# Patient Record
Sex: FEMALE | Race: WHITE | NOT HISPANIC OR LATINO | Employment: OTHER | URBAN - METROPOLITAN AREA
[De-identification: names, ages, dates, MRNs, and addresses within clinical notes are randomized per-mention and may not be internally consistent; named-entity substitution may affect disease eponyms.]

---

## 2017-01-23 ENCOUNTER — HOSPITAL ENCOUNTER (OUTPATIENT)
Dept: RADIOLOGY | Facility: HOSPITAL | Age: 67
Discharge: HOME | End: 2017-01-23
Attending: ORTHOPAEDIC SURGERY
Payer: COMMERCIAL

## 2017-01-23 ENCOUNTER — OFFICE VISIT (OUTPATIENT)
Dept: ORTHOPEDICS | Facility: CLINIC | Age: 67
End: 2017-01-23
Payer: COMMERCIAL

## 2017-01-23 VITALS — WEIGHT: 165 LBS | HEIGHT: 68 IN | BODY MASS INDEX: 25.01 KG/M2

## 2017-01-23 DIAGNOSIS — M25.561 RIGHT KNEE PAIN, UNSPECIFIED CHRONICITY: Primary | ICD-10-CM

## 2017-01-23 DIAGNOSIS — M25.561 RIGHT KNEE PAIN, UNSPECIFIED CHRONICITY: ICD-10-CM

## 2017-01-23 DIAGNOSIS — M17.11 PRIMARY OSTEOARTHRITIS OF RIGHT KNEE: Primary | ICD-10-CM

## 2017-01-23 PROCEDURE — 99213 OFFICE O/P EST LOW 20 MIN: CPT | Mod: S$GLB,,, | Performed by: ORTHOPAEDIC SURGERY

## 2017-01-23 PROCEDURE — 73564 X-RAY EXAM KNEE 4 OR MORE: CPT | Mod: 26,RT,, | Performed by: RADIOLOGY

## 2017-01-23 PROCEDURE — 73562 X-RAY EXAM OF KNEE 3: CPT | Mod: 26,59,LT, | Performed by: RADIOLOGY

## 2017-01-23 PROCEDURE — 99999 PR PBB SHADOW E&M-EST. PATIENT-LVL II: CPT | Mod: PBBFAC,,, | Performed by: ORTHOPAEDIC SURGERY

## 2017-01-23 PROCEDURE — 73564 X-RAY EXAM KNEE 4 OR MORE: CPT | Mod: TC,PN,RT

## 2017-01-23 NOTE — PROGRESS NOTES
Past Medical History   Diagnosis Date    Depression     GERD (gastroesophageal reflux disease)     Hyperlipidemia     Hypertension     Hypothyroid        Past Surgical History   Procedure Laterality Date    Appendectomy      Tubal ligation      Tonsillectomy         Current Outpatient Prescriptions   Medication Sig    aspirin (ECOTRIN) 81 MG EC tablet Take 81 mg by mouth once daily.     atorvastatin (LIPITOR) 10 MG tablet One tablet daily    benzonatate (TESSALON) 100 MG capsule     BRAN/GUM/FIB/DAYRL/PSYL/KELP/PEC (FIBER 6 ORAL) Take by mouth.    buPROPion (WELLBUTRIN SR) 150 MG TBSR 12 hr tablet Take 150 mg by mouth 2 (two) times daily.    cetirizine (ZYRTEC) 10 MG tablet Take 10 mg by mouth once daily.    erythromycin (ROMYCIN) ophthalmic ointment Place into both eyes every 6 (six) hours.    Lactobacillus rhamnosus GG (CULTURELLE) 10 billion cell capsule Take 1 capsule by mouth once daily.    levothyroxine (SYNTHROID) 112 MCG tablet Take 1 tablet (112 mcg total) by mouth once daily.    lisinopril (PRINIVIL,ZESTRIL) 20 MG tablet Take 20 mg by mouth once daily.    metoprolol succinate (TOPROL-XL) 25 MG 24 hr tablet Take 25 mg by mouth once daily.     No current facility-administered medications for this visit.      Facility-Administered Medications Ordered in Other Visits   Medication    EUFLEXXA injection Syrg 20 mg       Review of patient's allergies indicates:   Allergen Reactions    Lidocaine      Hand edema,  redness    Penicillins Rash       Family History   Problem Relation Age of Onset    Cancer Father     Cancer Sister        Social History     Social History    Marital status:      Spouse name: N/A    Number of children: N/A    Years of education: N/A     Occupational History    Not on file.     Social History Main Topics    Smoking status: Never Smoker    Smokeless tobacco: Never Used    Alcohol use Yes      Comment: rarely    Drug use: No    Sexual activity: Yes      Other Topics Concern    Not on file     Social History Narrative       Chief Complaint:   Chief Complaint   Patient presents with    Left Knee - Pain       History: This is a 66-year-old female who is an employee here at [employer].  Patient has had a history of left knee arthritis since 2014.  Patient had a cortisone injection done 2 years ago which worked great and then another one about a month ago.  Patient was seeing another physician who is now out of network.  She was considering Synvisc at the time.  She would like to pursue this.  Symptoms are stable and moderate to severe.    Present: She comes in today because her right knee is giving him more trouble.  Patient had a Synvisc series of the left knee and has done great.  She is almost a year out and still having good results.  She would like to try the same thing on the right knee.  Pain started hurting about a week ago.  Pain walking and around the kneecap.  Pain today as a 0 out of 10 but up to a 6 out of 10 with activity.    Review of Systems:    Musculoskeletal:  See HPI        Physical Examination:    Vital Signs:    There were no vitals filed for this visit.    Body mass index is 25.09 kg/(m^2).    This a well-developed, well nourished patient in no acute distress.  They are alert and oriented and cooperative to examination.  Pt. walks without an antalgic gait.      Examination of the right knee shows no rashes or erythema. There are no masses ecchymosis or effusion. Patient has full range of motion from 0-130°. Patient is nontender to palpation over lateral joint line and moderately tender to palpation over the medial joint line.  Knee is stable to varus and valgus stress. 5 out of 5 motor strength. Palpable distal pulses. Intact light touch sensation.  Severe Patellofemoral crepitus      X-rays: 4 views of the right knee are ordered and reviewed which show arthritic changes with severe changes of the patellofemoral joint and more moderate changes  of the medial compartment of both knees     Assessment:: Bilateral knee osteoarthritis    Plan:  I reviewed her x-rays today.  Both knees are about equal.  I'm glad her left knee is doing so well.  I recommended doing a Euflexxa series of her right knee as well.  We will get authorization for this. follow-up in 2 weeks to start the injections..

## 2017-01-25 PROBLEM — M17.11 PRIMARY OSTEOARTHRITIS OF RIGHT KNEE: Status: ACTIVE | Noted: 2017-01-25

## 2017-02-10 ENCOUNTER — TELEPHONE (OUTPATIENT)
Dept: FAMILY MEDICINE | Facility: CLINIC | Age: 67
End: 2017-02-10

## 2017-02-10 ENCOUNTER — OFFICE VISIT (OUTPATIENT)
Dept: OPTOMETRY | Facility: CLINIC | Age: 67
End: 2017-02-10
Payer: COMMERCIAL

## 2017-02-10 DIAGNOSIS — J11.1 INFLUENZA: Primary | ICD-10-CM

## 2017-02-10 DIAGNOSIS — H25.13 NUCLEAR SCLEROSIS, BILATERAL: Primary | ICD-10-CM

## 2017-02-10 DIAGNOSIS — H52.7 REFRACTIVE ERROR: ICD-10-CM

## 2017-02-10 DIAGNOSIS — Z20.828 EXPOSURE TO THE FLU: ICD-10-CM

## 2017-02-10 PROCEDURE — 92015 DETERMINE REFRACTIVE STATE: CPT | Mod: S$GLB,,, | Performed by: OPTOMETRIST

## 2017-02-10 PROCEDURE — 92004 COMPRE OPH EXAM NEW PT 1/>: CPT | Mod: S$GLB,,, | Performed by: OPTOMETRIST

## 2017-02-10 PROCEDURE — 99999 PR PBB SHADOW E&M-EST. PATIENT-LVL II: CPT | Mod: PBBFAC,,, | Performed by: OPTOMETRIST

## 2017-02-10 RX ORDER — OSELTAMIVIR PHOSPHATE 75 MG/1
CAPSULE ORAL
Qty: 10 CAPSULE | Refills: 0 | Status: SHIPPED | OUTPATIENT
Start: 2017-02-10 | End: 2018-04-03 | Stop reason: ALTCHOICE

## 2017-02-10 NOTE — TELEPHONE ENCOUNTER
Patient states that her son in law has the flu. Also states that they were advised to start tamiflu. Please advise.

## 2017-02-10 NOTE — PROGRESS NOTES
HPI     CC: Pt here today for yearly eye exam. DLE 1 year    Pt states vision has been stable with current glasses.     (-) pain / (-) discomfort  (-) headaches  (-) diplopia   (-) flashes / (-) floaters         Last edited by Amos Leblanc on 2/10/2017  2:15 PM.     ROS     Positive for: Eyes    Negative for: Constitutional, Gastrointestinal, Neurological, Skin,   Genitourinary, Musculoskeletal, HENT, Endocrine, Cardiovascular,   Respiratory, Psychiatric, Allergic/Imm, Heme/Lymph    Last edited by Chon Dorantes, OD on 2/10/2017  2:40 PM. (History)        Assessment /Plan     For exam results, see Encounter Report.    Nuclear sclerosis, bilateral    Refractive error      Mild NS ou. Discussed possible ocular affects of cataracts. Acceptable BCVA OU. Discussed treatment options. Surgery not recommended at this time. Monitor yearly.     Dispensed updated spectacle Rx. Discussed various spectacle lens options. Discussed adaptation period to new specs. Demonstrated new spec Rx in phoropter with patient satisfaction.       RTC in 1 year for comprehensive eye exam, or sooner prn.

## 2017-02-10 NOTE — TELEPHONE ENCOUNTER
----- Message from Leslie Hernandez sent at 2/10/2017  8:08 AM CST -----  Contact: Patient  Patient's son in law has the flu and his Doctor recommended that everyone in the house be treated with Tamiflu. Please send a prescription to [facility] on [address].

## 2017-02-10 NOTE — TELEPHONE ENCOUNTER
----- Message from Ramona Sanabria sent at 2/9/2017  3:34 PM CST -----  Contact: Allison Trejo  Daughter called regarding the patient needing to be prescribed tamaflu. The patient's son was diagnosed with the flu and lives with him. Please contact Allison [phone]    [facility] [address] [place] [phone] [facility]  [address] [facility] [address]  Phone: [phone] Fax: [phone]

## 2017-08-24 DIAGNOSIS — Z12.11 COLON CANCER SCREENING: ICD-10-CM

## 2017-08-28 ENCOUNTER — OFFICE VISIT (OUTPATIENT)
Dept: ORTHOPEDICS | Facility: CLINIC | Age: 67
End: 2017-08-28
Payer: COMMERCIAL

## 2017-08-28 ENCOUNTER — HOSPITAL ENCOUNTER (OUTPATIENT)
Dept: RADIOLOGY | Facility: HOSPITAL | Age: 67
Discharge: HOME | End: 2017-08-28
Attending: ORTHOPAEDIC SURGERY
Payer: COMMERCIAL

## 2017-08-28 VITALS
BODY MASS INDEX: 25.01 KG/M2 | DIASTOLIC BLOOD PRESSURE: 85 MMHG | HEART RATE: 75 BPM | HEIGHT: 68 IN | SYSTOLIC BLOOD PRESSURE: 142 MMHG | WEIGHT: 165 LBS

## 2017-08-28 DIAGNOSIS — M75.101 ROTATOR CUFF SYNDROME, RIGHT: Primary | ICD-10-CM

## 2017-08-28 DIAGNOSIS — M25.511 RIGHT SHOULDER PAIN, UNSPECIFIED CHRONICITY: ICD-10-CM

## 2017-08-28 DIAGNOSIS — M25.511 RIGHT SHOULDER PAIN, UNSPECIFIED CHRONICITY: Primary | ICD-10-CM

## 2017-08-28 PROCEDURE — 73030 X-RAY EXAM OF SHOULDER: CPT | Mod: 26,RT,, | Performed by: RADIOLOGY

## 2017-08-28 PROCEDURE — 99999 PR PBB SHADOW E&M-EST. PATIENT-LVL III: CPT | Mod: PBBFAC,,, | Performed by: ORTHOPAEDIC SURGERY

## 2017-08-28 PROCEDURE — 99214 OFFICE O/P EST MOD 30 MIN: CPT | Mod: 25,S$GLB,, | Performed by: ORTHOPAEDIC SURGERY

## 2017-08-28 PROCEDURE — 3077F SYST BP >= 140 MM HG: CPT | Mod: S$GLB,,, | Performed by: ORTHOPAEDIC SURGERY

## 2017-08-28 PROCEDURE — 3008F BODY MASS INDEX DOCD: CPT | Mod: S$GLB,,, | Performed by: ORTHOPAEDIC SURGERY

## 2017-08-28 PROCEDURE — 73030 X-RAY EXAM OF SHOULDER: CPT | Mod: TC,PN,RT

## 2017-08-28 PROCEDURE — 3079F DIAST BP 80-89 MM HG: CPT | Mod: S$GLB,,, | Performed by: ORTHOPAEDIC SURGERY

## 2017-08-28 PROCEDURE — 20610 DRAIN/INJ JOINT/BURSA W/O US: CPT | Mod: RT,S$GLB,, | Performed by: ORTHOPAEDIC SURGERY

## 2017-08-28 RX ORDER — TRIAMCINOLONE ACETONIDE 40 MG/ML
40 INJECTION, SUSPENSION INTRA-ARTICULAR; INTRAMUSCULAR
Status: DISCONTINUED | OUTPATIENT
Start: 2017-08-28 | End: 2017-08-28 | Stop reason: HOSPADM

## 2017-08-28 RX ADMIN — TRIAMCINOLONE ACETONIDE 40 MG: 40 INJECTION, SUSPENSION INTRA-ARTICULAR; INTRAMUSCULAR at 03:08

## 2017-08-28 NOTE — PROGRESS NOTES
Past Medical History:   Diagnosis Date    Depression     GERD (gastroesophageal reflux disease)     Hyperlipidemia     Hypertension     Hypothyroid        Past Surgical History:   Procedure Laterality Date    APPENDECTOMY      TONSILLECTOMY      TUBAL LIGATION         Current Outpatient Prescriptions   Medication Sig    aspirin (ECOTRIN) 81 MG EC tablet Take 81 mg by mouth once daily.     atorvastatin (LIPITOR) 10 MG tablet One tablet daily    benzonatate (TESSALON) 100 MG capsule     BRAN/GUM/FIB/DARYL/PSYL/KELP/PEC (FIBER 6 ORAL) Take by mouth.    buPROPion (WELLBUTRIN SR) 150 MG TBSR 12 hr tablet Take 150 mg by mouth 2 (two) times daily.    cetirizine (ZYRTEC) 10 MG tablet Take 10 mg by mouth once daily.    erythromycin (ROMYCIN) ophthalmic ointment Place into both eyes every 6 (six) hours.    Lactobacillus rhamnosus GG (CULTURELLE) 10 billion cell capsule Take 1 capsule by mouth once daily.    lisinopril (PRINIVIL,ZESTRIL) 20 MG tablet Take 20 mg by mouth once daily.    metoprolol succinate (TOPROL-XL) 25 MG 24 hr tablet Take 25 mg by mouth once daily.    oseltamivir (TAMIFLU) 75 MG capsule Take 1 po qday until out. If symptoms of flu-fever, headache, body aches then increase to bid until out     No current facility-administered medications for this visit.      Facility-Administered Medications Ordered in Other Visits   Medication    EUFLEXXA injection Syrg 20 mg       Review of patient's allergies indicates:   Allergen Reactions    Lidocaine      Hand edema,  redness    Penicillins Rash       Family History   Problem Relation Age of Onset    Cancer Father     Cancer Sister     Glaucoma Neg Hx     Macular degeneration Neg Hx     Retinal detachment Neg Hx        Social History     Social History    Marital status:      Spouse name: N/A    Number of children: N/A    Years of education: N/A     Occupational History    Not on file.     Social History Main Topics    Smoking  status: Never Smoker    Smokeless tobacco: Never Used    Alcohol use Yes      Comment: rarely    Drug use: No    Sexual activity: Yes     Other Topics Concern    Not on file     Social History Narrative    No narrative on file       Chief Complaint:   Chief Complaint   Patient presents with    Shoulder Pain     RIGHT SHOULDER PAIN       History of present illness: Is a 66-year-old pleasant right-hand-dominant female seen for right shoulder pain began on August 25, 2017 when she awoke.  Patient doesn't recall a trauma or injury.  Patient has tried Tylenol and heat.  She's tried icing.  She has limited range of motion and significant pain.  No history of shoulder trouble.  Pain as a 6 out of 10.      Review of Systems:    Constitution: Negative for chills, fever, and sweats.  Negative for unexplained weight loss.    HENT:  Negative for headaches and blurry vision.    Cardiovascular:Negative for chest pain or irregular heart beat. Negative for hypertension.    Respiratory:  Negative for cough and shortness of breath.    Gastrointestinal: Negative for abdominal pain, heartburn, melena, nausea, and vomitting.    Genitourinary:  Negative bladder incontinence and dysuria.    Musculoskeletal:  See HPI    Neurological: Negative for numbness.    Psychiatric/Behavioral: Negative for depression.  The patient is not nervous/anxious.      Endocrine: Negative for polyuria    Hematologic/Lymphatic: Negative for bleeding problem.  Does not bruise/bleed easily.    Skin: Negative for poor would healing and rash      Physical Examination:    Vital Signs:    Vitals:    08/28/17 1356   BP: (!) 142/85   Pulse: 75       Body mass index is 25.09 kg/m².    This a well-developed, well nourished patient in no acute distress.  They are alert and oriented and cooperative to examination.  Pt. walks without an antalgic gait.      Examination of the right shoulder shows no rashes or erythema. There are no masses, ecchymosis, or atrophy. The  patient has mild limitation in active range of motion in forward flexion, external rotation, and internal rotation to the mid T-spine. The patient has markedly positive impingement signs. - Sumter's test. - Speeds test. Nontender to palpation over a.c. joint. Normal stability anteriorly, posteriorly, and negative sulcus sign. Passive range of motion: Forward flexion of 180°, external rotation at 90° of 90°, internal rotation of 50°, and external rotation at 0° of 50°. 2+ radial pulse. Intact axillary, radial, median and ulnar sensation. 5 out of 5 resisted forward flexion, external rotation, and negative lift off test.    Examination of the left shoulder shows no rashes or erythema. There are no masses, ecchymosis, or atrophy. The patient has full range of motion in forward flexion, external rotation, and internal rotation to the mid T-spine. The patient has - impingement signs. - Sumter's test. - Speeds test. Nontender to palpation over a.c. joint. Normal stability anteriorly, posteriorly, and negative sulcus sign. Passive range of motion: Forward flexion of 180°, external rotation at 90° of 90°, internal rotation of 50°, and external rotation at 0° of 50°. 2+ radial pulse. Intact axillary, radial, median and ulnar sensation. 5 out of 5 resisted forward flexion, external rotation, and negative lift off test.    X-rays: X-rays of the right shoulder ordered and reviewed which show some mild degenerative changes throughout the shoulder     Assessment:: Right rotator cuff syndrome    Plan:  I reviewed the x-rays and the findings with her and her daughter today.  I recommended cortisone injection to help with her acute flareup.  Hopefully this will resolve all of her pain.  We talked about possibly getting her into some physical therapy her needing an MRI if the pain should persist.    This note was created using Dragon voice recognition software that occasionally misinterpreted phrases or words.    Consult note is  delivered via Epic messaging service.

## 2017-08-28 NOTE — PROCEDURES
Large Joint Aspiration/Injection  Date/Time: 8/28/2017 3:32 PM  Performed by: REGULO PORRAS  Authorized by: REGULO PORRAS     Consent Done?:  Yes (Verbal)  Indications:  Pain  Procedure site marked: Yes    Timeout: Prior to procedure the correct patient, procedure, and site was verified      Location:  Shoulder  Site:  R subacromial bursa  Prep: Patient was prepped and draped in usual sterile fashion    Ultrasonic Guidance for needle placement: No  Needle size:  20 G  Approach:  Posterior  Medications:  40 mg triamcinolone acetonide 40 mg/mL  Patient tolerance:  Patient tolerated the procedure well with no immediate complications

## 2017-09-27 ENCOUNTER — LAB VISIT (OUTPATIENT)
Dept: LAB | Facility: HOSPITAL | Age: 67
End: 2017-09-27
Attending: SPECIALIST
Payer: COMMERCIAL

## 2017-09-27 DIAGNOSIS — I20.0 INTERMEDIATE CORONARY SYNDROME: Primary | ICD-10-CM

## 2017-09-27 DIAGNOSIS — Z13.29 SCREENING FOR THYROID DISORDER: ICD-10-CM

## 2017-09-27 DIAGNOSIS — E78.9 UNSPECIFIED DISORDER OF LIPOID METABOLISM: ICD-10-CM

## 2017-09-27 DIAGNOSIS — E05.90 THYROTOXICOSIS WITHOUT MENTION OF GOITER OR OTHER CAUSE, WITHOUT MENTION OF THYROTOXIC CRISIS OR STORM: ICD-10-CM

## 2017-09-27 DIAGNOSIS — R07.89 OTHER CHEST PAIN: ICD-10-CM

## 2017-09-27 LAB
ALBUMIN SERPL BCP-MCNC: 3.9 G/DL
ALP SERPL-CCNC: 58 U/L
ALT SERPL W/O P-5'-P-CCNC: 22 U/L
ANION GAP SERPL CALC-SCNC: 8 MMOL/L
AST SERPL-CCNC: 21 U/L
BASOPHILS # BLD AUTO: 0 K/UL
BASOPHILS NFR BLD: 0.2 %
BILIRUB SERPL-MCNC: 0.8 MG/DL
BUN SERPL-MCNC: 12 MG/DL
CALCIUM SERPL-MCNC: 9.8 MG/DL
CHLORIDE SERPL-SCNC: 105 MMOL/L
CHOLEST SERPL-MCNC: 242 MG/DL
CHOLEST/HDLC SERPL: 3 {RATIO}
CO2 SERPL-SCNC: 28 MMOL/L
CREAT SERPL-MCNC: 0.8 MG/DL
DIFFERENTIAL METHOD: ABNORMAL
EOSINOPHIL # BLD AUTO: 0.1 K/UL
EOSINOPHIL NFR BLD: 1.5 %
ERYTHROCYTE [DISTWIDTH] IN BLOOD BY AUTOMATED COUNT: 14.1 %
EST. GFR  (AFRICAN AMERICAN): >60 ML/MIN/1.73 M^2
EST. GFR  (NON AFRICAN AMERICAN): >60 ML/MIN/1.73 M^2
GLUCOSE SERPL-MCNC: 91 MG/DL
HCT VFR BLD AUTO: 39 %
HDLC SERPL-MCNC: 82 MG/DL
HDLC SERPL: 33.9 %
HGB BLD-MCNC: 13.2 G/DL
LDLC SERPL CALC-MCNC: 148 MG/DL
LYMPHOCYTES # BLD AUTO: 2.3 K/UL
LYMPHOCYTES NFR BLD: 42.9 %
MCH RBC QN AUTO: 29.9 PG
MCHC RBC AUTO-ENTMCNC: 34 G/DL
MCV RBC AUTO: 88 FL
MONOCYTES # BLD AUTO: 0.4 K/UL
MONOCYTES NFR BLD: 8.3 %
NEUTROPHILS # BLD AUTO: 2.5 K/UL
NEUTROPHILS NFR BLD: 47.1 %
NONHDLC SERPL-MCNC: 160 MG/DL
PLATELET # BLD AUTO: 214 K/UL
PMV BLD AUTO: 8.6 FL
POTASSIUM SERPL-SCNC: 4.3 MMOL/L
PROT SERPL-MCNC: 7 G/DL
RBC # BLD AUTO: 4.44 M/UL
SODIUM SERPL-SCNC: 141 MMOL/L
TRIGL SERPL-MCNC: 60 MG/DL
TSH SERPL DL<=0.005 MIU/L-ACNC: 2.28 UIU/ML
WBC # BLD AUTO: 5.4 K/UL

## 2017-09-27 PROCEDURE — 80061 LIPID PANEL: CPT

## 2017-09-27 PROCEDURE — 80053 COMPREHEN METABOLIC PANEL: CPT

## 2017-09-27 PROCEDURE — 84443 ASSAY THYROID STIM HORMONE: CPT

## 2017-09-27 PROCEDURE — 36415 COLL VENOUS BLD VENIPUNCTURE: CPT

## 2017-09-27 PROCEDURE — 85025 COMPLETE CBC W/AUTO DIFF WBC: CPT

## 2017-09-28 DIAGNOSIS — Z12.31 SCREENING MAMMOGRAM, ENCOUNTER FOR: ICD-10-CM

## 2017-09-28 DIAGNOSIS — Z12.31 OTHER SCREENING MAMMOGRAM: ICD-10-CM

## 2017-11-30 ENCOUNTER — OFFICE VISIT (OUTPATIENT)
Dept: ORTHOPEDICS | Facility: CLINIC | Age: 67
End: 2017-11-30
Payer: COMMERCIAL

## 2017-11-30 VITALS
BODY MASS INDEX: 25.01 KG/M2 | SYSTOLIC BLOOD PRESSURE: 142 MMHG | HEIGHT: 68 IN | WEIGHT: 165 LBS | HEART RATE: 71 BPM | DIASTOLIC BLOOD PRESSURE: 67 MMHG

## 2017-11-30 DIAGNOSIS — M17.11 PRIMARY OSTEOARTHRITIS OF RIGHT KNEE: Primary | ICD-10-CM

## 2017-11-30 PROCEDURE — 99213 OFFICE O/P EST LOW 20 MIN: CPT | Mod: 25,S$GLB,, | Performed by: ORTHOPAEDIC SURGERY

## 2017-11-30 PROCEDURE — 20610 DRAIN/INJ JOINT/BURSA W/O US: CPT | Mod: RT,S$GLB,, | Performed by: ORTHOPAEDIC SURGERY

## 2017-11-30 PROCEDURE — 99999 PR PBB SHADOW E&M-EST. PATIENT-LVL III: CPT | Mod: PBBFAC,,, | Performed by: ORTHOPAEDIC SURGERY

## 2017-11-30 RX ORDER — TRIAMCINOLONE ACETONIDE 40 MG/ML
40 INJECTION, SUSPENSION INTRA-ARTICULAR; INTRAMUSCULAR
Status: DISCONTINUED | OUTPATIENT
Start: 2017-11-30 | End: 2017-11-30 | Stop reason: HOSPADM

## 2017-11-30 RX ADMIN — TRIAMCINOLONE ACETONIDE 40 MG: 40 INJECTION, SUSPENSION INTRA-ARTICULAR; INTRAMUSCULAR at 01:11

## 2017-11-30 NOTE — PROGRESS NOTES
Past Medical History:   Diagnosis Date    Depression     GERD (gastroesophageal reflux disease)     Hyperlipidemia     Hypertension     Hypothyroid        Past Surgical History:   Procedure Laterality Date    APPENDECTOMY      TONSILLECTOMY      TUBAL LIGATION         Current Outpatient Prescriptions   Medication Sig    aspirin (ECOTRIN) 81 MG EC tablet Take 81 mg by mouth once daily.     atorvastatin (LIPITOR) 10 MG tablet One tablet daily    benzonatate (TESSALON) 100 MG capsule     BRAN/GUM/FIB/DARYL/PSYL/KELP/PEC (FIBER 6 ORAL) Take by mouth.    buPROPion (WELLBUTRIN SR) 150 MG TBSR 12 hr tablet Take 150 mg by mouth 2 (two) times daily.    cetirizine (ZYRTEC) 10 MG tablet Take 10 mg by mouth once daily.    erythromycin (ROMYCIN) ophthalmic ointment Place into both eyes every 6 (six) hours.    Lactobacillus rhamnosus GG (CULTURELLE) 10 billion cell capsule Take 1 capsule by mouth once daily.    lisinopril (PRINIVIL,ZESTRIL) 20 MG tablet Take 20 mg by mouth once daily.    metoprolol succinate (TOPROL-XL) 25 MG 24 hr tablet Take 25 mg by mouth once daily.    oseltamivir (TAMIFLU) 75 MG capsule Take 1 po qday until out. If symptoms of flu-fever, headache, body aches then increase to bid until out     No current facility-administered medications for this visit.      Facility-Administered Medications Ordered in Other Visits   Medication    EUFLEXXA injection Syrg 20 mg       Review of patient's allergies indicates:   Allergen Reactions    Lidocaine      Hand edema,  redness    Penicillins Rash       Family History   Problem Relation Age of Onset    Cancer Father     Cancer Sister     Glaucoma Neg Hx     Macular degeneration Neg Hx     Retinal detachment Neg Hx        Social History     Social History    Marital status:      Spouse name: N/A    Number of children: N/A    Years of education: N/A     Occupational History    Not on file.     Social History Main Topics    Smoking  status: Never Smoker    Smokeless tobacco: Never Used    Alcohol use Yes      Comment: rarely    Drug use: No    Sexual activity: Yes     Other Topics Concern    Not on file     Social History Narrative    No narrative on file       Chief Complaint:   Chief Complaint   Patient presents with    Knee Pain     right knee pain       History: This is a 67-year-old female who is an employee here at [employer].  Patient has had a history of left knee arthritis since 2014.  Patient had a cortisone injection done 2 years ago which worked great and then another one about a month ago.  Patient was seeing another physician who is now out of network.  She was considering Synvisc at the time.  She would like to pursue this.  Symptoms are stable and moderate to severe.    Present: She comes in today because her right knee is giving her more trouble.  Patient had a Synvisc series of the left knee and has done great.  Euflexxa worked on the right knee.  Pain is back up to a 5 out of 10.      Review of systems:   Musculoskeletal: See history of present illness    Physical examination:    Vital Signs:    Vitals:    11/30/17 1024   BP: (!) 142/67   Pulse: 71       Body mass index is 25.09 kg/m².    This a well-developed, well nourished patient in no acute distress.  They are alert and oriented and cooperative to examination.  Pt. walks without an antalgic gait.      Examination of the right knee shows no rashes or erythema. There are no masses ecchymosis or effusion. Patient has full range of motion from 0-130°. Patient is nontender to palpation over lateral joint line and moderately tender to palpation over the medial joint line.  Knee is stable to varus and valgus stress. 5 out of 5 motor strength. Palpable distal pulses. Intact light touch sensation.  Severe Patellofemoral crepitus      X-rays: 4 views of the right knee are  reviewed which show arthritic changes with severe changes of the patellofemoral joint and more moderate  changes of the medial compartment of both knees     Assessment:: Bilateral knee osteoarthritis    Plan:  I reviewed her x-rays today.  Both knees are about equal.  I'm glad her left knee is doing so well.  We talked about treatment today.  She would like to try and repeat a Euflexxa series.  We will do injection of steroid today and follow-up in 2 weeks with Euflexxa.

## 2017-11-30 NOTE — PROCEDURES
Large Joint Aspiration/Injection  Date/Time: 11/30/2017 1:06 PM  Performed by: REGULO PORRAS  Authorized by: REGULO PORRAS     Consent Done?:  Yes (Verbal)  Indications:  Pain  Procedure site marked: Yes    Timeout: Prior to procedure the correct patient, procedure, and site was verified      Location:  Knee  Site:  R knee  Prep: Patient was prepped and draped in usual sterile fashion    Needle size:  20 G  Approach:  Anterolateral  Medications:  40 mg triamcinolone acetonide 40 mg/mL  Patient tolerance:  Patient tolerated the procedure well with no immediate complications

## 2017-12-14 ENCOUNTER — OFFICE VISIT (OUTPATIENT)
Dept: ORTHOPEDICS | Facility: CLINIC | Age: 67
End: 2017-12-14
Payer: COMMERCIAL

## 2017-12-14 DIAGNOSIS — M17.11 PRIMARY OSTEOARTHRITIS OF RIGHT KNEE: Primary | ICD-10-CM

## 2017-12-14 PROCEDURE — 20610 DRAIN/INJ JOINT/BURSA W/O US: CPT | Mod: RT,S$GLB,, | Performed by: ORTHOPAEDIC SURGERY

## 2017-12-14 PROCEDURE — 99499 UNLISTED E&M SERVICE: CPT | Mod: S$GLB,,, | Performed by: ORTHOPAEDIC SURGERY

## 2017-12-14 PROCEDURE — 99999 PR PBB SHADOW E&M-EST. PATIENT-LVL II: CPT | Mod: PBBFAC,,, | Performed by: ORTHOPAEDIC SURGERY

## 2017-12-14 RX ORDER — HYALURONATE SODIUM 20 MG/2 ML
20 SYRINGE (ML) INTRAARTICULAR
Status: DISCONTINUED | OUTPATIENT
Start: 2017-12-14 | End: 2017-12-14 | Stop reason: HOSPADM

## 2017-12-14 RX ADMIN — Medication 20 MG: at 08:12

## 2017-12-14 NOTE — PROCEDURES
Large Joint Aspiration/Injection  Date/Time: 12/14/2017 8:18 AM  Performed by: REGULO PORRAS  Authorized by: REGULO PORRAS     Consent Done?:  Yes (Verbal)  Indications:  Pain  Procedure site marked: Yes    Timeout: Prior to procedure the correct patient, procedure, and site was verified      Location:  Knee  Site:  R knee  Prep: Patient was prepped and draped in usual sterile fashion    Needle size:  20 G  Approach:  Anterolateral  Medications:  20 mg EUFLEXXA 10 mg/mL(mw 2.4 -3.6 million)  Patient tolerance:  Patient tolerated the procedure well with no immediate complications

## 2017-12-14 NOTE — PROGRESS NOTES
Past Medical History:   Diagnosis Date    Depression     GERD (gastroesophageal reflux disease)     Hyperlipidemia     Hypertension     Hypothyroid        Past Surgical History:   Procedure Laterality Date    APPENDECTOMY      TONSILLECTOMY      TUBAL LIGATION         Current Outpatient Prescriptions   Medication Sig    aspirin (ECOTRIN) 81 MG EC tablet Take 81 mg by mouth once daily.     atorvastatin (LIPITOR) 10 MG tablet One tablet daily    benzonatate (TESSALON) 100 MG capsule     BRAN/GUM/FIB/DARYL/PSYL/KELP/PEC (FIBER 6 ORAL) Take by mouth.    buPROPion (WELLBUTRIN SR) 150 MG TBSR 12 hr tablet Take 150 mg by mouth 2 (two) times daily.    cetirizine (ZYRTEC) 10 MG tablet Take 10 mg by mouth once daily.    erythromycin (ROMYCIN) ophthalmic ointment Place into both eyes every 6 (six) hours.    Lactobacillus rhamnosus GG (CULTURELLE) 10 billion cell capsule Take 1 capsule by mouth once daily.    lisinopril (PRINIVIL,ZESTRIL) 20 MG tablet Take 20 mg by mouth once daily.    metoprolol succinate (TOPROL-XL) 25 MG 24 hr tablet Take 25 mg by mouth once daily.    oseltamivir (TAMIFLU) 75 MG capsule Take 1 po qday until out. If symptoms of flu-fever, headache, body aches then increase to bid until out     No current facility-administered medications for this visit.      Facility-Administered Medications Ordered in Other Visits   Medication    EUFLEXXA injection Syrg 20 mg       Review of patient's allergies indicates:   Allergen Reactions    Lidocaine      Hand edema,  redness    Penicillins Rash       Family History   Problem Relation Age of Onset    Cancer Father     Cancer Sister     Glaucoma Neg Hx     Macular degeneration Neg Hx     Retinal detachment Neg Hx        Social History     Social History    Marital status:      Spouse name: N/A    Number of children: N/A    Years of education: N/A     Occupational History    Not on file.     Social History Main Topics    Smoking  status: Never Smoker    Smokeless tobacco: Never Used    Alcohol use Yes      Comment: rarely    Drug use: No    Sexual activity: Yes     Other Topics Concern    Not on file     Social History Narrative    No narrative on file       Chief Complaint:   Chief Complaint   Patient presents with    Knee Pain     R knee euflexxa 1/3       History: This is a 67-year-old female who is an employee here at [employer].  Patient has had a history of left knee arthritis since 2014.  Patient had a cortisone injection done 2 years ago which worked great and then another one about a month ago.  Patient was seeing another physician who is now out of network.  She was considering Synvisc at the time.  She would like to pursue this.  Symptoms are stable and moderate to severe.    Present: She comes in today because her right knee is giving her more trouble.  Patient had a Synvisc series of the left knee and has done great.  Euflexxa worked on the right knee.  Pain is back up to a 5 out of 10.      Review of systems:   Musculoskeletal: See history of present illness    Physical examination:    Vital Signs:    There were no vitals filed for this visit.    There is no height or weight on file to calculate BMI.    This a well-developed, well nourished patient in no acute distress.  They are alert and oriented and cooperative to examination.  Pt. walks without an antalgic gait.      Examination of the right knee shows no rashes or erythema. There are no masses ecchymosis or effusion. Patient has full range of motion from 0-130°. Patient is nontender to palpation over lateral joint line and moderately tender to palpation over the medial joint line.  Knee is stable to varus and valgus stress. 5 out of 5 motor strength. Palpable distal pulses. Intact light touch sensation.  Severe Patellofemoral crepitus      X-rays: 4 views of the right knee are  reviewed which show arthritic changes with severe changes of the patellofemoral joint and  more moderate changes of the medial compartment of both knees     Assessment:: Bilateral knee osteoarthritis    Plan:  I injected her right knee with Euflexxa 1 of 3.  Follow-up next week.

## 2017-12-21 ENCOUNTER — OFFICE VISIT (OUTPATIENT)
Dept: ORTHOPEDICS | Facility: CLINIC | Age: 67
End: 2017-12-21
Payer: COMMERCIAL

## 2017-12-21 VITALS — HEIGHT: 68 IN | WEIGHT: 165 LBS | BODY MASS INDEX: 25.01 KG/M2

## 2017-12-21 DIAGNOSIS — M17.11 PRIMARY OSTEOARTHRITIS OF RIGHT KNEE: Primary | ICD-10-CM

## 2017-12-21 PROCEDURE — 99499 UNLISTED E&M SERVICE: CPT | Mod: S$GLB,,, | Performed by: ORTHOPAEDIC SURGERY

## 2017-12-21 PROCEDURE — 20610 DRAIN/INJ JOINT/BURSA W/O US: CPT | Mod: RT,S$GLB,, | Performed by: ORTHOPAEDIC SURGERY

## 2017-12-21 PROCEDURE — 99999 PR PBB SHADOW E&M-EST. PATIENT-LVL II: CPT | Mod: PBBFAC,,, | Performed by: ORTHOPAEDIC SURGERY

## 2017-12-21 RX ORDER — HYALURONATE SODIUM 20 MG/2 ML
20 SYRINGE (ML) INTRAARTICULAR
Status: DISCONTINUED | OUTPATIENT
Start: 2017-12-21 | End: 2017-12-21 | Stop reason: HOSPADM

## 2017-12-21 RX ADMIN — Medication 20 MG: at 08:12

## 2017-12-21 NOTE — PROGRESS NOTES
Past Medical History:   Diagnosis Date    Depression     GERD (gastroesophageal reflux disease)     Hyperlipidemia     Hypertension     Hypothyroid        Past Surgical History:   Procedure Laterality Date    APPENDECTOMY      TONSILLECTOMY      TUBAL LIGATION         Current Outpatient Prescriptions   Medication Sig    aspirin (ECOTRIN) 81 MG EC tablet Take 81 mg by mouth once daily.     atorvastatin (LIPITOR) 10 MG tablet One tablet daily    benzonatate (TESSALON) 100 MG capsule     BRAN/GUM/FIB/DARYL/PSYL/KELP/PEC (FIBER 6 ORAL) Take by mouth.    buPROPion (WELLBUTRIN SR) 150 MG TBSR 12 hr tablet Take 150 mg by mouth 2 (two) times daily.    cetirizine (ZYRTEC) 10 MG tablet Take 10 mg by mouth once daily.    erythromycin (ROMYCIN) ophthalmic ointment Place into both eyes every 6 (six) hours.    Lactobacillus rhamnosus GG (CULTURELLE) 10 billion cell capsule Take 1 capsule by mouth once daily.    lisinopril (PRINIVIL,ZESTRIL) 20 MG tablet Take 20 mg by mouth once daily.    metoprolol succinate (TOPROL-XL) 25 MG 24 hr tablet Take 25 mg by mouth once daily.    oseltamivir (TAMIFLU) 75 MG capsule Take 1 po qday until out. If symptoms of flu-fever, headache, body aches then increase to bid until out     No current facility-administered medications for this visit.      Facility-Administered Medications Ordered in Other Visits   Medication    EUFLEXXA injection Syrg 20 mg       Review of patient's allergies indicates:   Allergen Reactions    Lidocaine      Hand edema,  redness    Penicillins Rash       Family History   Problem Relation Age of Onset    Cancer Father     Cancer Sister     Glaucoma Neg Hx     Macular degeneration Neg Hx     Retinal detachment Neg Hx        Social History     Social History    Marital status:      Spouse name: N/A    Number of children: N/A    Years of education: N/A     Occupational History    Not on file.     Social History Main Topics    Smoking  status: Never Smoker    Smokeless tobacco: Never Used    Alcohol use Yes      Comment: rarely    Drug use: No    Sexual activity: Yes     Other Topics Concern    Not on file     Social History Narrative    No narrative on file       Chief Complaint:   Chief Complaint   Patient presents with    Knee Pain     right knee-Euflexxa 2/3        History: This is a 67-year-old female who is an employee here at [employer].  Patient has had a history of left knee arthritis since 2014.  Patient had a cortisone injection done 2 years ago which worked great and then another one about a month ago.  Patient was seeing another physician who is now out of network.  She was considering Synvisc at the time.  She would like to pursue this.  Symptoms are stable and moderate to severe.    Present: She comes in today because her right knee is giving her more trouble.  Patient had a Synvisc series of the left knee and has done great.  Euflexxa worked on the right knee.  Pain is back up to a 5 out of 10.      Review of systems:   Musculoskeletal: See history of present illness    Physical examination:    Vital Signs:    There were no vitals filed for this visit.    Body mass index is 25.09 kg/m².    This a well-developed, well nourished patient in no acute distress.  They are alert and oriented and cooperative to examination.  Pt. walks without an antalgic gait.      Examination of the right knee shows no rashes or erythema. There are no masses ecchymosis or effusion. Patient has full range of motion from 0-130°. Patient is nontender to palpation over lateral joint line and moderately tender to palpation over the medial joint line.  Knee is stable to varus and valgus stress. 5 out of 5 motor strength. Palpable distal pulses. Intact light touch sensation.  Severe Patellofemoral crepitus      X-rays: 4 views of the right knee are  reviewed which show arthritic changes with severe changes of the patellofemoral joint and more moderate  changes of the medial compartment of both knees     Assessment:: Bilateral knee osteoarthritis    Plan:  I injected her right knee with Euflexxa 2 of 3.  Follow-up next week.

## 2017-12-21 NOTE — PROCEDURES
Large Joint Aspiration/Injection  Date/Time: 12/21/2017 8:21 AM  Performed by: REGULO PORRAS  Authorized by: REGULO PORRAS     Consent Done?:  Yes (Verbal)  Indications:  Pain  Procedure site marked: Yes    Timeout: Prior to procedure the correct patient, procedure, and site was verified      Location:  Knee  Site:  R knee  Prep: Patient was prepped and draped in usual sterile fashion    Needle size:  20 G  Approach:  Anterolateral  Medications:  20 mg EUFLEXXA 10 mg/mL(mw 2.4 -3.6 million)  Patient tolerance:  Patient tolerated the procedure well with no immediate complications

## 2017-12-28 ENCOUNTER — OFFICE VISIT (OUTPATIENT)
Dept: ORTHOPEDICS | Facility: CLINIC | Age: 67
End: 2017-12-28
Payer: COMMERCIAL

## 2017-12-28 DIAGNOSIS — M17.11 PRIMARY OSTEOARTHRITIS OF RIGHT KNEE: Primary | ICD-10-CM

## 2017-12-28 PROCEDURE — 20610 DRAIN/INJ JOINT/BURSA W/O US: CPT | Mod: RT,S$GLB,, | Performed by: ORTHOPAEDIC SURGERY

## 2017-12-28 PROCEDURE — 99499 UNLISTED E&M SERVICE: CPT | Mod: S$GLB,,, | Performed by: ORTHOPAEDIC SURGERY

## 2017-12-28 PROCEDURE — 99999 PR PBB SHADOW E&M-EST. PATIENT-LVL II: CPT | Mod: PBBFAC,,, | Performed by: ORTHOPAEDIC SURGERY

## 2017-12-28 RX ORDER — HYALURONATE SODIUM 20 MG/2 ML
20 SYRINGE (ML) INTRAARTICULAR
Status: DISCONTINUED | OUTPATIENT
Start: 2017-12-28 | End: 2017-12-28 | Stop reason: HOSPADM

## 2017-12-28 RX ADMIN — Medication 20 MG: at 10:12

## 2017-12-28 NOTE — PROGRESS NOTES
Past Medical History:   Diagnosis Date    Depression     GERD (gastroesophageal reflux disease)     Hyperlipidemia     Hypertension     Hypothyroid        Past Surgical History:   Procedure Laterality Date    APPENDECTOMY      TONSILLECTOMY      TUBAL LIGATION         Current Outpatient Prescriptions   Medication Sig    aspirin (ECOTRIN) 81 MG EC tablet Take 81 mg by mouth once daily.     atorvastatin (LIPITOR) 10 MG tablet One tablet daily    benzonatate (TESSALON) 100 MG capsule     BRAN/GUM/FIB/DARYL/PSYL/KELP/PEC (FIBER 6 ORAL) Take by mouth.    buPROPion (WELLBUTRIN SR) 150 MG TBSR 12 hr tablet Take 150 mg by mouth 2 (two) times daily.    cetirizine (ZYRTEC) 10 MG tablet Take 10 mg by mouth once daily.    erythromycin (ROMYCIN) ophthalmic ointment Place into both eyes every 6 (six) hours.    Lactobacillus rhamnosus GG (CULTURELLE) 10 billion cell capsule Take 1 capsule by mouth once daily.    lisinopril (PRINIVIL,ZESTRIL) 20 MG tablet Take 20 mg by mouth once daily.    metoprolol succinate (TOPROL-XL) 25 MG 24 hr tablet Take 25 mg by mouth once daily.    oseltamivir (TAMIFLU) 75 MG capsule Take 1 po qday until out. If symptoms of flu-fever, headache, body aches then increase to bid until out     No current facility-administered medications for this visit.      Facility-Administered Medications Ordered in Other Visits   Medication    EUFLEXXA injection Syrg 20 mg       Review of patient's allergies indicates:   Allergen Reactions    Lidocaine      Hand edema,  redness    Penicillins Rash       Family History   Problem Relation Age of Onset    Cancer Father     Cancer Sister     Glaucoma Neg Hx     Macular degeneration Neg Hx     Retinal detachment Neg Hx        Social History     Social History    Marital status:      Spouse name: N/A    Number of children: N/A    Years of education: N/A     Occupational History    Not on file.     Social History Main Topics    Smoking  status: Never Smoker    Smokeless tobacco: Never Used    Alcohol use Yes      Comment: rarely    Drug use: No    Sexual activity: Yes     Other Topics Concern    Not on file     Social History Narrative    No narrative on file       Chief Complaint:   Chief Complaint   Patient presents with    Knee Pain     R knee euflexxa 3/3       History: This is a 67-year-old female who is an employee here at [employer].  Patient has had a history of left knee arthritis since 2014.  Patient had a cortisone injection done 2 years ago which worked great and then another one about a month ago.  Patient was seeing another physician who is now out of network.  She was considering Synvisc at the time.  She would like to pursue this.  Symptoms are stable and moderate to severe.    Present: She comes in today because her right knee is giving her more trouble.  Patient had a Synvisc series of the left knee and has done great.  Euflexxa worked on the right knee.  Pain is back up to a 5 out of 10.      Review of systems:   Musculoskeletal: See history of present illness    Physical examination:    Vital Signs:    There were no vitals filed for this visit.    There is no height or weight on file to calculate BMI.    This a well-developed, well nourished patient in no acute distress.  They are alert and oriented and cooperative to examination.  Pt. walks without an antalgic gait.      Examination of the right knee shows no rashes or erythema. There are no masses ecchymosis or effusion. Patient has full range of motion from 0-130°. Patient is nontender to palpation over lateral joint line and moderately tender to palpation over the medial joint line.  Knee is stable to varus and valgus stress. 5 out of 5 motor strength. Palpable distal pulses. Intact light touch sensation.  Severe Patellofemoral crepitus      X-rays: 4 views of the right knee are  reviewed which show arthritic changes with severe changes of the patellofemoral joint and  more moderate changes of the medial compartment of both knees     Assessment:: Bilateral knee osteoarthritis    Plan:  I injected her right knee with Euflexxa 3 of 3.  Follow-up as needed.

## 2017-12-28 NOTE — PROCEDURES
Large Joint Aspiration/Injection  Date/Time: 12/28/2017 10:52 AM  Performed by: REGULO PORRAS  Authorized by: REGULO PORRAS     Consent Done?:  Yes (Verbal)  Indications:  Pain  Procedure site marked: Yes    Timeout: Prior to procedure the correct patient, procedure, and site was verified      Location:  Knee  Site:  R knee  Prep: Patient was prepped and draped in usual sterile fashion    Needle size:  20 G  Approach:  Anterolateral  Medications:  20 mg EUFLEXXA 10 mg/mL(mw 2.4 -3.6 million)  Patient tolerance:  Patient tolerated the procedure well with no immediate complications

## 2018-02-22 DIAGNOSIS — Z11.59 NEED FOR HEPATITIS C SCREENING TEST: ICD-10-CM

## 2018-04-02 ENCOUNTER — DOCUMENTATION ONLY (OUTPATIENT)
Dept: FAMILY MEDICINE | Facility: CLINIC | Age: 68
End: 2018-04-02

## 2018-04-02 NOTE — PROGRESS NOTES
Health Maintenance Due   Topic Date Due    Hepatitis C Screening  1950    TETANUS VACCINE  10/10/1969    DEXA SCAN  10/10/1991    Colonoscopy  10/10/2001    Zoster Vaccine  10/10/2011    Mammogram  02/28/2014    Pneumococcal (65+) (1 of 2 - PCV13) 10/10/2016    Influenza Vaccine  08/01/2017

## 2018-04-03 ENCOUNTER — OFFICE VISIT (OUTPATIENT)
Dept: FAMILY MEDICINE | Facility: CLINIC | Age: 68
End: 2018-04-03
Payer: COMMERCIAL

## 2018-04-03 ENCOUNTER — APPOINTMENT (OUTPATIENT)
Dept: LAB | Facility: HOSPITAL | Age: 68
End: 2018-04-03
Attending: INTERNAL MEDICINE
Payer: COMMERCIAL

## 2018-04-03 VITALS
WEIGHT: 177.94 LBS | HEART RATE: 69 BPM | HEIGHT: 68 IN | SYSTOLIC BLOOD PRESSURE: 130 MMHG | DIASTOLIC BLOOD PRESSURE: 84 MMHG | TEMPERATURE: 98 F | BODY MASS INDEX: 26.97 KG/M2 | RESPIRATION RATE: 16 BRPM | OXYGEN SATURATION: 100 %

## 2018-04-03 DIAGNOSIS — Z51.81 MEDICATION MONITORING ENCOUNTER: ICD-10-CM

## 2018-04-03 DIAGNOSIS — E03.9 HYPOTHYROIDISM, UNSPECIFIED TYPE: ICD-10-CM

## 2018-04-03 DIAGNOSIS — Z11.59 ENCOUNTER FOR HEPATITIS C SCREENING TEST FOR LOW RISK PATIENT: ICD-10-CM

## 2018-04-03 DIAGNOSIS — Z23 NEED FOR VACCINATION WITH 13-POLYVALENT PNEUMOCOCCAL CONJUGATE VACCINE: ICD-10-CM

## 2018-04-03 DIAGNOSIS — Z78.0 POSTMENOPAUSAL: ICD-10-CM

## 2018-04-03 DIAGNOSIS — G50.0 TRIGEMINAL NEURALGIA: Primary | ICD-10-CM

## 2018-04-03 DIAGNOSIS — E78.5 HYPERLIPIDEMIA, UNSPECIFIED HYPERLIPIDEMIA TYPE: ICD-10-CM

## 2018-04-03 DIAGNOSIS — R60.0 LEG EDEMA: ICD-10-CM

## 2018-04-03 PROCEDURE — 90471 IMMUNIZATION ADMIN: CPT | Mod: S$GLB,,, | Performed by: INTERNAL MEDICINE

## 2018-04-03 PROCEDURE — 80156 ASSAY CARBAMAZEPINE TOTAL: CPT

## 2018-04-03 PROCEDURE — 90670 PCV13 VACCINE IM: CPT | Mod: S$GLB,,, | Performed by: INTERNAL MEDICINE

## 2018-04-03 PROCEDURE — 99214 OFFICE O/P EST MOD 30 MIN: CPT | Mod: 25,S$GLB,, | Performed by: INTERNAL MEDICINE

## 2018-04-03 PROCEDURE — 84443 ASSAY THYROID STIM HORMONE: CPT

## 2018-04-03 PROCEDURE — 80053 COMPREHEN METABOLIC PANEL: CPT

## 2018-04-03 PROCEDURE — 83880 ASSAY OF NATRIURETIC PEPTIDE: CPT

## 2018-04-03 PROCEDURE — 86803 HEPATITIS C AB TEST: CPT

## 2018-04-03 PROCEDURE — 85025 COMPLETE CBC W/AUTO DIFF WBC: CPT

## 2018-04-03 PROCEDURE — 3079F DIAST BP 80-89 MM HG: CPT | Mod: CPTII,S$GLB,, | Performed by: INTERNAL MEDICINE

## 2018-04-03 PROCEDURE — 3075F SYST BP GE 130 - 139MM HG: CPT | Mod: CPTII,S$GLB,, | Performed by: INTERNAL MEDICINE

## 2018-04-03 PROCEDURE — 80061 LIPID PANEL: CPT

## 2018-04-03 RX ORDER — CHOLECALCIFEROL (VITAMIN D3) 25 MCG
1000 TABLET ORAL DAILY
COMMUNITY

## 2018-04-03 RX ORDER — LEVOTHYROXINE SODIUM 112 UG/1
1 TABLET ORAL DAILY
COMMUNITY
Start: 2018-01-26 | End: 2018-10-26 | Stop reason: SDUPTHER

## 2018-04-03 RX ORDER — TRIAMTERENE/HYDROCHLOROTHIAZID 37.5-25 MG
1 TABLET ORAL DAILY
Qty: 30 TABLET | Refills: 11 | Status: SHIPPED | OUTPATIENT
Start: 2018-04-03 | End: 2020-02-05

## 2018-04-03 RX ORDER — BUPROPION HYDROCHLORIDE 150 MG/1
1 TABLET ORAL 2 TIMES DAILY
COMMUNITY
Start: 2018-02-07 | End: 2019-04-09

## 2018-04-03 RX ORDER — CARBAMAZEPINE 200 MG/1
200 TABLET ORAL 3 TIMES DAILY
Qty: 90 TABLET | Refills: 11 | Status: SHIPPED | OUTPATIENT
Start: 2018-04-03 | End: 2019-04-09

## 2018-04-03 NOTE — PROGRESS NOTES
Subjective:       Patient ID: Skyler Espinoza is a 67 y.o. female.    Chief Complaint: Headache (sharp pain back of head) and Leg Swelling    HPI     CHIEF COMPLAINT: Headache  HPI: Mild relief with aspirin  presently has headache: no..    Severity is #  10  (10 point scale).  has a headache      2     days per month.  ONSET:     4 days   ago.    QUALITY/COURSE:    Worse  DURATION: 1-2 seconds happening about 10 times a day    The following symptoms/statements  are positive if BOLD, negative otherwise.     CHARACTERISTICS: sudden. atypical.   Awakening_the_patient_from_sleep. Worse_on_awakening.. .  Pulsating .     Squeezing.  Stabbing.  LOCATION:  . Right.  Left:. . Forehead. Face. Jaw. Eyes. Frontal. Temporal. Top/Vertex. Posterior. Radiation:   AGGRAVATING FACTORS: head trauma . FH of headache.   anxiety .  pressure points of the face . placing the head lower than the trunk .  PAST TREATMENT OR EVALUATION: medications:   CT scan .  MRI.  ASSOCIATED SYMPTOMS:  N/V .  Changes_in_memory .  Change_in_mentation .  Motor_changes . Sensation_changes.  . Stiff_neck . fever. phonophobia .  . prodrome.  Medical History: Any_neurological_disease . migraines . Emotional_problems . Sinus_disease .            CHIEF COMPLAINT: leg edema.  HPI:     ONSET/TIMING: Onset    40 y     ago. Sudden: no..Trauma: no.. Similar problems in past: no    DURATION: .Intermittent    QUALITY/COURSE:  Worse x 1d. .  Pain: no.    LOCATION: Right: yes   Left: yes    INTENSITY/SEVERITY:  Severity    5    (on a 1-10 scale).    CONTEXT/WHEN:  Similar problems: yes  . Late in day: yes.  Weights:   Wt Readings from Last 1 Encounters:   04/03/18 0812 80.7 kg (177 lb 14.6 oz)         MODIFIERS/TREATMENTS: Taking medications ? yes..   Compliant with taking prescribed medications ? yes.   Medication not effective now ? yes  .Prior ultrasound: no  Immobilization: no.     SYMPTOMS/RELATED:  Possible medication side effects include:     The following are  "positive if BOLD, negative otherwise.      REVIEW OF SYSTEMS :    congestive heart_failure . Liver_disease . Alcohol_abuse . blumeia . Licorice_use . pregnancy. . drugs: steroids . clonidine . aldomet . MAO_inh . Ca_challel_blockers . alpha-blocker . hydralazine . minoxidil . NSAIDS.     Review of Systems   Constitutional: Negative for fever.   HENT: Negative for ear pain.    Eyes: Negative for photophobia and visual disturbance.   Gastrointestinal: Negative for nausea and vomiting.   Musculoskeletal: Negative for myalgias.   Neurological: Positive for headaches.   Psychiatric/Behavioral: Negative for dysphoric mood. The patient is not nervous/anxious.        Objective:      Vitals:    04/03/18 0812   BP: 130/84   Pulse: 69   Resp: 16   Temp: 97.8 °F (36.6 °C)   TempSrc: Oral   SpO2: 100%   Weight: 80.7 kg (177 lb 14.6 oz)   Height: 5' 8" (1.727 m)   PainSc: 10-Worst pain ever   PainLoc: Head     Physical Exam   Constitutional: She appears well-developed and well-nourished.   Cardiovascular: Normal rate, regular rhythm and normal heart sounds.    Pulmonary/Chest: Effort normal and breath sounds normal.   Abdominal: Soft. There is no tenderness.   Musculoskeletal: She exhibits edema (1+ edema bilaterally).   Neurological: She is alert.   Psychiatric: She has a normal mood and affect. Her behavior is normal. Thought content normal.   Nursing note and vitals reviewed.      The 10-year ASCVD risk score (Patriciabre GRANGER Jr., et al., 2013) is: 8.1%    Values used to calculate the score:      Age: 67 years      Sex: Female      Is Non- : No      Diabetic: No      Tobacco smoker: No      Systolic Blood Pressure: 130 mmHg      Is BP treated: Yes      HDL Cholesterol: 82 mg/dL      Total Cholesterol: 242 mg/dL    Assessment:       1. Trigeminal neuralgia    2. Leg edema    3. Need for vaccination with 13-polyvalent pneumococcal conjugate vaccine    4. Encounter for hepatitis C screening test for low risk " patient    5. Postmenopausal    6. Hyperlipidemia, unspecified hyperlipidemia type    7. Hypothyroidism, unspecified type    8. Medication monitoring encounter          Plan:     Trigeminal neuralgia  -     CBC auto differential; Future; Expected date: 04/03/2018  -     Comprehensive metabolic panel; Future; Expected date: 04/03/2018  -     carBAMazepine (TEGRETOL) 200 mg tablet; Take 1 tablet (200 mg total) by mouth 3 (three) times daily.  Dispense: 90 tablet; Refill: 11    Leg edema  -     Comprehensive metabolic panel; Future; Expected date: 04/03/2018  -     Brain natriuretic peptide; Future; Expected date: 04/03/2018  -     triamterene-hydrochlorothiazide 37.5-25 mg (MAXZIDE-25) 37.5-25 mg per tablet; Take 1 tablet by mouth once daily.  Dispense: 30 tablet; Refill: 11    Need for vaccination with 13-polyvalent pneumococcal conjugate vaccine  -     Pneumococcal Conjugate Vaccine (13 Valent) (IM)    Encounter for hepatitis C screening test for low risk patient  -     Hepatitis C antibody; Future; Expected date: 04/03/2018    Postmenopausal  -     DXA Bone Density Spine And Hip; Future; Expected date: 04/03/2018    Hyperlipidemia, unspecified hyperlipidemia type  -     Lipid panel; Future; Expected date: 04/03/2018    Hypothyroidism, unspecified type  -     TSH; Future; Expected date: 04/03/2018    Medication monitoring encounter  -     Carbamazepine level, total; Future; Expected date: 04/03/2018  -     CBC auto differential; Future; Expected date: 04/03/2018  -     Comprehensive metabolic panel; Future; Expected date: 04/03/2018      Follow-up in about 3 months (around 7/3/2018).

## 2018-04-03 NOTE — PATIENT INSTRUCTIONS

## 2018-04-03 NOTE — PROGRESS NOTES
Patient ID by name and  Prevnar 13 adult dose 0.50 ml IM given in left deltoid Tolerated well Aseptic tech used, no bleeding at the site noted observed for 15 min no adverse reaction noted.

## 2018-04-04 DIAGNOSIS — Z51.81 MEDICATION MONITORING ENCOUNTER: Primary | ICD-10-CM

## 2018-04-04 LAB
ALBUMIN SERPL BCP-MCNC: 3.7 G/DL
ALBUMIN SERPL BCP-MCNC: 3.7 G/DL
ALP SERPL-CCNC: 54 U/L
ALP SERPL-CCNC: 54 U/L
ALT SERPL W/O P-5'-P-CCNC: 20 U/L
ALT SERPL W/O P-5'-P-CCNC: 20 U/L
ANION GAP SERPL CALC-SCNC: 7 MMOL/L
ANION GAP SERPL CALC-SCNC: 7 MMOL/L
AST SERPL-CCNC: 21 U/L
AST SERPL-CCNC: 21 U/L
BASOPHILS # BLD AUTO: 0 K/UL
BASOPHILS # BLD AUTO: 0 K/UL
BASOPHILS NFR BLD: 0.3 %
BASOPHILS NFR BLD: 0.3 %
BILIRUB SERPL-MCNC: 0.4 MG/DL
BILIRUB SERPL-MCNC: 0.4 MG/DL
BNP SERPL-MCNC: 160 PG/ML
BUN SERPL-MCNC: 14 MG/DL
BUN SERPL-MCNC: 14 MG/DL
CALCIUM SERPL-MCNC: 9.7 MG/DL
CALCIUM SERPL-MCNC: 9.7 MG/DL
CARBAMAZEPINE SERPL-MCNC: <1.9 UG/ML
CHLORIDE SERPL-SCNC: 108 MMOL/L
CHLORIDE SERPL-SCNC: 108 MMOL/L
CHOLEST SERPL-MCNC: 235 MG/DL
CHOLEST/HDLC SERPL: 3 {RATIO}
CO2 SERPL-SCNC: 26 MMOL/L
CO2 SERPL-SCNC: 26 MMOL/L
CREAT SERPL-MCNC: 0.8 MG/DL
CREAT SERPL-MCNC: 0.8 MG/DL
DIFFERENTIAL METHOD: NORMAL
DIFFERENTIAL METHOD: NORMAL
EOSINOPHIL # BLD AUTO: 0.1 K/UL
EOSINOPHIL # BLD AUTO: 0.1 K/UL
EOSINOPHIL NFR BLD: 1.4 %
EOSINOPHIL NFR BLD: 1.4 %
ERYTHROCYTE [DISTWIDTH] IN BLOOD BY AUTOMATED COUNT: 14.4 %
ERYTHROCYTE [DISTWIDTH] IN BLOOD BY AUTOMATED COUNT: 14.4 %
EST. GFR  (AFRICAN AMERICAN): >60 ML/MIN/1.73 M^2
EST. GFR  (AFRICAN AMERICAN): >60 ML/MIN/1.73 M^2
EST. GFR  (NON AFRICAN AMERICAN): >60 ML/MIN/1.73 M^2
EST. GFR  (NON AFRICAN AMERICAN): >60 ML/MIN/1.73 M^2
GLUCOSE SERPL-MCNC: 76 MG/DL
GLUCOSE SERPL-MCNC: 76 MG/DL
HCT VFR BLD AUTO: 37.1 %
HCT VFR BLD AUTO: 37.1 %
HDLC SERPL-MCNC: 79 MG/DL
HDLC SERPL: 33.6 %
HGB BLD-MCNC: 12.2 G/DL
HGB BLD-MCNC: 12.2 G/DL
LDLC SERPL CALC-MCNC: 142.8 MG/DL
LYMPHOCYTES # BLD AUTO: 1.8 K/UL
LYMPHOCYTES # BLD AUTO: 1.8 K/UL
LYMPHOCYTES NFR BLD: 33.5 %
LYMPHOCYTES NFR BLD: 33.5 %
MCH RBC QN AUTO: 29.5 PG
MCH RBC QN AUTO: 29.5 PG
MCHC RBC AUTO-ENTMCNC: 32.9 G/DL
MCHC RBC AUTO-ENTMCNC: 32.9 G/DL
MCV RBC AUTO: 90 FL
MCV RBC AUTO: 90 FL
MONOCYTES # BLD AUTO: 0.3 K/UL
MONOCYTES # BLD AUTO: 0.3 K/UL
MONOCYTES NFR BLD: 5.6 %
MONOCYTES NFR BLD: 5.6 %
NEUTROPHILS # BLD AUTO: 3.1 K/UL
NEUTROPHILS # BLD AUTO: 3.1 K/UL
NEUTROPHILS NFR BLD: 59.2 %
NEUTROPHILS NFR BLD: 59.2 %
NONHDLC SERPL-MCNC: 156 MG/DL
PLATELET # BLD AUTO: 212 K/UL
PLATELET # BLD AUTO: 212 K/UL
PMV BLD AUTO: 9.7 FL
PMV BLD AUTO: 9.7 FL
POTASSIUM SERPL-SCNC: 4.9 MMOL/L
POTASSIUM SERPL-SCNC: 4.9 MMOL/L
PROT SERPL-MCNC: 6.5 G/DL
PROT SERPL-MCNC: 6.5 G/DL
RBC # BLD AUTO: 4.14 M/UL
RBC # BLD AUTO: 4.14 M/UL
SODIUM SERPL-SCNC: 141 MMOL/L
SODIUM SERPL-SCNC: 141 MMOL/L
TRIGL SERPL-MCNC: 66 MG/DL
TSH SERPL DL<=0.005 MIU/L-ACNC: 1.08 UIU/ML
WBC # BLD AUTO: 5.3 K/UL
WBC # BLD AUTO: 5.3 K/UL

## 2018-04-05 LAB — HCV AB SERPL QL IA: NEGATIVE

## 2018-04-06 ENCOUNTER — TELEPHONE (OUTPATIENT)
Dept: FAMILY MEDICINE | Facility: CLINIC | Age: 68
End: 2018-04-06

## 2018-04-06 NOTE — TELEPHONE ENCOUNTER
Concerned that TSH is lower than it has been in the past.  Also states they discussed sleep study with provider.  Did not see order

## 2018-04-06 NOTE — TELEPHONE ENCOUNTER
----- Message from Cale Trejo sent at 4/5/2018 12:16 PM CDT -----  Contact: Daughter  Calling to go over lab results as well as some general questions. Says patient started the water pill that was prescribed yesterday and woke up with a really bad headache. Says she haven't started the headache pills yet, would like to know if she needs to follow up with some blood work after starting. Also said patient was to have a sleep study ordered and she doesn't see the orders for that yet. Please advise. Thanks.

## 2018-04-09 NOTE — TELEPHONE ENCOUNTER
What I like her to do is have a friend or family member watch while she is sleeping  to see if she is stopping breathing sometimes.  This would take an hour as I don't take insurance would pay for it with what I have in the chart right now.    Fluctuations within the normal range of TSH are expected and do not need to be treated

## 2018-04-13 ENCOUNTER — HOSPITAL ENCOUNTER (OUTPATIENT)
Dept: RADIOLOGY | Facility: HOSPITAL | Age: 68
Discharge: HOME | End: 2018-04-13
Attending: FAMILY MEDICINE
Payer: COMMERCIAL

## 2018-04-13 ENCOUNTER — HOSPITAL ENCOUNTER (OUTPATIENT)
Dept: RADIOLOGY | Facility: HOSPITAL | Age: 68
Discharge: HOME | End: 2018-04-13
Attending: INTERNAL MEDICINE
Payer: COMMERCIAL

## 2018-04-13 DIAGNOSIS — Z78.0 POSTMENOPAUSAL: ICD-10-CM

## 2018-04-13 DIAGNOSIS — Z12.31 SCREENING MAMMOGRAM, ENCOUNTER FOR: ICD-10-CM

## 2018-04-13 PROCEDURE — 77067 SCR MAMMO BI INCL CAD: CPT | Mod: 26,,, | Performed by: RADIOLOGY

## 2018-04-13 PROCEDURE — 77063 BREAST TOMOSYNTHESIS BI: CPT | Mod: 26,,, | Performed by: RADIOLOGY

## 2018-04-13 PROCEDURE — 77067 SCR MAMMO BI INCL CAD: CPT | Mod: TC

## 2018-04-13 PROCEDURE — 77080 DXA BONE DENSITY AXIAL: CPT | Mod: 26,,, | Performed by: RADIOLOGY

## 2018-04-13 PROCEDURE — 77080 DXA BONE DENSITY AXIAL: CPT | Mod: TC

## 2018-04-25 ENCOUNTER — HOSPITAL ENCOUNTER (OUTPATIENT)
Dept: CARDIOLOGY | Facility: HOSPITAL | Age: 68
Discharge: HOME | End: 2018-04-25
Attending: SPECIALIST
Payer: COMMERCIAL

## 2018-04-25 VITALS
HEIGHT: 68 IN | WEIGHT: 177 LBS | SYSTOLIC BLOOD PRESSURE: 130 MMHG | HEART RATE: 77 BPM | DIASTOLIC BLOOD PRESSURE: 84 MMHG | BODY MASS INDEX: 26.83 KG/M2

## 2018-04-25 DIAGNOSIS — I10 HYPERTENSION, UNSPECIFIED TYPE: ICD-10-CM

## 2018-04-25 PROCEDURE — C8929 TTE W OR WO FOL WCON,DOPPLER: HCPCS

## 2018-04-30 LAB
AORTIC ROOT ANNULUS: 3.12 CM
AORTIC VALVE CUSP SEPERATION: 2.62 CM
AV MEAN GRADIENT: 3.55 MMHG
AV VALVE AREA: 4 CM2
BSA FOR ECHO PROCEDURE: 1.96 M2
CV ECHO LV RWT: 0.56 CM
DOP CALC AO PEAK VEL: 1.2 M/S
DOP CALC AO VTI: 0.23 CM
DOP CALC LVOT AREA: 4.6 CM2
DOP CALC LVOT DIAMETER: 2.42 CM
DOP CALC LVOT STROKE VOLUME: 0.92 CM3
DOP CALCLVOT PEAK VEL VTI: 0.2 CM
E WAVE DECELERATION TIME: 190.45 MSEC
E/A RATIO: 1.43
E/E' RATIO: 5.03
ECHO EF ESTIMATED: 55 %
ECHO LV POSTERIOR WALL: 1.01 CM (ref 0.6–1.1)
FRACTIONAL SHORTENING: 28 % (ref 28–44)
INTERVENTRICULAR SEPTUM: 0.98 CM (ref 0.6–1.1)
IVRT: 0.12 MSEC
LEFT ATRIUM SIZE: 3.63 CM
LEFT INTERNAL DIMENSION IN SYSTOLE: 2.57 CM (ref 2.1–4)
LEFT VENTRICULAR INTERNAL DIMENSION IN DIASTOLE: 3.56 CM (ref 3.5–6)
LV LATERAL E/E' RATIO: 3.65
LV SEPTAL E/E' RATIO: 8.11
MV PEAK A VEL: 0.51 M/S
MV PEAK E VEL: 0.73 M/S
MV STENOSIS PRESSURE HALF TIME: 55.23 MS
MV VALVE AREA P 1/2 METHOD: 3.98 CM2
PISA TR MAX VEL: 2.12 M/S
PV PEAK GRADIENT: 2.52 MMHG
PV PEAK VELOCITY: 0.79 CM/S
RA PRESSURE: 8 MMHG
RIGHT VENTRICULAR END-DIASTOLIC DIMENSION: 2.43 CM
TDI LATERAL: 0.2
TDI SEPTAL: 0.09
TDI: 0.15
TR MAX PG: 18.07 MMHG
TV REST PULMONARY ARTERY PRESSURE: 26.07 MMHG

## 2018-05-16 ENCOUNTER — TELEPHONE (OUTPATIENT)
Dept: ORTHOPEDICS | Facility: CLINIC | Age: 68
End: 2018-05-16

## 2018-05-16 NOTE — TELEPHONE ENCOUNTER
----- Message from Allison Golden sent at 5/16/2018  9:31 AM CDT -----  Contact: Patient  Type:  Sooner Apoointment Request    Caller is requesting a sooner appointment.  Caller declined first available appointment listed below.  Caller will not accept being placed on the waitlist and is requesting a message be sent to doctor.    Name of Caller:  Patient  When is the first available appointment?  5/24  Symptoms:  Pain is in a a lot of pain with her left knee.  Best Call Back Number:    Additional Information:  Patient has an appointment on 5/24 but she is looking for a sooner appointment because she is in a lot of pain.

## 2018-05-17 ENCOUNTER — HOSPITAL ENCOUNTER (OUTPATIENT)
Dept: RADIOLOGY | Facility: HOSPITAL | Age: 68
Discharge: HOME | End: 2018-05-17
Attending: ORTHOPAEDIC SURGERY
Payer: COMMERCIAL

## 2018-05-17 ENCOUNTER — OFFICE VISIT (OUTPATIENT)
Dept: ORTHOPEDICS | Facility: CLINIC | Age: 68
End: 2018-05-17
Payer: COMMERCIAL

## 2018-05-17 VITALS
HEART RATE: 75 BPM | WEIGHT: 177 LBS | BODY MASS INDEX: 26.83 KG/M2 | DIASTOLIC BLOOD PRESSURE: 76 MMHG | HEIGHT: 68 IN | SYSTOLIC BLOOD PRESSURE: 124 MMHG

## 2018-05-17 DIAGNOSIS — M25.562 LEFT KNEE PAIN, UNSPECIFIED CHRONICITY: ICD-10-CM

## 2018-05-17 DIAGNOSIS — M17.12 PRIMARY OSTEOARTHRITIS OF LEFT KNEE: Primary | ICD-10-CM

## 2018-05-17 DIAGNOSIS — M25.562 LEFT KNEE PAIN, UNSPECIFIED CHRONICITY: Primary | ICD-10-CM

## 2018-05-17 PROCEDURE — 20610 DRAIN/INJ JOINT/BURSA W/O US: CPT | Mod: LT,S$GLB,, | Performed by: ORTHOPAEDIC SURGERY

## 2018-05-17 PROCEDURE — 73564 X-RAY EXAM KNEE 4 OR MORE: CPT | Mod: 26,LT,, | Performed by: RADIOLOGY

## 2018-05-17 PROCEDURE — 3074F SYST BP LT 130 MM HG: CPT | Mod: CPTII,S$GLB,, | Performed by: ORTHOPAEDIC SURGERY

## 2018-05-17 PROCEDURE — 99213 OFFICE O/P EST LOW 20 MIN: CPT | Mod: 25,S$GLB,, | Performed by: ORTHOPAEDIC SURGERY

## 2018-05-17 PROCEDURE — 73562 X-RAY EXAM OF KNEE 3: CPT | Mod: TC,PN,RT

## 2018-05-17 PROCEDURE — 73562 X-RAY EXAM OF KNEE 3: CPT | Mod: 26,XS,RT, | Performed by: RADIOLOGY

## 2018-05-17 PROCEDURE — 99999 PR PBB SHADOW E&M-EST. PATIENT-LVL III: CPT | Mod: PBBFAC,,, | Performed by: ORTHOPAEDIC SURGERY

## 2018-05-17 PROCEDURE — 3078F DIAST BP <80 MM HG: CPT | Mod: CPTII,S$GLB,, | Performed by: ORTHOPAEDIC SURGERY

## 2018-05-17 RX ADMIN — TRIAMCINOLONE ACETONIDE 40 MG: 40 INJECTION, SUSPENSION INTRA-ARTICULAR; INTRAMUSCULAR at 07:05

## 2018-05-21 RX ORDER — TRIAMCINOLONE ACETONIDE 40 MG/ML
40 INJECTION, SUSPENSION INTRA-ARTICULAR; INTRAMUSCULAR
Status: DISCONTINUED | OUTPATIENT
Start: 2018-05-17 | End: 2018-05-21 | Stop reason: HOSPADM

## 2018-05-21 NOTE — PROGRESS NOTES
Past Medical History:   Diagnosis Date    Depression     GERD (gastroesophageal reflux disease)     Hyperlipidemia     Hypertension     Hypothyroid        Past Surgical History:   Procedure Laterality Date    APPENDECTOMY      TONSILLECTOMY      TUBAL LIGATION         Current Outpatient Prescriptions   Medication Sig    aspirin (ECOTRIN) 81 MG EC tablet Take 81 mg by mouth once daily.     atorvastatin (LIPITOR) 10 MG tablet One tablet daily    buPROPion (WELLBUTRIN XL) 150 MG TB24 tablet Take 1 tablet by mouth 2 (two) times daily.    carBAMazepine (TEGRETOL) 200 mg tablet Take 1 tablet (200 mg total) by mouth 3 (three) times daily.    cetirizine (ZYRTEC) 10 MG tablet Take 10 mg by mouth once daily.    Lactobacillus rhamnosus GG (CULTURELLE) 10 billion cell capsule Take 1 capsule by mouth once daily.    levothyroxine (SYNTHROID) 112 MCG tablet Take 1 tablet by mouth once daily.    metoprolol succinate (TOPROL-XL) 25 MG 24 hr tablet Take 25 mg by mouth once daily.    triamterene-hydrochlorothiazide 37.5-25 mg (MAXZIDE-25) 37.5-25 mg per tablet Take 1 tablet by mouth once daily.    vitamin D 1000 units Tab Take 1,000 Units by mouth once daily.     No current facility-administered medications for this visit.      Facility-Administered Medications Ordered in Other Visits   Medication    EUFLEXXA injection Syrg 20 mg       Review of patient's allergies indicates:   Allergen Reactions    Lidocaine      Hand edema,  redness    Penicillins Rash       Family History   Problem Relation Age of Onset    Cancer Father     Cancer Sister     Glaucoma Neg Hx     Macular degeneration Neg Hx     Retinal detachment Neg Hx        Social History     Social History    Marital status:      Spouse name: N/A    Number of children: N/A    Years of education: N/A     Occupational History    Not on file.     Social History Main Topics    Smoking status: Never Smoker    Smokeless tobacco: Never Used     Alcohol use Yes      Comment: rarely    Drug use: No    Sexual activity: Yes     Other Topics Concern    Not on file     Social History Narrative    No narrative on file       Chief Complaint:   Chief Complaint   Patient presents with    Left Knee - Pain       History: This is a 67-year-old female who is an employee here at [employer].  Patient has had a history of left knee arthritis since 2014.  Patient had a cortisone injection done 2 years ago which worked great and then another one about a month ago.  Patient was seeing another physician who is now out of network.  She was considering Synvisc at the time.  She would like to pursue this.  Symptoms are stable and moderate to severe.    Present: She comes in today because her left knee is giving her more trouble.  Her last injection was back in March of 2016.  She did well on the other knee with Euflexxa.  Pain is a 3/10.    Review of systems:   Musculoskeletal: See history of present illness    Physical examination:    Vital Signs:    Vitals:    05/17/18 1454   BP: 124/76   Pulse: 75       Body mass index is 26.91 kg/m².    This a well-developed, well nourished patient in no acute distress.  They are alert and oriented and cooperative to examination.  Pt. walks without an antalgic gait.      Examination of the left knee shows no rashes or erythema. There are no masses ecchymosis or effusion. Patient has full range of motion from 0-130°. Patient is nontender to palpation over lateral joint line and moderately tender to palpation over the medial joint line.  Knee is stable to varus and valgus stress. 5 out of 5 motor strength. Palpable distal pulses. Intact light touch sensation.  Severe Patellofemoral crepitus      X-rays: 4 views of the left knee are ordered and reviewed which show arthritic changes with severe changes of the patellofemoral joint and more moderate changes of the medial compartment of both knees     Assessment:: Bilateral knee  osteoarthritis    Plan:  I injected her left knee with cortisone today.  Follow up as needed.  Reviewed the x-rays with her today.

## 2018-05-21 NOTE — PROCEDURES
Large Joint Aspiration/Injection  Date/Time: 5/17/2018 7:51 AM  Performed by: REGULO PORRAS  Authorized by: REGULO PORRAS     Consent Done?:  Yes (Verbal)  Indications:  Pain  Procedure site marked: Yes    Timeout: Prior to procedure the correct patient, procedure, and site was verified      Location:  Knee  Site:  L knee  Prep: Patient was prepped and draped in usual sterile fashion    Needle size:  20 G  Approach:  Anterolateral  Medications:  40 mg triamcinolone acetonide 40 mg/mL  Patient tolerance:  Patient tolerated the procedure well with no immediate complications

## 2018-06-06 ENCOUNTER — OFFICE VISIT (OUTPATIENT)
Dept: OPTOMETRY | Facility: CLINIC | Age: 68
End: 2018-06-06
Payer: COMMERCIAL

## 2018-06-06 DIAGNOSIS — H25.13 NUCLEAR SCLEROSIS, BILATERAL: Primary | ICD-10-CM

## 2018-06-06 DIAGNOSIS — D31.31 NEVUS OF CHOROID OF RIGHT EYE: ICD-10-CM

## 2018-06-06 DIAGNOSIS — H52.7 REFRACTIVE ERROR: ICD-10-CM

## 2018-06-06 PROCEDURE — 92015 DETERMINE REFRACTIVE STATE: CPT | Mod: S$GLB,,, | Performed by: OPTOMETRIST

## 2018-06-06 PROCEDURE — 99999 PR PBB SHADOW E&M-EST. PATIENT-LVL II: CPT | Mod: PBBFAC,,, | Performed by: OPTOMETRIST

## 2018-06-06 PROCEDURE — 92014 COMPRE OPH EXAM EST PT 1/>: CPT | Mod: S$GLB,,, | Performed by: OPTOMETRIST

## 2018-06-06 NOTE — PROGRESS NOTES
HPI     Presenting Complaint: Pt here today for yearly eye exam. Pt states vision   has been stable with current glasses.     Ophthalmic medication / drops:None    (-) headaches  (-) diplopia   (-) flashes / (-) floaters      Last edited by Amos Leblanc on 6/6/2018  3:03 PM. (History)            Assessment /Plan     For exam results, see Encounter Report.    Nuclear sclerosis, bilateral    Refractive error    Nevus of choroid of right eye      Mild cataracts of both eyes, non-visually significant. Discussed possible ocular affects of cataracts. Acceptable BCVA OU. Discussed treatment options. Surgery not recommended at this time. Monitor yearly.     Dispensed updated spectacle Rx. Discussed various spectacle lens options. Discussed adaptation period to new specs.  Demonstrated new spec Rx vs current specs in phoropter with patient satisfaction.    Choroidal nevus of right eye, inferior temporal arcades posterior pole. ~1 DD, distinct borders, no overlying pigment. Monitor yearly.       RTC in 1 year for comprehensive eye exam, or sooner prn.

## 2018-08-16 ENCOUNTER — OFFICE VISIT (OUTPATIENT)
Dept: OPHTHALMOLOGY | Facility: CLINIC | Age: 68
End: 2018-08-16
Payer: COMMERCIAL

## 2018-08-16 DIAGNOSIS — H57.12 EYE PAIN, LEFT: Primary | ICD-10-CM

## 2018-08-16 PROCEDURE — 92012 INTRM OPH EXAM EST PATIENT: CPT | Mod: S$GLB,,, | Performed by: OPHTHALMOLOGY

## 2018-08-16 PROCEDURE — 99999 PR PBB SHADOW E&M-EST. PATIENT-LVL III: CPT | Mod: PBBFAC,,, | Performed by: OPHTHALMOLOGY

## 2018-08-16 NOTE — PROGRESS NOTES
HPI     Presenting Complaint: Pt here today for eye pain in left eye since   Saturday ( 6 days) Pt states feels like someone has punched in left eye.   Pt has noticed over the last 6 days gray spot on corner near nose on white   part of the eye. Pt denies crust or redness of left eye.     Ophthalmic medication / drops:Art tears as needed for comfort    (-) headaches  (-) diplopia   (-) flashes / (-) floaters    Woke up from sleep from eye pain couple weeks ago, used a compress and PO   NSAID. No prior episodes. Now just feels uncomfortable, took some   ibuprofen this am. Saturday had also happened again. Denies tenderness to   touch or photophobia.     Last edited by Era Sharma MD on 8/16/2018 11:15 AM.   (History)            Assessment /Plan     For exam results, see Encounter Report.    Eye pain, left        IOP WNL, no cell/flare, clinical exam WNL. RTC for gonio if worsens.

## 2018-10-25 ENCOUNTER — DOCUMENTATION ONLY (OUTPATIENT)
Dept: FAMILY MEDICINE | Facility: CLINIC | Age: 68
End: 2018-10-25

## 2018-10-25 NOTE — PROGRESS NOTES
Health Maintenance Due   Topic Date Due    TETANUS VACCINE  10/10/1969    Zoster Vaccine  10/10/2011

## 2018-10-26 ENCOUNTER — OFFICE VISIT (OUTPATIENT)
Dept: FAMILY MEDICINE | Facility: CLINIC | Age: 68
End: 2018-10-26
Payer: COMMERCIAL

## 2018-10-26 VITALS
SYSTOLIC BLOOD PRESSURE: 124 MMHG | DIASTOLIC BLOOD PRESSURE: 70 MMHG | TEMPERATURE: 98 F | HEART RATE: 75 BPM | WEIGHT: 179.25 LBS | HEIGHT: 68 IN | BODY MASS INDEX: 27.17 KG/M2 | OXYGEN SATURATION: 99 %

## 2018-10-26 DIAGNOSIS — E03.9 ACQUIRED HYPOTHYROIDISM: ICD-10-CM

## 2018-10-26 DIAGNOSIS — Z88.9 MULTIPLE DRUG ALLERGIES: ICD-10-CM

## 2018-10-26 DIAGNOSIS — Z23 NEED FOR SHINGLES VACCINE: ICD-10-CM

## 2018-10-26 DIAGNOSIS — G47.30 SLEEP APNEA, UNSPECIFIED TYPE: Primary | ICD-10-CM

## 2018-10-26 DIAGNOSIS — E78.2 MIXED HYPERLIPIDEMIA: ICD-10-CM

## 2018-10-26 PROCEDURE — 99213 OFFICE O/P EST LOW 20 MIN: CPT | Mod: S$GLB,,, | Performed by: NURSE PRACTITIONER

## 2018-10-26 RX ORDER — LOVASTATIN 20 MG/1
20 TABLET ORAL NIGHTLY
Qty: 30 TABLET | Refills: 5 | Status: SHIPPED | OUTPATIENT
Start: 2018-10-26 | End: 2019-04-09 | Stop reason: SDUPTHER

## 2018-10-26 RX ORDER — LEVOTHYROXINE SODIUM 112 UG/1
112 TABLET ORAL DAILY
Qty: 90 TABLET | Refills: 3 | Status: SHIPPED | OUTPATIENT
Start: 2018-10-26 | End: 2019-04-09 | Stop reason: DRUGHIGH

## 2018-10-26 NOTE — PROGRESS NOTES
Subjective:       Patient ID: Skyler Espinoza is a 68 y.o. female.    Chief Complaint: Sleep Apnea and Memory Loss    Thyroid Problem   Presents for follow-up visit. Symptoms include anxiety and constipation. Patient reports no diarrhea, menstrual problem or palpitations. The symptoms have been stable.     Has known sleep apnea, has not been on cpap for past 2 years or so. Has been having memory problems for past 1 1/2 years. Saw neurologist about 2 years ago for essential tremor.     Has allergies to lidocaine and PCN listed, but not sure she is really allergic to them, would like to see an alergist to see if she really is allergic to them.     Review of Systems   Constitutional: Positive for activity change. Negative for unexpected weight change.   HENT: Positive for rhinorrhea. Negative for hearing loss and trouble swallowing.    Eyes: Positive for visual disturbance. Negative for discharge.   Respiratory: Negative for chest tightness and wheezing.    Cardiovascular: Negative for chest pain and palpitations.   Gastrointestinal: Positive for constipation. Negative for blood in stool, diarrhea and vomiting.   Endocrine: Negative for polydipsia and polyuria.   Genitourinary: Negative for difficulty urinating, dysuria, hematuria and menstrual problem.   Musculoskeletal: Positive for arthralgias. Negative for joint swelling and neck pain.   Neurological: Positive for headaches. Negative for weakness.   Psychiatric/Behavioral: Positive for confusion and dysphoric mood. The patient is nervous/anxious.        Objective:     The 10-year ASCVD risk score (Patricia ELKIN Jr., et al., 2013) is: 8.2%    Values used to calculate the score:      Age: 68 years      Sex: Female      Is Non- : No      Diabetic: No      Tobacco smoker: No      Systolic Blood Pressure: 124 mmHg      Is BP treated: Yes      HDL Cholesterol: 79 mg/dL      Total Cholesterol: 235 mg/dL    Physical Exam   Constitutional: She is oriented  to person, place, and time. She appears well-developed and well-nourished. No distress.   HENT:   Head: Normocephalic and atraumatic.   Eyes: Conjunctivae are normal. Right eye exhibits no discharge. Left eye exhibits no discharge. No scleral icterus.   Cardiovascular: Normal rate, regular rhythm and normal heart sounds. Exam reveals no gallop and no friction rub.   No murmur heard.  Pulmonary/Chest: Effort normal and breath sounds normal. No respiratory distress. She has no wheezes. She has no rales.   Neurological: She is alert and oriented to person, place, and time.   Skin: Skin is warm and dry. She is not diaphoretic.   Psychiatric: She has a normal mood and affect. Her behavior is normal.   Nursing note and vitals reviewed.      Assessment:       1. Sleep apnea, unspecified type    2. Acquired hypothyroidism    3. Mixed hyperlipidemia    4. Need for shingles vaccine    5. Multiple drug allergies        Plan:       Sleep apnea, unspecified type  -     Polysomnography 4 or more parameters; Future    Acquired hypothyroidism  -     levothyroxine (SYNTHROID) 112 MCG tablet; Take 1 tablet (112 mcg total) by mouth once daily.  Dispense: 90 tablet; Refill: 3    Mixed hyperlipidemia  -     lovastatin (MEVACOR) 20 MG tablet; Take 1 tablet (20 mg total) by mouth every evening.  Dispense: 30 tablet; Refill: 5    Need for shingles vaccine  -     adjuvant AS01B, PF,vial 1 of 2 Susp; Inject 0.5 mLs into the muscle once. for 1 dose  Dispense: 0.5 mL; Refill: 0  -     varicella-zoster gE vac,2 of 2 50 mcg SusR; Inject 0.5 mLs into the muscle once. for 1 dose  Dispense: 1 each; Refill: 0    Multiple drug allergies  -     Ambulatory Referral to Allergy         1 month with sleep study first

## 2019-01-16 ENCOUNTER — TELEPHONE (OUTPATIENT)
Dept: OPHTHALMOLOGY | Facility: CLINIC | Age: 69
End: 2019-01-16

## 2019-01-16 NOTE — TELEPHONE ENCOUNTER
Spoke to patient, informed her that she could come in for a glasses check with optical no appointment needed.

## 2019-01-16 NOTE — TELEPHONE ENCOUNTER
----- Message from Rosita Price sent at 1/16/2019 12:27 PM CST -----  Contact: Patient  Type:  Sooner Apoointment Request    Caller is requesting a sooner appointment.  Caller declined first available appointment listed below.  Caller will not accept being placed on the waitlist and is requesting a message be sent to doctor.    Name of Caller:  Skyler  When is the first available appointment?  Na    Best Call Back Number:  [phone]    Additional Information: Patient is stating that she is having double vision and would like to be seen soon. She does not know if it's her eyes or glasses needing adjustments. Please call back and advise.

## 2019-02-11 DIAGNOSIS — M25.511 RIGHT SHOULDER PAIN, UNSPECIFIED CHRONICITY: Primary | ICD-10-CM

## 2019-02-18 ENCOUNTER — HOSPITAL ENCOUNTER (OUTPATIENT)
Dept: RADIOLOGY | Facility: HOSPITAL | Age: 69
Discharge: HOME | End: 2019-02-18
Attending: ORTHOPAEDIC SURGERY
Payer: MEDICARE

## 2019-02-18 ENCOUNTER — OFFICE VISIT (OUTPATIENT)
Dept: ORTHOPEDICS | Facility: CLINIC | Age: 69
End: 2019-02-18
Payer: MEDICARE

## 2019-02-18 VITALS
HEIGHT: 68 IN | HEART RATE: 77 BPM | WEIGHT: 179 LBS | SYSTOLIC BLOOD PRESSURE: 117 MMHG | BODY MASS INDEX: 27.13 KG/M2 | DIASTOLIC BLOOD PRESSURE: 70 MMHG

## 2019-02-18 DIAGNOSIS — M75.101 ROTATOR CUFF SYNDROME OF RIGHT SHOULDER: ICD-10-CM

## 2019-02-18 DIAGNOSIS — M17.11 PRIMARY OSTEOARTHRITIS OF RIGHT KNEE: Primary | ICD-10-CM

## 2019-02-18 DIAGNOSIS — M25.511 RIGHT SHOULDER PAIN, UNSPECIFIED CHRONICITY: ICD-10-CM

## 2019-02-18 DIAGNOSIS — M17.12 PRIMARY OSTEOARTHRITIS OF LEFT KNEE: ICD-10-CM

## 2019-02-18 PROCEDURE — 73030 X-RAY EXAM OF SHOULDER: CPT | Mod: TC,PN,RT

## 2019-02-18 PROCEDURE — 20610 DRAIN/INJ JOINT/BURSA W/O US: CPT | Mod: 50,PBBFAC,PN | Performed by: ORTHOPAEDIC SURGERY

## 2019-02-18 PROCEDURE — 20610 LARGE JOINT ASPIRATION/INJECTION: R KNEE, L KNEE: ICD-10-PCS | Mod: 50,S$PBB,, | Performed by: ORTHOPAEDIC SURGERY

## 2019-02-18 PROCEDURE — 99999 PR PBB SHADOW E&M-EST. PATIENT-LVL III: ICD-10-PCS | Mod: PBBFAC,,, | Performed by: ORTHOPAEDIC SURGERY

## 2019-02-18 PROCEDURE — 73030 XR SHOULDER TRAUMA 3 VIEW RIGHT: ICD-10-PCS | Mod: 26,RT,, | Performed by: RADIOLOGY

## 2019-02-18 PROCEDURE — 73030 X-RAY EXAM OF SHOULDER: CPT | Mod: 26,RT,, | Performed by: RADIOLOGY

## 2019-02-18 PROCEDURE — 99214 PR OFFICE/OUTPT VISIT, EST, LEVL IV, 30-39 MIN: ICD-10-PCS | Mod: 25,S$PBB,, | Performed by: ORTHOPAEDIC SURGERY

## 2019-02-18 PROCEDURE — 99999 PR PBB SHADOW E&M-EST. PATIENT-LVL III: CPT | Mod: PBBFAC,,, | Performed by: ORTHOPAEDIC SURGERY

## 2019-02-18 PROCEDURE — 99214 OFFICE O/P EST MOD 30 MIN: CPT | Mod: 25,S$PBB,, | Performed by: ORTHOPAEDIC SURGERY

## 2019-02-18 PROCEDURE — 99213 OFFICE O/P EST LOW 20 MIN: CPT | Mod: PBBFAC,25,PN | Performed by: ORTHOPAEDIC SURGERY

## 2019-02-18 RX ORDER — TRIAMCINOLONE ACETONIDE 40 MG/ML
40 INJECTION, SUSPENSION INTRA-ARTICULAR; INTRAMUSCULAR
Status: DISCONTINUED | OUTPATIENT
Start: 2019-02-18 | End: 2019-02-18 | Stop reason: HOSPADM

## 2019-02-18 RX ADMIN — TRIAMCINOLONE ACETONIDE 40 MG: 40 INJECTION, SUSPENSION INTRA-ARTICULAR; INTRAMUSCULAR at 08:02

## 2019-02-18 NOTE — PROCEDURES
Large Joint Aspiration/Injection: R knee, L knee  Date/Time: 2/18/2019 8:30 AM  Performed by: David Chan MD  Authorized by: David Chan MD     Consent Done?:  Yes (Verbal)  Indications:  Pain  Procedure site marked: Yes    Timeout: Prior to procedure the correct patient, procedure, and site was verified      Location:  Knee  Site:  R knee and L knee  Prep: Patient was prepped and draped in usual sterile fashion    Needle size:  20 G  Approach:  Anterolateral  Medications:  40 mg triamcinolone acetonide 40 mg/mL; 40 mg triamcinolone acetonide 40 mg/mL  Patient tolerance:  Patient tolerated the procedure well with no immediate complications

## 2019-02-18 NOTE — PROGRESS NOTES
Past Medical History:   Diagnosis Date    Depression     GERD (gastroesophageal reflux disease)     Hyperlipidemia     Hypertension     Hypothyroid        Past Surgical History:   Procedure Laterality Date    APPENDECTOMY      TONSILLECTOMY      TUBAL LIGATION         Current Outpatient Medications   Medication Sig    aspirin (ECOTRIN) 81 MG EC tablet Take 81 mg by mouth once daily.     buPROPion (WELLBUTRIN XL) 150 MG TB24 tablet Take 1 tablet by mouth 2 (two) times daily.    carBAMazepine (TEGRETOL) 200 mg tablet Take 1 tablet (200 mg total) by mouth 3 (three) times daily.    cetirizine (ZYRTEC) 10 MG tablet Take 10 mg by mouth once daily.    levothyroxine (SYNTHROID) 112 MCG tablet Take 1 tablet (112 mcg total) by mouth once daily.    lovastatin (MEVACOR) 20 MG tablet Take 1 tablet (20 mg total) by mouth every evening.    metoprolol succinate (TOPROL-XL) 25 MG 24 hr tablet Take 25 mg by mouth once daily.    triamterene-hydrochlorothiazide 37.5-25 mg (MAXZIDE-25) 37.5-25 mg per tablet Take 1 tablet by mouth once daily.    vitamin D 1000 units Tab Take 1,000 Units by mouth once daily.     No current facility-administered medications for this visit.      Facility-Administered Medications Ordered in Other Visits   Medication    EUFLEXXA injection Syrg 20 mg       Review of patient's allergies indicates:   Allergen Reactions    Lidocaine      Hand edema,  redness    Penicillins Rash       Family History   Problem Relation Age of Onset    Cancer Father     Cancer Sister     Glaucoma Neg Hx     Macular degeneration Neg Hx     Retinal detachment Neg Hx        Social History     Socioeconomic History    Marital status:      Spouse name: Not on file    Number of children: Not on file    Years of education: Not on file    Highest education level: Not on file   Social Needs    Financial resource strain: Not on file    Food insecurity - worry: Not on file    Food insecurity - inability:  Not on file    Transportation needs - medical: Not on file    Transportation needs - non-medical: Not on file   Occupational History    Not on file   Tobacco Use    Smoking status: Never Smoker    Smokeless tobacco: Never Used   Substance and Sexual Activity    Alcohol use: Yes     Comment: rarely    Drug use: No    Sexual activity: Yes   Other Topics Concern    Not on file   Social History Narrative    Not on file       Chief Complaint:   Chief Complaint   Patient presents with    Knee Pain     bilateral knee pain    Shoulder Pain     right shoulder pain       History: This is a 68-year-old female who is an employee here at [employer].  Patient has had a history of left knee arthritis since 2014.  Patient had a cortisone injection done 2 years ago which worked great and then another one about a month ago.  Patient was seeing another physician who is now out of network.  She was considering Synvisc at the time.  She would like to pursue this.  Symptoms are stable and moderate to severe.    Present: She comes in today bilateral knee pain. Patient also has right shoulder pain. This started about 3 weeks ago without incident.  Pain reaching out order laying on it.  Knees have been hurting for a couple months.  Her last treatments were back in May of 2018.  Patient had good results with both the Euflexxa and cortisone.  Pain is currently 3/10.    Review of systems:   Musculoskeletal: See history of present illness    Physical examination:    Vital Signs:    Vitals:    02/18/19 0805   BP: 117/70   Pulse: 77       Body mass index is 27.22 kg/m².    This a well-developed, well nourished patient in no acute distress.  They are alert and oriented and cooperative to examination.  Pt. walks without an antalgic gait.      Examination of bilateral knee shows no rashes or erythema. There are no masses ecchymosis or effusion. Patient has full range of motion from 0-130°. Patient is nontender to palpation over lateral  joint line and moderately tender to palpation over the medial joint line.  Knee is stable to varus and valgus stress. 5 out of 5 motor strength. Palpable distal pulses. Intact light touch sensation.  Severe Patellofemoral crepitus    Examination of the right shoulder shows no rashes or erythema. There are no masses, ecchymosis, or atrophy. The patient has full range of motion in forward flexion, external rotation, and internal rotation to the mid T-spine. The patient has moderately positive impingement signs. - [place]'s test. - Speeds test. Nontender to palpation over a.c. joint. Normal stability anteriorly, posteriorly, and negative sulcus sign. Passive range of motion: Forward flexion of 180°, external rotation at 90° of 90°, internal rotation of 50°, and external rotation at 0° of 50°. 2+ radial pulse. Intact axillary, radial, median and ulnar sensation. 5 out of 5 resisted forward flexion, external rotation, and negative lift off test.        X-rays: 4 views of the left knee are reviewed which show arthritic changes with severe changes of the patellofemoral joint and more moderate changes of the medial compartment of both knees  X-rays of the right shoulder ordered and reviewed which show some mild degenerative changes well-maintained glenohumeral joint space     Assessment:: Bilateral knee osteoarthritis  Right rotator cuff syndrome    Plan:  I reviewed the x-ray with her today.  We discussed treatment options for both her knee arthritis as well as her right rotator cuff tendinitis.  Patient elected to have cortisone injections in both knees.  Might do Euflexxa again if needed.  Also gave her rotator cuff and shoulder conditioning guide for her shoulder.

## 2019-04-04 DIAGNOSIS — E78.2 MIXED HYPERLIPIDEMIA: ICD-10-CM

## 2019-04-05 RX ORDER — LOVASTATIN 20 MG/1
20 TABLET ORAL NIGHTLY
Qty: 30 TABLET | Refills: 5 | OUTPATIENT
Start: 2019-04-05 | End: 2020-04-04

## 2019-04-09 ENCOUNTER — OFFICE VISIT (OUTPATIENT)
Dept: FAMILY MEDICINE | Facility: CLINIC | Age: 69
End: 2019-04-09
Payer: MEDICARE

## 2019-04-09 ENCOUNTER — DOCUMENTATION ONLY (OUTPATIENT)
Dept: FAMILY MEDICINE | Facility: CLINIC | Age: 69
End: 2019-04-09

## 2019-04-09 VITALS
SYSTOLIC BLOOD PRESSURE: 124 MMHG | HEART RATE: 66 BPM | TEMPERATURE: 98 F | BODY MASS INDEX: 27.76 KG/M2 | WEIGHT: 183.19 LBS | DIASTOLIC BLOOD PRESSURE: 60 MMHG | OXYGEN SATURATION: 96 % | HEIGHT: 68 IN

## 2019-04-09 DIAGNOSIS — E03.9 ACQUIRED HYPOTHYROIDISM: ICD-10-CM

## 2019-04-09 DIAGNOSIS — F41.9 ANXIETY AND DEPRESSION: ICD-10-CM

## 2019-04-09 DIAGNOSIS — E03.9 ACQUIRED HYPOTHYROIDISM: Primary | ICD-10-CM

## 2019-04-09 DIAGNOSIS — K21.9 GASTROESOPHAGEAL REFLUX DISEASE, ESOPHAGITIS PRESENCE NOT SPECIFIED: ICD-10-CM

## 2019-04-09 DIAGNOSIS — R60.0 LEG EDEMA: ICD-10-CM

## 2019-04-09 DIAGNOSIS — F32.A ANXIETY AND DEPRESSION: ICD-10-CM

## 2019-04-09 DIAGNOSIS — E78.2 MIXED HYPERLIPIDEMIA: Primary | ICD-10-CM

## 2019-04-09 DIAGNOSIS — Z23 NEED FOR VACCINATION AGAINST STREPTOCOCCUS PNEUMONIAE: ICD-10-CM

## 2019-04-09 DIAGNOSIS — I10 ESSENTIAL HYPERTENSION: ICD-10-CM

## 2019-04-09 LAB
ALBUMIN SERPL BCP-MCNC: 4.3 G/DL (ref 3.5–5.2)
ALP SERPL-CCNC: 60 U/L (ref 55–135)
ALT SERPL W/O P-5'-P-CCNC: 22 U/L (ref 10–44)
ANION GAP SERPL CALC-SCNC: 7 MMOL/L (ref 8–16)
AST SERPL-CCNC: 22 U/L (ref 10–40)
BASOPHILS # BLD AUTO: 0 K/UL (ref 0–0.2)
BASOPHILS NFR BLD: 0.5 % (ref 0–1.9)
BILIRUB SERPL-MCNC: 0.6 MG/DL (ref 0.1–1)
BUN SERPL-MCNC: 13 MG/DL (ref 8–23)
CALCIUM SERPL-MCNC: 10.3 MG/DL (ref 8.7–10.5)
CHLORIDE SERPL-SCNC: 102 MMOL/L (ref 95–110)
CHOLEST SERPL-MCNC: 200 MG/DL (ref 120–199)
CHOLEST/HDLC SERPL: 2.3 {RATIO} (ref 2–5)
CO2 SERPL-SCNC: 30 MMOL/L (ref 23–29)
CREAT SERPL-MCNC: 0.8 MG/DL (ref 0.5–1.4)
DIFFERENTIAL METHOD: NORMAL
EOSINOPHIL # BLD AUTO: 0.1 K/UL (ref 0–0.5)
EOSINOPHIL NFR BLD: 1.1 % (ref 0–8)
ERYTHROCYTE [DISTWIDTH] IN BLOOD BY AUTOMATED COUNT: 13.7 % (ref 11.5–14.5)
EST. GFR  (AFRICAN AMERICAN): >60 ML/MIN/1.73 M^2
EST. GFR  (NON AFRICAN AMERICAN): >60 ML/MIN/1.73 M^2
GLUCOSE SERPL-MCNC: 102 MG/DL (ref 70–110)
HCT VFR BLD AUTO: 40.9 % (ref 37–48.5)
HDLC SERPL-MCNC: 87 MG/DL (ref 40–75)
HDLC SERPL: 43.5 % (ref 20–50)
HGB BLD-MCNC: 13.4 G/DL (ref 12–16)
LDLC SERPL CALC-MCNC: 97.8 MG/DL (ref 63–159)
LYMPHOCYTES # BLD AUTO: 2 K/UL (ref 1–4.8)
LYMPHOCYTES NFR BLD: 32.3 % (ref 18–48)
MCH RBC QN AUTO: 29.3 PG (ref 27–31)
MCHC RBC AUTO-ENTMCNC: 32.8 G/DL (ref 32–36)
MCV RBC AUTO: 89 FL (ref 82–98)
MONOCYTES # BLD AUTO: 0.5 K/UL (ref 0.3–1)
MONOCYTES NFR BLD: 8.2 % (ref 4–15)
NEUTROPHILS # BLD AUTO: 3.6 K/UL (ref 1.8–7.7)
NEUTROPHILS NFR BLD: 57.9 % (ref 38–73)
NONHDLC SERPL-MCNC: 113 MG/DL
PLATELET # BLD AUTO: 259 K/UL (ref 150–350)
PMV BLD AUTO: 9.6 FL (ref 9.2–12.9)
POTASSIUM SERPL-SCNC: 4.4 MMOL/L (ref 3.5–5.1)
PROT SERPL-MCNC: 7.2 G/DL (ref 6–8.4)
RBC # BLD AUTO: 4.59 M/UL (ref 4–5.4)
SODIUM SERPL-SCNC: 139 MMOL/L (ref 136–145)
T4 FREE SERPL-MCNC: 1.43 NG/DL (ref 0.71–1.51)
TRIGL SERPL-MCNC: 76 MG/DL (ref 30–150)
TSH SERPL DL<=0.005 MIU/L-ACNC: 0.29 UIU/ML (ref 0.4–4)
WBC # BLD AUTO: 6.2 K/UL (ref 3.9–12.7)

## 2019-04-09 PROCEDURE — 99214 OFFICE O/P EST MOD 30 MIN: CPT | Mod: 25,S$GLB,, | Performed by: NURSE PRACTITIONER

## 2019-04-09 PROCEDURE — 85025 COMPLETE CBC W/AUTO DIFF WBC: CPT

## 2019-04-09 PROCEDURE — G0009 PNEUMOCOCCAL POLYSACCHARIDE VACCINE 23-VALENT =>2YO SQ IM: ICD-10-PCS | Mod: S$GLB,,, | Performed by: NURSE PRACTITIONER

## 2019-04-09 PROCEDURE — 84443 ASSAY THYROID STIM HORMONE: CPT

## 2019-04-09 PROCEDURE — 84439 ASSAY OF FREE THYROXINE: CPT

## 2019-04-09 PROCEDURE — G0009 ADMIN PNEUMOCOCCAL VACCINE: HCPCS | Mod: S$GLB,,, | Performed by: NURSE PRACTITIONER

## 2019-04-09 PROCEDURE — 90732 PNEUMOCOCCAL POLYSACCHARIDE VACCINE 23-VALENT =>2YO SQ IM: ICD-10-PCS | Mod: S$GLB,,, | Performed by: NURSE PRACTITIONER

## 2019-04-09 PROCEDURE — 90732 PPSV23 VACC 2 YRS+ SUBQ/IM: CPT | Mod: S$GLB,,, | Performed by: NURSE PRACTITIONER

## 2019-04-09 PROCEDURE — 80053 COMPREHEN METABOLIC PANEL: CPT

## 2019-04-09 PROCEDURE — 99214 PR OFFICE/OUTPT VISIT, EST, LEVL IV, 30-39 MIN: ICD-10-PCS | Mod: 25,S$GLB,, | Performed by: NURSE PRACTITIONER

## 2019-04-09 PROCEDURE — 80061 LIPID PANEL: CPT

## 2019-04-09 RX ORDER — PANTOPRAZOLE SODIUM 40 MG/1
40 TABLET, DELAYED RELEASE ORAL DAILY
Qty: 30 TABLET | Refills: 2 | Status: SHIPPED | OUTPATIENT
Start: 2019-04-09 | End: 2019-07-10 | Stop reason: SDUPTHER

## 2019-04-09 RX ORDER — LEVOTHYROXINE SODIUM 100 UG/1
100 TABLET ORAL DAILY
Qty: 90 TABLET | Refills: 0 | Status: SHIPPED | OUTPATIENT
Start: 2019-04-09 | End: 2019-07-01 | Stop reason: SDUPTHER

## 2019-04-09 RX ORDER — LOVASTATIN 20 MG/1
20 TABLET ORAL NIGHTLY
Qty: 30 TABLET | Refills: 0 | Status: SHIPPED | OUTPATIENT
Start: 2019-04-09 | End: 2019-07-16 | Stop reason: SDUPTHER

## 2019-04-09 RX ORDER — VENLAFAXINE HYDROCHLORIDE 37.5 MG/1
37.5 CAPSULE, EXTENDED RELEASE ORAL DAILY
Qty: 30 CAPSULE | Refills: 0 | Status: SHIPPED | OUTPATIENT
Start: 2019-04-09 | End: 2019-05-09 | Stop reason: SDUPTHER

## 2019-04-09 NOTE — PATIENT INSTRUCTIONS
Start by taking wellbutrin every other day (once a day as you have been doing) for 1 week, on the off days, take effexor once a day. Then the second week, take wellbutrin every 3rd day and take the effexor for 2 days in between for a whole week. Then on the third week, take effexor for 3 days, then 1 day of wellbutrin and you are off the wellbutrin after that and only take effexor once a day.    4 weeks with labs prior

## 2019-04-09 NOTE — PROGRESS NOTES
Subjective:       Patient ID: Skyler Espinoza is a 68 y.o. female.    Chief Complaint: Nausea    Depression   Visit Type: follow-up  Patient presents with the following symptoms: confusion, decreased concentration, depressed mood, memory impairment and nervousness/anxiety.  Patient is not experiencing: suicidal ideas.  Severity: incapacitating   Compliance with medications:  % (Has been taking prozac long term and does not feel like it is working any longer. )        Nausea   This is a new problem. The current episode started in the past 7 days (Had a severe headache, with nausea and vomiting about 3 days ago. ). The problem has been unchanged (has nausea daily, but not vomiting). Associated symptoms include congestion, headaches and nausea. Pertinent negatives include no fever or vomiting. She has tried NSAIDs (anti nausea medication) for the symptoms. The treatment provided moderate relief.       Review of Systems   Constitutional: Negative for fever.   HENT: Positive for congestion.    Gastrointestinal: Positive for nausea. Negative for vomiting.   Neurological: Positive for headaches.   Psychiatric/Behavioral: Positive for confusion, decreased concentration and depression. Negative for suicidal ideas. The patient is nervous/anxious.        Objective:      Physical Exam   Constitutional: She is oriented to person, place, and time. She appears well-developed and well-nourished. No distress.   HENT:   Head: Normocephalic and atraumatic.   Right Ear: External ear normal.   Left Ear: External ear normal.   Mouth/Throat: Oropharynx is clear and moist. No oropharyngeal exudate.   TMs pearly grey with light reflex bilaterally.   Has deviated septum.    Eyes: Conjunctivae are normal. Right eye exhibits no discharge. Left eye exhibits no discharge. No scleral icterus.   Neck: Normal range of motion. Neck supple.   Cardiovascular: Normal rate, regular rhythm and normal heart sounds. Exam reveals no gallop and no  friction rub.   No murmur heard.  Pulmonary/Chest: Effort normal and breath sounds normal. No stridor. No respiratory distress. She has no wheezes. She has no rales.   Musculoskeletal: She exhibits no edema.   Lymphadenopathy:     She has no cervical adenopathy.   Neurological: She is alert and oriented to person, place, and time.   Skin: Skin is warm and dry. No rash noted. She is not diaphoretic. No pallor.   Psychiatric: She has a normal mood and affect. Her behavior is normal.   Nursing note and vitals reviewed.      Assessment:       1. Mixed hyperlipidemia    2. Anxiety and depression    3. Leg edema    4. Acquired hypothyroidism    5. Essential hypertension    6. Need for vaccination against Streptococcus pneumoniae    7. Gastroesophageal reflux disease, esophagitis presence not specified        Plan:       Mixed hyperlipidemia  -     lovastatin (MEVACOR) 20 MG tablet; Take 1 tablet (20 mg total) by mouth every evening.  Dispense: 30 tablet; Refill: 0  -     Lipid panel; Future; Expected date: 04/09/2019    Anxiety and depression  -     venlafaxine (EFFEXOR-XR) 37.5 MG 24 hr capsule; Take 1 capsule (37.5 mg total) by mouth once daily.  Dispense: 30 capsule; Refill: 0    Leg edema    Acquired hypothyroidism  -     TSH; Future; Expected date: 04/09/2019    Essential hypertension  -     CBC auto differential; Future; Expected date: 04/09/2019  -     Comprehensive metabolic panel; Future; Expected date: 04/09/2019    Need for vaccination against Streptococcus pneumoniae  -     (In Office Administered) Pneumococcal Polysaccharide Vaccine (23 Valent) (SQ/IM)    Gastroesophageal reflux disease, esophagitis presence not specified  -     pantoprazole (PROTONIX) 40 MG tablet; Take 1 tablet (40 mg total) by mouth once daily.  Dispense: 30 tablet; Refill: 2        Start by taking wellbutrin every other day (once a day as you have been doing) for 1 week, on the off days, take effexor once a day. Then the second week, take  wellbutrin every 3rd day and take the effexor for 2 days in between for a whole week. Then on the third week, take effexor for 3 days, then 1 day of wellbutrin and you are off the wellbutrin after that and only take effexor once a day.    4 weeks with labs prior

## 2019-04-09 NOTE — PROGRESS NOTES
Health Maintenance Due   Topic Date Due    Zoster Vaccine  10/10/2011    Pneumococcal Vaccine (65+ Low/Medium Risk) (2 of 2 - PPSV23) 04/03/2019    Lipid Panel  04/04/2019

## 2019-05-09 ENCOUNTER — OFFICE VISIT (OUTPATIENT)
Dept: FAMILY MEDICINE | Facility: CLINIC | Age: 69
End: 2019-05-09
Payer: MEDICARE

## 2019-05-09 VITALS
DIASTOLIC BLOOD PRESSURE: 60 MMHG | WEIGHT: 189.63 LBS | OXYGEN SATURATION: 98 % | HEIGHT: 68 IN | BODY MASS INDEX: 28.74 KG/M2 | SYSTOLIC BLOOD PRESSURE: 130 MMHG | TEMPERATURE: 98 F | HEART RATE: 72 BPM

## 2019-05-09 DIAGNOSIS — K21.9 GASTROESOPHAGEAL REFLUX DISEASE, ESOPHAGITIS PRESENCE NOT SPECIFIED: Primary | ICD-10-CM

## 2019-05-09 DIAGNOSIS — F41.9 ANXIETY AND DEPRESSION: ICD-10-CM

## 2019-05-09 DIAGNOSIS — E03.9 ACQUIRED HYPOTHYROIDISM: ICD-10-CM

## 2019-05-09 DIAGNOSIS — F32.A ANXIETY AND DEPRESSION: ICD-10-CM

## 2019-05-09 PROCEDURE — 99214 PR OFFICE/OUTPT VISIT, EST, LEVL IV, 30-39 MIN: ICD-10-PCS | Mod: S$GLB,,, | Performed by: NURSE PRACTITIONER

## 2019-05-09 PROCEDURE — 99214 OFFICE O/P EST MOD 30 MIN: CPT | Mod: S$GLB,,, | Performed by: NURSE PRACTITIONER

## 2019-05-09 RX ORDER — VENLAFAXINE HYDROCHLORIDE 37.5 MG/1
37.5 CAPSULE, EXTENDED RELEASE ORAL DAILY
Qty: 90 CAPSULE | Refills: 3 | Status: SHIPPED | OUTPATIENT
Start: 2019-05-09 | End: 2019-08-30

## 2019-05-09 NOTE — PROGRESS NOTES
Subjective:       Patient ID: Skyler Espinoza is a 68 y.o. female.    Chief Complaint: Depression  Depression has improved significantly. She is completely off the wellbutrin and only on the effexor which is working well. She has no suicidal or homicidal ideation. She feels more organized and able to concentrate better. Her daughter is with her and agrees that pt. Is no longer confused and repeating herself. Patient states she has energy now and is not staying in her bedroom most of the day. Both patient and her daughter have a trip to [place] planned for this weekend and are looking forward to it.     Gerd is chronic and well controlled with protonix. No nausea, vomiting or diarrhea.     She has chronic hypothyroidism, but last lab showed her to be over medicated on levothyroxine. The dose was changed and she has not had any problems with it. She denies anxiety or depression, fatigue, cold or heat intolerance.  HPI  Review of Systems   Constitutional: Positive for activity change. Negative for fatigue, fever and unexpected weight change.   HENT: Negative for congestion, hearing loss and sore throat.    Eyes: Negative for pain, redness and visual disturbance.   Respiratory: Negative for cough and shortness of breath.    Cardiovascular: Negative for chest pain and leg swelling.   Gastrointestinal: Negative for constipation, diarrhea, nausea and vomiting.   Endocrine: Negative for cold intolerance and heat intolerance.   Genitourinary: Negative for dysuria and hematuria.   Musculoskeletal: Negative for arthralgias and myalgias.   Skin: Negative for pallor and rash.   Neurological: Negative for dizziness and headaches.   Psychiatric/Behavioral: Negative for dysphoric mood, self-injury and suicidal ideas. The patient is not nervous/anxious.        Objective:       Lab Results   Component Value Date    WBC 6.20 04/09/2019    HGB 13.4 04/09/2019    HCT 40.9 04/09/2019    MCV 89 04/09/2019     04/09/2019      CMP  Sodium   Date Value Ref Range Status   04/09/2019 139 136 - 145 mmol/L Final     Potassium   Date Value Ref Range Status   04/09/2019 4.4 3.5 - 5.1 mmol/L Final     Chloride   Date Value Ref Range Status   04/09/2019 102 95 - 110 mmol/L Final     CO2   Date Value Ref Range Status   04/09/2019 30 (H) 23 - 29 mmol/L Final     Glucose   Date Value Ref Range Status   04/09/2019 102 70 - 110 mg/dL Final     BUN, Bld   Date Value Ref Range Status   04/09/2019 13 8 - 23 mg/dL Final     Creatinine   Date Value Ref Range Status   04/09/2019 0.8 0.5 - 1.4 mg/dL Final   05/02/2013 0.8 0.5 - 1.4 mg/dL Final     Calcium   Date Value Ref Range Status   04/09/2019 10.3 8.7 - 10.5 mg/dL Final   05/02/2013 10.0 8.7 - 10.5 mg/dL Final     Total Protein   Date Value Ref Range Status   04/09/2019 7.2 6.0 - 8.4 g/dL Final     Albumin   Date Value Ref Range Status   04/09/2019 4.3 3.5 - 5.2 g/dL Final     Total Bilirubin   Date Value Ref Range Status   04/09/2019 0.6 0.1 - 1.0 mg/dL Final     Comment:     For infants and newborns, interpretation of results should be based  on gestational age, weight and in agreement with clinical  observations.  Premature Infant recommended reference ranges:  Up to 24 hours.............<8.0 mg/dL  Up to 48 hours............<12.0 mg/dL  3-5 days..................<15.0 mg/dL  6-29 days.................<15.0 mg/dL       Alkaline Phosphatase   Date Value Ref Range Status   04/09/2019 60 55 - 135 U/L Final     AST   Date Value Ref Range Status   04/09/2019 22 10 - 40 U/L Final     ALT   Date Value Ref Range Status   04/09/2019 22 10 - 44 U/L Final     Anion Gap   Date Value Ref Range Status   04/09/2019 7 (L) 8 - 16 mmol/L Final   05/02/2013 15 5 - 15 meq/L Final     eGFR if    Date Value Ref Range Status   04/09/2019 >60 >60 mL/min/1.73 m^2 Final     eGFR if non    Date Value Ref Range Status   04/09/2019 >60 >60 mL/min/1.73 m^2 Final     Comment:     Calculation used  to obtain the estimated glomerular filtration  rate (eGFR) is the CKD-EPI equation.        Lab Results   Component Value Date    CHOL 200 (H) 04/09/2019    CHOL 235 (H) 04/04/2018    CHOL 242 (H) 09/27/2017     Lab Results   Component Value Date    HDL 87 (H) 04/09/2019    HDL 79 (H) 04/04/2018    HDL 82 (H) 09/27/2017     Lab Results   Component Value Date    LDLCALC 97.8 04/09/2019    LDLCALC 142.8 04/04/2018    LDLCALC 148.0 09/27/2017     Lab Results   Component Value Date    TRIG 76 04/09/2019    TRIG 66 04/04/2018    TRIG 60 09/27/2017     Lab Results   Component Value Date    CHOLHDL 43.5 04/09/2019    CHOLHDL 33.6 04/04/2018    CHOLHDL 33.9 09/27/2017     Lab Results   Component Value Date    TSH 0.292 (L) 04/09/2019    FREET4 1.43 04/09/2019           Physical Exam   Constitutional: She is oriented to person, place, and time. She appears well-developed and well-nourished. No distress.   HENT:   Head: Normocephalic and atraumatic.   Eyes: Conjunctivae are normal. Right eye exhibits no discharge. Left eye exhibits no discharge. No scleral icterus.   Cardiovascular: Normal rate, regular rhythm and normal heart sounds. Exam reveals no gallop and no friction rub.   No murmur heard.  Pulmonary/Chest: Effort normal and breath sounds normal. No stridor. No respiratory distress. She has no wheezes. She has no rales.   Musculoskeletal: She exhibits no edema.   Neurological: She is alert and oriented to person, place, and time.   Skin: Skin is warm and dry. No rash noted. She is not diaphoretic. No pallor.   Psychiatric: She has a normal mood and affect. Her behavior is normal.   Nursing note and vitals reviewed.      Assessment:       1. Gastroesophageal reflux disease, esophagitis presence not specified    2. Anxiety and depression    3. Acquired hypothyroidism        Plan:       Gastroesophageal reflux disease, esophagitis presence not specified    Anxiety and depression  -     venlafaxine (EFFEXOR-XR) 37.5 MG 24  hr capsule; Take 1 capsule (37.5 mg total) by mouth once daily.  Dispense: 90 capsule; Refill: 3    Acquired hypothyroidism  -     TSH; Future; Expected date: 07/09/2019    Continue pantoprazole as ordered.      recheck tsh in 2 months

## 2019-06-06 ENCOUNTER — OFFICE VISIT (OUTPATIENT)
Dept: FAMILY MEDICINE | Facility: CLINIC | Age: 69
End: 2019-06-06
Payer: MEDICARE

## 2019-06-06 ENCOUNTER — DOCUMENTATION ONLY (OUTPATIENT)
Dept: FAMILY MEDICINE | Facility: CLINIC | Age: 69
End: 2019-06-06

## 2019-06-06 VITALS
OXYGEN SATURATION: 97 % | TEMPERATURE: 99 F | WEIGHT: 189.13 LBS | BODY MASS INDEX: 28.66 KG/M2 | DIASTOLIC BLOOD PRESSURE: 70 MMHG | SYSTOLIC BLOOD PRESSURE: 118 MMHG | HEART RATE: 74 BPM | HEIGHT: 68 IN

## 2019-06-06 DIAGNOSIS — J30.89 NON-SEASONAL ALLERGIC RHINITIS, UNSPECIFIED TRIGGER: ICD-10-CM

## 2019-06-06 DIAGNOSIS — J01.10 ACUTE NON-RECURRENT FRONTAL SINUSITIS: ICD-10-CM

## 2019-06-06 DIAGNOSIS — R00.2 PALPITATIONS: ICD-10-CM

## 2019-06-06 DIAGNOSIS — R50.9 FEVER, UNSPECIFIED FEVER CAUSE: Primary | ICD-10-CM

## 2019-06-06 LAB
BILIRUB SERPL-MCNC: ABNORMAL MG/DL
BLOOD URINE, POC: ABNORMAL
COLOR, POC UA: ABNORMAL
GLUCOSE UR QL STRIP: NORMAL
KETONES UR QL STRIP: ABNORMAL
LEUKOCYTE ESTERASE URINE, POC: ABNORMAL
NITRITE, POC UA: ABNORMAL
PH, POC UA: 5
PROTEIN, POC: ABNORMAL
SPECIFIC GRAVITY, POC UA: 1.02
UROBILINOGEN, POC UA: NORMAL

## 2019-06-06 PROCEDURE — 81002 URINALYSIS NONAUTO W/O SCOPE: CPT | Mod: S$GLB,,, | Performed by: NURSE PRACTITIONER

## 2019-06-06 PROCEDURE — 99214 PR OFFICE/OUTPT VISIT, EST, LEVL IV, 30-39 MIN: ICD-10-PCS | Mod: 25,S$GLB,, | Performed by: NURSE PRACTITIONER

## 2019-06-06 PROCEDURE — 93010 EKG 12-LEAD: ICD-10-PCS | Mod: S$GLB,,, | Performed by: INTERNAL MEDICINE

## 2019-06-06 PROCEDURE — 99214 OFFICE O/P EST MOD 30 MIN: CPT | Mod: 25,S$GLB,, | Performed by: NURSE PRACTITIONER

## 2019-06-06 PROCEDURE — 93005 EKG 12-LEAD: ICD-10-PCS | Mod: S$GLB,,, | Performed by: NURSE PRACTITIONER

## 2019-06-06 PROCEDURE — 81002 POCT URINE DIPSTICK WITHOUT MICROSCOPE: ICD-10-PCS | Mod: S$GLB,,, | Performed by: NURSE PRACTITIONER

## 2019-06-06 PROCEDURE — 93010 ELECTROCARDIOGRAM REPORT: CPT | Mod: S$GLB,,, | Performed by: INTERNAL MEDICINE

## 2019-06-06 PROCEDURE — 93005 ELECTROCARDIOGRAM TRACING: CPT | Mod: S$GLB,,, | Performed by: NURSE PRACTITIONER

## 2019-06-06 RX ORDER — PREDNISONE 5 MG/1
TABLET ORAL
Qty: 21 TABLET | Refills: 0 | Status: SHIPPED | OUTPATIENT
Start: 2019-06-06 | End: 2019-08-27 | Stop reason: ALTCHOICE

## 2019-06-06 RX ORDER — AZELASTINE 1 MG/ML
1 SPRAY, METERED NASAL 2 TIMES DAILY
Qty: 30 ML | Refills: 5 | Status: SHIPPED | OUTPATIENT
Start: 2019-06-06 | End: 2020-02-27 | Stop reason: SDUPTHER

## 2019-06-06 NOTE — PATIENT INSTRUCTIONS
Start the prednisone first thing in the morning, take all the tablets (6 first day, 5 second day, 4 third day, 3 forth day, 2 fifth day, 1 last day) with breakfast or 1/2 with breakfast and 1/2 with lunch). Do not use after 2:00 pm or it will keep you awake all night.     See Dr. Henry Dong and tell him about the palpitations, see if he wants to do an event monitor.

## 2019-06-06 NOTE — PROGRESS NOTES
"Subjective:       Patient ID: Skyler Espinoza is a 68 y.o. female.    Chief Complaint: Generalized Body Aches and Headache    Sinusitis   This is a new problem. The current episode started yesterday. The problem has been gradually improving since onset. The maximum temperature recorded prior to her arrival was 101 - 101.9 F. The fever has been present for 1 to 2 days. Associated symptoms include coughing, headaches, sinus pressure and sneezing. Pertinent negatives include no congestion. (Severe myalgias) Past treatments include acetaminophen. The treatment provided significant relief.   While she was having fever yesterday, she had palpitations. No chest pain or dyspnea, but felt like her chest was "fluttering". She has had this before and spoke to Dr. Dong about it. She had a holter and nothing was found. She has not had any palpitations since.   Review of Systems   HENT: Positive for sinus pressure and sneezing. Negative for congestion.    Respiratory: Positive for cough.    Neurological: Positive for headaches.       Objective:    U/A normal except trace protein, EKG shows incomplete bundle branch block.   Physical Exam   Constitutional: She is oriented to person, place, and time. She appears well-developed and well-nourished. No distress.   HENT:   Head: Normocephalic and atraumatic.   Right Ear: External ear normal.   Left Ear: External ear normal.   Mouth/Throat: No oropharyngeal exudate.   Eyes: Conjunctivae are normal. Right eye exhibits no discharge. Left eye exhibits no discharge. No scleral icterus.   Neck: Normal range of motion. Neck supple.   Cardiovascular: Normal rate, regular rhythm and normal heart sounds. Exam reveals no gallop and no friction rub.   No murmur heard.  Pulmonary/Chest: Effort normal and breath sounds normal. No stridor. No respiratory distress. She has no wheezes. She has no rales.   Musculoskeletal: She exhibits no edema.   Lymphadenopathy:     She has no cervical adenopathy. "   Neurological: She is alert and oriented to person, place, and time.   Skin: Skin is warm and dry. No rash noted. She is not diaphoretic. No pallor.   Psychiatric: She has a normal mood and affect. Her behavior is normal.   Nursing note and vitals reviewed.      Assessment:     This provider spent  25 minutes face to face with patient, more than half the time for counseling and coordination of care as noted.    1. Fever, unspecified fever cause    2. Non-seasonal allergic rhinitis, unspecified trigger    3. Palpitations    4. Acute non-recurrent frontal sinusitis        Plan:       Fever, unspecified fever cause  -     POCT urine dipstick without microscope; Future; Expected date: 06/06/2019    Non-seasonal allergic rhinitis, unspecified trigger  -     azelastine (ASTELIN) 137 mcg (0.1 %) nasal spray; 1 spray (137 mcg total) by Nasal route 2 (two) times daily.  Dispense: 30 mL; Refill: 5    Palpitations  -     EKG 12-lead    Acute non-recurrent frontal sinusitis  -     predniSONE (DELTASONE) 5 MG tablet; 6 tabs the first day, then 5 the next, then 4, 3, 2, 1  Dispense: 21 tablet; Refill: 0           Start the prednisone first thing in the morning, take all the tablets (6 first day, 5 second day, 4 third day, 3 forth day, 2 fifth day, 1 last day) with breakfast or 1/2 with breakfast and 1/2 with lunch). Do not use after 2:00 pm or it will keep you awake all night. Explained what incomplete bundle branch block was and that it is not worrisome, but the palpitations may be seen on an event monitor. Encouraged patient to speak to her cardiologist, Dr. Henry Dong, about wearing an event monitor. The patient and her daughter verbalized understanding and are agreeable.

## 2019-07-01 DIAGNOSIS — E03.9 ACQUIRED HYPOTHYROIDISM: ICD-10-CM

## 2019-07-01 RX ORDER — LEVOTHYROXINE SODIUM 100 UG/1
100 TABLET ORAL DAILY
Qty: 30 TABLET | Refills: 0 | Status: SHIPPED | OUTPATIENT
Start: 2019-07-01 | End: 2019-07-16 | Stop reason: SDUPTHER

## 2019-07-01 NOTE — TELEPHONE ENCOUNTER
----- Message from Smita Ibarra sent at 7/1/2019 11:13 AM CDT -----  Contact: Pt  Pt needs a refill on levothyroxine (SYNTHROID) 100 MCG tablet called into pharmacy. Patient stated was not approved by pharmacy    Pt can be reached at [phone]

## 2019-07-01 NOTE — TELEPHONE ENCOUNTER
Please let patient know that I sent a 1 month supply to her pharmacy as we need to recheck her thyroid level in a week to see if this is the correct dose.

## 2019-07-09 ENCOUNTER — LAB VISIT (OUTPATIENT)
Dept: LAB | Facility: HOSPITAL | Age: 69
End: 2019-07-09
Attending: INTERNAL MEDICINE
Payer: MEDICARE

## 2019-07-09 DIAGNOSIS — E03.9 ACQUIRED HYPOTHYROIDISM: ICD-10-CM

## 2019-07-09 LAB — TSH SERPL DL<=0.005 MIU/L-ACNC: 0.49 UIU/ML (ref 0.4–4)

## 2019-07-09 PROCEDURE — 36415 COLL VENOUS BLD VENIPUNCTURE: CPT | Mod: PO

## 2019-07-09 PROCEDURE — 84443 ASSAY THYROID STIM HORMONE: CPT

## 2019-07-10 DIAGNOSIS — K21.9 GASTROESOPHAGEAL REFLUX DISEASE, ESOPHAGITIS PRESENCE NOT SPECIFIED: ICD-10-CM

## 2019-07-10 RX ORDER — PANTOPRAZOLE SODIUM 40 MG/1
40 TABLET, DELAYED RELEASE ORAL DAILY
Qty: 30 TABLET | Refills: 2 | Status: SHIPPED | OUTPATIENT
Start: 2019-07-10 | End: 2019-10-09 | Stop reason: SDUPTHER

## 2019-07-10 NOTE — TELEPHONE ENCOUNTER
----- Message from Virgen CALDERÓNMeghana Holguinlorieramírez sent at 7/10/2019 12:44 PM CDT -----  Contact: [phone] self   REFILLS:    Patient is requesting a medication refill.    RX name: pantoprazole (PROTONIX) 40 MG tablet    Strength: 40 MG tab    Directions: Route: Take 1 tablet (40 mg total) by mouth once daily    Pharmacy name: [facility] [address] [place] [address]    Phone number where pt can be reached: [phone]

## 2019-07-16 ENCOUNTER — DOCUMENTATION ONLY (OUTPATIENT)
Dept: FAMILY MEDICINE | Facility: CLINIC | Age: 69
End: 2019-07-16

## 2019-07-16 ENCOUNTER — TELEPHONE (OUTPATIENT)
Dept: FAMILY MEDICINE | Facility: CLINIC | Age: 69
End: 2019-07-16

## 2019-07-16 ENCOUNTER — OFFICE VISIT (OUTPATIENT)
Dept: FAMILY MEDICINE | Facility: CLINIC | Age: 69
End: 2019-07-16
Payer: MEDICARE

## 2019-07-16 VITALS
BODY MASS INDEX: 28.66 KG/M2 | HEIGHT: 68 IN | HEART RATE: 87 BPM | OXYGEN SATURATION: 96 % | WEIGHT: 189.13 LBS | TEMPERATURE: 98 F | DIASTOLIC BLOOD PRESSURE: 70 MMHG | SYSTOLIC BLOOD PRESSURE: 118 MMHG

## 2019-07-16 DIAGNOSIS — Z23 NEED FOR SHINGLES VACCINE: Primary | ICD-10-CM

## 2019-07-16 DIAGNOSIS — E03.9 ACQUIRED HYPOTHYROIDISM: ICD-10-CM

## 2019-07-16 DIAGNOSIS — E78.2 MIXED HYPERLIPIDEMIA: ICD-10-CM

## 2019-07-16 PROBLEM — G25.0 ESSENTIAL TREMOR: Status: ACTIVE | Noted: 2019-07-16

## 2019-07-16 PROCEDURE — 99213 PR OFFICE/OUTPT VISIT, EST, LEVL III, 20-29 MIN: ICD-10-PCS | Mod: S$GLB,,, | Performed by: NURSE PRACTITIONER

## 2019-07-16 PROCEDURE — 99213 OFFICE O/P EST LOW 20 MIN: CPT | Mod: S$GLB,,, | Performed by: NURSE PRACTITIONER

## 2019-07-16 RX ORDER — LEVOTHYROXINE SODIUM 100 UG/1
100 TABLET ORAL DAILY
Qty: 90 TABLET | Refills: 3 | Status: SHIPPED | OUTPATIENT
Start: 2019-07-16 | End: 2019-08-27

## 2019-07-16 RX ORDER — LOVASTATIN 20 MG/1
20 TABLET ORAL NIGHTLY
Qty: 90 TABLET | Refills: 3 | Status: SHIPPED | OUTPATIENT
Start: 2019-07-16 | End: 2020-07-14

## 2019-07-16 NOTE — PROGRESS NOTES
Subjective:       Patient ID: Skyler Espinoza is a 68 y.o. female.    Chief Complaint: Thyroid Problem    Thyroid Problem   Presents for follow-up visit. Patient reports no cold intolerance, constipation, fatigue, hair loss, heat intolerance or weight gain. The symptoms have been resolved.     Depression is chronic and well controlled.   Review of Systems   Constitutional: Negative for fatigue and weight gain.   Gastrointestinal: Negative for constipation.   Endocrine: Negative for cold intolerance and heat intolerance.       Objective:       Lab Results   Component Value Date    TSH 0.486 07/09/2019       Physical Exam   Constitutional: She is oriented to person, place, and time. She appears well-developed and well-nourished. No distress.   HENT:   Head: Normocephalic and atraumatic.   Eyes: Conjunctivae are normal. Right eye exhibits no discharge. Left eye exhibits no discharge. No scleral icterus.   Cardiovascular: Normal rate, regular rhythm and normal heart sounds. Exam reveals no gallop and no friction rub.   No murmur heard.  Pulmonary/Chest: Effort normal and breath sounds normal. No stridor. No respiratory distress. She has no wheezes. She has no rales.   Musculoskeletal: She exhibits no edema.   Neurological: She is alert and oriented to person, place, and time.   Skin: Skin is warm and dry. No rash noted. She is not diaphoretic. No pallor.   Psychiatric: She has a normal mood and affect. Her behavior is normal.   Nursing note and vitals reviewed.      Assessment:       1. Need for shingles vaccine    2. Acquired hypothyroidism        Plan:       Need for shingles vaccine  -     varicella-zoster gE vac,2 of 2 50 mcg SusR; Inject 0.5 mLs into the muscle once. for 1 dose  Dispense: 1 each; Refill: 0  -     adjuvant AS01B, PF,vial 1 of 2 Susp; Inject 0.5 mLs into the muscle once. for 1 dose  Dispense: 0.5 mL; Refill: 0    Acquired hypothyroidism  -     levothyroxine (SYNTHROID) 100 MCG tablet; Take 1 tablet  (100 mcg total) by mouth once daily.  Dispense: 90 tablet; Refill: 3       6 months

## 2019-07-19 ENCOUNTER — PATIENT MESSAGE (OUTPATIENT)
Dept: FAMILY MEDICINE | Facility: CLINIC | Age: 69
End: 2019-07-19

## 2019-08-26 ENCOUNTER — NURSE TRIAGE (OUTPATIENT)
Dept: ADMINISTRATIVE | Facility: CLINIC | Age: 69
End: 2019-08-26

## 2019-08-26 ENCOUNTER — HOSPITAL ENCOUNTER (EMERGENCY)
Facility: HOSPITAL | Age: 69
Discharge: HOME | End: 2019-08-26
Attending: EMERGENCY MEDICINE
Payer: MEDICARE

## 2019-08-26 VITALS
RESPIRATION RATE: 21 BRPM | OXYGEN SATURATION: 97 % | BODY MASS INDEX: 28.79 KG/M2 | HEIGHT: 68 IN | HEART RATE: 80 BPM | SYSTOLIC BLOOD PRESSURE: 127 MMHG | TEMPERATURE: 99 F | WEIGHT: 190 LBS | DIASTOLIC BLOOD PRESSURE: 81 MMHG

## 2019-08-26 DIAGNOSIS — R00.2 PALPITATIONS: Primary | ICD-10-CM

## 2019-08-26 DIAGNOSIS — R42 DIZZINESS: ICD-10-CM

## 2019-08-26 LAB
ALBUMIN SERPL BCP-MCNC: 4.1 G/DL (ref 3.5–5.2)
ALP SERPL-CCNC: 51 U/L (ref 55–135)
ALT SERPL W/O P-5'-P-CCNC: 20 U/L (ref 10–44)
ANION GAP SERPL CALC-SCNC: 7 MMOL/L (ref 8–16)
AST SERPL-CCNC: 21 U/L (ref 10–40)
BASOPHILS # BLD AUTO: 0.04 K/UL (ref 0–0.2)
BASOPHILS NFR BLD: 0.7 % (ref 0–1.9)
BILIRUB SERPL-MCNC: 0.8 MG/DL (ref 0.1–1)
BNP SERPL-MCNC: 50 PG/ML (ref 0–99)
BUN SERPL-MCNC: 15 MG/DL (ref 8–23)
CALCIUM SERPL-MCNC: 9.6 MG/DL (ref 8.7–10.5)
CHLORIDE SERPL-SCNC: 106 MMOL/L (ref 95–110)
CO2 SERPL-SCNC: 29 MMOL/L (ref 23–29)
CREAT SERPL-MCNC: 0.6 MG/DL (ref 0.5–1.4)
DIFFERENTIAL METHOD: NORMAL
EOSINOPHIL # BLD AUTO: 0.1 K/UL (ref 0–0.5)
EOSINOPHIL NFR BLD: 1.7 % (ref 0–8)
ERYTHROCYTE [DISTWIDTH] IN BLOOD BY AUTOMATED COUNT: 12.4 % (ref 11.5–14.5)
EST. GFR  (AFRICAN AMERICAN): >60 ML/MIN/1.73 M^2
EST. GFR  (NON AFRICAN AMERICAN): >60 ML/MIN/1.73 M^2
GLUCOSE SERPL-MCNC: 78 MG/DL (ref 70–110)
HCT VFR BLD AUTO: 40.3 % (ref 37–48.5)
HGB BLD-MCNC: 13.2 G/DL (ref 12–16)
IMM GRANULOCYTES # BLD AUTO: 0.01 K/UL (ref 0–0.04)
IMM GRANULOCYTES NFR BLD AUTO: 0.2 % (ref 0–0.5)
INR PPP: 1.1
LYMPHOCYTES # BLD AUTO: 1.9 K/UL (ref 1–4.8)
LYMPHOCYTES NFR BLD: 33.9 % (ref 18–48)
MAGNESIUM SERPL-MCNC: 2.3 MG/DL (ref 1.6–2.6)
MCH RBC QN AUTO: 29 PG (ref 27–31)
MCHC RBC AUTO-ENTMCNC: 32.8 G/DL (ref 32–36)
MCV RBC AUTO: 89 FL (ref 82–98)
MONOCYTES # BLD AUTO: 0.6 K/UL (ref 0.3–1)
MONOCYTES NFR BLD: 10.1 % (ref 4–15)
NEUTROPHILS # BLD AUTO: 3.1 K/UL (ref 1.8–7.7)
NEUTROPHILS NFR BLD: 53.4 % (ref 38–73)
NRBC BLD-RTO: 0 /100 WBC
PLATELET # BLD AUTO: 238 K/UL (ref 150–350)
PMV BLD AUTO: 11 FL (ref 9.2–12.9)
POTASSIUM SERPL-SCNC: 4 MMOL/L (ref 3.5–5.1)
PROT SERPL-MCNC: 7.1 G/DL (ref 6–8.4)
PROTHROMBIN TIME: 13.4 SEC (ref 11.7–14)
RBC # BLD AUTO: 4.55 M/UL (ref 4–5.4)
SODIUM SERPL-SCNC: 142 MMOL/L (ref 136–145)
T4 FREE SERPL-MCNC: 1.23 NG/DL (ref 0.71–1.51)
TROPONIN I SERPL DL<=0.01 NG/ML-MCNC: <0.03 NG/ML (ref 0.02–0.04)
TSH SERPL DL<=0.005 MIU/L-ACNC: 0.06 UIU/ML (ref 0.34–5.6)
WBC # BLD AUTO: 5.73 K/UL (ref 3.9–12.7)

## 2019-08-26 PROCEDURE — 84484 ASSAY OF TROPONIN QUANT: CPT

## 2019-08-26 PROCEDURE — 93005 ELECTROCARDIOGRAM TRACING: CPT

## 2019-08-26 PROCEDURE — 85610 PROTHROMBIN TIME: CPT

## 2019-08-26 PROCEDURE — 85025 COMPLETE CBC W/AUTO DIFF WBC: CPT

## 2019-08-26 PROCEDURE — 83735 ASSAY OF MAGNESIUM: CPT

## 2019-08-26 PROCEDURE — 80053 COMPREHEN METABOLIC PANEL: CPT

## 2019-08-26 PROCEDURE — 84439 ASSAY OF FREE THYROXINE: CPT

## 2019-08-26 PROCEDURE — 99285 EMERGENCY DEPT VISIT HI MDM: CPT | Mod: 25

## 2019-08-26 PROCEDURE — 83880 ASSAY OF NATRIURETIC PEPTIDE: CPT

## 2019-08-26 PROCEDURE — 84443 ASSAY THYROID STIM HORMONE: CPT

## 2019-08-26 RX ORDER — MECLIZINE HYDROCHLORIDE 25 MG/1
25 TABLET ORAL 3 TIMES DAILY PRN
Qty: 20 TABLET | Refills: 0 | Status: SHIPPED | OUTPATIENT
Start: 2019-08-26 | End: 2019-09-23 | Stop reason: SDUPTHER

## 2019-08-26 NOTE — TELEPHONE ENCOUNTER
When she bent down, she feels like the whole room went sideways, and sat down to rest, took a while for it to resolve, and dtr checked her orthostatic blood pressures, and they were fine.  Dtr reports, irregular heart rate.  Hx of palpitations, MVP, irregular heart beat, just completed 1 month holter monitor, but doesn't have results.  Dtr notices she's been sleeping a lot more, like 12-13 hours per day.  Also notices some memory issues, as well.  Attempted to get her into clinic now, no appts until 2 pm, dtr declines ED/UCC, wants to see Patricia Solorio NP later today.    Reason for Disposition   Extra heart beats OR irregular heart beating (i.e., 'palpitations')    Additional Information   Negative: Shock suspected (e.g., cold/pale/clammy skin, too weak to stand, low BP, rapid pulse)   Negative: Difficult to awaken or acting confused (e.g., disoriented, slurred speech)   Negative: Fainted, and still feels dizzy afterwards   Negative: Severe difficulty breathing (e.g., struggling for each breath, speaks in single words)   Negative: Overdose (accidental or intentional) of medications   Negative: New neurologic deficit that is present now: * Weakness of the face, arm, or leg on one side of the body * Numbness of the face, arm, or leg on one side of the body * Loss of speech or garbled speech   Negative: Heart beating < 50 beats per minute OR > 140 beats per minute   Negative: Sounds like a life-threatening emergency to the triager   Negative: Chest pain   Negative: Rectal bleeding, bloody stool, or tarry-black stool   Negative: Vomiting is the main symptom   Negative: Diarrhea is the main symptom   Negative: Headache is the main symptom   Negative: Heat exhaustion suspected (i.e., dehydration from heat exposure)   Negative: Patient states that he/she is having an anxiety/panic attack   Negative: SEVERE dizziness (e.g., unable to stand, requires support to walk, feels like passing out now)   Negative:  Severe headache    Protocols used: DIZZINESS-A-OH

## 2019-08-26 NOTE — ED PROVIDER NOTES
Encounter Date: 8/26/2019       History     Chief Complaint   Patient presents with    Palpitations     68-year-old female presents complaining of palpitations and dizziness patient reports that dizziness started today while she was bending to pet her CT, patient then developed a mild headache rated 3/10, patient also reports that dizziness gradually resolved.  Patient reports continued palpitations.  Patient has no other complaints at this time she had a similar episode last week.        Review of patient's allergies indicates:   Allergen Reactions    Lidocaine      Hand edema,  redness    Penicillins Rash     Past Medical History:   Diagnosis Date    Depression     GERD (gastroesophageal reflux disease)     Hyperlipidemia     Hypertension     Hypothyroid      Past Surgical History:   Procedure Laterality Date    APPENDECTOMY      TONSILLECTOMY      TUBAL LIGATION       Family History   Problem Relation Age of Onset    Cancer Father     Cancer Sister     Glaucoma Neg Hx     Macular degeneration Neg Hx     Retinal detachment Neg Hx      Social History     Tobacco Use    Smoking status: Never Smoker    Smokeless tobacco: Never Used   Substance Use Topics    Alcohol use: Yes     Comment: rarely    Drug use: No     Review of Systems   Constitutional: Negative for fever.   HENT: Negative for congestion, rhinorrhea, sore throat and trouble swallowing.    Eyes: Negative for visual disturbance.   Respiratory: Negative for cough, chest tightness, shortness of breath and wheezing.    Cardiovascular: Positive for palpitations. Negative for chest pain and leg swelling.   Gastrointestinal: Negative for abdominal distention, abdominal pain, constipation, diarrhea, nausea and vomiting.   Genitourinary: Negative for difficulty urinating, dysuria, flank pain and frequency.   Musculoskeletal: Negative for arthralgias, back pain, joint swelling and neck pain.   Skin: Negative for color change and rash.    Neurological: Positive for dizziness and headaches. Negative for syncope, speech difficulty, weakness and numbness.   All other systems reviewed and are negative.      Physical Exam     Initial Vitals [08/26/19 1201]   BP Pulse Resp Temp SpO2   132/78 84 16 98.5 °F (36.9 °C) 97 %      MAP       --         Physical Exam    Nursing note and vitals reviewed.  Constitutional: She appears well-developed and well-nourished. She is not diaphoretic. No distress.   HENT:   Head: Normocephalic and atraumatic.   Right Ear: External ear normal.   Left Ear: External ear normal.   Nose: Nose normal.   Mouth/Throat: Oropharynx is clear and moist. No oropharyngeal exudate.   Eyes: Conjunctivae and EOM are normal. Pupils are equal, round, and reactive to light. Right eye exhibits no discharge. Left eye exhibits no discharge. No scleral icterus.   Neck: Normal range of motion. Neck supple. No thyromegaly present. No tracheal deviation present. No JVD present.   Cardiovascular: Normal rate, regular rhythm, normal heart sounds and intact distal pulses. Exam reveals no gallop and no friction rub.    No murmur heard.  Pulmonary/Chest: Breath sounds normal. No stridor. No respiratory distress. She has no wheezes. She has no rhonchi. She has no rales. She exhibits no tenderness.   Abdominal: Soft. Bowel sounds are normal. She exhibits no distension and no mass. There is no tenderness. There is no rebound and no guarding.   Musculoskeletal: Normal range of motion. She exhibits no edema or tenderness.   Lymphadenopathy:     She has no cervical adenopathy.   Neurological: She is alert and oriented to person, place, and time. She has normal strength. She displays normal reflexes. No cranial nerve deficit or sensory deficit.   Skin: Skin is warm and dry. No rash and no abscess noted. No erythema. No pallor.         ED Course   Procedures  Labs Reviewed   COMPREHENSIVE METABOLIC PANEL - Abnormal; Notable for the following components:        Result Value    Alkaline Phosphatase 51 (*)     Anion Gap 7 (*)     All other components within normal limits   TSH - Abnormal; Notable for the following components:    TSH 0.060 (*)     All other components within normal limits   CBC W/ AUTO DIFFERENTIAL   B-TYPE NATRIURETIC PEPTIDE   TROPONIN I   PROTIME-INR   MAGNESIUM   T4, FREE        ECG Results          EKG 12-lead (In process)  Result time 08/26/19 13:09:22    In process by Interface, Lab In [facility] (08/26/19 13:09:22)                 Narrative:    Test Reason : R00.2,    Vent. Rate : 084 BPM     Atrial Rate : 084 BPM     P-R Int : 158 ms          QRS Dur : 102 ms      QT Int : 394 ms       P-R-T Axes : 070 027 075 degrees     QTc Int : 465 ms    Normal sinus rhythm  Normal ECG  When compared with ECG of 06-JUN-2019 10:17,  Incomplete right bundle branch block is no longer Present    Referred By: [email or web address]   SELF           Confirmed By:                             Imaging Results          CT Head Without Contrast (Final result)  Result time 08/26/19 15:32:53    Final result by Cindy Dsouza MD (08/26/19 15:32:53)                 Impression:      1. No acute intracranial abnormality.    2. Chronic small vessel ischemic changes.      Electronically signed by: Cindy Dsouza MD  Date:    08/26/2019  Time:    15:32             Narrative:      CMS MANDATED QUALITY DATA - CT RADIATION - 436    All CT scans at this facility utilize dose modulation, iterative reconstruction, and/or weight based dosing when appropriate to reduce radiation dose to as low as reasonably achievable.    EXAMINATION:  CT HEAD WITHOUT CONTRAST    CLINICAL HISTORY:  Syncope/fainting;    TECHNIQUE:  Head CT without IV contrast. All CT scans at this facility use dose modulation, iterative reconstruction, and/or weight based dosing when appropriate to reduce radiation dose to as low as reasonably achievable.    COMPARISON:  None.    FINDINGS:  No acute intracranial hemorrhage, midline  shift, or mass effect.    Diffuse cerebral and cerebellar atrophy is evident. Mild periventricular and subcortical white matter low-attenuation suggests chronic small vessel ischemic changes.    The ventricles and cisterns are maintained.    Calvarium is intact, and visualized sinuses are clear.                               X-Ray Chest PA And Lateral (Final result)  Result time 08/26/19 12:59:05    Final result by Cindy Dsouza MD (08/26/19 12:59:05)                 Impression:      Normal chest.      Electronically signed by: Cindy Dsouza MD  Date:    08/26/2019  Time:    12:59             Narrative:    EXAMINATION:  XR CHEST PA AND LATERAL    CLINICAL HISTORY:  palpitations;    FINDINGS:  PA and lateral chest without comparisons shows normal cardiomediastinal silhouette.    Lungs are clear. Pulmonary vasculature is normal. Degenerative changes of the thoracic spine                                 Medical Decision Making:   History:   Old Medical Records: I decided to obtain old medical records.  Initial Assessment:   Emergent evaluation of a 68-year-old female presenting with palpitations differential diagnosis includes ACS, electrolyte abnormality, endocrine dysfunction, infection, CVA              Attending Attestation:             Attending ED Notes:   Patient's labs show no significant acute abnormality, patient is tolerating p.o. in the ER and ambulating without difficulty patient's dizziness and palpitations have resolved.  Patient offered observational stay in the emergency department however declined stating that she feels well and would rather go home.  Patient is referred to PCP and Cardiology for re-evaluation in the next 2 3 days patient cautioned to return immediately to the ER for any worsening or any further concerns or should symptoms return.             Clinical Impression:       ICD-10-CM ICD-9-CM   1. Palpitations R00.2 785.1   2. Dizziness R42 780.4                                 Quincy Raymond MD  08/26/19 8228

## 2019-08-26 NOTE — ED NOTES
Orhtostatic vitals done.  Stable.  Denies lightheadedness or weakness while standing.    Is alert, Ox3, PREM.   VS stable.   SR with occasional PAC's noted on monitor.   Denies any chest pain or SOB.  Does report mild headache 3/10 and hunger.  Given sandwich tray.  Sitting up to eat with good appetite

## 2019-08-27 ENCOUNTER — TELEPHONE (OUTPATIENT)
Dept: FAMILY MEDICINE | Facility: CLINIC | Age: 69
End: 2019-08-27

## 2019-08-27 ENCOUNTER — DOCUMENTATION ONLY (OUTPATIENT)
Dept: FAMILY MEDICINE | Facility: CLINIC | Age: 69
End: 2019-08-27

## 2019-08-27 ENCOUNTER — PATIENT MESSAGE (OUTPATIENT)
Dept: FAMILY MEDICINE | Facility: CLINIC | Age: 69
End: 2019-08-27

## 2019-08-27 ENCOUNTER — LAB VISIT (OUTPATIENT)
Dept: LAB | Facility: HOSPITAL | Age: 69
End: 2019-08-27
Attending: INTERNAL MEDICINE
Payer: MEDICARE

## 2019-08-27 ENCOUNTER — OFFICE VISIT (OUTPATIENT)
Dept: FAMILY MEDICINE | Facility: CLINIC | Age: 69
End: 2019-08-27
Payer: MEDICARE

## 2019-08-27 VITALS
HEART RATE: 87 BPM | HEIGHT: 68 IN | TEMPERATURE: 98 F | BODY MASS INDEX: 28.5 KG/M2 | WEIGHT: 188.06 LBS | RESPIRATION RATE: 16 BRPM | DIASTOLIC BLOOD PRESSURE: 80 MMHG | OXYGEN SATURATION: 98 % | SYSTOLIC BLOOD PRESSURE: 108 MMHG

## 2019-08-27 DIAGNOSIS — E05.90 HYPERTHYROIDISM: ICD-10-CM

## 2019-08-27 DIAGNOSIS — E03.9 ACQUIRED HYPOTHYROIDISM: ICD-10-CM

## 2019-08-27 DIAGNOSIS — E05.90 HYPERTHYROIDISM: Primary | ICD-10-CM

## 2019-08-27 DIAGNOSIS — R41.3 MEMORY LOSS: ICD-10-CM

## 2019-08-27 DIAGNOSIS — L56.8 PHOTODERMATITIS: ICD-10-CM

## 2019-08-27 DIAGNOSIS — M17.0 PRIMARY OSTEOARTHRITIS OF BOTH KNEES: ICD-10-CM

## 2019-08-27 LAB
TSH SERPL DL<=0.005 MIU/L-ACNC: 0.07 UIU/ML (ref 0.4–4)
VIT B12 SERPL-MCNC: 898 PG/ML (ref 210–950)

## 2019-08-27 PROCEDURE — 84439 ASSAY OF FREE THYROXINE: CPT

## 2019-08-27 PROCEDURE — 36415 COLL VENOUS BLD VENIPUNCTURE: CPT | Mod: PO

## 2019-08-27 PROCEDURE — 99215 OFFICE O/P EST HI 40 MIN: CPT | Mod: S$GLB,,, | Performed by: INTERNAL MEDICINE

## 2019-08-27 PROCEDURE — 99215 PR OFFICE/OUTPT VISIT, EST, LEVL V, 40-54 MIN: ICD-10-PCS | Mod: S$GLB,,, | Performed by: INTERNAL MEDICINE

## 2019-08-27 PROCEDURE — 82607 VITAMIN B-12: CPT

## 2019-08-27 PROCEDURE — 84443 ASSAY THYROID STIM HORMONE: CPT

## 2019-08-27 PROCEDURE — 86592 SYPHILIS TEST NON-TREP QUAL: CPT

## 2019-08-27 PROCEDURE — 86376 MICROSOMAL ANTIBODY EACH: CPT

## 2019-08-27 RX ORDER — TRIAMCINOLONE ACETONIDE 1 MG/G
CREAM TOPICAL 2 TIMES DAILY PRN
Qty: 80 G | Refills: 1 | Status: SHIPPED | OUTPATIENT
Start: 2019-08-27 | End: 2019-11-07

## 2019-08-27 RX ORDER — LEVOTHYROXINE SODIUM 50 UG/1
50 TABLET ORAL DAILY
Qty: 30 TABLET | Refills: 1 | Status: SHIPPED | OUTPATIENT
Start: 2019-08-27 | End: 2020-02-05 | Stop reason: DRUGHIGH

## 2019-08-27 NOTE — PROGRESS NOTES
"Subjective:      2:32 PM     Patient ID: Skyler Espinoza is a 68 y.o. female.    Chief Complaint: Hospital Follow Up (dizziness,palpitations); memory problems (need referral to neuro); and Knee Pain (request referral to physical therapy)    HPI     The patient has a history of hypothyroidism.  Her dose was decreased in June and a month later she had a normal thyroid level.  Yesterday in the ER her TSH dropped to the 0.06.    CHIEF COMPLAINT: dizziness .  HPI:  When she bent over in his went to get up she became dizzy and felt her heart was pounding fast and irregularly.  She did have PACs in the ER.    ONSET/TIMING: Onset   1 day ago    days ago.         Trauma: no    DURATION:  Intermittent for about 2 min    QUALITY/COURSE:  Resolved    INTENSITY/SEVERITY:  severity 5   (on a 1-10 scale).        MODIFIERS/TREATMENTS:   Taking medications:   . ENT consult done: no.     SYMPTOMS/RELATED:  Possible medication side effects include:        The following symptoms/statements are positive if BOLDED, otherwise negative.          CONTEXT/WHEN:  Lying down.  Sitting up .Standing up. turning head.  Sudden.. Trauma. Similar_problems_in_past.           .     REVIEW OF SYSTEMS :   vertigo . visual aura.    CULPRIT MEDICATIONS: : alpha blockers, beta blockers, calcium channel blockers, diuretics, epileptogenic medication, hypoglycemic agents, hypotensive medications            Review of Systems      Objective:      Vitals:    08/27/19 1404   BP: 108/80   Pulse: 87   Resp: 16   Temp: 98.3 °F (36.8 °C)   TempSrc: Oral   SpO2: 98%   Weight: 85.3 kg (188 lb 0.8 oz)   Height: 5' 8" (1.727 m)   PainSc: 0-No pain    Blood pressure lying 126/82 pulse 76  Blood pressure standing 118/90 pulse 102  Physical Exam   Constitutional: She is oriented to person, place, and time. She appears well-developed and well-nourished.   Cardiovascular: Normal rate, regular rhythm and normal heart sounds.   Pulmonary/Chest: Effort normal and breath sounds " normal.   Abdominal: Soft. There is no tenderness.   Neurological: She is alert and oriented to person, place, and time. She displays normal reflexes. No cranial nerve deficit or sensory deficit. She exhibits normal muscle tone. Coordination normal.   Can draw clock face.  Members 1 of 3 objects at 5 min.  Can spell world backwards   Skin:        Psychiatric: She has a normal mood and affect. Her behavior is normal. Thought content normal.   Nursing note and vitals reviewed.        Assessment:       1. Hyperthyroidism    2. Acquired hypothyroidism    3. Memory loss    4. Photodermatitis    5. Primary osteoarthritis of both knees          Plan:   More than 45 min spent over half in counseling and coordination    Hyperthyroidism  -     THYROID PEROXIDASE ANTIBODY; Future; Expected date: 08/27/2019  -     THYROGLOBULIN; Future; Expected date: 08/27/2019  -     TSH; Future; Expected date: 08/27/2019    Acquired hypothyroidism    Memory loss  -     RPR; Future; Expected date: 08/27/2019  -     Vitamin B12; Future; Expected date: 08/27/2019  -     Ambulatory Referral to Neurology    Photodermatitis  -     triamcinolone acetonide 0.1% (KENALOG) 0.1 % cream; Apply topically 2 (two) times daily as needed.  Dispense: 80 g; Refill: 1    Primary osteoarthritis of both knees  -     triamcinolone acetonide 0.1% (KENALOG) 0.1 % cream; Apply topically 2 (two) times daily as needed.  Dispense: 80 g; Refill: 1  -     Ambulatory consult to Physical Therapy      Follow up in about 1 month (around 9/27/2019).

## 2019-08-27 NOTE — PATIENT INSTRUCTIONS
If there is no mixup in the thyroid medications let me known will decrease her thyroid medicine in half.  You can break the pills in half until I called in.    Get a walker with a seat and have her walk 10 min 3 times a day.    Please fill out the patient experience survey.

## 2019-08-28 ENCOUNTER — TELEPHONE (OUTPATIENT)
Dept: FAMILY MEDICINE | Facility: CLINIC | Age: 69
End: 2019-08-28

## 2019-08-28 ENCOUNTER — PATIENT MESSAGE (OUTPATIENT)
Dept: FAMILY MEDICINE | Facility: CLINIC | Age: 69
End: 2019-08-28

## 2019-08-28 LAB
RPR SER QL: NORMAL
T4 FREE SERPL-MCNC: 1.29 NG/DL (ref 0.71–1.51)
THYROPEROXIDASE IGG SERPL-ACNC: 12 IU/ML

## 2019-08-28 NOTE — TELEPHONE ENCOUNTER
----- Message from Naty Henley sent at 8/28/2019 12:27 PM CDT -----  Contact: sky daughter  Type: Needs Medical Advice    Who Called:  Sky  Best Call Back Number: [phone]  Additional Information: Pls call daughter regarding physical therapy location. She is requesting to have the orders sent to [facility] on [address] in [place]. [phone]. She does not have the fax#. Pls call Sky regarding any questions

## 2019-08-28 NOTE — TELEPHONE ENCOUNTER
Please notify the patient that we are getting blood work in 3 weeks.  It is already ordered.  I called in the you with thyroxine 50 mcg

## 2019-08-29 LAB
THRYOGLOBULIN INTERPRETATION: ABNORMAL
THYROGLOB AB SERPL-ACNC: 3.6 IU/ML
THYROGLOB SERPL-MCNC: 0.5 NG/ML

## 2019-08-30 ENCOUNTER — TELEPHONE (OUTPATIENT)
Dept: FAMILY MEDICINE | Facility: CLINIC | Age: 69
End: 2019-08-30

## 2019-08-30 ENCOUNTER — PATIENT MESSAGE (OUTPATIENT)
Dept: FAMILY MEDICINE | Facility: CLINIC | Age: 69
End: 2019-08-30

## 2019-08-30 DIAGNOSIS — F41.9 ANXIETY AND DEPRESSION: Primary | ICD-10-CM

## 2019-08-30 DIAGNOSIS — F32.A ANXIETY AND DEPRESSION: Primary | ICD-10-CM

## 2019-08-30 RX ORDER — VENLAFAXINE HYDROCHLORIDE 75 MG/1
75 CAPSULE, EXTENDED RELEASE ORAL DAILY
Qty: 30 CAPSULE | Refills: 11 | Status: SHIPPED | OUTPATIENT
Start: 2019-08-30 | End: 2019-10-09 | Stop reason: SDUPTHER

## 2019-09-11 ENCOUNTER — TELEPHONE (OUTPATIENT)
Dept: FAMILY MEDICINE | Facility: CLINIC | Age: 69
End: 2019-09-11

## 2019-09-11 ENCOUNTER — PATIENT MESSAGE (OUTPATIENT)
Dept: FAMILY MEDICINE | Facility: CLINIC | Age: 69
End: 2019-09-11

## 2019-09-11 DIAGNOSIS — R00.0 TACHYCARDIA: Primary | ICD-10-CM

## 2019-09-11 NOTE — TELEPHONE ENCOUNTER
----- Message from Jory Burdick sent at 9/11/2019 11:36 AM CDT -----  Contact: Daughter  Type: Needs Medical Advice    Who Called: Allison  Johan Call Back Number: [phone]  Additional Information: The daughter wants to know if the patient can have more blood work ordered she thinks that her thyroid levels need to be checked would like a call to be advised please

## 2019-09-11 NOTE — TELEPHONE ENCOUNTER
Spoke with daughter.  Message was sent to Dr Cardona as urgent and we will call back once he response.

## 2019-09-12 ENCOUNTER — LAB VISIT (OUTPATIENT)
Dept: LAB | Facility: HOSPITAL | Age: 69
End: 2019-09-12
Attending: INTERNAL MEDICINE
Payer: MEDICARE

## 2019-09-12 DIAGNOSIS — R00.0 TACHYCARDIA: ICD-10-CM

## 2019-09-12 DIAGNOSIS — M25.561 PAIN IN BOTH KNEES, UNSPECIFIED CHRONICITY: Primary | ICD-10-CM

## 2019-09-12 DIAGNOSIS — M25.562 PAIN IN BOTH KNEES, UNSPECIFIED CHRONICITY: Primary | ICD-10-CM

## 2019-09-12 LAB
ALBUMIN SERPL BCP-MCNC: 3.9 G/DL (ref 3.5–5.2)
ALP SERPL-CCNC: 57 U/L (ref 55–135)
ALT SERPL W/O P-5'-P-CCNC: 16 U/L (ref 10–44)
ANION GAP SERPL CALC-SCNC: 7 MMOL/L (ref 8–16)
AST SERPL-CCNC: 23 U/L (ref 10–40)
BASOPHILS # BLD AUTO: 0.03 K/UL (ref 0–0.2)
BASOPHILS NFR BLD: 0.7 % (ref 0–1.9)
BILIRUB SERPL-MCNC: 0.4 MG/DL (ref 0.1–1)
BUN SERPL-MCNC: 11 MG/DL (ref 8–23)
CALCIUM SERPL-MCNC: 9.5 MG/DL (ref 8.7–10.5)
CHLORIDE SERPL-SCNC: 105 MMOL/L (ref 95–110)
CO2 SERPL-SCNC: 28 MMOL/L (ref 23–29)
CREAT SERPL-MCNC: 0.7 MG/DL (ref 0.5–1.4)
DIFFERENTIAL METHOD: NORMAL
EOSINOPHIL # BLD AUTO: 0.1 K/UL (ref 0–0.5)
EOSINOPHIL NFR BLD: 2.7 % (ref 0–8)
ERYTHROCYTE [DISTWIDTH] IN BLOOD BY AUTOMATED COUNT: 12.5 % (ref 11.5–14.5)
EST. GFR  (AFRICAN AMERICAN): >60 ML/MIN/1.73 M^2
EST. GFR  (NON AFRICAN AMERICAN): >60 ML/MIN/1.73 M^2
GLUCOSE SERPL-MCNC: 84 MG/DL (ref 70–110)
HCT VFR BLD AUTO: 38.8 % (ref 37–48.5)
HGB BLD-MCNC: 12.7 G/DL (ref 12–16)
IMM GRANULOCYTES # BLD AUTO: 0.01 K/UL (ref 0–0.04)
IMM GRANULOCYTES NFR BLD AUTO: 0.2 % (ref 0–0.5)
LYMPHOCYTES # BLD AUTO: 1.6 K/UL (ref 1–4.8)
LYMPHOCYTES NFR BLD: 40.6 % (ref 18–48)
MCH RBC QN AUTO: 29.4 PG (ref 27–31)
MCHC RBC AUTO-ENTMCNC: 32.7 G/DL (ref 32–36)
MCV RBC AUTO: 90 FL (ref 82–98)
MONOCYTES # BLD AUTO: 0.4 K/UL (ref 0.3–1)
MONOCYTES NFR BLD: 9.9 % (ref 4–15)
NEUTROPHILS # BLD AUTO: 1.9 K/UL (ref 1.8–7.7)
NEUTROPHILS NFR BLD: 45.9 % (ref 38–73)
NRBC BLD-RTO: 0 /100 WBC
PLATELET # BLD AUTO: 225 K/UL (ref 150–350)
PMV BLD AUTO: 11.2 FL (ref 9.2–12.9)
POTASSIUM SERPL-SCNC: 4.3 MMOL/L (ref 3.5–5.1)
PROT SERPL-MCNC: 6.6 G/DL (ref 6–8.4)
RBC # BLD AUTO: 4.32 M/UL (ref 4–5.4)
SODIUM SERPL-SCNC: 140 MMOL/L (ref 136–145)
T4 FREE SERPL-MCNC: 0.83 NG/DL (ref 0.71–1.51)
TSH SERPL DL<=0.005 MIU/L-ACNC: 0.84 UIU/ML (ref 0.4–4)
WBC # BLD AUTO: 4.04 K/UL (ref 3.9–12.7)

## 2019-09-12 PROCEDURE — 84443 ASSAY THYROID STIM HORMONE: CPT

## 2019-09-12 PROCEDURE — 85025 COMPLETE CBC W/AUTO DIFF WBC: CPT

## 2019-09-12 PROCEDURE — 84439 ASSAY OF FREE THYROXINE: CPT

## 2019-09-12 PROCEDURE — 80053 COMPREHEN METABOLIC PANEL: CPT

## 2019-09-12 PROCEDURE — 36415 COLL VENOUS BLD VENIPUNCTURE: CPT | Mod: PO

## 2019-09-16 ENCOUNTER — HOSPITAL ENCOUNTER (OUTPATIENT)
Dept: RADIOLOGY | Facility: HOSPITAL | Age: 69
Discharge: HOME | End: 2019-09-16
Attending: ORTHOPAEDIC SURGERY
Payer: MEDICARE

## 2019-09-16 ENCOUNTER — OFFICE VISIT (OUTPATIENT)
Dept: ORTHOPEDICS | Facility: CLINIC | Age: 69
End: 2019-09-16
Payer: MEDICARE

## 2019-09-16 VITALS
HEART RATE: 83 BPM | SYSTOLIC BLOOD PRESSURE: 121 MMHG | DIASTOLIC BLOOD PRESSURE: 71 MMHG | HEIGHT: 68 IN | WEIGHT: 188 LBS | BODY MASS INDEX: 28.49 KG/M2

## 2019-09-16 DIAGNOSIS — M25.561 PAIN IN BOTH KNEES, UNSPECIFIED CHRONICITY: ICD-10-CM

## 2019-09-16 DIAGNOSIS — M17.12 PRIMARY OSTEOARTHRITIS OF LEFT KNEE: ICD-10-CM

## 2019-09-16 DIAGNOSIS — M25.562 PAIN IN BOTH KNEES, UNSPECIFIED CHRONICITY: ICD-10-CM

## 2019-09-16 DIAGNOSIS — M17.11 PRIMARY OSTEOARTHRITIS OF RIGHT KNEE: Primary | ICD-10-CM

## 2019-09-16 PROCEDURE — 99213 OFFICE O/P EST LOW 20 MIN: CPT | Mod: 25,S$PBB,, | Performed by: ORTHOPAEDIC SURGERY

## 2019-09-16 PROCEDURE — 73564 XR KNEE ORTHO BILAT WITH FLEXION: ICD-10-PCS | Mod: 26,50,, | Performed by: RADIOLOGY

## 2019-09-16 PROCEDURE — 99213 PR OFFICE/OUTPT VISIT, EST, LEVL III, 20-29 MIN: ICD-10-PCS | Mod: 25,S$PBB,, | Performed by: ORTHOPAEDIC SURGERY

## 2019-09-16 PROCEDURE — 99999 PR PBB SHADOW E&M-EST. PATIENT-LVL III: ICD-10-PCS | Mod: PBBFAC,,, | Performed by: ORTHOPAEDIC SURGERY

## 2019-09-16 PROCEDURE — 20610 DRAIN/INJ JOINT/BURSA W/O US: CPT | Mod: 50,PBBFAC,PN | Performed by: ORTHOPAEDIC SURGERY

## 2019-09-16 PROCEDURE — 99999 PR PBB SHADOW E&M-EST. PATIENT-LVL III: CPT | Mod: PBBFAC,,, | Performed by: ORTHOPAEDIC SURGERY

## 2019-09-16 PROCEDURE — 73564 X-RAY EXAM KNEE 4 OR MORE: CPT | Mod: TC,50,PN

## 2019-09-16 PROCEDURE — 99213 OFFICE O/P EST LOW 20 MIN: CPT | Mod: PBBFAC,25,PN | Performed by: ORTHOPAEDIC SURGERY

## 2019-09-16 PROCEDURE — 73564 X-RAY EXAM KNEE 4 OR MORE: CPT | Mod: 26,50,, | Performed by: RADIOLOGY

## 2019-09-16 PROCEDURE — 20610 LARGE JOINT ASPIRATION/INJECTION: L KNEE, R KNEE: ICD-10-PCS | Mod: 50,S$PBB,, | Performed by: ORTHOPAEDIC SURGERY

## 2019-09-16 RX ORDER — TRIAMCINOLONE ACETONIDE 40 MG/ML
40 INJECTION, SUSPENSION INTRA-ARTICULAR; INTRAMUSCULAR
Status: DISCONTINUED | OUTPATIENT
Start: 2019-09-16 | End: 2019-09-16 | Stop reason: HOSPADM

## 2019-09-16 RX ORDER — DILTIAZEM HYDROCHLORIDE 120 MG/1
CAPSULE, EXTENDED RELEASE ORAL
Refills: 3 | COMMUNITY
Start: 2019-09-05 | End: 2019-11-04 | Stop reason: ALTCHOICE

## 2019-09-16 RX ADMIN — TRIAMCINOLONE ACETONIDE 40 MG: 40 INJECTION, SUSPENSION INTRA-ARTICULAR; INTRAMUSCULAR at 12:09

## 2019-09-16 NOTE — PROGRESS NOTES
Past Medical History:   Diagnosis Date    Depression     GERD (gastroesophageal reflux disease)     Hyperlipidemia     Hypertension     Hypothyroid        Past Surgical History:   Procedure Laterality Date    APPENDECTOMY      TONSILLECTOMY      TUBAL LIGATION         Current Outpatient Medications   Medication Sig    aspirin (ECOTRIN) 81 MG EC tablet Take 81 mg by mouth once daily.     azelastine (ASTELIN) 137 mcg (0.1 %) nasal spray 1 spray (137 mcg total) by Nasal route 2 (two) times daily.    cetirizine (ZYRTEC) 10 MG tablet Take 10 mg by mouth once daily.    dextrin (FIBER POWDER ORAL) Take by mouth once daily.    diltiaZEM HCl (TIAZAC) 120 mg 24 hr capsule TK ONE C PO  QD    levothyroxine (SYNTHROID) 50 MCG tablet Take 1 tablet (50 mcg total) by mouth once daily.    lovastatin (MEVACOR) 20 MG tablet Take 1 tablet (20 mg total) by mouth every evening.    meclizine (ANTIVERT) 25 mg tablet Take 1 tablet (25 mg total) by mouth 3 (three) times daily as needed.    pantoprazole (PROTONIX) 40 MG tablet Take 1 tablet (40 mg total) by mouth once daily.    triamcinolone acetonide 0.1% (KENALOG) 0.1 % cream Apply topically 2 (two) times daily as needed.    venlafaxine (EFFEXOR-XR) 75 MG 24 hr capsule Take 1 capsule (75 mg total) by mouth once daily.    vitamin D 1000 units Tab Take 1,000 Units by mouth once daily.    triamterene-hydrochlorothiazide 37.5-25 mg (MAXZIDE-25) 37.5-25 mg per tablet Take 1 tablet by mouth once daily.     No current facility-administered medications for this visit.      Facility-Administered Medications Ordered in Other Visits   Medication    EUFLEXXA injection Syrg 20 mg       Review of patient's allergies indicates:   Allergen Reactions    Lidocaine      Hand edema,  redness    Penicillins Rash       Family History   Problem Relation Age of Onset    Cancer Father     Cancer Sister     Glaucoma Neg Hx     Macular degeneration Neg Hx     Retinal detachment Neg Hx         Social History     Socioeconomic History    Marital status:      Spouse name: Not on file    Number of children: Not on file    Years of education: Not on file    Highest education level: Not on file   Occupational History    Not on file   Social Needs    Financial resource strain: Not on file    Food insecurity:     Worry: Not on file     Inability: Not on file    Transportation needs:     Medical: Not on file     Non-medical: Not on file   Tobacco Use    Smoking status: Never Smoker    Smokeless tobacco: Never Used   Substance and Sexual Activity    Alcohol use: Yes     Comment: rarely    Drug use: No    Sexual activity: Yes   Lifestyle    Physical activity:     Days per week: Not on file     Minutes per session: Not on file    Stress: Not on file   Relationships    Social connections:     Talks on phone: Not on file     Gets together: Not on file     Attends Worship service: Not on file     Active member of club or organization: Not on file     Attends meetings of clubs or organizations: Not on file     Relationship status: Not on file   Other Topics Concern    Not on file   Social History Narrative    Not on file       Chief Complaint:   Chief Complaint   Patient presents with    Knee Pain     bilateral knee pain       History: This is a 68-year-old female who is an employee here at [employer].  Patient has had a history of left knee arthritis since 2014.  Patient had a cortisone injection done 2 years ago which worked great and then another one about a month ago.  Patient was seeing another physician who is now out of network.  She was considering Synvisc at the time.  She would like to pursue this.  Symptoms are stable and moderate to severe.    Present: She comes in today bilateral knee pain. Patient also has left shoulder pain. This started about 3 weeks ago without incident.  Pain reaching out or laying on it.  Knees have been hurting for a couple months.  Her last treatments  were back in February.  Patient had good results with both the Euflexxa and cortisone.  Pain is currently 5/10.  No previous treatment for the shoulders    Review of systems:   Musculoskeletal: See history of present illness    Physical examination:    Vital Signs:    Vitals:    09/16/19 1045   BP: 121/71   Pulse: 83       Body mass index is 28.59 kg/m².    This a well-developed, well nourished patient in no acute distress.  They are alert and oriented and cooperative to examination.  Pt. walks without an antalgic gait.      Examination of bilateral knee shows no rashes or erythema. There are no masses ecchymosis or effusion. Patient has full range of motion from 0-130°. Patient is nontender to palpation over lateral joint line and moderately tender to palpation over the medial joint line.  Knee is stable to varus and valgus stress. 5 out of 5 motor strength. Palpable distal pulses. Intact light touch sensation.  Severe Patellofemoral crepitus    Examination of the left shoulder shows no rashes or erythema. There are no masses, ecchymosis, or atrophy. The patient has full range of motion in forward flexion, external rotation, and internal rotation to the mid T-spine. The patient has moderately positive impingement signs. - Mesa's test. - Speeds test. Nontender to palpation over a.c. joint. Normal stability anteriorly, posteriorly, and negative sulcus sign. Passive range of motion: Forward flexion of 180°, external rotation at 90° of 90°, internal rotation of 50°, and external rotation at 0° of 50°. 2+ radial pulse. Intact axillary, radial, median and ulnar sensation. 5 out of 5 resisted forward flexion, external rotation, and negative lift off test.        X-rays: 4 views of t both knees are ordered and reviewed which show arthritic changes with severe changes of the patellofemoral joint and more moderate changes of the medial compartment of both knees .  No significant change  X-rays of the right shoulder   reviewed which show some mild degenerative changes well-maintained glenohumeral joint space     Assessment:: Bilateral knee osteoarthritis  Left rotator cuff syndrome    Plan:  I reviewed the x-ray with her today.  We discussed treatment options for both her knee arthritis as well as her left rotator cuff tendinitis.  Patient elected to have cortisone injections in both knees.  Might do Euflexxa again if needed.  Also gave her rotator cuff and shoulder conditioning guide for her shoulder.

## 2019-09-16 NOTE — PROCEDURES
Large Joint Aspiration/Injection: L knee, R knee  Date/Time: 9/16/2019 12:34 PM  Performed by: David Chan MD  Authorized by: David Chan MD     Consent Done?:  Yes (Verbal)  Indications:  Pain  Procedure site marked: Yes    Timeout: Prior to procedure the correct patient, procedure, and site was verified    Anesthesia    Anesthetic: lidocaine 1% without epinephrine and bupivacaine 0.25% without epinephrine  Anesthetic total: 6mL    Location:  Knee  Site:  L knee and R knee  Prep: Patient was prepped and draped in usual sterile fashion    Needle size:  20 G  Approach:  Anterolateral  Medications:  40 mg triamcinolone acetonide 40 mg/mL; 40 mg triamcinolone acetonide 40 mg/mL  Patient tolerance:  Patient tolerated the procedure well with no immediate complications

## 2019-09-23 ENCOUNTER — PATIENT MESSAGE (OUTPATIENT)
Dept: FAMILY MEDICINE | Facility: CLINIC | Age: 69
End: 2019-09-23

## 2019-09-23 DIAGNOSIS — G31.9 CEREBRAL ATROPHY: Primary | ICD-10-CM

## 2019-09-23 RX ORDER — MECLIZINE HYDROCHLORIDE 25 MG/1
25 TABLET ORAL 3 TIMES DAILY PRN
Qty: 20 TABLET | Refills: 2 | Status: SHIPPED | OUTPATIENT
Start: 2019-09-23 | End: 2019-10-02 | Stop reason: SDUPTHER

## 2019-09-27 ENCOUNTER — HOSPITAL ENCOUNTER (OUTPATIENT)
Dept: RADIOLOGY | Facility: HOSPITAL | Age: 69
Discharge: HOME | End: 2019-09-27
Attending: NURSE PRACTITIONER
Payer: MEDICARE

## 2019-09-27 DIAGNOSIS — G31.9 CEREBRAL ATROPHY: ICD-10-CM

## 2019-09-27 PROCEDURE — 70551 MRI BRAIN STEM W/O DYE: CPT | Mod: TC

## 2019-09-27 PROCEDURE — 70551 MRI BRAIN WITHOUT CONTRAST: ICD-10-PCS | Mod: 26,,, | Performed by: RADIOLOGY

## 2019-09-27 PROCEDURE — 70551 MRI BRAIN STEM W/O DYE: CPT | Mod: 26,,, | Performed by: RADIOLOGY

## 2019-10-02 NOTE — TELEPHONE ENCOUNTER
----- Message from Milly Cruz sent at 10/2/2019 11:46 AM CDT -----  Contact: Allison  Type:  RX Refill Request    Who Called:  Allison, daughter  Refill or New Rx:  New- from Dr. Cardona  RX Name and Strength:  meclizine (ANTIVERT) 25 mg tablet  How is the patient currently takig it? (ex. 1XDay):  3xDay  Preferred Pharmacy with phone number:  [email or web address] Call Back Number:  [phone]  Additional Information:  Daughter stated she has been trying to contact about getting this prescribed for mom. Patient is now completely out and dizzy. ER prescribed but requesting Dr. Cardona to. Also, she stated she believes her mom needs to up her antidepressant dosage. Has appt schedule with Long on 10/9

## 2019-10-03 RX ORDER — MECLIZINE HYDROCHLORIDE 25 MG/1
25 TABLET ORAL 3 TIMES DAILY PRN
Qty: 20 TABLET | Refills: 2 | Status: SHIPPED | OUTPATIENT
Start: 2019-10-03 | End: 2020-02-05

## 2019-10-09 ENCOUNTER — OFFICE VISIT (OUTPATIENT)
Dept: FAMILY MEDICINE | Facility: CLINIC | Age: 69
End: 2019-10-09
Payer: MEDICARE

## 2019-10-09 ENCOUNTER — DOCUMENTATION ONLY (OUTPATIENT)
Dept: FAMILY MEDICINE | Facility: CLINIC | Age: 69
End: 2019-10-09

## 2019-10-09 ENCOUNTER — TELEPHONE (OUTPATIENT)
Dept: FAMILY MEDICINE | Facility: CLINIC | Age: 69
End: 2019-10-09

## 2019-10-09 VITALS
SYSTOLIC BLOOD PRESSURE: 126 MMHG | HEIGHT: 68 IN | HEART RATE: 75 BPM | OXYGEN SATURATION: 96 % | DIASTOLIC BLOOD PRESSURE: 70 MMHG | WEIGHT: 183.19 LBS | TEMPERATURE: 98 F | BODY MASS INDEX: 27.76 KG/M2

## 2019-10-09 DIAGNOSIS — F41.9 ANXIETY AND DEPRESSION: ICD-10-CM

## 2019-10-09 DIAGNOSIS — M25.541 ARTHRALGIA OF BOTH HANDS: Primary | ICD-10-CM

## 2019-10-09 DIAGNOSIS — H81.12 BENIGN PAROXYSMAL POSITIONAL VERTIGO OF LEFT EAR: Primary | ICD-10-CM

## 2019-10-09 DIAGNOSIS — M25.50 ARTHRALGIA, UNSPECIFIED JOINT: ICD-10-CM

## 2019-10-09 DIAGNOSIS — F32.A ANXIETY AND DEPRESSION: ICD-10-CM

## 2019-10-09 DIAGNOSIS — M25.542 ARTHRALGIA OF BOTH HANDS: Primary | ICD-10-CM

## 2019-10-09 DIAGNOSIS — K21.9 GASTROESOPHAGEAL REFLUX DISEASE, ESOPHAGITIS PRESENCE NOT SPECIFIED: ICD-10-CM

## 2019-10-09 PROCEDURE — 99214 OFFICE O/P EST MOD 30 MIN: CPT | Mod: S$GLB,,, | Performed by: NURSE PRACTITIONER

## 2019-10-09 PROCEDURE — 99214 PR OFFICE/OUTPT VISIT, EST, LEVL IV, 30-39 MIN: ICD-10-PCS | Mod: S$GLB,,, | Performed by: NURSE PRACTITIONER

## 2019-10-09 RX ORDER — DOCUSATE SODIUM 100 MG/1
100 CAPSULE, LIQUID FILLED ORAL
COMMUNITY
End: 2019-11-04

## 2019-10-09 RX ORDER — MAGNESIUM 200 MG
TABLET ORAL ONCE
COMMUNITY
End: 2019-11-04

## 2019-10-09 RX ORDER — MULTIVITAMIN
1 TABLET ORAL DAILY
COMMUNITY
End: 2020-02-05

## 2019-10-09 RX ORDER — VENLAFAXINE HYDROCHLORIDE 150 MG/1
150 CAPSULE, EXTENDED RELEASE ORAL DAILY
Qty: 30 CAPSULE | Refills: 11 | Status: SHIPPED | OUTPATIENT
Start: 2019-10-09 | End: 2020-10-05

## 2019-10-09 RX ORDER — PANTOPRAZOLE SODIUM 40 MG/1
40 TABLET, DELAYED RELEASE ORAL DAILY
Qty: 30 TABLET | Refills: 2 | Status: SHIPPED | OUTPATIENT
Start: 2019-10-09 | End: 2019-12-26

## 2019-10-09 RX ORDER — AMOXICILLIN 250 MG
2 CAPSULE ORAL DAILY
COMMUNITY
End: 2020-02-05

## 2019-10-09 RX ORDER — SELENIUM 50 MCG
300 TABLET ORAL DAILY
COMMUNITY

## 2019-10-09 NOTE — PROGRESS NOTES
Subjective:       Patient ID: Skyler Espinoza is a 68 y.o. female.    Chief Complaint: Fatigue and Dizziness    Dizziness:   Chronicity:  Recurrent  Onset:  More than 1 month ago  Progression since onset:  Unchanged  Frequency:  Every few hours  Dizziness characteristics:  Off-balanceno hearing loss, no nausea and no vomiting.  Aggravated by:  Nothing  Risk factors:  Family history of inner ear (grandfather was deaf, unknown cause)  Treatments tried:  Meclizine  Improvements on treatment:  Moderate   PMH includes: anxiety.no strokes, no head trauma, no head trauma and no ear infections.  Fatigue   This is a chronic (Has known hypothyroidism, but it is well controlled on labs.  ) problem. The current episode started more than 1 year ago. The problem occurs daily. The problem has been gradually worsening. Associated symptoms include coughing and fatigue. Pertinent negatives include no nausea, vertigo or vomiting. Associated symptoms comments: constipation. Nothing aggravates the symptoms. She has tried nothing for the symptoms.   Depression   Visit Type: follow-up (Taking effexor, thought it was working, but not doing as well as it was a few months ago)  Patient presents with the following symptoms: fatigue, hypersomnia, nervousness/anxiety and weight loss.  Patient is not experiencing: suicidal ideas.  Frequency of symptoms: most days   Severity: moderate   Sleep quality: fair  Compliance with medications:  %        Has arthralgias, both hands, chronically. Has fatigue and wants to see a rheumatologist as she feels this could be rheumatoid arthritis. Had a grandparent with severe arthritis, thinks it may have been rheumatoid.   Review of Systems   Constitutional: Positive for fatigue and weight loss.   HENT: Negative for hearing loss.    Respiratory: Positive for cough.    Gastrointestinal: Negative for nausea and vomiting.   Neurological: Positive for dizziness. Negative for vertigo.   Psychiatric/Behavioral:  Positive for depression. Negative for suicidal ideas. The patient is nervous/anxious.        Objective:      Physical Exam   Constitutional: She is oriented to person, place, and time. She appears well-developed and well-nourished. No distress.   HENT:   Head: Normocephalic and atraumatic.   Has nystagmus with dean pike left.    Eyes: Conjunctivae are normal. Right eye exhibits no discharge. Left eye exhibits no discharge. No scleral icterus.   Cardiovascular: Normal rate, regular rhythm and normal heart sounds. Exam reveals no gallop and no friction rub.   No murmur heard.  Pulmonary/Chest: Effort normal and breath sounds normal. No stridor. No respiratory distress. She has no wheezes. She has no rales.   Musculoskeletal: She exhibits no edema.   Neurological: She is alert and oriented to person, place, and time.   Skin: Skin is warm and dry. No rash noted. She is not diaphoretic. No pallor.   Psychiatric: She has a normal mood and affect. Her behavior is normal.   Nursing note and vitals reviewed.      Assessment:       1. Benign paroxysmal positional vertigo of left ear    2. Arthralgia, unspecified joint    3. Anxiety and depression    4. Gastroesophageal reflux disease, esophagitis presence not specified        Plan:     Benign paroxysmal positional vertigo of left ear    Arthralgia, unspecified joint  -     Rheumatoid factor; Future; Expected date: 10/09/2019  -     Cyclic citrul peptide antibody, IgG; Future; Expected date: 10/09/2019  -     Sedimentation rate; Future; Expected date: 10/09/2019  -     C-reactive protein; Future; Expected date: 10/09/2019  -     DESMOND Screen w/Reflex; Future; Expected date: 10/09/2019  -     Ambulatory Referral to Rheumatology    Anxiety and depression  -     venlafaxine (EFFEXOR-XR) 150 MG Cp24; Take 1 capsule (150 mg total) by mouth once daily.  Dispense: 30 capsule; Refill: 11    Gastroesophageal reflux disease, esophagitis presence not specified  -     pantoprazole  (PROTONIX) 40 MG tablet; Take 1 tablet (40 mg total) by mouth once daily.  Dispense: 30 tablet; Refill: 2      Take meclizine before you start the epley maneuver (exercise).  Continue PT, your diet and use miralax for constipation.

## 2019-10-09 NOTE — TELEPHONE ENCOUNTER
----- Message from Elsa Cisneros sent at 10/9/2019  1:40 PM CDT -----  Type: Needs Medical Advice    Who Called: daughter- Allison Trejo  Symptoms (please be specific):    How long has patient had these symptoms:    Pharmacy name and phone #:   Best Call Back Number: [phone]   Additional Information: Patient's daughter asking for referral for rheumatologist, should be for Dr Cori Miller. Dr Antunez and Dr Ade Shaffer is not accepting new pt.

## 2019-10-10 ENCOUNTER — LAB VISIT (OUTPATIENT)
Dept: LAB | Facility: HOSPITAL | Age: 69
End: 2019-10-10
Attending: NURSE PRACTITIONER
Payer: MEDICARE

## 2019-10-10 ENCOUNTER — PATIENT MESSAGE (OUTPATIENT)
Dept: OPTOMETRY | Facility: CLINIC | Age: 69
End: 2019-10-10

## 2019-10-10 DIAGNOSIS — M25.50 ARTHRALGIA, UNSPECIFIED JOINT: ICD-10-CM

## 2019-10-10 LAB
CCP AB SER IA-ACNC: <0.5 U/ML
CRP SERPL-MCNC: 0.2 MG/L (ref 0–8.2)
ERYTHROCYTE [SEDIMENTATION RATE] IN BLOOD BY WESTERGREN METHOD: 5 MM/HR (ref 0–20)
RHEUMATOID FACT SERPL-ACNC: <10 IU/ML (ref 0–15)

## 2019-10-10 PROCEDURE — 86431 RHEUMATOID FACTOR QUANT: CPT

## 2019-10-10 PROCEDURE — 36415 COLL VENOUS BLD VENIPUNCTURE: CPT | Mod: PO

## 2019-10-10 PROCEDURE — 85651 RBC SED RATE NONAUTOMATED: CPT | Mod: PO

## 2019-10-10 PROCEDURE — 86200 CCP ANTIBODY: CPT

## 2019-10-10 PROCEDURE — 86038 ANTINUCLEAR ANTIBODIES: CPT

## 2019-10-10 PROCEDURE — 86140 C-REACTIVE PROTEIN: CPT

## 2019-10-10 NOTE — TELEPHONE ENCOUNTER
----- Message from DANY Mtz sent at 10/9/2019  6:27 PM CDT -----  Referral in chart for Dr. Miller  ----- Message -----  From: Leslie Barfield MA  Sent: 10/9/2019   1:51 PM CDT  To: DANY Mtz        ----- Message -----  From: Elsa Cisneros  Sent: 10/9/2019   1:40 PM CDT  To: [facility] Staff    Type: Needs Medical Advice    Who Called: daughterPaige Trejo  Symptoms (please be specific):    How long has patient had these symptoms:    Pharmacy name and phone #:   Best Call Back Number: [phone]   Additional Information: Patient's daughter asking for referral for rheumatologist, should be for Dr Cori Miller. Dr Antunez and Dr Ade Shaffer is not accepting new pt.

## 2019-10-11 LAB — ANA SER QL IF: NORMAL

## 2019-10-18 ENCOUNTER — TELEPHONE (OUTPATIENT)
Dept: FAMILY MEDICINE | Facility: CLINIC | Age: 69
End: 2019-10-18

## 2019-10-18 DIAGNOSIS — M17.0 OSTEOARTHRITIS OF BOTH KNEES, UNSPECIFIED OSTEOARTHRITIS TYPE: Primary | ICD-10-CM

## 2019-10-22 ENCOUNTER — PATIENT MESSAGE (OUTPATIENT)
Dept: FAMILY MEDICINE | Facility: CLINIC | Age: 69
End: 2019-10-22

## 2019-11-04 ENCOUNTER — OFFICE VISIT (OUTPATIENT)
Dept: FAMILY MEDICINE | Facility: CLINIC | Age: 69
End: 2019-11-04
Payer: MEDICARE

## 2019-11-04 VITALS
HEIGHT: 68 IN | BODY MASS INDEX: 27.76 KG/M2 | HEART RATE: 71 BPM | TEMPERATURE: 98 F | DIASTOLIC BLOOD PRESSURE: 72 MMHG | RESPIRATION RATE: 18 BRPM | OXYGEN SATURATION: 97 % | WEIGHT: 183.19 LBS | SYSTOLIC BLOOD PRESSURE: 118 MMHG

## 2019-11-04 DIAGNOSIS — H81.10 BENIGN PAROXYSMAL POSITIONAL VERTIGO, UNSPECIFIED LATERALITY: ICD-10-CM

## 2019-11-04 DIAGNOSIS — R11.0 NAUSEA: ICD-10-CM

## 2019-11-04 DIAGNOSIS — G44.40 REBOUND HEADACHE: Primary | ICD-10-CM

## 2019-11-04 DIAGNOSIS — H53.2 DOUBLE VISION WITH BOTH EYES OPEN: ICD-10-CM

## 2019-11-04 PROCEDURE — 99214 OFFICE O/P EST MOD 30 MIN: CPT | Mod: S$GLB,,, | Performed by: NURSE PRACTITIONER

## 2019-11-04 PROCEDURE — 99214 PR OFFICE/OUTPT VISIT, EST, LEVL IV, 30-39 MIN: ICD-10-PCS | Mod: S$GLB,,, | Performed by: NURSE PRACTITIONER

## 2019-11-04 RX ORDER — PREDNISONE 5 MG/1
TABLET ORAL
Qty: 21 TABLET | Refills: 0 | Status: SHIPPED | OUTPATIENT
Start: 2019-11-04 | End: 2020-02-05

## 2019-11-04 RX ORDER — LEVOTHYROXINE SODIUM 50 UG/1
50 TABLET ORAL DAILY
COMMUNITY
End: 2019-11-04 | Stop reason: CLARIF

## 2019-11-04 RX ORDER — METOPROLOL SUCCINATE 25 MG/1
25 TABLET, EXTENDED RELEASE ORAL DAILY
COMMUNITY
End: 2024-01-19

## 2019-11-04 RX ORDER — POLYETHYLENE GLYCOL 3350 17 G/17G
POWDER, FOR SOLUTION ORAL
COMMUNITY
End: 2022-02-11

## 2019-11-04 RX ORDER — PROMETHAZINE HYDROCHLORIDE 25 MG/1
25 TABLET ORAL EVERY 8 HOURS PRN
Qty: 30 TABLET | Refills: 0 | Status: SHIPPED | OUTPATIENT
Start: 2019-11-04 | End: 2020-02-05

## 2019-11-04 NOTE — PROGRESS NOTES
Subjective:       Patient ID: Skyler Espinoza is a 69 y.o. female.    Chief Complaint: Follow-up  Saw endocrinologist for thyroid issues who wants her to take brand name unithyroid only. She did not think the patient was depressed, instead feels she has dementia and wanted her to see neurologist.     Saw neurologist, but did not get very much information and felt that he didn't think there was much going on. He could not determine if pt. Had dementia or depression and referred to both counseling and psychiatry. He did not start any meds for dementia, but said he might when she returns. She did not mention the double vision to him that she has been having for several months. She saw the eye doctor and they did not find any reason for double vision. Double vision is only when she has both eyes open and goes away when she closes one eye. Patient would like a second neurology opinion.      She saw a doctor about hyperbaric treatments and thinks they would be beneficial even though the cost is $8,000.     Having daily headaches for several weeks, takes tylenol or motrin otc then the headache goes away. And is still having dizziness, but was only doing the epley exercise about once a day for a few days.   HPI  Review of Systems   Constitutional: Positive for appetite change and fatigue. Negative for fever and unexpected weight change.   HENT: Negative for congestion, hearing loss and sore throat.    Eyes: Positive for visual disturbance. Negative for pain and redness.   Respiratory: Negative for cough and shortness of breath.    Cardiovascular: Negative for chest pain and leg swelling.   Gastrointestinal: Negative for constipation, diarrhea, nausea and vomiting.   Endocrine: Negative for cold intolerance and heat intolerance.   Genitourinary: Negative for dysuria and hematuria.   Musculoskeletal: Negative for arthralgias and myalgias.   Skin: Negative for pallor and rash.   Neurological: Positive for dizziness and  headaches. Negative for weakness.   Psychiatric/Behavioral: Positive for confusion. Negative for behavioral problems, dysphoric mood and suicidal ideas. The patient is not nervous/anxious.        Objective:      Physical Exam   Constitutional: She is oriented to person, place, and time. She appears well-developed and well-nourished. No distress.   HENT:   Head: Normocephalic and atraumatic.   Eyes: Conjunctivae are normal. Right eye exhibits no discharge. Left eye exhibits no discharge. No scleral icterus.   Cardiovascular: Normal rate, regular rhythm and normal heart sounds. Exam reveals no gallop and no friction rub.   No murmur heard.  Pulmonary/Chest: Effort normal and breath sounds normal. No stridor. No respiratory distress. She has no wheezes. She has no rales.   Musculoskeletal: She exhibits no edema.   Neurological: She is alert and oriented to person, place, and time.   Skin: Skin is warm and dry. No rash noted. She is not diaphoretic. No pallor.   Psychiatric: She has a normal mood and affect. Her behavior is normal.   Nursing note and vitals reviewed.      Assessment:     This provider spent  25 minutes face to face with patient, more than half the time for counseling and coordination of care as noted.    1. Rebound headache    2. Nausea    3. Double vision with both eyes open    4. Benign paroxysmal positional vertigo, unspecified laterality        Plan:        Rebound headache  -     promethazine (PHENERGAN) 25 MG tablet; Take 1 tablet (25 mg total) by mouth every 8 (eight) hours as needed for Nausea.  Dispense: 30 tablet; Refill: 0  -     predniSONE (DELTASONE) 5 MG tablet; 6 tabs the first day, then 5 the next, then 4, 3, 2, 1  Dispense: 21 tablet; Refill: 0    Nausea  -     promethazine (PHENERGAN) 25 MG tablet; Take 1 tablet (25 mg total) by mouth every 8 (eight) hours as needed for Nausea.  Dispense: 30 tablet; Refill: 0    Double vision with both eyes open  -     Ambulatory Referral to  Neurology    Benign paroxysmal positional vertigo, unspecified laterality    dementia, depression vs myesthenia gravis (or combination of several?)         Discussed possibility of hyperbaric treatments being worthless and very expensive, patient understands the risk and states she has the money and wants to try it. Discussed myesthenia gravis being in the differential due to patient's severe fatigue and double vision without another reason for it. Explained that Epley maneuver needs to be done 3 times a day for several days (to a week) for it to work. If patient is still having dizziness after that or gets worse, I will refer to ENT.  Also discussed how rebound headache works.  3 months

## 2019-11-07 ENCOUNTER — OFFICE VISIT (OUTPATIENT)
Dept: ALLERGY | Facility: CLINIC | Age: 69
End: 2019-11-07
Payer: MEDICARE

## 2019-11-07 VITALS
DIASTOLIC BLOOD PRESSURE: 78 MMHG | OXYGEN SATURATION: 97 % | HEART RATE: 78 BPM | BODY MASS INDEX: 28.56 KG/M2 | WEIGHT: 182 LBS | HEIGHT: 67 IN | SYSTOLIC BLOOD PRESSURE: 130 MMHG

## 2019-11-07 DIAGNOSIS — Z88.9 HISTORY OF ALLERGY TO MULTIPLE DRUGS: Primary | ICD-10-CM

## 2019-11-07 DIAGNOSIS — J31.0 CHRONIC RHINITIS: ICD-10-CM

## 2019-11-07 DIAGNOSIS — L50.9 HIVES: ICD-10-CM

## 2019-11-07 PROCEDURE — 99205 OFFICE O/P NEW HI 60 MIN: CPT | Mod: S$GLB,,, | Performed by: ALLERGY & IMMUNOLOGY

## 2019-11-07 PROCEDURE — 99205 PR OFFICE/OUTPT VISIT, NEW, LEVL V, 60-74 MIN: ICD-10-PCS | Mod: S$GLB,,, | Performed by: ALLERGY & IMMUNOLOGY

## 2019-11-07 RX ORDER — IPRATROPIUM BROMIDE 21 UG/1
2 SPRAY, METERED NASAL 4 TIMES DAILY
Qty: 30 ML | Refills: 3 | Status: SHIPPED | OUTPATIENT
Start: 2019-11-07 | End: 2020-02-27 | Stop reason: SDUPTHER

## 2019-11-07 RX ORDER — TRIAMCINOLONE ACETONIDE 5 MG/G
CREAM TOPICAL 2 TIMES DAILY
Qty: 15 G | Refills: 3 | Status: SHIPPED | OUTPATIENT
Start: 2019-11-07 | End: 2021-08-11

## 2019-11-07 RX ORDER — LEVOTHYROXINE SODIUM 75 UG/1
75 TABLET ORAL DAILY
COMMUNITY
End: 2020-02-05 | Stop reason: SDUPTHER

## 2019-11-07 NOTE — PATIENT INSTRUCTIONS
Nose:  Saline first 1-2 times per day  Next azelastine 1 spray per nare twice per day - if symptoms worsen, may use up to 4 times per day   Then fluticasone or rhinocort 1 spray per nare twice per day  Ipratropium 2 sprays per nare every 6 hours as needed for runny nose.     Technique:  Head down  Aim up and out   Spray don't sniff    Hives:  Follow action plan     Drug allergy  We can test but must be hive free.

## 2019-11-07 NOTE — PROGRESS NOTES
"Subjective:       Patient ID: Skyler Espinoza is a 69 y.o. female.    Chief Complaint: Allergic Reaction (trying to figure out drug allergies-lidocaine and PCN. Local rxn. Hands red/itchy/swollen. )    HPI     Pt presents as a consult from Patricia Solorio for drug allergy.     > 10 years   Pt reports hives and pruritus.   No other symptoms  No pain or narcotic medication at the same time  Pt does not remember her reaction or circumstances.  Does have a history of "rashes"  She describes it has random and red, raised, and pruritic.   I showed pictures of hives and she endorses it.   May occur monthly.     Rhinitis:  Adulthood  Uses flonase and azelastine   Mainly runny nose   Can have headaches but has history of migraines and trigeminal neuralgia.   Allergy testing: none   Env: 2 cats   No carpet.     Review of Systems      General: neg unexpected weight changes, fevers, chills, night sweats, malaise  HEENT: see hpi, Neg eye pain, vision changes, ear drainage, nose bleeds, throat tightness, sores in the mouth  CV: Neg chest pain, palpitations, swelling  Resp: see hpi, neg shortness of breath, hemoptysis, cough  GI: see hpi, neg dysphagia, night abdominal pain, reflux, chronic diarrhea, chronic constipation  Derm: See Hpi, neg new rash, neg flushing  Mu/sk: Neg joint pain, joint swelling   Psych: Neg anxiety  neuro: neg chronic headaches, muscle weakness  Endo: neg heat/cold intolerance, chronic fatigue    Objective:     Vitals:    11/07/19 0913   BP: 130/78   Pulse: 78   SpO2: 97%   Weight: 82.6 kg (182 lb)   Height: 5' 7" (1.702 m)        Physical Exam      General: no acute distress, well developed well nourished   HEENT:   Head:normocephalic atraumatic  Eyes: JENNIFER, EOMI, Neg injection, scleral icterus, or conjunctival papillary hypertrophy.  Ears: tm clear bilaterally, normal canal  Nose: 2-3+ inferior turbinates pink, neg nasal polyps            Mucosa: moist            Septal irritation: none   OP: mucus membranes " moist, + cobblestoning, - PND, neg erythema or lesions  Neck: supple, Full range of motion, neg lymphadenopathy  Chest: full respiratory excursion no abnormal chest abnormality  Resp: clear to ascultation bilaterally  CV: RRR, neg MRG, brisk capillary refill  Abdomen: BS+, non tender, non distended  Ext:  Neg clubbing, cyanosis, pitting edema  Skin: Neg rashes or lesions  Lymph: neg supraclavicular, axillary     Assessment:       1. History of allergy to multiple drugs    2. Chronic rhinitis    3. Hives        Plan:       History of allergy to multiple drugs    Chronic rhinitis  -     ipratropium (ATROVENT) 0.03 % nasal spray; 2 sprays by Nasal route 4 (four) times daily.  Dispense: 30 mL; Refill: 3    Hives  -     triamcinolone acetonide 0.5% (KENALOG) 0.5 % Crea; Apply topically 2 (two) times daily.  Dispense: 15 g; Refill: 3      mulitple history of drug allergy    pcn and lidocaine testing.     Hives  Action plan and antihistamines    Cannot test while on chronic meds    Must be hive free prior to testing.     Rhinitis  Ipratropium   Continue medications azelastine and flonase.     Follow up in 2-3 months, sooner if needed.           Anushka Pastrana M.D.  Allergy/Immunology  [facility]   [phone] phone  [phone] fax

## 2019-11-08 ENCOUNTER — TELEPHONE (OUTPATIENT)
Dept: FAMILY MEDICINE | Facility: CLINIC | Age: 69
End: 2019-11-08

## 2019-11-11 ENCOUNTER — TELEPHONE (OUTPATIENT)
Dept: FAMILY MEDICINE | Facility: CLINIC | Age: 69
End: 2019-11-11

## 2019-11-11 NOTE — TELEPHONE ENCOUNTER
----- Message from Griselda Caceres sent at 11/11/2019 11:12 AM CST -----  Contact: Allison Trejo, Daughter  Type: Needs Medical Advice    Who Called:  patient  Best Call Back Number: [phone]  Additional Information: would like to get a referral to ENT for Vertigo--please advise--thank you

## 2019-11-12 ENCOUNTER — PATIENT MESSAGE (OUTPATIENT)
Dept: FAMILY MEDICINE | Facility: CLINIC | Age: 69
End: 2019-11-12

## 2019-11-12 DIAGNOSIS — H81.10 BENIGN PAROXYSMAL POSITIONAL VERTIGO, UNSPECIFIED LATERALITY: Primary | ICD-10-CM

## 2019-11-14 ENCOUNTER — LAB VISIT (OUTPATIENT)
Dept: LAB | Facility: HOSPITAL | Age: 69
End: 2019-11-14
Attending: NURSE PRACTITIONER
Payer: MEDICARE

## 2019-11-14 DIAGNOSIS — G70.01 MYASTHENIA GRAVIS WITH EXACERBATION: ICD-10-CM

## 2019-11-14 DIAGNOSIS — H53.2 DIPLOPIA: Primary | ICD-10-CM

## 2019-11-14 PROCEDURE — 86255 FLUORESCENT ANTIBODY SCREEN: CPT

## 2019-11-14 PROCEDURE — 30000890 LABCORP MISCELLANEOUS TEST: Mod: 91

## 2019-11-14 PROCEDURE — 30000890 MISCELLANEOUS SENDOUT TEST, BLOOD

## 2019-11-14 PROCEDURE — 36415 COLL VENOUS BLD VENIPUNCTURE: CPT

## 2019-11-14 PROCEDURE — 83519 RIA NONANTIBODY: CPT

## 2019-11-14 PROCEDURE — 83519 RIA NONANTIBODY: CPT | Mod: 91

## 2019-11-22 LAB
LABCORP MISC TEST CODE: NORMAL
LABCORP MISC TEST NAME: NORMAL
LABCORP MISCELLANEOUS TEST: NORMAL

## 2019-11-26 LAB
LABCORP MISC TEST CODE: NORMAL
LABCORP MISC TEST NAME: NORMAL
LABCORP MISCELLANEOUS TEST: NORMAL

## 2019-12-26 DIAGNOSIS — K21.9 GASTROESOPHAGEAL REFLUX DISEASE, ESOPHAGITIS PRESENCE NOT SPECIFIED: ICD-10-CM

## 2019-12-26 RX ORDER — PANTOPRAZOLE SODIUM 40 MG/1
TABLET, DELAYED RELEASE ORAL
Qty: 30 TABLET | Refills: 2 | Status: SHIPPED | OUTPATIENT
Start: 2019-12-26 | End: 2020-02-05

## 2020-01-15 ENCOUNTER — OFFICE VISIT (OUTPATIENT)
Dept: FAMILY MEDICINE | Facility: CLINIC | Age: 70
End: 2020-01-15
Payer: MEDICARE

## 2020-01-15 ENCOUNTER — HOSPITAL ENCOUNTER (OUTPATIENT)
Dept: RADIOLOGY | Facility: CLINIC | Age: 70
Discharge: HOME | End: 2020-01-15
Attending: INTERNAL MEDICINE
Payer: MEDICARE

## 2020-01-15 VITALS
HEART RATE: 81 BPM | HEIGHT: 67 IN | RESPIRATION RATE: 16 BRPM | DIASTOLIC BLOOD PRESSURE: 80 MMHG | WEIGHT: 171.5 LBS | BODY MASS INDEX: 26.92 KG/M2 | OXYGEN SATURATION: 98 % | TEMPERATURE: 98 F | SYSTOLIC BLOOD PRESSURE: 124 MMHG

## 2020-01-15 DIAGNOSIS — R63.4 WEIGHT LOSS: ICD-10-CM

## 2020-01-15 DIAGNOSIS — G47.30 SLEEP APNEA, UNSPECIFIED TYPE: ICD-10-CM

## 2020-01-15 DIAGNOSIS — R53.81 MALAISE AND FATIGUE: ICD-10-CM

## 2020-01-15 DIAGNOSIS — R53.83 MALAISE AND FATIGUE: ICD-10-CM

## 2020-01-15 DIAGNOSIS — F41.9 ANXIETY: ICD-10-CM

## 2020-01-15 DIAGNOSIS — I10 HYPERTENSION, UNSPECIFIED TYPE: Primary | ICD-10-CM

## 2020-01-15 PROCEDURE — 1126F AMNT PAIN NOTED NONE PRSNT: CPT | Mod: S$GLB,,, | Performed by: INTERNAL MEDICINE

## 2020-01-15 PROCEDURE — 71046 XR CHEST PA AND LATERAL: ICD-10-PCS | Mod: 26,,, | Performed by: RADIOLOGY

## 2020-01-15 PROCEDURE — 71046 X-RAY EXAM CHEST 2 VIEWS: CPT | Mod: TC,FY,PO

## 2020-01-15 PROCEDURE — 1126F PR PAIN SEVERITY QUANTIFIED, NO PAIN PRESENT: ICD-10-PCS | Mod: S$GLB,,, | Performed by: INTERNAL MEDICINE

## 2020-01-15 PROCEDURE — G0008 ADMIN INFLUENZA VIRUS VAC: HCPCS | Mod: S$GLB,,, | Performed by: INTERNAL MEDICINE

## 2020-01-15 PROCEDURE — 99214 OFFICE O/P EST MOD 30 MIN: CPT | Mod: 25,S$GLB,, | Performed by: INTERNAL MEDICINE

## 2020-01-15 PROCEDURE — 1159F PR MEDICATION LIST DOCUMENTED IN MEDICAL RECORD: ICD-10-PCS | Mod: S$GLB,,, | Performed by: INTERNAL MEDICINE

## 2020-01-15 PROCEDURE — 90662 FLU VACCINE - HIGH DOSE (65+) PRESERVATIVE FREE IM: ICD-10-PCS | Mod: S$GLB,,, | Performed by: INTERNAL MEDICINE

## 2020-01-15 PROCEDURE — 99214 PR OFFICE/OUTPT VISIT, EST, LEVL IV, 30-39 MIN: ICD-10-PCS | Mod: 25,S$GLB,, | Performed by: INTERNAL MEDICINE

## 2020-01-15 PROCEDURE — 90662 IIV NO PRSV INCREASED AG IM: CPT | Mod: S$GLB,,, | Performed by: INTERNAL MEDICINE

## 2020-01-15 PROCEDURE — G0008 FLU VACCINE - HIGH DOSE (65+) PRESERVATIVE FREE IM: ICD-10-PCS | Mod: S$GLB,,, | Performed by: INTERNAL MEDICINE

## 2020-01-15 PROCEDURE — 1159F MED LIST DOCD IN RCRD: CPT | Mod: S$GLB,,, | Performed by: INTERNAL MEDICINE

## 2020-01-15 PROCEDURE — 71046 X-RAY EXAM CHEST 2 VIEWS: CPT | Mod: 26,,, | Performed by: RADIOLOGY

## 2020-01-15 RX ORDER — BUSPIRONE HYDROCHLORIDE 15 MG/1
15 TABLET ORAL 2 TIMES DAILY
Qty: 60 TABLET | Refills: 11 | Status: SHIPPED | OUTPATIENT
Start: 2020-01-15 | End: 2021-02-12 | Stop reason: SDUPTHER

## 2020-01-15 RX ORDER — METHYLPHENIDATE HYDROCHLORIDE 10 MG/1
10 TABLET ORAL 2 TIMES DAILY WITH MEALS
Qty: 60 TABLET | Refills: 0 | Status: SHIPPED | OUTPATIENT
Start: 2020-01-15 | End: 2020-01-15

## 2020-01-15 NOTE — PROGRESS NOTES
"Subjective:      11:02 AM     Patient ID: Skyler Espinoza is a 69 y.o. female.    Chief Complaint: Hypertension    HPI   Patient is having severe fatigue sleeping until 11 in the morning even though she goes to bed at 10.  She uses CPAP.  When she wakes up she has double vision but it goes away after that shortly.    Her mood is better on Effexor but she is still having a lot of anxiety.    States appetite is good. No night sweats.       CHIEF COMPLAINT: Hypertension  HPI:     ONSET:      QUALITY/COURSE:   Unchanged.     INTENSITY/SEVERITY:  Average blood pressure is 120/80.      MODIFIERS/TREATMENTS:  Taking medications: yes. .High sodium intake: no. alcohol: no      The following symptoms are positive only if BOLDED, otherwise are negative.      SYMPTOMS/RELATED: Possible medication side effects include:   Depression..  . Cough. . Constipation.    REVIEW OF SYMPTOMS: . Weight_loss . Weight_gain . Leg_cramps ..    TARGET ORGAN DAMAGE:: angina/ prior myocardial infarction, chronic kidney disease, heart failure, left ventricular hypertrophy, peripheral artery disease, prior coronary revascularization, retinopathy, stroke. transient ischemic attack.      Review of Systems      Objective:      Vitals:    01/15/20 1055   BP: 124/80   Pulse: 81   Resp: 16   Temp: 98.1 °F (36.7 °C)   TempSrc: Oral   SpO2: 98%   Weight: 77.8 kg (171 lb 8.3 oz)   Height: 5' 7" (1.702 m)   PainSc: 0-No pain    Down 9 lb  Physical Exam   Constitutional: She is oriented to person, place, and time. She appears well-developed and well-nourished.   Cardiovascular: Normal rate, regular rhythm and normal heart sounds.   Pulmonary/Chest: Effort normal and breath sounds normal.   Abdominal: Soft. There is no tenderness.   Neurological: She is alert and oriented to person, place, and time.   Psychiatric: She has a normal mood and affect. Her behavior is normal. Thought content normal.   Nursing note and vitals reviewed.        Assessment:       1. " Hypertension, unspecified type    2. Sleep apnea, unspecified type    3. Anxiety    4. Malaise and fatigue    5. Weight loss          Plan:       Hypertension, unspecified type    Sleep apnea, unspecified type    Anxiety  -     busPIRone (BUSPAR) 15 MG tablet; Take 1 tablet (15 mg total) by mouth 2 (two) times daily.  Dispense: 60 tablet; Refill: 11    Malaise and fatigue  -     Discontinue: methylphenidate HCl (RITALIN) 10 MG tablet; Take 1 tablet (10 mg total) by mouth 2 (two) times daily with meals.  Dispense: 60 tablet; Refill: 0    Weight loss  -     X-Ray Chest PA And Lateral; Future; Expected date: 01/15/2020  -     US Abdomen Complete; Future; Expected date: 01/15/2020  -     CBC auto differential; Future; Expected date: 01/15/2020  -     Comprehensive metabolic panel; Future; Expected date: 01/15/2020  -     Rapid HIV; Future; Expected date: 01/15/2020  -     Ambulatory consult to Gastroenterology  -     Ambulatory referral to Gynecology      Follow up in about 6 weeks (around 2/26/2020).

## 2020-01-15 NOTE — PATIENT INSTRUCTIONS
Get more exercise.  Do new things and .  Meet new people    Thank you for choosing [facility].     Please fill out the patient experience survey.

## 2020-01-16 ENCOUNTER — HOSPITAL ENCOUNTER (OUTPATIENT)
Dept: RADIOLOGY | Facility: CLINIC | Age: 70
Discharge: HOME | End: 2020-01-16
Attending: INTERNAL MEDICINE
Payer: MEDICARE

## 2020-01-16 DIAGNOSIS — R63.4 WEIGHT LOSS: ICD-10-CM

## 2020-01-16 PROCEDURE — 76700 US EXAM ABDOM COMPLETE: CPT | Mod: TC,PO

## 2020-01-16 PROCEDURE — 76700 US EXAM ABDOM COMPLETE: CPT | Mod: 26,,, | Performed by: RADIOLOGY

## 2020-01-16 PROCEDURE — 76700 US ABDOMEN COMPLETE: ICD-10-PCS | Mod: 26,,, | Performed by: RADIOLOGY

## 2020-01-16 NOTE — PROGRESS NOTES
Normal study    This was reviewed by Dr. Cardona.  If you have any questions needs or problems let us know

## 2020-01-20 DIAGNOSIS — G47.19 EXCESSIVE DAYTIME SLEEPINESS: ICD-10-CM

## 2020-01-20 DIAGNOSIS — R06.83 SNORING: ICD-10-CM

## 2020-01-20 DIAGNOSIS — G47.33 OSA (OBSTRUCTIVE SLEEP APNEA): Primary | ICD-10-CM

## 2020-01-20 DIAGNOSIS — G47.9 FATIGUE DUE TO SLEEP PATTERN DISTURBANCE: ICD-10-CM

## 2020-01-20 DIAGNOSIS — R53.83 FATIGUE DUE TO SLEEP PATTERN DISTURBANCE: ICD-10-CM

## 2020-01-21 ENCOUNTER — OFFICE VISIT (OUTPATIENT)
Dept: GASTROENTEROLOGY | Facility: CLINIC | Age: 70
End: 2020-01-21
Payer: MEDICARE

## 2020-01-21 VITALS
HEART RATE: 88 BPM | BODY MASS INDEX: 26.83 KG/M2 | DIASTOLIC BLOOD PRESSURE: 70 MMHG | WEIGHT: 171.31 LBS | SYSTOLIC BLOOD PRESSURE: 118 MMHG

## 2020-01-21 DIAGNOSIS — R63.0 DECREASED APPETITE: ICD-10-CM

## 2020-01-21 DIAGNOSIS — R63.4 WEIGHT LOSS: Primary | ICD-10-CM

## 2020-01-21 DIAGNOSIS — Z80.0 FAMILY HISTORY OF COLON CANCER: ICD-10-CM

## 2020-01-21 PROCEDURE — 99214 OFFICE O/P EST MOD 30 MIN: CPT | Mod: PBBFAC,PO | Performed by: INTERNAL MEDICINE

## 2020-01-21 PROCEDURE — 1126F AMNT PAIN NOTED NONE PRSNT: CPT | Mod: ,,, | Performed by: INTERNAL MEDICINE

## 2020-01-21 PROCEDURE — 99999 PR PBB SHADOW E&M-EST. PATIENT-LVL IV: ICD-10-PCS | Mod: PBBFAC,,, | Performed by: INTERNAL MEDICINE

## 2020-01-21 PROCEDURE — 1159F PR MEDICATION LIST DOCUMENTED IN MEDICAL RECORD: ICD-10-PCS | Mod: ,,, | Performed by: INTERNAL MEDICINE

## 2020-01-21 PROCEDURE — 1126F PR PAIN SEVERITY QUANTIFIED, NO PAIN PRESENT: ICD-10-PCS | Mod: ,,, | Performed by: INTERNAL MEDICINE

## 2020-01-21 PROCEDURE — 99999 PR PBB SHADOW E&M-EST. PATIENT-LVL IV: CPT | Mod: PBBFAC,,, | Performed by: INTERNAL MEDICINE

## 2020-01-21 PROCEDURE — 1159F MED LIST DOCD IN RCRD: CPT | Mod: ,,, | Performed by: INTERNAL MEDICINE

## 2020-01-21 PROCEDURE — 99204 PR OFFICE/OUTPT VISIT, NEW, LEVL IV, 45-59 MIN: ICD-10-PCS | Mod: S$PBB,,, | Performed by: INTERNAL MEDICINE

## 2020-01-21 PROCEDURE — 99204 OFFICE O/P NEW MOD 45 MIN: CPT | Mod: S$PBB,,, | Performed by: INTERNAL MEDICINE

## 2020-01-21 RX ORDER — LEVOTHYROXINE SODIUM 75 UG/1
75 TABLET ORAL
COMMUNITY

## 2020-01-21 NOTE — LETTER
January 22, 2020      Abhi Cardona MD  [address]  [facility] [address]           [facility]enter[facility]  [address]  [facility] [address]  Phone: [phone]          Patient: Skyler Espinoza   MR Number: [id number]   YOB: 1950   Date of Visit: 1/21/2020       Dear Dr. Abhi Cardona:    Thank you for referring Skyler Espinoza to me for evaluation. Attached you will find relevant portions of my assessment and plan of care.    If you have questions, please do not hesitate to call me. I look forward to following Skyler Espinoza along with you.    Sincerely,    Yary Forman MD    Enclosure  CC:  No Recipients    If you would like to receive this communication electronically, please contact [email or web address] or [phone] to request more information on [facility] access.    For providers and/or their staff who would like to refer a patient to [facility], please contact us through our one-stop-shop provider referral line, [facility], at [phone].    If you feel you have received this communication in error or would no longer like to receive these types of communications, please e-mail [email or web address]

## 2020-01-21 NOTE — PROGRESS NOTES
[facility]     CC: Weight loss    HPI 69 y.o. female presents for evaluation of unintentional, weight loss of 12 pounds that has occurred over several months and now stabilized. She denies changes in bowel habits, abdominal pain or dysphagia. She does note a decreased appetite thus decreased PO intake. Dr. Warren performed her last colonoscopy less than 5 years ago and she notes she did not have polyps. Her sister had colon cancer in her 50's. Her daughter is present and provides additional history, stating that her mother does not eat enough and sometimes forgets to eat.    Past Medical History:   Diagnosis Date    Depression     GERD (gastroesophageal reflux disease)     Hyperlipidemia     Hypertension     Hypothyroid        Past Surgical History:   Procedure Laterality Date    APPENDECTOMY      TONSILLECTOMY      TUBAL LIGATION         Social History     Tobacco Use    Smoking status: Never Smoker    Smokeless tobacco: Never Used   Substance Use Topics    Alcohol use: Yes     Comment: rarely    Drug use: No       Family History   Problem Relation Age of Onset    Cancer Father     Cancer Sister     Glaucoma Neg Hx     Macular degeneration Neg Hx     Retinal detachment Neg Hx        Review of Systems  General ROS: negative for - chills, fever; + weight loss  Psychological ROS: negative for - hallucination, depression or suicidal ideation  Ophthalmic ROS: negative for - blurry vision, photophobia or eye pain  ENT ROS: negative for - epistaxis, sore throat or rhinorrhea  Respiratory ROS: no cough, shortness of breath, or wheezing  Cardiovascular ROS: no chest pain or dyspnea on exertion  Gastrointestinal ROS: no dysphagia, no blood in stool, no diarrhea  Genito-Urinary ROS: no dysuria, trouble voiding, or hematuria  Musculoskeletal ROS: negative for - arthralgia, myalgia, weakness  Neurological ROS: no syncope or seizures; no ataxia  Dermatological ROS: negative for pruritis, rash and  jaundice    Physical Examination  /70   Pulse 88   Wt 77.7 kg (171 lb 4.8 oz)   BMI 26.83 kg/m²   General appearance: alert, cooperative, no distress  HENT: Normocephalic, atraumatic, neck symmetrical, no nasal discharge   Eyes: conjunctivae/corneas clear, PERRL, EOM's intact, sclera anicteric  Lungs: clear to auscultation bilaterally, no dullness to percussion bilaterally, symmetric expansion, breathing unlabored  Heart: regular rate and rhythm without rub; no displacement of the PMI   Abdomen: soft, nontender,nondistended, BS active ,no masses appreciated   Extremities: extremities symmetric; no clubbing, cyanosis, or edema  Integument: Skin color, texture, turgor normal; no rashes; hair distrubution normal, no jaundice  Neurologic: Alert and oriented X 3, no focal sensory or motor neurologic deficits  Psychiatric: no pressured speech; normal affect; no evidence of impaired cognition, no anxiety/depression     Labs:  Lab Results   Component Value Date    WBC 5.32 01/15/2020    HGB 12.9 01/15/2020    HCT 40.3 01/15/2020    MCV 94 01/15/2020     01/15/2020       CMP  Sodium   Date Value Ref Range Status   01/15/2020 139 136 - 145 mmol/L Final     Potassium   Date Value Ref Range Status   01/15/2020 4.4 3.5 - 5.1 mmol/L Final     Chloride   Date Value Ref Range Status   01/15/2020 104 95 - 110 mmol/L Final     CO2   Date Value Ref Range Status   01/15/2020 28 23 - 29 mmol/L Final     Glucose   Date Value Ref Range Status   01/15/2020 90 70 - 110 mg/dL Final     BUN, Bld   Date Value Ref Range Status   01/15/2020 12 8 - 23 mg/dL Final     Creatinine   Date Value Ref Range Status   01/15/2020 0.8 0.5 - 1.4 mg/dL Final   05/02/2013 0.8 0.5 - 1.4 mg/dL Final     Calcium   Date Value Ref Range Status   01/15/2020 9.6 8.7 - 10.5 mg/dL Final   05/02/2013 10.0 8.7 - 10.5 mg/dL Final     Total Protein   Date Value Ref Range Status   01/15/2020 6.8 6.0 - 8.4 g/dL Final     Albumin   Date Value Ref Range Status    01/15/2020 4.3 3.5 - 5.2 g/dL Final     Total Bilirubin   Date Value Ref Range Status   01/15/2020 0.5 0.1 - 1.0 mg/dL Final     Comment:     For infants and newborns, interpretation of results should be based  on gestational age, weight and in agreement with clinical  observations.  Premature Infant recommended reference ranges:  Up to 24 hours.............<8.0 mg/dL  Up to 48 hours............<12.0 mg/dL  3-5 days..................<15.0 mg/dL  6-29 days.................<15.0 mg/dL       Alkaline Phosphatase   Date Value Ref Range Status   01/15/2020 52 (L) 55 - 135 U/L Final     AST   Date Value Ref Range Status   01/15/2020 18 10 - 40 U/L Final     ALT   Date Value Ref Range Status   01/15/2020 11 10 - 44 U/L Final     Anion Gap   Date Value Ref Range Status   01/15/2020 7 (L) 8 - 16 mmol/L Final   05/02/2013 15 5 - 15 meq/L Final     eGFR if    Date Value Ref Range Status   01/15/2020 >60.0 >60 mL/min/1.73 m^2 Final     eGFR if non    Date Value Ref Range Status   01/15/2020 >60.0 >60 mL/min/1.73 m^2 Final     Comment:     Calculation used to obtain the estimated glomerular filtration  rate (eGFR) is the CKD-EPI equation.            Imaging:  Abdominal ultrasound report  was independently visualized and reviewed by me and showed mildly heterogenous liver, otherwise normal    CXR- no acute disease    MRI brain September 2019- mild to moderate cerebral volume loss with mild nonspecific white matter change    Assessment:   69 y.o. female with family history of colon cancer presents for evaluation of weight loss that is likely due to decreased PO intake  Plan:  -Obtain prior records from Dr. Warren  -Keep food journal/calorie count  -RTC 3 months, if weight is stable no further work up is needed, if she continues to lose weight may consider endoscopy and imaging    Yary Forman MD  [facility]  [address], [address]  [place] [address]  Office:  [phone]  Fax: [phone]

## 2020-01-23 DIAGNOSIS — R41.89 COGNITIVE DECLINE: Primary | ICD-10-CM

## 2020-02-05 ENCOUNTER — PROCEDURE VISIT (OUTPATIENT)
Dept: SLEEP MEDICINE | Facility: HOSPITAL | Age: 70
End: 2020-02-05
Attending: INTERNAL MEDICINE
Payer: MEDICARE

## 2020-02-05 ENCOUNTER — OFFICE VISIT (OUTPATIENT)
Dept: FAMILY MEDICINE | Facility: CLINIC | Age: 70
End: 2020-02-05
Payer: MEDICARE

## 2020-02-05 VITALS
HEIGHT: 67 IN | SYSTOLIC BLOOD PRESSURE: 104 MMHG | DIASTOLIC BLOOD PRESSURE: 72 MMHG | BODY MASS INDEX: 26.82 KG/M2 | TEMPERATURE: 98 F | OXYGEN SATURATION: 97 % | HEART RATE: 96 BPM | WEIGHT: 170.88 LBS

## 2020-02-05 DIAGNOSIS — R53.83 FATIGUE DUE TO SLEEP PATTERN DISTURBANCE: ICD-10-CM

## 2020-02-05 DIAGNOSIS — G47.19 EXCESSIVE DAYTIME SLEEPINESS: ICD-10-CM

## 2020-02-05 DIAGNOSIS — R51.9 CHRONIC NONINTRACTABLE HEADACHE, UNSPECIFIED HEADACHE TYPE: ICD-10-CM

## 2020-02-05 DIAGNOSIS — R06.83 SNORING: ICD-10-CM

## 2020-02-05 DIAGNOSIS — R41.3 MEMORY LOSS: Primary | ICD-10-CM

## 2020-02-05 DIAGNOSIS — G89.29 CHRONIC NONINTRACTABLE HEADACHE, UNSPECIFIED HEADACHE TYPE: ICD-10-CM

## 2020-02-05 DIAGNOSIS — G47.9 FATIGUE DUE TO SLEEP PATTERN DISTURBANCE: ICD-10-CM

## 2020-02-05 DIAGNOSIS — G47.33 OSA (OBSTRUCTIVE SLEEP APNEA): ICD-10-CM

## 2020-02-05 PROCEDURE — 99214 PR OFFICE/OUTPT VISIT, EST, LEVL IV, 30-39 MIN: ICD-10-PCS | Mod: S$GLB,,, | Performed by: NURSE PRACTITIONER

## 2020-02-05 PROCEDURE — 95810 POLYSOM 6/> YRS 4/> PARAM: CPT

## 2020-02-05 PROCEDURE — 99214 OFFICE O/P EST MOD 30 MIN: CPT | Mod: S$GLB,,, | Performed by: NURSE PRACTITIONER

## 2020-02-05 RX ORDER — DONEPEZIL HYDROCHLORIDE 5 MG/1
5 TABLET, FILM COATED ORAL NIGHTLY
Qty: 30 TABLET | Refills: 11 | Status: SHIPPED | OUTPATIENT
Start: 2020-02-05 | End: 2021-01-04

## 2020-02-05 RX ORDER — FLUTICASONE PROPIONATE 50 MCG
1 SPRAY, SUSPENSION (ML) NASAL DAILY
COMMUNITY
End: 2020-02-27 | Stop reason: SDUPTHER

## 2020-02-05 NOTE — PATIENT INSTRUCTIONS
Try 1/2 tablet on a full stomach for a week, if not side effects, may increase to 1 tablet in the evenings.  Try riboflavin (B2) 400 mg. A day, takes 3 months of using it to see a difference in headaches. Use USP verified herbals and vitamins.   1 month

## 2020-02-05 NOTE — PROGRESS NOTES
Subjective:       Patient ID: Skyler Espinoza is a 69 y.o. female.    Chief Complaint: Memory Loss  Saw the neurologist who suggested some otc supplements. Patient and her daughter have questions about the medications and want to know if they are safe and effective, if not, they would prefer to try a prescription drug instead.    Patient continues to have headaches almost every day, but they go away with 2 tylenol tabs.   HPI  Review of Systems   Constitutional: Negative for fatigue, fever and unexpected weight change.   HENT: Negative for congestion, hearing loss and sore throat.    Eyes: Negative for pain, redness and visual disturbance.   Respiratory: Negative for cough and shortness of breath.    Cardiovascular: Negative for chest pain and leg swelling.   Gastrointestinal: Negative for constipation, diarrhea, nausea and vomiting.   Endocrine: Negative for cold intolerance and heat intolerance.   Genitourinary: Negative for dysuria and hematuria.   Musculoskeletal: Negative for arthralgias and myalgias.   Skin: Negative for pallor and rash.   Neurological: Positive for headaches. Negative for dizziness.   Psychiatric/Behavioral: Negative for dysphoric mood. The patient is not nervous/anxious.        Objective:      Physical Exam   Constitutional: She is oriented to person, place, and time. She appears well-developed and well-nourished. No distress.   HENT:   Head: Normocephalic and atraumatic.   Eyes: Conjunctivae are normal. Right eye exhibits no discharge. Left eye exhibits no discharge. No scleral icterus.   Cardiovascular: Normal rate, regular rhythm and normal heart sounds. Exam reveals no gallop and no friction rub.   No murmur heard.  Pulmonary/Chest: Effort normal and breath sounds normal. No stridor. No respiratory distress. She has no wheezes. She has no rales.   Musculoskeletal: She exhibits no edema.   Neurological: She is alert and oriented to person, place, and time.   Skin: Skin is warm and dry. No  rash noted. She is not diaphoretic. No pallor.   Psychiatric: She has a normal mood and affect. Her behavior is normal.   Nursing note and vitals reviewed.      Assessment:     This provider spent  25 minutes face to face with patient, more than half the time for counseling and coordination of care as noted.    1. Memory loss    2. Chronic nonintractable headache, unspecified headache type        Plan:     Memory loss  -     donepezil (ARICEPT) 5 MG tablet; Take 1 tablet (5 mg total) by mouth every evening.  Dispense: 30 tablet; Refill: 11    Chronic nonintractable headache, unspecified headache type      We looked up Huperzine A on the [facility] website and they could not recommend this herbal because the studies that indicate the herb may be helpful with sited in a systematic review as being of poor quality and the studies for safety were only done for 3 months. The other supplement had lithium in it which has to be closely monitored in prescription doses so the patient and her daughter were not thrilled about using that either.   Try riboflavin (B2) 400 mg. A day, takes 3 months of using it to see a difference in headaches. Use USP verified herbals and vitamins.   1 month

## 2020-02-06 ENCOUNTER — OFFICE VISIT (OUTPATIENT)
Dept: ALLERGY | Facility: CLINIC | Age: 70
End: 2020-02-06
Payer: MEDICARE

## 2020-02-06 ENCOUNTER — LAB VISIT (OUTPATIENT)
Dept: LAB | Facility: HOSPITAL | Age: 70
End: 2020-02-06
Attending: ALLERGY & IMMUNOLOGY
Payer: MEDICARE

## 2020-02-06 VITALS
DIASTOLIC BLOOD PRESSURE: 72 MMHG | WEIGHT: 167 LBS | HEIGHT: 67 IN | SYSTOLIC BLOOD PRESSURE: 109 MMHG | HEART RATE: 89 BPM | BODY MASS INDEX: 26.21 KG/M2

## 2020-02-06 DIAGNOSIS — L50.9 HIVES: ICD-10-CM

## 2020-02-06 DIAGNOSIS — L50.9 HIVES: Primary | ICD-10-CM

## 2020-02-06 PROCEDURE — 86003 ALLG SPEC IGE CRUDE XTRC EA: CPT

## 2020-02-06 PROCEDURE — 99214 PR OFFICE/OUTPT VISIT, EST, LEVL IV, 30-39 MIN: ICD-10-PCS | Mod: S$GLB,,, | Performed by: ALLERGY & IMMUNOLOGY

## 2020-02-06 PROCEDURE — 86003 ALLG SPEC IGE CRUDE XTRC EA: CPT | Mod: 59

## 2020-02-06 PROCEDURE — 36415 COLL VENOUS BLD VENIPUNCTURE: CPT

## 2020-02-06 PROCEDURE — 99214 OFFICE O/P EST MOD 30 MIN: CPT | Mod: S$GLB,,, | Performed by: ALLERGY & IMMUNOLOGY

## 2020-02-06 NOTE — PROGRESS NOTES
"Subjective:       Patient ID: Skyler Espinoza is a 69 y.o. female.    Chief Complaint: Food Intolerance (hives/rash after eating shrimp recently. Treated with steroid creamx 3 days resolved. ) and Allergy Testing (interested in more testing. Years ago had epi pen for some allergies and dont have any rx's now. )    HPI     Pt presents as .     > 10 years   Pt reports hives and pruritus.   No other symptoms  No pain or narcotic medication at the same time  Pt does not remember her reaction or circumstances.  Does have a history of "rashes"  She describes it has random and red, raised, and pruritic.   I showed pictures of hives and she endorses it.   May occur monthly.     Most recently ate shrimp and had hives on her neck, took benadryl, and resolved. Per daughter, resolved after 3 days however.   Would like shrimp testing.     Rhinitis:  Condition: improved   Adulthood  Uses flonase and azelastine   Mainly runny nose   Can have headaches but has history of migraines and trigeminal neuralgia.   Allergy testing: none   Env: 2 cats   No carpet.     Review of Systems      General: neg unexpected weight changes, fevers, chills, night sweats, malaise  HEENT: see hpi, Neg eye pain, vision changes, ear drainage, nose bleeds, throat tightness, sores in the mouth  CV: Neg chest pain, palpitations, swelling  Resp: see hpi, neg shortness of breath, hemoptysis, cough  GI: see hpi, neg dysphagia, night abdominal pain, reflux, chronic diarrhea, chronic constipation  Derm: See Hpi, neg new rash, neg flushing  Mu/sk: Neg joint pain, joint swelling   Psych: Neg anxiety  neuro: neg chronic headaches, muscle weakness  Endo: neg heat/cold intolerance, chronic fatigue    Objective:     Vitals:    02/06/20 1430   BP: 109/72   Pulse: 89   Weight: 75.8 kg (167 lb)   Height: 5' 7" (1.702 m)        Physical Exam      General: no acute distress, well developed well nourished       Assessment:       1. Hives        Plan:       Hives  -     Shrimp " IgE; Future; Expected date: 02/06/2020  -     Crayfish, freshwater IgE; Future; Expected date: 02/06/2020  -     Crab IgE; Future; Expected date: 02/06/2020  -     Oyster IgE; Future; Expected date: 02/06/2020      mulitple history of drug allergy    pcn and lidocaine testing. - discussed parameters for testing.     Hives  Action plan and antihistamines    Cannot test while on chronic meds    Must be hive free prior to testing.     Rhinitis  Ipratropium   Continue medications azelastine and flonase.   Improved on meds.     Follow up in 2-3 months, sooner if needed.       > 50% > 30 discussing procedures for lidocaine , penicillin, and serum lab IgE for shellfish.         Anushka Pastrana M.D.  Allergy/Immunology  [facility]   [phone] phone  [phone] fax

## 2020-02-06 NOTE — PATIENT INSTRUCTIONS
We can schedule penicillin and lidocaine testing.     You must be off anithistamines x 7 days and be hive free off the antihistamines so there is no confusion.       Instructions For Skin Testing    Please HOLD all antihistamines (Benadryl, zyrtec, Claritin, loratidine, cetirizine, diphenhydramine, medications with pm in the name, Allegra, fexofenadine) 7 days prior to testing.     Please HOLD zantac, ranitidine, pepsid, famotidine 3 days prior to your testing.     Please HOLD azelastine, astelin, astepro, dymista three days prior to your skin testing    Please HOLD your Beta Blocker (ask the office if you are on one.) These medicines typically end in olol. HOLD x 48 hours (metoprolol).    Please HOLD clonidine the morning of skin testing.     Please HOLD any Tricyclic antidepressants 14 days prior to skin testing. Please consult your prescribing doctor prior to discontinuing this medication.     Please HOLD Seroquel 14 days prior to skin testing. Please consult your prescribing physician prior to stopping this medication.     After skin testing, you may resume taking your HELD medications.     You may CONTINUE Montelukast , Flonase, Fluticasone, Nasonex, or other intranasal steroid.

## 2020-02-10 LAB
ALLERGEN CRAB IGE: <0.1 KU/L
ALLERGEN OYSTER IGE: <0.1 KU/L
CRAWFISH IGE QN: <0.1 KU/L
SHRIMP IGE QN: <0.1 KU/L

## 2020-02-11 ENCOUNTER — HOSPITAL ENCOUNTER (OUTPATIENT)
Dept: RADIOLOGY | Facility: HOSPITAL | Age: 70
Discharge: HOME | End: 2020-02-11
Attending: OBSTETRICS & GYNECOLOGY
Payer: MEDICARE

## 2020-02-11 ENCOUNTER — OFFICE VISIT (OUTPATIENT)
Dept: OBSTETRICS AND GYNECOLOGY | Facility: CLINIC | Age: 70
End: 2020-02-11
Payer: MEDICARE

## 2020-02-11 VITALS
BODY MASS INDEX: 26.71 KG/M2 | SYSTOLIC BLOOD PRESSURE: 100 MMHG | WEIGHT: 170.19 LBS | HEIGHT: 67 IN | DIASTOLIC BLOOD PRESSURE: 64 MMHG

## 2020-02-11 VITALS — BODY MASS INDEX: 26.71 KG/M2 | HEIGHT: 67 IN | WEIGHT: 170.19 LBS

## 2020-02-11 DIAGNOSIS — E03.8 HYPOTHYROIDISM DUE TO HASHIMOTO'S THYROIDITIS: ICD-10-CM

## 2020-02-11 DIAGNOSIS — Z12.31 VISIT FOR SCREENING MAMMOGRAM: ICD-10-CM

## 2020-02-11 DIAGNOSIS — E06.3 HYPOTHYROIDISM DUE TO HASHIMOTO'S THYROIDITIS: ICD-10-CM

## 2020-02-11 DIAGNOSIS — Z01.419 ROUTINE GYNECOLOGICAL EXAMINATION: Primary | ICD-10-CM

## 2020-02-11 PROCEDURE — 88175 CYTOPATH C/V AUTO FLUID REDO: CPT

## 2020-02-11 PROCEDURE — G0101 PR CA SCREEN;PELVIC/BREAST EXAM: ICD-10-PCS | Mod: S$PBB,,, | Performed by: OBSTETRICS & GYNECOLOGY

## 2020-02-11 PROCEDURE — 77063 BREAST TOMOSYNTHESIS BI: CPT | Mod: 26,,, | Performed by: RADIOLOGY

## 2020-02-11 PROCEDURE — 77067 SCR MAMMO BI INCL CAD: CPT | Mod: 26,,, | Performed by: RADIOLOGY

## 2020-02-11 PROCEDURE — G0101 CA SCREEN;PELVIC/BREAST EXAM: HCPCS | Mod: S$PBB,,, | Performed by: OBSTETRICS & GYNECOLOGY

## 2020-02-11 PROCEDURE — 77063 MAMMO DIGITAL SCREENING BILAT WITH TOMOSYNTHESIS_CAD: ICD-10-PCS | Mod: 26,,, | Performed by: RADIOLOGY

## 2020-02-11 PROCEDURE — 87624 HPV HI-RISK TYP POOLED RSLT: CPT

## 2020-02-11 PROCEDURE — 77067 SCR MAMMO BI INCL CAD: CPT | Mod: TC,PN

## 2020-02-11 PROCEDURE — G0101 CA SCREEN;PELVIC/BREAST EXAM: HCPCS | Mod: PBBFAC,PN | Performed by: OBSTETRICS & GYNECOLOGY

## 2020-02-11 PROCEDURE — 99213 OFFICE O/P EST LOW 20 MIN: CPT | Mod: PBBFAC,PN,25 | Performed by: OBSTETRICS & GYNECOLOGY

## 2020-02-11 PROCEDURE — 99999 PR PBB SHADOW E&M-EST. PATIENT-LVL III: CPT | Mod: PBBFAC,,, | Performed by: OBSTETRICS & GYNECOLOGY

## 2020-02-11 PROCEDURE — 77067 MAMMO DIGITAL SCREENING BILAT WITH TOMOSYNTHESIS_CAD: ICD-10-PCS | Mod: 26,,, | Performed by: RADIOLOGY

## 2020-02-11 PROCEDURE — 99999 PR PBB SHADOW E&M-EST. PATIENT-LVL III: ICD-10-PCS | Mod: PBBFAC,,, | Performed by: OBSTETRICS & GYNECOLOGY

## 2020-02-11 RX ORDER — MV/FA/DHA/EPA/FISH OIL/SAW/GNK 400MCG-200
COMBINATION PACKAGE (EA) ORAL
COMMUNITY
End: 2021-01-06

## 2020-02-11 RX ORDER — IBUPROFEN 100 MG/5ML
1000 SUSPENSION, ORAL (FINAL DOSE FORM) ORAL DAILY
COMMUNITY

## 2020-02-11 NOTE — PROGRESS NOTES
Chief Complaint   Patient presents with    [facility]    Well Woman    Mammogram       History of Present Illness: Skyler Espinoza is a 69 y.o. female that presents today 2/11/2020 for well gyn visit.    Past Medical History:   Diagnosis Date    Depression     GERD (gastroesophageal reflux disease)     Hyperlipidemia     Hypertension     Hypothyroid        Past Surgical History:   Procedure Laterality Date    APPENDECTOMY      COLONOSCOPY  2017    Briana, repeat in 10    TONSILLECTOMY      TUBAL LIGATION         Current Outpatient Medications   Medication Sig Dispense Refill    ascorbic acid, vitamin C, (VITAMIN C) 1000 MG tablet Take 1,000 mg by mouth once daily.      azelastine (ASTELIN) 137 mcg (0.1 %) nasal spray 1 spray (137 mcg total) by Nasal route 2 (two) times daily. 30 mL 5    busPIRone (BUSPAR) 15 MG tablet Take 1 tablet (15 mg total) by mouth 2 (two) times daily. 60 tablet 11    donepezil (ARICEPT) 5 MG tablet Take 1 tablet (5 mg total) by mouth every evening. 30 tablet 11    fluticasone propionate (FLONASE) 50 mcg/actuation nasal spray 1 spray by Each Nostril route once daily.      ipratropium (ATROVENT) 0.03 % nasal spray 2 sprays by Nasal route 4 (four) times daily. 30 mL 3    krill oil 500 mg Cap Take by mouth.      Lactobacillus rhamnosus GG (CULTURELLE) 10 billion cell capsule Take 1 capsule by mouth once daily.      levothyroxine (UNITHROID) 75 MCG tablet Take 75 mcg by mouth before breakfast.      lovastatin (MEVACOR) 20 MG tablet Take 1 tablet (20 mg total) by mouth every evening. 90 tablet 3    metoprolol succinate (TOPROL-XL) 25 MG 24 hr tablet Take 25 mg by mouth once daily.      multivit-min-ferrous fumarate (MULTI VITAMIN) 9 mg iron/15 mL Liqd as directed      polyethylene glycol (GLYCOLAX) 17 gram PwPk Take by mouth.      selenium 50 mcg Tab Take 300 mcg by mouth once daily.       triamcinolone acetonide 0.5% (KENALOG) 0.5 % Crea Apply topically 2 (two) times  daily. 15 g 3    TURMERIC ORAL Take by mouth.      venlafaxine (EFFEXOR-XR) 150 MG Cp24 Take 1 capsule (150 mg total) by mouth once daily. 30 capsule 11    aspirin (ECOTRIN) 81 MG EC tablet Take 81 mg by mouth once daily.       cetirizine (ZYRTEC) 10 MG tablet Take 10 mg by mouth once daily.      vitamin D 1000 units Tab Take 1,000 Units by mouth once daily.       No current facility-administered medications for this visit.        Review of patient's allergies indicates:   Allergen Reactions    Lidocaine      Hand edema,  redness    Penicillins Rash       Family History   Problem Relation Age of Onset    Cancer Father     Cancer Sister     Glaucoma Neg Hx     Macular degeneration Neg Hx     Retinal detachment Neg Hx     Breast cancer Neg Hx     Ovarian cancer Neg Hx        Social History     Socioeconomic History    Marital status:      Spouse name: Not on file    Number of children: Not on file    Years of education: Not on file    Highest education level: Not on file   Occupational History    Not on file   Social Needs    Financial resource strain: Not on file    Food insecurity:     Worry: Not on file     Inability: Not on file    Transportation needs:     Medical: Not on file     Non-medical: Not on file   Tobacco Use    Smoking status: Never Smoker    Smokeless tobacco: Never Used   Substance and Sexual Activity    Alcohol use: Yes     Comment: rarely    Drug use: No    Sexual activity: Yes   Lifestyle    Physical activity:     Days per week: Not on file     Minutes per session: Not on file    Stress: Not on file   Relationships    Social connections:     Talks on phone: Not on file     Gets together: Not on file     Attends Sikhism service: Not on file     Active member of club or organization: Not on file     Attends meetings of clubs or organizations: Not on file     Relationship status: Not on file   Other Topics Concern    Not on file   Social History Narrative     "Not on file       OB History    Para Term  AB Living   3 3 3         SAB TAB Ectopic Multiple Live Births                  # Outcome Date GA Lbr Dav/2nd Weight Sex Delivery Anes PTL Lv   3 Term      Vag-Spont      2 Term      Vag-Spont      1 Term      Vag-Spont          Review of Symptoms:  GENERAL: Denies weight gain or weight loss. Feeling well overall.   SKIN: Denies rash or lesions.   HEAD: Denies head injury or headache.   NODES: Denies enlarged lymph nodes.   CHEST: Denies chest pain or shortness of breath.   CARDIOVASCULAR: Denies palpitations or left sided chest pain.   ABDOMEN: No abdominal pain, constipation, diarrhea, nausea, vomiting or rectal bleeding.   URINARY: No frequency, dysuria, hematuria, or burning on urination.  HEMATOLOGIC: No easy bruisability or excessive bleeding.   MUSCULOSKELETAL: Denies joint pain or swelling.     /64   Ht 5' 7" (1.702 m)   Wt 77.2 kg (170 lb 3.1 oz)   Physical Exam:  APPEARANCE: Well nourished, well developed, in no acute distress.  SKIN: Normal skin turgor, no lesions.  NECK: Neck symmetric without masses   RESPIRATORY: Normal respiratory effort with no retractions or use of accessory muscles  CARDIOVASCULAR: Peripheral vascular system with no swelling no varicosities and palpation of pulses normal  LYMPHATIC: No enlargements of the lymph nodes noted in the neck, axillae, or groin  ABDOMEN: Soft. No tenderness or masses. No hepatosplenomegaly. No hernias.  BREASTS: Symmetrical, no skin changes or visible lesions. No palpable masses, nipple discharge or adenopathy bilaterally.  PELVIC: Normal external female genitalia without lesions. Normal hair distribution. Adequate perineal body, normal urethral meatus. Urethra with no masses.  Bladder nontender. Vagina moist and well rugated without lesions or discharge. Cervix pink and without lesions. No significant cystocele or rectocele. Bimanual exam showed uterus normal size, shape, position, mobile " and nontender. Adnexa without masses or tenderness. Urethra and bladder normal.   EXTREMITIES: No clubbing cyanosis or edema.    ASSESSMENT/PLAN:  Routine gynecological examination  -     Liquid-Based Pap Smear, Screening  -     HPV High Risk Genotypes, PCR    Visit for screening mammogram  -     Mammo Digital Screening Bilat w/ Arnold; Future; Expected date: 02/11/2020    Hypothyroidism due to Hashimoto's thyroiditis          Patient was counseled today on Pelvic exams and Pap Smear guidelines.   We discussed STD screening if at high risk for a STD.  We discussed recommendation for breast cancer screening with mammogram every other year after the age of 40 and annually after the age of 50.    We discussed colon cancer screening when indicated.   Osteoporosis screening discussed when indicated.   She was advised to see her primary care physician for all other health maintenance.     FOLLOW-UP with me for next routine visit.

## 2020-02-17 ENCOUNTER — PROCEDURE VISIT (OUTPATIENT)
Dept: ALLERGY | Facility: CLINIC | Age: 70
End: 2020-02-17
Payer: MEDICARE

## 2020-02-17 VITALS
WEIGHT: 167 LBS | DIASTOLIC BLOOD PRESSURE: 73 MMHG | OXYGEN SATURATION: 97 % | HEIGHT: 67 IN | HEART RATE: 87 BPM | SYSTOLIC BLOOD PRESSURE: 113 MMHG | BODY MASS INDEX: 26.21 KG/M2

## 2020-02-17 DIAGNOSIS — Z88.9 HISTORY OF ALLERGY TO MULTIPLE DRUGS: ICD-10-CM

## 2020-02-17 PROCEDURE — 95018 PR PERQ&IC ALLG TEST DRUGS/BIOL: ICD-10-PCS | Mod: S$GLB,,, | Performed by: ALLERGY & IMMUNOLOGY

## 2020-02-17 PROCEDURE — 95018 ALL TSTG PERQ&IQ DRUGS/BIOL: CPT | Mod: S$GLB,,, | Performed by: ALLERGY & IMMUNOLOGY

## 2020-02-17 PROCEDURE — 95076 INGEST CHALLENGE INI 120 MIN: CPT | Mod: S$GLB,,, | Performed by: ALLERGY & IMMUNOLOGY

## 2020-02-17 PROCEDURE — 95076 PR INGESTION CHALLENGE TEST; INITIAL 120 MIN: ICD-10-PCS | Mod: S$GLB,,, | Performed by: ALLERGY & IMMUNOLOGY

## 2020-02-17 NOTE — PROCEDURES
Procedures           Pt was then challenged to 250 mg of amoxicillin.   Her oral challenge procedure time was 70 mins. Pt was discharged in good condition.     Her penicillin was compounded per  guidelines for her skin test today.         Anushka Pastrana M.D.  Allergy/Immunology  [facility]   [phone] phone  [phone] fax

## 2020-02-18 LAB
HPV HR 12 DNA SPEC QL NAA+PROBE: NEGATIVE
HPV16 AG SPEC QL: NEGATIVE
HPV18 DNA SPEC QL NAA+PROBE: NEGATIVE

## 2020-02-27 ENCOUNTER — OFFICE VISIT (OUTPATIENT)
Dept: FAMILY MEDICINE | Facility: CLINIC | Age: 70
End: 2020-02-27
Payer: MEDICARE

## 2020-02-27 VITALS
RESPIRATION RATE: 18 BRPM | HEART RATE: 78 BPM | DIASTOLIC BLOOD PRESSURE: 50 MMHG | OXYGEN SATURATION: 96 % | WEIGHT: 172.63 LBS | BODY MASS INDEX: 27.09 KG/M2 | SYSTOLIC BLOOD PRESSURE: 100 MMHG | HEIGHT: 67 IN | TEMPERATURE: 98 F

## 2020-02-27 DIAGNOSIS — J31.0 CHRONIC RHINITIS: ICD-10-CM

## 2020-02-27 DIAGNOSIS — I10 HYPERTENSION, UNSPECIFIED TYPE: ICD-10-CM

## 2020-02-27 DIAGNOSIS — J30.89 NON-SEASONAL ALLERGIC RHINITIS, UNSPECIFIED TRIGGER: ICD-10-CM

## 2020-02-27 DIAGNOSIS — R42 ORTHOSTATIC DIZZINESS: Primary | ICD-10-CM

## 2020-02-27 PROCEDURE — 99214 PR OFFICE/OUTPT VISIT, EST, LEVL IV, 30-39 MIN: ICD-10-PCS | Mod: S$GLB,,, | Performed by: INTERNAL MEDICINE

## 2020-02-27 PROCEDURE — 99214 OFFICE O/P EST MOD 30 MIN: CPT | Mod: S$GLB,,, | Performed by: INTERNAL MEDICINE

## 2020-02-27 RX ORDER — IPRATROPIUM BROMIDE 21 UG/1
2 SPRAY, METERED NASAL 4 TIMES DAILY PRN
Qty: 30 ML | Refills: 3 | Status: SHIPPED | OUTPATIENT
Start: 2020-02-27 | End: 2022-02-10 | Stop reason: ALTCHOICE

## 2020-02-27 RX ORDER — FLUTICASONE PROPIONATE 50 MCG
1 SPRAY, SUSPENSION (ML) NASAL DAILY
Qty: 16 G | Refills: 5 | Status: SHIPPED | OUTPATIENT
Start: 2020-02-27 | End: 2022-02-11

## 2020-02-27 RX ORDER — AZELASTINE 1 MG/ML
1 SPRAY, METERED NASAL 2 TIMES DAILY
Qty: 30 ML | Refills: 5 | Status: SHIPPED | OUTPATIENT
Start: 2020-02-27 | End: 2021-08-11

## 2020-02-27 NOTE — PATIENT INSTRUCTIONS
If dizzy increase the salt in her diet.  Example drink V8 juice..   Thigh high stockings with some compression.    Get more exercise    Thank you for choosing [facility].     Please fill out the patient experience survey.

## 2020-03-12 ENCOUNTER — PATIENT MESSAGE (OUTPATIENT)
Dept: FAMILY MEDICINE | Facility: CLINIC | Age: 70
End: 2020-03-12

## 2020-03-12 LAB
FINAL PATHOLOGIC DIAGNOSIS: NORMAL
Lab: NORMAL

## 2020-03-13 ENCOUNTER — TELEPHONE (OUTPATIENT)
Dept: FAMILY MEDICINE | Facility: CLINIC | Age: 70
End: 2020-03-13

## 2020-03-13 DIAGNOSIS — K21.9 GASTROESOPHAGEAL REFLUX DISEASE, ESOPHAGITIS PRESENCE NOT SPECIFIED: Primary | ICD-10-CM

## 2020-03-13 RX ORDER — PANTOPRAZOLE SODIUM 40 MG/1
40 TABLET, DELAYED RELEASE ORAL DAILY
Qty: 30 TABLET | Refills: 11 | Status: SHIPPED | OUTPATIENT
Start: 2020-03-13 | End: 2021-03-05 | Stop reason: SDUPTHER

## 2020-03-30 ENCOUNTER — PATIENT MESSAGE (OUTPATIENT)
Dept: GASTROENTEROLOGY | Facility: CLINIC | Age: 70
End: 2020-03-30

## 2020-05-05 ENCOUNTER — PATIENT MESSAGE (OUTPATIENT)
Dept: ADMINISTRATIVE | Facility: HOSPITAL | Age: 70
End: 2020-05-05

## 2020-06-17 ENCOUNTER — OFFICE VISIT (OUTPATIENT)
Dept: FAMILY MEDICINE | Facility: CLINIC | Age: 70
End: 2020-06-17
Payer: MEDICARE

## 2020-06-17 ENCOUNTER — DOCUMENTATION ONLY (OUTPATIENT)
Dept: FAMILY MEDICINE | Facility: CLINIC | Age: 70
End: 2020-06-17

## 2020-06-17 VITALS
HEART RATE: 78 BPM | OXYGEN SATURATION: 96 % | BODY MASS INDEX: 27.34 KG/M2 | TEMPERATURE: 98 F | DIASTOLIC BLOOD PRESSURE: 84 MMHG | WEIGHT: 174.19 LBS | RESPIRATION RATE: 16 BRPM | HEIGHT: 67 IN | SYSTOLIC BLOOD PRESSURE: 110 MMHG

## 2020-06-17 DIAGNOSIS — I10 HYPERTENSION, UNSPECIFIED TYPE: Primary | ICD-10-CM

## 2020-06-17 DIAGNOSIS — E78.5 HYPERLIPIDEMIA, UNSPECIFIED HYPERLIPIDEMIA TYPE: ICD-10-CM

## 2020-06-17 PROBLEM — E06.3 AUTOIMMUNE THYROIDITIS: Status: ACTIVE | Noted: 2020-06-17

## 2020-06-17 PROBLEM — F32.9 MAJOR DEPRESSION, SINGLE EPISODE: Status: ACTIVE | Noted: 2020-06-17

## 2020-06-17 PROCEDURE — 99213 OFFICE O/P EST LOW 20 MIN: CPT | Mod: S$GLB,,, | Performed by: INTERNAL MEDICINE

## 2020-06-17 PROCEDURE — 99213 PR OFFICE/OUTPT VISIT, EST, LEVL III, 20-29 MIN: ICD-10-PCS | Mod: S$GLB,,, | Performed by: INTERNAL MEDICINE

## 2020-06-17 RX ORDER — VALACYCLOVIR HYDROCHLORIDE 1 G/1
TABLET, FILM COATED ORAL
COMMUNITY
Start: 2020-04-29

## 2020-06-17 RX ORDER — MECLIZINE HYDROCHLORIDE 25 MG/1
1 TABLET ORAL 3 TIMES DAILY PRN
COMMUNITY
Start: 2020-04-19 | End: 2021-08-11

## 2020-06-17 RX ORDER — MUPIROCIN 20 MG/G
OINTMENT TOPICAL
COMMUNITY
Start: 2020-05-16 | End: 2021-08-11

## 2020-06-17 NOTE — PROGRESS NOTES
"Subjective:      4:05 PM     Patient ID: Skyler Espinoza is a 69 y.o. female.    Chief Complaint: Hypertension (no refills needed)    HPI   She is doing much better with the Effexor.  Her mood is better and her memory is better.        CHIEF COMPLAINT: Hypertension  HPI:     ONSET:      QUALITY/COURSE:   Unchanged.     INTENSITY/SEVERITY:  Average blood pressure is 120/70.      MODIFIERS/TREATMENTS:  Taking medications: yes. .High sodium intake: no. alcohol: no      The following symptoms are positive only if BOLDED, otherwise are negative.      SYMPTOMS/RELATED: Possible medication side effects include:   Depression..  . Cough. . Constipation.    REVIEW OF SYMPTOMS: . Weight_loss . Weight_gain . Leg_cramps ..    TARGET ORGAN DAMAGE:: angina/ prior myocardial infarction, chronic kidney disease, heart failure, left ventricular hypertrophy, peripheral artery disease, prior coronary revascularization, retinopathy, stroke. transient ischemic attack.      Review of Systems   Constitutional: Negative for diaphoresis.   Eyes: Negative for visual disturbance.   Respiratory: Negative for chest tightness and shortness of breath.    Cardiovascular: Negative for chest pain.   Neurological: Negative for dizziness, syncope, weakness and headaches.   Psychiatric/Behavioral: Negative for dysphoric mood. The patient is not nervous/anxious.          Objective:      Vitals:    06/17/20 1544   BP: 110/84   Pulse: 78   Resp: 16   Temp: 98.1 °F (36.7 °C)   TempSrc: Oral   SpO2: 96%   Weight: 79 kg (174 lb 2.6 oz)   Height: 5' 7" (1.702 m)   PainSc: 0-No pain     Physical Exam  Vitals signs and nursing note reviewed.   Constitutional:       Appearance: She is well-developed.   Cardiovascular:      Rate and Rhythm: Normal rate and regular rhythm.      Heart sounds: Normal heart sounds.   Pulmonary:      Effort: Pulmonary effort is normal.      Breath sounds: Normal breath sounds.   Abdominal:      Palpations: Abdomen is soft.      " Tenderness: There is no abdominal tenderness.   Neurological:      Mental Status: She is alert.   Psychiatric:         Behavior: Behavior normal.         Thought Content: Thought content normal.       No results found for this or any previous visit (from the past 1008 hour(s)).       Assessment:       1. Hypertension, unspecified type    2. Hyperlipidemia, unspecified hyperlipidemia type          Plan:       Hypertension, unspecified type  -     Comprehensive metabolic panel; Future; Expected date: 06/17/2020    Hyperlipidemia, unspecified hyperlipidemia type  -     Lipid Panel; Future; Expected date: 06/17/2020      Follow up in about 6 months (around 12/17/2020).

## 2020-06-17 NOTE — PATIENT INSTRUCTIONS
Avoid  alcohol  Low-Salt Diet  This diet removes foods that are high in salt. It also limits the amount of salt you use when cooking. It is most often used for people with high blood pressure, edema (fluid retention), and kidney, liver, or heart disease.  Table salt contains the mineral sodium. Your body needs sodium to work normally. But too much sodium can make your health problems worse. Your healthcare provider is recommending a low-salt (also called low-sodium) diet for you. Your total daily allowance of salt is 1,500 to 2,300 milligrams (mg). It is less than 1 teaspoon of table salt. This means you can have only about 500 to 700 mg of sodium at each meal. People with certain health problems should limit salt intake to the lower end of the recommended range.    When you cook, don´t add much salt. If you can cook without using salt, even better. Don´t add salt to your food at the table.  When shopping, read food labels. Salt is often called sodium on the label. Choose foods that are salt-free, low salt, or very low salt. Note that foods with reduced salt may not lower your salt intake enough.    Beans, potatoes, and pasta  Ok: Dry beans, split peas, lentils, potatoes, rice, macaroni, pasta, spaghetti without added salt  Avoid: Potato chips, tortilla chips, and similar products  Breads and cereals  Ok: Low-sodium breads, rolls, cereals, and cakes; low-salt crackers, matzo crackers  Avoid: Salted crackers, pretzels, popcorn, Macanese toast, pancakes, muffins  Dairy  Ok: Milk, chocolate milk, hot chocolate mix, low-salt cheeses, and yogurt  Avoid: Processed cheese and cheese spreads; Roquefort, Camembert, and cottage cheese; buttermilk, instant breakfast drink  Desserts  Ok: Ice cream, frozen yogurt, juice bars, gelatin, cookies and pies, sugar, honey, jelly, hard candy  Avoid: Most pies, cakes and cookies prepared or processed with salt; instant pudding  Drinks  Ok: Tea, coffee, fizzy (carbonated) drinks,  juices  Avoid: Flavored coffees, electrolyte replacement drinks, sports drinks  Meats  Ok: All fresh meat, fish, poultry, low-salt tuna, eggs, egg substitute  Avoid: Smoked, pickled, brine-cured, or salted meats and fish. This includes cody, chipped beef, corned beef, hot dogs, deli meats, ham, kosher meats, salt pork, sausage, canned tuna, salted codfish, smoked salmon, herring, sardines, or anchovies.  Seasonings and spices  Ok: Most seasonings are okay. Good substitutes for salt include: fresh herb blends, hot sauce, lemon, garlic, cox, vinegar, dry mustard, parsley, cilantro, horseradish, tomato paste, regular margarine, mayonnaise, unsalted butter, cream cheese, vegetable oil, cream, low-salt salad dressing and gravy.  Avoid: Regular ketchup, relishes, pickles, soy sauce, teriyaki sauce, Worcestershire sauce, BBQ sauce, tartar sauce, meat tenderizer, chili sauce, regular gravy, regular salad dressing, salted butter  Soups  Ok: Low-salt soups and broths made with allowed foods  Avoid: Bouillon cubes, soups with smoked or salted meats, regular soup and broth  Vegetables  Ok: Most vegetables are okay; also low-salt tomato and vegetable juices  Avoid: Sauerkraut and other brine-soaked vegetables; pickles and other pickled vegetables; tomato juice, olives  © 6644-3111 [employer]. [address] [place] [id number]. All rights reserved. This information is not intended as a substitute for professional medical care. Always follow your healthcare professional's instructions.      Thank you for choosing [facility].     Please fill out the patient experience survey.

## 2020-06-26 ENCOUNTER — NURSE TRIAGE (OUTPATIENT)
Dept: ADMINISTRATIVE | Facility: CLINIC | Age: 70
End: 2020-06-26

## 2020-06-26 NOTE — TELEPHONE ENCOUNTER
Caller states that pt has sore throat and ABd pain x 1 day, caller states that back of throat is redden. Caller states she would like to get information on COVid testing for her mom.  Pt advise per protocol and verbalized understanding.    Reason for Disposition   [1] COVID-19 infection suspected by caller or triager AND [2] mild symptoms (cough, fever, or others) AND [3] no complications or SOB    Additional Information   Negative: SEVERE difficulty breathing (e.g., struggling for each breath, speaks in single words)   Negative: Difficult to awaken or acting confused (e.g., disoriented, slurred speech)   Negative: Bluish (or gray) lips or face now   Negative: Shock suspected (e.g., cold/pale/clammy skin, too weak to stand, low BP, rapid pulse)   Negative: Sounds like a life-threatening emergency to the triager   Negative: SEVERE or constant chest pain or pressure (Exception: mild central chest pain, present only when coughing)   Negative: MODERATE difficulty breathing (e.g., speaks in phrases, SOB even at rest, pulse 100-120)   Negative: Patient sounds very sick or weak to the triager   Negative: MILD difficulty breathing (e.g., minimal/no SOB at rest, SOB with walking, pulse <100)   Negative: Chest pain or pressure   Negative: Fever > 103 F (39.4 C)   Negative: [1] Fever > 101 F (38.3 C) AND [2] age > 60   Negative: [1] Fever > 100.0 F (37.8 C) AND [2] bedridden (e.g., nursing home patient, CVA, chronic illness, recovering from surgery)   Negative: HIGH RISK patient (e.g., age > 64 years, diabetes, heart or lung disease, weak immune system)   Negative: Fever present > 3 days (72 hours)   Negative: [1] Fever returns after gone for over 24 hours AND [2] symptoms worse or not improved   Negative: [1] Continuous (nonstop) coughing interferes with work or school AND [2] no improvement using cough treatment per protocol    Protocols used: CORONAVIRUS (COVID-19) DIAGNOSED OR [id number]

## 2020-07-01 ENCOUNTER — OFFICE VISIT (OUTPATIENT)
Dept: FAMILY MEDICINE | Facility: CLINIC | Age: 70
End: 2020-07-01
Payer: MEDICARE

## 2020-07-01 VITALS
HEIGHT: 67 IN | HEART RATE: 72 BPM | WEIGHT: 170.63 LBS | OXYGEN SATURATION: 97 % | TEMPERATURE: 98 F | BODY MASS INDEX: 26.78 KG/M2 | RESPIRATION RATE: 16 BRPM | SYSTOLIC BLOOD PRESSURE: 90 MMHG | DIASTOLIC BLOOD PRESSURE: 70 MMHG

## 2020-07-01 DIAGNOSIS — H81.10 BENIGN PAROXYSMAL POSITIONAL VERTIGO, UNSPECIFIED LATERALITY: ICD-10-CM

## 2020-07-01 DIAGNOSIS — R11.2 NAUSEA AND VOMITING, INTRACTABILITY OF VOMITING NOT SPECIFIED, UNSPECIFIED VOMITING TYPE: ICD-10-CM

## 2020-07-01 DIAGNOSIS — B37.81 THRUSH OF MOUTH AND ESOPHAGUS: Primary | ICD-10-CM

## 2020-07-01 DIAGNOSIS — B37.0 THRUSH OF MOUTH AND ESOPHAGUS: Primary | ICD-10-CM

## 2020-07-01 PROCEDURE — 99214 PR OFFICE/OUTPT VISIT, EST, LEVL IV, 30-39 MIN: ICD-10-PCS | Mod: S$GLB,,, | Performed by: NURSE PRACTITIONER

## 2020-07-01 PROCEDURE — 99214 OFFICE O/P EST MOD 30 MIN: CPT | Mod: S$GLB,,, | Performed by: NURSE PRACTITIONER

## 2020-07-01 RX ORDER — ONDANSETRON 4 MG/1
4 TABLET, ORALLY DISINTEGRATING ORAL EVERY 8 HOURS PRN
Qty: 30 TABLET | Refills: 0 | Status: SHIPPED | OUTPATIENT
Start: 2020-07-01 | End: 2021-01-06 | Stop reason: SDUPTHER

## 2020-07-01 RX ORDER — NYSTATIN 100000 [USP'U]/ML
6 SUSPENSION ORAL 4 TIMES DAILY
Qty: 240 ML | Refills: 0 | Status: SHIPPED | OUTPATIENT
Start: 2020-07-01 | End: 2020-07-11

## 2020-07-01 NOTE — PROGRESS NOTES
Subjective:       Patient ID: Skyler Espinoza is a 69 y.o. female.    Chief Complaint: Otalgia, Emesis, Sore Throat (blisters,pain), Dizziness, Abdominal Pain, and Fatigue    Dizziness:   Chronicity:  Recurrent  Onset:  More than 1 month ago  Progression since onset:  Waxing and waning  Severity:  Moderate  Duration:  5 minutes  Dizziness characteristics:  Spinning inside head only and off-balance   Associated symptoms: ear pain, headaches, nausea, vomiting, weakness and palpitations.no hearing loss and no chest pain.  Aggravated by:  Position changes  Treatments tried:  Meclizine  Improvements on treatment:  Moderate  Sore Throat   This is a new problem. The current episode started in the past 7 days (started about a week ago). The problem has been gradually improving. There has been no fever. The pain is mild. Associated symptoms include diarrhea, ear pain, headaches and vomiting. Pertinent negatives include no coughing, hoarse voice, neck pain, swollen glands or trouble swallowing. Associated symptoms comments: anorexia.   Had diarrhea and then developed nausea and vomiting yesterday after going in hot attic. Still nauseated today.   Review of Systems   Constitutional: Positive for activity change. Negative for unexpected weight change.   HENT: Positive for ear pain, rhinorrhea and sore throat. Negative for hearing loss, hoarse voice and trouble swallowing.    Eyes: Negative for discharge and visual disturbance.   Respiratory: Negative for cough, chest tightness and wheezing.    Cardiovascular: Positive for palpitations. Negative for chest pain.   Gastrointestinal: Positive for diarrhea, nausea and vomiting. Negative for blood in stool and constipation.   Endocrine: Negative for polydipsia and polyuria.   Genitourinary: Negative for difficulty urinating, dysuria, hematuria and menstrual problem.   Musculoskeletal: Negative for arthralgias, joint swelling and neck pain.   Neurological: Positive for dizziness,  weakness and headaches.   Psychiatric/Behavioral: Positive for dysphoric mood. Negative for confusion.         Objective:      Physical Exam  Vitals signs and nursing note reviewed.   Constitutional:       General: She is not in acute distress.     Appearance: Normal appearance. She is well-developed. She is not diaphoretic.   HENT:      Head: Normocephalic and atraumatic.      Right Ear: Tympanic membrane, ear canal and external ear normal.      Left Ear: Tympanic membrane, ear canal and external ear normal.      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      Mouth/Throat:      Mouth: Mucous membranes are moist.   Eyes:      General: No scleral icterus.        Right eye: No discharge.         Left eye: No discharge.      Extraocular Movements: Extraocular movements intact.      Conjunctiva/sclera: Conjunctivae normal.      Pupils: Pupils are equal, round, and reactive to light.   Cardiovascular:      Rate and Rhythm: Normal rate and regular rhythm.      Heart sounds: Normal heart sounds. No murmur. No friction rub. No gallop.    Pulmonary:      Effort: Pulmonary effort is normal. No respiratory distress.      Breath sounds: Normal breath sounds. No stridor. No wheezing, rhonchi or rales.   Musculoskeletal:         General: No swelling.   Skin:     General: Skin is warm and dry.      Capillary Refill: Capillary refill takes less than 2 seconds.      Coloration: Skin is not jaundiced or pale.      Findings: No rash.   Neurological:      Mental Status: She is alert and oriented to person, place, and time.      Cranial Nerves: No cranial nerve deficit.      Motor: No weakness.      Coordination: Coordination normal.      Gait: Gait abnormal.   Psychiatric:         Mood and Affect: Mood normal.         Behavior: Behavior normal.         Assessment:       1. Thrush of mouth and esophagus    2. Nausea and vomiting, intractability of vomiting not specified,  unspecified vomiting type    3. Benign paroxysmal positional vertigo, unspecified laterality        Plan:       Thrush of mouth and esophagus  -     nystatin (MYCOSTATIN) 100,000 unit/mL suspension; Take 6 mLs (600,000 Units total) by mouth 4 (four) times daily. for 10 days  Dispense: 240 mL; Refill: 0    Nausea and vomiting, intractability of vomiting not specified, unspecified vomiting type  -     ondansetron (ZOFRAN-ODT) 4 MG TbDL; Take 1 tablet (4 mg total) by mouth every 8 (eight) hours as needed.  Dispense: 30 tablet; Refill: 0    Benign paroxysmal positional vertigo, unspecified laterality             Stop steroids, stop augmentin. If dizziness doesn't stop within a week, we can refer to ENT. Take meclizine before doing epley maneuver.   Discussed diet to calm stomach and intestines, no milk products until diarrhea free for 3 days.   Clear liquids for tonight, if stomach improves, brat diet tomorrow and continue back to regular diet as tolerated.

## 2020-07-01 NOTE — PATIENT INSTRUCTIONS
If you are asked to answer a survey from [facility], please do so and let them know how I did at your visit today.     Stop steroids, stop augmentin. If dizziness doesn't stop within a week, we can refer to ENT. Take meclizine before doing epley maneuver.     Clear liquids for tonight, if stomach improves, brat diet tomorrow and continue back to regular diet as tolerated.

## 2020-07-06 DIAGNOSIS — L27.0 ALLERGIC DRUG RASH: Primary | ICD-10-CM

## 2020-07-06 RX ORDER — CLOBETASOL PROPIONATE 0.5 MG/G
CREAM TOPICAL 2 TIMES DAILY
Qty: 60 G | Refills: 0 | Status: SHIPPED | OUTPATIENT
Start: 2020-07-06 | End: 2021-01-06

## 2020-07-08 ENCOUNTER — TELEPHONE (OUTPATIENT)
Dept: FAMILY MEDICINE | Facility: CLINIC | Age: 70
End: 2020-07-08

## 2020-07-08 NOTE — TELEPHONE ENCOUNTER
Spoke with patient and her daughter, Allison. The clobetasol cream is $36 at [facility] with a Good Rx coupon.

## 2020-10-01 ENCOUNTER — PATIENT MESSAGE (OUTPATIENT)
Dept: OTHER | Facility: OTHER | Age: 70
End: 2020-10-01

## 2020-10-27 ENCOUNTER — PATIENT OUTREACH (OUTPATIENT)
Dept: ADMINISTRATIVE | Facility: OTHER | Age: 70
End: 2020-10-27

## 2020-10-27 NOTE — PROGRESS NOTES
Chart was reviewed for overdue Proactive [facility] Encounters ([facility])  topics  Updates were requested from care everywhere  Health Maintenance has been updated  [place] immunization registry triggered

## 2020-10-28 DIAGNOSIS — M25.562 PAIN IN BOTH KNEES, UNSPECIFIED CHRONICITY: Primary | ICD-10-CM

## 2020-10-28 DIAGNOSIS — M25.561 PAIN IN BOTH KNEES, UNSPECIFIED CHRONICITY: Primary | ICD-10-CM

## 2020-11-08 ENCOUNTER — PATIENT MESSAGE (OUTPATIENT)
Dept: FAMILY MEDICINE | Facility: CLINIC | Age: 70
End: 2020-11-08

## 2020-11-17 ENCOUNTER — OFFICE VISIT (OUTPATIENT)
Dept: DERMATOLOGY | Facility: CLINIC | Age: 70
End: 2020-11-17
Payer: MEDICARE

## 2020-11-17 ENCOUNTER — TELEPHONE (OUTPATIENT)
Dept: DERMATOLOGY | Facility: CLINIC | Age: 70
End: 2020-11-17

## 2020-11-17 DIAGNOSIS — L73.8 SEBACEOUS HYPERPLASIA: ICD-10-CM

## 2020-11-17 DIAGNOSIS — L82.1 SEBORRHEIC KERATOSIS: ICD-10-CM

## 2020-11-17 DIAGNOSIS — D23.9 BLUE NEVUS: ICD-10-CM

## 2020-11-17 DIAGNOSIS — D22.9 MULTIPLE BENIGN NEVI: ICD-10-CM

## 2020-11-17 DIAGNOSIS — D48.5 NEOPLASM OF UNCERTAIN BEHAVIOR OF SKIN: Primary | ICD-10-CM

## 2020-11-17 DIAGNOSIS — Z12.83 SKIN CANCER SCREENING: ICD-10-CM

## 2020-11-17 DIAGNOSIS — D18.01 CHERRY ANGIOMA: ICD-10-CM

## 2020-11-17 PROCEDURE — 99202 OFFICE O/P NEW SF 15 MIN: CPT | Mod: 25,S$PBB,, | Performed by: STUDENT IN AN ORGANIZED HEALTH CARE EDUCATION/TRAINING PROGRAM

## 2020-11-17 PROCEDURE — 88305 TISSUE EXAM BY PATHOLOGIST: CPT | Mod: 59 | Performed by: PATHOLOGY

## 2020-11-17 PROCEDURE — 11103 TANGNTL BX SKIN EA SEP/ADDL: CPT | Mod: PBBFAC,PO | Performed by: STUDENT IN AN ORGANIZED HEALTH CARE EDUCATION/TRAINING PROGRAM

## 2020-11-17 PROCEDURE — 99999 PR PBB SHADOW E&M-EST. PATIENT-LVL V: ICD-10-PCS | Mod: PBBFAC,,, | Performed by: STUDENT IN AN ORGANIZED HEALTH CARE EDUCATION/TRAINING PROGRAM

## 2020-11-17 PROCEDURE — 99999 PR PBB SHADOW E&M-EST. PATIENT-LVL V: CPT | Mod: PBBFAC,,, | Performed by: STUDENT IN AN ORGANIZED HEALTH CARE EDUCATION/TRAINING PROGRAM

## 2020-11-17 PROCEDURE — 99202 PR OFFICE/OUTPT VISIT, NEW, LEVL II, 15-29 MIN: ICD-10-PCS | Mod: 25,S$PBB,, | Performed by: STUDENT IN AN ORGANIZED HEALTH CARE EDUCATION/TRAINING PROGRAM

## 2020-11-17 PROCEDURE — 88342 IMHCHEM/IMCYTCHM 1ST ANTB: CPT | Performed by: PATHOLOGY

## 2020-11-17 PROCEDURE — 88305 TISSUE EXAM BY PATHOLOGIST: ICD-10-PCS | Mod: 26,,, | Performed by: PATHOLOGY

## 2020-11-17 PROCEDURE — 11102 TANGNTL BX SKIN SINGLE LES: CPT | Mod: PBBFAC,PO | Performed by: STUDENT IN AN ORGANIZED HEALTH CARE EDUCATION/TRAINING PROGRAM

## 2020-11-17 PROCEDURE — 11103 PR TANGENTIAL BIOPSY, SKIN, EA ADDTL LESION: ICD-10-PCS | Mod: S$PBB,,, | Performed by: STUDENT IN AN ORGANIZED HEALTH CARE EDUCATION/TRAINING PROGRAM

## 2020-11-17 PROCEDURE — 99215 OFFICE O/P EST HI 40 MIN: CPT | Mod: PBBFAC,PO | Performed by: STUDENT IN AN ORGANIZED HEALTH CARE EDUCATION/TRAINING PROGRAM

## 2020-11-17 PROCEDURE — 88305 TISSUE EXAM BY PATHOLOGIST: CPT | Mod: 26,,, | Performed by: PATHOLOGY

## 2020-11-17 PROCEDURE — 11102 TANGNTL BX SKIN SINGLE LES: CPT | Mod: S$PBB,,, | Performed by: STUDENT IN AN ORGANIZED HEALTH CARE EDUCATION/TRAINING PROGRAM

## 2020-11-17 PROCEDURE — 88342 IMHCHEM/IMCYTCHM 1ST ANTB: CPT | Mod: 26,,, | Performed by: PATHOLOGY

## 2020-11-17 PROCEDURE — 11102 PR TANGENTIAL BIOPSY, SKIN, SINGLE LESION: ICD-10-PCS | Mod: S$PBB,,, | Performed by: STUDENT IN AN ORGANIZED HEALTH CARE EDUCATION/TRAINING PROGRAM

## 2020-11-17 PROCEDURE — 11103 TANGNTL BX SKIN EA SEP/ADDL: CPT | Mod: S$PBB,,, | Performed by: STUDENT IN AN ORGANIZED HEALTH CARE EDUCATION/TRAINING PROGRAM

## 2020-11-17 PROCEDURE — 88341 IMHCHEM/IMCYTCHM EA ADD ANTB: CPT | Performed by: PATHOLOGY

## 2020-11-17 PROCEDURE — 88342 CHG IMMUNOCYTOCHEMISTRY: ICD-10-PCS | Mod: 26,,, | Performed by: PATHOLOGY

## 2020-11-17 PROCEDURE — 88341 IMHCHEM/IMCYTCHM EA ADD ANTB: CPT | Mod: 26,,, | Performed by: PATHOLOGY

## 2020-11-17 PROCEDURE — 88341 PR IHC OR ICC EACH ADD'L SINGLE ANTIBODY  STAINPR: ICD-10-PCS | Mod: 26,,, | Performed by: PATHOLOGY

## 2020-11-17 NOTE — PROGRESS NOTES
Subjective:       Patient ID:  Skyler Espinoza is a 70 y.o. female who presents for   Chief Complaint   Patient presents with    Lesion     L chest      Patient presents with c/o a lesion on her left breast/chest-  -x 1 month  -no discomfort/symptoms  -OTC products   -Bump is raised     Patient complains of lesion(s)  Location: upper lip  Duration: days-weeks  Symptoms: asymptomatic, raised, red  Relieving factors/Previous treatments: none    Denies NMSC     Lesion        Review of Systems   Constitutional: Negative for fever and chills.   Respiratory: Negative for cough and shortness of breath.    Skin: Positive for activity-related sunscreen use. Negative for daily sunscreen use and wears hat.        Objective:    Physical Exam   Constitutional: She appears well-developed and well-nourished. No distress.   Neurological: She is alert and oriented to person, place, and time. She is not disoriented.   Psychiatric: She has a normal mood and affect.   Skin:   Areas Examined (abnormalities noted in diagram):   Scalp / Hair Palpated and Inspected  Head / Face Inspection Performed  Neck Inspection Performed  Chest / Axilla Inspection Performed  Abdomen Inspection Performed  Back Inspection Performed  RUE Inspected  LUE Inspection Performed  Nails and Digits Inspection Performed                   Diagram Legend     Erythematous scaling macule/papule c/w actinic keratosis       Vascular papule c/w angioma      Pigmented verrucoid papule/plaque c/w seborrheic keratosis      Yellow umbilicated papule c/w sebaceous hyperplasia      Irregularly shaped tan macule c/w lentigo     1-2 mm smooth white papules consistent with Milia      Movable subcutaneous cyst with punctum c/w epidermal inclusion cyst      Subcutaneous movable cyst c/w pilar cyst      Firm pink to brown papule c/w dermatofibroma      Pedunculated fleshy papule(s) c/w skin tag(s)      Evenly pigmented macule c/w junctional nevus     Mildly variegated pigmented,  slightly irregular-bordered macule c/w mildly atypical nevus      Flesh colored to evenly pigmented papule c/w intradermal nevus       Pink pearly papule/plaque c/w basal cell carcinoma      Erythematous hyperkeratotic cursted plaque c/w SCC      Surgical scar with no sign of skin cancer recurrence      Open and closed comedones      Inflammatory papules and pustules      Verrucoid papule consistent consistent with wart     Erythematous eczematous patches and plaques     Dystrophic onycholytic nail with subungual debris c/w onychomycosis     Umbilicated papule    Erythematous-base heme-crusted tan verrucoid plaque consistent with inflamed seborrheic keratosis     Erythematous Silvery Scaling Plaque c/w Psoriasis     See annotation              Assessment / Plan:      Pathology Orders:     Normal Orders This Visit    Specimen to Pathology, Dermatology     Comments:    Number of Specimens:->2  ------------------------->-------------------------  Spec 1 Procedure:->Biopsy  Spec 1 Clinical Impression:->hyperkeratotic papule dx: ISK  vs. SCC vs other  Spec 1 Source:->left chest  ------------------------->-------------------------  Spec 2 Procedure:->Biopsy  Spec 2 Clinical Impression:->ISK vs HAK vs. SCC vs. BCC  Spec 2 Source:->upper cutaneous lip    Questions:    Procedure Type: Dermatology and skin neoplasms    Number of Specimens: 2    ------------------------: -------------------------    Spec 1 Procedure: Biopsy    Spec 1 Clinical Impression: hyperkeratotic papule dx: ISK vs. SCC vs other    Spec 1 Source: left chest    ------------------------: -------------------------    Spec 2 Procedure: Biopsy    Spec 2 Clinical Impression: ISK vs HAK vs. SCC vs. BCC    Spec 2 Source: upper cutaneous lip        Neoplasm of uncertain behavior of skin x2  -     Specimen to Pathology, Dermatology    Multiple benign nevi  Blue nevus  -upper body skin examination performed today including at least  points as noted in physical  examination. No lesions suspicious for malignancy noted.  Reassurance provided.  Instructed patient to observe lesion(s) for changes and follow up in clinic if changes are noted. Discussed ABCDE's of moles.    Seborrheic keratosis  -These are benign inherited growths without a malignant potential. Reassurance given to patient. No treatment is necessary.     Cherry angioma  -This is a benign vascular lesion. Reassurance given. No treatment required.     Skin cancer screening  Upper body skin examination performed today including at least 6 points as noted in physical examination. Suspicious lesions noted.    Sebaceous hyperplasia  -This is a common condition representing benign enlargement of the sebaceous lobule. It typically occurs in adulthood. Reassurance given to patient.     Reviewed allergies with patient, she does not recall when/what her reaction to lidocaine was. She has had lidocaine more recently with no side effects. Patient, her daugher Allison, and I reviewed risks of proceeding with lidocaine injection. Allowed patient to sit for 15 minutes after injection, no SOB, swelling, hives. Tolerated injection without difficulty. I do not think she has a true lidocaine allergy at this time.            Follow up in about 6 months (around 5/17/2021).

## 2020-11-23 ENCOUNTER — TELEPHONE (OUTPATIENT)
Dept: DERMATOLOGY | Facility: CLINIC | Age: 70
End: 2020-11-23

## 2020-11-23 LAB
FINAL PATHOLOGIC DIAGNOSIS: NORMAL
GROSS: NORMAL

## 2020-11-23 NOTE — PROGRESS NOTES
Final Pathologic Diagnosis 1.  Skin, left chest, shave biopsy:   - NEUROFIBROMA, TRAUMATIZED AND INFLAMED.   MICROSCOPIC DESCRIPTION: Sections show a proliferation of bland spindle cells   with wavy nuclei in a pale staining matrix involving dermis.  The lesion is   focally traumatized with associated hyperkeratosis, epidermal hyperplasia,   and vertically-oriented papillary dermal fibrosis in association with focally   brisk chronic inflammatory infiltrate.  The dermal spindle cell proliferation   stains weakly positive for S100 immunohistochemical stain.  Mart 1   immunohistochemical stain fails to reveal a melanocytic proliferation.   Appropriately reactive controls were reviewed.    **Please notify patient that this is a benign/non-cancerous lesion called a neurofibroma (this is a collection of nerve cells). No further intervention is warranted.     2.  Skin, upper cutaneous lip, shave biopsy:   - INFLAMED SEBORRHEIC KERATOSIS.   MICROSCOPIC DESCRIPTION: Sections show an abrupt proliferation of benign   basaloid keratinocytes associated with horn pseudocyst formation. A   lymphocytic infiltrate is noted.   Comment: Interp By Ilya Castillo M.D., Signed on 11/23/2020 at 11:48    **Please notify patient that this is a benign lesion and no further intervention is warranted.

## 2020-11-24 ENCOUNTER — TELEPHONE (OUTPATIENT)
Dept: DERMATOLOGY | Facility: CLINIC | Age: 70
End: 2020-11-24

## 2020-12-09 ENCOUNTER — LAB VISIT (OUTPATIENT)
Dept: LAB | Facility: HOSPITAL | Age: 70
End: 2020-12-09
Attending: INTERNAL MEDICINE
Payer: MEDICARE

## 2020-12-09 DIAGNOSIS — E78.5 HYPERLIPIDEMIA, UNSPECIFIED HYPERLIPIDEMIA TYPE: ICD-10-CM

## 2020-12-09 LAB
CHOLEST SERPL-MCNC: 233 MG/DL (ref 120–199)
CHOLEST/HDLC SERPL: 2.6 {RATIO} (ref 2–5)
HDLC SERPL-MCNC: 89 MG/DL (ref 40–75)
HDLC SERPL: 38.2 % (ref 20–50)
LDLC SERPL CALC-MCNC: 133 MG/DL (ref 63–159)
NONHDLC SERPL-MCNC: 144 MG/DL
TRIGL SERPL-MCNC: 55 MG/DL (ref 30–150)

## 2020-12-09 PROCEDURE — 80061 LIPID PANEL: CPT

## 2020-12-09 PROCEDURE — 36415 COLL VENOUS BLD VENIPUNCTURE: CPT

## 2020-12-11 ENCOUNTER — PATIENT MESSAGE (OUTPATIENT)
Dept: OTHER | Facility: OTHER | Age: 70
End: 2020-12-11

## 2021-01-06 ENCOUNTER — OFFICE VISIT (OUTPATIENT)
Dept: FAMILY MEDICINE | Facility: CLINIC | Age: 71
End: 2021-01-06
Payer: MEDICARE

## 2021-01-06 VITALS
OXYGEN SATURATION: 96 % | TEMPERATURE: 97 F | RESPIRATION RATE: 12 BRPM | SYSTOLIC BLOOD PRESSURE: 120 MMHG | BODY MASS INDEX: 29.97 KG/M2 | HEART RATE: 69 BPM | DIASTOLIC BLOOD PRESSURE: 80 MMHG | WEIGHT: 191.38 LBS

## 2021-01-06 DIAGNOSIS — R11.2 NAUSEA AND VOMITING, INTRACTABILITY OF VOMITING NOT SPECIFIED, UNSPECIFIED VOMITING TYPE: ICD-10-CM

## 2021-01-06 DIAGNOSIS — Z12.31 BREAST CANCER SCREENING BY MAMMOGRAM: ICD-10-CM
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
## 2021-01-06 DIAGNOSIS — H70.92 MASTOIDITIS OF LEFT SIDE: Primary | ICD-10-CM

## 2021-01-06 DIAGNOSIS — Z78.0 POST-MENOPAUSAL: ICD-10-CM

## 2021-01-06 PROCEDURE — 99214 PR OFFICE/OUTPT VISIT, EST, LEVL IV, 30-39 MIN: ICD-10-PCS | Mod: S$GLB,,, | Performed by: NURSE PRACTITIONER

## 2021-01-06 PROCEDURE — 99214 OFFICE O/P EST MOD 30 MIN: CPT | Mod: S$GLB,,, | Performed by: NURSE PRACTITIONER

## 2021-01-06 RX ORDER — AMOXICILLIN AND CLAVULANATE POTASSIUM 875; 125 MG/1; MG/1
1 TABLET, FILM COATED ORAL 2 TIMES DAILY
Qty: 20 TABLET | Refills: 0 | Status: SHIPPED | OUTPATIENT
Start: 2021-01-06 | End: 2021-01-16

## 2021-01-06 RX ORDER — ONDANSETRON 4 MG/1
4 TABLET, ORALLY DISINTEGRATING ORAL EVERY 8 HOURS PRN
Qty: 30 TABLET | Refills: 0 | Status: SHIPPED | OUTPATIENT
Start: 2021-01-06

## 2021-01-19 ENCOUNTER — OFFICE VISIT (OUTPATIENT)
Dept: DERMATOLOGY | Facility: CLINIC | Age: 71
End: 2021-01-19
Payer: MEDICARE

## 2021-01-19 DIAGNOSIS — L82.0 SEBORRHEIC KERATOSES, INFLAMED: Primary | ICD-10-CM

## 2021-01-19 DIAGNOSIS — L82.1 SEBORRHEIC KERATOSES: ICD-10-CM

## 2021-01-19 DIAGNOSIS — L90.5 SCAR: ICD-10-CM

## 2021-01-19 PROCEDURE — 17110 PR DESTRUCTION BENIGN LESIONS UP TO 14: ICD-10-PCS | Mod: S$PBB,,, | Performed by: STUDENT IN AN ORGANIZED HEALTH CARE EDUCATION/TRAINING PROGRAM

## 2021-01-19 PROCEDURE — 17110 DESTRUCTION B9 LES UP TO 14: CPT | Mod: PBBFAC,PO | Performed by: STUDENT IN AN ORGANIZED HEALTH CARE EDUCATION/TRAINING PROGRAM

## 2021-01-19 PROCEDURE — 99213 PR OFFICE/OUTPT VISIT, EST, LEVL III, 20-29 MIN: ICD-10-PCS | Mod: 25,S$PBB,, | Performed by: STUDENT IN AN ORGANIZED HEALTH CARE EDUCATION/TRAINING PROGRAM

## 2021-01-19 PROCEDURE — 17110 DESTRUCTION B9 LES UP TO 14: CPT | Mod: S$PBB,,, | Performed by: STUDENT IN AN ORGANIZED HEALTH CARE EDUCATION/TRAINING PROGRAM

## 2021-01-19 PROCEDURE — 99999 PR PBB SHADOW E&M-EST. PATIENT-LVL IV: ICD-10-PCS | Mod: PBBFAC,,, | Performed by: STUDENT IN AN ORGANIZED HEALTH CARE EDUCATION/TRAINING PROGRAM

## 2021-01-19 PROCEDURE — 99999 PR PBB SHADOW E&M-EST. PATIENT-LVL IV: CPT | Mod: PBBFAC,,, | Performed by: STUDENT IN AN ORGANIZED HEALTH CARE EDUCATION/TRAINING PROGRAM

## 2021-01-19 PROCEDURE — 99213 OFFICE O/P EST LOW 20 MIN: CPT | Mod: 25,S$PBB,, | Performed by: STUDENT IN AN ORGANIZED HEALTH CARE EDUCATION/TRAINING PROGRAM

## 2021-01-19 PROCEDURE — 99214 OFFICE O/P EST MOD 30 MIN: CPT | Mod: PBBFAC,PO,25 | Performed by: STUDENT IN AN ORGANIZED HEALTH CARE EDUCATION/TRAINING PROGRAM

## 2021-02-02 ENCOUNTER — HOSPITAL ENCOUNTER (OUTPATIENT)
Dept: RADIOLOGY | Facility: HOSPITAL | Age: 71
Discharge: HOME | End: 2021-02-02
Attending: STUDENT IN AN ORGANIZED HEALTH CARE EDUCATION/TRAINING PROGRAM
Payer: MEDICARE

## 2021-02-02 ENCOUNTER — OFFICE VISIT (OUTPATIENT)
Dept: RHEUMATOLOGY | Facility: CLINIC | Age: 71
End: 2021-02-02
Payer: MEDICARE

## 2021-02-02 VITALS
DIASTOLIC BLOOD PRESSURE: 75 MMHG | HEIGHT: 67 IN | WEIGHT: 193 LBS | SYSTOLIC BLOOD PRESSURE: 122 MMHG | HEART RATE: 82 BPM | BODY MASS INDEX: 30.29 KG/M2

## 2021-02-02 DIAGNOSIS — E03.8 HYPOTHYROIDISM DUE TO HASHIMOTO'S THYROIDITIS: ICD-10-CM

## 2021-02-02 DIAGNOSIS — M65.30 TRIGGER FINGER, UNSPECIFIED FINGER, UNSPECIFIED LATERALITY: ICD-10-CM

## 2021-02-02 DIAGNOSIS — M25.50 ARTHRALGIA, UNSPECIFIED JOINT: ICD-10-CM

## 2021-02-02 DIAGNOSIS — M79.642 BILATERAL HAND PAIN: ICD-10-CM

## 2021-02-02 DIAGNOSIS — I10 HYPERTENSION, UNSPECIFIED TYPE: ICD-10-CM

## 2021-02-02 DIAGNOSIS — M79.641 BILATERAL HAND PAIN: ICD-10-CM

## 2021-02-02 DIAGNOSIS — E06.3 HYPOTHYROIDISM DUE TO HASHIMOTO'S THYROIDITIS: ICD-10-CM

## 2021-02-02 DIAGNOSIS — M25.50 ARTHRALGIA, UNSPECIFIED JOINT: Primary | ICD-10-CM

## 2021-02-02 PROCEDURE — 73130 X-RAY EXAM OF HAND: CPT | Mod: 26,50,, | Performed by: RADIOLOGY

## 2021-02-02 PROCEDURE — 73130 X-RAY EXAM OF HAND: CPT | Mod: TC,50,FY

## 2021-02-02 PROCEDURE — 99205 PR OFFICE/OUTPT VISIT, NEW, LEVL V, 60-74 MIN: ICD-10-PCS | Mod: S$PBB,,, | Performed by: STUDENT IN AN ORGANIZED HEALTH CARE EDUCATION/TRAINING PROGRAM

## 2021-02-02 PROCEDURE — 73130 XR HAND COMPLETE 3 VIEWS BILATERAL: ICD-10-PCS | Mod: 26,50,, | Performed by: RADIOLOGY

## 2021-02-02 PROCEDURE — 99999 PR PBB SHADOW E&M-EST. PATIENT-LVL V: CPT | Mod: PBBFAC,,, | Performed by: STUDENT IN AN ORGANIZED HEALTH CARE EDUCATION/TRAINING PROGRAM

## 2021-02-02 PROCEDURE — 99215 OFFICE O/P EST HI 40 MIN: CPT | Mod: PBBFAC,PO,25 | Performed by: STUDENT IN AN ORGANIZED HEALTH CARE EDUCATION/TRAINING PROGRAM

## 2021-02-02 PROCEDURE — 99205 OFFICE O/P NEW HI 60 MIN: CPT | Mod: S$PBB,,, | Performed by: STUDENT IN AN ORGANIZED HEALTH CARE EDUCATION/TRAINING PROGRAM

## 2021-02-02 PROCEDURE — 99999 PR PBB SHADOW E&M-EST. PATIENT-LVL V: ICD-10-PCS | Mod: PBBFAC,,, | Performed by: STUDENT IN AN ORGANIZED HEALTH CARE EDUCATION/TRAINING PROGRAM

## 2021-02-02 ASSESSMENT — ROUTINE ASSESSMENT OF PATIENT INDEX DATA (RAPID3)
PATIENT GLOBAL ASSESSMENT SCORE: 2
MDHAQ FUNCTION SCORE: 0.1
PSYCHOLOGICAL DISTRESS SCORE: 2.2
PAIN SCORE: 5.5
AM STIFFNESS SCORE: 1, YES
FATIGUE SCORE: 2
TOTAL RAPID3 SCORE: 2.61

## 2021-02-12 ENCOUNTER — TELEPHONE (OUTPATIENT)
Dept: FAMILY MEDICINE | Facility: CLINIC | Age: 71
End: 2021-02-12

## 2021-02-12 ENCOUNTER — HOSPITAL ENCOUNTER (OUTPATIENT)
Dept: RADIOLOGY | Facility: CLINIC | Age: 71
Discharge: HOME | End: 2021-02-12
Attending: NURSE PRACTITIONER
Payer: MEDICARE

## 2021-02-12 ENCOUNTER — PATIENT MESSAGE (OUTPATIENT)
Dept: FAMILY MEDICINE | Facility: CLINIC | Age: 71
End: 2021-02-12

## 2021-02-12 DIAGNOSIS — Z12.31 BREAST CANCER SCREENING BY MAMMOGRAM: ICD-10-CM

## 2021-02-12 DIAGNOSIS — F41.9 ANXIETY: ICD-10-CM

## 2021-02-12 PROCEDURE — 77067 MAMMO DIGITAL SCREENING BILAT WITH TOMO: ICD-10-PCS | Mod: 26,,, | Performed by: RADIOLOGY

## 2021-02-12 PROCEDURE — 77067 SCR MAMMO BI INCL CAD: CPT | Mod: 26,,, | Performed by: RADIOLOGY

## 2021-02-12 PROCEDURE — 77063 MAMMO DIGITAL SCREENING BILAT WITH TOMO: ICD-10-PCS | Mod: 26,,, | Performed by: RADIOLOGY

## 2021-02-12 PROCEDURE — 77063 BREAST TOMOSYNTHESIS BI: CPT | Mod: 26,,, | Performed by: RADIOLOGY

## 2021-02-12 PROCEDURE — 77067 SCR MAMMO BI INCL CAD: CPT | Mod: TC,PO

## 2021-02-12 RX ORDER — BUSPIRONE HYDROCHLORIDE 15 MG/1
15 TABLET ORAL 2 TIMES DAILY
Qty: 60 TABLET | Refills: 11 | Status: SHIPPED | OUTPATIENT
Start: 2021-02-12 | End: 2021-02-12 | Stop reason: SDUPTHER

## 2021-02-12 RX ORDER — BUSPIRONE HYDROCHLORIDE 15 MG/1
15 TABLET ORAL 2 TIMES DAILY
Qty: 180 TABLET | Refills: 3 | Status: SHIPPED | OUTPATIENT
Start: 2021-02-12 | End: 2021-11-01

## 2021-03-03 DIAGNOSIS — M25.561 PAIN IN BOTH KNEES, UNSPECIFIED CHRONICITY: Primary | ICD-10-CM

## 2021-03-03 DIAGNOSIS — M25.562 PAIN IN BOTH KNEES, UNSPECIFIED CHRONICITY: Primary | ICD-10-CM

## 2021-03-04 ENCOUNTER — HOSPITAL ENCOUNTER (OUTPATIENT)
Dept: RADIOLOGY | Facility: HOSPITAL | Age: 71
Discharge: HOME | End: 2021-03-04
Attending: ORTHOPAEDIC SURGERY
Payer: MEDICARE

## 2021-03-04 ENCOUNTER — OFFICE VISIT (OUTPATIENT)
Dept: ORTHOPEDICS | Facility: CLINIC | Age: 71
End: 2021-03-04
Payer: MEDICARE

## 2021-03-04 VITALS — RESPIRATION RATE: 16 BRPM | HEIGHT: 67 IN | WEIGHT: 193 LBS | BODY MASS INDEX: 30.29 KG/M2

## 2021-03-04 DIAGNOSIS — M17.11 PRIMARY OSTEOARTHRITIS OF RIGHT KNEE: Primary | ICD-10-CM

## 2021-03-04 DIAGNOSIS — M25.561 PAIN IN BOTH KNEES, UNSPECIFIED CHRONICITY: ICD-10-CM

## 2021-03-04 DIAGNOSIS — M17.12 PRIMARY OSTEOARTHRITIS OF LEFT KNEE: ICD-10-CM

## 2021-03-04 DIAGNOSIS — M25.562 PAIN IN BOTH KNEES, UNSPECIFIED CHRONICITY: ICD-10-CM

## 2021-03-04 PROCEDURE — 99214 OFFICE O/P EST MOD 30 MIN: CPT | Mod: PBBFAC,25,PN | Performed by: ORTHOPAEDIC SURGERY

## 2021-03-04 PROCEDURE — 99999 PR PBB SHADOW E&M-EST. PATIENT-LVL IV: ICD-10-PCS | Mod: PBBFAC,,, | Performed by: ORTHOPAEDIC SURGERY

## 2021-03-04 PROCEDURE — 99213 OFFICE O/P EST LOW 20 MIN: CPT | Mod: 25,S$PBB,, | Performed by: ORTHOPAEDIC SURGERY

## 2021-03-04 PROCEDURE — 99213 PR OFFICE/OUTPT VISIT, EST, LEVL III, 20-29 MIN: ICD-10-PCS | Mod: 25,S$PBB,, | Performed by: ORTHOPAEDIC SURGERY

## 2021-03-04 PROCEDURE — 20610 DRAIN/INJ JOINT/BURSA W/O US: CPT | Mod: 50,PBBFAC,PN | Performed by: ORTHOPAEDIC SURGERY

## 2021-03-04 PROCEDURE — 73564 X-RAY EXAM KNEE 4 OR MORE: CPT | Mod: 26,50,, | Performed by: RADIOLOGY

## 2021-03-04 PROCEDURE — 73564 X-RAY EXAM KNEE 4 OR MORE: CPT | Mod: TC,50,PN

## 2021-03-04 PROCEDURE — 99999 PR PBB SHADOW E&M-EST. PATIENT-LVL IV: CPT | Mod: PBBFAC,,, | Performed by: ORTHOPAEDIC SURGERY

## 2021-03-04 PROCEDURE — 20610 LARGE JOINT ASPIRATION/INJECTION: BILATERAL KNEE: ICD-10-PCS | Mod: 50,S$PBB,, | Performed by: ORTHOPAEDIC SURGERY

## 2021-03-04 PROCEDURE — 73564 XR KNEE ORTHO BILAT WITH FLEXION: ICD-10-PCS | Mod: 26,50,, | Performed by: RADIOLOGY

## 2021-03-04 RX ORDER — TRIAMCINOLONE ACETONIDE 40 MG/ML
40 INJECTION, SUSPENSION INTRA-ARTICULAR; INTRAMUSCULAR
Status: DISCONTINUED | OUTPATIENT
Start: 2021-03-04 | End: 2021-03-04 | Stop reason: HOSPADM

## 2021-03-04 RX ADMIN — TRIAMCINOLONE ACETONIDE 40 MG: 40 INJECTION, SUSPENSION INTRA-ARTICULAR; INTRAMUSCULAR at 03:03

## 2021-03-05 DIAGNOSIS — K21.9 GASTROESOPHAGEAL REFLUX DISEASE: ICD-10-CM

## 2021-03-05 RX ORDER — PANTOPRAZOLE SODIUM 40 MG/1
40 TABLET, DELAYED RELEASE ORAL DAILY
Qty: 30 TABLET | Refills: 11 | Status: SHIPPED | OUTPATIENT
Start: 2021-03-05

## 2021-04-13 ENCOUNTER — HOSPITAL ENCOUNTER (OUTPATIENT)
Dept: RADIOLOGY | Facility: CLINIC | Age: 71
Discharge: HOME | End: 2021-04-13
Attending: NURSE PRACTITIONER
Payer: MEDICARE

## 2021-04-13 DIAGNOSIS — Z78.0 POST-MENOPAUSAL: ICD-10-CM

## 2021-04-13 PROCEDURE — 77080 DXA BONE DENSITY AXIAL: CPT | Mod: TC,PO

## 2021-04-13 PROCEDURE — 77080 DEXA BONE DENSITY SPINE HIP: ICD-10-PCS | Mod: 26,,, | Performed by: RADIOLOGY

## 2021-04-13 PROCEDURE — 77080 DXA BONE DENSITY AXIAL: CPT | Mod: 26,,, | Performed by: RADIOLOGY

## 2021-07-21 ENCOUNTER — OFFICE VISIT (OUTPATIENT)
Dept: FAMILY MEDICINE | Facility: CLINIC | Age: 71
End: 2021-07-21
Payer: MEDICARE

## 2021-07-21 VITALS
RESPIRATION RATE: 16 BRPM | OXYGEN SATURATION: 97 % | SYSTOLIC BLOOD PRESSURE: 128 MMHG | HEIGHT: 67 IN | TEMPERATURE: 98 F | DIASTOLIC BLOOD PRESSURE: 70 MMHG | HEART RATE: 80 BPM | BODY MASS INDEX: 31.38 KG/M2 | WEIGHT: 199.94 LBS

## 2021-07-21 DIAGNOSIS — J01.00 ACUTE NON-RECURRENT MAXILLARY SINUSITIS: Primary | ICD-10-CM

## 2021-07-21 DIAGNOSIS — G43.909 MIGRAINE WITHOUT STATUS MIGRAINOSUS, NOT INTRACTABLE, UNSPECIFIED MIGRAINE TYPE: ICD-10-CM

## 2021-07-21 PROCEDURE — 99214 OFFICE O/P EST MOD 30 MIN: CPT | Mod: S$GLB,,, | Performed by: INTERNAL MEDICINE

## 2021-07-21 PROCEDURE — 99214 PR OFFICE/OUTPT VISIT, EST, LEVL IV, 30-39 MIN: ICD-10-PCS | Mod: S$GLB,,, | Performed by: INTERNAL MEDICINE

## 2021-07-21 RX ORDER — SUMATRIPTAN SUCCINATE 100 MG/1
TABLET ORAL
Qty: 10 TABLET | Refills: 11 | Status: SHIPPED | OUTPATIENT
Start: 2021-07-21 | End: 2024-01-19

## 2021-07-21 RX ORDER — AMOXICILLIN AND CLAVULANATE POTASSIUM 875; 125 MG/1; MG/1
1 TABLET, FILM COATED ORAL 2 TIMES DAILY
Qty: 10 TABLET | Refills: 0 | Status: SHIPPED | OUTPATIENT
Start: 2021-07-21 | End: 2021-08-11

## 2021-07-21 RX ORDER — BUTALBITAL, ACETAMINOPHEN AND CAFFEINE 300; 40; 50 MG/1; MG/1; MG/1
1 CAPSULE ORAL
COMMUNITY
Start: 2021-07-16 | End: 2021-08-11

## 2021-07-21 RX ORDER — PREDNISONE 20 MG/1
40 TABLET ORAL DAILY
Qty: 4 TABLET | Refills: 0 | Status: SHIPPED | OUTPATIENT
Start: 2021-07-21 | End: 2021-07-23

## 2021-07-23 ENCOUNTER — PATIENT MESSAGE (OUTPATIENT)
Dept: FAMILY MEDICINE | Facility: CLINIC | Age: 71
End: 2021-07-23

## 2021-07-27 ENCOUNTER — PATIENT MESSAGE (OUTPATIENT)
Dept: OTOLARYNGOLOGY | Facility: CLINIC | Age: 71
End: 2021-07-27

## 2021-07-27 ENCOUNTER — OFFICE VISIT (OUTPATIENT)
Dept: OTOLARYNGOLOGY | Facility: CLINIC | Age: 71
End: 2021-07-27
Payer: MEDICARE

## 2021-07-27 ENCOUNTER — TELEPHONE (OUTPATIENT)
Dept: FAMILY MEDICINE | Facility: CLINIC | Age: 71
End: 2021-07-27

## 2021-07-27 DIAGNOSIS — J32.9 CHRONIC SINUSITIS, UNSPECIFIED LOCATION: ICD-10-CM

## 2021-07-27 DIAGNOSIS — J34.89 SINUS PRESSURE: Primary | ICD-10-CM

## 2021-07-27 DIAGNOSIS — R09.82 PND (POST-NASAL DRIP): ICD-10-CM

## 2021-07-27 PROCEDURE — 99213 OFFICE O/P EST LOW 20 MIN: CPT | Mod: 95,,, | Performed by: PHYSICIAN ASSISTANT

## 2021-07-27 PROCEDURE — 99213 PR OFFICE/OUTPT VISIT, EST, LEVL III, 20-29 MIN: ICD-10-PCS | Mod: 95,,, | Performed by: PHYSICIAN ASSISTANT

## 2021-07-27 RX ORDER — PREDNISONE 20 MG/1
TABLET ORAL
Qty: 21 TABLET | Refills: 0 | Status: SHIPPED | OUTPATIENT
Start: 2021-07-27 | End: 2021-08-11

## 2021-07-27 RX ORDER — LEVOFLOXACIN 500 MG/1
500 TABLET, FILM COATED ORAL DAILY
Qty: 14 TABLET | Refills: 0 | Status: SHIPPED | OUTPATIENT
Start: 2021-07-27 | End: 2021-08-11

## 2021-08-10 ENCOUNTER — PATIENT OUTREACH (OUTPATIENT)
Dept: ADMINISTRATIVE | Facility: OTHER | Age: 71
End: 2021-08-10

## 2021-08-11 ENCOUNTER — OFFICE VISIT (OUTPATIENT)
Dept: DERMATOLOGY | Facility: CLINIC | Age: 71
End: 2021-08-11
Payer: MEDICARE

## 2021-08-11 DIAGNOSIS — L60.8 NAIL DISCOLORATION: Primary | ICD-10-CM

## 2021-08-11 PROCEDURE — 99214 OFFICE O/P EST MOD 30 MIN: CPT | Mod: PBBFAC,PO | Performed by: STUDENT IN AN ORGANIZED HEALTH CARE EDUCATION/TRAINING PROGRAM

## 2021-08-11 PROCEDURE — 99999 PR PBB SHADOW E&M-EST. PATIENT-LVL IV: CPT | Mod: PBBFAC,,, | Performed by: STUDENT IN AN ORGANIZED HEALTH CARE EDUCATION/TRAINING PROGRAM

## 2021-08-11 PROCEDURE — 99999 PR PBB SHADOW E&M-EST. PATIENT-LVL IV: ICD-10-PCS | Mod: PBBFAC,,, | Performed by: STUDENT IN AN ORGANIZED HEALTH CARE EDUCATION/TRAINING PROGRAM

## 2021-08-11 PROCEDURE — 99212 PR OFFICE/OUTPT VISIT, EST, LEVL II, 10-19 MIN: ICD-10-PCS | Mod: S$PBB,,, | Performed by: STUDENT IN AN ORGANIZED HEALTH CARE EDUCATION/TRAINING PROGRAM

## 2021-08-11 PROCEDURE — 99212 OFFICE O/P EST SF 10 MIN: CPT | Mod: S$PBB,,, | Performed by: STUDENT IN AN ORGANIZED HEALTH CARE EDUCATION/TRAINING PROGRAM

## 2021-08-12 ENCOUNTER — PATIENT MESSAGE (OUTPATIENT)
Dept: OTOLARYNGOLOGY | Facility: CLINIC | Age: 71
End: 2021-08-12

## 2021-08-12 DIAGNOSIS — J32.9 CHRONIC SINUSITIS, UNSPECIFIED LOCATION: ICD-10-CM

## 2021-08-16 ENCOUNTER — HOSPITAL ENCOUNTER (OUTPATIENT)
Dept: RADIOLOGY | Facility: HOSPITAL | Age: 71
Discharge: HOME | End: 2021-08-16
Attending: ORTHOPAEDIC SURGERY
Payer: MEDICARE

## 2021-08-16 ENCOUNTER — OFFICE VISIT (OUTPATIENT)
Dept: ORTHOPEDICS | Facility: CLINIC | Age: 71
End: 2021-08-16
Payer: MEDICARE

## 2021-08-16 VITALS — HEIGHT: 67 IN | BODY MASS INDEX: 31.38 KG/M2 | WEIGHT: 199.94 LBS | RESPIRATION RATE: 16 BRPM

## 2021-08-16 DIAGNOSIS — M25.512 ACUTE PAIN OF LEFT SHOULDER: Primary | ICD-10-CM

## 2021-08-16 DIAGNOSIS — M25.512 ACUTE PAIN OF LEFT SHOULDER: ICD-10-CM

## 2021-08-16 DIAGNOSIS — M75.100 ROTATOR CUFF SYNDROME, UNSPECIFIED LATERALITY: Primary | ICD-10-CM

## 2021-08-16 PROCEDURE — 73030 X-RAY EXAM OF SHOULDER: CPT | Mod: 26,LT,, | Performed by: RADIOLOGY

## 2021-08-16 PROCEDURE — 73030 X-RAY EXAM OF SHOULDER: CPT | Mod: TC,PN,LT

## 2021-08-16 PROCEDURE — 99999 PR PBB SHADOW E&M-EST. PATIENT-LVL III: ICD-10-PCS | Mod: PBBFAC,,, | Performed by: ORTHOPAEDIC SURGERY

## 2021-08-16 PROCEDURE — 20610 DRAIN/INJ JOINT/BURSA W/O US: CPT | Mod: PBBFAC,PN,LT | Performed by: ORTHOPAEDIC SURGERY

## 2021-08-16 PROCEDURE — 73030 XR SHOULDER TRAUMA 3 VIEW LEFT: ICD-10-PCS | Mod: 26,LT,, | Performed by: RADIOLOGY

## 2021-08-16 PROCEDURE — 99213 OFFICE O/P EST LOW 20 MIN: CPT | Mod: PBBFAC,PN,25 | Performed by: ORTHOPAEDIC SURGERY

## 2021-08-16 PROCEDURE — 99213 PR OFFICE/OUTPT VISIT, EST, LEVL III, 20-29 MIN: ICD-10-PCS | Mod: 25,S$PBB,, | Performed by: ORTHOPAEDIC SURGERY

## 2021-08-16 PROCEDURE — 99213 OFFICE O/P EST LOW 20 MIN: CPT | Mod: 25,S$PBB,, | Performed by: ORTHOPAEDIC SURGERY

## 2021-08-16 PROCEDURE — 99999 PR PBB SHADOW E&M-EST. PATIENT-LVL III: CPT | Mod: PBBFAC,,, | Performed by: ORTHOPAEDIC SURGERY

## 2021-08-16 PROCEDURE — 20610 LARGE JOINT ASPIRATION/INJECTION: L SUBACROMIAL BURSA: ICD-10-PCS | Mod: S$PBB,LT,, | Performed by: ORTHOPAEDIC SURGERY

## 2021-08-16 RX ORDER — TRIAMCINOLONE ACETONIDE 40 MG/ML
40 INJECTION, SUSPENSION INTRA-ARTICULAR; INTRAMUSCULAR
Status: DISCONTINUED | OUTPATIENT
Start: 2021-08-16 | End: 2021-08-16 | Stop reason: HOSPADM

## 2021-08-16 RX ADMIN — TRIAMCINOLONE ACETONIDE 40 MG: 40 INJECTION, SUSPENSION INTRA-ARTICULAR; INTRAMUSCULAR at 03:08

## 2021-08-17 ENCOUNTER — HOSPITAL ENCOUNTER (OUTPATIENT)
Dept: RADIOLOGY | Facility: HOSPITAL | Age: 71
Discharge: HOME | End: 2021-08-17
Attending: ORTHOPAEDIC SURGERY
Payer: MEDICARE

## 2021-08-17 ENCOUNTER — PATIENT MESSAGE (OUTPATIENT)
Dept: OTOLARYNGOLOGY | Facility: CLINIC | Age: 71
End: 2021-08-17

## 2021-08-17 DIAGNOSIS — J32.9 CHRONIC SINUSITIS, UNSPECIFIED LOCATION: ICD-10-CM

## 2021-08-17 PROCEDURE — 70486 CT MAXILLOFACIAL W/O DYE: CPT | Mod: TC,PO

## 2021-08-18 ENCOUNTER — PATIENT MESSAGE (OUTPATIENT)
Dept: OTOLARYNGOLOGY | Facility: CLINIC | Age: 71
End: 2021-08-18

## 2021-09-10 ENCOUNTER — PATIENT MESSAGE (OUTPATIENT)
Dept: DERMATOLOGY | Facility: CLINIC | Age: 71
End: 2021-09-10

## 2021-09-13 ENCOUNTER — DOCUMENTATION ONLY (OUTPATIENT)
Dept: DERMATOLOGY | Facility: CLINIC | Age: 71
End: 2021-09-13

## 2021-09-19 ENCOUNTER — PATIENT OUTREACH (OUTPATIENT)
Dept: ADMINISTRATIVE | Facility: OTHER | Age: 71
End: 2021-09-19

## 2021-09-20 ENCOUNTER — OFFICE VISIT (OUTPATIENT)
Dept: ORTHOPEDICS | Facility: CLINIC | Age: 71
End: 2021-09-20
Payer: MEDICARE

## 2021-09-20 VITALS — HEIGHT: 67 IN | RESPIRATION RATE: 16 BRPM | BODY MASS INDEX: 31.38 KG/M2 | WEIGHT: 199.94 LBS

## 2021-09-20 DIAGNOSIS — M25.512 ACUTE PAIN OF LEFT SHOULDER: Primary | ICD-10-CM

## 2021-09-20 DIAGNOSIS — M75.100 ROTATOR CUFF SYNDROME, UNSPECIFIED LATERALITY: ICD-10-CM

## 2021-09-20 PROCEDURE — 99213 OFFICE O/P EST LOW 20 MIN: CPT | Mod: S$PBB,,, | Performed by: ORTHOPAEDIC SURGERY

## 2021-09-20 PROCEDURE — 99214 OFFICE O/P EST MOD 30 MIN: CPT | Mod: PBBFAC,PN | Performed by: ORTHOPAEDIC SURGERY

## 2021-09-20 PROCEDURE — 99999 PR PBB SHADOW E&M-EST. PATIENT-LVL IV: CPT | Mod: PBBFAC,,, | Performed by: ORTHOPAEDIC SURGERY

## 2021-09-20 PROCEDURE — 99213 PR OFFICE/OUTPT VISIT, EST, LEVL III, 20-29 MIN: ICD-10-PCS | Mod: S$PBB,,, | Performed by: ORTHOPAEDIC SURGERY

## 2021-09-20 PROCEDURE — 99999 PR PBB SHADOW E&M-EST. PATIENT-LVL IV: ICD-10-PCS | Mod: PBBFAC,,, | Performed by: ORTHOPAEDIC SURGERY

## 2021-09-24 ENCOUNTER — HOSPITAL ENCOUNTER (OUTPATIENT)
Dept: RADIOLOGY | Facility: HOSPITAL | Age: 71
Discharge: HOME | End: 2021-09-24
Attending: ORTHOPAEDIC SURGERY
Payer: MEDICARE

## 2021-09-24 DIAGNOSIS — M25.512 ACUTE PAIN OF LEFT SHOULDER: ICD-10-CM

## 2021-09-24 PROCEDURE — 73221 MRI JOINT UPR EXTREM W/O DYE: CPT | Mod: 26,LT,, | Performed by: RADIOLOGY

## 2021-09-24 PROCEDURE — 73221 MRI SHOULDER WITHOUT CONTRAST LEFT: ICD-10-PCS | Mod: 26,LT,, | Performed by: RADIOLOGY

## 2021-09-24 PROCEDURE — 73221 MRI JOINT UPR EXTREM W/O DYE: CPT | Mod: TC,LT

## 2021-09-30 DIAGNOSIS — F32.A ANXIETY AND DEPRESSION: ICD-10-CM

## 2021-09-30 DIAGNOSIS — F41.9 ANXIETY AND DEPRESSION: ICD-10-CM

## 2021-09-30 DIAGNOSIS — G47.19 EXCESSIVE DAYTIME SLEEPINESS: ICD-10-CM

## 2021-09-30 DIAGNOSIS — R53.83 LOSS OF ENERGY: ICD-10-CM

## 2021-09-30 DIAGNOSIS — R06.83 SNORING: ICD-10-CM

## 2021-09-30 DIAGNOSIS — G47.33 OSA (OBSTRUCTIVE SLEEP APNEA): Primary | ICD-10-CM

## 2021-09-30 RX ORDER — VENLAFAXINE HYDROCHLORIDE 150 MG/1
CAPSULE, EXTENDED RELEASE ORAL
Qty: 30 CAPSULE | Refills: 11 | Status: SHIPPED | OUTPATIENT
Start: 2021-09-30 | End: 2021-11-01

## 2021-10-04 ENCOUNTER — PROCEDURE VISIT (OUTPATIENT)
Dept: SLEEP MEDICINE | Facility: HOSPITAL | Age: 71
End: 2021-10-04
Attending: INTERNAL MEDICINE
Payer: MEDICARE

## 2021-10-04 ENCOUNTER — OFFICE VISIT (OUTPATIENT)
Dept: ORTHOPEDICS | Facility: CLINIC | Age: 71
End: 2021-10-04
Payer: MEDICARE

## 2021-10-04 DIAGNOSIS — M25.512 ACUTE PAIN OF LEFT SHOULDER: Primary | ICD-10-CM

## 2021-10-04 DIAGNOSIS — G47.33 OSA (OBSTRUCTIVE SLEEP APNEA): ICD-10-CM

## 2021-10-04 DIAGNOSIS — G47.19 EXCESSIVE DAYTIME SLEEPINESS: ICD-10-CM

## 2021-10-04 DIAGNOSIS — M75.02 ADHESIVE CAPSULITIS OF LEFT SHOULDER: ICD-10-CM

## 2021-10-04 DIAGNOSIS — R06.83 SNORING: ICD-10-CM

## 2021-10-04 DIAGNOSIS — R53.83 LOSS OF ENERGY: ICD-10-CM

## 2021-10-04 PROCEDURE — 99211 OFF/OP EST MAY X REQ PHY/QHP: CPT | Mod: PBBFAC,PN | Performed by: ORTHOPAEDIC SURGERY

## 2021-10-04 PROCEDURE — 99213 OFFICE O/P EST LOW 20 MIN: CPT | Mod: S$PBB,,, | Performed by: ORTHOPAEDIC SURGERY

## 2021-10-04 PROCEDURE — 99999 PR PBB SHADOW E&M-EST. PATIENT-LVL I: ICD-10-PCS | Mod: PBBFAC,,, | Performed by: ORTHOPAEDIC SURGERY

## 2021-10-04 PROCEDURE — 99999 PR PBB SHADOW E&M-EST. PATIENT-LVL I: CPT | Mod: PBBFAC,,, | Performed by: ORTHOPAEDIC SURGERY

## 2021-10-04 PROCEDURE — 99213 PR OFFICE/OUTPT VISIT, EST, LEVL III, 20-29 MIN: ICD-10-PCS | Mod: S$PBB,,, | Performed by: ORTHOPAEDIC SURGERY

## 2021-10-04 PROCEDURE — 95806 SLEEP STUDY UNATT&RESP EFFT: CPT

## 2021-11-01 ENCOUNTER — OFFICE VISIT (OUTPATIENT)
Dept: DERMATOLOGY | Facility: CLINIC | Age: 71
End: 2021-11-01
Payer: MEDICARE

## 2021-11-01 ENCOUNTER — PATIENT OUTREACH (OUTPATIENT)
Dept: ADMINISTRATIVE | Facility: OTHER | Age: 71
End: 2021-11-01
Payer: MEDICARE

## 2021-11-01 VITALS — WEIGHT: 199 LBS | BODY MASS INDEX: 31.23 KG/M2 | HEIGHT: 67 IN

## 2021-11-01 DIAGNOSIS — L73.8 SEBACEOUS HYPERPLASIA OF FACE: ICD-10-CM

## 2021-11-01 DIAGNOSIS — D48.5 NEOPLASM OF UNCERTAIN BEHAVIOR OF SKIN: Primary | ICD-10-CM

## 2021-11-01 DIAGNOSIS — D22.9 MULTIPLE BENIGN NEVI: ICD-10-CM

## 2021-11-01 DIAGNOSIS — L82.1 SEBORRHEIC KERATOSES: ICD-10-CM

## 2021-11-01 PROCEDURE — 99213 PR OFFICE/OUTPT VISIT, EST, LEVL III, 20-29 MIN: ICD-10-PCS | Mod: 25,S$PBB,, | Performed by: DERMATOLOGY

## 2021-11-01 PROCEDURE — 88305 TISSUE EXAM BY PATHOLOGIST: ICD-10-PCS | Mod: 26,,, | Performed by: PATHOLOGY

## 2021-11-01 PROCEDURE — 88305 TISSUE EXAM BY PATHOLOGIST: CPT | Mod: 26,,, | Performed by: PATHOLOGY

## 2021-11-01 PROCEDURE — 99999 PR PBB SHADOW E&M-EST. PATIENT-LVL IV: CPT | Mod: PBBFAC,,, | Performed by: DERMATOLOGY

## 2021-11-01 PROCEDURE — 88305 TISSUE EXAM BY PATHOLOGIST: CPT | Performed by: PATHOLOGY

## 2021-11-01 PROCEDURE — 99999 PR PBB SHADOW E&M-EST. PATIENT-LVL IV: ICD-10-PCS | Mod: PBBFAC,,, | Performed by: DERMATOLOGY

## 2021-11-01 PROCEDURE — 99213 OFFICE O/P EST LOW 20 MIN: CPT | Mod: 25,S$PBB,, | Performed by: DERMATOLOGY

## 2021-11-01 PROCEDURE — 11102 PR TANGENTIAL BIOPSY, SKIN, SINGLE LESION: ICD-10-PCS | Mod: S$PBB,,, | Performed by: DERMATOLOGY

## 2021-11-01 PROCEDURE — 11102 TANGNTL BX SKIN SINGLE LES: CPT | Mod: PBBFAC,PO | Performed by: DERMATOLOGY

## 2021-11-01 PROCEDURE — 99214 OFFICE O/P EST MOD 30 MIN: CPT | Mod: PBBFAC,PO | Performed by: DERMATOLOGY

## 2021-11-01 PROCEDURE — 11102 TANGNTL BX SKIN SINGLE LES: CPT | Mod: S$PBB,,, | Performed by: DERMATOLOGY

## 2021-11-01 RX ORDER — BUPROPION HYDROCHLORIDE 150 MG/1
TABLET ORAL
COMMUNITY
Start: 2021-10-21 | End: 2022-02-11

## 2021-11-03 LAB
FINAL PATHOLOGIC DIAGNOSIS: NORMAL
GROSS: NORMAL
Lab: NORMAL
MICROSCOPIC EXAM: NORMAL

## 2021-12-08 ENCOUNTER — TELEPHONE (OUTPATIENT)
Dept: OTOLARYNGOLOGY | Facility: CLINIC | Age: 71
End: 2021-12-08
Payer: MEDICARE

## 2021-12-08 ENCOUNTER — PATIENT MESSAGE (OUTPATIENT)
Dept: OTOLARYNGOLOGY | Facility: CLINIC | Age: 71
End: 2021-12-08

## 2021-12-08 ENCOUNTER — OFFICE VISIT (OUTPATIENT)
Dept: OTOLARYNGOLOGY | Facility: CLINIC | Age: 71
End: 2021-12-08
Payer: MEDICARE

## 2021-12-08 VITALS — BODY MASS INDEX: 31.54 KG/M2 | HEIGHT: 68 IN | WEIGHT: 208.13 LBS

## 2021-12-08 DIAGNOSIS — J30.89 ALLERGIC RHINITIS DUE TO OTHER ALLERGIC TRIGGER, UNSPECIFIED SEASONALITY: ICD-10-CM

## 2021-12-08 DIAGNOSIS — R09.82 PND (POST-NASAL DRIP): ICD-10-CM

## 2021-12-08 DIAGNOSIS — K21.9 LARYNGOPHARYNGEAL REFLUX (LPR): Primary | ICD-10-CM

## 2021-12-08 PROCEDURE — 99214 PR OFFICE/OUTPT VISIT, EST, LEVL IV, 30-39 MIN: ICD-10-PCS | Mod: 25,S$GLB,, | Performed by: PHYSICIAN ASSISTANT

## 2021-12-08 PROCEDURE — 31575 DIAGNOSTIC LARYNGOSCOPY: CPT | Mod: S$GLB,,, | Performed by: PHYSICIAN ASSISTANT

## 2021-12-08 PROCEDURE — 99214 OFFICE O/P EST MOD 30 MIN: CPT | Mod: 25,S$GLB,, | Performed by: PHYSICIAN ASSISTANT

## 2021-12-08 PROCEDURE — 31575 PR LARYNGOSCOPY, FLEXIBLE; DIAGNOSTIC: ICD-10-PCS | Mod: S$GLB,,, | Performed by: PHYSICIAN ASSISTANT

## 2021-12-13 ENCOUNTER — OFFICE VISIT (OUTPATIENT)
Dept: DERMATOLOGY | Facility: CLINIC | Age: 71
End: 2021-12-13
Payer: MEDICARE

## 2021-12-13 DIAGNOSIS — L60.3 NAIL DYSTROPHY: Primary | ICD-10-CM

## 2021-12-13 DIAGNOSIS — L57.0 ACTINIC KERATOSIS: ICD-10-CM

## 2021-12-13 PROCEDURE — 99999 PR PBB SHADOW E&M-EST. PATIENT-LVL III: ICD-10-PCS | Mod: PBBFAC,,, | Performed by: STUDENT IN AN ORGANIZED HEALTH CARE EDUCATION/TRAINING PROGRAM

## 2021-12-13 PROCEDURE — 99213 OFFICE O/P EST LOW 20 MIN: CPT | Mod: PBBFAC,PO | Performed by: STUDENT IN AN ORGANIZED HEALTH CARE EDUCATION/TRAINING PROGRAM

## 2021-12-13 PROCEDURE — 99212 PR OFFICE/OUTPT VISIT, EST, LEVL II, 10-19 MIN: ICD-10-PCS | Mod: S$PBB,,, | Performed by: STUDENT IN AN ORGANIZED HEALTH CARE EDUCATION/TRAINING PROGRAM

## 2021-12-13 PROCEDURE — 99212 OFFICE O/P EST SF 10 MIN: CPT | Mod: S$PBB,,, | Performed by: STUDENT IN AN ORGANIZED HEALTH CARE EDUCATION/TRAINING PROGRAM

## 2021-12-13 PROCEDURE — 99999 PR PBB SHADOW E&M-EST. PATIENT-LVL III: CPT | Mod: PBBFAC,,, | Performed by: STUDENT IN AN ORGANIZED HEALTH CARE EDUCATION/TRAINING PROGRAM

## 2022-01-28 ENCOUNTER — LAB VISIT (OUTPATIENT)
Dept: LAB | Facility: HOSPITAL | Age: 72
End: 2022-01-28
Attending: PHYSICIAN ASSISTANT
Payer: MEDICARE

## 2022-01-28 ENCOUNTER — PATIENT OUTREACH (OUTPATIENT)
Dept: ADMINISTRATIVE | Facility: OTHER | Age: 72
End: 2022-01-28
Payer: MEDICARE

## 2022-01-28 ENCOUNTER — OFFICE VISIT (OUTPATIENT)
Dept: OTOLARYNGOLOGY | Facility: CLINIC | Age: 72
End: 2022-01-28
Payer: MEDICARE

## 2022-01-28 VITALS — HEIGHT: 68 IN | WEIGHT: 215.63 LBS | BODY MASS INDEX: 32.68 KG/M2

## 2022-01-28 DIAGNOSIS — H69.91 ETD (EUSTACHIAN TUBE DYSFUNCTION), RIGHT: ICD-10-CM

## 2022-01-28 DIAGNOSIS — J06.9 UPPER RESPIRATORY TRACT INFECTION, UNSPECIFIED TYPE: ICD-10-CM

## 2022-01-28 DIAGNOSIS — J30.2 SEASONAL ALLERGIES: ICD-10-CM

## 2022-01-28 DIAGNOSIS — H65.01 NON-RECURRENT ACUTE SEROUS OTITIS MEDIA OF RIGHT EAR: ICD-10-CM

## 2022-01-28 DIAGNOSIS — J30.2 SEASONAL ALLERGIES: Primary | ICD-10-CM

## 2022-01-28 PROCEDURE — 99214 OFFICE O/P EST MOD 30 MIN: CPT | Mod: PBBFAC,PO | Performed by: PHYSICIAN ASSISTANT

## 2022-01-28 PROCEDURE — 86003 ALLG SPEC IGE CRUDE XTRC EA: CPT | Performed by: PHYSICIAN ASSISTANT

## 2022-01-28 PROCEDURE — 99999 PR PBB SHADOW E&M-EST. PATIENT-LVL IV: CPT | Mod: PBBFAC,,, | Performed by: PHYSICIAN ASSISTANT

## 2022-01-28 PROCEDURE — 99214 OFFICE O/P EST MOD 30 MIN: CPT | Mod: S$PBB,,, | Performed by: PHYSICIAN ASSISTANT

## 2022-01-28 PROCEDURE — 99999 PR PBB SHADOW E&M-EST. PATIENT-LVL IV: ICD-10-PCS | Mod: PBBFAC,,, | Performed by: PHYSICIAN ASSISTANT

## 2022-01-28 PROCEDURE — 86003 ALLG SPEC IGE CRUDE XTRC EA: CPT | Mod: 59 | Performed by: PHYSICIAN ASSISTANT

## 2022-01-28 PROCEDURE — 99214 PR OFFICE/OUTPT VISIT, EST, LEVL IV, 30-39 MIN: ICD-10-PCS | Mod: S$PBB,,, | Performed by: PHYSICIAN ASSISTANT

## 2022-01-28 RX ORDER — FLUTICASONE PROPIONATE 50 MCG
1 SPRAY, SUSPENSION (ML) NASAL 2 TIMES DAILY
Qty: 16 G | Refills: 3 | Status: SHIPPED | OUTPATIENT
Start: 2022-01-28

## 2022-01-28 NOTE — PROGRESS NOTES
[facility]    Subjective:      Patient: Skyler Espinoza Patient PCP: Abhi Cardona MD         :  1950     Sex:  female      MRN:  [id number]          Date of Visit: 2022      Chief Complaint: Ear pain    Patient ID: Skyler Espinoza is a 71 y.o. female who was self-referred for post nasal drip. Pt has been previously treated for sinus pressure with PND with Amoxil and prednisone burst. Pt was last seen at [facility] in [place] by ENT PA for PND. Pt was treated with levaquin 500mg once daily x 14 days along with prednisone 20mg taper and advised that should her symptoms not resolve, they would go forward with CT sinus scan. Pt had CT sinus WO 2021 which showed mild mucosal thickening of the inferior right maxillary sinus with mild scattered thickening of ethmoid air cells with rightward septal deviation. Otherwise, sinus CT was unremarkable for significant disease. Pt recently saw here PCP outside of the [facility] system and was placed on Keflex for treatment of her sinus infection which she finished last Thursday and pt was then placed on a steroid pulse for 5 days which helped. Pt states that her sinus pressure and pain has improved with the use of recent antibiotics. Pt states she has occasional clear rhinorrhea. Pt endorses chronic PND for years with a recent flair up around 2.5 weeks ago. Pt lives with her daughter and her -in-law and her  and law had been sick prior to her flair up. Current sinonasal medications zyrtec; flonase; astelin PRN; saline irrigations two times a day.  She does not regularly use nasal decongestant sprays. She recalls previously having allergy testingpositive for penicillin and seafood-was not tested for environmental allergies-just tested for food and drug allergies. Pt reports subjective seasonal allergies with occasional runny and itchy nose and sneezing. She denies a history of asthma.She relates a history of reflux symptomscontrolled with protonix  40mg daily.  She does not drink caffeinated beverages on a daily basis.She relates a diagnosis of obstructive sleep apnea with regular CPAP use. She has not had sinonasal surgery.  She has had a tonsillectomy and adenoidectomy.She does not recall a prior history of nasal trauma.    Interval History 01/28/2022: Pt is seen in clinic today for ear pain. Pt has not been taking gaviscon for acid reflux. Pt continues to take protonix 40mg daily. Pt had relief of PND secondary to LPR. Pt has had bilateral frontal pressure and pain. Pt has also had associated right sided ear pain since yesterday-right sided ear pain has resolved. Pt has started hyperbaric O2-with Neurologist Dr. Shields for memory issues with last treatment this past Wednesday. Pt had acute congestion and runny nose last night-pt not having runny nose today.     Review of Systems   Constitutional: Negative for chills and fever.   HENT: Positive for ear pain (right), postnasal drip (yesterday) and sinus pain (bilateral frontal). Negative for ear discharge.    Neurological: Positive for headaches.   Psychiatric/Behavioral: Negative for agitation, behavioral problems, confusion, decreased concentration and dysphoric mood.      Past Medical History  She has a past medical history of Depression, GERD (gastroesophageal reflux disease), Hyperlipidemia, Hypertension, and Hypothyroid.    Family History  Her family history includes Cancer in her father and sister.    Past Surgical History:   Procedure Laterality Date    APPENDECTOMY      COLONOSCOPY  2017    Briana, repeat in 10    TONSILLECTOMY      TUBAL LIGATION       Social History     Tobacco Use    Smoking status: Never Smoker    Smokeless tobacco: Never Used   Substance and Sexual Activity    Alcohol use: Not Currently     Comment: rarely    Drug use: No    Sexual activity: Yes     Medications  She has a current medication list which includes the following prescription(s): ascorbic acid (vitamin c),  aspirin, cetirizine, fluticasone propionate, ipratropium, levothyroxine, metoprolol succinate, multivit-min-ferrous fumarate, ondansetron, pantoprazole, polyethylene glycol, selenium, sumatriptan, valacyclovir, vitamin d, azelastine, bupropion, fluticasone propionate, and lovastatin.    Review of patient's allergies indicates:  No Known Allergies  All medications, allergies, and past history have been reviewed.    Objective:      Vitals:  Vitals - 1 value per visit 12/13/2021 1/28/2022 1/28/2022   SYSTOLIC - - -   DIASTOLIC - - -   Pulse - - -   Temp - - -   Resp - - -   SPO2 - - -   Weight (lb) - - 215.61   Weight (kg) - - 97.8   Height - - 68   BMI (Calculated) - - 32.8   VISIT REPORT - - -   Pain Score  0 2 -   Some recent data might be hidden       Body surface area is 2.17 meters squared.  Physical Exam  Constitutional:       General: She is not in acute distress.     Appearance: Normal appearance. She is not ill-appearing.   HENT:      Head: Normocephalic and atraumatic.      Right Ear: External ear normal. A middle ear effusion (clear) is present. Tympanic membrane is retracted.      Left Ear: Tympanic membrane, ear canal and external ear normal.      Nose:      Right Sinus: Frontal sinus tenderness present. No maxillary sinus tenderness.      Left Sinus: Frontal sinus tenderness present. No maxillary sinus tenderness.      Mouth/Throat:      Lips: Pink. No lesions.      Mouth: Mucous membranes are moist.      Tongue: No lesions.      Palate: No lesions.      Pharynx: Oropharynx is clear. Uvula midline. No pharyngeal swelling, oropharyngeal exudate, posterior oropharyngeal erythema or uvula swelling.   Eyes:      General:         Right eye: No discharge.         Left eye: No discharge.      Extraocular Movements: Extraocular movements intact.      Conjunctiva/sclera: Conjunctivae normal.   Pulmonary:      Effort: Pulmonary effort is normal.   Neurological:      General: No focal deficit present.      Mental  Status: She is alert and oriented to person, place, and time. Mental status is at baseline.   Psychiatric:         Mood and Affect: Mood normal.         Behavior: Behavior normal.         Thought Content: Thought content normal.         Judgment: Judgment normal.       Labs:  WBC   Date Value Ref Range Status   02/02/2021 5.39 3.90 - 12.70 K/uL Final     Eosinophil %   Date Value Ref Range Status   02/02/2021 1.9 0.0 - 8.0 % Final     Eos #   Date Value Ref Range Status   02/02/2021 0.1 0.0 - 0.5 K/uL Final     Platelets   Date Value Ref Range Status   02/02/2021 219 150 - 350 K/uL Final     Glucose   Date Value Ref Range Status   02/02/2021 108 70 - 110 mg/dL Final     Imaging:  CT Sinus WO 08/17/2021  I independently reviewed the images of the CT sinuses dated 08/17/2021. Pertinent findings include Rightward septal deviation with mucosal thickening in the inferior right maxillary sinus.  All lab results, imaging results, and data have been reviewed.    Assessment:        ICD-10-CM ICD-9-CM   1. Seasonal allergies  J30.2 477.9   2. ETD (Eustachian tube dysfunction), right  H69.81 381.81   3. Non-recurrent acute serous otitis media of right ear  H65.01 381.01   4. Upper respiratory tract infection, unspecified type  J06.9 465.9            Plan:      Seasonal allergies  -     Environmental allergy panel ordered.   - Switch from zyrtec to Claritin.   - Flonase 1 puff to each nostril twice daily.   - Use stefania med sinus rinse once daily for sinus hygiene.     ETD (Eustachian tube dysfunction), right  -     fluticasone propionate (FLONASE) 50 mcg/actuation nasal spray; 1 spray (50 mcg total) by Each Nostril route 2 (two) times daily.  Dispense: 16 g; Refill: 3  - Pop ears 4-6 times a day as shown in office.     Non-recurrent acute serous otitis media of right ear  -     fluticasone propionate (FLONASE) 50 mcg/actuation nasal spray; 1 spray (50 mcg total) by Each Nostril route 2 (two) times daily.  Dispense: 16 g; Refill:  3  -Pop ears as shown in office 4-6 times a day.   -Follow up in office in 2 weeks for ear recheck.    Upper respiratory tract infection, unspecified type  -     COVID-19 Routine Screening    Pt had acute sinus symptoms that started one day after hyperbaric treatment for memory issues. Pt now has serous otitis media of the right ear. Possible URI. Pt and family will call Neurologist to see if he wants to hold on hyperbaric treatment for now as pt has ETD and eustachian tube is pressure equalizer tube for the ear.

## 2022-01-28 NOTE — PROGRESS NOTES
Health Maintenance Due   Topic Date Due    COVID-19 Vaccine (1) Never done    Influenza Vaccine (1) 09/01/2021    Mammogram  02/12/2022     Updates were requested from care everywhere.  Chart was reviewed for overdue Proactive [facility] Encounters ([facility]) topics (CRS, Breast Cancer Screening, Eye exam)  Health Maintenance has been updated.  LINKS immunization registry triggered.  Immunizations were reconciled.

## 2022-01-28 NOTE — PATIENT INSTRUCTIONS
Switch from zyrtec to claritin.    Complete allergy testing and do covid19 screening.    Flonase 1 puff to each nostril twice daily.    Follow up in 2 weeks for ear check.    Use NeilMed sinus rinse once daily.          DIRECTIONS FOR SINUS SALINE RINSE To see demonstration: Enter http://www.Ausra.com/watch?v=[email or web address] into the browser address box, or go to You tube, and under the search box, enter sinus rinse. Click on NeilMed Sinus Rinse Video    Step 1    Step 1 Please wash your hands. Fill the clean bottle with the designated volume of warm distilled water, filtered water or previously boiled water. You may warm the water in a microwave but we recommend that you warm it in increments of five seconds. This is to avoid excessive heating of the water and damage to the device or scalding your nasal passage.    Step 2    Step 2 Cut the SINUS RINSE mixture packet at the corner and pour its contents into the bottle. Tighten the cap & tube on the bottle securely, place one finger over the tip of the cap and shake the bottle gently to dissolve the mixture.      Step 3    Step 3 Standing in front of a sink, bend forward to your comfort level and tilt your head down. Keeping your mouth open without holding your breath, place cap snugly against your nasal passage and SQUEEZE BOTTLE GENTLY until the solution starts draining from the OPPOSITE nasal passage or from your mouth. Keep squeezing the bottle GENTLY until at least 1/4 to 1/2 (60 to 120 mL) of the bottle is used for a proper rinse. Do not swallow the solution.    Step 4    Blow your nose gently, without pinching your nose completely because this will apply pressure on the eardrums. If tolerable, sniff in any residual solution remaining in the nasal passage once or twice prior to blowing your nose as this may clean out the posterior nasopharyngeal area (the area at the back of your nasal passage). Some solution will reach the back of the throat, so please  spit it out. To help improve drainage of any residual solution, blow your nose gently while tilting your head to the opposite side of the nasal passage that you just rinsed.    Step 5    Now repeat steps 3 & 4 for your other nasal passage.    Step 6     Air dry the Sinus Rinse bottle, cap, and tube on a clean paper towel, a glass plate to store the bottle cap and tube. If there is any solution leftover, please discard it. We recommend you make a fresh solution each time you rinse. Rinse 5 times each day, OR as directed by your physician. Warnings: DO NOT RINSE IF NASAL PASSAGE IS COMPLETELY BLOCKED OR IF YOU HAVE AN EAR INFECTION OR BLOCKED EARS. If you have had ear surgery, please contact your physician prior to irrigation. If you experience any pressure in your ears, stop the rinse and get further directions from your physician or contact our office during regular business hours. To avoid any ear discomfort: Heat the solution to lukewarm, do not use hot, boiling or cold water. Keep your mouth open. Do not hold your breath and if possible make the sound GURU....GURU... Make sure to take the position as shown. Gently squeeze 1/4 of the bottle at a time (60mL / 2 ounces). Stop the rinse if you feel any solution sensation near the ears. You may rinse with a partially blocked nasal passage. Please do not use for any other purposes. Please rinse at least ONE HOUR PRIOR to bedtime, in order to avoid any residual solution dripping in the throat.    >> Before using the SINUS RINSE kit, please inspect the cap, tube and bottle carefully for wear and tear. If any of the components appear discolored or cracked, please contact [employer] to obtain a replacement. You must follow the cleaning instructions provided in this brochure or cleaning instruction card prior to each use.    >> The SINUS RINSE bottle and mixture are to be used only for nasalirrigation. Do not use for any other purposes.    >> We recommend that you use the  rinse ONE HOUR PRIOR to bedtime in order to avoid any residual solution dripping in the throat.    Tips to avoid ear discomfort while rinsing    If you have had ear surgery, please contact your physician prior to irrigation. Do not use if you have an ear infection or blocked ears. Rinse with lukewarm water. Keep your mouth open. Do not hold your breath while rinsing. While rinsing, make sure to tilt your. Gently squeeze the bottle while rinsing; do not squeeze the bottle very forcefully. Stop the rinse if you feel a sensation of fluid near your ears.    Tips to avoid unexpected drainage after rinsing    In rare situations, especially if you have had sinus surgery, the saline solution can pool in the sinus cavities and nasal passages and then drip from your nostrils hours after rinsing. To avoid this harmless but annoying inconvenience, take one extra step after rinsing: lean forward, tilt your head sideways and gently blow your nose. Then, tilt your head to the other side and blow again. You may need to repeat this several times. This will help rid your nasal passages of any excess mucus and remaining saline solution. If you find yourself experiencing delayed drainage often, do not rinse right before leaving your house or going to bed.

## 2022-01-31 LAB
A ALTERNATA IGE QN: <0.1 KU/L
A FUMIGATUS IGE QN: <0.1 KU/L
ALLERGEN BOXELDER MAPLE TREE IGE: <0.1 KU/L
ALLERGEN MAPLE (BOX ELDER) CLASS: NORMAL
ALLERGEN MULBERRY CLASS: NORMAL
ALLERGEN MULBERRY TREE IGE: <0.1 KU/L
ALLERGEN PIGWEED IGE: <0.1 KU/L
ALLERGEN WHITE ASH TREE IGE: <0.1 KU/L
AMER SYCAMORE IGE QN: <0.35 KU/L
BALD CYPRESS IGE QN: <0.1 KU/L
BERMUDA GRASS IGE QN: <0.1 KU/L
C HERBARUM IGE QN: <0.1 KU/L
CAT DANDER IGE QN: <0.1 KU/L
CEDAR IGE QN: <0.1 KU/L
COCKLEBUR IGE QN: <0.1 KU/L
COMMON PIGWEED CLASS: NORMAL
COMMON RAGWEED IGE QN: <0.1 KU/L
D FARINAE IGE QN: <0.1 KU/L
D PTERONYSS IGE QN: <0.1 KU/L
DEPRECATED A ALTERNATA IGE RAST QL: NORMAL
DEPRECATED A FUMIGATUS IGE RAST QL: NORMAL
DEPRECATED BALD CYPRESS IGE RAST QL: NORMAL
DEPRECATED BERMUDA GRASS IGE RAST QL: NORMAL
DEPRECATED C HERBARUM IGE RAST QL: NORMAL
DEPRECATED CAT DANDER IGE RAST QL: NORMAL
DEPRECATED CEDAR IGE RAST QL: NORMAL
DEPRECATED COCKLEBUR IGE RAST QL: NORMAL
DEPRECATED COMMON RAGWEED IGE RAST QL: NORMAL
DEPRECATED D FARINAE IGE RAST QL: NORMAL
DEPRECATED D PTERONYSS IGE RAST QL: NORMAL
DEPRECATED DOG DANDER IGE RAST QL: NORMAL
DEPRECATED ELDER IGE RAST QL: NORMAL
DEPRECATED ENGL PLANTAIN IGE RAST QL: NORMAL
DEPRECATED GOOSEFOOT IGE RAST QL: NORMAL
DEPRECATED JOHNSON GRASS IGE RAST QL: NORMAL
DEPRECATED KENT BLUE GRASS IGE RAST QL: NORMAL
DEPRECATED M RACEMOSUS IGE RAST QL: NORMAL
DEPRECATED MUGWORT IGE RAST QL: NORMAL
DEPRECATED NETTLE IGE RAST QL: NORMAL
DEPRECATED P NOTATUM IGE RAST QL: NORMAL
DEPRECATED PECAN/HICK TREE IGE RAST QL: NORMAL
DEPRECATED ROACH IGE RAST QL: NORMAL
DEPRECATED SHEEP SORREL IGE RAST QL: NORMAL
DEPRECATED SILVER BIRCH IGE RAST QL: NORMAL
DEPRECATED TIMOTHY IGE RAST QL: NORMAL
DEPRECATED WHITE OAK IGE RAST QL: NORMAL
DOG DANDER IGE QN: <0.1 KU/L
ELDER IGE QN: <0.1 KU/L
ELM CEDAR CLASS: NORMAL
ELM CEDAR, IGE: <0.1 KU/L
ENGL PLANTAIN IGE QN: <0.1 KU/L
FEATHER PANEL #2: <0.35 KU/L
GOOSEFOOT IGE QN: <0.1 KU/L
JOHNSON GRASS IGE QN: <0.1 KU/L
KENT BLUE GRASS IGE QN: <0.1 KU/L
M RACEMOSUS IGE QN: <0.1 KU/L
MUGWORT IGE QN: <0.1 KU/L
NETTLE IGE QN: <0.1 KU/L
P NOTATUM IGE QN: <0.1 KU/L
PECAN/HICK TREE IGE QN: <0.1 KU/L
ROACH IGE QN: <0.1 KU/L
SHEEP SORREL IGE QN: <0.1 KU/L
SILVER BIRCH IGE QN: <0.1 KU/L
TIMOTHY IGE QN: <0.1 KU/L
WHITE ASH CLASS: NORMAL
WHITE OAK IGE QN: <0.1 KU/L

## 2022-02-01 ENCOUNTER — TELEPHONE (OUTPATIENT)
Dept: OTOLARYNGOLOGY | Facility: CLINIC | Age: 72
End: 2022-02-01
Payer: MEDICARE

## 2022-02-01 NOTE — TELEPHONE ENCOUNTER
Called and spoke with Allison Trejo. Informed her that pt's allergy panel came back as negative for common environmental allergens. Advised that I believe pt's headaches are migraine related and to follow up with neurologist. Pt is being held from hyperbarics at this time until her right serous otitis media clears up. Will see how pt's right serous otitis media is doing at planned follow up

## 2022-02-10 ENCOUNTER — PATIENT MESSAGE (OUTPATIENT)
Dept: ORTHOPEDICS | Facility: CLINIC | Age: 72
End: 2022-02-10

## 2022-02-10 ENCOUNTER — OFFICE VISIT (OUTPATIENT)
Dept: OTOLARYNGOLOGY | Facility: CLINIC | Age: 72
End: 2022-02-10
Payer: MEDICARE

## 2022-02-10 ENCOUNTER — OFFICE VISIT (OUTPATIENT)
Dept: ORTHOPEDICS | Facility: CLINIC | Age: 72
End: 2022-02-10
Payer: MEDICARE

## 2022-02-10 VITALS — BODY MASS INDEX: 32.07 KG/M2 | HEIGHT: 68 IN | WEIGHT: 211.63 LBS

## 2022-02-10 VITALS — RESPIRATION RATE: 18 BRPM | HEIGHT: 68 IN | WEIGHT: 211 LBS | BODY MASS INDEX: 31.98 KG/M2

## 2022-02-10 DIAGNOSIS — Z86.69 HISTORY OF MIGRAINE: ICD-10-CM

## 2022-02-10 DIAGNOSIS — H65.01 NON-RECURRENT ACUTE SEROUS OTITIS MEDIA OF RIGHT EAR: ICD-10-CM

## 2022-02-10 DIAGNOSIS — K21.9 LARYNGOPHARYNGEAL REFLUX (LPR): ICD-10-CM

## 2022-02-10 DIAGNOSIS — M17.12 PRIMARY OSTEOARTHRITIS OF LEFT KNEE: ICD-10-CM

## 2022-02-10 DIAGNOSIS — M25.562 PAIN IN BOTH KNEES, UNSPECIFIED CHRONICITY: ICD-10-CM

## 2022-02-10 DIAGNOSIS — M25.561 PAIN IN BOTH KNEES, UNSPECIFIED CHRONICITY: ICD-10-CM

## 2022-02-10 DIAGNOSIS — R09.82 PND (POST-NASAL DRIP): ICD-10-CM

## 2022-02-10 DIAGNOSIS — M17.11 PRIMARY OSTEOARTHRITIS OF RIGHT KNEE: Primary | ICD-10-CM

## 2022-02-10 DIAGNOSIS — J31.0 CHRONIC RHINITIS: Primary | ICD-10-CM

## 2022-02-10 PROCEDURE — 99213 OFFICE O/P EST LOW 20 MIN: CPT | Mod: PBBFAC,PN,25 | Performed by: ORTHOPAEDIC SURGERY

## 2022-02-10 PROCEDURE — 99213 OFFICE O/P EST LOW 20 MIN: CPT | Mod: 25,S$PBB,, | Performed by: ORTHOPAEDIC SURGERY

## 2022-02-10 PROCEDURE — 20610 DRAIN/INJ JOINT/BURSA W/O US: CPT | Mod: 50,PBBFAC,PN | Performed by: ORTHOPAEDIC SURGERY

## 2022-02-10 PROCEDURE — 99999 PR PBB SHADOW E&M-EST. PATIENT-LVL III: CPT | Mod: PBBFAC,,, | Performed by: ORTHOPAEDIC SURGERY

## 2022-02-10 PROCEDURE — 20610 LARGE JOINT ASPIRATION/INJECTION: BILATERAL KNEE: ICD-10-PCS | Mod: 50,S$PBB,, | Performed by: ORTHOPAEDIC SURGERY

## 2022-02-10 PROCEDURE — 99999 PR PBB SHADOW E&M-EST. PATIENT-LVL III: ICD-10-PCS | Mod: PBBFAC,,, | Performed by: ORTHOPAEDIC SURGERY

## 2022-02-10 PROCEDURE — 99213 OFFICE O/P EST LOW 20 MIN: CPT | Mod: S$GLB,,, | Performed by: PHYSICIAN ASSISTANT

## 2022-02-10 PROCEDURE — 99213 PR OFFICE/OUTPT VISIT, EST, LEVL III, 20-29 MIN: ICD-10-PCS | Mod: S$GLB,,, | Performed by: PHYSICIAN ASSISTANT

## 2022-02-10 PROCEDURE — 99213 PR OFFICE/OUTPT VISIT, EST, LEVL III, 20-29 MIN: ICD-10-PCS | Mod: 25,S$PBB,, | Performed by: ORTHOPAEDIC SURGERY

## 2022-02-10 RX ORDER — IPRATROPIUM BROMIDE 21 UG/1
2 SPRAY, METERED NASAL 3 TIMES DAILY
Qty: 30 ML | Refills: 1 | Status: SHIPPED | OUTPATIENT
Start: 2022-02-10 | End: 2022-02-10

## 2022-02-10 RX ADMIN — TRIAMCINOLONE ACETONIDE 40 MG: 40 INJECTION, SUSPENSION INTRA-ARTICULAR; INTRAMUSCULAR at 04:02

## 2022-02-10 NOTE — PATIENT INSTRUCTIONS
Continue flonase 1 puff to each nostril twice daily.    Switch from astelin to atrovent nasal spray. Do atrovent 2 sprays to each nostril three times a day as needed for runny nose.     Continue protonix. Take gaviscon after breakfast, lunch, dinner and prior to bed time x 8 weeks.    ACID REFLUX   What is acid reflux?    When we eat, food passes from the throat and into the stomach through a tube called the esophagus. At the bottom of the esophagus is a ring of muscles that acts as a valve between the esophagus and stomach, called the lower esophageal sphincter. Smoking, alcohol, and certain types of food may weaken the sphincter, so it may stop closing properly. The contents in the stomach then may leak back, or reflux, into the esophagus. This problem is called gastroesophageal reflux disease (GERD). Symptoms of GERD include heartburn, belching, regurgitation of stomach contents, and swallowing difficulties.    Sometimes, the stomach acid travels up through the esophagus and spills into the larynx or pharynx (voice box). This is called laryngopharyngeal reflux (LPR) and is irritating to the vocal folds and surrounding tissues. Often, patients with LPR do not experience heartburn as a symptom. More commonly, symptoms of LPR include hoarseness, excessive mucous resulting in frequent throat clearing, post-nasal drip, coughing, throat soreness or burning, choking episodes, difficulty swallowing, and sensation of a lump in the throat.     How is acid reflux treated?   Treatment for acid reflux can involve any combination of medication, lifestyle modifications, and surgery.   · Medications. Your doctor may prescribe a proton pump inhibitor (PPI) or an H2 blocker. If you are prescribed a PPI, take in on an empty stomach in the morning 30 minutes prior to eating breakfast. Keep in mind that it may take 4-6 weeks before symptoms begin to resolve, so do not stop medications without consulting your doctor.   · Lifestyle  and dietary modifications. Eat smaller meals at a slower pace. Avoid over-eating. If you are overweight, try to lose weight. Do not lie down or exercise directly after eating; eat your last meal of the day at least 2-3 hours prior to going to sleep. Avoid tight-fitting clothes. If you are a smoker, reduce or quit smoking. Elevate your head of bed 4-6 inches by putting phone books under the legs at the head of your bed or buy a wedge pillow, but do not use more than two regular pillows as this causes the body to curl and compresses your stomach.     Food group Foods to avoid to reduce reflux   Beverages  Whole milk, 2% milk, chocolate milk/hot chocolate, alcohol, coffee (regular and decaf), caffeinated tea, mint tea, carbonated beverages, citrus juice    Breads/grains Commercial sweet rolls, doughnuts, croissants, and other high-fat pastries    Fruits and vegetables Fried or cream-style vegetables, tomatoes, tomato-based products, citrus fruits, hot peppers    Soups and seasonings Cream, cheese, tomato-based soups, vinegar    Meats and proteins Fatty or fried meat/fish, cody, sausage, pepperoni, lunch meat, fried eggs    Fats and oil Lard, cody drippings, salt pork, meat drippings, gravies, highly seasoned salad dressings, nuts    Sweets/desserts Anything made with or from chocolate, peppermint, spearmint, whole milk, or cream; high-fat pastries, gum, hard candy

## 2022-02-10 NOTE — PROGRESS NOTES
[facility]    Subjective:      Patient: Skyler Espinoza Patient PCP: Abhi Cardona MD (Inactive)         :  1950     Sex:  female      MRN:  [id number]          Date of Visit: 02/10/2022      Chief Complaint: Follow up right serous otitis media    Patient ID:[id number] Skyler Espinoza is a 71 y.o. female who was self-referred for post nasal drip. Pt has been previously treated for sinus pressure with PND with Amoxil and prednisone burst. Pt was last seen at [facility] in [place] by ENT PA for PND. Pt was treated with levaquin 500mg once daily x 14 days along with prednisone 20mg taper and advised that should her symptoms not resolve, they would go forward with CT sinus scan. Pt had CT sinus WO 2021 which showed mild mucosal thickening of the inferior right maxillary sinus with mild scattered thickening of ethmoid air cells with rightward septal deviation. Otherwise, sinus CT was unremarkable for significant disease. Pt recently saw here PCP outside of the [facility] system and was placed on Keflex for treatment of her sinus infection which she finished last Thursday and pt was then placed on a steroid pulse for 5 days which helped. Pt states that her sinus pressure and pain has improved with the use of recent antibiotics. Pt states she has occasional clear rhinorrhea. Pt endorses chronic PND for years with a recent flair up around 2.5 weeks ago. Pt lives with her daughter and her -in-law and her  and law had been sick prior to her flair up. Current sinonasal medications zyrtec; flonase; astelin PRN; saline irrigations two times a day.  She does not regularly use nasal decongestant sprays. She recalls previously having allergy testingpositive for penicillin and seafood-was not tested for environmental allergies-just tested for food and drug allergies. Pt reports subjective seasonal allergies with occasional runny and itchy nose and sneezing. She denies a history of asthma.She relates a  history of reflux symptomscontrolled with protonix 40mg daily.  She does not drink caffeinated beverages on a daily basis.She relates a diagnosis of obstructive sleep apnea with regular CPAP use. She has not had sinonasal surgery.  She has had a tonsillectomy and adenoidectomy.She does not recall a prior history of nasal trauma.    Interval History 01/28/2022: Pt is seen in clinic today for ear pain. Pt has not been taking gaviscon for acid reflux. Pt continues to take protonix 40mg daily. Pt had relief of PND secondary to LPR. Pt has had bilateral frontal pressure and pain. Pt has also had associated right sided ear pain since yesterday-right sided ear pain has resolved. Pt has started hyperbaric O2-with Neurologist Dr. Shields for memory issues with last treatment this past Wednesday. Pt had acute congestion and runny nose last night-pt not having runny nose today.     02/10/2022: Pt continues to have PND with frontal headaches. Pt endorses clear rhinorrhea and right ear pain. Pt had environmental allergy testing after last visit, which was negative. Pt continues to take protonix 40mg daily, but has not started gaviscon. Pt has no other ENT complaints at this time.     Review of Systems   HENT: Positive for ear pain, postnasal drip and rhinorrhea.    Eyes: Negative.    Cardiovascular: Negative.    Endocrine: Negative.    Genitourinary: Negative.    Skin: Negative.    Neurological: Positive for headaches.   Hematological: Negative.    Psychiatric/Behavioral: Positive for decreased concentration.      Past Medical History  She has a past medical history of Depression, GERD (gastroesophageal reflux disease), Hyperlipidemia, Hypertension, and Hypothyroid.    Family History  Her family history includes Cancer in her father and sister.    Past Surgical History:   Procedure Laterality Date    APPENDECTOMY      COLONOSCOPY  2017    Briana, repeat in 10    TONSILLECTOMY      TUBAL LIGATION       Social History      Tobacco Use    Smoking status: Never Smoker    Smokeless tobacco: Never Used   Substance and Sexual Activity    Alcohol use: Not Currently     Comment: rarely    Drug use: No    Sexual activity: Yes     Medications  She has a current medication list which includes the following prescription(s): ascorbic acid (vitamin c), aspirin, azelastine, bupropion, cetirizine, fluticasone propionate, fluticasone propionate, ipratropium, levothyroxine, lovastatin, metoprolol succinate, multivit-min-ferrous fumarate, ondansetron, pantoprazole, polyethylene glycol, selenium, sumatriptan, valacyclovir, and vitamin d.    Review of patient's allergies indicates:  No Known Allergies  All medications, allergies, and past history have been reviewed.    Objective:      Vitals:  Vitals - 1 value per visit 1/28/2022 2/10/2022 2/10/2022   SYSTOLIC - - -   DIASTOLIC - - -   Pulse - - -   Temp - - -   Resp - - -   SPO2 - - -   Weight (lb) 215.61 - 211.64   Weight (kg) 97.8 - 96   Height 68 - 68   BMI (Calculated) 32.8 - 32.2   VISIT REPORT - - -   Pain Score  - 0 -   Some recent data might be hidden       Body surface area is 2.15 meters squared.  Physical Exam  Constitutional:       General: She is not in acute distress.     Appearance: Normal appearance. She is not ill-appearing.   HENT:      Head: Normocephalic and atraumatic.      Right Ear: Ear canal and external ear normal. A middle ear effusion (nearly resolved serous effusion) is present.      Left Ear: Tympanic membrane, ear canal and external ear normal.      Nose: Nose normal.      Mouth/Throat:      Lips: Pink. No lesions.      Mouth: Mucous membranes are moist.      Tongue: No lesions.      Palate: No lesions.      Pharynx: Oropharynx is clear. Uvula midline. No pharyngeal swelling, oropharyngeal exudate, posterior oropharyngeal erythema or uvula swelling.   Eyes:      General:         Right eye: No discharge.         Left eye: No discharge.      Extraocular Movements:  Extraocular movements intact.      Conjunctiva/sclera: Conjunctivae normal.   Pulmonary:      Effort: Pulmonary effort is normal.   Neurological:      General: No focal deficit present.      Mental Status: She is alert and oriented to person, place, and time. Mental status is at baseline.   Psychiatric:         Mood and Affect: Mood normal.         Behavior: Behavior normal.         Thought Content: Thought content normal.         Judgment: Judgment normal.     Labs:  WBC   Date Value Ref Range Status   02/02/2021 5.39 3.90 - 12.70 K/uL Final     Eosinophil %   Date Value Ref Range Status   02/02/2021 1.9 0.0 - 8.0 % Final     Eos #   Date Value Ref Range Status   02/02/2021 0.1 0.0 - 0.5 K/uL Final     Platelets   Date Value Ref Range Status   02/02/2021 219 150 - 350 K/uL Final     Glucose   Date Value Ref Range Status   02/02/2021 108 70 - 110 mg/dL Final     Imaging:  CT Sinus WO 08/17/2021  I independently reviewed the images of the CT sinuses dated 08/17/2021. Pertinent findings include Rightward septal deviation with mucosal thickening in the inferior right maxillary sinus.  All lab results, imaging results, and data have been reviewed.    Assessment:        ICD-10-CM ICD-9-CM   1. Chronic rhinitis  J31.0 472.0   2. PND (post-nasal drip)  R09.82 784.91   3. History of migraine  Z86.69 V12.49   4. Non-recurrent acute serous otitis media of right ear  H65.01 381.01   5. Laryngopharyngeal reflux (LPR)  K21.9 478.79            Plan:      Chronic rhinitis  -     ipratropium (ATROVENT) 21 mcg (0.03 %) nasal spray; 2 sprays by Nasal route 3 (three) times daily.  Dispense: 30 mL; Refill: 1    PND (post-nasal drip)  -Pt PND likely secondary to LPR. Pt is to continue flonase 1 puff to each nostril BID. Pt is to continue protonix 40mg daily and start gaviscon after breakfast, lunch, dinner and prior to bed time x 8 weeks.    History of migraine  -Advised that frontal headaches are likely migraine related. Pt will follow  up with her neurologist.     Non-recurrent acute serous otitis media of right ear  -Pt is to continue flonase 1 puff to each nostril BID and is to pop her ears 4-6 times a day.     Laryngopharyngeal reflux (LPR)  -Pt is to continue protonix 40mg daily and start gaviscon after breakfast, lunch, dinner and prior to bed time x 8 weeks.  -Pt is to follow acid reflux handout provided to her as can be seen in AVS.      -Pt is to follow up in 8 weeks to re-assess how LPR and PND are doing with above management and to check for clearance of right ear serous effusion.

## 2022-02-11 ENCOUNTER — PATIENT MESSAGE (OUTPATIENT)
Dept: OTOLARYNGOLOGY | Facility: CLINIC | Age: 72
End: 2022-02-11
Payer: MEDICARE

## 2022-02-11 RX ORDER — TRIAMCINOLONE ACETONIDE 40 MG/ML
40 INJECTION, SUSPENSION INTRA-ARTICULAR; INTRAMUSCULAR
Status: DISCONTINUED | OUTPATIENT
Start: 2022-02-10 | End: 2022-02-11 | Stop reason: HOSPADM

## 2022-02-12 NOTE — PROGRESS NOTES
Past Medical History:   Diagnosis Date    Depression     GERD (gastroesophageal reflux disease)     Hyperlipidemia     Hypertension     Hypothyroid        Past Surgical History:   Procedure Laterality Date    APPENDECTOMY      COLONOSCOPY  2017    Briana, repeat in 10    TONSILLECTOMY      TUBAL LIGATION         Current Outpatient Medications   Medication Sig    ascorbic acid, vitamin C, (VITAMIN C) 1000 MG tablet Take 1,000 mg by mouth once daily.    cetirizine (ZYRTEC) 10 MG tablet Take 10 mg by mouth once daily.    fluticasone propionate (FLONASE) 50 mcg/actuation nasal spray 1 spray (50 mcg total) by Each Nostril route 2 (two) times daily.    levothyroxine (SYNTHROID) 75 MCG tablet Take 75 mcg by mouth before breakfast.    lovastatin (MEVACOR) 20 MG tablet TAKE 1 TABLET BY MOUTH EVERY EVENING    metoprolol succinate (TOPROL-XL) 25 MG 24 hr tablet Take 25 mg by mouth once daily.    multivit-min-ferrous fumarate 9 mg iron/15 mL Liqd as directed    ondansetron (ZOFRAN-ODT) 4 MG TbDL Take 1 tablet (4 mg total) by mouth every 8 (eight) hours as needed.    pantoprazole (PROTONIX) 40 MG tablet Take 1 tablet (40 mg total) by mouth once daily.    selenium 50 mcg Tab Take 300 mcg by mouth once daily.     sumatriptan (IMITREX) 100 MG tablet 1 p.o. q.4 hours p.r.n. headache No more than twice in a 24 hour period    valACYclovir (VALTREX) 1000 MG tablet     vitamin D 1000 units Tab Take 1,000 Units by mouth once daily.    aspirin (ECOTRIN) 81 MG EC tablet Take 81 mg by mouth once daily.    azelastine (ASTELIN) 137 mcg (0.1 %) nasal spray 1 spray (137 mcg total) by Nasal route 2 (two) times daily.    buPROPion (WELLBUTRIN XL) 150 MG TB24 tablet Take by mouth.    fluticasone propionate (FLONASE) 50 mcg/actuation nasal spray 1 spray (50 mcg total) by Each Nostril route once daily.    ipratropium (ATROVENT) 21 mcg (0.03 %) nasal spray USE 2 SPRAYS IN EACH NOSTRIL THREE TIMES DAILY    polyethylene  glycol (GLYCOLAX) 17 gram PwPk Take by mouth.     No current facility-administered medications for this visit.       Review of patient's allergies indicates:   Allergen Reactions    Lidocaine      Hand edema,  redness    Penicillins Rash       Family History   Problem Relation Age of Onset    Cancer Father     Cancer Sister     Glaucoma Neg Hx     Macular degeneration Neg Hx     Retinal detachment Neg Hx     Breast cancer Neg Hx     Ovarian cancer Neg Hx     Eczema Neg Hx     Lupus Neg Hx     Melanoma Neg Hx     Psoriasis Neg Hx        Social History     Socioeconomic History    Marital status:    Tobacco Use    Smoking status: Never Smoker    Smokeless tobacco: Never Used   Substance and Sexual Activity    Alcohol use: Not Currently     Comment: rarely    Drug use: No    Sexual activity: Yes       Chief Complaint:   Chief Complaint   Patient presents with    Right Knee - Pain    Left Knee - Pain    Knee Pain       History: This is a 71-year-old female who has had a history of left knee arthritis since 2014.      Present: She comes in today bilateral knee pain.    Patient had good results with both the Euflexxa and cortisone.  Pain is a 5/10.  Last treatments was some cortisone in both knees back in March.  Pain returned about a month ago.    Review of systems:   Musculoskeletal: See history of present illness    Physical examination:    Vital Signs:    Vitals:    02/10/22 1515   Resp: 18       Body mass index is 32.08 kg/m².    This a well-developed, well nourished patient in no acute distress.  They are alert and oriented and cooperative to examination.  Pt. walks without an antalgic gait.      Examination of bilateral knee shows no rashes or erythema. There are no masses ecchymosis or effusion. Patient has full range of motion from 0-130°. Patient is nontender to palpation over lateral joint line and moderately tender to palpation over the medial joint line.  Knee is stable to varus  and valgus stress. 5 out of 5 motor strength. Palpable distal pulses. Intact light touch sensation.  Severe Patellofemoral crepitus      X-rays: 4 views of both knees are  reviewed which show arthritic changes with severe changes of the patellofemoral joint and more moderate changes of the medial compartment of both knees .  No significant change  X-rays of the right shoulder  reviewed which show some mild degenerative changes well-maintained glenohumeral joint space     Assessment:: Bilateral knee osteoarthritis      Plan:  I reviewed the x-ray with her today.  We discussed treatment options for her knee arthritis.  Patient elected to have cortisone injections in both knees.  Might do Euflexxa again if needed.

## 2022-02-12 NOTE — PROCEDURES
Large Joint Aspiration/Injection: bilateral knee    Date/Time: 2/10/2022 4:00 PM  Performed by: David Chan MD  Authorized by: David Chan MD     Consent Done?:  Yes (Verbal)  Indications:  Pain  Site marked: the procedure site was marked    Timeout: prior to procedure the correct patient, procedure, and site was verified    Prep: patient was prepped and draped in usual sterile fashion      Local anesthesia used?: Yes    Anesthesia:  Local infiltration  Local anesthetic:  Bupivacaine 0.25% without epinephrine and lidocaine 2% without epinephrine  Anesthetic total (ml):  6      Details:  Needle Size:  22 G  Ultrasonic Guidance for needle placement?: No    Approach:  Anterolateral  Location:  Knee  Laterality:  Bilateral  Site:  Bilateral knee  Medications (Right):  40 mg triamcinolone acetonide 40 mg/mL  Medications (Left):  40 mg triamcinolone acetonide 40 mg/mL  Patient tolerance:  Patient tolerated the procedure well with no immediate complications

## 2022-02-14 ENCOUNTER — PATIENT MESSAGE (OUTPATIENT)
Dept: OTOLARYNGOLOGY | Facility: CLINIC | Age: 72
End: 2022-02-14
Payer: MEDICARE

## 2022-05-31 DIAGNOSIS — G43.701 CHRONIC MIGRAINE WITHOUT AURA WITH STATUS MIGRAINOSUS, NOT INTRACTABLE: ICD-10-CM

## 2022-05-31 DIAGNOSIS — R41.3 MEMORY IMPAIRMENT: Primary | ICD-10-CM

## 2022-06-16 ENCOUNTER — HOSPITAL ENCOUNTER (OUTPATIENT)
Dept: RADIOLOGY | Facility: HOSPITAL | Age: 72
Discharge: HOME | End: 2022-06-16
Attending: NURSE PRACTITIONER
Payer: MEDICARE

## 2022-06-16 DIAGNOSIS — G43.701 CHRONIC MIGRAINE WITHOUT AURA WITH STATUS MIGRAINOSUS, NOT INTRACTABLE: ICD-10-CM

## 2022-06-16 DIAGNOSIS — R41.3 MEMORY IMPAIRMENT: ICD-10-CM

## 2022-06-16 PROCEDURE — 70551 MRI BRAIN STEM W/O DYE: CPT | Mod: TC,PO

## 2022-07-20 DIAGNOSIS — M17.12 PRIMARY OSTEOARTHRITIS OF LEFT KNEE: ICD-10-CM

## 2022-07-20 DIAGNOSIS — M17.11 PRIMARY OSTEOARTHRITIS OF RIGHT KNEE: Primary | ICD-10-CM

## 2022-08-11 ENCOUNTER — TELEPHONE (OUTPATIENT)
Dept: OTOLARYNGOLOGY | Facility: CLINIC | Age: 72
End: 2022-08-11
Payer: MEDICARE

## 2022-08-11 ENCOUNTER — NURSE TRIAGE (OUTPATIENT)
Dept: ADMINISTRATIVE | Facility: CLINIC | Age: 72
End: 2022-08-11
Payer: MEDICARE

## 2022-08-11 NOTE — TELEPHONE ENCOUNTER
Attempted to contact pt's daughter, Allison after receiving the following msg. No answer; left voicemail msg for call back.

## 2022-08-11 NOTE — TELEPHONE ENCOUNTER
Pt covid + positive for 2 days.  Complaints of a mild cough, sore throat, headache and right ear pain.  Care advice states home care, all questions answered.  Advised to call back for worsening symptoms.     Reason for Disposition   [1] COVID-19 diagnosed by positive lab test (e.g., PCR, rapid self-test kit) AND [2] mild symptoms (e.g., cough, fever, others) AND [3] no complications or SOB    Additional Information   Negative: SEVERE difficulty breathing (e.g., struggling for each breath, speaks in single words)   Negative: Difficult to awaken or acting confused (e.g., disoriented, slurred speech)   Negative: Bluish (or gray) lips or face now   Negative: Shock suspected (e.g., cold/pale/clammy skin, too weak to stand, low BP, rapid pulse)   Negative: Sounds like a life-threatening emergency to the triager   Negative: SEVERE or constant chest pain or pressure  (Exception: Mild central chest pain, present only when coughing.)   Negative: MODERATE difficulty breathing (e.g., speaks in phrases, SOB even at rest, pulse 100-120)   Negative: [1] Headache AND [2] stiff neck (can't touch chin to chest)   Negative: Oxygen level (e.g., pulse oximetry) 90 percent or lower   Negative: Chest pain or pressure   Negative: Patient sounds very sick or weak to the triager   Negative: MILD difficulty breathing (e.g., minimal/no SOB at rest, SOB with walking, pulse <100)   Negative: Fever > 103 F (39.4 C)   Negative: [1] Fever > 101 F (38.3 C) AND [2] age > 60 years   Negative: [1] Fever > 100.0 F (37.8 C) AND [2] bedridden (e.g., nursing home patient, CVA, chronic illness, recovering from surgery)   Negative: HIGH RISK for severe COVID complications (e.g., weak immune system, age > 64 years, obesity with BMI > 25, pregnant, chronic lung disease or other chronic medical condition)  (Exception: Already seen by PCP and no new or worsening symptoms.)   Negative: [1] HIGH RISK patient AND [2] influenza is widespread in the  community AND [3] ONE OR MORE respiratory symptoms: cough, sore throat, runny or stuffy nose   Negative: [1] HIGH RISK patient AND [2] influenza exposure within the last 7 days AND [3] ONE OR MORE respiratory symptoms: cough, sore throat, runny or stuffy nose   Negative: Oxygen level (e.g., pulse oximetry) 91 to 94 percent   Negative: Fever present > 3 days (72 hours)   Negative: [1] Fever returns after gone for over 24 hours AND [2] symptoms worse or not improved   Negative: [1] Continuous (nonstop) coughing interferes with work or school AND [2] no improvement using cough treatment per Care Advice   Negative: [1] COVID-19 infection suspected by caller or triager AND [2] mild symptoms (cough, fever, or others) AND [3] negative COVID-19 rapid test   Negative: Cough present > 3 weeks   Negative: [1] COVID-19 diagnosed by positive lab test (e.g., PCR, rapid self-test kit) AND [2] NO symptoms (e.g., cough, fever, others)    Protocols used: CORONAVIRUS (COVID-19) DIAGNOSED OR [id number]

## 2022-08-11 NOTE — TELEPHONE ENCOUNTER
----- Message from Pauline Paez sent at 8/11/2022  4:47 PM CDT -----  Contact: Allison, daughter  Type: Needs Medical Advice    Who Called:  Allison    Symptoms (please be specific):  Covid + yesterday, R ear pain    Pharmacy name and phone #:    [id number] [address] & [address] [address]  Phone: [phone] Fax: [phone]    Best Call Back Number: [phone]    Additional Information: Wanted to know if office can call in Cipro ear drops for ears.  Please call back.  Thanks.

## 2022-08-16 ENCOUNTER — TELEPHONE (OUTPATIENT)
Dept: OTOLARYNGOLOGY | Facility: CLINIC | Age: 72
End: 2022-08-16
Payer: MEDICARE

## 2022-08-16 NOTE — TELEPHONE ENCOUNTER
----- Message from Irais Ramirez sent at 8/16/2022  3:43 PM CDT -----  Contact: Daughter  Type:  Needs Medical Advice    Who Called: Daughter    Would the patient rather a call back or a response via [facility]? Call     Best Call Back Number: [phone]    Additional Information: Daughter needs to speak with nurse about moms nose bleeding when she blows her nose.    Please call to advice

## 2022-08-16 NOTE — TELEPHONE ENCOUNTER
Contacted and spoke w/pt's daughter Allison after receiving the following call back msg. Daughter addressed that pt has been having some minimal bleeding when blowing her nose, and was wanting advice on how to stop the bleeding. I advised, per provider, that pt is to use saline sprays, along with aquaphor to keep bleeding area hydrated. Daughter also informed us that pt is currently using Ipratropium for her PND, which is currently causing pt's nose to try out. Informed that if pt cannot stop Ipratropium, then they are to continue preventative measures.     Also advised that they can use afrin when having prolonged bleeding as well. If bleeding does not stop after 15 min, to head to nearest ED. Also let pt's daughter know that if it continues to be an ongoing issue, to contact our office so we can schedule an appt. Pt's daughter verbalized understanding.

## 2022-08-29 ENCOUNTER — OFFICE VISIT (OUTPATIENT)
Dept: ORTHOPEDICS | Facility: CLINIC | Age: 72
End: 2022-08-29
Payer: MEDICARE

## 2022-08-29 VITALS — BODY MASS INDEX: 31.98 KG/M2 | HEIGHT: 68 IN | RESPIRATION RATE: 18 BRPM | WEIGHT: 211 LBS

## 2022-08-29 DIAGNOSIS — M17.12 PRIMARY OSTEOARTHRITIS OF LEFT KNEE: ICD-10-CM

## 2022-08-29 DIAGNOSIS — M17.11 PRIMARY OSTEOARTHRITIS OF RIGHT KNEE: Primary | ICD-10-CM

## 2022-08-29 PROCEDURE — 99213 OFFICE O/P EST LOW 20 MIN: CPT | Mod: PBBFAC,PN,25 | Performed by: ORTHOPAEDIC SURGERY

## 2022-08-29 PROCEDURE — 20610 LARGE JOINT ASPIRATION/INJECTION: BILATERAL KNEE: ICD-10-PCS | Mod: 50,S$PBB,, | Performed by: ORTHOPAEDIC SURGERY

## 2022-08-29 PROCEDURE — 99213 PR OFFICE/OUTPT VISIT, EST, LEVL III, 20-29 MIN: ICD-10-PCS | Mod: 25,S$PBB,, | Performed by: ORTHOPAEDIC SURGERY

## 2022-08-29 PROCEDURE — 20610 DRAIN/INJ JOINT/BURSA W/O US: CPT | Mod: 50,PBBFAC,PN | Performed by: ORTHOPAEDIC SURGERY

## 2022-08-29 PROCEDURE — 99999 PR PBB SHADOW E&M-EST. PATIENT-LVL III: CPT | Mod: PBBFAC,,, | Performed by: ORTHOPAEDIC SURGERY

## 2022-08-29 PROCEDURE — 99213 OFFICE O/P EST LOW 20 MIN: CPT | Mod: 25,S$PBB,, | Performed by: ORTHOPAEDIC SURGERY

## 2022-08-29 PROCEDURE — 99999 PR PBB SHADOW E&M-EST. PATIENT-LVL III: ICD-10-PCS | Mod: PBBFAC,,, | Performed by: ORTHOPAEDIC SURGERY

## 2022-08-29 RX ORDER — TRIAMCINOLONE ACETONIDE 40 MG/ML
40 INJECTION, SUSPENSION INTRA-ARTICULAR; INTRAMUSCULAR
Status: DISCONTINUED | OUTPATIENT
Start: 2022-08-29 | End: 2022-08-29 | Stop reason: HOSPADM

## 2022-08-29 RX ADMIN — TRIAMCINOLONE ACETONIDE 40 MG: 40 INJECTION, SUSPENSION INTRA-ARTICULAR; INTRAMUSCULAR at 02:08

## 2022-08-29 NOTE — PROCEDURES
Large Joint Aspiration/Injection: bilateral knee    Date/Time: 8/29/2022 2:45 PM  Performed by: David Chan MD  Authorized by: David Chan MD     Consent Done?:  Yes (Verbal)  Indications:  Pain  Site marked: the procedure site was marked    Timeout: prior to procedure the correct patient, procedure, and site was verified    Prep: patient was prepped and draped in usual sterile fashion      Local anesthesia used?: Yes    Anesthesia:  Local infiltration  Local anesthetic:  Bupivacaine 0.25% without epinephrine and lidocaine 2% without epinephrine  Anesthetic total (ml):  6      Details:  Needle Size:  22 G  Ultrasonic Guidance for needle placement?: No    Approach:  Anterolateral  Location:  Knee  Laterality:  Bilateral  Site:  Bilateral knee  Medications (Right):  40 mg triamcinolone acetonide 40 mg/mL  Medications (Left):  40 mg triamcinolone acetonide 40 mg/mL  Patient tolerance:  Patient tolerated the procedure well with no immediate complications

## 2022-08-29 NOTE — PROGRESS NOTES
Past Medical History:   Diagnosis Date    Depression     GERD (gastroesophageal reflux disease)     Hyperlipidemia     Hypertension     Hypothyroid        Past Surgical History:   Procedure Laterality Date    APPENDECTOMY      COLONOSCOPY  2017    Briana, repeat in 10    TONSILLECTOMY      TUBAL LIGATION         Current Outpatient Medications   Medication Sig    ascorbic acid, vitamin C, (VITAMIN C) 1000 MG tablet Take 1,000 mg by mouth once daily.    cetirizine (ZYRTEC) 10 MG tablet Take 10 mg by mouth once daily.    fluticasone propionate (FLONASE) 50 mcg/actuation nasal spray 1 spray (50 mcg total) by Each Nostril route 2 (two) times daily.    ipratropium (ATROVENT) 21 mcg (0.03 %) nasal spray USE 2 SPRAYS IN EACH NOSTRIL THREE TIMES DAILY    levothyroxine (SYNTHROID) 75 MCG tablet Take 75 mcg by mouth before breakfast.    lovastatin (MEVACOR) 20 MG tablet TAKE 1 TABLET BY MOUTH EVERY EVENING    metoprolol succinate (TOPROL-XL) 25 MG 24 hr tablet Take 25 mg by mouth once daily.    multivit-min-ferrous fumarate 9 mg iron/15 mL Liqd as directed    ondansetron (ZOFRAN-ODT) 4 MG TbDL Take 1 tablet (4 mg total) by mouth every 8 (eight) hours as needed.    pantoprazole (PROTONIX) 40 MG tablet Take 1 tablet (40 mg total) by mouth once daily.    selenium 50 mcg Tab Take 300 mcg by mouth once daily.     sumatriptan (IMITREX) 100 MG tablet 1 p.o. q.4 hours p.r.n. headache No more than twice in a 24 hour period    valACYclovir (VALTREX) 1000 MG tablet     vitamin D 1000 units Tab Take 1,000 Units by mouth once daily.    azelastine (ASTELIN) 137 mcg (0.1 %) nasal spray 1 spray (137 mcg total) by Nasal route 2 (two) times daily.     No current facility-administered medications for this visit.       Review of patient's allergies indicates:   Allergen Reactions    Lidocaine      Hand edema,  redness    Penicillins Rash       Family History   Problem Relation Age of Onset    Cancer Father     Cancer Sister     Glaucoma Neg Hx      Macular degeneration Neg Hx     Retinal detachment Neg Hx     Breast cancer Neg Hx     Ovarian cancer Neg Hx     Eczema Neg Hx     Lupus Neg Hx     Melanoma Neg Hx     Psoriasis Neg Hx        Social History     Socioeconomic History    Marital status:    Tobacco Use    Smoking status: Never    Smokeless tobacco: Never   Substance and Sexual Activity    Alcohol use: Not Currently     Comment: rarely    Drug use: No    Sexual activity: Yes       Chief Complaint:   Chief Complaint   Patient presents with    Left Knee - Pain    Right Knee - Pain       History: This is a 71-year-old female who has had a history of left knee arthritis since 2014.      Present: She comes in today bilateral knee pain.    Patient had good results with both the Euflexxa and cortisone.  Pain is a 5/10.  Last treatments was some cortisone in both knees back in February.  Pain is a 5/10.    Review of systems:   Musculoskeletal: See history of present illness    Physical examination:    Vital Signs:    Vitals:    08/29/22 1505   Resp: 18       Body mass index is 32.08 kg/m².    This a well-developed, well nourished patient in no acute distress.  They are alert and oriented and cooperative to examination.  Pt. walks without an antalgic gait.      Examination of bilateral knee shows no rashes or erythema. There are no masses ecchymosis or effusion. Patient has full range of motion from 0-130°. Patient is nontender to palpation over lateral joint line and moderately tender to palpation over the medial joint line.  Knee is stable to varus and valgus stress. 5 out of 5 motor strength. Palpable distal pulses. Intact light touch sensation.  Severe Patellofemoral crepitus      X-rays: 4 views of both knees are  reviewed which show arthritic changes with severe changes of the patellofemoral joint and more moderate changes of the medial compartment of both knees .  No significant change  X-rays of the right shoulder  reviewed which show some  mild degenerative changes well-maintained glenohumeral joint space     Assessment:: Bilateral knee osteoarthritis      Plan:  I reviewed the x-ray with her today.  We discussed treatment options for her knee arthritis.  Patient elected to have cortisone injections in both knees.  Might do Euflexxa again if needed.

## 2022-11-13 ENCOUNTER — HOSPITAL ENCOUNTER (EMERGENCY)
Facility: HOSPITAL | Age: 72
Discharge: HOME | End: 2022-11-13
Attending: EMERGENCY MEDICINE
Payer: MEDICARE

## 2022-11-13 VITALS
SYSTOLIC BLOOD PRESSURE: 130 MMHG | HEART RATE: 68 BPM | TEMPERATURE: 98 F | OXYGEN SATURATION: 95 % | WEIGHT: 210 LBS | BODY MASS INDEX: 31.93 KG/M2 | DIASTOLIC BLOOD PRESSURE: 59 MMHG | RESPIRATION RATE: 18 BRPM

## 2022-11-13 DIAGNOSIS — I10 HYPERTENSION, UNSPECIFIED TYPE: ICD-10-CM

## 2022-11-13 DIAGNOSIS — A08.4 VIRAL GASTROENTERITIS: Primary | ICD-10-CM

## 2022-11-13 LAB
ALBUMIN SERPL BCP-MCNC: 4 G/DL (ref 3.5–5.2)
ALP SERPL-CCNC: 65 U/L (ref 55–135)
ALT SERPL W/O P-5'-P-CCNC: 22 U/L (ref 10–44)
ANION GAP SERPL CALC-SCNC: 5 MMOL/L (ref 8–16)
AST SERPL-CCNC: 23 U/L (ref 10–40)
BASOPHILS # BLD AUTO: 0.04 K/UL (ref 0–0.2)
BASOPHILS NFR BLD: 0.4 % (ref 0–1.9)
BILIRUB SERPL-MCNC: 0.7 MG/DL (ref 0.1–1)
BILIRUB UR QL STRIP: NEGATIVE
BUN SERPL-MCNC: 24 MG/DL (ref 8–23)
C DIFF GDH STL QL: NEGATIVE
C DIFF TOX A+B STL QL IA: NEGATIVE
CALCIUM SERPL-MCNC: 9.2 MG/DL (ref 8.7–10.5)
CHLORIDE SERPL-SCNC: 106 MMOL/L (ref 95–110)
CLARITY UR: CLEAR
CO2 SERPL-SCNC: 26 MMOL/L (ref 23–29)
COLOR UR: YELLOW
CREAT SERPL-MCNC: 0.9 MG/DL (ref 0.5–1.4)
DIFFERENTIAL METHOD: NORMAL
EOSINOPHIL # BLD AUTO: 0.2 K/UL (ref 0–0.5)
EOSINOPHIL NFR BLD: 1.9 % (ref 0–8)
ERYTHROCYTE [DISTWIDTH] IN BLOOD BY AUTOMATED COUNT: 13.8 % (ref 11.5–14.5)
EST. GFR  (NO RACE VARIABLE): >60 ML/MIN/1.73 M^2
GLUCOSE SERPL-MCNC: 120 MG/DL (ref 70–110)
GLUCOSE UR QL STRIP: NEGATIVE
HCT VFR BLD AUTO: 39.3 % (ref 37–48.5)
HGB BLD-MCNC: 13.2 G/DL (ref 12–16)
HGB UR QL STRIP: NEGATIVE
IMM GRANULOCYTES # BLD AUTO: 0.03 K/UL (ref 0–0.04)
IMM GRANULOCYTES NFR BLD AUTO: 0.3 % (ref 0–0.5)
KETONES UR QL STRIP: NEGATIVE
LEUKOCYTE ESTERASE UR QL STRIP: NEGATIVE
LIPASE SERPL-CCNC: 50 U/L (ref 4–60)
LYMPHOCYTES # BLD AUTO: 2.2 K/UL (ref 1–4.8)
LYMPHOCYTES NFR BLD: 20.9 % (ref 18–48)
MCH RBC QN AUTO: 30 PG (ref 27–31)
MCHC RBC AUTO-ENTMCNC: 33.6 G/DL (ref 32–36)
MCV RBC AUTO: 89 FL (ref 82–98)
MONOCYTES # BLD AUTO: 0.9 K/UL (ref 0.3–1)
MONOCYTES NFR BLD: 8.3 % (ref 4–15)
NEUTROPHILS # BLD AUTO: 7 K/UL (ref 1.8–7.7)
NEUTROPHILS NFR BLD: 68.2 % (ref 38–73)
NITRITE UR QL STRIP: NEGATIVE
NRBC BLD-RTO: 0 /100 WBC
OB PNL STL: POSITIVE
PH UR STRIP: 6 [PH] (ref 5–8)
PLATELET # BLD AUTO: 288 K/UL (ref 150–450)
PMV BLD AUTO: 10.7 FL (ref 9.2–12.9)
POTASSIUM SERPL-SCNC: 3.7 MMOL/L (ref 3.5–5.1)
PROT SERPL-MCNC: 6.7 G/DL (ref 6–8.4)
PROT UR QL STRIP: NEGATIVE
RBC # BLD AUTO: 4.4 M/UL (ref 4–5.4)
SODIUM SERPL-SCNC: 137 MMOL/L (ref 136–145)
SP GR UR STRIP: 1.02 (ref 1–1.03)
TSH SERPL DL<=0.005 MIU/L-ACNC: 5.45 UIU/ML (ref 0.34–5.6)
URN SPEC COLLECT METH UR: NORMAL
UROBILINOGEN UR STRIP-ACNC: NEGATIVE EU/DL
WBC # BLD AUTO: 10.29 K/UL (ref 3.9–12.7)
WBC #/AREA STL HPF: ABNORMAL /[HPF]

## 2022-11-13 PROCEDURE — 89055 LEUKOCYTE ASSESSMENT FECAL: CPT | Performed by: EMERGENCY MEDICINE

## 2022-11-13 PROCEDURE — 63600175 PHARM REV CODE 636 W HCPCS: Performed by: EMERGENCY MEDICINE

## 2022-11-13 PROCEDURE — 87449 NOS EACH ORGANISM AG IA: CPT | Performed by: EMERGENCY MEDICINE

## 2022-11-13 PROCEDURE — 83690 ASSAY OF LIPASE: CPT | Performed by: EMERGENCY MEDICINE

## 2022-11-13 PROCEDURE — 36415 COLL VENOUS BLD VENIPUNCTURE: CPT | Performed by: EMERGENCY MEDICINE

## 2022-11-13 PROCEDURE — 87046 STOOL CULTR AEROBIC BACT EA: CPT | Mod: 59 | Performed by: EMERGENCY MEDICINE

## 2022-11-13 PROCEDURE — 96374 THER/PROPH/DIAG INJ IV PUSH: CPT

## 2022-11-13 PROCEDURE — 81003 URINALYSIS AUTO W/O SCOPE: CPT | Performed by: EMERGENCY MEDICINE

## 2022-11-13 PROCEDURE — 99284 EMERGENCY DEPT VISIT MOD MDM: CPT

## 2022-11-13 PROCEDURE — 82272 OCCULT BLD FECES 1-3 TESTS: CPT | Performed by: EMERGENCY MEDICINE

## 2022-11-13 PROCEDURE — 85025 COMPLETE CBC W/AUTO DIFF WBC: CPT | Performed by: EMERGENCY MEDICINE

## 2022-11-13 PROCEDURE — 84443 ASSAY THYROID STIM HORMONE: CPT | Performed by: EMERGENCY MEDICINE

## 2022-11-13 PROCEDURE — C9113 INJ PANTOPRAZOLE SODIUM, VIA: HCPCS | Performed by: EMERGENCY MEDICINE

## 2022-11-13 PROCEDURE — 87209 SMEAR COMPLEX STAIN: CPT | Performed by: EMERGENCY MEDICINE

## 2022-11-13 PROCEDURE — 87045 FECES CULTURE AEROBIC BACT: CPT | Performed by: EMERGENCY MEDICINE

## 2022-11-13 PROCEDURE — 25000003 PHARM REV CODE 250: Performed by: EMERGENCY MEDICINE

## 2022-11-13 PROCEDURE — 80053 COMPREHEN METABOLIC PANEL: CPT | Performed by: EMERGENCY MEDICINE

## 2022-11-13 PROCEDURE — 87177 OVA AND PARASITES SMEARS: CPT | Performed by: EMERGENCY MEDICINE

## 2022-11-13 PROCEDURE — 96361 HYDRATE IV INFUSION ADD-ON: CPT

## 2022-11-13 RX ORDER — AMLODIPINE BESYLATE 5 MG/1
5 TABLET ORAL
Status: COMPLETED | OUTPATIENT
Start: 2022-11-13 | End: 2022-11-13

## 2022-11-13 RX ORDER — PANTOPRAZOLE SODIUM 40 MG/10ML
40 INJECTION, POWDER, LYOPHILIZED, FOR SOLUTION INTRAVENOUS
Status: COMPLETED | OUTPATIENT
Start: 2022-11-13 | End: 2022-11-13

## 2022-11-13 RX ORDER — AMLODIPINE BESYLATE 5 MG/1
5 TABLET ORAL DAILY
Qty: 30 TABLET | Refills: 0 | Status: SHIPPED | OUTPATIENT
Start: 2022-11-13 | End: 2023-11-13

## 2022-11-13 RX ADMIN — PANTOPRAZOLE SODIUM 40 MG: 40 INJECTION, POWDER, FOR SOLUTION INTRAVENOUS at 02:11

## 2022-11-13 RX ADMIN — SODIUM CHLORIDE, SODIUM LACTATE, POTASSIUM CHLORIDE, AND CALCIUM CHLORIDE 1000 ML: .6; .31; .03; .02 INJECTION, SOLUTION INTRAVENOUS at 02:11

## 2022-11-13 RX ADMIN — AMLODIPINE BESYLATE 5 MG: 5 TABLET ORAL at 02:11

## 2022-11-13 NOTE — ED PROVIDER NOTES
Encounter Date: 11/13/2022       History     Chief Complaint   Patient presents with    Diarrhea     Diarrhea x 2 hours.      72-year-old female presented emergency department with 2 episodes of loose bowel movements and daughter concerned about possible dehydration and presented here.  Patient also has history of memory problems and recently had a migraine headache however does not have any headache today.  Patient complains of nausea vomiting and diarrhea.  Patient received Zofran given by daughter and now not nauseated after that.  Patient denies any blood in stool and patient had two lose bowel movements.  Daughter also noted blood pressure elevated.    Review of patient's allergies indicates:  No Known Allergies  Past Medical History:   Diagnosis Date    Depression     GERD (gastroesophageal reflux disease)     Hyperlipidemia     Hypertension     Hypothyroid      Past Surgical History:   Procedure Laterality Date    APPENDECTOMY      COLONOSCOPY  2017    Briana, repeat in 10    TONSILLECTOMY      TUBAL LIGATION       Family History   Problem Relation Age of Onset    Cancer Father     Cancer Sister     Glaucoma Neg Hx     Macular degeneration Neg Hx     Retinal detachment Neg Hx     Breast cancer Neg Hx     Ovarian cancer Neg Hx     Eczema Neg Hx     Lupus Neg Hx     Melanoma Neg Hx     Psoriasis Neg Hx      Social History     Tobacco Use    Smoking status: Never    Smokeless tobacco: Never   Substance Use Topics    Alcohol use: Not Currently     Comment: rarely    Drug use: No     Review of Systems   Constitutional: Negative.    HENT: Negative.     Eyes: Negative.    Respiratory: Negative.     Cardiovascular: Negative.  Negative for chest pain.   Gastrointestinal:  Positive for diarrhea and nausea.   Endocrine: Negative.    Genitourinary: Negative.    Musculoskeletal: Negative.    Skin: Negative.    Allergic/Immunologic: Negative.    Neurological: Negative.    Hematological: Negative.     Psychiatric/Behavioral: Negative.     All other systems reviewed and are negative.    Physical Exam     Initial Vitals [11/13/22 0143]   BP Pulse Resp Temp SpO2   (!) 161/91 75 18 97.6 °F (36.4 °C) 96 %      MAP       --         Physical Exam    Nursing note and vitals reviewed.  Constitutional: She appears well-developed and well-nourished.   HENT:   Head: Normocephalic and atraumatic.   Nose: Nose normal.   Mouth/Throat: Oropharynx is clear and moist.   Eyes: Conjunctivae and EOM are normal. Pupils are equal, round, and reactive to light.   Neck: Neck supple. No thyromegaly present. No tracheal deviation present. No JVD present.   Normal range of motion.  Cardiovascular:  Normal rate, regular rhythm, normal heart sounds and intact distal pulses.           No murmur heard.  Pulmonary/Chest: Breath sounds normal. No stridor. No respiratory distress. She has no wheezes. She has no rales.   Abdominal: Abdomen is soft. Bowel sounds are normal. She exhibits no distension. There is no abdominal tenderness.   Musculoskeletal:         General: No edema. Normal range of motion.      Cervical back: Normal range of motion and neck supple.     Neurological: She is alert and oriented to person, place, and time. She has normal strength. GCS score is 15. GCS eye subscore is 4. GCS verbal subscore is 5. GCS motor subscore is 6.   Does have decreased short-term memory at baseline in process of seeing neurologist next week   Skin: Skin is warm. Capillary refill takes less than 2 seconds.   Psychiatric: She has a normal mood and affect. Thought content normal.       ED Course   Procedures  Labs Reviewed   COMPREHENSIVE METABOLIC PANEL - Abnormal; Notable for the following components:       Result Value    Glucose 120 (*)     BUN 24 (*)     Anion Gap 5 (*)     All other components within normal limits   CULTURE, STOOL   CLOSTRIDIUM DIFFICILE   CBC W/ AUTO DIFFERENTIAL   LIPASE   TSH   URINALYSIS, REFLEX TO URINE CULTURE   OCCULT  BLOOD X 1, STOOL   STOOL EXAM-OVA,CYSTS,PARASITES   WBC, STOOL   TSH          Imaging Results    None          Medications   lactated ringers bolus 1,000 mL (1,000 mLs Intravenous New Bag 11/13/22 0232)   pantoprazole injection 40 mg (40 mg Intravenous Given 11/13/22 0229)   amLODIPine tablet 5 mg (5 mg Oral Given 11/13/22 0229)     Medical Decision Making:   Differential Diagnosis:   72-year-old female presented emergency department with elevated blood pressure and diarrhea patient has symptomsitis.  Patient did feel better after hydration and patient took Zofran prior to arrival and nausea resolved and now tolerating oral fluids.  As feeling better discharged with instructions and follow-up.  Patient has follow-up with neurologist next week.  Patient's daughter to follow her thyroid results.  Patient discharged with Norvasc prescription as well.  Clinical Tests:   Lab Tests: Reviewed                        Clinical Impression:   Final diagnoses:  [A08.4] Viral gastroenteritis (Primary)  [I10] Hypertension, unspecified type      ED Disposition Condition    Discharge Stable          ED Prescriptions       Medication Sig Dispense Start Date End Date Auth. Provider    amLODIPine (NORVASC) 5 MG tablet Take 1 tablet (5 mg total) by mouth once daily. 30 tablet 11/13/2022 11/13/2023 Rey Adan MD          Follow-up Information    None          Rey Adan MD  11/13/22 3289       Rey Adan MD  11/13/22 0246

## 2022-11-15 LAB
STOOL CULTURE: NORMAL
STOOL CULTURE: NORMAL

## 2022-11-17 LAB
O+P SPEC MICRO: NORMAL
O+P STL CONC: NORMAL

## 2023-01-10 DIAGNOSIS — M54.2 CERVICALGIA: Primary | ICD-10-CM

## 2023-01-12 DIAGNOSIS — R09.82 POSTNASAL DRIP: Primary | ICD-10-CM

## 2023-01-17 ENCOUNTER — HOSPITAL ENCOUNTER (OUTPATIENT)
Dept: RADIOLOGY | Facility: HOSPITAL | Age: 73
Discharge: HOME | End: 2023-01-17
Attending: PAIN MEDICINE
Payer: MEDICARE

## 2023-01-17 DIAGNOSIS — M54.2 CERVICALGIA: ICD-10-CM

## 2023-01-17 PROCEDURE — 72040 X-RAY EXAM NECK SPINE 2-3 VW: CPT | Mod: TC,PO

## 2023-01-17 PROCEDURE — 72141 MRI NECK SPINE W/O DYE: CPT | Mod: TC,PO

## 2023-01-19 ENCOUNTER — HOSPITAL ENCOUNTER (OUTPATIENT)
Dept: RADIOLOGY | Facility: HOSPITAL | Age: 73
Discharge: HOME | End: 2023-01-19
Attending: OTOLARYNGOLOGY
Payer: MEDICARE

## 2023-01-19 DIAGNOSIS — R09.82 POSTNASAL DRIP: ICD-10-CM

## 2023-01-19 PROCEDURE — 70220 X-RAY EXAM OF SINUSES: CPT | Mod: TC,PO

## 2023-02-06 ENCOUNTER — HOSPITAL ENCOUNTER (OUTPATIENT)
Dept: RADIOLOGY | Facility: HOSPITAL | Age: 73
Discharge: HOME | End: 2023-02-06
Attending: ORTHOPAEDIC SURGERY
Payer: MEDICARE

## 2023-02-06 ENCOUNTER — OFFICE VISIT (OUTPATIENT)
Dept: ORTHOPEDICS | Facility: CLINIC | Age: 73
End: 2023-02-06
Payer: MEDICARE

## 2023-02-06 VITALS — BODY MASS INDEX: 31.83 KG/M2 | WEIGHT: 210 LBS | HEIGHT: 68 IN | RESPIRATION RATE: 18 BRPM

## 2023-02-06 DIAGNOSIS — M17.11 PRIMARY OSTEOARTHRITIS OF RIGHT KNEE: ICD-10-CM

## 2023-02-06 DIAGNOSIS — M17.12 PRIMARY OSTEOARTHRITIS OF LEFT KNEE: ICD-10-CM

## 2023-02-06 DIAGNOSIS — M17.11 PRIMARY OSTEOARTHRITIS OF RIGHT KNEE: Primary | ICD-10-CM

## 2023-02-06 PROCEDURE — 20610 DRAIN/INJ JOINT/BURSA W/O US: CPT | Mod: 50,PBBFAC,PN | Performed by: ORTHOPAEDIC SURGERY

## 2023-02-06 PROCEDURE — 99213 OFFICE O/P EST LOW 20 MIN: CPT | Mod: PBBFAC,PN,25 | Performed by: ORTHOPAEDIC SURGERY

## 2023-02-06 PROCEDURE — 99213 PR OFFICE/OUTPT VISIT, EST, LEVL III, 20-29 MIN: ICD-10-PCS | Mod: 25,S$PBB,, | Performed by: ORTHOPAEDIC SURGERY

## 2023-02-06 PROCEDURE — 73564 X-RAY EXAM KNEE 4 OR MORE: CPT | Mod: 26,50,, | Performed by: RADIOLOGY

## 2023-02-06 PROCEDURE — 99999 PR PBB SHADOW E&M-EST. PATIENT-LVL III: ICD-10-PCS | Mod: PBBFAC,,, | Performed by: ORTHOPAEDIC SURGERY

## 2023-02-06 PROCEDURE — 73564 X-RAY EXAM KNEE 4 OR MORE: CPT | Mod: TC,50,PN

## 2023-02-06 PROCEDURE — 73564 XR KNEE ORTHO BILAT WITH FLEXION: ICD-10-PCS | Mod: 26,50,, | Performed by: RADIOLOGY

## 2023-02-06 PROCEDURE — 99999 PR PBB SHADOW E&M-EST. PATIENT-LVL III: CPT | Mod: PBBFAC,,, | Performed by: ORTHOPAEDIC SURGERY

## 2023-02-06 PROCEDURE — 20610 LARGE JOINT ASPIRATION/INJECTION: BILATERAL KNEE: ICD-10-PCS | Mod: 50,S$PBB,, | Performed by: ORTHOPAEDIC SURGERY

## 2023-02-06 PROCEDURE — 99213 OFFICE O/P EST LOW 20 MIN: CPT | Mod: 25,S$PBB,, | Performed by: ORTHOPAEDIC SURGERY

## 2023-02-06 RX ORDER — TRIAMCINOLONE ACETONIDE 40 MG/ML
40 INJECTION, SUSPENSION INTRA-ARTICULAR; INTRAMUSCULAR
Status: DISCONTINUED | OUTPATIENT
Start: 2023-02-06 | End: 2023-02-06 | Stop reason: HOSPADM

## 2023-02-06 RX ADMIN — TRIAMCINOLONE ACETONIDE 40 MG: 40 INJECTION, SUSPENSION INTRA-ARTICULAR; INTRAMUSCULAR at 02:02

## 2023-02-06 NOTE — PROGRESS NOTES
Past Medical History:   Diagnosis Date    Depression     GERD (gastroesophageal reflux disease)     Hyperlipidemia     Hypertension     Hypothyroid        Past Surgical History:   Procedure Laterality Date    APPENDECTOMY      COLONOSCOPY  2017    Briana, repeat in 10    TONSILLECTOMY      TUBAL LIGATION         Current Outpatient Medications   Medication Sig    amLODIPine (NORVASC) 5 MG tablet Take 1 tablet (5 mg total) by mouth once daily.    ascorbic acid, vitamin C, (VITAMIN C) 1000 MG tablet Take 1,000 mg by mouth once daily.    cetirizine (ZYRTEC) 10 MG tablet Take 10 mg by mouth once daily.    fluticasone propionate (FLONASE) 50 mcg/actuation nasal spray 1 spray (50 mcg total) by Each Nostril route 2 (two) times daily.    ipratropium (ATROVENT) 21 mcg (0.03 %) nasal spray USE 2 SPRAYS IN EACH NOSTRIL THREE TIMES DAILY    levothyroxine (SYNTHROID) 75 MCG tablet Take 75 mcg by mouth before breakfast.    lovastatin (MEVACOR) 20 MG tablet TAKE 1 TABLET BY MOUTH EVERY EVENING    metoprolol succinate (TOPROL-XL) 25 MG 24 hr tablet Take 25 mg by mouth once daily.    multivit-min-ferrous fumarate 9 mg iron/15 mL Liqd as directed    ondansetron (ZOFRAN-ODT) 4 MG TbDL Take 1 tablet (4 mg total) by mouth every 8 (eight) hours as needed.    pantoprazole (PROTONIX) 40 MG tablet Take 1 tablet (40 mg total) by mouth once daily.    selenium 50 mcg Tab Take 300 mcg by mouth once daily.     sumatriptan (IMITREX) 100 MG tablet 1 p.o. q.4 hours p.r.n. headache No more than twice in a 24 hour period    valACYclovir (VALTREX) 1000 MG tablet     vitamin D 1000 units Tab Take 1,000 Units by mouth once daily.    azelastine (ASTELIN) 137 mcg (0.1 %) nasal spray 1 spray (137 mcg total) by Nasal route 2 (two) times daily.     No current facility-administered medications for this visit.       Review of patient's allergies indicates:   Allergen Reactions    Lidocaine      Hand edema,  redness    Penicillins Rash       Family History    Problem Relation Age of Onset    Cancer Father     Cancer Sister     Glaucoma Neg Hx     Macular degeneration Neg Hx     Retinal detachment Neg Hx     Breast cancer Neg Hx     Ovarian cancer Neg Hx     Eczema Neg Hx     Lupus Neg Hx     Melanoma Neg Hx     Psoriasis Neg Hx        Social History     Socioeconomic History    Marital status:    Tobacco Use    Smoking status: Never    Smokeless tobacco: Never   Substance and Sexual Activity    Alcohol use: Not Currently     Comment: rarely    Drug use: No    Sexual activity: Yes       Chief Complaint:   Chief Complaint   Patient presents with    Knee Pain     follow up B knees        History: This is a 72-year-old female who has had a history of knee arthritis since 2014.      Present: She comes in today bilateral knee pain.    Patient had good results with both the Euflexxa and cortisone.  Pain is a 5/10.      Review of systems:   Musculoskeletal: See history of present illness    Physical examination:    Vital Signs:    Vitals:    02/06/23 1357   Resp: 18       Body mass index is 31.93 kg/m².    This a well-developed, well nourished patient in no acute distress.  They are alert and oriented and cooperative to examination.  Pt. walks without an antalgic gait.      Examination of bilateral knee shows no rashes or erythema. There are no masses ecchymosis or effusion. Patient has full range of motion from 0-130°. Patient is nontender to palpation over lateral joint line and moderately tender to palpation over the medial joint line.  Knee is stable to varus and valgus stress. 5 out of 5 motor strength. Palpable distal pulses. Intact light touch sensation.  Severe Patellofemoral crepitus      X-rays: 4 views of both knees are ordered and reviewed which show arthritic changes with severe changes of the patellofemoral joint and more moderate changes of the medial compartment of both knees .  No significant change  X-rays of the right shoulder  reviewed which show  some mild degenerative changes well-maintained glenohumeral joint space     Assessment:: Bilateral knee osteoarthritis      Plan:  I reviewed the x-ray with her today.  We discussed treatment options for her knee arthritis.  Patient elected to have cortisone injections in both knees.  Might do Euflexxa again if needed.

## 2023-02-06 NOTE — PROCEDURES
Large Joint Aspiration/Injection: bilateral knee    Date/Time: 2/6/2023 2:00 PM  Performed by: David Chan MD  Authorized by: David Chan MD     Consent Done?:  Yes (Verbal)  Indications:  Pain  Site marked: the procedure site was marked    Timeout: prior to procedure the correct patient, procedure, and site was verified    Prep: patient was prepped and draped in usual sterile fashion      Local anesthesia used?: Yes    Anesthesia:  Local infiltration  Local anesthetic:  Bupivacaine 0.25% without epinephrine and lidocaine 2% without epinephrine  Anesthetic total (ml):  6      Details:  Needle Size:  22 G  Ultrasonic Guidance for needle placement?: No    Approach:  Anterolateral  Location:  Knee  Laterality:  Bilateral  Site:  Bilateral knee  Medications (Right):  40 mg triamcinolone acetonide 40 mg/mL  Medications (Left):  40 mg triamcinolone acetonide 40 mg/mL  Patient tolerance:  Patient tolerated the procedure well with no immediate complications

## 2023-02-07 ENCOUNTER — TELEPHONE (OUTPATIENT)
Dept: ORTHOPEDICS | Facility: CLINIC | Age: 73
End: 2023-02-07
Payer: MEDICARE

## 2023-02-07 NOTE — TELEPHONE ENCOUNTER
Advised daughter that flushing can occur. Advised to go to ED if she starts having hives, SOB, or itching. Pt daughter says mother feeling fine other than the flush.

## 2023-02-07 NOTE — TELEPHONE ENCOUNTER
----- Message from Brady Francis sent at 2/7/2023 12:32 PM CST -----  Who Called: Daughter         What is the reqeust in detail: Requesting call back to discuss pt had injections of steroids in each knee 2/6/23, pt is having facial flushing, daughter is concerned and wants to confirm if that may be a side effect of the steroids.          Can the clinic reply by [facility]? No          Best Call Back Number: [phone]          Additional Information:

## 2023-04-12 DIAGNOSIS — M75.100 ROTATOR CUFF SYNDROME, UNSPECIFIED LATERALITY: Primary | ICD-10-CM

## 2023-05-18 ENCOUNTER — TELEPHONE (OUTPATIENT)
Dept: ORTHOPEDICS | Facility: CLINIC | Age: 73
End: 2023-05-18
Payer: MEDICARE

## 2023-05-18 NOTE — TELEPHONE ENCOUNTER
----- Message from Dariusz Jennings MA sent at 5/18/2023  3:33 PM CDT -----  Contact: dtr/Allison Cooper stated patient needs an appointment, at either location much sooner than what is available.  Patient wants knee injection but wants the Gel.    Call back number is [phone]

## 2023-05-29 ENCOUNTER — OFFICE VISIT (OUTPATIENT)
Dept: ORTHOPEDICS | Facility: CLINIC | Age: 73
End: 2023-05-29
Payer: MEDICARE

## 2023-05-29 DIAGNOSIS — M17.11 PRIMARY OSTEOARTHRITIS OF RIGHT KNEE: Primary | ICD-10-CM

## 2023-05-29 DIAGNOSIS — M25.562 PAIN IN BOTH KNEES, UNSPECIFIED CHRONICITY: ICD-10-CM

## 2023-05-29 DIAGNOSIS — M17.12 PRIMARY OSTEOARTHRITIS OF LEFT KNEE: ICD-10-CM

## 2023-05-29 DIAGNOSIS — M25.561 PAIN IN BOTH KNEES, UNSPECIFIED CHRONICITY: ICD-10-CM

## 2023-05-29 PROCEDURE — 99999 PR PBB SHADOW E&M-EST. PATIENT-LVL I: CPT | Mod: PBBFAC,,, | Performed by: ORTHOPAEDIC SURGERY

## 2023-05-29 PROCEDURE — 99211 OFF/OP EST MAY X REQ PHY/QHP: CPT | Mod: PBBFAC,PO,25 | Performed by: ORTHOPAEDIC SURGERY

## 2023-05-29 PROCEDURE — 99999 PR PBB SHADOW E&M-EST. PATIENT-LVL I: ICD-10-PCS | Mod: PBBFAC,,, | Performed by: ORTHOPAEDIC SURGERY

## 2023-05-29 PROCEDURE — 20610 LARGE JOINT ASPIRATION/INJECTION: BILATERAL KNEE: ICD-10-PCS | Mod: 50,S$PBB,, | Performed by: ORTHOPAEDIC SURGERY

## 2023-05-29 PROCEDURE — 99499 UNLISTED E&M SERVICE: CPT | Mod: S$PBB,,, | Performed by: ORTHOPAEDIC SURGERY

## 2023-05-29 PROCEDURE — 20610 DRAIN/INJ JOINT/BURSA W/O US: CPT | Mod: 50,PBBFAC,PO | Performed by: ORTHOPAEDIC SURGERY

## 2023-05-29 PROCEDURE — 99499 NO LOS: ICD-10-PCS | Mod: S$PBB,,, | Performed by: ORTHOPAEDIC SURGERY

## 2023-05-29 RX ADMIN — Medication 20 MG: at 08:05

## 2023-05-29 NOTE — PROCEDURES
Large Joint Aspiration/Injection: bilateral knee    Date/Time: 5/29/2023 8:30 AM  Performed by: David Chan MD  Authorized by: David Chan MD     Consent Done?:  Yes (Verbal)  Indications:  Pain  Site marked: the procedure site was marked    Timeout: prior to procedure the correct patient, procedure, and site was verified      Details:  Needle Size:  20 G  Approach:  Anterolateral  Location:  Knee  Laterality:  Bilateral  Site:  Bilateral knee  Medications (Right):  20 mg sodium hyaluronate (EUFLEXXA) 10 mg/mL(mw 2.4 -3.6 million)  Medications (Left):  20 mg sodium hyaluronate (EUFLEXXA) 10 mg/mL(mw 2.4 -3.6 million)  Patient tolerance:  Patient tolerated the procedure well with no immediate complications

## 2023-05-29 NOTE — PROGRESS NOTES
Past Medical History:   Diagnosis Date    Depression     GERD (gastroesophageal reflux disease)     Hyperlipidemia     Hypertension     Hypothyroid        Past Surgical History:   Procedure Laterality Date    APPENDECTOMY      COLONOSCOPY  2017    Briana, repeat in 10    TONSILLECTOMY      TUBAL LIGATION         Current Outpatient Medications   Medication Sig    amLODIPine (NORVASC) 5 MG tablet Take 1 tablet (5 mg total) by mouth once daily.    ascorbic acid, vitamin C, (VITAMIN C) 1000 MG tablet Take 1,000 mg by mouth once daily.    azelastine (ASTELIN) 137 mcg (0.1 %) nasal spray 1 spray (137 mcg total) by Nasal route 2 (two) times daily.    cetirizine (ZYRTEC) 10 MG tablet Take 10 mg by mouth once daily.    fluticasone propionate (FLONASE) 50 mcg/actuation nasal spray 1 spray (50 mcg total) by Each Nostril route 2 (two) times daily.    ipratropium (ATROVENT) 21 mcg (0.03 %) nasal spray USE 2 SPRAYS IN EACH NOSTRIL THREE TIMES DAILY    levothyroxine (SYNTHROID) 75 MCG tablet Take 75 mcg by mouth before breakfast.    lovastatin (MEVACOR) 20 MG tablet TAKE 1 TABLET BY MOUTH EVERY EVENING    metoprolol succinate (TOPROL-XL) 25 MG 24 hr tablet Take 25 mg by mouth once daily.    multivit-min-ferrous fumarate 9 mg iron/15 mL Liqd as directed    ondansetron (ZOFRAN-ODT) 4 MG TbDL Take 1 tablet (4 mg total) by mouth every 8 (eight) hours as needed.    pantoprazole (PROTONIX) 40 MG tablet Take 1 tablet (40 mg total) by mouth once daily.    selenium 50 mcg Tab Take 300 mcg by mouth once daily.     sumatriptan (IMITREX) 100 MG tablet 1 p.o. q.4 hours p.r.n. headache No more than twice in a 24 hour period    valACYclovir (VALTREX) 1000 MG tablet     vitamin D 1000 units Tab Take 1,000 Units by mouth once daily.     No current facility-administered medications for this visit.       Review of patient's allergies indicates:   Allergen Reactions    Lidocaine      Hand edema,  redness    Penicillins Rash       Family History    Problem Relation Age of Onset    Cancer Father     Cancer Sister     Glaucoma Neg Hx     Macular degeneration Neg Hx     Retinal detachment Neg Hx     Breast cancer Neg Hx     Ovarian cancer Neg Hx     Eczema Neg Hx     Lupus Neg Hx     Melanoma Neg Hx     Psoriasis Neg Hx        Social History     Socioeconomic History    Marital status:    Tobacco Use    Smoking status: Never    Smokeless tobacco: Never   Substance and Sexual Activity    Alcohol use: Not Currently     Comment: rarely    Drug use: No    Sexual activity: Yes       Chief Complaint:   No chief complaint on file.      History: This is a 72-year-old female who has had a history of knee arthritis since 2014.      Present: She comes in today bilateral knee pain.    Patient had good results with both the Euflexxa and cortisone.  Pain is a 5/10.      Review of systems:   Musculoskeletal: See history of present illness    Physical examination:    Vital Signs:    There were no vitals filed for this visit.      There is no height or weight on file to calculate BMI.    This a well-developed, well nourished patient in no acute distress.  They are alert and oriented and cooperative to examination.  Pt. walks without an antalgic gait.      Examination of bilateral knee shows no rashes or erythema. There are no masses ecchymosis or effusion. Patient has full range of motion from 0-130°. Patient is nontender to palpation over lateral joint line and moderately tender to palpation over the medial joint line.  Knee is stable to varus and valgus stress. 5 out of 5 motor strength. Palpable distal pulses. Intact light touch sensation.  Severe Patellofemoral crepitus      X-rays: 4 views of both knees are  reviewed which show arthritic changes with severe changes of the patellofemoral joint and more moderate changes of the medial compartment of both knees .  No significant change  X-rays of the right shoulder  reviewed which show some mild degenerative changes  well-maintained glenohumeral joint space     Assessment:: Bilateral knee osteoarthritis      Plan:  I reviewed the x-ray with her today.  We discussed treatment options for her knee arthritis.  Patient elected to have Euflexxa 1 of 3 in both knees.  Follow up next week.

## 2023-06-08 ENCOUNTER — OFFICE VISIT (OUTPATIENT)
Dept: ORTHOPEDICS | Facility: CLINIC | Age: 73
End: 2023-06-08
Payer: MEDICARE

## 2023-06-08 VITALS — BODY MASS INDEX: 31.83 KG/M2 | HEIGHT: 68 IN | RESPIRATION RATE: 18 BRPM | WEIGHT: 210 LBS

## 2023-06-08 DIAGNOSIS — M17.11 PRIMARY OSTEOARTHRITIS OF RIGHT KNEE: Primary | ICD-10-CM

## 2023-06-08 DIAGNOSIS — M17.12 PRIMARY OSTEOARTHRITIS OF LEFT KNEE: ICD-10-CM

## 2023-06-08 PROCEDURE — 99999 PR PBB SHADOW E&M-EST. PATIENT-LVL III: CPT | Mod: PBBFAC,,, | Performed by: ORTHOPAEDIC SURGERY

## 2023-06-08 PROCEDURE — 99499 NO LOS: ICD-10-PCS | Mod: S$PBB,,, | Performed by: ORTHOPAEDIC SURGERY

## 2023-06-08 PROCEDURE — 99499 UNLISTED E&M SERVICE: CPT | Mod: S$PBB,,, | Performed by: ORTHOPAEDIC SURGERY

## 2023-06-08 PROCEDURE — 99999 PR PBB SHADOW E&M-EST. PATIENT-LVL III: ICD-10-PCS | Mod: PBBFAC,,, | Performed by: ORTHOPAEDIC SURGERY

## 2023-06-08 PROCEDURE — 20610 DRAIN/INJ JOINT/BURSA W/O US: CPT | Mod: 50,PBBFAC,PO | Performed by: ORTHOPAEDIC SURGERY

## 2023-06-08 PROCEDURE — 20610 LARGE JOINT ASPIRATION/INJECTION: BILATERAL KNEE: ICD-10-PCS | Mod: 50,S$PBB,, | Performed by: ORTHOPAEDIC SURGERY

## 2023-06-08 PROCEDURE — 99213 OFFICE O/P EST LOW 20 MIN: CPT | Mod: PBBFAC,PO,25 | Performed by: ORTHOPAEDIC SURGERY

## 2023-06-08 RX ADMIN — Medication 20 MG: at 08:06

## 2023-06-08 NOTE — PROCEDURES
Large Joint Aspiration/Injection: bilateral knee    Date/Time: 6/8/2023 8:15 AM  Performed by: David Chan MD  Authorized by: David Chan MD     Consent Done?:  Yes (Verbal)  Indications:  Pain  Site marked: the procedure site was marked    Timeout: prior to procedure the correct patient, procedure, and site was verified      Details:  Needle Size:  20 G  Approach:  Anterolateral  Location:  Knee  Laterality:  Bilateral  Site:  Bilateral knee  Medications (Right):  20 mg sodium hyaluronate (EUFLEXXA) 10 mg/mL(mw 2.4 -3.6 million)  Medications (Left):  20 mg sodium hyaluronate (EUFLEXXA) 10 mg/mL(mw 2.4 -3.6 million)  Patient tolerance:  Patient tolerated the procedure well with no immediate complications

## 2023-06-08 NOTE — PROGRESS NOTES
Past Medical History:   Diagnosis Date    Depression     GERD (gastroesophageal reflux disease)     Hyperlipidemia     Hypertension     Hypothyroid        Past Surgical History:   Procedure Laterality Date    APPENDECTOMY      COLONOSCOPY  2017    Briana, repeat in 10    TONSILLECTOMY      TUBAL LIGATION         Current Outpatient Medications   Medication Sig    amLODIPine (NORVASC) 5 MG tablet Take 1 tablet (5 mg total) by mouth once daily.    ascorbic acid, vitamin C, (VITAMIN C) 1000 MG tablet Take 1,000 mg by mouth once daily.    azelastine (ASTELIN) 137 mcg (0.1 %) nasal spray 1 spray (137 mcg total) by Nasal route 2 (two) times daily.    cetirizine (ZYRTEC) 10 MG tablet Take 10 mg by mouth once daily.    fluticasone propionate (FLONASE) 50 mcg/actuation nasal spray 1 spray (50 mcg total) by Each Nostril route 2 (two) times daily.    ipratropium (ATROVENT) 21 mcg (0.03 %) nasal spray USE 2 SPRAYS IN EACH NOSTRIL THREE TIMES DAILY    levothyroxine (SYNTHROID) 75 MCG tablet Take 75 mcg by mouth before breakfast.    lovastatin (MEVACOR) 20 MG tablet TAKE 1 TABLET BY MOUTH EVERY EVENING    metoprolol succinate (TOPROL-XL) 25 MG 24 hr tablet Take 25 mg by mouth once daily.    multivit-min-ferrous fumarate 9 mg iron/15 mL Liqd as directed    ondansetron (ZOFRAN-ODT) 4 MG TbDL Take 1 tablet (4 mg total) by mouth every 8 (eight) hours as needed.    pantoprazole (PROTONIX) 40 MG tablet Take 1 tablet (40 mg total) by mouth once daily.    selenium 50 mcg Tab Take 300 mcg by mouth once daily.     sumatriptan (IMITREX) 100 MG tablet 1 p.o. q.4 hours p.r.n. headache No more than twice in a 24 hour period    valACYclovir (VALTREX) 1000 MG tablet     vitamin D 1000 units Tab Take 1,000 Units by mouth once daily.     No current facility-administered medications for this visit.       Review of patient's allergies indicates:   Allergen Reactions    Lidocaine      Hand edema,  redness    Penicillins Rash       Family History    Problem Relation Age of Onset    Cancer Father     Cancer Sister     Glaucoma Neg Hx     Macular degeneration Neg Hx     Retinal detachment Neg Hx     Breast cancer Neg Hx     Ovarian cancer Neg Hx     Eczema Neg Hx     Lupus Neg Hx     Melanoma Neg Hx     Psoriasis Neg Hx        Social History     Socioeconomic History    Marital status:    Tobacco Use    Smoking status: Never    Smokeless tobacco: Never   Substance and Sexual Activity    Alcohol use: Not Currently     Comment: rarely    Drug use: No    Sexual activity: Yes       Chief Complaint:   No chief complaint on file.      History: This is a 72-year-old female who has had a history of knee arthritis since 2014.      Present: She comes in today bilateral knee pain.    Patient had good results with both the Euflexxa and cortisone.  Pain is a 5/10.      Review of systems:   Musculoskeletal: See history of present illness    Physical examination:    Vital Signs:    There were no vitals filed for this visit.      There is no height or weight on file to calculate BMI.    This a well-developed, well nourished patient in no acute distress.  They are alert and oriented and cooperative to examination.  Pt. walks without an antalgic gait.      Examination of bilateral knee shows no rashes or erythema. There are no masses ecchymosis or effusion. Patient has full range of motion from 0-130°. Patient is nontender to palpation over lateral joint line and moderately tender to palpation over the medial joint line.  Knee is stable to varus and valgus stress. 5 out of 5 motor strength. Palpable distal pulses. Intact light touch sensation.  Severe Patellofemoral crepitus      X-rays: 4 views of both knees are  reviewed which show arthritic changes with severe changes of the patellofemoral joint and more moderate changes of the medial compartment of both knees .  No significant change  X-rays of the right shoulder  reviewed which show some mild degenerative changes  well-maintained glenohumeral joint space     Assessment:: Bilateral knee osteoarthritis      Plan:  I reviewed the x-ray with her today.  We discussed treatment options for her knee arthritis.  Patient elected to have Euflexxa 2 of 3 in both knees.  Follow up next week.

## 2023-06-15 ENCOUNTER — OFFICE VISIT (OUTPATIENT)
Dept: ORTHOPEDICS | Facility: CLINIC | Age: 73
End: 2023-06-15
Payer: MEDICARE

## 2023-06-15 VITALS — BODY MASS INDEX: 31.83 KG/M2 | RESPIRATION RATE: 18 BRPM | WEIGHT: 210 LBS | HEIGHT: 68 IN

## 2023-06-15 DIAGNOSIS — M17.11 PRIMARY OSTEOARTHRITIS OF RIGHT KNEE: Primary | ICD-10-CM

## 2023-06-15 DIAGNOSIS — M17.12 PRIMARY OSTEOARTHRITIS OF LEFT KNEE: ICD-10-CM

## 2023-06-15 PROCEDURE — 99213 OFFICE O/P EST LOW 20 MIN: CPT | Mod: PBBFAC,PO,25 | Performed by: ORTHOPAEDIC SURGERY

## 2023-06-15 PROCEDURE — 20610 DRAIN/INJ JOINT/BURSA W/O US: CPT | Mod: 50,PBBFAC,PO | Performed by: ORTHOPAEDIC SURGERY

## 2023-06-15 PROCEDURE — 99999 PR PBB SHADOW E&M-EST. PATIENT-LVL III: CPT | Mod: PBBFAC,,, | Performed by: ORTHOPAEDIC SURGERY

## 2023-06-15 PROCEDURE — 99499 UNLISTED E&M SERVICE: CPT | Mod: S$PBB,,, | Performed by: ORTHOPAEDIC SURGERY

## 2023-06-15 PROCEDURE — 20610 LARGE JOINT ASPIRATION/INJECTION: BILATERAL KNEE: ICD-10-PCS | Mod: 50,S$PBB,, | Performed by: ORTHOPAEDIC SURGERY

## 2023-06-15 PROCEDURE — 99499 NO LOS: ICD-10-PCS | Mod: S$PBB,,, | Performed by: ORTHOPAEDIC SURGERY

## 2023-06-15 PROCEDURE — 99999 PR PBB SHADOW E&M-EST. PATIENT-LVL III: ICD-10-PCS | Mod: PBBFAC,,, | Performed by: ORTHOPAEDIC SURGERY

## 2023-06-15 RX ADMIN — Medication 20 MG: at 10:06

## 2023-06-15 NOTE — PROCEDURES
Large Joint Aspiration/Injection: bilateral knee    Date/Time: 6/15/2023 10:00 AM  Performed by: David Chan MD  Authorized by: David Chan MD     Consent Done?:  Yes (Verbal)  Indications:  Pain  Site marked: the procedure site was marked    Timeout: prior to procedure the correct patient, procedure, and site was verified      Details:  Needle Size:  20 G  Approach:  Anterolateral  Location:  Knee  Laterality:  Bilateral  Site:  Bilateral knee  Medications (Right):  20 mg sodium hyaluronate (EUFLEXXA) 10 mg/mL(mw 2.4 -3.6 million)  Medications (Left):  20 mg sodium hyaluronate (EUFLEXXA) 10 mg/mL(mw 2.4 -3.6 million)  Patient tolerance:  Patient tolerated the procedure well with no immediate complications

## 2023-06-15 NOTE — PROGRESS NOTES
Past Medical History:   Diagnosis Date    Depression     GERD (gastroesophageal reflux disease)     Hyperlipidemia     Hypertension     Hypothyroid        Past Surgical History:   Procedure Laterality Date    APPENDECTOMY      COLONOSCOPY  2017    Briana, repeat in 10    TONSILLECTOMY      TUBAL LIGATION         Current Outpatient Medications   Medication Sig    amLODIPine (NORVASC) 5 MG tablet Take 1 tablet (5 mg total) by mouth once daily.    ascorbic acid, vitamin C, (VITAMIN C) 1000 MG tablet Take 1,000 mg by mouth once daily.    azelastine (ASTELIN) 137 mcg (0.1 %) nasal spray 1 spray (137 mcg total) by Nasal route 2 (two) times daily.    cetirizine (ZYRTEC) 10 MG tablet Take 10 mg by mouth once daily.    fluticasone propionate (FLONASE) 50 mcg/actuation nasal spray 1 spray (50 mcg total) by Each Nostril route 2 (two) times daily.    ipratropium (ATROVENT) 21 mcg (0.03 %) nasal spray USE 2 SPRAYS IN EACH NOSTRIL THREE TIMES DAILY    levothyroxine (SYNTHROID) 75 MCG tablet Take 75 mcg by mouth before breakfast.    lovastatin (MEVACOR) 20 MG tablet TAKE 1 TABLET BY MOUTH EVERY EVENING    metoprolol succinate (TOPROL-XL) 25 MG 24 hr tablet Take 25 mg by mouth once daily.    multivit-min-ferrous fumarate 9 mg iron/15 mL Liqd as directed    ondansetron (ZOFRAN-ODT) 4 MG TbDL Take 1 tablet (4 mg total) by mouth every 8 (eight) hours as needed.    pantoprazole (PROTONIX) 40 MG tablet Take 1 tablet (40 mg total) by mouth once daily.    selenium 50 mcg Tab Take 300 mcg by mouth once daily.     sumatriptan (IMITREX) 100 MG tablet 1 p.o. q.4 hours p.r.n. headache No more than twice in a 24 hour period    valACYclovir (VALTREX) 1000 MG tablet     vitamin D 1000 units Tab Take 1,000 Units by mouth once daily.     No current facility-administered medications for this visit.       Review of patient's allergies indicates:   Allergen Reactions    Lidocaine      Hand edema,  redness    Penicillins Rash       Family History    Problem Relation Age of Onset    Cancer Father     Cancer Sister     Glaucoma Neg Hx     Macular degeneration Neg Hx     Retinal detachment Neg Hx     Breast cancer Neg Hx     Ovarian cancer Neg Hx     Eczema Neg Hx     Lupus Neg Hx     Melanoma Neg Hx     Psoriasis Neg Hx        Social History     Socioeconomic History    Marital status:    Tobacco Use    Smoking status: Never    Smokeless tobacco: Never   Substance and Sexual Activity    Alcohol use: Not Currently     Comment: rarely    Drug use: No    Sexual activity: Yes       Chief Complaint:   Chief Complaint   Patient presents with    Injections     bilat euflexxa 3/3       History: This is a 72-year-old female who has had a history of knee arthritis since 2014.      Present: She comes in today bilateral knee pain.    Patient had good results with both the Euflexxa and cortisone.  Pain is a 5/10.      Review of systems:   Musculoskeletal: See history of present illness    Physical examination:    Vital Signs:    Vitals:    06/15/23 1014   Resp: 18         Body mass index is 31.93 kg/m².    This a well-developed, well nourished patient in no acute distress.  They are alert and oriented and cooperative to examination.  Pt. walks without an antalgic gait.      Examination of bilateral knee shows no rashes or erythema. There are no masses ecchymosis or effusion. Patient has full range of motion from 0-130°. Patient is nontender to palpation over lateral joint line and moderately tender to palpation over the medial joint line.  Knee is stable to varus and valgus stress. 5 out of 5 motor strength. Palpable distal pulses. Intact light touch sensation.  Severe Patellofemoral crepitus      X-rays: 4 views of both knees are  reviewed which show arthritic changes with severe changes of the patellofemoral joint and more moderate changes of the medial compartment of both knees .  No significant change  X-rays of the right shoulder  reviewed which show some mild  degenerative changes well-maintained glenohumeral joint space     Assessment:: Bilateral knee osteoarthritis      Plan:  I reviewed the x-ray with her today.  We discussed treatment options for her knee arthritis.  Patient elected to have Euflexxa 3 of 3 in both knees.  Follow up 6 months to repeat [place].

## 2023-09-27 ENCOUNTER — OFFICE VISIT (OUTPATIENT)
Dept: ORTHOPEDICS | Facility: CLINIC | Age: 73
End: 2023-09-27
Payer: MEDICARE

## 2023-09-27 VITALS — WEIGHT: 210 LBS | BODY MASS INDEX: 31.83 KG/M2 | RESPIRATION RATE: 18 BRPM | HEIGHT: 68 IN

## 2023-09-27 DIAGNOSIS — M17.12 PRIMARY OSTEOARTHRITIS OF LEFT KNEE: ICD-10-CM

## 2023-09-27 DIAGNOSIS — M25.561 CHRONIC PAIN OF RIGHT KNEE: Primary | ICD-10-CM

## 2023-09-27 DIAGNOSIS — G89.29 CHRONIC PAIN OF LEFT KNEE: ICD-10-CM

## 2023-09-27 DIAGNOSIS — M17.11 PRIMARY OSTEOARTHRITIS OF RIGHT KNEE: Primary | ICD-10-CM

## 2023-09-27 DIAGNOSIS — M25.561 PAIN IN BOTH KNEES, UNSPECIFIED CHRONICITY: ICD-10-CM

## 2023-09-27 DIAGNOSIS — M25.562 PAIN IN BOTH KNEES, UNSPECIFIED CHRONICITY: ICD-10-CM

## 2023-09-27 DIAGNOSIS — G89.29 CHRONIC PAIN OF RIGHT KNEE: Primary | ICD-10-CM

## 2023-09-27 DIAGNOSIS — M25.562 CHRONIC PAIN OF LEFT KNEE: ICD-10-CM

## 2023-09-27 PROCEDURE — 99999PBSHW PR PBB SHADOW TECHNICAL ONLY FILED TO HB: Mod: PBBFAC,,,

## 2023-09-27 PROCEDURE — 99999PBSHW PR PBB SHADOW TECHNICAL ONLY FILED TO HB: ICD-10-PCS | Mod: PBBFAC,,,

## 2023-09-27 PROCEDURE — 99214 PR OFFICE/OUTPT VISIT, EST, LEVL IV, 30-39 MIN: ICD-10-PCS | Mod: 25,S$PBB,, | Performed by: ORTHOPAEDIC SURGERY

## 2023-09-27 PROCEDURE — 99213 OFFICE O/P EST LOW 20 MIN: CPT | Mod: PBBFAC,PO,25 | Performed by: ORTHOPAEDIC SURGERY

## 2023-09-27 PROCEDURE — 20610 DRAIN/INJ JOINT/BURSA W/O US: CPT | Mod: 50,PBBFAC,PO | Performed by: ORTHOPAEDIC SURGERY

## 2023-09-27 PROCEDURE — 99999 PR PBB SHADOW E&M-EST. PATIENT-LVL III: ICD-10-PCS | Mod: PBBFAC,,, | Performed by: ORTHOPAEDIC SURGERY

## 2023-09-27 PROCEDURE — 20610 LARGE JOINT ASPIRATION/INJECTION: BILATERAL KNEE: ICD-10-PCS | Mod: 50,S$PBB,, | Performed by: ORTHOPAEDIC SURGERY

## 2023-09-27 PROCEDURE — 99999 PR PBB SHADOW E&M-EST. PATIENT-LVL III: CPT | Mod: PBBFAC,,, | Performed by: ORTHOPAEDIC SURGERY

## 2023-09-27 PROCEDURE — 99214 OFFICE O/P EST MOD 30 MIN: CPT | Mod: 25,S$PBB,, | Performed by: ORTHOPAEDIC SURGERY

## 2023-09-27 RX ORDER — TRIAMCINOLONE ACETONIDE 40 MG/ML
40 INJECTION, SUSPENSION INTRA-ARTICULAR; INTRAMUSCULAR
Status: DISCONTINUED | OUTPATIENT
Start: 2023-09-27 | End: 2023-09-27 | Stop reason: HOSPADM

## 2023-09-27 RX ADMIN — TRIAMCINOLONE ACETONIDE 40 MG: 40 INJECTION, SUSPENSION INTRA-ARTICULAR; INTRAMUSCULAR at 08:09

## 2023-09-27 NOTE — PROCEDURES
Large Joint Aspiration/Injection: bilateral knee    Date/Time: 9/27/2023 8:45 AM    Performed by: David Chan MD  Authorized by: David Chan MD    Consent Done?:  Yes (Verbal)  Indications:  Pain  Site marked: the procedure site was marked    Timeout: prior to procedure the correct patient, procedure, and site was verified    Prep: patient was prepped and draped in usual sterile fashion      Local anesthesia used?: Yes    Anesthesia:  Local infiltration  Local anesthetic: Ropivicaine.  Anesthetic total (ml):  3      Details:  Needle Size:  22 G  Ultrasonic Guidance for needle placement?: No    Approach:  Anterolateral  Location:  Knee  Laterality:  Bilateral  Site:  Bilateral knee  Medications (Right):  40 mg triamcinolone acetonide 40 mg/mL  Medications (Left):  40 mg triamcinolone acetonide 40 mg/mL  Patient tolerance:  Patient tolerated the procedure well with no immediate complications

## 2023-09-27 NOTE — PROGRESS NOTES
Past Medical History:   Diagnosis Date    Depression     GERD (gastroesophageal reflux disease)     Hyperlipidemia     Hypertension     Hypothyroid        Past Surgical History:   Procedure Laterality Date    APPENDECTOMY      COLONOSCOPY  2017    Briana, repeat in 10    TONSILLECTOMY      TUBAL LIGATION         Current Outpatient Medications   Medication Sig    amLODIPine (NORVASC) 5 MG tablet Take 1 tablet (5 mg total) by mouth once daily.    ascorbic acid, vitamin C, (VITAMIN C) 1000 MG tablet Take 1,000 mg by mouth once daily.    azelastine (ASTELIN) 137 mcg (0.1 %) nasal spray 1 spray (137 mcg total) by Nasal route 2 (two) times daily.    cetirizine (ZYRTEC) 10 MG tablet Take 10 mg by mouth once daily.    fluticasone propionate (FLONASE) 50 mcg/actuation nasal spray 1 spray (50 mcg total) by Each Nostril route 2 (two) times daily.    ipratropium (ATROVENT) 21 mcg (0.03 %) nasal spray USE 2 SPRAYS IN EACH NOSTRIL THREE TIMES DAILY    levothyroxine (SYNTHROID) 75 MCG tablet Take 75 mcg by mouth before breakfast.    lovastatin (MEVACOR) 20 MG tablet TAKE 1 TABLET BY MOUTH EVERY EVENING    metoprolol succinate (TOPROL-XL) 25 MG 24 hr tablet Take 25 mg by mouth once daily.    multivit-min-ferrous fumarate 9 mg iron/15 mL Liqd as directed    ondansetron (ZOFRAN-ODT) 4 MG TbDL Take 1 tablet (4 mg total) by mouth every 8 (eight) hours as needed.    pantoprazole (PROTONIX) 40 MG tablet Take 1 tablet (40 mg total) by mouth once daily.    selenium 50 mcg Tab Take 300 mcg by mouth once daily.     sumatriptan (IMITREX) 100 MG tablet 1 p.o. q.4 hours p.r.n. headache No more than twice in a 24 hour period    valACYclovir (VALTREX) 1000 MG tablet     vitamin D 1000 units Tab Take 1,000 Units by mouth once daily.     No current facility-administered medications for this visit.       Review of patient's allergies indicates:   Allergen Reactions    Lidocaine      Hand edema,  redness    Penicillins Rash       Family History    Problem Relation Age of Onset    Cancer Father     Cancer Sister     Glaucoma Neg Hx     Macular degeneration Neg Hx     Retinal detachment Neg Hx     Breast cancer Neg Hx     Ovarian cancer Neg Hx     Eczema Neg Hx     Lupus Neg Hx     Melanoma Neg Hx     Psoriasis Neg Hx        Social History     Socioeconomic History    Marital status:    Tobacco Use    Smoking status: Never    Smokeless tobacco: Never   Substance and Sexual Activity    Alcohol use: Not Currently     Comment: rarely    Drug use: No    Sexual activity: Yes       Chief Complaint:   No chief complaint on file.      History of present illness: This is a 72-year-old female who has had a history of knee arthritis since 2014.  Patient previously had good results with both the Euflexxa and cortisone.  Unfortunately, the Euflexxa is no longer working.  Pain is up to an 8/10.      Review of systems:   Musculoskeletal: See history of present illness    Physical examination:    Vital Signs:    There were no vitals filed for this visit.    There is no height or weight on file to calculate BMI.    This a well-developed, well nourished patient in no acute distress.  They are alert and oriented and cooperative to examination.  Pt. walks without an antalgic gait.      Examination of bilateral knee shows no rashes or erythema. There are no masses ecchymosis or effusion. Patient has full range of motion from 0-130°. Patient is nontender to palpation over lateral joint line and moderately tender to palpation over the medial joint line.  Knee is stable to varus and valgus stress. 5 out of 5 motor strength. Palpable distal pulses. Intact light touch sensation.  Severe Patellofemoral crepitus      X-rays: 4 views of both knees are  reviewed which show arthritic changes with severe changes of the patellofemoral joint and more moderate changes of the medial compartment of both knees .  No significant change  X-rays of the right shoulder  reviewed which show  some mild degenerative changes well-maintained glenohumeral joint space     Assessment:: Bilateral knee osteoarthritis    Plan:  I reviewed the x-ray with her today.  We discussed treatment options for her knee arthritis.  I agreed to inject both knees with cortisone today.  We will also get her in for a referral to possibly do Iovera on both of her knees.  She does not want knee replacement.

## 2023-09-29 DIAGNOSIS — Z12.31 ENCOUNTER FOR SCREENING MAMMOGRAM FOR MALIGNANT NEOPLASM OF BREAST: Primary | ICD-10-CM

## 2023-10-11 ENCOUNTER — PATIENT MESSAGE (OUTPATIENT)
Dept: FAMILY MEDICINE | Facility: CLINIC | Age: 73
End: 2023-10-11

## 2023-12-08 ENCOUNTER — TELEPHONE (OUTPATIENT)
Dept: ORTHOPEDICS | Facility: CLINIC | Age: 73
End: 2023-12-08
Payer: MEDICARE

## 2023-12-08 NOTE — TELEPHONE ENCOUNTER
----- Message from Armida San sent at 12/8/2023  2:24 PM CST -----  Type: Needs Medical Advice  Who Called:  pt daughter     Best Call Back Number: [phone] (home)     Additional Information: calling wanting to know if dr does steroid injections in hands please advise

## 2023-12-08 NOTE — TELEPHONE ENCOUNTER
Patient advised she can call back on Monday when staff is in the office and they will be able to help her.

## 2023-12-12 ENCOUNTER — HOSPITAL ENCOUNTER (OUTPATIENT)
Dept: RADIOLOGY | Facility: HOSPITAL | Age: 73
Discharge: HOME | End: 2023-12-12
Attending: PHYSICIAN ASSISTANT
Payer: MEDICARE

## 2023-12-12 ENCOUNTER — OFFICE VISIT (OUTPATIENT)
Dept: ORTHOPEDICS | Facility: CLINIC | Age: 73
End: 2023-12-12
Payer: MEDICARE

## 2023-12-12 VITALS — BODY MASS INDEX: 31.83 KG/M2 | HEIGHT: 68 IN | WEIGHT: 210 LBS

## 2023-12-12 DIAGNOSIS — M18.12 PRIMARY OSTEOARTHRITIS OF FIRST CARPOMETACARPAL JOINT OF LEFT HAND: ICD-10-CM

## 2023-12-12 DIAGNOSIS — M79.642 LEFT HAND PAIN: ICD-10-CM

## 2023-12-12 DIAGNOSIS — M79.641 RIGHT HAND PAIN: ICD-10-CM

## 2023-12-12 DIAGNOSIS — M18.11 PRIMARY OSTEOARTHRITIS OF FIRST CARPOMETACARPAL JOINT OF RIGHT HAND: ICD-10-CM

## 2023-12-12 DIAGNOSIS — M65.4 DE QUERVAIN'S TENOSYNOVITIS, LEFT: Primary | ICD-10-CM

## 2023-12-12 PROCEDURE — 99214 OFFICE O/P EST MOD 30 MIN: CPT | Mod: S$PBB,25,, | Performed by: PHYSICIAN ASSISTANT

## 2023-12-12 PROCEDURE — 73130 XR HAND COMPLETE 3 VIEW RIGHT: ICD-10-PCS | Mod: 26,RT,, | Performed by: RADIOLOGY

## 2023-12-12 PROCEDURE — 73130 X-RAY EXAM OF HAND: CPT | Mod: 26,RT,, | Performed by: RADIOLOGY

## 2023-12-12 PROCEDURE — 99999 PR PBB SHADOW E&M-EST. PATIENT-LVL IV: ICD-10-PCS | Mod: PBBFAC,,, | Performed by: PHYSICIAN ASSISTANT

## 2023-12-12 PROCEDURE — 99999 PR PBB SHADOW E&M-EST. PATIENT-LVL IV: CPT | Mod: PBBFAC,,, | Performed by: PHYSICIAN ASSISTANT

## 2023-12-12 PROCEDURE — 73130 X-RAY EXAM OF HAND: CPT | Mod: TC,PO,RT

## 2023-12-12 PROCEDURE — 99214 PR OFFICE/OUTPT VISIT, EST, LEVL IV, 30-39 MIN: ICD-10-PCS | Mod: S$PBB,25,, | Performed by: PHYSICIAN ASSISTANT

## 2023-12-12 PROCEDURE — 20550 NJX 1 TENDON SHEATH/LIGAMENT: CPT | Mod: S$PBB,LT,, | Performed by: PHYSICIAN ASSISTANT

## 2023-12-12 PROCEDURE — 99999PBSHW PR PBB SHADOW TECHNICAL ONLY FILED TO HB: ICD-10-PCS | Mod: PBBFAC,,,

## 2023-12-12 PROCEDURE — 73130 X-RAY EXAM OF HAND: CPT | Mod: 26,LT,, | Performed by: RADIOLOGY

## 2023-12-12 PROCEDURE — 20550 TENDON SHEATH: ICD-10-PCS | Mod: S$PBB,LT,, | Performed by: PHYSICIAN ASSISTANT

## 2023-12-12 PROCEDURE — 73130 X-RAY EXAM OF HAND: CPT | Mod: TC,PO,LT

## 2023-12-12 PROCEDURE — 99214 OFFICE O/P EST MOD 30 MIN: CPT | Mod: PBBFAC,PO,25 | Performed by: PHYSICIAN ASSISTANT

## 2023-12-12 PROCEDURE — 20550 NJX 1 TENDON SHEATH/LIGAMENT: CPT | Mod: PBBFAC,PO,LT | Performed by: PHYSICIAN ASSISTANT

## 2023-12-12 PROCEDURE — 99999PBSHW PR PBB SHADOW TECHNICAL ONLY FILED TO HB: Mod: PBBFAC,,,

## 2023-12-12 RX ORDER — TRIAMCINOLONE ACETONIDE 40 MG/ML
20 INJECTION, SUSPENSION INTRA-ARTICULAR; INTRAMUSCULAR
Status: DISCONTINUED | OUTPATIENT
Start: 2023-12-12 | End: 2023-12-12 | Stop reason: HOSPADM

## 2023-12-12 RX ADMIN — TRIAMCINOLONE ACETONIDE 20 MG: 40 INJECTION, SUSPENSION INTRA-ARTICULAR; INTRAMUSCULAR at 01:12

## 2023-12-12 NOTE — PROGRESS NOTES
12/12/2023    Chief Complaint:  Chief Complaint   Patient presents with    Right Hand - Pain    Left Hand - Pain     HPI:  Skyler Espinoza is a 73 y.o. female, who presents to clinic today for evaluation of her bilateral thumb base pain and left wrist pain.  States he has pain located at the base of the bilateral thumbs, as well as popping of the base of the left thumb.  States he has also has pain of the left wrist.  Denies any pain of the right wrist.  Denies any recent acute injuries.  Denies any paresthesias.  Denies any other complaints at this time.    PMHX:  Past Medical History:   Diagnosis Date    Depression     GERD (gastroesophageal reflux disease)     Hyperlipidemia     Hypertension     Hypothyroid        PSHX:  Past Surgical History:   Procedure Laterality Date    APPENDECTOMY      COLONOSCOPY  2017    Briana, repeat in 10    TONSILLECTOMY      TUBAL LIGATION         FMHX:  Family History   Problem Relation Age of Onset    Cancer Father     Cancer Sister     Glaucoma Neg Hx     Macular degeneration Neg Hx     Retinal detachment Neg Hx     Breast cancer Neg Hx     Ovarian cancer Neg Hx     Eczema Neg Hx     Lupus Neg Hx     Melanoma Neg Hx     Psoriasis Neg Hx        SOCHX:  Social History     Tobacco Use    Smoking status: Never    Smokeless tobacco: Never   Substance Use Topics    Alcohol use: Not Currently     Comment: rarely       ALLERGIES:  Patient has no known allergies.    CURRENT MEDICATIONS:  Current Outpatient Medications on File Prior to Visit   Medication Sig Dispense Refill    ascorbic acid, vitamin C, (VITAMIN C) 1000 MG tablet Take 1,000 mg by mouth once daily.      cetirizine (ZYRTEC) 10 MG tablet Take 10 mg by mouth once daily.      fluticasone propionate (FLONASE) 50 mcg/actuation nasal spray 1 spray (50 mcg total) by Each Nostril route 2 (two) times daily. 16 g 3    ipratropium (ATROVENT) 21 mcg (0.03 %) nasal spray USE 2 SPRAYS IN EACH NOSTRIL THREE TIMES DAILY 90 mL 0     "levothyroxine (SYNTHROID) 75 MCG tablet Take 75 mcg by mouth before breakfast.      lovastatin (MEVACOR) 20 MG tablet TAKE 1 TABLET BY MOUTH EVERY EVENING 90 tablet 3    metoprolol succinate (TOPROL-XL) 25 MG 24 hr tablet Take 25 mg by mouth once daily.      multivit-min-ferrous fumarate 9 mg iron/15 mL Liqd as directed      ondansetron (ZOFRAN-ODT) 4 MG TbDL Take 1 tablet (4 mg total) by mouth every 8 (eight) hours as needed. 30 tablet 0    pantoprazole (PROTONIX) 40 MG tablet Take 1 tablet (40 mg total) by mouth once daily. 30 tablet 11    selenium 50 mcg Tab Take 300 mcg by mouth once daily.       sumatriptan (IMITREX) 100 MG tablet 1 p.o. q.4 hours p.r.n. headache No more than twice in a 24 hour period 10 tablet 11    valACYclovir (VALTREX) 1000 MG tablet       vitamin D 1000 units Tab Take 1,000 Units by mouth once daily.      amLODIPine (NORVASC) 5 MG tablet Take 1 tablet (5 mg total) by mouth once daily. 30 tablet 0    azelastine (ASTELIN) 137 mcg (0.1 %) nasal spray 1 spray (137 mcg total) by Nasal route 2 (two) times daily. 30 mL 5     No current facility-administered medications on file prior to visit.       REVIEW OF SYSTEMS:  Review of Systems   Constitutional: Negative.    HENT: Negative.     Eyes: Negative.    Respiratory: Negative.     Cardiovascular: Negative.    Gastrointestinal: Negative.    Genitourinary: Negative.    Musculoskeletal:  Positive for joint pain.   Skin: Negative.    Neurological: Negative.    Endo/Heme/Allergies: Negative.    Psychiatric/Behavioral: Negative.       GENERAL PHYSICAL EXAM:   Ht 5' 8" (1.727 m)   Wt 95.3 kg (210 lb)   BMI 31.93 kg/m²    GEN: well developed, well nourished, no acute distress   HENT: Normocephalic, atraumatic   EYES: No discharge, conjunctiva normal   NECK: Supple, non-tender   PULM: No wheezing, no respiratory distress   CV: RRR   ABD: Soft, non-tender    ORTHO EXAM:   Examination of the left hand/wrist reveals mild edema over the 1st extensor " compartment.  No erythema, ecchymosis, or skin breakdown.  Tenderness palpation of the 1st extensor compartment.  Positive Finkelstein's test.  Circular range of motion of the 1st CMC joint reveals that the base of the 1st metacarpal is subluxing in and out of the 1st CMC joint.  Tenderness to palpation of the 1st CMC joint.  Positive grind test of the 1st CMC joint.  Applying radial pressure to the base of the 1st CMC joint does stabilize the joint, and allows for circular range of motion of the 1st CMC joint with no evidence of subluxation.  Normal sensation in the radial, ulnar, and median nerve distributions.  Capillary refill less than 2 seconds.    RADIOLOGY:   X-rays of the left hand were taken today in clinic.  X-rays reviewed by myself.  X-rays were compared to previous imaging.  Imaging showed no acute fracture or dislocation no subluxation.  No radiopaque foreign body or mass noted.  No osseous destructive/erosive processes noted.  Presence of degenerative changes of the 1st CMC joint.  No other significant bony abnormalities noted.   X-rays of the right hand were taken today in clinic.  X-rays were reviewed by myself.  X-rays compared to previous imaging.  Imaging showed no acute fracture or dislocation no subluxation.  No radiopaque foreign body or mass noted.  No osseous destructive/erosive processes noted.  Presence of degenerative changes of the 1st CMC joint.  No other significant bony abnormalities noted.    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 I discussed with Skyler Espinoza the de Quervain tenosynovitis and 1st CMC joint osteoarthritis pathology and treatment options in detail during today's visit.  After treatment options were discussed, we decided the best course of action at this time is to perform a steroid injection into the 1st extensor  compartment of the left wrist in clinic today.  We also discussed would proceed with immobilization of the bilateral 1st CMC joints via meta  splints.  She verbally agreed with the treatment plan    2.  Informed consent was obtained.  After an alcohol prep followed by chlorhexidine prep, a steroid injection was placed into the 1st extensor compartment of the left wrist.  She tolerated the procedure well with no immediate complications.    3. She was referred to occupational therapy to be fitted for bilateral meta  splints.      4. I would like to have her follow up in clinic in 6 weeks for repeat evaluation.  She was instructed to contact clinic for any problems or concerns in the interim.

## 2024-01-19 ENCOUNTER — OFFICE VISIT (OUTPATIENT)
Dept: CARDIOLOGY | Facility: CLINIC | Age: 74
End: 2024-01-19
Payer: MEDICARE

## 2024-01-19 ENCOUNTER — LAB VISIT (OUTPATIENT)
Dept: LAB | Facility: HOSPITAL | Age: 74
End: 2024-01-19
Attending: INTERNAL MEDICINE
Payer: MEDICARE

## 2024-01-19 VITALS
BODY MASS INDEX: 35.93 KG/M2 | DIASTOLIC BLOOD PRESSURE: 84 MMHG | HEART RATE: 88 BPM | SYSTOLIC BLOOD PRESSURE: 146 MMHG | WEIGHT: 236.31 LBS

## 2024-01-19 DIAGNOSIS — E66.01 SEVERE OBESITY: ICD-10-CM

## 2024-01-19 DIAGNOSIS — R06.09 EXERTIONAL DYSPNEA: ICD-10-CM

## 2024-01-19 DIAGNOSIS — I10 PRIMARY HYPERTENSION: ICD-10-CM

## 2024-01-19 DIAGNOSIS — E78.00 HYPERCHOLESTEROLEMIA: ICD-10-CM

## 2024-01-19 DIAGNOSIS — I49.3 VENTRICULAR PREMATURE COMPLEXES: ICD-10-CM

## 2024-01-19 DIAGNOSIS — R06.09 EXERTIONAL DYSPNEA: Primary | ICD-10-CM

## 2024-01-19 LAB
ALBUMIN SERPL BCP-MCNC: 3.8 G/DL (ref 3.5–5.2)
ALP SERPL-CCNC: 76 U/L (ref 55–135)
ALT SERPL W/O P-5'-P-CCNC: 18 U/L (ref 10–44)
ANION GAP SERPL CALC-SCNC: 13 MMOL/L (ref 8–16)
AST SERPL-CCNC: 20 U/L (ref 10–40)
BASOPHILS # BLD AUTO: 0.06 K/UL (ref 0–0.2)
BASOPHILS NFR BLD: 0.8 % (ref 0–1.9)
BILIRUB SERPL-MCNC: 0.5 MG/DL (ref 0.1–1)
BNP SERPL-MCNC: 127 PG/ML (ref 0–99)
BUN SERPL-MCNC: 9 MG/DL (ref 8–23)
CALCIUM SERPL-MCNC: 9.9 MG/DL (ref 8.7–10.5)
CHLORIDE SERPL-SCNC: 103 MMOL/L (ref 95–110)
CO2 SERPL-SCNC: 24 MMOL/L (ref 23–29)
CREAT SERPL-MCNC: 0.9 MG/DL (ref 0.5–1.4)
DIFFERENTIAL METHOD BLD: ABNORMAL
EOSINOPHIL # BLD AUTO: 0.2 K/UL (ref 0–0.5)
EOSINOPHIL NFR BLD: 2.8 % (ref 0–8)
ERYTHROCYTE [DISTWIDTH] IN BLOOD BY AUTOMATED COUNT: 12.8 % (ref 11.5–14.5)
EST. GFR  (NO RACE VARIABLE): >60 ML/MIN/1.73 M^2
GLUCOSE SERPL-MCNC: 82 MG/DL (ref 70–110)
HCT VFR BLD AUTO: 47.1 % (ref 37–48.5)
HGB BLD-MCNC: 14.1 G/DL (ref 12–16)
IMM GRANULOCYTES # BLD AUTO: 0.02 K/UL (ref 0–0.04)
IMM GRANULOCYTES NFR BLD AUTO: 0.3 % (ref 0–0.5)
LYMPHOCYTES # BLD AUTO: 2.5 K/UL (ref 1–4.8)
LYMPHOCYTES NFR BLD: 34 % (ref 18–48)
MAGNESIUM SERPL-MCNC: 2.4 MG/DL (ref 1.6–2.6)
MCH RBC QN AUTO: 30.1 PG (ref 27–31)
MCHC RBC AUTO-ENTMCNC: 29.9 G/DL (ref 32–36)
MCV RBC AUTO: 100 FL (ref 82–98)
MONOCYTES # BLD AUTO: 0.7 K/UL (ref 0.3–1)
MONOCYTES NFR BLD: 8.9 % (ref 4–15)
NEUTROPHILS # BLD AUTO: 3.9 K/UL (ref 1.8–7.7)
NEUTROPHILS NFR BLD: 53.2 % (ref 38–73)
NRBC BLD-RTO: 0 /100 WBC
PLATELET # BLD AUTO: 312 K/UL (ref 150–450)
PMV BLD AUTO: 12.6 FL (ref 9.2–12.9)
POTASSIUM SERPL-SCNC: 4 MMOL/L (ref 3.5–5.1)
PROT SERPL-MCNC: 7.2 G/DL (ref 6–8.4)
RBC # BLD AUTO: 4.69 M/UL (ref 4–5.4)
SODIUM SERPL-SCNC: 140 MMOL/L (ref 136–145)
TSH SERPL DL<=0.005 MIU/L-ACNC: 0.93 UIU/ML (ref 0.4–4)
WBC # BLD AUTO: 7.39 K/UL (ref 3.9–12.7)

## 2024-01-19 PROCEDURE — 36415 COLL VENOUS BLD VENIPUNCTURE: CPT | Mod: PN | Performed by: INTERNAL MEDICINE

## 2024-01-19 PROCEDURE — 83735 ASSAY OF MAGNESIUM: CPT | Performed by: INTERNAL MEDICINE

## 2024-01-19 PROCEDURE — 99214 OFFICE O/P EST MOD 30 MIN: CPT | Mod: PBBFAC,PN | Performed by: INTERNAL MEDICINE

## 2024-01-19 PROCEDURE — 93005 ELECTROCARDIOGRAM TRACING: CPT | Mod: PBBFAC,PN | Performed by: INTERNAL MEDICINE

## 2024-01-19 PROCEDURE — 84443 ASSAY THYROID STIM HORMONE: CPT | Performed by: INTERNAL MEDICINE

## 2024-01-19 PROCEDURE — 93010 ELECTROCARDIOGRAM REPORT: CPT | Mod: S$PBB,,, | Performed by: INTERNAL MEDICINE

## 2024-01-19 PROCEDURE — 99999 PR PBB SHADOW E&M-EST. PATIENT-LVL IV: CPT | Mod: PBBFAC,,, | Performed by: INTERNAL MEDICINE

## 2024-01-19 PROCEDURE — 99204 OFFICE O/P NEW MOD 45 MIN: CPT | Mod: S$PBB,,, | Performed by: INTERNAL MEDICINE

## 2024-01-19 PROCEDURE — 80053 COMPREHEN METABOLIC PANEL: CPT | Performed by: INTERNAL MEDICINE

## 2024-01-19 PROCEDURE — 85025 COMPLETE CBC W/AUTO DIFF WBC: CPT | Performed by: INTERNAL MEDICINE

## 2024-01-19 PROCEDURE — 83880 ASSAY OF NATRIURETIC PEPTIDE: CPT | Performed by: INTERNAL MEDICINE

## 2024-01-19 RX ORDER — METOPROLOL SUCCINATE 100 MG/1
100 TABLET, EXTENDED RELEASE ORAL DAILY
COMMUNITY
Start: 2023-11-13

## 2024-01-19 NOTE — PROGRESS NOTES
[facility]    Chief Complaint  Chief Complaint   Patient presents with    Palpitations    Shortness of Breath       HPI:    Patient presents for dyspnea and palpitations.  She is here with her daughter who helps with the history because of the patient's mild dementia.  Supportive was prompted when she developed symptoms last night where she felt like her heart was skipping beats.  No associated symptoms.  Checked her rhythm with Rubina.  I have reviewed those strips.  Sinus rhythm with PVCs.  Diagnosed with mitral valve prolapse decades ago.  Her daughter is more concerned about exertional dyspnea.  Has been worsening for over a month.  Patient is very sedentary.  When she gets dyspneic, she lies down to rest.  No associated symptoms but feels generally unwell.  Symptoms resolve after a few minutes of rest.    Medications  Current Outpatient Medications   Medication Sig Dispense Refill    metoprolol succinate (TOPROL-XL) 100 MG 24 hr tablet Take 100 mg by mouth once daily.      amLODIPine (NORVASC) 5 MG tablet Take 1 tablet (5 mg total) by mouth once daily. (Patient not taking: Reported on 1/19/2024) 30 tablet 0    ascorbic acid, vitamin C, (VITAMIN C) 1000 MG tablet Take 1,000 mg by mouth once daily.      azelastine (ASTELIN) 137 mcg (0.1 %) nasal spray 1 spray (137 mcg total) by Nasal route 2 (two) times daily. 30 mL 5    cetirizine (ZYRTEC) 10 MG tablet Take 10 mg by mouth once daily.      fluticasone propionate (FLONASE) 50 mcg/actuation nasal spray 1 spray (50 mcg total) by Each Nostril route 2 (two) times daily. 16 g 3    ipratropium (ATROVENT) 21 mcg (0.03 %) nasal spray USE 2 SPRAYS IN EACH NOSTRIL THREE TIMES DAILY 90 mL 0    levothyroxine (SYNTHROID) 75 MCG tablet Take 75 mcg by mouth before breakfast.      lovastatin (MEVACOR) 20 MG tablet TAKE 1 TABLET BY MOUTH EVERY EVENING 90 tablet 3    multivit-min-ferrous fumarate 9 mg iron/15 mL Liqd as directed      ondansetron (ZOFRAN-ODT) 4 MG  TbDL Take 1 tablet (4 mg total) by mouth every 8 (eight) hours as needed. 30 tablet 0    pantoprazole (PROTONIX) 40 MG tablet Take 1 tablet (40 mg total) by mouth once daily. 30 tablet 11    selenium 50 mcg Tab Take 300 mcg by mouth once daily.       valACYclovir (VALTREX) 1000 MG tablet       vitamin D 1000 units Tab Take 1,000 Units by mouth once daily.       No current facility-administered medications for this visit.        History  Past Medical History:   Diagnosis Date    Depression     GERD (gastroesophageal reflux disease)     Hyperlipidemia     Hypertension     Hypothyroid      Past Surgical History:   Procedure Laterality Date    APPENDECTOMY      COLONOSCOPY  2017    Briana, repeat in 10    TONSILLECTOMY      TUBAL LIGATION       Social History     Socioeconomic History    Marital status:    Tobacco Use    Smoking status: Never    Smokeless tobacco: Never   Substance and Sexual Activity    Alcohol use: Not Currently     Comment: rarely    Drug use: No    Sexual activity: Yes     Social Determinants of Health     Financial Resource Strain: Patient Declined (12/11/2023)    Overall Financial Resource Strain (CARDIA)     Difficulty of Paying Living Expenses: Patient declined   Food Insecurity: Patient Declined (12/11/2023)    Hunger Vital Sign     Worried About Running Out of Food in the Last Year: Patient declined     Ran Out of Food in the Last Year: Patient declined   Transportation Needs: Patient Declined (12/11/2023)    PRAPARE - Transportation     Lack of Transportation (Medical): Patient declined     Lack of Transportation (Non-Medical): Patient declined   Physical Activity: Unknown (12/11/2023)    Exercise Vital Sign     Days of Exercise per Week: Patient declined     Minutes of Exercise per Session: 20 min   Stress: Patient Declined (12/11/2023)    [employer] - Occupational Stress Questionnaire     Feeling of Stress : Patient declined   Social Connections:  Unknown (12/11/2023)    Social Connection and Isolation Panel [NHANES]     Frequency of Communication with Friends and Family: Patient declined     Frequency of Social Gatherings with Friends and Family: Patient declined     Active Member of Clubs or Organizations: Patient declined     Attends Club or Organization Meetings: Patient declined     Marital Status: Patient declined   Housing Stability: Unknown (12/11/2023)    Housing Stability Vital Sign     Unable to Pay for Housing in the Last Year: Patient refused     Unstable Housing in the Last Year: Patient refused     Family History   Problem Relation Age of Onset    Cancer Father     Cancer Sister     Glaucoma Neg Hx     Macular degeneration Neg Hx     Retinal detachment Neg Hx     Breast cancer Neg Hx     Ovarian cancer Neg Hx     Eczema Neg Hx     Lupus Neg Hx     Melanoma Neg Hx     Psoriasis Neg Hx         Allergies  Review of patient's allergies indicates:  No Known Allergies    Review of Systems   Review of Systems   Constitutional: Negative for malaise/fatigue, weight gain and weight loss.   Eyes:  Negative for visual disturbance.   Cardiovascular:  Positive for dyspnea on exertion, leg swelling and palpitations. Negative for chest pain, claudication, cyanosis, irregular heartbeat, near-syncope, orthopnea, paroxysmal nocturnal dyspnea and syncope.   Respiratory:  Negative for cough, hemoptysis, shortness of breath, sleep disturbances due to breathing and wheezing.    Hematologic/Lymphatic: Negative for bleeding problem. Does not bruise/bleed easily.   Skin:  Negative for poor wound healing.   Musculoskeletal:  Negative for muscle cramps and myalgias.   Gastrointestinal:  Negative for abdominal pain, anorexia, diarrhea, heartburn, hematemesis, hematochezia, melena, nausea and vomiting.   Genitourinary:  Negative for hematuria and nocturia.   Neurological:  Negative for excessive daytime sleepiness, dizziness, focal weakness, light-headedness and weakness.        Physical Exam  Vitals:    24 1322   BP: (!) 146/84   Pulse: 88     Wt Readings from Last 1 Encounters:   24 107.2 kg (236 lb 5.3 oz)     Physical Exam  Vitals and nursing note reviewed.   Constitutional:       General: She is not in acute distress.     Appearance: She is obese. She is not toxic-appearing or diaphoretic.   HENT:      Head: Normocephalic and atraumatic.      Mouth/Throat:      Lips: Pink.      Mouth: Mucous membranes are moist.   Eyes:      General: No scleral icterus.     Conjunctiva/sclera: Conjunctivae normal.   Neck:      Thyroid: No thyromegaly.      Vascular: No carotid bruit, hepatojugular reflux or JVD.      Trachea: Trachea normal.   Cardiovascular:      Rate and Rhythm: Normal rate and regular rhythm. Occasional Extrasystoles are present.     Pulses:           Carotid pulses are 2+ on the right side and 2+ on the left side.       Radial pulses are 2+ on the right side and 2+ on the left side.      Heart sounds: S1 normal and S2 normal. No murmur heard.     No friction rub. No S3 or S4 sounds.   Pulmonary:      Effort: Pulmonary effort is normal. No accessory muscle usage or respiratory distress.      Breath sounds: Normal breath sounds and air entry. No decreased breath sounds, wheezing, rhonchi or rales.   Abdominal:      General: Bowel sounds are normal. There is no distension or abdominal bruit.      Palpations: Abdomen is soft. There is no hepatomegaly, splenomegaly or pulsatile mass.      Tenderness: There is no abdominal tenderness.   Musculoskeletal:         General: No tenderness or deformity.      Right lower le+ Edema present.      Left lower le+ Edema present.   Skin:     General: Skin is warm and dry.      Capillary Refill: Capillary refill takes less than 2 seconds.      Coloration: Skin is not cyanotic or pale.      Nails: There is no clubbing.   Neurological:      General: No focal deficit present.      Mental Status: She is alert and oriented to  person, place, and time.   Psychiatric:         Attention and Perception: Attention normal.         Mood and Affect: Mood normal.         Speech: Speech normal.         Behavior: Behavior normal. Behavior is cooperative.         Labs  No visits with results within 6 Month(s) from this visit.   Latest known visit with results is:   Admission on 11/13/2022, Discharged on 11/13/2022   Component Date Value Ref Range Status    WBC 11/13/2022 10.29  3.90 - 12.70 K/uL Final    RBC 11/13/2022 4.40  4.00 - 5.40 M/uL Final    Hemoglobin 11/13/2022 13.2  12.0 - 16.0 g/dL Final    Hematocrit 11/13/2022 39.3  37.0 - 48.5 % Final    MCV 11/13/2022 89  82 - 98 fL Final    MCH 11/13/2022 30.0  27.0 - 31.0 pg Final    MCHC 11/13/2022 33.6  32.0 - 36.0 g/dL Final    RDW 11/13/2022 13.8  11.5 - 14.5 % Final    Platelets 11/13/2022 288  150 - 450 K/uL Final    MPV 11/13/2022 10.7  9.2 - 12.9 fL Final    Immature Granulocytes 11/13/2022 0.3  0.0 - 0.5 % Final    Gran # (ANC) 11/13/2022 7.0  1.8 - 7.7 K/uL Final    Immature Grans (Abs) 11/13/2022 0.03  0.00 - 0.04 K/uL Final    Comment: Mild elevation in immature granulocytes is non specific and   can be seen in a variety of conditions including stress response,   acute inflammation, trauma and pregnancy. Correlation with other   laboratory and clinical findings is essential.      Lymph # 11/13/2022 2.2  1.0 - 4.8 K/uL Final    Mono # 11/13/2022 0.9  0.3 - 1.0 K/uL Final    Eos # 11/13/2022 0.2  0.0 - 0.5 K/uL Final    Baso # 11/13/2022 0.04  0.00 - 0.20 K/uL Final    nRBC 11/13/2022 0  0 /100 WBC Final    Gran % 11/13/2022 68.2  38.0 - 73.0 % Final    Lymph % 11/13/2022 20.9  18.0 - 48.0 % Final    Mono % 11/13/2022 8.3  4.0 - 15.0 % Final    Eosinophil % 11/13/2022 1.9  0.0 - 8.0 % Final    Basophil % 11/13/2022 0.4  0.0 - 1.9 % Final    Differential Method 11/13/2022 Automated   Final    Sodium 11/13/2022 137  136 - 145 mmol/L Final    Potassium 11/13/2022 3.7  3.5 - 5.1 mmol/L Final     Chloride 11/13/2022 106  95 - 110 mmol/L Final    CO2 11/13/2022 26  23 - 29 mmol/L Final    Glucose 11/13/2022 120 (H)  70 - 110 mg/dL Final    BUN 11/13/2022 24 (H)  8 - 23 mg/dL Final    Creatinine 11/13/2022 0.9  0.5 - 1.4 mg/dL Final    Calcium 11/13/2022 9.2  8.7 - 10.5 mg/dL Final    Total Protein 11/13/2022 6.7  6.0 - 8.4 g/dL Final    Albumin 11/13/2022 4.0  3.5 - 5.2 g/dL Final    Total Bilirubin 11/13/2022 0.7  0.1 - 1.0 mg/dL Final    Comment: For infants and newborns, interpretation of results should be based  on gestational age, weight and in agreement with clinical  observations.    Premature Infant recommended reference ranges:  Up to 24 hours.............<8.0 mg/dL  Up to 48 hours............<12.0 mg/dL  3-5 days..................<15.0 mg/dL  6-29 days.................<15.0 mg/dL      Alkaline Phosphatase 11/13/2022 65  55 - 135 U/L Final    AST 11/13/2022 23  10 - 40 U/L Final    ALT 11/13/2022 22  10 - 44 U/L Final    Anion Gap 11/13/2022 5 (L)  8 - 16 mmol/L Final    eGFR 11/13/2022 >60.0  >60 mL/min/1.73 m^2 Final    Lipase 11/13/2022 50  4 - 60 U/L Final    Specimen UA 11/13/2022 Urine, Clean Catch   Final    Color, UA 11/13/2022 Yellow  Yellow, Straw, Flor Final    Appearance, UA 11/13/2022 Clear  Clear Final    pH, UA 11/13/2022 6.0  5.0 - 8.0 Final    Specific Gravity, UA 11/13/2022 1.020  1.005 - 1.030 Final    Protein, UA 11/13/2022 Negative  Negative Final    Comment: Recommend a 24 hour urine protein or a urine   protein/creatinine ratio if globulin induced proteinuria is  clinically suspected.      Glucose, UA 11/13/2022 Negative  Negative Final    Ketones, UA 11/13/2022 Negative  Negative Final    Bilirubin (UA) 11/13/2022 Negative  Negative Final    Occult Blood UA 11/13/2022 Negative  Negative Final    Nitrite, UA 11/13/2022 Negative  Negative Final    Urobilinogen, UA 11/13/2022 Negative  Negative EU/dL Final    Leukocytes, UA 11/13/2022 Negative  Negative Final    Occult Blood  11/13/2022 Positive (A)  Negative Final    Stool Exam-Ova,Cysts,Parasites 11/13/2022 Final report   Final    Comment: These results were obtained using wet  preparation(s) and trichrome stained smear.  This test does not include testing for  Cryptosporidium parvum, Cyclospora, or  Microsporidia.  Performed at:  [address]  [id number]  : Levi Smith MD, Phone:  [phone]      Stool Culture 11/13/2022 No Salmonella,Shigella,Vibrio,Campylobacter.   Final    Stool Culture 11/13/2022 No E coli [id number]:H7 isolated.   Final    Stool WBC 11/13/2022 Mod neutrophils seen (A)  No neutrophils seen Final    C. diff Antigen 11/13/2022 Negative  Negative Final    C difficile Toxins A+B, EIA 11/13/2022 Negative  Negative Final    Testing not recommended for children <24 months old.    TSH 11/13/2022 5.450  0.340 - 5.600 uIU/mL Final    Ova and Parasites, Result 1 11/13/2022 Comment   Final    Comment: No ova, cysts, or parasites seen.  One negative specimen does not rule out the  possibility of a parasitic infection.  Performed at:  [address]  [id number]  : Levi Smith MD, Phone:  [phone]         Imaging  No results found.    Assessment  1. Exertional dyspnea  - EKG 12-lead  - Comprehensive Metabolic Panel; Future  - BNP; Future  - CBC Auto Differential; Future  - TSH; Future  - Magnesium; Future  - Echo; Future    2. Ventricular premature complexes  Benign ectopy.  Reassured.  - EKG 12-lead  - Comprehensive Metabolic Panel; Future  - BNP; Future  - CBC Auto Differential; Future  - TSH; Future  - Magnesium; Future  - Echo; Future    3. Primary hypertension  Mildly elevated today but seems to be generally well controlled    4. Hypercholesterolemia  When last assessed, seems reasonably controlled    5. Severe obesity  Discussed      Plan and Discussion    Workup as above with further planning based on  those results.    The ASCVD Risk score (Danish MCPHERSON, et al., 2019) failed to calculate for the following reasons:    Cannot find a previous HDL lab    Cannot find a previous total cholesterol lab     Follow Up  Follow up for test results.      Roger Srivastava MD

## 2024-01-23 ENCOUNTER — HOSPITAL ENCOUNTER (OUTPATIENT)
Dept: CARDIOLOGY | Facility: HOSPITAL | Age: 74
Discharge: HOME | End: 2024-01-23
Attending: INTERNAL MEDICINE
Payer: MEDICARE

## 2024-01-23 VITALS — BODY MASS INDEX: 35.77 KG/M2 | WEIGHT: 236 LBS | HEIGHT: 68 IN

## 2024-01-23 DIAGNOSIS — R06.09 EXERTIONAL DYSPNEA: ICD-10-CM

## 2024-01-23 DIAGNOSIS — I49.3 VENTRICULAR PREMATURE COMPLEXES: ICD-10-CM

## 2024-01-23 LAB
AORTIC ROOT ANNULUS: 3.9 CM
AORTIC VALVE CUSP SEPERATION: 2.2 CM
AV INDEX (PROSTH): 0.93
AV MEAN GRADIENT: 2 MMHG
AV PEAK GRADIENT: 3 MMHG
AV VALVE AREA BY VELOCITY RATIO: 2.71 CM²
AV VALVE AREA: 2.91 CM²
AV VELOCITY RATIO: 0.86
BSA FOR ECHO PROCEDURE: 2.27 M2
CV ECHO LV RWT: 0.7 CM
DOP CALC AO PEAK VEL: 0.87 M/S
DOP CALC AO VTI: 16.6 CM
DOP CALC LVOT AREA: 3.1 CM2
DOP CALC LVOT DIAMETER: 2 CM
DOP CALC LVOT PEAK VEL: 0.75 M/S
DOP CALC LVOT STROKE VOLUME: 48.36 CM3
DOP CALCLVOT PEAK VEL VTI: 15.4 CM
E WAVE DECELERATION TIME: 239 MSEC
E/A RATIO: 0.82
E/E' RATIO: 6.22 M/S
ECHO LV POSTERIOR WALL: 1.2 CM (ref 0.6–1.1)
FRACTIONAL SHORTENING: 34 % (ref 28–44)
INTERVENTRICULAR SEPTUM: 1.08 CM (ref 0.6–1.1)
IVRT: 137 MSEC
LEFT INTERNAL DIMENSION IN SYSTOLE: 2.28 CM (ref 2.1–4)
LEFT VENTRICLE DIASTOLIC VOLUME INDEX: 22.42 ML/M2
LEFT VENTRICLE DIASTOLIC VOLUME: 49.1 ML
LEFT VENTRICLE MASS INDEX: 56 G/M2
LEFT VENTRICLE SYSTOLIC VOLUME INDEX: 8.1 ML/M2
LEFT VENTRICLE SYSTOLIC VOLUME: 17.7 ML
LEFT VENTRICULAR INTERNAL DIMENSION IN DIASTOLE: 3.45 CM (ref 3.5–6)
LEFT VENTRICULAR MASS: 122.96 G
LV LATERAL E/E' RATIO: 6.22 M/S
LV SEPTAL E/E' RATIO: 6.22 M/S
LVOT MG: 1 MMHG
LVOT MV: 0.48 CM/S
MV PEAK A VEL: 0.68 M/S
MV PEAK E VEL: 0.56 M/S
MV STENOSIS PRESSURE HALF TIME: 130 MS
MV VALVE AREA P 1/2 METHOD: 1.69 CM2
PISA TR MAX VEL: 2.16 M/S
RA PRESSURE ESTIMATED: 3 MMHG
RIGHT VENTRICULAR END-DIASTOLIC DIMENSION: 1.63 CM
RV TB RVSP: 5 MMHG
TDI LATERAL: 0.09 M/S
TDI SEPTAL: 0.09 M/S
TDI: 0.09 M/S
TR MAX PG: 19 MMHG
TV REST PULMONARY ARTERY PRESSURE: 22 MMHG
Z-SCORE OF LEFT VENTRICULAR DIMENSION IN END DIASTOLE: -7.63
Z-SCORE OF LEFT VENTRICULAR DIMENSION IN END SYSTOLE: -5.37

## 2024-01-23 PROCEDURE — 93306 TTE W/DOPPLER COMPLETE: CPT | Mod: 26,,, | Performed by: INTERNAL MEDICINE

## 2024-01-23 PROCEDURE — 93306 TTE W/DOPPLER COMPLETE: CPT

## 2024-05-07 DIAGNOSIS — M25.569 KNEE PAIN, UNSPECIFIED CHRONICITY, UNSPECIFIED LATERALITY: Primary | ICD-10-CM

## 2024-05-16 ENCOUNTER — OFFICE VISIT (OUTPATIENT)
Dept: ORTHOPEDICS | Facility: CLINIC | Age: 74
End: 2024-05-16
Payer: MEDICARE

## 2024-05-16 ENCOUNTER — HOSPITAL ENCOUNTER (OUTPATIENT)
Dept: RADIOLOGY | Facility: HOSPITAL | Age: 74
Discharge: HOME | End: 2024-05-16
Attending: ORTHOPAEDIC SURGERY
Payer: MEDICARE

## 2024-05-16 VITALS — HEIGHT: 68 IN | RESPIRATION RATE: 18 BRPM | WEIGHT: 235.88 LBS | BODY MASS INDEX: 35.75 KG/M2

## 2024-05-16 DIAGNOSIS — M25.569 KNEE PAIN, UNSPECIFIED CHRONICITY, UNSPECIFIED LATERALITY: ICD-10-CM

## 2024-05-16 DIAGNOSIS — M18.11 PRIMARY OSTEOARTHRITIS OF FIRST CARPOMETACARPAL JOINT OF RIGHT HAND: ICD-10-CM

## 2024-05-16 DIAGNOSIS — M25.569 KNEE PAIN, UNSPECIFIED CHRONICITY, UNSPECIFIED LATERALITY: Primary | ICD-10-CM

## 2024-05-16 PROCEDURE — 99999PBSHW PR PBB SHADOW TECHNICAL ONLY FILED TO HB: Mod: PBBFAC,,,

## 2024-05-16 PROCEDURE — 20610 DRAIN/INJ JOINT/BURSA W/O US: CPT | Mod: 50,PBBFAC,PO | Performed by: ORTHOPAEDIC SURGERY

## 2024-05-16 PROCEDURE — 99213 OFFICE O/P EST LOW 20 MIN: CPT | Mod: PBBFAC,25,PO | Performed by: ORTHOPAEDIC SURGERY

## 2024-05-16 PROCEDURE — 99214 OFFICE O/P EST MOD 30 MIN: CPT | Mod: 25,S$PBB,, | Performed by: ORTHOPAEDIC SURGERY

## 2024-05-16 PROCEDURE — 73564 X-RAY EXAM KNEE 4 OR MORE: CPT | Mod: TC,50,PO

## 2024-05-16 PROCEDURE — 99999 PR PBB SHADOW E&M-EST. PATIENT-LVL III: CPT | Mod: PBBFAC,,, | Performed by: ORTHOPAEDIC SURGERY

## 2024-05-16 PROCEDURE — 73564 X-RAY EXAM KNEE 4 OR MORE: CPT | Mod: 26,50,, | Performed by: RADIOLOGY

## 2024-05-16 RX ORDER — TRIAMCINOLONE ACETONIDE 40 MG/ML
40 INJECTION, SUSPENSION INTRA-ARTICULAR; INTRAMUSCULAR
Status: DISCONTINUED | OUTPATIENT
Start: 2024-05-16 | End: 2024-05-16 | Stop reason: HOSPADM

## 2024-05-16 RX ADMIN — TRIAMCINOLONE ACETONIDE 40 MG: 40 INJECTION, SUSPENSION INTRA-ARTICULAR; INTRAMUSCULAR at 10:05

## 2024-05-16 NOTE — PROGRESS NOTES
Past Medical History:   Diagnosis Date    Depression     GERD (gastroesophageal reflux disease)     Hyperlipidemia     Hypertension     Hypothyroid        Past Surgical History:   Procedure Laterality Date    APPENDECTOMY      COLONOSCOPY  2017    Briana, repeat in 10    TONSILLECTOMY      TUBAL LIGATION         Current Outpatient Medications   Medication Sig    ascorbic acid, vitamin C, (VITAMIN C) 1000 MG tablet Take 1,000 mg by mouth once daily.    azelastine (ASTELIN) 137 mcg (0.1 %) nasal spray 1 spray (137 mcg total) by Nasal route 2 (two) times daily.    cetirizine (ZYRTEC) 10 MG tablet Take 10 mg by mouth once daily.    fluticasone propionate (FLONASE) 50 mcg/actuation nasal spray 1 spray (50 mcg total) by Each Nostril route 2 (two) times daily.    ipratropium (ATROVENT) 21 mcg (0.03 %) nasal spray USE 2 SPRAYS IN EACH NOSTRIL THREE TIMES DAILY    lovastatin (MEVACOR) 20 MG tablet TAKE 1 TABLET BY MOUTH EVERY EVENING    metoprolol succinate (TOPROL-XL) 100 MG 24 hr tablet Take 100 mg by mouth once daily.    multivit-min-ferrous fumarate 9 mg iron/15 mL Liqd as directed    ondansetron (ZOFRAN-ODT) 4 MG TbDL Take 1 tablet (4 mg total) by mouth every 8 (eight) hours as needed.    pantoprazole (PROTONIX) 40 MG tablet Take 1 tablet (40 mg total) by mouth once daily.    selenium 50 mcg Tab Take 300 mcg by mouth once daily.     valACYclovir (VALTREX) 1000 MG tablet     vitamin D 1000 units Tab Take 1,000 Units by mouth once daily.     No current facility-administered medications for this visit.       Review of patient's allergies indicates:   Allergen Reactions    Lidocaine      Hand edema,  redness    Penicillins Rash       Family History   Problem Relation Name Age of Onset    Cancer Father      Cancer Sister      Glaucoma Neg Hx      Macular degeneration Neg Hx      Retinal detachment Neg Hx      Breast cancer Neg Hx      Ovarian cancer Neg Hx      Eczema Neg Hx      Lupus Neg Hx      Melanoma Neg Hx       Psoriasis Neg Hx         Social History     Socioeconomic History    Marital status:    Tobacco Use    Smoking status: Never    Smokeless tobacco: Never   Substance and Sexual Activity    Alcohol use: Not Currently     Comment: rarely    Drug use: No    Sexual activity: Yes     Social Determinants of Health     Financial Resource Strain: Patient Declined (12/11/2023)    Overall Financial Resource Strain (CARDIA)     Difficulty of Paying Living Expenses: Patient declined   Food Insecurity: Patient Declined (12/11/2023)    Hunger Vital Sign     Worried About Running Out of Food in the Last Year: Patient declined     Ran Out of Food in the Last Year: Patient declined   Transportation Needs: Patient Declined (12/11/2023)    PRAPARE - Transportation     Lack of Transportation (Medical): Patient declined     Lack of Transportation (Non-Medical): Patient declined   Physical Activity: Unknown (12/11/2023)    Exercise Vital Sign     Days of Exercise per Week: Patient declined     Minutes of Exercise per Session: 20 min   Stress: Patient Declined (12/11/2023)    [employer] [employer] - Occupational Stress Questionnaire     Feeling of Stress : Patient declined   Housing Stability: Unknown (12/11/2023)    Housing Stability Vital Sign     Unable to Pay for Housing in the Last Year: Patient refused     Unstable Housing in the Last Year: Patient refused       Chief Complaint:   No chief complaint on file.      History of present illness: This is a 72-year-old female who has had a history of knee arthritis since 2014.  Patient previously had good results with both the Euflexxa and cortisone.  Unfortunately, the Euflexxa is no longer working.  Pain is up to an 8/10.      Review of systems:   Musculoskeletal: See history of present illness    Physical examination:    Vital Signs:    There were no vitals filed for this visit.    There is no height or weight on file to calculate BMI.    This a well-developed,  well nourished patient in no acute distress.  They are alert and oriented and cooperative to examination.  Pt. walks without an antalgic gait.      Examination of bilateral knee shows no rashes or erythema. There are no masses ecchymosis or effusion. Patient has full range of motion from 0-130°. Patient is nontender to palpation over lateral joint line and moderately tender to palpation over the medial joint line.  Knee is stable to varus and valgus stress. 5 out of 5 motor strength. Palpable distal pulses. Intact light touch sensation.  Severe Patellofemoral crepitus      X-rays: 4 views of both knees are  reviewed which show arthritic changes with severe changes of the patellofemoral joint and more moderate changes of the medial compartment of both knees .  No significant change  X-rays of the right shoulder  reviewed which show some mild degenerative changes well-maintained glenohumeral joint space     Assessment:: Bilateral knee osteoarthritis    Plan:  I reviewed the x-ray with her today.  We discussed treatment options for her knee arthritis.  I agreed to inject both knees with cortisone today.  She does not want knee replacement.

## 2024-05-16 NOTE — PROGRESS NOTES
Past Medical History:   Diagnosis Date    Depression     GERD (gastroesophageal reflux disease)     Hyperlipidemia     Hypertension     Hypothyroid        Past Surgical History:   Procedure Laterality Date    APPENDECTOMY      COLONOSCOPY  2017    Briana, repeat in 10    TONSILLECTOMY      TUBAL LIGATION         Current Outpatient Medications   Medication Sig    ascorbic acid, vitamin C, (VITAMIN C) 1000 MG tablet Take 1,000 mg by mouth once daily.    azelastine (ASTELIN) 137 mcg (0.1 %) nasal spray 1 spray (137 mcg total) by Nasal route 2 (two) times daily.    cetirizine (ZYRTEC) 10 MG tablet Take 10 mg by mouth once daily.    fluticasone propionate (FLONASE) 50 mcg/actuation nasal spray 1 spray (50 mcg total) by Each Nostril route 2 (two) times daily.    ipratropium (ATROVENT) 21 mcg (0.03 %) nasal spray USE 2 SPRAYS IN EACH NOSTRIL THREE TIMES DAILY    lovastatin (MEVACOR) 20 MG tablet TAKE 1 TABLET BY MOUTH EVERY EVENING    metoprolol succinate (TOPROL-XL) 100 MG 24 hr tablet Take 100 mg by mouth once daily.    multivit-min-ferrous fumarate 9 mg iron/15 mL Liqd as directed    ondansetron (ZOFRAN-ODT) 4 MG TbDL Take 1 tablet (4 mg total) by mouth every 8 (eight) hours as needed.    pantoprazole (PROTONIX) 40 MG tablet Take 1 tablet (40 mg total) by mouth once daily.    selenium 50 mcg Tab Take 300 mcg by mouth once daily.     valACYclovir (VALTREX) 1000 MG tablet     vitamin D 1000 units Tab Take 1,000 Units by mouth once daily.     No current facility-administered medications for this visit.       Review of patient's allergies indicates:   Allergen Reactions    Lidocaine      Hand edema,  redness    Penicillins Rash       Family History   Problem Relation Name Age of Onset    Cancer Father      Cancer Sister      Glaucoma Neg Hx      Macular degeneration Neg Hx      Retinal detachment Neg Hx      Breast cancer Neg Hx      Ovarian cancer Neg Hx      Eczema Neg Hx      Lupus Neg Hx      Melanoma Neg Hx       Psoriasis Neg Hx         Social History     Socioeconomic History    Marital status:    Tobacco Use    Smoking status: Never    Smokeless tobacco: Never   Substance and Sexual Activity    Alcohol use: Not Currently     Comment: rarely    Drug use: No    Sexual activity: Yes     Social Determinants of Health     Financial Resource Strain: Patient Declined (12/11/2023)    Overall Financial Resource Strain (CARDIA)     Difficulty of Paying Living Expenses: Patient declined   Food Insecurity: Patient Declined (12/11/2023)    Hunger Vital Sign     Worried About Running Out of Food in the Last Year: Patient declined     Ran Out of Food in the Last Year: Patient declined   Transportation Needs: Patient Declined (12/11/2023)    PRAPARE - Transportation     Lack of Transportation (Medical): Patient declined     Lack of Transportation (Non-Medical): Patient declined   Physical Activity: Unknown (12/11/2023)    Exercise Vital Sign     Days of Exercise per Week: Patient declined     Minutes of Exercise per Session: 20 min   Stress: Patient Declined (12/11/2023)    [employer] [place] [employer] - Occupational Stress Questionnaire     Feeling of Stress : Patient declined   Housing Stability: Unknown (12/11/2023)    Housing Stability Vital Sign     Unable to Pay for Housing in the Last Year: Patient refused     Unstable Housing in the Last Year: Patient refused       Chief Complaint:   No chief complaint on file.      History of present illness: This is a 73-year-old female who has had a history of knee arthritis since 2014.  Patient previously had good results with both the Euflexxa and cortisone.  Unfortunately, the Euflexxa is no longer working.  Last injections were back in September.Pain is up to an 3/10.      Review of systems:   Musculoskeletal: See history of present illness    Physical examination:    Vital Signs:    There were no vitals filed for this visit.    There is no height or weight on file to  calculate BMI.    This a well-developed, well nourished patient in no acute distress.  They are alert and oriented and cooperative to examination.  Pt. walks without an antalgic gait.      Examination of bilateral knee shows no rashes or erythema. There are no masses ecchymosis or effusion. Patient has full range of motion from 0-130°. Patient is nontender to palpation over lateral joint line and moderately tender to palpation over the medial joint line.  Knee is stable to varus and valgus stress. 5 out of 5 motor strength. Palpable distal pulses. Intact light touch sensation.  Severe Patellofemoral crepitus      X-rays: 4 views of both knees are  ordered and reviewed which show arthritic changes with severe changes of the patellofemoral joint and more moderate changes of the medial compartment of both knees .  No significant change  X-rays of the right shoulder  reviewed which show some mild degenerative changes well-maintained glenohumeral joint space     Assessment:: Bilateral knee osteoarthritis    Plan:  I reviewed the x-ray with her today.  We discussed treatment options for her knee arthritis.  I agreed to inject both knees with cortisone today.  She does not want knee replacement.

## 2024-05-16 NOTE — PROCEDURES
Large Joint Aspiration/Injection: bilateral knee    Date/Time: 5/16/2024 10:30 AM    Performed by: David Chan MD  Authorized by: David Chan MD    Consent Done?:  Yes (Verbal)  Indications:  Pain  Site marked: the procedure site was marked    Timeout: prior to procedure the correct patient, procedure, and site was verified    Prep: patient was prepped and draped in usual sterile fashion      Local anesthesia used?: Yes    Anesthesia:  Local infiltration  Local anesthetic: Ropivicaine.  Anesthetic total (ml):  3      Details:  Needle Size:  22 G  Ultrasonic Guidance for needle placement?: No    Approach:  Anterolateral  Location:  Knee  Laterality:  Bilateral  Site:  Bilateral knee  Medications (Right):  40 mg triamcinolone acetonide 40 mg/mL  Medications (Left):  40 mg triamcinolone acetonide 40 mg/mL  Patient tolerance:  Patient tolerated the procedure well with no immediate complications

## 2024-06-12 ENCOUNTER — HOSPITAL ENCOUNTER (OUTPATIENT)
Dept: RADIOLOGY | Facility: HOSPITAL | Age: 74
Discharge: HOME | End: 2024-06-12
Attending: STUDENT IN AN ORGANIZED HEALTH CARE EDUCATION/TRAINING PROGRAM
Payer: MEDICARE

## 2024-06-12 ENCOUNTER — OFFICE VISIT (OUTPATIENT)
Dept: PHYSICAL MEDICINE AND REHAB | Facility: CLINIC | Age: 74
End: 2024-06-12
Payer: MEDICARE

## 2024-06-12 VITALS — HEIGHT: 68 IN | WEIGHT: 206 LBS | BODY MASS INDEX: 31.22 KG/M2

## 2024-06-12 DIAGNOSIS — G89.29 CHRONIC NECK PAIN: Primary | ICD-10-CM

## 2024-06-12 DIAGNOSIS — M54.50 CHRONIC BILATERAL LOW BACK PAIN WITHOUT SCIATICA: ICD-10-CM

## 2024-06-12 DIAGNOSIS — G89.29 CHRONIC BILATERAL LOW BACK PAIN WITHOUT SCIATICA: ICD-10-CM

## 2024-06-12 DIAGNOSIS — M50.30 DDD (DEGENERATIVE DISC DISEASE), CERVICAL: ICD-10-CM

## 2024-06-12 DIAGNOSIS — M51.36 DDD (DEGENERATIVE DISC DISEASE), LUMBAR: ICD-10-CM

## 2024-06-12 DIAGNOSIS — M54.2 CHRONIC NECK PAIN: Primary | ICD-10-CM

## 2024-06-12 PROCEDURE — 72100 X-RAY EXAM L-S SPINE 2/3 VWS: CPT | Mod: TC

## 2024-06-12 PROCEDURE — 72100 X-RAY EXAM L-S SPINE 2/3 VWS: CPT | Mod: 26,,, | Performed by: RADIOLOGY

## 2024-06-12 PROCEDURE — 99213 OFFICE O/P EST LOW 20 MIN: CPT | Mod: PBBFAC,PN | Performed by: STUDENT IN AN ORGANIZED HEALTH CARE EDUCATION/TRAINING PROGRAM

## 2024-06-12 PROCEDURE — 99999 PR PBB SHADOW E&M-EST. PATIENT-LVL III: CPT | Mod: PBBFAC,,, | Performed by: STUDENT IN AN ORGANIZED HEALTH CARE EDUCATION/TRAINING PROGRAM

## 2024-06-12 PROCEDURE — 99204 OFFICE O/P NEW MOD 45 MIN: CPT | Mod: S$PBB,,, | Performed by: STUDENT IN AN ORGANIZED HEALTH CARE EDUCATION/TRAINING PROGRAM

## 2024-06-12 RX ORDER — METOPROLOL SUCCINATE 100 MG/1
TABLET, EXTENDED RELEASE ORAL
COMMUNITY

## 2024-06-12 RX ORDER — FAMOTIDINE 40 MG/1
40 TABLET, FILM COATED ORAL
COMMUNITY
Start: 2024-05-23

## 2024-06-12 RX ORDER — DIVALPROEX SODIUM 125 MG/1
250 TABLET, DELAYED RELEASE ORAL 2 TIMES DAILY
COMMUNITY
Start: 2024-05-30

## 2024-06-12 RX ORDER — EPINEPHRINE 0.22MG
AEROSOL WITH ADAPTER (ML) INHALATION
COMMUNITY
Start: 2023-01-01

## 2024-06-12 RX ORDER — LEVOTHYROXINE SODIUM 100 UG/1
TABLET ORAL
COMMUNITY
Start: 2023-01-01

## 2024-06-12 RX ORDER — CODEINE PHOSPHATE AND GUAIFENESIN 10; 100 MG/5ML; MG/5ML
SOLUTION ORAL
COMMUNITY
Start: 2024-04-17

## 2024-06-12 RX ORDER — BENZONATATE 200 MG/1
200 CAPSULE ORAL EVERY 6 HOURS PRN
COMMUNITY
Start: 2024-04-18

## 2024-06-12 RX ORDER — MELOXICAM 15 MG/1
15 TABLET ORAL DAILY PRN
Qty: 30 TABLET | Refills: 2 | Status: SHIPPED | OUTPATIENT
Start: 2024-06-12

## 2024-06-12 NOTE — PROGRESS NOTES
[facility] New Patient Evaluation    Referred by: Donavon Self    PCP: Abhi Cardona MD    CC:   Chief Complaint   Patient presents with    Back Pain    Neck Pain           No data to display              HPI: Skyler Espinoza is a 73 y.o. female who presents with 1 year history of chronic low back pain.  She describes the pain as aching and sharp.  It does come and go with time.  The pain is made worse with standing and bending.  She has tried Tylenol and Aleve as well as therapy without sustained relief.  Pain is improved with sitting and resting. Pain primarily stays in the mid back and does not radiate.    She also has chronic aching neck pain. She sees Dr. Valencia in the past and had ablative therapy recommended.  MRI of the C spine showed DDD with mild compression of the ventral aspect of the cervical cord.     Pain Intervention History:  None     Past Spine Surgical History:  none    Past and current medications:  Antineuropathics: taken in the past but caused unwanted s/e  Antiseizure: depakote  NSAIDs: aleve  Physical therapy: not for her back  Antidepressants: none  Muscle relaxers: skelaxin  Opioids: none  Antiplatelets/Anticoagulants: none    History:    Current Outpatient Medications:     benzonatate (TESSALON) 200 MG capsule, Take 200 mg by mouth every 6 (six) hours as needed., Disp: , Rfl:     coenzyme Q10 (COQ-10) 100 mg capsule, , Disp: , Rfl:     divalproex (DEPAKOTE) 125 MG EC tablet, Take 250 mg by mouth 2 (two) times daily., Disp: , Rfl:     famotidine (PEPCID) 40 MG tablet, Take 40 mg by mouth., Disp: , Rfl:     guaiFENesin-codeine 100-10 mg/5 ml (TUSSI-ORGANIDIN NR)  mg/5 mL syrup, SMARTSI-10 Milliliter(s) By Mouth Every Night PRN, Disp: , Rfl:     levothyroxine (SYNTHROID) 100 MCG tablet, , Disp: , Rfl:     ascorbic acid, vitamin C, (VITAMIN C) 1000 MG tablet, Take 1,000 mg by mouth once daily., Disp: , Rfl:     azelastine (ASTELIN) 137 mcg (0.1 %)  nasal spray, 1 spray (137 mcg total) by Nasal route 2 (two) times daily., Disp: 30 mL, Rfl: 5    cetirizine (ZYRTEC) 10 MG tablet, Take 10 mg by mouth once daily., Disp: , Rfl:     fluticasone propionate (FLONASE) 50 mcg/actuation nasal spray, 1 spray (50 mcg total) by Each Nostril route 2 (two) times daily., Disp: 16 g, Rfl: 3    ipratropium (ATROVENT) 21 mcg (0.03 %) nasal spray, USE 2 SPRAYS IN EACH NOSTRIL THREE TIMES DAILY, Disp: 90 mL, Rfl: 0    lovastatin (MEVACOR) 20 MG tablet, TAKE 1 TABLET BY MOUTH EVERY EVENING, Disp: 90 tablet, Rfl: 3    meloxicam (MOBIC) 15 MG tablet, Take 1 tablet (15 mg total) by mouth daily as needed for Pain., Disp: 30 tablet, Rfl: 2    metoprolol succinate (TOPROL-XL) 100 MG 24 hr tablet, Take 100 mg by mouth once daily., Disp: , Rfl:     metoprolol succinate (TOPROL-XL) 100 MG 24 hr tablet, 1 tablet Orally Once a day for 90 days, Disp: , Rfl:     multivit-min-ferrous fumarate 9 mg iron/15 mL Liqd, as directed, Disp: , Rfl:     ondansetron (ZOFRAN-ODT) 4 MG TbDL, Take 1 tablet (4 mg total) by mouth every 8 (eight) hours as needed., Disp: 30 tablet, Rfl: 0    pantoprazole (PROTONIX) 40 MG tablet, Take 1 tablet (40 mg total) by mouth once daily., Disp: 30 tablet, Rfl: 11    selenium 50 mcg Tab, Take 300 mcg by mouth once daily. , Disp: , Rfl:     valACYclovir (VALTREX) 1000 MG tablet, , Disp: , Rfl:     vitamin D 1000 units Tab, Take 1,000 Units by mouth once daily., Disp: , Rfl:     Past Medical History:   Diagnosis Date    Depression     GERD (gastroesophageal reflux disease)     Hyperlipidemia     Hypertension     Hypothyroid        Past Surgical History:   Procedure Laterality Date    APPENDECTOMY      COLONOSCOPY  2017    Briana, repeat in 10    TONSILLECTOMY      TUBAL LIGATION         Family History   Problem Relation Name Age of Onset    Cancer Father      Cancer Sister      Glaucoma Neg Hx      Macular degeneration Neg Hx      Retinal detachment Neg Hx      Breast cancer  "Neg Hx      Ovarian cancer Neg Hx      Eczema Neg Hx      Lupus Neg Hx      Melanoma Neg Hx      Psoriasis Neg Hx         Social History     Socioeconomic History    Marital status:    Tobacco Use    Smoking status: Never    Smokeless tobacco: Never   Substance and Sexual Activity    Alcohol use: Not Currently     Comment: rarely    Drug use: No    Sexual activity: Yes     Social Determinants of Health     Financial Resource Strain: Patient Declined (12/11/2023)    Overall Financial Resource Strain (CARDIA)     Difficulty of Paying Living Expenses: Patient declined   Food Insecurity: Patient Declined (12/11/2023)    Hunger Vital Sign     Worried About Running Out of Food in the Last Year: Patient declined     Ran Out of Food in the Last Year: Patient declined   Transportation Needs: Patient Declined (12/11/2023)    PRAPARE - Transportation     Lack of Transportation (Medical): Patient declined     Lack of Transportation (Non-Medical): Patient declined   Physical Activity: Unknown (12/11/2023)    Exercise Vital Sign     Days of Exercise per Week: Patient declined     Minutes of Exercise per Session: 20 min   Stress: Patient Declined (12/11/2023)    [employer] [place] [employer] - Occupational Stress Questionnaire     Feeling of Stress : Patient declined   Housing Stability: Unknown (12/11/2023)    Housing Stability Vital Sign     Unable to Pay for Housing in the Last Year: Patient refused     Unstable Housing in the Last Year: Patient refused     Review of patient's allergies indicates:  No Known Allergies    Review of Systems: Balance of review of systems is negative.    Physical Exam:  Vitals:    06/12/24 0847   Weight: 93.4 kg (206 lb)   Height: 5' 8" (1.727 m)   PainSc:   4     Body mass index is 31.32 kg/m².    Gen: NAD  Psych: mood appropriate for given condition  HEENT: eyes anicteric   CV: RRR, 2+ radial pulse  HEENT: anicteric   Respiratory: non-labored, no signs of respiratory " distress  Abd: non-distended  Skin: warm, dry and intact.  Gait: Able to heel walk, toe walk. No antalgic gait.     Coordination:   Romberg: negative  Finger to nose coordination: normal  Heel to shin coordination: normal  Tandem walking coordination: normal    Cervical spine:   ROM is full in flexion, extension and lateral rotation without increased pain.  Spurling's maneuver causes no neck pain to either side.  Myofascial exam: Tenderness to palpation across cervical paraspinous region bilaterally.    Lumbar spine:   ROM is full with flexion extension and oblique extension with no increased pain.    Bradley's test causes no increased pain on either side.    Supine straight leg raise is negative bilaterally.    Internal and external rotation of the hip causes no increased pain on either side.  Myofascial exam: No tenderness to palpation across lumbar paraspinous muscles. No tenderness to palpation over the bilateral greater trochanters and bilateral SI joint    Sensory:  Intact and symmetrical to light touch in C4-T1 dermatomes bilaterally. Intact and symmetrical to light touch in L1-S1 dermatomes bilaterally.    Motor:    Right Left   C4 Shoulder Abduction  5  5   C5 Elbow Flexion    5  5   C6 Wrist Extension  5  5   C7 Elbow Extension   5  5   C8/T1 Hand Intrinsics   5  5        Right Left   L2/3 Iliacus Hip flexion  5  5   L3/4 Qudratus Femoris Knee Extension  5  5   L4/5 Tib Anterior Ankle Dorsiflexion   5  5   L5/S1 Extensor Hallicus Longus Great toe extension  5  5   S1/S2 Gastroc/Soleus Plantar Flexion  5  5      Right Left   Triceps DTR 2+ 2+   Biceps DTR 2+ 2+   Brachioradialis DTR 2+ 2+   Zamorano Absent  Absent        Right Left   Patellar DTR 2+ 2+   Achilles DTR 2+ 2+   Clonus Absent Absent   Babinski Absent Absent     Imaging:  MRI C spine:   IMPRESSION:  1. Multilevel cervical degenerative disc disease as above. Findings are most pronounced at C6-7, where moderate-sized broad-based disc protrusion  "results in mild spinal stenosis and mild compression of the ventral aspect of the cervical cord.  2. Please see additional details as noted at each individual level.    Labs:  No results found for: "LABA1C", "HGBA1C"    Lab Results   Component Value Date    WBC 7.39 01/19/2024    HGB 14.1 01/19/2024    HCT 47.1 01/19/2024     (H) 01/19/2024     01/19/2024     Assessment:   Problem List Items Addressed This Visit          Neuro    DDD (degenerative disc disease), cervical     Healthy Back orders placed. Handout provided.   Recommend f/u with Dr. Valencia if insufficient relief with therapy.  Meloxicam prn  Reviewed MRI  Unable to tolerate gabapentin.   RTC 8 wks.             Orthopedic    Chronic bilateral low back pain without sciatica     Healthy Back orders placed. Handout provided.   Meloxicam prn  Educated on red flags and reasons to present to the ED or call our office.  X rays of the L spine  RTC 8 wks.           Other Visit Diagnoses       Chronic neck pain    -  Primary    DDD (degenerative disc disease), lumbar        Relevant Orders    X-Ray Lumbar Spine 2 Or 3 Views            Lamin Price M.D.  Physical Medicine and Rehabilitation Medicine    This note was completed with dictation software and grammatical errors may exist.    "

## 2024-06-12 NOTE — ASSESSMENT & PLAN NOTE
Healthy Back orders placed. Handout provided.   Meloxicam prn  Educated on red flags and reasons to present to the ED or call our office.  X rays of the L spine  RTC 8 wks.

## 2024-06-12 NOTE — ASSESSMENT & PLAN NOTE
Healthy Back orders placed. Handout provided.   Recommend f/u with Dr. Valencia if insufficient relief with therapy.  Meloxicam prn  Reviewed MRI  Unable to tolerate gabapentin.   RTC 8 wks.

## 2024-08-13 ENCOUNTER — OFFICE VISIT (OUTPATIENT)
Dept: PHYSICAL MEDICINE AND REHAB | Facility: CLINIC | Age: 74
End: 2024-08-13
Payer: MEDICARE

## 2024-08-13 VITALS
DIASTOLIC BLOOD PRESSURE: 83 MMHG | BODY MASS INDEX: 31.83 KG/M2 | HEIGHT: 68 IN | SYSTOLIC BLOOD PRESSURE: 142 MMHG | WEIGHT: 210 LBS | HEART RATE: 73 BPM

## 2024-08-13 DIAGNOSIS — M51.36 DDD (DEGENERATIVE DISC DISEASE), LUMBAR: Primary | ICD-10-CM

## 2024-08-13 DIAGNOSIS — M54.50 CHRONIC BILATERAL LOW BACK PAIN WITHOUT SCIATICA: ICD-10-CM

## 2024-08-13 DIAGNOSIS — M50.30 DDD (DEGENERATIVE DISC DISEASE), CERVICAL: ICD-10-CM

## 2024-08-13 DIAGNOSIS — G89.29 CHRONIC BILATERAL LOW BACK PAIN WITHOUT SCIATICA: ICD-10-CM

## 2024-08-13 PROCEDURE — 3079F DIAST BP 80-89 MM HG: CPT | Mod: CPTII,S$GLB,, | Performed by: STUDENT IN AN ORGANIZED HEALTH CARE EDUCATION/TRAINING PROGRAM

## 2024-08-13 PROCEDURE — 3008F BODY MASS INDEX DOCD: CPT | Mod: CPTII,S$GLB,, | Performed by: STUDENT IN AN ORGANIZED HEALTH CARE EDUCATION/TRAINING PROGRAM

## 2024-08-13 PROCEDURE — 1159F MED LIST DOCD IN RCRD: CPT | Mod: CPTII,S$GLB,, | Performed by: STUDENT IN AN ORGANIZED HEALTH CARE EDUCATION/TRAINING PROGRAM

## 2024-08-13 PROCEDURE — 99214 OFFICE O/P EST MOD 30 MIN: CPT | Mod: S$GLB,,, | Performed by: STUDENT IN AN ORGANIZED HEALTH CARE EDUCATION/TRAINING PROGRAM

## 2024-08-13 PROCEDURE — 1101F PT FALLS ASSESS-DOCD LE1/YR: CPT | Mod: CPTII,S$GLB,, | Performed by: STUDENT IN AN ORGANIZED HEALTH CARE EDUCATION/TRAINING PROGRAM

## 2024-08-13 PROCEDURE — 3288F FALL RISK ASSESSMENT DOCD: CPT | Mod: CPTII,S$GLB,, | Performed by: STUDENT IN AN ORGANIZED HEALTH CARE EDUCATION/TRAINING PROGRAM

## 2024-08-13 PROCEDURE — 3077F SYST BP >= 140 MM HG: CPT | Mod: CPTII,S$GLB,, | Performed by: STUDENT IN AN ORGANIZED HEALTH CARE EDUCATION/TRAINING PROGRAM

## 2024-08-13 PROCEDURE — 1126F AMNT PAIN NOTED NONE PRSNT: CPT | Mod: CPTII,S$GLB,, | Performed by: STUDENT IN AN ORGANIZED HEALTH CARE EDUCATION/TRAINING PROGRAM

## 2024-08-13 PROCEDURE — 99999 PR PBB SHADOW E&M-EST. PATIENT-LVL IV: CPT | Mod: PBBFAC,,, | Performed by: STUDENT IN AN ORGANIZED HEALTH CARE EDUCATION/TRAINING PROGRAM

## 2024-08-13 RX ORDER — MELOXICAM 15 MG/1
15 TABLET ORAL DAILY PRN
Qty: 30 TABLET | Refills: 2 | Status: SHIPPED | OUTPATIENT
Start: 2024-08-13

## 2024-08-13 NOTE — ASSESSMENT & PLAN NOTE
Healthy Back orders placed again. Handout provided.   Recommend f/u with Dr. Valencia.  Recently underwent medial branch blocks and recalls being scheduled for repeat blocks an eventual ablation  Refilled Meloxicam prn.  Discussed the risks of long-term NSAID use.  Reviewed MRI again  Unable to tolerate gabapentin.   RTC 3 months.

## 2024-08-13 NOTE — PROGRESS NOTES
[facility]    Referred by: No ref. provider found    PCP: Mi, Primary Doctor    CC:   Chief Complaint   Patient presents with    Follow-up     Patient is here for a 2 Mo F/U and states she is doing well and her pain level is a 0/10 today. Her daughter states she is having some trouble with some tremors lately.            No data to display              Interval note on 08/13/2024:  Patient arrives today for scheduled follow up from her last clinic visit on 06/12/2024.  At this visit, I started her in the healthy back program, reviewed MRI and provided her with meloxicam as needed.  She is accompanied by her very supportive daughter today.  She reports improved pain with meloxicam and denies unwanted side effects.  They have not started healthy back as they do not recall being called for an appointment.  They do remain interested in attempting therapy.      HPI: Skyler Espinoza is a 73 y.o. female who presents with 1 year history of chronic low back pain.  She describes the pain as aching and sharp.  It does come and go with time.  The pain is made worse with standing and bending.  She has tried Tylenol and Aleve as well as therapy without sustained relief.  Pain is improved with sitting and resting. Pain primarily stays in the mid back and does not radiate.    She also has chronic aching neck pain. She sees Dr. Valencia in the past and had ablative therapy recommended.  MRI of the C spine showed DDD with mild compression of the ventral aspect of the cervical cord.     Pain Intervention History:  None     Past Spine Surgical History:  none    Past and current medications:  Antineuropathics: taken in the past but caused unwanted s/e  Antiseizure: depakote  NSAIDs: aleve  Physical therapy: not for her back  Antidepressants: none  Muscle relaxers: skelaxin  Opioids: none  Antiplatelets/Anticoagulants: none    History:    Current Outpatient Medications:     ascorbic acid, vitamin C, (VITAMIN  C) 1000 MG tablet, Take 1,000 mg by mouth once daily., Disp: , Rfl:     benzonatate (TESSALON) 200 MG capsule, Take 200 mg by mouth every 6 (six) hours as needed., Disp: , Rfl:     cetirizine (ZYRTEC) 10 MG tablet, Take 10 mg by mouth once daily., Disp: , Rfl:     coenzyme Q10 (COQ-10) 100 mg capsule, , Disp: , Rfl:     divalproex (DEPAKOTE) 125 MG EC tablet, Take 250 mg by mouth 2 (two) times daily., Disp: , Rfl:     famotidine (PEPCID) 40 MG tablet, Take 40 mg by mouth., Disp: , Rfl:     fluticasone propionate (FLONASE) 50 mcg/actuation nasal spray, 1 spray (50 mcg total) by Each Nostril route 2 (two) times daily., Disp: 16 g, Rfl: 3    guaiFENesin-codeine 100-10 mg/5 ml (TUSSI-ORGANIDIN NR)  mg/5 mL syrup, SMARTSI-10 Milliliter(s) By Mouth Every Night PRN, Disp: , Rfl:     ipratropium (ATROVENT) 21 mcg (0.03 %) nasal spray, USE 2 SPRAYS IN EACH NOSTRIL THREE TIMES DAILY, Disp: 90 mL, Rfl: 0    levothyroxine (SYNTHROID) 100 MCG tablet, , Disp: , Rfl:     lovastatin (MEVACOR) 20 MG tablet, TAKE 1 TABLET BY MOUTH EVERY EVENING, Disp: 90 tablet, Rfl: 3    metoprolol succinate (TOPROL-XL) 100 MG 24 hr tablet, 1 tablet Orally Once a day for 90 days, Disp: , Rfl:     multivit-min-ferrous fumarate 9 mg iron/15 mL Liqd, as directed, Disp: , Rfl:     ondansetron (ZOFRAN-ODT) 4 MG TbDL, Take 1 tablet (4 mg total) by mouth every 8 (eight) hours as needed., Disp: 30 tablet, Rfl: 0    pantoprazole (PROTONIX) 40 MG tablet, Take 1 tablet (40 mg total) by mouth once daily., Disp: 30 tablet, Rfl: 11    selenium 50 mcg Tab, Take 300 mcg by mouth once daily. , Disp: , Rfl:     valACYclovir (VALTREX) 1000 MG tablet, , Disp: , Rfl:     vitamin D 1000 units Tab, Take 1,000 Units by mouth once daily., Disp: , Rfl:     azelastine (ASTELIN) 137 mcg (0.1 %) nasal spray, 1 spray (137 mcg total) by Nasal route 2 (two) times daily., Disp: 30 mL, Rfl: 5    meloxicam (MOBIC) 15 MG tablet, Take 1 tablet (15 mg total) by mouth daily as  needed for Pain., Disp: 30 tablet, Rfl: 2    metoprolol succinate (TOPROL-XL) 100 MG 24 hr tablet, Take 100 mg by mouth once daily., Disp: , Rfl:     Past Medical History:   Diagnosis Date    Depression     GERD (gastroesophageal reflux disease)     Hyperlipidemia     Hypertension     Hypothyroid        Past Surgical History:   Procedure Laterality Date    APPENDECTOMY      COLONOSCOPY  2017    Briana, repeat in 10    TONSILLECTOMY      TUBAL LIGATION         Family History   Problem Relation Name Age of Onset    Cancer Father      Cancer Sister      Glaucoma Neg Hx      Macular degeneration Neg Hx      Retinal detachment Neg Hx      Breast cancer Neg Hx      Ovarian cancer Neg Hx      Eczema Neg Hx      Lupus Neg Hx      Melanoma Neg Hx      Psoriasis Neg Hx         Social History     Socioeconomic History    Marital status:    Tobacco Use    Smoking status: Never    Smokeless tobacco: Never   Substance and Sexual Activity    Alcohol use: Not Currently     Comment: rarely    Drug use: No    Sexual activity: Yes     Social Determinants of Health     Financial Resource Strain: Patient Declined (12/11/2023)    Overall Financial Resource Strain (CARDIA)     Difficulty of Paying Living Expenses: Patient declined   Food Insecurity: Patient Declined (12/11/2023)    Hunger Vital Sign     Worried About Running Out of Food in the Last Year: Patient declined     Ran Out of Food in the Last Year: Patient declined   Transportation Needs: Patient Declined (12/11/2023)    PRAPARE - Transportation     Lack of Transportation (Medical): Patient declined     Lack of Transportation (Non-Medical): Patient declined   Physical Activity: Unknown (12/11/2023)    Exercise Vital Sign     Days of Exercise per Week: Patient declined     Minutes of Exercise per Session: 20 min   Stress: Patient Declined (12/11/2023)    [place] [employer] - Occupational Stress Questionnaire     Feeling of Stress : Patient declined  "  Housing Stability: Unknown (12/11/2023)    Housing Stability Vital Sign     Unable to Pay for Housing in the Last Year: Patient refused     Unstable Housing in the Last Year: Patient refused     Review of patient's allergies indicates:  No Known Allergies    Review of Systems: Balance of review of systems is negative.    Physical Exam:  Vitals:    08/13/24 0920   BP: (!) 142/83   Pulse: 73   Weight: 95.3 kg (210 lb)   Height: 5' 8" (1.727 m)   PainSc: 0-No pain   PainLoc: Back     Body mass index is 31.93 kg/m².    Gen: NAD  Psych: mood appropriate for given condition  HEENT: eyes anicteric   CV: RRR, 2+ radial pulse  HEENT: anicteric   Respiratory: non-labored, no signs of respiratory distress  Abd: non-distended  Skin: warm, dry and intact.  Gait: Able to heel walk, toe walk. No antalgic gait.     Coordination:   Romberg: negative  Finger to nose coordination: normal  Heel to shin coordination: normal  Tandem walking coordination: normal    Cervical spine:   ROM is full in flexion, extension and lateral rotation without increased pain.  Spurling's maneuver causes no neck pain to either side.  Myofascial exam: Tenderness to palpation across cervical paraspinous region bilaterally.    Lumbar spine:   ROM is full with flexion extension and oblique extension with no increased pain.    Bradley's test causes no increased pain on either side.    Supine straight leg raise is negative bilaterally.    Internal and external rotation of the hip causes no increased pain on either side.  Myofascial exam: No tenderness to palpation across lumbar paraspinous muscles. No tenderness to palpation over the bilateral greater trochanters and bilateral SI joint    Sensory:  Intact and symmetrical to light touch in C4-T1 dermatomes bilaterally. Intact and symmetrical to light touch in L1-S1 dermatomes bilaterally.    Motor:    Right Left   C4 Shoulder Abduction  5  5   C5 Elbow Flexion    5  5   C6 Wrist Extension  5  5   C7 Elbow " "Extension   5  5   C8/T1 Hand Intrinsics   5  5        Right Left   L2/3 Iliacus Hip flexion  5  5   L3/4 Qudratus Femoris Knee Extension  5  5   L4/5 Tib Anterior Ankle Dorsiflexion   5  5   L5/S1 Extensor Hallicus Longus Great toe extension  5  5   S1/S2 Gastroc/Soleus Plantar Flexion  5  5      Right Left   Triceps DTR 2+ 2+   Biceps DTR 2+ 2+   Brachioradialis DTR 2+ 2+   Zamorano Absent  Absent        Right Left   Patellar DTR 2+ 2+   Achilles DTR 2+ 2+   Clonus Absent Absent   Babinski Absent Absent     Imaging:  MRI C spine:   IMPRESSION:  1. Multilevel cervical degenerative disc disease as above. Findings are most pronounced at C6-7, where moderate-sized broad-based disc protrusion results in mild spinal stenosis and mild compression of the ventral aspect of the cervical cord.  2. Please see additional details as noted at each individual level.    Labs:  No results found for: "LABA1C", "HGBA1C"    Lab Results   Component Value Date    WBC 7.39 01/19/2024    HGB 14.1 01/19/2024    HCT 47.1 01/19/2024     (H) 01/19/2024     01/19/2024     Assessment:   Problem List Items Addressed This Visit          Neuro    DDD (degenerative disc disease), cervical     Healthy Back orders placed again. Handout provided.   Recommend f/u with Dr. Valencia.  Recently underwent medial branch blocks and recalls being scheduled for repeat blocks an eventual ablation  Refilled Meloxicam prn.  Discussed the risks of long-term NSAID use.  Reviewed MRI again  Unable to tolerate gabapentin.   RTC 3 months.         Relevant Orders    Ambulatory referral/consult to [facility]       Orthopedic    Chronic bilateral low back pain without sciatica     Healthy Back orders placed again. Handout provided.   Refilled Meloxicam prn  Educated on red flags and reasons to present to the ED or call our office.  Reviewed X rays of the L spine  RTC 3 months         Relevant Orders    Ambulatory referral/consult to [facility]     Other Visit Diagnoses       DDD (degenerative disc disease), lumbar    -  Primary    Relevant Orders    Ambulatory referral/consult to [facility]              Lmain Price M.D.  Physical Medicine and Rehabilitation Medicine    This note was completed with dictation software and grammatical errors may exist.

## 2024-08-13 NOTE — ASSESSMENT & PLAN NOTE
Healthy Back orders placed again. Handout provided.   Refilled Meloxicam prn  Educated on red flags and reasons to present to the ED or call our office.  Reviewed X rays of the L spine  RTC 3 months

## 2024-08-21 ENCOUNTER — OFFICE VISIT (OUTPATIENT)
Dept: ORTHOPEDICS | Facility: CLINIC | Age: 74
End: 2024-08-21
Payer: MEDICARE

## 2024-08-21 VITALS — WEIGHT: 210.13 LBS | BODY MASS INDEX: 31.85 KG/M2 | RESPIRATION RATE: 18 BRPM | HEIGHT: 68 IN

## 2024-08-21 DIAGNOSIS — M17.12 PRIMARY OSTEOARTHRITIS OF LEFT KNEE: ICD-10-CM

## 2024-08-21 DIAGNOSIS — M17.11 PRIMARY OSTEOARTHRITIS OF RIGHT KNEE: Primary | ICD-10-CM

## 2024-08-21 PROCEDURE — 1101F PT FALLS ASSESS-DOCD LE1/YR: CPT | Mod: CPTII,S$GLB,, | Performed by: ORTHOPAEDIC SURGERY

## 2024-08-21 PROCEDURE — 99999 PR PBB SHADOW E&M-EST. PATIENT-LVL IV: CPT | Mod: PBBFAC,,, | Performed by: ORTHOPAEDIC SURGERY

## 2024-08-21 PROCEDURE — 20610 DRAIN/INJ JOINT/BURSA W/O US: CPT | Mod: 50,S$GLB,, | Performed by: ORTHOPAEDIC SURGERY

## 2024-08-21 PROCEDURE — 1160F RVW MEDS BY RX/DR IN RCRD: CPT | Mod: CPTII,S$GLB,, | Performed by: ORTHOPAEDIC SURGERY

## 2024-08-21 PROCEDURE — 1125F AMNT PAIN NOTED PAIN PRSNT: CPT | Mod: CPTII,S$GLB,, | Performed by: ORTHOPAEDIC SURGERY

## 2024-08-21 PROCEDURE — 3288F FALL RISK ASSESSMENT DOCD: CPT | Mod: CPTII,S$GLB,, | Performed by: ORTHOPAEDIC SURGERY

## 2024-08-21 PROCEDURE — 99214 OFFICE O/P EST MOD 30 MIN: CPT | Mod: 25,S$GLB,, | Performed by: ORTHOPAEDIC SURGERY

## 2024-08-21 PROCEDURE — 1159F MED LIST DOCD IN RCRD: CPT | Mod: CPTII,S$GLB,, | Performed by: ORTHOPAEDIC SURGERY

## 2024-08-21 PROCEDURE — 3008F BODY MASS INDEX DOCD: CPT | Mod: CPTII,S$GLB,, | Performed by: ORTHOPAEDIC SURGERY

## 2024-08-21 RX ORDER — TRIAMCINOLONE ACETONIDE 40 MG/ML
40 INJECTION, SUSPENSION INTRA-ARTICULAR; INTRAMUSCULAR
Status: DISCONTINUED | OUTPATIENT
Start: 2024-08-21 | End: 2024-08-21 | Stop reason: HOSPADM

## 2024-08-21 RX ADMIN — TRIAMCINOLONE ACETONIDE 40 MG: 40 INJECTION, SUSPENSION INTRA-ARTICULAR; INTRAMUSCULAR at 01:08

## 2024-08-21 NOTE — PROGRESS NOTES
Past Medical History:   Diagnosis Date    Depression     GERD (gastroesophageal reflux disease)     Hyperlipidemia     Hypertension     Hypothyroid        Past Surgical History:   Procedure Laterality Date    APPENDECTOMY      COLONOSCOPY  2017    Briana, repeat in 10    TONSILLECTOMY      TUBAL LIGATION         Current Outpatient Medications   Medication Sig    ascorbic acid, vitamin C, (VITAMIN C) 1000 MG tablet Take 1,000 mg by mouth once daily.    benzonatate (TESSALON) 200 MG capsule Take 200 mg by mouth every 6 (six) hours as needed.    cetirizine (ZYRTEC) 10 MG tablet Take 10 mg by mouth once daily.    coenzyme Q10 (COQ-10) 100 mg capsule     divalproex (DEPAKOTE) 125 MG EC tablet Take 250 mg by mouth 2 (two) times daily.    famotidine (PEPCID) 40 MG tablet Take 40 mg by mouth.    fluticasone propionate (FLONASE) 50 mcg/actuation nasal spray 1 spray (50 mcg total) by Each Nostril route 2 (two) times daily.    guaiFENesin-codeine 100-10 mg/5 ml (TUSSI-ORGANIDIN NR)  mg/5 mL syrup SMARTSI-10 Milliliter(s) By Mouth Every Night PRN    ipratropium (ATROVENT) 21 mcg (0.03 %) nasal spray USE 2 SPRAYS IN EACH NOSTRIL THREE TIMES DAILY    levothyroxine (SYNTHROID) 100 MCG tablet     lovastatin (MEVACOR) 20 MG tablet TAKE 1 TABLET BY MOUTH EVERY EVENING    meloxicam (MOBIC) 15 MG tablet Take 1 tablet (15 mg total) by mouth daily as needed for Pain.    metoprolol succinate (TOPROL-XL) 100 MG 24 hr tablet 1 tablet Orally Once a day for 90 days    multivit-min-ferrous fumarate 9 mg iron/15 mL Liqd as directed    ondansetron (ZOFRAN-ODT) 4 MG TbDL Take 1 tablet (4 mg total) by mouth every 8 (eight) hours as needed.    pantoprazole (PROTONIX) 40 MG tablet Take 1 tablet (40 mg total) by mouth once daily.    selenium 50 mcg Tab Take 300 mcg by mouth once daily.     valACYclovir (VALTREX) 1000 MG tablet     vitamin D 1000 units Tab Take 1,000 Units by mouth once daily.    azelastine (ASTELIN) 137 mcg (0.1 %) nasal  spray 1 spray (137 mcg total) by Nasal route 2 (two) times daily.    metoprolol succinate (TOPROL-XL) 100 MG 24 hr tablet Take 100 mg by mouth once daily.     No current facility-administered medications for this visit.       Review of patient's allergies indicates:   Allergen Reactions    Lidocaine      Hand edema,  redness    Penicillins Rash       Family History   Problem Relation Name Age of Onset    Cancer Father      Cancer Sister      Glaucoma Neg Hx      Macular degeneration Neg Hx      Retinal detachment Neg Hx      Breast cancer Neg Hx      Ovarian cancer Neg Hx      Eczema Neg Hx      Lupus Neg Hx      Melanoma Neg Hx      Psoriasis Neg Hx         Social History     Socioeconomic History    Marital status:    Tobacco Use    Smoking status: Never    Smokeless tobacco: Never   Substance and Sexual Activity    Alcohol use: Not Currently     Comment: rarely    Drug use: No    Sexual activity: Yes     Social Determinants of Health     Financial Resource Strain: Patient Declined (12/11/2023)    Overall Financial Resource Strain (CARDIA)     Difficulty of Paying Living Expenses: Patient declined   Food Insecurity: Patient Declined (12/11/2023)    Hunger Vital Sign     Worried About Running Out of Food in the Last Year: Patient declined     Ran Out of Food in the Last Year: Patient declined   Transportation Needs: Patient Declined (12/11/2023)    PRAPARE - Transportation     Lack of Transportation (Medical): Patient declined     Lack of Transportation (Non-Medical): Patient declined   Physical Activity: Unknown (12/11/2023)    Exercise Vital Sign     Days of Exercise per Week: Patient declined     Minutes of Exercise per Session: 20 min   Stress: Patient Declined (12/11/2023)    [place] [employer] - Occupational Stress Questionnaire     Feeling of Stress : Patient declined   Housing Stability: Unknown (12/11/2023)    Housing Stability Vital Sign     Unable to Pay for Housing in the  Last Year: Patient refused     Unstable Housing in the Last Year: Patient refused       Chief Complaint:   Chief Complaint   Patient presents with    Right Knee - Pain    Left Knee - Pain       History of present illness: This is a 73-year-old female who has had a history of knee arthritis since 2014.  Patient previously had good results with both the Euflexxa and cortisone.  Unfortunately, the Euflexxa is no longer working.  Last injections were back in September.Pain is up to an 3/10.      Review of systems:   Musculoskeletal: See history of present illness    Physical examination:    Vital Signs:    Vitals:    08/21/24 1355   Resp: 18       Body mass index is 31.95 kg/m².    This a well-developed, well nourished patient in no acute distress.  They are alert and oriented and cooperative to examination.  Pt. walks without an antalgic gait.      Examination of bilateral knee shows no rashes or erythema. There are no masses ecchymosis or effusion. Patient has full range of motion from 0-130°. Patient is nontender to palpation over lateral joint line and moderately tender to palpation over the medial joint line.  Knee is stable to varus and valgus stress. 5 out of 5 motor strength. Palpable distal pulses. Intact light touch sensation.  Severe Patellofemoral crepitus      X-rays: 4 views of both knees are reviewed which show arthritic changes with severe changes of the patellofemoral joint and more moderate changes of the medial compartment of both knees .  No significant change  X-rays of the right shoulder  reviewed which show some mild degenerative changes well-maintained glenohumeral joint space     Assessment:: Bilateral knee osteoarthritis    Plan:  I reviewed the x-ray with her today.  We discussed treatment options for her knee arthritis.  I agreed to inject both knees with cortisone today.  She does not want knee replacement.

## 2024-08-21 NOTE — PROCEDURES
Large Joint Aspiration/Injection: bilateral knee    Date/Time: 8/21/2024 1:45 PM    Performed by: David Chan MD  Authorized by: David Chan MD    Consent Done?:  Yes (Verbal)  Indications:  Pain  Site marked: the procedure site was marked    Timeout: prior to procedure the correct patient, procedure, and site was verified    Prep: patient was prepped and draped in usual sterile fashion      Local anesthesia used?: Yes    Anesthesia:  Local infiltration  Local anesthetic: Ropivicaine.  Anesthetic total (ml):  3      Details:  Needle Size:  22 G  Ultrasonic Guidance for needle placement?: No    Approach:  Anterolateral  Location:  Knee  Laterality:  Bilateral  Site:  Bilateral knee  Medications (Right):  40 mg triamcinolone acetonide 40 mg/mL  Medications (Left):  40 mg triamcinolone acetonide 40 mg/mL  Patient tolerance:  Patient tolerated the procedure well with no immediate complications

## 2024-08-22 ENCOUNTER — CLINICAL SUPPORT (OUTPATIENT)
Dept: REHABILITATION | Facility: HOSPITAL | Age: 74
End: 2024-08-22
Attending: STUDENT IN AN ORGANIZED HEALTH CARE EDUCATION/TRAINING PROGRAM
Payer: MEDICARE

## 2024-08-22 DIAGNOSIS — M51.36 DDD (DEGENERATIVE DISC DISEASE), LUMBAR: ICD-10-CM

## 2024-08-22 DIAGNOSIS — M54.2 NECK PAIN: ICD-10-CM

## 2024-08-22 DIAGNOSIS — M50.30 DDD (DEGENERATIVE DISC DISEASE), CERVICAL: ICD-10-CM

## 2024-08-22 DIAGNOSIS — G89.29 CHRONIC BILATERAL LOW BACK PAIN WITHOUT SCIATICA: ICD-10-CM

## 2024-08-22 DIAGNOSIS — M54.50 CHRONIC BILATERAL LOW BACK PAIN WITHOUT SCIATICA: ICD-10-CM

## 2024-08-22 PROBLEM — M51.369 DDD (DEGENERATIVE DISC DISEASE), LUMBAR: Status: ACTIVE | Noted: 2024-08-22

## 2024-08-22 PROCEDURE — 97162 PT EVAL MOD COMPLEX 30 MIN: CPT | Mod: PO

## 2024-08-22 PROCEDURE — 97112 NEUROMUSCULAR REEDUCATION: CPT | Mod: PO

## 2024-08-22 NOTE — PLAN OF CARE
[facility]   Physical Therapy Initial Evaluation      Name: Skyler Espinoza  Clinic Number: [id number]    Therapy Diagnosis:   Encounter Diagnoses   Name Primary?    DDD (degenerative disc disease), lumbar     DDD (degenerative disc disease), cervical     Chronic bilateral low back pain without sciatica         Physician: Lamin Price*    Physician Orders: PT Eval and Treat   Medical Diagnosis from Referral: DDD, lumbar  DDD, cervical.  Chronic bilateral LBP without sciatica.    Evaluation Date: 8/22/2024  Authorization Period Expiration: 8/13/25  Plan of Care Expiration: 10/25/24  Progress Note Due: 9/21/24  Date of Surgery: NA  Visit # / Visits authorized: 1/ 1   FOTO: 1/ 3 Next visit.    Precautions: Standard, mild dementia.     Time In: 1315  Time Out: 1345  Total Billable Time: 30 minutes    Subjective     Date of onset: Insidious.    History of current condition - Skyler reports: neck and LBP started ~ 2 yrs ago without trauma.Gradually getting worse.Pt reports difficulties with ADLs, functional activities, homemaking,walking.She is also reporting frequent HA.    Falls: No    Imaging: see EPIC:     Prior Therapy: no  Social History: In elevated house lives with their daughter  Occupation: Retired.  Prior Level of Function: Independent.  Current Level of Function: Modified independent.    Pain:  Current 0/10, worst 9/10, best 0/10   Location: bilateral back  and neck    Description: Aching, Dull, Deep, Sharp, and Variable  Aggravating Factors: Standing, Bending, Walking, Extension, Flexing, Lifting, and Getting out of bed/chair  Easing Factors: relaxation, pain medication, lying down, and rest    Patients goals: decrease pain, improve function.     Medical History:   Past Medical History:   Diagnosis Date    Depression     GERD (gastroesophageal reflux disease)     Hyperlipidemia     Hypertension     Hypothyroid        Surgical History:   Skyler Espinoza  has a past surgical  history that includes Appendectomy; Tubal ligation; Tonsillectomy; and Colonoscopy (2017).    Medications:   Skyler has a current medication list which includes the following prescription(s): ascorbic acid (vitamin c), azelastine, benzonatate, cetirizine, coenzyme q10, divalproex, famotidine, fluticasone propionate, guaifenesin-codeine 100-10 mg/5 ml, ipratropium, levothyroxine, lovastatin, meloxicam, metoprolol succinate, multivit-min-ferrous fumarate, ondansetron, pantoprazole, selenium, valacyclovir, and vitamin d.    Allergies:   Review of patient's allergies indicates:  No Known Allergies     Objective        Cervical AROM: Pain/Dysfunction with Movement:   Flexion  30    Extension   35    Right side bending  20    Left side bending  20    Right rotation 65    Left rotation  60      Strength of c/spine is grossly 3-/5 in all planes  Posture;head forward, increased t/kyphosis/ flexed hips.  Special tests;comprssion test on c/spine;neg.  C5 test;neg   (3), LT;40, RT;47#the patient is right handed  Palpation;cervical paraspinals and both upper traps tender.       Lumbar AROM: Pain/Dysfunction with Movement:   Flexion   WNL    Extension  10    Right side bending  10    Left side bending   15    Right rotation  15    Left rotation   15      Strength of L/spine is grossly 3-/5  Special tests;axial loading;neg.  Slump test;neg.  SLR LT;80, RT;80 neg  Goran's;neg.  FADIR;neg.  Palpation;L2-L5 paraspinals tender.    Intake Outcome Measure for FOTO NA due to pt late Survey    Therapist reviewed FOTO scores for Skyler Espinoza on 8/22/2024.   FOTO report - see Media section or FOTO account episode details.    Intake Score: NA%         Treatment     Total Treatment time (time-based codes) separate from Evaluation: 8 minutes     Skyler received the treatments listed below:      neuromuscular re-education activities to improve: Balance, Coordination, Kinesthetic, Sense, Proprioception, and Posture for 8 minutes. The following  activities were included:  UBE 4/4'.    Patient Education and Home Exercises     Education provided:   - Role of PT.POC.    Written Home Exercises Provided:  to be provided .  Assessment     Skyler is a 73 y.o. female referred to outpatient Physical Therapy with a medical diagnosis of cervical and lumbar DDD. Patient presents with ROM and strength deficits,poor posture, impaired function due to pain and above listed deficits.    Patient prognosis is Good.   Patient will benefit from skilled outpatient Physical Therapy to address the deficits stated above and in the chart below, provide patient /family education, and to maximize patientt's level of independence.     Plan of care discussed with patient: Yes  Patient's spiritual, cultural and educational needs considered and patient is agreeable to the plan of care and goals as stated below:     Anticipated Barriers for therapy: no    Medical Necessity is demonstrated by the following  History  Co-morbidities and personal factors that may impact the plan of care [x] LOW: no personal factors / co-morbidities  [] MODERATE: 1-2 personal factors / co-morbidities  [] HIGH: 3+ personal factors / co-morbidities    Moderate / High Support Documentation:   Co-morbidities affecting plan of care:     Personal Factors:   no deficits     Examination  Body Structures and Functions, activity limitations and participation restrictions that may impact the plan of care [x] LOW: addressing 1-2 elements  [] MODERATE: 3+ elements  [] HIGH: 4+ elements (please support below)    Moderate / High Support Documentation:      Clinical Presentation [x] LOW: stable  [] MODERATE: Evolving  [] HIGH: Unstable     Decision Making/ Complexity Score: moderate       Goals:  SHORT TERM GOALS:  3 weeks  Progress Date met   Recent signs and systems trend is improving in order to progress towards Long term goals.  [] Met  [] Not Met  [] Progressing    Patient will be independent with Home Exercise Program  in  order to further progress and return to maximal function. [] Met  [] Not Met  [] Progressing    Pain rating at Worst: 5 /10 in order to progress towards increased independence with activity. [] Met  [] Not Met  [] Progressing    Patient will be able to correct postural deviations in sitting and standing, to decrease pain and promote postural awareness for injury prevention.  [] Met  [] Not Met  [] Progressing    Patient will improve functional outcome (FOTO) score: by 5% to increase self-worth & perceived functional ability towards long term goals [] Met  [] Not Met  [] Progressing      LONG TERM GOALS: 6 weeks  Progress Date met   Patient will return to normal activites of daily living, recreational, and work related activities with less pain and limitation.  [] Met  [] Not Met  [] Progressing    Patient will improve range of motion  to stated goals in order to return to maximal functional potential. ROM of c/spine and l/spine WNL/WFL. [] Met  [] Not Met  [] Progressing    Patient will improve Strength to stated goals of appropriate musculature in order to improve functional independence. Strength of c/spine and l/spine >4/5. [] Met  [] Not Met  [] Progressing    Pain Rating at Best: 1/10 to improve Quality of Life.  [] Met  [] Not Met  [] Progressing    Patient will meet predicted functional outcome (FOTO) score: 50% to increase self-worth & perceived functional ability. [] Met  [] Not Met  [] Progressing    Patient will have met/partially met personal goal of: decrease pain and improve function [] Met  [] Not Met  [] Progressing         Plan     Plan of care Certification: 8/22/2024 to 10/25/24.    Outpatient Physical Therapy 2 times weekly for 6 weeks to include the following interventions: Cervical/Lumbar Traction, Electrical Stimulation PRN, Manual Therapy, Moist Heat/ Ice, Neuromuscular Re-ed, Patient Education, Therapeutic Activities, Therapeutic Exercise, and dry needling PRN, IMS PRN .     Maxim  Elton, PT        Physician's Signature: _________________________________________ Date: ________________

## 2024-08-29 ENCOUNTER — CLINICAL SUPPORT (OUTPATIENT)
Dept: REHABILITATION | Facility: HOSPITAL | Age: 74
End: 2024-08-29
Payer: MEDICARE

## 2024-08-29 DIAGNOSIS — M54.2 NECK PAIN: ICD-10-CM

## 2024-08-29 DIAGNOSIS — M51.36 DDD (DEGENERATIVE DISC DISEASE), LUMBAR: Primary | ICD-10-CM

## 2024-08-29 DIAGNOSIS — G89.29 CHRONIC BILATERAL LOW BACK PAIN WITHOUT SCIATICA: ICD-10-CM

## 2024-08-29 DIAGNOSIS — M50.30 DDD (DEGENERATIVE DISC DISEASE), CERVICAL: ICD-10-CM

## 2024-08-29 DIAGNOSIS — M54.50 CHRONIC BILATERAL LOW BACK PAIN WITHOUT SCIATICA: ICD-10-CM

## 2024-08-29 PROCEDURE — 97112 NEUROMUSCULAR REEDUCATION: CPT | Mod: PO

## 2024-08-29 PROCEDURE — 97110 THERAPEUTIC EXERCISES: CPT | Mod: PO

## 2024-08-29 NOTE — PROGRESS NOTES
[facility]   Physical Therapy Treatment Note      Name: Skyler Espinoza  Clinic Number: [id number]    Therapy Diagnosis:   Encounter Diagnoses   Name Primary?    DDD (degenerative disc disease), lumbar Yes    DDD (degenerative disc disease), cervical     Chronic bilateral low back pain without sciatica     Neck pain      Physician: Lamin Price*    Visit Date: 8/29/2024    Physician Orders: PT Eval and Treat   Medical Diagnosis from Referral: DDD, lumbar  DDD, cervical.  Chronic bilateral LBP without sciatica.    Evaluation Date: 8/22/2024  Authorization Period Expiration: 12/31/24  Plan of Care Expiration: 10/25/24  Visit # / Visits authorized: 1/ 20      Time In: 0945 (check in time of 0943 for 0930 appointment)  Time Out: 1030  Total Billable Time: 45 minutes    Precautions: mild dementia.      Subjective     Patient reports: No neck nor low back pain upon arrival.    She  does not have a home exercise program.  Response to previous treatment: no changes  Functional change: none    Pain: no complaints of pain       Objective      Objective Measures updated at progress report unless specified.     Treatment     Skyler received the treatments listed below:      therapeutic exercises to develop strength and endurance for 25 minutes including:    X 15 each seated bilateral lower extremity therapeutic exercise (2#) = long arc quad, ball squeeze, marching  X 7 sit to stands  2 X 20 each shuttle mini squats and heel raises (50#)  X 20 SportsArt knee curl (22#)   Up/down 50 feet ramp with stand by assist   X 10 minutes NuStep (level 3) as an adjunct to strength/balance/mobility training      neuromuscular re-education activities to improve: Balance for 20 minutes. The following activities were included:    X 15 each balance therapeutic exercise = heel raises/toe raises, mini squats  X 15 alternating toe taps on 5-inch stool  X 25 feet each balance gait = side stepping, backwards gait  X  1 minute stand on AirEx   Functional ambulation 100 feet on compliant surface        Patient Education and Home Exercises       Education provided:   - proper therapeutic exercise technique    Written Home Exercises Provided:  to be provided at future appointment .       Assessment     Patient needed bilateral upper extremity support during sit to stands, balance therapeutic exercise and alternating toe taps; no loss of balance demonstrated on the ramp and while walking on compliant surface; no loss of balance noted while standing on AirEx.     Skyler Is progressing fairly well towards her goals.   Patient prognosis is Fair.     Patient will continue to benefit from skilled outpatient physical therapy to address the deficits listed in the problem list box on initial evaluation, provide pt/family education and to maximize pt's level of independence in the home and community environment.     Patient's spiritual, cultural and educational needs considered and pt agreeable to plan of care and goals.     Anticipated barriers to physical therapy: severity of pain    Goals:     SHORT TERM GOALS:  3 weeks  Progress Date met   Recent signs and systems trend is improving in order to progress towards Long term goals.  [] Met  [] Not Met  [x] Progressing     Patient will be independent with Home Exercise Program  in order to further progress and return to maximal function. [] Met  [x] Not Met  [] Progressing     Pain rating at Worst: 5 /10 in order to progress towards increased independence with activity. [x] Met  [] Not Met  [] Progressing     Patient will be able to correct postural deviations in sitting and standing, to decrease pain and promote postural awareness for injury prevention.  [] Met  [] Not Met  [x] Progressing     Patient will improve functional outcome (FOTO) score: by 5% to increase self-worth & perceived functional ability towards long term goals [] Met  [x] Not Met  [] Progressing          LONG TERM GOALS: 6  weeks  Progress Date met   Patient will return to normal activites of daily living, recreational, and work related activities with less pain and limitation.  [] Met  [] Not Met  [x] Progressing     Patient will improve range of motion  to stated goals in order to return to maximal functional potential. ROM of c/spine and l/spine WNL/WFL. [] Met  [] Not Met  [x] Progressing     Patient will improve Strength to stated goals of appropriate musculature in order to improve functional independence. Strength of c/spine and l/spine >4/5. [] Met  [x] Not Met  [] Progressing     Pain Rating at Best: 1/10 to improve Quality of Life.  [x] Met  [] Not Met  [] Progressing     Patient will meet predicted functional outcome (FOTO) score: 50% to increase self-worth & perceived functional ability. [] Met  [x] Not Met  [] Progressing     Patient will have met/partially met personal goal of: decrease pain and improve function [] Met  [] Not Met  [x] Progressing          Plan     Continue to progress strength/balance/mobility training to patient's tolerance.      Jaime Ta, PT

## 2024-09-10 ENCOUNTER — TELEPHONE (OUTPATIENT)
Dept: FAMILY MEDICINE | Facility: CLINIC | Age: 74
End: 2024-09-10
Payer: MEDICARE

## 2024-09-10 NOTE — TELEPHONE ENCOUNTER
Called pt to r/s appt due to weather/ provider working from home. No answer, LVM and call back number

## 2024-09-17 ENCOUNTER — CLINICAL SUPPORT (OUTPATIENT)
Dept: REHABILITATION | Facility: HOSPITAL | Age: 74
End: 2024-09-17
Payer: MEDICARE

## 2024-09-17 DIAGNOSIS — M54.50 CHRONIC BILATERAL LOW BACK PAIN WITHOUT SCIATICA: Primary | ICD-10-CM

## 2024-09-17 DIAGNOSIS — G89.29 CHRONIC BILATERAL LOW BACK PAIN WITHOUT SCIATICA: Primary | ICD-10-CM

## 2024-09-17 DIAGNOSIS — M54.2 NECK PAIN: ICD-10-CM

## 2024-09-17 PROCEDURE — 97112 NEUROMUSCULAR REEDUCATION: CPT | Mod: PO

## 2024-09-17 PROCEDURE — 97012 MECHANICAL TRACTION THERAPY: CPT | Mod: PO

## 2024-09-17 NOTE — PROGRESS NOTES
[facility]   Physical Therapy Treatment Note      Name: Skyler Espinoza  Clinic Number: [id number]    Therapy Diagnosis:   Encounter Diagnoses   Name Primary?    Chronic bilateral low back pain without sciatica Yes    Neck pain      Physician: Lamin Price*    Visit Date: 9/17/2024    Physician Orders: PT Eval and Treat   Medical Diagnosis from Referral: DDD, lumbar  DDD, cervical.  Chronic bilateral LBP without sciatica.    Evaluation Date: 8/22/2024  Authorization Period Expiration: 12/31/24  Plan of Care Expiration: 10/25/24  Visit # / Visits authorized: 1/ 20      Time In: 1307  Time Out: 1345  Total Billable Time: 45 minutes    Precautions: mild dementia.      Subjective     Patient reports: No neck nor low back pain upon arrival.    She home exercise program to be issued.  Response to previous treatment: no changes  Functional change: none    Pain: no complaints of pain     Objective      Objective Measures updated at progress report unless specified.     Treatment     Skyler received the treatments listed below:      therapeutic exercises to develop strength and endurance for 0 minutes including:Not performed    X 15 each seated bilateral lower extremity therapeutic exercise (2#) = long arc quad, ball squeeze, marching  X 7 sit to stands  2 X 20 each shuttle mini squats and heel raises (50#)  X 20 SportsArt knee curl (22#)   Up/down 50 feet ramp with stand by assist   X 10 minutes NuStep (level 3) as an adjunct to strength/balance/mobility training      neuromuscular re-education activities to improve: Balance for 25 minutes. The following activities were included:              Nustep 10' L2  X 15 each balance therapeutic exercise = heel raises/toe raises, mini squats  X 15 alternating toe taps on 5-inch stool  X 25 feet each balance gait = side stepping, backwards gait  X 1 minute stand on AirEx   Functional ambulation 100 feet on compliant surface      ICTX 16/12# X  15'.    Patient Education and Home Exercises       Education provided:   - posture ed    Written Home Exercises Provided:  to be provided at future appointment .     Assessment     Tolerated traction well.  Skyler Is progressing fairly well towards her goals.   Patient prognosis is Fair.     Patient will continue to benefit from skilled outpatient physical therapy to address the deficits listed in the problem list box on initial evaluation, provide pt/family education and to maximize pt's level of independence in the home and community environment.     Patient's spiritual, cultural and educational needs considered and pt agreeable to plan of care and goals.     Anticipated barriers to physical therapy: severity of pain    Goals:     SHORT TERM GOALS:  3 weeks  Progress Date met   Recent signs and systems trend is improving in order to progress towards Long term goals.  [] Met  [] Not Met  [x] Progressing     Patient will be independent with Home Exercise Program  in order to further progress and return to maximal function. [] Met  [x] Not Met  [] Progressing     Pain rating at Worst: 5 /10 in order to progress towards increased independence with activity. [x] Met  [] Not Met  [] Progressing     Patient will be able to correct postural deviations in sitting and standing, to decrease pain and promote postural awareness for injury prevention.  [] Met  [] Not Met  [x] Progressing     Patient will improve functional outcome (FOTO) score: by 5% to increase self-worth & perceived functional ability towards long term goals [] Met  [x] Not Met  [] Progressing          LONG TERM GOALS: 6 weeks  Progress Date met   Patient will return to normal activites of daily living, recreational, and work related activities with less pain and limitation.  [] Met  [] Not Met  [x] Progressing     Patient will improve range of motion  to stated goals in order to return to maximal functional potential. ROM of c/spine and l/spine WNL/WFL. []  Met  [] Not Met  [x] Progressing     Patient will improve Strength to stated goals of appropriate musculature in order to improve functional independence. Strength of c/spine and l/spine >4/5. [] Met  [x] Not Met  [] Progressing     Pain Rating at Best: 1/10 to improve Quality of Life.  [x] Met  [] Not Met  [] Progressing     Patient will meet predicted functional outcome (FOTO) score: 50% to increase self-worth & perceived functional ability. [] Met  [x] Not Met  [] Progressing     Patient will have met/partially met personal goal of: decrease pain and improve function [] Met  [] Not Met  [x] Progressing          Plan     Continue to progress strength/balance/mobility training to patient's tolerance.      Maxim Conde, PT

## 2024-09-19 ENCOUNTER — CLINICAL SUPPORT (OUTPATIENT)
Dept: REHABILITATION | Facility: HOSPITAL | Age: 74
End: 2024-09-19
Payer: MEDICARE

## 2024-09-19 DIAGNOSIS — G89.29 CHRONIC BILATERAL LOW BACK PAIN WITHOUT SCIATICA: Primary | ICD-10-CM

## 2024-09-19 DIAGNOSIS — M54.50 CHRONIC BILATERAL LOW BACK PAIN WITHOUT SCIATICA: Primary | ICD-10-CM

## 2024-09-19 DIAGNOSIS — M54.2 NECK PAIN: ICD-10-CM

## 2024-09-19 PROCEDURE — 97012 MECHANICAL TRACTION THERAPY: CPT | Mod: PO

## 2024-09-19 PROCEDURE — 97112 NEUROMUSCULAR REEDUCATION: CPT | Mod: PO

## 2024-09-19 NOTE — PROGRESS NOTES
[facility]   Physical Therapy Treatment Note      Name: Skyler Espinoza  Clinic Number: [id number]    Therapy Diagnosis:   Encounter Diagnoses   Name Primary?    Chronic bilateral low back pain without sciatica Yes    Neck pain      Physician: Lamin Price*    Visit Date: 9/19/2024    Physician Orders: PT Eval and Treat   Medical Diagnosis from Referral: DDD, lumbar  DDD, cervical.  Chronic bilateral LBP without sciatica.    Evaluation Date: 8/22/2024  Authorization Period Expiration: 12/31/24  Plan of Care Expiration: 10/25/24  Visit # / Visits authorized: 1/ 20      Time In: 1309  Time Out: 1347  Total Billable Time: 38 minutes    Precautions: mild dementia.      Subjective     Patient reports: No neck nor low back pain upon arrival.    She home exercise program to be issued.  Response to previous treatment: no changes  Functional change: none    Pain: no complaints of pain     Objective      Objective Measures updated at progress report unless specified.     Treatment     Skyler received the treatments listed below:      therapeutic exercises to develop strength and endurance for 0 minutes including:Not performed    X 15 each seated bilateral lower extremity therapeutic exercise (2#) = long arc quad, ball squeeze, marching  X 7 sit to stands  2 X 20 each shuttle mini squats and heel raises (50#)  X 20 SportsArt knee curl (22#)   Up/down 50 feet ramp with stand by assist   X 10 minutes NuStep (level 3) as an adjunct to strength/balance/mobility training      neuromuscular re-education activities to improve: Balance for 23 minutes. The following activities were included:              Nustep 10' L2 Bike 10' , rom c/s 10 x all planes.  X 15 each balance therapeutic exercise = heel raises/toe raises, mini squats NP  X 15 alternating toe taps on 5-inch stool NP  X 25 feet each balance gait = side stepping, backwards gait NP  X 1 minute stand on AirEx NP   Functional ambulation 2X100  feet on compliant surface      ICTX 17/12# X 15'.    Patient Education and Home Exercises       Education provided:   - posture ed    Written Home Exercises Provided:  to be provided at future appointment .     Assessment     Pt reported ha following traction.  Skyler Is progressing fairly well towards her goals.   Patient prognosis is Fair.     Patient will continue to benefit from skilled outpatient physical therapy to address the deficits listed in the problem list box on initial evaluation, provide pt/family education and to maximize pt's level of independence in the home and community environment.     Patient's spiritual, cultural and educational needs considered and pt agreeable to plan of care and goals.     Anticipated barriers to physical therapy: severity of pain    Goals:     SHORT TERM GOALS:  3 weeks  Progress Date met   Recent signs and systems trend is improving in order to progress towards Long term goals.  [] Met  [] Not Met  [x] Progressing     Patient will be independent with Home Exercise Program  in order to further progress and return to maximal function. [] Met  [x] Not Met  [] Progressing     Pain rating at Worst: 5 /10 in order to progress towards increased independence with activity. [x] Met  [] Not Met  [] Progressing     Patient will be able to correct postural deviations in sitting and standing, to decrease pain and promote postural awareness for injury prevention.  [] Met  [] Not Met  [x] Progressing     Patient will improve functional outcome (FOTO) score: by 5% to increase self-worth & perceived functional ability towards long term goals [] Met  [x] Not Met  [] Progressing          LONG TERM GOALS: 6 weeks  Progress Date met   Patient will return to normal activites of daily living, recreational, and work related activities with less pain and limitation.  [] Met  [] Not Met  [x] Progressing     Patient will improve range of motion  to stated goals in order to return to maximal  functional potential. ROM of c/spine and l/spine WNL/WFL. [] Met  [] Not Met  [x] Progressing     Patient will improve Strength to stated goals of appropriate musculature in order to improve functional independence. Strength of c/spine and l/spine >4/5. [] Met  [x] Not Met  [] Progressing     Pain Rating at Best: 1/10 to improve Quality of Life.  [x] Met  [] Not Met  [] Progressing     Patient will meet predicted functional outcome (FOTO) score: 50% to increase self-worth & perceived functional ability. [] Met  [x] Not Met  [] Progressing     Patient will have met/partially met personal goal of: decrease pain and improve function [] Met  [] Not Met  [x] Progressing          Plan     Continue to progress strength/balance/mobility training to patient's tolerance.      Maxim Conde, PT

## 2024-09-24 ENCOUNTER — CLINICAL SUPPORT (OUTPATIENT)
Dept: REHABILITATION | Facility: HOSPITAL | Age: 74
End: 2024-09-24
Payer: MEDICARE

## 2024-09-24 DIAGNOSIS — M54.50 CHRONIC BILATERAL LOW BACK PAIN WITHOUT SCIATICA: Primary | ICD-10-CM

## 2024-09-24 DIAGNOSIS — M54.2 NECK PAIN: ICD-10-CM

## 2024-09-24 DIAGNOSIS — G89.29 CHRONIC BILATERAL LOW BACK PAIN WITHOUT SCIATICA: Primary | ICD-10-CM

## 2024-09-24 PROCEDURE — 97112 NEUROMUSCULAR REEDUCATION: CPT | Mod: PO

## 2024-09-24 PROCEDURE — 97012 MECHANICAL TRACTION THERAPY: CPT | Mod: PO

## 2024-09-24 NOTE — PROGRESS NOTES
[facility]   Physical Therapy Treatment Note      Name: Skyler Espinoza  Clinic Number: [id number]    Therapy Diagnosis:   Encounter Diagnoses   Name Primary?    Chronic bilateral low back pain without sciatica Yes    Neck pain      Physician: Lamin Price*    Visit Date: 9/24/2024    Physician Orders: PT Eval and Treat   Medical Diagnosis from Referral: DDD, lumbar  DDD, cervical.  Chronic bilateral LBP without sciatica.    Evaluation Date: 8/22/2024  Authorization Period Expiration: 12/31/24  Plan of Care Expiration: 10/25/24  Visit # / Visits authorized: 1/ 20      Time In: 1258  Time Out: 1438  Total Billable Time: 40 minutes    Precautions: mild dementia.      Subjective     Patient reports: No neck nor low back pain upon arrival.    She home exercise program to be issued.  Response to previous treatment: no changes  Functional change: none    Pain: no complaints of pain     Objective      Objective Measures updated at progress report unless specified.     Treatment     Skyler received the treatments listed below:      therapeutic exercises to develop strength and endurance for 0 minutes including:Not performed    X 15 each seated bilateral lower extremity therapeutic exercise (2#) = long arc quad, ball squeeze, marching  X 7 sit to stands  2 X 20 each shuttle mini squats and heel raises (50#)  X 20 SportsArt knee curl (22#)   Up/down 50 feet ramp with stand by assist   X 10 minutes NuStep (level 3) as an adjunct to strength/balance/mobility training      neuromuscular re-education activities to improve: Balance for 25 minutes. The following activities were included:              Nustep 10' L2 UBE 5/5' 10' , ROM C/spine 10 x all planes.  X 15 each balance therapeutic exercise = heel raises/toe raises, mini squats NP  X 15 alternating toe taps on 5-inch stool NP  X 25 feet each balance gait = side stepping, backwards gait NP  X 1 minute stand on AirEx NP   Functional ambulation  2X100 feet on compliant surface      ICTX 17/12# X 15'.    Patient Education and Home Exercises       Education provided:   - posture ed    Written Home Exercises Provided:  to be provided at future appointment .     Assessment       Skyler Is progressing fairly well towards her goals.   Patient prognosis is Fair.     Patient will continue to benefit from skilled outpatient physical therapy to address the deficits listed in the problem list box on initial evaluation, provide pt/family education and to maximize pt's level of independence in the home and community environment.     Patient's spiritual, cultural and educational needs considered and pt agreeable to plan of care and goals.     Anticipated barriers to physical therapy: severity of pain    Goals:     SHORT TERM GOALS:  3 weeks  Progress Date met   Recent signs and systems trend is improving in order to progress towards Long term goals.  [] Met  [] Not Met  [x] Progressing     Patient will be independent with Home Exercise Program  in order to further progress and return to maximal function. [] Met  [x] Not Met  [] Progressing     Pain rating at Worst: 5 /10 in order to progress towards increased independence with activity. [x] Met  [] Not Met  [] Progressing     Patient will be able to correct postural deviations in sitting and standing, to decrease pain and promote postural awareness for injury prevention.  [] Met  [] Not Met  [x] Progressing     Patient will improve functional outcome (FOTO) score: by 5% to increase self-worth & perceived functional ability towards long term goals [] Met  [x] Not Met  [] Progressing          LONG TERM GOALS: 6 weeks  Progress Date met   Patient will return to normal activites of daily living, recreational, and work related activities with less pain and limitation.  [] Met  [] Not Met  [x] Progressing     Patient will improve range of motion  to stated goals in order to return to maximal functional potential. ROM of c/spine  and l/spine WNL/WFL. [] Met  [] Not Met  [x] Progressing     Patient will improve Strength to stated goals of appropriate musculature in order to improve functional independence. Strength of c/spine and l/spine >4/5. [] Met  [x] Not Met  [] Progressing     Pain Rating at Best: 1/10 to improve Quality of Life.  [x] Met  [] Not Met  [] Progressing     Patient will meet predicted functional outcome (FOTO) score: 50% to increase self-worth & perceived functional ability. [] Met  [x] Not Met  [] Progressing     Patient will have met/partially met personal goal of: decrease pain and improve function [] Met  [] Not Met  [x] Progressing          Plan     Continue to progress strength/balance/mobility training to patient's tolerance.      Maxim Conde, PT

## 2024-09-29 ENCOUNTER — ON-DEMAND VIRTUAL (OUTPATIENT)
Dept: URGENT CARE | Facility: CLINIC | Age: 74
End: 2024-09-29
Payer: MEDICARE

## 2024-09-29 DIAGNOSIS — R39.9 UTI SYMPTOMS: Primary | ICD-10-CM

## 2024-09-29 PROCEDURE — 99213 OFFICE O/P EST LOW 20 MIN: CPT | Mod: 95,,, | Performed by: NURSE PRACTITIONER

## 2024-09-29 RX ORDER — CEPHALEXIN 500 MG/1
500 CAPSULE ORAL EVERY 12 HOURS
Qty: 14 CAPSULE | Refills: 0 | Status: SHIPPED | OUTPATIENT
Start: 2024-09-29 | End: 2024-10-06

## 2024-09-29 NOTE — PROGRESS NOTES
Subjective:      Patient ID: Skyler Espinoza is a 73 y.o. female.    Vitals:  vitals were not taken for this visit.     Chief Complaint: Dysuria      Visit Type: TELE AUDIOVISUAL - This visit was conducted virtually based on  subjective information and limited objective exam    Present with the patient at the time of consultation: TELEMED PRESENT WITH PATIENT: family member        Past Medical History:   Diagnosis Date    Depression     GERD (gastroesophageal reflux disease)     Hyperlipidemia     Hypertension     Hypothyroid      Past Surgical History:   Procedure Laterality Date    APPENDECTOMY      COLONOSCOPY  2017    Briana, repeat in 10    TONSILLECTOMY      TUBAL LIGATION       Review of patient's allergies indicates:  No Known Allergies  Current Outpatient Medications on File Prior to Visit   Medication Sig Dispense Refill    ascorbic acid, vitamin C, (VITAMIN C) 1000 MG tablet Take 1,000 mg by mouth once daily.      azelastine (ASTELIN) 137 mcg (0.1 %) nasal spray 1 spray (137 mcg total) by Nasal route 2 (two) times daily. 30 mL 5    benzonatate (TESSALON) 200 MG capsule Take 200 mg by mouth every 6 (six) hours as needed.      cetirizine (ZYRTEC) 10 MG tablet Take 10 mg by mouth once daily.      coenzyme Q10 (COQ-10) 100 mg capsule       divalproex (DEPAKOTE) 125 MG EC tablet Take 250 mg by mouth 2 (two) times daily.      famotidine (PEPCID) 40 MG tablet Take 40 mg by mouth.      fluticasone propionate (FLONASE) 50 mcg/actuation nasal spray 1 spray (50 mcg total) by Each Nostril route 2 (two) times daily. 16 g 3    guaiFENesin-codeine 100-10 mg/5 ml (TUSSI-ORGANIDIN NR)  mg/5 mL syrup SMARTSI-10 Milliliter(s) By Mouth Every Night PRN      ipratropium (ATROVENT) 21 mcg (0.03 %) nasal spray USE 2 SPRAYS IN EACH NOSTRIL THREE TIMES DAILY 90 mL 0    levothyroxine (SYNTHROID) 100 MCG tablet       lovastatin (MEVACOR) 20 MG tablet TAKE 1 TABLET BY MOUTH EVERY EVENING 90 tablet 3    meloxicam (MOBIC) 15  MG tablet Take 1 tablet (15 mg total) by mouth daily as needed for Pain. 30 tablet 2    metoprolol succinate (TOPROL-XL) 100 MG 24 hr tablet 1 tablet Orally Once a day for 90 days      multivit-min-ferrous fumarate 9 mg iron/15 mL Liqd as directed      ondansetron (ZOFRAN-ODT) 4 MG TbDL Take 1 tablet (4 mg total) by mouth every 8 (eight) hours as needed. 30 tablet 0    pantoprazole (PROTONIX) 40 MG tablet Take 1 tablet (40 mg total) by mouth once daily. 30 tablet 11    selenium 50 mcg Tab Take 300 mcg by mouth once daily.       valACYclovir (VALTREX) 1000 MG tablet       vitamin D 1000 units Tab Take 1,000 Units by mouth once daily.       No current facility-administered medications on file prior to visit.     Family History   Problem Relation Name Age of Onset    Cancer Father      Cancer Sister      Glaucoma Neg Hx      Macular degeneration Neg Hx      Retinal detachment Neg Hx      Breast cancer Neg Hx      Ovarian cancer Neg Hx      Eczema Neg Hx      Lupus Neg Hx      Melanoma Neg Hx      Psoriasis Neg Hx         Medications Ordered                [id number] - [place] [id number] [facility] AT [facility] & [address] [address]    Telephone: [phone]   Fax: [phone]   Hours: Open 24 hours                         E-Prescribed (1 of 1)              cephALEXin (KEFLEX) 500 MG capsule    Sig: Take 1 capsule (500 mg total) by mouth every 12 (twelve) hours. for 7 days       Start: 9/29/24     Quantity: 14 capsule Refills: 0                           Ohs Peq Odvv Intake    9/29/2024  5:54 PM CDT - Filed by Patient   What is your current physical address in the event of a medical emergency? [address], [place] [address]   Are you able to take your vital signs? Yes   Systolic Blood Pressure: 110   Diastolic Blood Pressure: 74   Weight:    Height:    Pulse: 83   Temperature: 97.6   Respiration rate: 16   Pulse Oxygen: 95   Please attach any relevant images or  files          72 yo female with daughter with c/o pain on urination. She states symptoms started yesterday and she gave her pyridium. She denies hematuria. She denies discharge. She denies abdominal but has had back pain.         Constitution: Negative. Negative for fever.   HENT: Negative.     Neck: neck negative.   Cardiovascular: Negative.    Respiratory: Negative.     Gastrointestinal: Negative.  Negative for abdominal pain, nausea and vomiting.   Endocrine: negative.   Genitourinary:  Positive for dysuria, frequency and urgency. Negative for vaginal discharge, vaginal odor and genital sore.   Musculoskeletal: Negative.  Negative for back pain.   Skin: Negative.    Neurological: Negative.         Objective:   The physical exam was conducted virtually.  LOCATION OF PATIENT [facility]  O x 3 ; no acute distress noted; appearance normal; mood and behavior normal; thought process normal  Head- normocephalic  Nose- appears normal, no discharge or erythema  Eyes- pupils appear normal in size, no drainage, no erythema  Ears- normal appearing; no discharge, no erythema  Mouth- appears normal  Oropharynx- no erythema, lesions  Lungs- breathing at a normal rate, no acute distress noted  Heart- no reports of tachycardia, palpitations, chest pain  Abdomen- non distended, non tender reported by patient  Skin- warm and dry, no erythema or edema noted by patient or visualized  Psych- as above; no si/hi      Assessment:     1. UTI symptoms        Plan:     PLEASE READ YOUR DISCHARGE INSTRUCTIONS ENTIRELY AS IT CONTAINS IMPORTANT INFORMATION.      Take the antibiotics to completion.     Drink plenty of fluids, wipe front to back, take showers not baths, no scented soaps, wear breathable cotton underwear, urinate after sexual intercourse.     Please go to the ER for worsening symptoms including fever, worsening flank pain, vomiting, etc.       Please return or see your primary care doctor if you develop new or worsening  symptoms.     Please arrange follow up with your primary medical clinic as soon as possible. You must understand that you've received an Urgent Care treatment only and that you may be released before all of your medical problems are known or treated. You, the patient, will arrange for follow up as instructed. If your symptoms worsen or fail to improve you should go to the Emergency Room.  WE CANNOT RULE OUT ALL POSSIBLE CAUSES OF YOUR SYMPTOMS IN THE URGENT CARE SETTING PLEASE GO TO THE ER IF YOU FEELS YOUR CONDITION IS WORSENING OR YOU WOULD LIKE EMERGENT EVALUATION.      Thank you for choosing [facility]!    Our goal in the [facility] is to always provide outstanding medical care. You may receive a survey by mail or e-mail in the next week regarding your experience today. We would greatly appreciate you completing and returning the survey. Your feedback provides us with a way to recognize our staff who provide very good care, and it helps us learn how to improve when your experience was below our aspiration of excellence.         We appreciate you trusting us with your medical care. We hope you feel better soon. We will be happy to take care of you for all of your future medical needs.    You must understand that you've received an Urgent Care treatment only and that you may be released before all your medical problems are known or treated. You, the patient, will arrange for follow up care as instructed.    Follow up with your PCP or specialty clinic as directed in the next 1-2 weeks if not improved or as needed.  You can call [phone] to schedule an appointment with the appropriate provider.    If your condition worsens we recommend that you receive another evaluation in person, with your primary care provider, urgent care or at the emergency room immediately or contact your primary medical clinics after hours call service to discuss your concerns.         UTI  symptoms  -     cephALEXin (KEFLEX) 500 MG capsule; Take 1 capsule (500 mg total) by mouth every 12 (twelve) hours. for 7 days  Dispense: 14 capsule; Refill: 0

## 2024-10-06 ENCOUNTER — HOSPITAL ENCOUNTER (EMERGENCY)
Facility: HOSPITAL | Age: 74
Discharge: HOME | End: 2024-10-06
Attending: EMERGENCY MEDICINE
Payer: MEDICARE

## 2024-10-06 VITALS
BODY MASS INDEX: 31.83 KG/M2 | OXYGEN SATURATION: 98 % | WEIGHT: 210 LBS | DIASTOLIC BLOOD PRESSURE: 60 MMHG | HEIGHT: 68 IN | TEMPERATURE: 99 F | RESPIRATION RATE: 23 BRPM | HEART RATE: 96 BPM | SYSTOLIC BLOOD PRESSURE: 120 MMHG

## 2024-10-06 DIAGNOSIS — K52.9 COLITIS: Primary | ICD-10-CM

## 2024-10-06 DIAGNOSIS — R11.2 NAUSEA AND/OR VOMITING: ICD-10-CM

## 2024-10-06 DIAGNOSIS — U07.1 COVID: ICD-10-CM

## 2024-10-06 LAB
ALBUMIN SERPL BCP-MCNC: 3.9 G/DL (ref 3.5–5.2)
ALP SERPL-CCNC: 67 U/L (ref 55–135)
ALT SERPL W/O P-5'-P-CCNC: 14 U/L (ref 10–44)
ANION GAP SERPL CALC-SCNC: 8 MMOL/L (ref 8–16)
AST SERPL-CCNC: 19 U/L (ref 10–40)
BASOPHILS # BLD AUTO: 0.03 K/UL (ref 0–0.2)
BASOPHILS NFR BLD: 0.3 % (ref 0–1.9)
BILIRUB SERPL-MCNC: 0.6 MG/DL (ref 0.1–1)
BILIRUB UR QL STRIP: NEGATIVE
BUN SERPL-MCNC: 7 MG/DL (ref 8–23)
CALCIUM SERPL-MCNC: 9.1 MG/DL (ref 8.7–10.5)
CHLORIDE SERPL-SCNC: 98 MMOL/L (ref 95–110)
CLARITY UR: CLEAR
CO2 SERPL-SCNC: 26 MMOL/L (ref 23–29)
COLOR UR: YELLOW
CREAT SERPL-MCNC: 0.8 MG/DL (ref 0.5–1.4)
DIFFERENTIAL METHOD BLD: ABNORMAL
EOSINOPHIL # BLD AUTO: 0.1 K/UL (ref 0–0.5)
EOSINOPHIL NFR BLD: 0.5 % (ref 0–8)
ERYTHROCYTE [DISTWIDTH] IN BLOOD BY AUTOMATED COUNT: 12.2 % (ref 11.5–14.5)
EST. GFR  (NO RACE VARIABLE): >60 ML/MIN/1.73 M^2
GLUCOSE SERPL-MCNC: 166 MG/DL (ref 70–110)
GLUCOSE UR QL STRIP: NEGATIVE
HCT VFR BLD AUTO: 41.3 % (ref 37–48.5)
HGB BLD-MCNC: 13.8 G/DL (ref 12–16)
HGB UR QL STRIP: NEGATIVE
IMM GRANULOCYTES # BLD AUTO: 0.01 K/UL (ref 0–0.04)
IMM GRANULOCYTES NFR BLD AUTO: 0.1 % (ref 0–0.5)
KETONES UR QL STRIP: NEGATIVE
LEUKOCYTE ESTERASE UR QL STRIP: NEGATIVE
LIPASE SERPL-CCNC: 30 U/L (ref 4–60)
LYMPHOCYTES # BLD AUTO: 1 K/UL (ref 1–4.8)
LYMPHOCYTES NFR BLD: 9.5 % (ref 18–48)
MCH RBC QN AUTO: 30.3 PG (ref 27–31)
MCHC RBC AUTO-ENTMCNC: 33.4 G/DL (ref 32–36)
MCV RBC AUTO: 91 FL (ref 82–98)
MONOCYTES # BLD AUTO: 0.4 K/UL (ref 0.3–1)
MONOCYTES NFR BLD: 3.7 % (ref 4–15)
NEUTROPHILS # BLD AUTO: 8.6 K/UL (ref 1.8–7.7)
NEUTROPHILS NFR BLD: 85.9 % (ref 38–73)
NITRITE UR QL STRIP: NEGATIVE
NRBC BLD-RTO: 0 /100 WBC
PH UR STRIP: 6 [PH] (ref 5–8)
PLATELET # BLD AUTO: 217 K/UL (ref 150–450)
PMV BLD AUTO: 9.7 FL (ref 9.2–12.9)
POTASSIUM SERPL-SCNC: 3.7 MMOL/L (ref 3.5–5.1)
PROT SERPL-MCNC: 6.7 G/DL (ref 6–8.4)
PROT UR QL STRIP: NEGATIVE
RBC # BLD AUTO: 4.55 M/UL (ref 4–5.4)
SODIUM SERPL-SCNC: 132 MMOL/L (ref 136–145)
SP GR UR STRIP: 1.01 (ref 1–1.03)
TROPONIN I SERPL HS-MCNC: 4.7 PG/ML (ref 0–14.9)
URN SPEC COLLECT METH UR: NORMAL
UROBILINOGEN UR STRIP-ACNC: NEGATIVE EU/DL
WBC # BLD AUTO: 10 K/UL (ref 3.9–12.7)

## 2024-10-06 PROCEDURE — 96374 THER/PROPH/DIAG INJ IV PUSH: CPT

## 2024-10-06 PROCEDURE — 96361 HYDRATE IV INFUSION ADD-ON: CPT

## 2024-10-06 PROCEDURE — 99285 EMERGENCY DEPT VISIT HI MDM: CPT | Mod: 25

## 2024-10-06 PROCEDURE — 83690 ASSAY OF LIPASE: CPT | Performed by: EMERGENCY MEDICINE

## 2024-10-06 PROCEDURE — 85025 COMPLETE CBC W/AUTO DIFF WBC: CPT | Performed by: EMERGENCY MEDICINE

## 2024-10-06 PROCEDURE — 25000003 PHARM REV CODE 250: Performed by: EMERGENCY MEDICINE

## 2024-10-06 PROCEDURE — 93005 ELECTROCARDIOGRAM TRACING: CPT | Performed by: INTERNAL MEDICINE

## 2024-10-06 PROCEDURE — 63600175 PHARM REV CODE 636 W HCPCS: Performed by: EMERGENCY MEDICINE

## 2024-10-06 PROCEDURE — 93010 ELECTROCARDIOGRAM REPORT: CPT | Mod: ,,, | Performed by: INTERNAL MEDICINE

## 2024-10-06 PROCEDURE — 80053 COMPREHEN METABOLIC PANEL: CPT | Performed by: EMERGENCY MEDICINE

## 2024-10-06 PROCEDURE — 96375 TX/PRO/DX INJ NEW DRUG ADDON: CPT

## 2024-10-06 PROCEDURE — 84484 ASSAY OF TROPONIN QUANT: CPT | Performed by: EMERGENCY MEDICINE

## 2024-10-06 PROCEDURE — 25500020 PHARM REV CODE 255: Performed by: EMERGENCY MEDICINE

## 2024-10-06 PROCEDURE — 81003 URINALYSIS AUTO W/O SCOPE: CPT | Performed by: EMERGENCY MEDICINE

## 2024-10-06 RX ORDER — CIPROFLOXACIN 500 MG/1
500 TABLET ORAL 2 TIMES DAILY
Qty: 20 TABLET | Refills: 0 | Status: SHIPPED | OUTPATIENT
Start: 2024-10-06 | End: 2024-10-16

## 2024-10-06 RX ORDER — CIPROFLOXACIN 500 MG/1
500 TABLET ORAL
Status: COMPLETED | OUTPATIENT
Start: 2024-10-06 | End: 2024-10-06

## 2024-10-06 RX ORDER — METRONIDAZOLE 500 MG/1
500 TABLET ORAL 3 TIMES DAILY
Qty: 30 TABLET | Refills: 0 | Status: SHIPPED | OUTPATIENT
Start: 2024-10-06 | End: 2024-10-16

## 2024-10-06 RX ORDER — DIPHENHYDRAMINE HYDROCHLORIDE 50 MG/ML
12.5 INJECTION INTRAMUSCULAR; INTRAVENOUS
Status: COMPLETED | OUTPATIENT
Start: 2024-10-06 | End: 2024-10-06

## 2024-10-06 RX ORDER — METRONIDAZOLE 250 MG/1
500 TABLET ORAL
Status: COMPLETED | OUTPATIENT
Start: 2024-10-06 | End: 2024-10-06

## 2024-10-06 RX ORDER — DROPERIDOL 2.5 MG/ML
0.62 INJECTION, SOLUTION INTRAMUSCULAR; INTRAVENOUS
Status: COMPLETED | OUTPATIENT
Start: 2024-10-06 | End: 2024-10-06

## 2024-10-06 RX ORDER — ONDANSETRON 4 MG/1
4 TABLET, ORALLY DISINTEGRATING ORAL EVERY 8 HOURS PRN
Qty: 12 TABLET | Refills: 0 | Status: SHIPPED | OUTPATIENT
Start: 2024-10-06

## 2024-10-06 RX ADMIN — DIPHENHYDRAMINE HYDROCHLORIDE 12.5 MG: 50 INJECTION INTRAMUSCULAR; INTRAVENOUS at 12:10

## 2024-10-06 RX ADMIN — METRONIDAZOLE 500 MG: 250 TABLET ORAL at 11:10

## 2024-10-06 RX ADMIN — SODIUM CHLORIDE 500 ML: 9 INJECTION, SOLUTION INTRAVENOUS at 10:10

## 2024-10-06 RX ADMIN — DROPERIDOL 0.62 MG: 2.5 INJECTION, SOLUTION INTRAMUSCULAR; INTRAVENOUS at 12:10

## 2024-10-06 RX ADMIN — IOHEXOL 100 ML: 350 INJECTION, SOLUTION INTRAVENOUS at 04:10

## 2024-10-06 RX ADMIN — CIPROFLOXACIN 500 MG: 500 TABLET ORAL at 11:10

## 2024-10-06 NOTE — ED PROVIDER NOTES
Encounter Date: 10/6/2024       History     Chief Complaint   Patient presents with    Nausea    Vomiting    COVID-19 Concerns     COVID+ today, has n/v and more confused per family.      [age] year female past medical history of GERD, depression, hypertension, hyperlipidemia presents secondary to nausea vomiting.  Patient brought in by EMS after family was concerned of her nausea vomiting gave suppository Phenergan.  Patient did test positive for COVID on today as well as recent UTI being treated but family feels as if she was getting worse with confusion, fatigue, nausea vomiting.  She denies any chest pain, shortness of breath, fever, chills, sweats associated.  Patient is otherwise stable and has no other complaints.      Review of patient's allergies indicates:  No Known Allergies  Past Medical History:   Diagnosis Date    Depression     GERD (gastroesophageal reflux disease)     Hyperlipidemia     Hypertension     Hypothyroid      Past Surgical History:   Procedure Laterality Date    APPENDECTOMY      COLONOSCOPY  2017    Briana, repeat in 10    TONSILLECTOMY      TUBAL LIGATION       Family History   Problem Relation Name Age of Onset    Cancer Father      Cancer Sister      Glaucoma Neg Hx      Macular degeneration Neg Hx      Retinal detachment Neg Hx      Breast cancer Neg Hx      Ovarian cancer Neg Hx      Eczema Neg Hx      Lupus Neg Hx      Melanoma Neg Hx      Psoriasis Neg Hx       Social History     Tobacco Use    Smoking status: Never    Smokeless tobacco: Never   Substance Use Topics    Alcohol use: Not Currently     Comment: rarely    Drug use: No     Review of Systems   Gastrointestinal:  Positive for nausea and vomiting.   Psychiatric/Behavioral:  Positive for confusion.    All other systems reviewed and are negative.      Physical Exam     Initial Vitals   BP Pulse Resp Temp SpO2   10/06/24 0029 10/06/24 0029 10/06/24 0029 10/06/24 0033 10/06/24 0029   138/72 78 19 98 °F (36.7 °C) 98 %       MAP       --                Physical Exam    Nursing note and vitals reviewed.  Constitutional: She appears well-developed and well-nourished. She has a sickly appearance. She appears distressed.   HENT:   Head: Normocephalic and atraumatic. Mouth/Throat: Oropharynx is clear and moist.   Eyes: Conjunctivae and EOM are normal. Pupils are equal, round, and reactive to light.   Neck: No tracheal deviation present. No JVD present.   Normal range of motion.  Cardiovascular:  Normal rate, regular rhythm, normal heart sounds and intact distal pulses.     Exam reveals no gallop and no friction rub.       No murmur heard.  Pulmonary/Chest: Breath sounds normal. No respiratory distress. She has no wheezes. She exhibits no tenderness.   Abdominal: Abdomen is soft. Bowel sounds are normal. She exhibits no distension. There is abdominal tenderness. There is no rebound and no guarding.   Musculoskeletal:         General: No tenderness or edema. Normal range of motion.      Cervical back: Normal range of motion.     Neurological: She is alert and oriented to person, place, and time. She has normal strength. No cranial nerve deficit or sensory deficit.   Skin: Skin is warm and dry. Capillary refill takes less than 2 seconds. No erythema.   Psychiatric: She has a normal mood and affect.         ED Course   Procedures  Labs Reviewed   CBC W/ AUTO DIFFERENTIAL - Abnormal       Result Value    WBC 10.00      RBC 4.55      Hemoglobin 13.8      Hematocrit 41.3      MCV 91      MCH 30.3      MCHC 33.4      RDW 12.2      Platelets 217      MPV 9.7      Immature Granulocytes 0.1      Gran # (ANC) 8.6 (*)     Immature Grans (Abs) 0.01      Lymph # 1.0      Mono # 0.4      Eos # 0.1      Baso # 0.03      nRBC 0      Gran % 85.9 (*)     Lymph % 9.5 (*)     Mono % 3.7 (*)     Eosinophil % 0.5      Basophil % 0.3      Differential Method Automated     COMPREHENSIVE METABOLIC PANEL - Abnormal    Sodium 132 (*)     Potassium 3.7      Chloride  98      CO2 26      Glucose 166 (*)     BUN 7 (*)     Creatinine 0.8      Calcium 9.1      Total Protein 6.7      Albumin 3.9      Total Bilirubin 0.6      Alkaline Phosphatase 67      AST 19      ALT 14      eGFR >60.0      Anion Gap 8     URINALYSIS, REFLEX TO URINE CULTURE    Specimen UA Urine, Clean Catch      Color, UA Yellow      Appearance, UA Clear      pH, UA 6.0      Specific Gravity, UA 1.010      Protein, UA Negative      Glucose, UA Negative      Ketones, UA Negative      Bilirubin (UA) Negative      Occult Blood UA Negative      Nitrite, UA Negative      Urobilinogen, UA Negative      Leukocytes, UA Negative      Narrative:     Specimen Source->Urine   TROPONIN I HIGH SENSITIVITY    Troponin I High Sensitivity 4.7     LIPASE    Lipase 30          ECG Results              EKG 12-lead (In process)        Collection Time Result Time QRS Duration OHS QTC Calculation    10/06/24 01:15:48 10/06/24 05:16:05 82 494                     In process by Interface, Lab In [facility] (10/06/24 05:16:12)                   Narrative:    Test Reason : R11.2,    Vent. Rate : 093 BPM     Atrial Rate : 094 BPM     P-R Int : 000 ms          QRS Dur : 082 ms      QT Int : 398 ms       P-R-T Axes : 000 -33 120 degrees     QTc Int : 494 ms    Accelerated Junctional rhythm  Left axis deviation  Nonspecific ST and T wave abnormality  Abnormal ECG  When compared with ECG of 19-JAN-2024 13:40,  Junctional rhythm has replaced Sinus rhythm    Referred By: [email or web address]   SELF           Confirmed By:                                   Imaging Results              CT Head Without Contrast (Final result)  Result time 10/06/24 07:47:17      Final result by Higinio Carlos MD (10/06/24 07:47:17)                   Impression:      1. No acute intracranial abnormality.  2. Chronic small vessel ischemic changes, unchanged.  3. Trace maxillary sinus fluid levels can be seen with sinusitis in the appropriate clinical setting.      Electronically  signed by: Higinio Carlos  Date:    10/06/2024  Time:    07:47               Narrative:    EXAMINATION:  CT HEAD WITHOUT CONTRAST    CLINICAL HISTORY:  Mental status change, unknown cause;    TECHNIQUE:  Head CT without IV contrast.    COMPARISON:  MRI 06/16/2022, CT 08/26/2019    FINDINGS:  Patient had CT abdomen and pelvis without IV contrast 3 hours earlier, accounting for diffuse hyperdensity within vessel lumens.    No acute intracranial hemorrhage, midline shift, or mass effect.  Mild cerebral and cerebellar atrophy unchanged.  Chronic small vessel ischemic changes in periventricular and subcortical white matter unchanged.    The ventricles and cisterns are maintained.    Calvarium is intact. Trace fluid levels occupying bilateral maxillary sinusitis.                                       CT Abdomen Pelvis With IV Contrast NO Oral Contrast (Final result)  Result time 10/06/24 06:41:21      Final result by Maxim Quintanilla MD (10/06/24 06:41:21)                   Impression:      1. Relatively long segment of distended colon with wall thickening and intraluminal fluid, extending from cecum throughout essentially the entirety of the descending colon with relative sparing of the sigmoid colon and rectum.  Findings could be seen in the setting of a nonspecific infectious or noninfectious inflammatory colitis.  2. Additional details of chronic and incidental findings as provided in the body of report.      Electronically signed by: Maxim Quintanilla  Date:    10/06/2024  Time:    06:41               Narrative:    EXAMINATION:  CT ABDOMEN PELVIS WITH IV CONTRAST    CLINICAL HISTORY:  Abdominal pain, acute, nonlocalized;    TECHNIQUE:  Low dose axial images, sagittal and coronal reformations were obtained from the lung bases to the pubic symphysis following the IV administration of 100 mL of Omnipaque 350    COMPARISON:  Abdominal ultrasound 01/16/2020.  CT of the abdomen performed 11/24/2009.    FINDINGS:  Lower  chest: Atelectasis and/or scar at the visualized lung bases.    Liver: Unremarkable.    Gallbladder and bile ducts: Unremarkable. No biliary ductal dilatation.    Pancreas: Unremarkable.    Spleen: Unremarkable.    Adrenals: Unremarkable.    Kidneys: Subcentimeter hypodense lesions in the kidneys are too small to definitely characterize.    Lymph nodes: No abdominal or pelvic lymphadenopathy.    Bowel and mesentery: Small hiatal hernia.  Nonspecific patulous appearance of the esophagus with dependent fluid, as may be seen the setting of dysmotility.    No evidence of bowel obstruction.  Relatively long segment of distended colon with wall thickening and intraluminal fluid, extending from cecum throughout essentially the entirety of the descending colon with relative sparing of the sigmoid colon and rectum.  Colonic diverticulosis is noted.  Equivocal tiny appendiceal stump in this patient reportedly status post appendectomy.  No definite focal pericecal inflammation.    Abdominal aorta: Nonaneurysmal.  Mild-to-moderate atherosclerotic calcification.    Inferior vena cava: Unremarkable.    Free fluid or free air: None.    Pelvis: Unremarkable.    Body wall: Rectus diastasis.  Small fat containing umbilical hernia.    Bones: No definite acute abnormality.  Question remote fracture deformity of the right L2 transverse process.  Degenerative findings of the spine and hips.                                       X-Ray Chest AP Portable (Final result)  Result time 10/06/24 01:28:11      Final result by Gallito Serrato MD (10/06/24 01:28:11)                   Impression:      Minimal left basilar atelectasis or scarring.  No large confluent airspace consolidation appreciated.      Electronically signed by: Gallito Serrato MD  Date:    10/06/2024  Time:    01:28               Narrative:    EXAMINATION:  XR CHEST AP PORTABLE    CLINICAL HISTORY:  Nausea with vomiting, unspecified    TECHNIQUE:  Single frontal view of the  chest was performed.    COMPARISON:  01/15/2020    FINDINGS:  Cardiac monitoring leads overlie the chest.  Cardiac silhouette is not significantly enlarged.  There is aortic atherosclerosis.  Linear subsegmental opacity at the left lung base is suggestive of atelectasis or scarring.  No large confluent airspace consolidation identified.  No significant volume of pleural fluid or pneumothorax appreciated.  Osseous structures demonstrate degenerative change.                                       Medications   droPERidol injection 0.625 mg (0.625 mg Intravenous Given 10/6/24 0049)   diphenhydrAMINE injection 12.5 mg (12.5 mg Intravenous Given 10/6/24 0049)   iohexoL (OMNIPAQUE 350) injection 100 mL (100 mLs Intravenous Given 10/6/24 0412)   sodium chloride 0.9% bolus 500 mL 500 mL (500 mLs Intravenous New Bag 10/6/24 1037)   ciprofloxacin HCl tablet 500 mg (500 mg Oral Given 10/6/24 1130)   metroNIDAZOLE tablet 500 mg (500 mg Oral Given 10/6/24 1129)     Medical Decision Making  73 year female on initial assessment in mild distress secondary to nausea, vomiting and abdominal cramping.  Patient is alert and oriented x3, neurologically neurovascular intact no focal deficits.  She is nontoxic-appearing and vitals stable at this time.      Differential diagnosis: Sepsis, UTI, COVID, dehydration, electrolyte abnormality    Amount and/or Complexity of Data Reviewed  Labs: ordered. Decision-making details documented in ED Course.  Radiology: ordered. Decision-making details documented in ED Course.  ECG/medicine tests: ordered. Decision-making details documented in ED Course.    Risk  Prescription drug management.               ED Course as of 10/06/24 1155   Sun Oct 06, 2024   0838 The patient was handed over to me at the change of shift by Dr. Iyer pending the patient's CT scans.  The patient's labs showed no acute abnormalities.  The patient was recently positive for COVID, as reported by the daughter.  The patient's CT  scan of her abdomen pelvis show a colitis.  The patient's CT scan of her head showed no acute abnormalities per Radiology.  The patient reports that she feels okay and her abdomen is soft and nontender.  I will attempt p.o. challenge and make sure the patient can ambulate as well. [PE]   1030 The patient has had several episodes of diarrhea here, likely secondary to her colitis.  She is getting a small amount of IV fluids here.  Additionally I will treat her with p.o. Cipro and p.o. Flagyl.  Both the patient and her daughter reports that she can likely still go home after her IV fluids.  She is stable for discharge to home.  She will be discharged home with p.o. Cipro, p.o. Flagyl, and ODT Zofran.  She is referred to primary care follow-up. [PE]      ED Course User Index  [PE] Tino Perez MD                           Clinical Impression:  Final diagnoses:  [R11.2] Nausea and/or vomiting  [K52.9] Colitis (Primary)  [U07.1] COVID          ED Disposition Condition    Discharge Stable          ED Prescriptions       Medication Sig Dispense Start Date End Date Auth. Provider    ciprofloxacin HCl (CIPRO) 500 MG tablet Take 1 tablet (500 mg total) by mouth 2 (two) times daily. for 10 days 20 tablet 10/6/2024 10/16/2024 Tino Perez MD    metroNIDAZOLE (FLAGYL) 500 MG tablet Take 1 tablet (500 mg total) by mouth 3 (three) times daily. for 10 days 30 tablet 10/6/2024 10/16/2024 Tino Perez MD    ondansetron (ZOFRAN-ODT) 4 MG TbDL Take 1 tablet (4 mg total) by mouth every 8 (eight) hours as needed (nausea/vomiting). 12 tablet 10/6/2024 -- Tino Perez MD          Follow-up Information       Follow up With Specialties Details Why Contact [facility]    [place] [address]  [phone]               Tino Perez MD  10/06/24 7133

## 2024-10-09 LAB
OHS QRS DURATION: 82 MS
OHS QTC CALCULATION: 494 MS

## 2024-11-15 ENCOUNTER — OFFICE VISIT (OUTPATIENT)
Dept: URGENT CARE | Facility: CLINIC | Age: 74
End: 2024-11-15
Payer: MEDICARE

## 2024-11-15 ENCOUNTER — HOSPITAL ENCOUNTER (EMERGENCY)
Facility: HOSPITAL | Age: 74
Discharge: HOME | End: 2024-11-15
Attending: EMERGENCY MEDICINE
Payer: MEDICARE

## 2024-11-15 VITALS
WEIGHT: 210 LBS | SYSTOLIC BLOOD PRESSURE: 170 MMHG | RESPIRATION RATE: 20 BRPM | OXYGEN SATURATION: 97 % | BODY MASS INDEX: 31.83 KG/M2 | HEART RATE: 80 BPM | TEMPERATURE: 98 F | HEIGHT: 68 IN | DIASTOLIC BLOOD PRESSURE: 97 MMHG

## 2024-11-15 VITALS
TEMPERATURE: 98 F | HEIGHT: 68 IN | BODY MASS INDEX: 31.83 KG/M2 | RESPIRATION RATE: 16 BRPM | HEART RATE: 85 BPM | DIASTOLIC BLOOD PRESSURE: 63 MMHG | WEIGHT: 210 LBS | SYSTOLIC BLOOD PRESSURE: 155 MMHG | OXYGEN SATURATION: 96 %
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
## 2024-11-15 DIAGNOSIS — S09.90XA INJURY OF HEAD, INITIAL ENCOUNTER: Primary | ICD-10-CM

## 2024-11-15 DIAGNOSIS — S09.90XA CLOSED HEAD INJURY, INITIAL ENCOUNTER: ICD-10-CM

## 2024-11-15 LAB
ALBUMIN SERPL BCP-MCNC: 4 G/DL (ref 3.5–5.2)
ALP SERPL-CCNC: 61 U/L (ref 55–135)
ALT SERPL W/O P-5'-P-CCNC: 19 U/L (ref 10–44)
ANION GAP SERPL CALC-SCNC: 5 MMOL/L (ref 8–16)
AST SERPL-CCNC: 18 U/L (ref 10–40)
BASOPHILS # BLD AUTO: 0.05 K/UL (ref 0–0.2)
BASOPHILS NFR BLD: 0.7 % (ref 0–1.9)
BILIRUB SERPL-MCNC: 0.4 MG/DL (ref 0.1–1)
BNP SERPL-MCNC: 130 PG/ML (ref 0–99)
BUN SERPL-MCNC: 7 MG/DL (ref 8–23)
CALCIUM SERPL-MCNC: 9.1 MG/DL (ref 8.7–10.5)
CHLORIDE SERPL-SCNC: 105 MMOL/L (ref 95–110)
CO2 SERPL-SCNC: 30 MMOL/L (ref 23–29)
CREAT SERPL-MCNC: 0.8 MG/DL (ref 0.5–1.4)
DIFFERENTIAL METHOD BLD: NORMAL
EOSINOPHIL # BLD AUTO: 0.3 K/UL (ref 0–0.5)
EOSINOPHIL NFR BLD: 4.4 % (ref 0–8)
ERYTHROCYTE [DISTWIDTH] IN BLOOD BY AUTOMATED COUNT: 13 % (ref 11.5–14.5)
EST. GFR  (NO RACE VARIABLE): >60 ML/MIN/1.73 M^2
GLUCOSE SERPL-MCNC: 103 MG/DL (ref 70–110)
HCT VFR BLD AUTO: 39.3 % (ref 37–48.5)
HGB BLD-MCNC: 13 G/DL (ref 12–16)
IMM GRANULOCYTES # BLD AUTO: 0.02 K/UL (ref 0–0.04)
IMM GRANULOCYTES NFR BLD AUTO: 0.3 % (ref 0–0.5)
INR PPP: 0.9 (ref 0.8–1.2)
LYMPHOCYTES # BLD AUTO: 2.4 K/UL (ref 1–4.8)
LYMPHOCYTES NFR BLD: 32.1 % (ref 18–48)
MAGNESIUM SERPL-MCNC: 2.1 MG/DL (ref 1.6–2.6)
MCH RBC QN AUTO: 30.2 PG (ref 27–31)
MCHC RBC AUTO-ENTMCNC: 33.1 G/DL (ref 32–36)
MCV RBC AUTO: 91 FL (ref 82–98)
MONOCYTES # BLD AUTO: 0.7 K/UL (ref 0.3–1)
MONOCYTES NFR BLD: 9.8 % (ref 4–15)
NEUTROPHILS # BLD AUTO: 3.9 K/UL (ref 1.8–7.7)
NEUTROPHILS NFR BLD: 52.7 % (ref 38–73)
NRBC BLD-RTO: 0 /100 WBC
PLATELET # BLD AUTO: 260 K/UL (ref 150–450)
PMV BLD AUTO: 10.2 FL (ref 9.2–12.9)
POTASSIUM SERPL-SCNC: 3.9 MMOL/L (ref 3.5–5.1)
PROT SERPL-MCNC: 6.4 G/DL (ref 6–8.4)
PROTHROMBIN TIME: 10.4 SEC (ref 9–12.5)
RBC # BLD AUTO: 4.3 M/UL (ref 4–5.4)
SODIUM SERPL-SCNC: 140 MMOL/L (ref 136–145)
TROPONIN I SERPL HS-MCNC: 3.6 PG/ML (ref 0–14.9)
WBC # BLD AUTO: 7.32 K/UL (ref 3.9–12.7)

## 2024-11-15 PROCEDURE — 99285 EMERGENCY DEPT VISIT HI MDM: CPT | Mod: 25

## 2024-11-15 PROCEDURE — 93010 ELECTROCARDIOGRAM REPORT: CPT | Mod: ,,, | Performed by: GENERAL PRACTICE

## 2024-11-15 PROCEDURE — 83735 ASSAY OF MAGNESIUM: CPT

## 2024-11-15 PROCEDURE — 83880 ASSAY OF NATRIURETIC PEPTIDE: CPT

## 2024-11-15 PROCEDURE — 85610 PROTHROMBIN TIME: CPT

## 2024-11-15 PROCEDURE — 93005 ELECTROCARDIOGRAM TRACING: CPT | Performed by: GENERAL PRACTICE

## 2024-11-15 PROCEDURE — 80053 COMPREHEN METABOLIC PANEL: CPT

## 2024-11-15 PROCEDURE — 84484 ASSAY OF TROPONIN QUANT: CPT

## 2024-11-15 PROCEDURE — 85025 COMPLETE CBC W/AUTO DIFF WBC: CPT

## 2024-11-15 RX ORDER — RIZATRIPTAN BENZOATE 10 MG/1
TABLET ORAL
COMMUNITY
Start: 2024-11-09

## 2024-11-15 NOTE — PROGRESS NOTES
"Subjective:      Patient ID: Skyler Espinoza is a 74 y.o. female.    Vitals:  height is 5' 8" (1.727 m) and weight is 95.3 kg (210 lb). Her oral temperature is 98.3 °F (36.8 °C). Her blood pressure is 155/63 (abnormal) and her pulse is 85. Her respiration is 16 and oxygen saturation is 96%.     Chief Complaint: Head Injury    Patient is a 74-year-old female who presents with daughter for head injury that occurred about an hour prior to arrival.  Past medical history significant for hypertension, hypothyroid, dementia and tremors.  Daughter states she was sitting at a chair on the vanity when she went to push back in the leg broke causing her to fall backwards and hit her head on the wall.  Daughter states she did not witness the fall.  They report no loss of consciousness.  Patient reports headache, dizziness and feeling off balance with ambulating.  Denies blood thinner use.  Reports no nausea or visual changes.         Constitution: Negative for chills and fever.   HENT:  Negative for sore throat.    Eyes:  Negative for blurred vision.   Respiratory:  Negative for cough.    Gastrointestinal:  Negative for abdominal pain, nausea, vomiting and diarrhea.   Neurological:  Positive for dizziness and headaches. Negative for loss of consciousness.      Objective:     Physical Exam   Constitutional: She is oriented to person, place, and time. She appears well-developed. She is cooperative. She does not appear ill. No distress.   HENT:   Head: Normocephalic and atraumatic.   Ears:   Right Ear: Hearing, tympanic membrane, external ear and ear canal normal.   Left Ear: Hearing, tympanic membrane, external ear and ear canal normal.   Nose: Nose normal. No rhinorrhea. Right sinus exhibits no maxillary sinus tenderness and no frontal sinus tenderness. Left sinus exhibits no maxillary sinus tenderness and no frontal sinus tenderness.   Mouth/Throat: Uvula is midline, oropharynx is clear and moist and mucous membranes are normal. " No trismus in the jaw. No oropharyngeal exudate, posterior oropharyngeal edema or posterior oropharyngeal erythema.   Eyes: Conjunctivae and lids are normal.   Neck: Phonation normal. No neck rigidity present.   Cardiovascular: Normal rate, regular rhythm and normal heart sounds.   Pulmonary/Chest: Effort normal and breath sounds normal. No respiratory distress. She has no decreased breath sounds. She has no rhonchi.   Abdominal: Normal appearance. Soft.   Musculoskeletal: Normal range of motion.         General: No deformity. Normal range of motion.   Neurological: She is alert and oriented to person, place, and time. She exhibits normal muscle tone. Coordination normal.      Comments: 5/5 strength bilateral lower extremities.  Positive Romberg.  Normal gait.  Able to follow commands.   Skin: Skin is warm, dry, intact and not pale.   Psychiatric: Her speech is normal and behavior is normal. Judgment and thought content normal.   Nursing note and vitals reviewed.      Assessment:     1. Injury of head, initial encounter        Plan:       Injury of head, initial encounter          Medical Decision Making:   History:   I obtained history from: someone other than patient.  Old Medical Records: I decided to obtain old medical records.  Urgent Care Management:  Patient is a 74-year-old female who presents with headache and dizziness after a head injury sustained about 1 hour prior to arrival.  Daughter declines any blood thinner use.  She denied loss of consciousness.  Due to age, injury and symptoms recommend emergency room for further evaluation.  Daughter voiced understanding.  She states she will drive her.  They declined ambulance transport.

## 2024-11-15 NOTE — ED PROVIDER NOTES
Chief complaint:  Fall (Fall from sitting hitting her head on a wall, Complains of head neck and left shoulder pain.)      HPI:  Skyler Espinoza is a 74 y.o. female with hx htn, obesity, GISELE, chronic lower back pain, tremor presenting with mechanical fall today when she was sitting on a stool in the leg broke, falling backward into the wall and striking her head without loss of consciousness.  Patient does endorse chronic daily headaches for which she takes triptans and did take triptan today.  Headache present after fall on reassessment is now resolved.  She additionally denies any ongoing neck or shoulder pain that she had felt immediately following fall.  She denies pain with movement.  She denies other injury.  She denies other preceding illness following fall off stool with head injury.  She denies antiplatelet agents or anticoagulation.  Mental status remains at baseline and normal.    ROS: As per HPI and below:  No neck pain presently, chest pain, shortness of breath, abdominal pain, back pain, extremity injury, chest pain, syncope, dysuria, fever, vomiting, visual change, focal numbness or weakness, difficulty urinating.    Review of patient's allergies indicates:  No Known Allergies    Discharge Medication List as of 11/15/2024  7:45 PM        CONTINUE these medications which have NOT CHANGED    Details   ascorbic acid, vitamin C, (VITAMIN C) 1000 MG tablet Take 1,000 mg by mouth once daily., Historical Med      azelastine (ASTELIN) 137 mcg (0.1 %) nasal spray 1 spray (137 mcg total) by Nasal route 2 (two) times daily., Starting Thu 2/27/2020, Until Wed 8/11/2021, Normal      benzonatate (TESSALON) 200 MG capsule Take 200 mg by mouth every 6 (six) hours as needed., Starting Thu 4/18/2024, Historical Med      cetirizine (ZYRTEC) 10 MG tablet Take 10 mg by mouth once daily., Until Discontinued, Historical Med      coenzyme Q10 (COQ-10) 100 mg capsule Historical Med      divalproex (DEPAKOTE) 125 MG EC tablet  Take 250 mg by mouth 2 (two) times daily., Starting u 2024, Historical Med      famotidine (PEPCID) 40 MG tablet Take 40 mg by mouth., Starting 2024, Historical Med      fluticasone propionate (FLONASE) 50 mcg/actuation nasal spray 1 spray (50 mcg total) by Each Nostril route 2 (two) times daily., Starting 2022, Normal      guaiFENesin-codeine 100-10 mg/5 ml (TUSSI-ORGANIDIN NR)  mg/5 mL syrup SMARTSI-10 Milliliter(s) By Mouth Every Night PRN, Historical Med      ipratropium (ATROVENT) 21 mcg (0.03 %) nasal spray USE 2 SPRAYS IN EACH NOSTRIL THREE TIMES DAILY, Normal      levothyroxine (SYNTHROID) 100 MCG tablet Historical Med      lovastatin (MEVACOR) 20 MG tablet TAKE 1 TABLET BY MOUTH EVERY EVENING, Normal      meloxicam (MOBIC) 15 MG tablet Take 1 tablet (15 mg total) by mouth daily as needed for Pain., Starting 2024, Normal      metoprolol succinate (TOPROL-XL) 100 MG 24 hr tablet 1 tablet Orally Once a day for 90 days, Historical Med      multivit-min-ferrous fumarate 9 mg iron/15 mL Liqd as directed, Historical Med      ondansetron (ZOFRAN-ODT) 4 MG TbDL Take 1 tablet (4 mg total) by mouth every 8 (eight) hours as needed (nausea/vomiting)., Starting Sun 10/6/2024, Print      pantoprazole (PROTONIX) 40 MG tablet Take 1 tablet (40 mg total) by mouth once daily., Starting Fri 3/5/2021, Normal      rizatriptan (MAXALT) 10 MG tablet Take by mouth., Starting Sat 2024, Historical Med      selenium 50 mcg Tab Take 300 mcg by mouth once daily. , Historical Med      valACYclovir (VALTREX) 1000 MG tablet Starting Wed 2020, Historical Med      vitamin D 1000 units Tab Take 1,000 Units by mouth once daily., Historical Med             PMH:  As per HPI and below:  Past Medical History:   Diagnosis Date    Depression     GERD (gastroesophageal reflux disease)     Hyperlipidemia     Hypertension     Hypothyroid      Past Surgical History:   Procedure Laterality Date     APPENDECTOMY      COLONOSCOPY  2017    Briana, repeat in 10    TONSILLECTOMY      TUBAL LIGATION         Social History     Socioeconomic History    Marital status:    Tobacco Use    Smoking status: Never    Smokeless tobacco: Never   Substance and Sexual Activity    Alcohol use: Not Currently     Comment: rarely    Drug use: No    Sexual activity: Yes     Social Drivers of Health     Financial Resource Strain: Patient Declined (12/11/2023)    Overall Financial Resource Strain (CARDIA)     Difficulty of Paying Living Expenses: Patient declined   Food Insecurity: Patient Declined (12/11/2023)    Hunger Vital Sign     Worried About Running Out of Food in the Last Year: Patient declined     Ran Out of Food in the Last Year: Patient declined   Transportation Needs: Patient Declined (12/11/2023)    PRAPARE - Transportation     Lack of Transportation (Medical): Patient declined     Lack of Transportation (Non-Medical): Patient declined   Physical Activity: Unknown (12/11/2023)    Exercise Vital Sign     Days of Exercise per Week: Patient declined     Minutes of Exercise per Session: 20 min   Stress: Patient Declined (12/11/2023)    [employer] [place] [employer] - Occupational Stress Questionnaire     Feeling of Stress : Patient declined   Housing Stability: Unknown (12/11/2023)    Housing Stability Vital Sign     Unable to Pay for Housing in the Last Year: Patient refused     Unstable Housing in the Last Year: Patient refused       Family History   Problem Relation Name Age of Onset    Cancer Father      Cancer Sister      Glaucoma Neg Hx      Macular degeneration Neg Hx      Retinal detachment Neg Hx      Breast cancer Neg Hx      Ovarian cancer Neg Hx      Eczema Neg Hx      Lupus Neg Hx      Melanoma Neg Hx      Psoriasis Neg Hx         Physical Exam:    Vitals:    11/15/24 1955   BP: (!) 170/97   Pulse: 80   Resp:    Temp:      GENERAL:  No apparent distress.  Alert.    HEENT:  Moist mucous  membranes.  Normocephalic and atraumatic.    NECK:  No swelling.  Midline trachea. No posterior midline tenderness to palpation.  Patient additionally rotates head 45° in both directions without pain or restriction.  CARDIOVASCULAR:  Regular rate and rhythm.  2+ radial pulses.    PULMONARY:  Lungs clear to auscultation bilaterally.  No wheezes, rales, or rhonci.    ABDOMEN:  Non-tender and non-distended.    EXTREMITIES:  Warm and well perfused.  Brisk capillary refill.  Atraumatic.  No tenderness to palpation.  NEUROLOGICAL:  Normal mental status.  Appropriate and conversant.  5/5 strength and sensation.  CN III through XII intact.    SKIN:  No rashes or ecchymoses.    BACK:  Atraumatic.  No vertebral tenderness to palpation.        Labs Reviewed   COMPREHENSIVE METABOLIC PANEL - Abnormal       Result Value    Sodium 140      Potassium 3.9      Chloride 105      CO2 30 (*)     Glucose 103      BUN 7 (*)     Creatinine 0.8      Calcium 9.1      Total Protein 6.4      Albumin 4.0      Total Bilirubin 0.4      Alkaline Phosphatase 61      AST 18      ALT 19      eGFR >60.0      Anion Gap 5 (*)    B-TYPE NATRIURETIC PEPTIDE - Abnormal     (*)    CBC W/ AUTO DIFFERENTIAL    WBC 7.32      RBC 4.30      Hemoglobin 13.0      Hematocrit 39.3      MCV 91      MCH 30.2      MCHC 33.1      RDW 13.0      Platelets 260      MPV 10.2      Immature Granulocytes 0.3      Gran # (ANC) 3.9      Immature Grans (Abs) 0.02      Lymph # 2.4      Mono # 0.7      Eos # 0.3      Baso # 0.05      nRBC 0      Gran % 52.7      Lymph % 32.1      Mono % 9.8      Eosinophil % 4.4      Basophil % 0.7      Differential Method Automated     MAGNESIUM    Magnesium 2.1     TROPONIN I HIGH SENSITIVITY    Troponin I High Sensitivity 3.6     PROTIME-INR    Prothrombin Time 10.4      INR 0.9         Discharge Medication List as of 11/15/2024  7:45 PM        CONTINUE these medications which have NOT CHANGED    Details   ascorbic acid, vitamin C,  (VITAMIN C) 1000 MG tablet Take 1,000 mg by mouth once daily., Historical Med      azelastine (ASTELIN) 137 mcg (0.1 %) nasal spray 1 spray (137 mcg total) by Nasal route 2 (two) times daily., Starting 2020, Until 2021, Normal      benzonatate (TESSALON) 200 MG capsule Take 200 mg by mouth every 6 (six) hours as needed., Starting 2024, Historical Med      cetirizine (ZYRTEC) 10 MG tablet Take 10 mg by mouth once daily., Until Discontinued, Historical Med      coenzyme Q10 (COQ-10) 100 mg capsule Historical Med      divalproex (DEPAKOTE) 125 MG EC tablet Take 250 mg by mouth 2 (two) times daily., Starting 2024, Historical Med      famotidine (PEPCID) 40 MG tablet Take 40 mg by mouth., Starting 2024, Historical Med      fluticasone propionate (FLONASE) 50 mcg/actuation nasal spray 1 spray (50 mcg total) by Each Nostril route 2 (two) times daily., Starting 2022, Normal      guaiFENesin-codeine 100-10 mg/5 ml (TUSSI-ORGANIDIN NR)  mg/5 mL syrup SMARTSI-10 Milliliter(s) By Mouth Every Night PRN, Historical Med      ipratropium (ATROVENT) 21 mcg (0.03 %) nasal spray USE 2 SPRAYS IN EACH NOSTRIL THREE TIMES DAILY, Normal      levothyroxine (SYNTHROID) 100 MCG tablet Historical Med      lovastatin (MEVACOR) 20 MG tablet TAKE 1 TABLET BY MOUTH EVERY EVENING, Normal      meloxicam (MOBIC) 15 MG tablet Take 1 tablet (15 mg total) by mouth daily as needed for Pain., Starting 2024, Normal      metoprolol succinate (TOPROL-XL) 100 MG 24 hr tablet 1 tablet Orally Once a day for 90 days, Historical Med      multivit-min-ferrous fumarate 9 mg iron/15 mL Liqd as directed, Historical Med      ondansetron (ZOFRAN-ODT) 4 MG TbDL Take 1 tablet (4 mg total) by mouth every 8 (eight) hours as needed (nausea/vomiting)., Starting Sun 10/6/2024, Print      pantoprazole (PROTONIX) 40 MG tablet Take 1 tablet (40 mg total) by mouth once daily., Starting Fri 3/5/2021, Normal       rizatriptan (MAXALT) 10 MG tablet Take by mouth., Starting Sat 11/9/2024, Historical Med      selenium 50 mcg Tab Take 300 mcg by mouth once daily. , Historical Med      valACYclovir (VALTREX) 1000 MG tablet Starting Wed 4/29/2020, Historical Med      vitamin D 1000 units Tab Take 1,000 Units by mouth once daily., Historical Med             Orders Placed This Encounter   Procedures    X-Ray Chest AP Portable    CT Head Without Contrast    CBC auto differential    Comprehensive metabolic panel    Brain natriuretic peptide    Magnesium    Troponin I High Sensitivity #1    Protime-INR    Notify physician for persistent pain    Cardiac Monitoring - Adult    Pulse Oximetry Continuous    EKG 12-lead    Saline lock IV       Imaging Results              CT Head Without Contrast (Final result)  Result time 11/15/24 19:26:58      Final result by Gallito Serrato MD (11/15/24 19:26:58)                   Impression:      No CT evidence of acute intracranial abnormality. Clinical correlation and further evaluation as warranted.    Chronic senescent and microvascular ischemic changes.      Electronically signed by: Gallito Serrato MD  Date:    11/15/2024  Time:    19:26               Narrative:    EXAMINATION:  CT HEAD WITHOUT CONTRAST    CLINICAL HISTORY:  Head trauma, minor (Age >= 65y);    TECHNIQUE:  Low dose axial images were obtained through the head.  Coronal and sagittal reformations were also performed. Contrast was not administered.    COMPARISON:  CT head 10/06/2024    FINDINGS:  There is generalized cerebral volume loss with compensatory sulcal widening and ventricular enlargement.  There is patchy and confluent periventricular and supratentorial white matter hypoattenuation suggesting sequelae of chronic microvascular ischemic change.  No CT evidence of acute intracranial hemorrhage.  The basal cisterns are patent.  The mastoid air cells and paranasal sinuses are clear of acute process. No acute displaced  calvarial fracture identified.                                       X-Ray Chest AP Portable (Final result)  Result time 11/15/24 16:54:41      Final result by Renan Cline MD (11/15/24 16:54:41)                   Impression:      No acute pulmonary process.      Electronically signed by: Renan Cline  Date:    11/15/2024  Time:    16:54               Narrative:    EXAMINATION:  XR CHEST AP PORTABLE    CLINICAL HISTORY:  chest pain;    COMPARISON:  10/06/2024    FINDINGS:  Cardiac silhouette size is within normal limits.  Lungs are clear with no large pleural effusion or pneumothorax evident.  No acute osseous abnormality.                                  (Rad read)    ED Course as of 11/15/24 2056   Fri Nov 15, 2024   1730 EKG:  Sinus rhythm with sinus arrhythmia, rate of 81, L axis, normal intervals.  There are no acute ST or T wave changes suggestive of acute ischemia or infarction.  (Independently interpreted by me) [MR]      ED Course User Index  [MR] Gallito Don MD       MDM:    74 y.o. female with mechanical fall when the stool she was sitting on broke with head injury without loss of consciousness and initial headache now resolved.  No sign of other significant injury.  No sign of other acute medical process precipitating fall requiring separate workup with laboratories and studies protocol from triage reviewed.  I doubt other acute medical process.  There is no current subjective neck pain or midline tenderness and I doubt cervical vertebral fracture or subluxation.  I do not think further neck imaging is indicated.  Head CT performed secondary to head trauma and age with low suspicion for intracranial hemorrhage.  Patient continues to feel well with no sign of acute process on head CT.  Other labs protocol from triage reviewed.  Nonspecific, chronic elevation BNP noted with very low suspicion for acute process such as CHF.  Hypertension noted here to be rechecked with PCP on follow-up as  well.  Detailed return precautions reviewed.    Diagnoses:    1. Fall   2.  Closed head injury       Gallito Don MD  11/15/24 2056

## 2024-11-16 LAB
OHS QRS DURATION: 94 MS
OHS QTC CALCULATION: 441 MS

## 2024-11-16 NOTE — ED NOTES
Daughter concerned about Mother's elevated bp of 186/87. Reports pt's bp is never this high and thinks migraine medication may be to blame. Daughter reports giving mom night Metoprolol before coming to ED since BP was elevated at home. Dr hitchcock. Will be in to speak with pt soon and possibly recommend a follow up visit with Cardiologist.

## 2024-11-18 ENCOUNTER — OFFICE VISIT (OUTPATIENT)
Dept: FAMILY MEDICINE | Facility: CLINIC | Age: 74
End: 2024-11-18
Payer: MEDICARE

## 2024-11-18 ENCOUNTER — HOSPITAL ENCOUNTER (OUTPATIENT)
Dept: RADIOLOGY | Facility: HOSPITAL | Age: 74
Discharge: HOME | End: 2024-11-18
Attending: NURSE PRACTITIONER
Payer: MEDICARE

## 2024-11-18 VITALS
SYSTOLIC BLOOD PRESSURE: 122 MMHG | HEART RATE: 82 BPM | DIASTOLIC BLOOD PRESSURE: 82 MMHG | WEIGHT: 239.19 LBS | BODY MASS INDEX: 36.25 KG/M2 | HEIGHT: 68 IN | OXYGEN SATURATION: 98 % | TEMPERATURE: 99 F

## 2024-11-18 DIAGNOSIS — I10 HTN (HYPERTENSION), BENIGN: ICD-10-CM

## 2024-11-18 DIAGNOSIS — R10.32 LEFT GROIN PAIN: ICD-10-CM

## 2024-11-18 DIAGNOSIS — E55.9 VITAMIN D DEFICIENCY: ICD-10-CM

## 2024-11-18 DIAGNOSIS — F01.B18 MODERATE VASCULAR DEMENTIA WITH OTHER BEHAVIORAL DISTURBANCE: ICD-10-CM

## 2024-11-18 DIAGNOSIS — M25.552 LEFT HIP PAIN: ICD-10-CM

## 2024-11-18 DIAGNOSIS — Z78.0 ASYMPTOMATIC POSTMENOPAUSAL STATE: ICD-10-CM

## 2024-11-18 DIAGNOSIS — Z76.89 ENCOUNTER TO ESTABLISH CARE: Primary | ICD-10-CM
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
## 2024-11-18 DIAGNOSIS — N64.4 BREAST PAIN, RIGHT: ICD-10-CM

## 2024-11-18 DIAGNOSIS — N64.4 BREAST PAIN, RIGHT: Primary | ICD-10-CM

## 2024-11-18 DIAGNOSIS — E06.3 HYPOTHYROIDISM DUE TO HASHIMOTO THYROIDITIS: ICD-10-CM

## 2024-11-18 PROCEDURE — 73502 X-RAY EXAM HIP UNI 2-3 VIEWS: CPT | Mod: TC,PO,LT

## 2024-11-18 PROCEDURE — 77080 DXA BONE DENSITY AXIAL: CPT | Mod: 26,,, | Performed by: RADIOLOGY

## 2024-11-18 PROCEDURE — 99999 PR PBB SHADOW E&M-EST. PATIENT-LVL IV: CPT | Mod: PBBFAC,,, | Performed by: NURSE PRACTITIONER

## 2024-11-18 PROCEDURE — 73502 X-RAY EXAM HIP UNI 2-3 VIEWS: CPT | Mod: 26,LT,, | Performed by: RADIOLOGY

## 2024-11-18 PROCEDURE — 77080 DXA BONE DENSITY AXIAL: CPT | Mod: TC,PO

## 2024-11-18 RX ORDER — IBUPROFEN 800 MG/1
800 TABLET ORAL EVERY 8 HOURS PRN
Qty: 30 TABLET | Refills: 0 | Status: SHIPPED | OUTPATIENT
Start: 2024-11-18

## 2024-11-18 RX ORDER — QUETIAPINE FUMARATE 50 MG/1
50-100 TABLET, FILM COATED ORAL NIGHTLY
COMMUNITY
Start: 2024-11-07

## 2024-11-18 RX ORDER — DIVALPROEX SODIUM 500 MG/1
500 TABLET, FILM COATED, EXTENDED RELEASE ORAL DAILY
COMMUNITY

## 2024-11-18 RX ORDER — METOPROLOL SUCCINATE 100 MG/1
100 TABLET, EXTENDED RELEASE ORAL DAILY
Qty: 90 TABLET | Refills: 1 | Status: SHIPPED | OUTPATIENT
Start: 2024-11-18

## 2024-11-18 NOTE — PROGRESS NOTES
SUBJECTIVE:      Patient ID: Skyler Espinoza is a 74 y.o. female.    Chief Complaint: Transitional Care    History of Present Illness    CHIEF COMPLAINT:  Ms. Espinoza presents for follow-up after an emergency room visit due to a fall, with complaints of lower left groin pain, right breast pain, and ongoing headaches.    HPI:  Ms. Espinoza, an elderly woman with mild to moderate vascular dementia, recently fell and hit her head against a wall, prompting an emergency room visit. She developed pain in her lower left groin area following the fall and had difficulty transitioning from the toilet to the walk-in tub due to groin pain the next day. She reports pain with leg movement, particularly when bent or moved outward, localized to the hip area.    Ms. Espinoza has been having severe right breast pain for approximately 1 week, including the day before her fall and again last night and this morning. There is no drainage from the nipple or redness associated with the breast pain. She denies any palpable masses.    Ms. Espinoza reports daily headaches. She is scheduled to see Dr. Fields, a neurologist specializing in pain management, for a nerve-burning procedure to potentially alleviate her migraines. She takes Maxalt as an emergency medication for headaches, and various other drugs have been tried for headache management.    Ms. Espinoza has been prescribed Depakote for mood management related to her dementia, which has reportedly helped with anger and irritation symptoms. She is also taking Seroquel (quetiapine) 50 mg at night.    Ms. Espinoza has Hashimoto's thyroiditis and is currently taking 100 mcg of levothyroxine. She also takes a daily vitamin D supplement.    Ms. Espinoza reports shortness of breath, which has been noticed recently. She has a history of leg swelling, thought to be positional. A recent echocardiogram showed a normal EF, but there are concerns about possible early heart failure with preserved EF due  to an elevated BNP level.    MEDICATIONS:  Ms. Espinoza is on Metoprolol for blood pressure management. She is taking Depakote for mood and anger/irritation related to dementia. Her regimen includes Seroquel (quetiapine) 50 mg, 2 tablets at night. She is on Levothyroxine 100 mcg for thyroid (Hashimoto's). Ms. Espinoza takes Vitamin D daily. Baxalta is used as emergency medication for headaches, and she also uses Excedrin for headache management.    MEDICAL HISTORY:  Ms. Espinoza has a history of vascular dementia, described as mild to moderate. She also has Hashimoto's thyroiditis and migraines. Ms. Espinoza has a lactation history from over 40 years ago. Ms. Espinoza has received the COVID-19 vaccine.    FAMILY HISTORY:  Family history is significant for the patient's daughter with a history of breast cancer, who underwent a double mastectomy.    TEST RESULTS:  Ms. Shankars BMP (Basic Metabolic Panel) was previously elevated, described as on the low end of elevated by the ER doctor. Her thyroid function was previously checked. The BNP was elevated. An echocardiogram was performed at the beginning of the year, showing a normal EF.    IMAGING:  A CT Head was performed on Friday following a fall, which was normal with no acute findings. Ms. Espinoza previously had a mammogram with normal results.        Review of Systems   Constitutional:  Negative for activity change, appetite change, chills, diaphoresis, fatigue, fever and unexpected weight change.   HENT:  Negative for congestion, ear pain, sinus pressure, sore throat, trouble swallowing and voice change.    Eyes:  Negative for pain, discharge and visual disturbance.   Respiratory:  Negative for cough, chest tightness, shortness of breath and wheezing.    Cardiovascular:  Negative for chest pain and palpitations.   Gastrointestinal:  Negative for abdominal pain, constipation, diarrhea, nausea and vomiting.   Genitourinary:  Negative for difficulty urinating, flank pain,  frequency and urgency.   Musculoskeletal:  Positive for arthralgias and gait problem. Negative for back pain, joint swelling and neck pain.        Left groin pain and right breast pain   Skin:  Negative for color change and rash.   Neurological:  Positive for headaches. Negative for dizziness, seizures, syncope, weakness and numbness.   Hematological:  Negative for adenopathy.   Psychiatric/Behavioral:  Positive for confusion. Negative for dysphoric mood and sleep disturbance. The patient is not nervous/anxious.        Family History   Problem Relation Name Age of Onset    Cancer Father      Cancer Sister      Glaucoma Neg Hx      Macular degeneration Neg Hx      Retinal detachment Neg Hx      Breast cancer Neg Hx      Ovarian cancer Neg Hx      Eczema Neg Hx      Lupus Neg Hx      Melanoma Neg Hx      Psoriasis Neg Hx        Social History     Socioeconomic History    Marital status:    Tobacco Use    Smoking status: Never    Smokeless tobacco: Never   Substance and Sexual Activity    Alcohol use: Not Currently     Comment: rarely    Drug use: No    Sexual activity: Yes     Social Drivers of Health     Financial Resource Strain: Patient Declined (12/11/2023)    Overall Financial Resource Strain (CARDIA)     Difficulty of Paying Living Expenses: Patient declined   Food Insecurity: Patient Declined (12/11/2023)    Hunger Vital Sign     Worried About Running Out of Food in the Last Year: Patient declined     Ran Out of Food in the Last Year: Patient declined   Transportation Needs: Patient Declined (12/11/2023)    PRAPARE - Transportation     Lack of Transportation (Medical): Patient declined     Lack of Transportation (Non-Medical): Patient declined   Physical Activity: Unknown (12/11/2023)    Exercise Vital Sign     Days of Exercise per Week: Patient declined     Minutes of Exercise per Session: 20 min   Stress: Patient Declined (12/11/2023)    [employer] - Occupational Stress  Questionnaire     Feeling of Stress : Patient declined   Housing Stability: Unknown (12/11/2023)    Housing Stability Vital Sign     Unable to Pay for Housing in the Last Year: Patient refused     Unstable Housing in the Last Year: Patient refused     Current Outpatient Medications   Medication Sig Dispense Refill    cetirizine (ZYRTEC) 10 MG tablet Take 10 mg by mouth once daily.      divalproex (DEPAKOTE) 125 MG EC tablet Take 250 mg by mouth once daily.      divalproex ER (DEPAKOTE ER) 500 MG Tb24 24 hr tablet Take 500 mg by mouth once daily. At night      famotidine (PEPCID) 40 MG tablet Take 40 mg by mouth.      levothyroxine (SYNTHROID) 100 MCG tablet       multivit-min-ferrous fumarate 9 mg iron/15 mL Liqd as directed      ondansetron (ZOFRAN-ODT) 4 MG TbDL Take 1 tablet (4 mg total) by mouth every 8 (eight) hours as needed (nausea/vomiting). 12 tablet 0    pantoprazole (PROTONIX) 40 MG tablet Take 1 tablet (40 mg total) by mouth once daily. 30 tablet 11    QUEtiapine (SEROQUEL) 50 MG tablet Take  mg by mouth every evening.      rizatriptan (MAXALT) 10 MG tablet Take by mouth.      valACYclovir (VALTREX) 1000 MG tablet       ibuprofen (ADVIL,MOTRIN) 800 MG tablet Take 1 tablet (800 mg total) by mouth every 8 (eight) hours as needed for Pain. 30 tablet 0    lovastatin (MEVACOR) 20 MG tablet TAKE 1 TABLET BY MOUTH EVERY EVENING 90 tablet 3    metoprolol succinate (TOPROL-XL) 100 MG 24 hr tablet Take 1 tablet (100 mg total) by mouth once daily. 90 tablet 1     No current facility-administered medications for this visit.     Review of patient's allergies indicates:  No Known Allergies   Past Medical History:   Diagnosis Date    Depression     GERD (gastroesophageal reflux disease)     Hyperlipidemia     Hypertension     Hypothyroid      Past Surgical History:   Procedure Laterality Date    APPENDECTOMY      COLONOSCOPY  2017    Briana, repeat in 10    TONSILLECTOMY      TUBAL LIGATION            OBJECTIVE:  "     Vitals:    11/18/24 1319   BP: 122/82   BP Location: Left arm   Patient Position: Sitting   Pulse: 82   Temp: 98.6 °F (37 °C)   TempSrc: Oral   SpO2: 98%   Weight: 108.5 kg (239 lb 3.2 oz)   Height: 5' 8" (1.727 m)     Physical Exam  Vitals and nursing note reviewed.   Constitutional:       General: She is awake. She is not in acute distress.     Appearance: She is well-developed and well-groomed. She is obese. She is not ill-appearing, toxic-appearing or diaphoretic.   HENT:      Head: Normocephalic and atraumatic.      Nose: Nose normal.   Eyes:      General: Lids are normal. Gaze aligned appropriately.      Conjunctiva/sclera: Conjunctivae normal.      Right eye: Right conjunctiva is not injected.      Left eye: Left conjunctiva is not injected.      Pupils: Pupils are equal, round, and reactive to light.   Cardiovascular:      Rate and Rhythm: Normal rate and regular rhythm.      Pulses: Normal pulses.      Heart sounds: Normal heart sounds, S1 normal and S2 normal. No murmur heard.     No friction rub. No gallop.   Pulmonary:      Effort: Pulmonary effort is normal. No respiratory distress.      Breath sounds: Normal breath sounds. No stridor. No decreased breath sounds, wheezing, rhonchi or rales.   Chest:      Chest wall: No mass, deformity or tenderness.   Breasts:     Right: No inverted nipple, mass, nipple discharge, skin change or tenderness.      Comments: Breast nontender to palpation. No skin changes.She does have some dense breast tissue.   Musculoskeletal:      Cervical back: Neck supple.      Right hip: Normal range of motion.      Left hip: Normal range of motion.      Right lower leg: No edema.      Left lower leg: No edema.      Comments: Greater trochanter negative for tenderness with palpation on left side. Pain to left groin with adduction of left hip.   Lymphadenopathy:      Cervical: No cervical adenopathy.   Skin:     General: Skin is warm and dry.      Capillary Refill: Capillary " refill takes less than 2 seconds.      Findings: No erythema or rash.   Neurological:      Mental Status: She is alert and oriented to person, place, and time. Mental status is at baseline.   Psychiatric:         Attention and Perception: Attention normal.         Mood and Affect: Mood normal.         Speech: Speech normal.         Behavior: Behavior normal. Behavior is cooperative.         Thought Content: Thought content normal.         Cognition and Memory: Cognition is impaired. Memory is impaired.         Judgment: Judgment normal.       Admission on 11/15/2024, Discharged on 11/15/2024   Component Date Value Ref Range Status    QRS Duration 11/15/2024 94  ms Final    [facility] QTC Calculation 11/15/2024 441  ms Final    WBC 11/15/2024 7.32  3.90 - 12.70 K/uL Final    RBC 11/15/2024 4.30  4.00 - 5.40 M/uL Final    Hemoglobin 11/15/2024 13.0  12.0 - 16.0 g/dL Final    Hematocrit 11/15/2024 39.3  37.0 - 48.5 % Final    MCV 11/15/2024 91  82 - 98 fL Final    MCH 11/15/2024 30.2  27.0 - 31.0 pg Final    MCHC 11/15/2024 33.1  32.0 - 36.0 g/dL Final    RDW 11/15/2024 13.0  11.5 - 14.5 % Final    Platelets 11/15/2024 260  150 - 450 K/uL Final    MPV 11/15/2024 10.2  9.2 - 12.9 fL Final    Immature Granulocytes 11/15/2024 0.3  0.0 - 0.5 % Final    Gran # (ANC) 11/15/2024 3.9  1.8 - 7.7 K/uL Final    Immature Grans (Abs) 11/15/2024 0.02  0.00 - 0.04 K/uL Final    Comment: Mild elevation in immature granulocytes is non specific and   can be seen in a variety of conditions including stress response,   acute inflammation, trauma and pregnancy. Correlation with other   laboratory and clinical findings is essential.      Lymph # 11/15/2024 2.4  1.0 - 4.8 K/uL Final    Mono # 11/15/2024 0.7  0.3 - 1.0 K/uL Final    Eos # 11/15/2024 0.3  0.0 - 0.5 K/uL Final    Baso # 11/15/2024 0.05  0.00 - 0.20 K/uL Final    nRBC 11/15/2024 0  0 /100 WBC Final    Gran % 11/15/2024 52.7  38.0 - 73.0 % Final    Lymph % 11/15/2024 32.1  18.0 - 48.0 %  Final    Mono % 11/15/2024 9.8  4.0 - 15.0 % Final    Eosinophil % 11/15/2024 4.4  0.0 - 8.0 % Final    Basophil % 11/15/2024 0.7  0.0 - 1.9 % Final    Differential Method 11/15/2024 Automated   Final    Sodium 11/15/2024 140  136 - 145 mmol/L Final    Potassium 11/15/2024 3.9  3.5 - 5.1 mmol/L Final    Chloride 11/15/2024 105  95 - 110 mmol/L Final    CO2 11/15/2024 30 (H)  23 - 29 mmol/L Final    Glucose 11/15/2024 103  70 - 110 mg/dL Final    BUN 11/15/2024 7 (L)  8 - 23 mg/dL Final    Creatinine 11/15/2024 0.8  0.5 - 1.4 mg/dL Final    Calcium 11/15/2024 9.1  8.7 - 10.5 mg/dL Final    Total Protein 11/15/2024 6.4  6.0 - 8.4 g/dL Final    Albumin 11/15/2024 4.0  3.5 - 5.2 g/dL Final    Total Bilirubin 11/15/2024 0.4  0.1 - 1.0 mg/dL Final    Comment: For infants and newborns, interpretation of results should be based  on gestational age, weight and in agreement with clinical  observations.    Premature Infant recommended reference ranges:  Up to 24 hours.............<8.0 mg/dL  Up to 48 hours............<12.0 mg/dL  3-5 days..................<15.0 mg/dL  6-29 days.................<15.0 mg/dL      Alkaline Phosphatase 11/15/2024 61  55 - 135 U/L Final    AST 11/15/2024 18  10 - 40 U/L Final    ALT 11/15/2024 19  10 - 44 U/L Final    eGFR 11/15/2024 >60.0  >60 mL/min/1.73 m^2 Final    Anion Gap 11/15/2024 5 (L)  8 - 16 mmol/L Final    BNP 11/15/2024 130 (H)  0 - 99 pg/mL Final    Values of less than 100 pg/ml are consistent with non-CHF populations.    Magnesium 11/15/2024 2.1  1.6 - 2.6 mg/dL Final    Troponin I High Sensitivity 11/15/2024 3.6  0.0 - 14.9 pg/mL Final    Comment: Troponin results differ between methods. Do not use   results between Troponin methods interchangeably.    Alkaline Phospatase levels above 400 U/L may   cause false positive results.    Access hsTnI should not be used for patients taking   Asfotase leora (Strensiq).      Prothrombin Time 11/15/2024 10.4  9.0 - 12.5 sec Final    INR  11/15/2024 0.9  0.8 - 1.2 Final    Comment: Coumadin Therapy:  2.0 - 3.0 for INR for all indicators except mechanical heart valves  and antiphospholipid syndromes which should use 2.5 - 3.5.            Assessment:       1. Encounter to establish care    2. HTN (hypertension), benign    3. Moderate vascular dementia with other behavioral disturbance    4. Hypothyroidism due to Hashimoto thyroiditis    5. Vitamin D deficiency    6. Asymptomatic postmenopausal state    7. Breast pain, right    8. Left hip pain    9. Left groin pain    10. Fall, initial encounter        Plan:       Assessment & Plan    Assessed patient post ER visit for fall with head injury; CT head normal, no acute findings  Evaluated left groin pain following fall; suspect possible muscle strain  Considered hip/pelvis x-ray to rule out fracture given fall history  Assessed right breast pain; no palpable masses on exam, dense breast tissue noted  Ordered diagnostic mammogram for further evaluation of breast pain  Reviewed elevated BNP; possible early heart failure with preserved EF, though echo showed normal EF  Considered vitamin D supplementation; ordered level check    HEADACHES:  - Keep follow-up with Neurology.  - Continue Maxalt prn.    VASCULAR DEMENTIA:  - Managed by Neurology.     HIP PAIN:  - Discussed potential causes of groin pain post-fall, including muscle strain.  - Ms. Spizale to apply ice or heat to groin area for pain relief.  - Started ibuprofen 800 mg for groin pain.  - Ordered X-ray of hip and pelvis.    BREAST CONCERNS:  - Explained dense breast tissue and rationale for diagnostic mammogram.  - Ordered diagnostic mammogram.    CARDIOVASCULAR ISSUES:  - Reviewed significance of elevated BNP and its potential relation to heart function.  - Continued metoprolol, refilled and sent to [facility] on [address].  - Follow-up with Cardiology.     THYROID AND METABOLIC HEALTH:  - Ordered labs at [facility]: Thyroid panel, Cholesterol  panel, Vitamin D level.  - Continue levothyroxine 100 mcg.    BONE HEALTH:  - Ordered bone density scan.  - Continue Calcium and Vit D supplements.     FOLLOW-UP:  - Follow up in 6 months.  - Keep all scheduled appointments with specialists.        Encounter to establish care    HTN (hypertension), benign  -     metoprolol succinate (TOPROL-XL) 100 MG 24 hr tablet; Take 1 tablet (100 mg total) by mouth once daily.  Dispense: 90 tablet; Refill: 1  -     Lipid Panel; Future; Expected date: 11/18/2024  -     TSH; Future; Expected date: 11/18/2024  -     T4, FREE; Future; Expected date: 11/18/2024    Moderate vascular dementia with other behavioral disturbance    Hypothyroidism due to Hashimoto thyroiditis  -     Lipid Panel; Future; Expected date: 11/18/2024  -     TSH; Future; Expected date: 11/18/2024  -     T4, FREE; Future; Expected date: 11/18/2024    Vitamin D deficiency  -     Vitamin D; Future; Expected date: 11/18/2024    Asymptomatic postmenopausal state  -     DXA Bone Density Axial Skeleton 1 or more sites; Future; Expected date: 11/18/2024    Breast pain, right  -     Mammo Digital Diagnostic Bilat with Arnold; Future; Expected date: 11/18/2024    Left hip pain  -     X-Ray Hip 2 or 3 views Left with Pelvis when performed; Future; Expected date: 11/18/2024  -     ibuprofen (ADVIL,MOTRIN) 800 MG tablet; Take 1 tablet (800 mg total) by mouth every 8 (eight) hours as needed for Pain.  Dispense: 30 tablet; Refill: 0    Left groin pain  -     X-Ray Hip 2 or 3 views Left with Pelvis when performed; Future; Expected date: 11/18/2024  -     ibuprofen (ADVIL,MOTRIN) 800 MG tablet; Take 1 tablet (800 mg total) by mouth every 8 (eight) hours as needed for Pain.  Dispense: 30 tablet; Refill: 0    Fall, initial encounter  -     X-Ray Hip 2 or 3 views Left with Pelvis when performed; Future; Expected date: 11/18/2024    I spent a total of 40 minutes on the day of the visit.This includes face to face time and non-face to  face time preparing to see the patient (eg, review of tests), obtaining and/or reviewing separately obtained history, documenting clinical information in the electronic or other health record, independently interpreting results and communicating results to the patient/family/caregiver, or care coordinator.    Follow up in about 6 months (around 5/18/2025) for HTN.          11/18/2024 DANY Madrigal, BRYANT    This note was generated with the assistance of ambient listening technology. Verbal consent was obtained by the patient and accompanying visitor(s) for the recording of patient appointment to facilitate this note. I attest to having reviewed and edited the generated note for accuracy, though some syntax or spelling errors may persist. Please contact the author of this note for any clarification.

## 2024-12-04 ENCOUNTER — OFFICE VISIT (OUTPATIENT)
Dept: ORTHOPEDICS | Facility: CLINIC | Age: 74
End: 2024-12-04
Payer: MEDICARE

## 2024-12-04 DIAGNOSIS — M17.12 PRIMARY OSTEOARTHRITIS OF LEFT KNEE: ICD-10-CM

## 2024-12-04 DIAGNOSIS — M17.11 PRIMARY OSTEOARTHRITIS OF RIGHT KNEE: Primary | ICD-10-CM

## 2024-12-04 DIAGNOSIS — M25.569 KNEE PAIN, UNSPECIFIED CHRONICITY, UNSPECIFIED LATERALITY: ICD-10-CM

## 2024-12-04 PROCEDURE — 99999 PR PBB SHADOW E&M-EST. PATIENT-LVL I: CPT | Mod: PBBFAC,,, | Performed by: ORTHOPAEDIC SURGERY

## 2024-12-04 RX ORDER — TRIAMCINOLONE ACETONIDE 40 MG/ML
40 INJECTION, SUSPENSION INTRA-ARTICULAR; INTRAMUSCULAR
Status: DISCONTINUED | OUTPATIENT
Start: 2024-12-04 | End: 2024-12-04 | Stop reason: HOSPADM

## 2024-12-04 RX ADMIN — TRIAMCINOLONE ACETONIDE 40 MG: 40 INJECTION, SUSPENSION INTRA-ARTICULAR; INTRAMUSCULAR at 02:12

## 2024-12-04 NOTE — PROCEDURES
Large Joint Aspiration/Injection: bilateral knee    Date/Time: 12/4/2024 2:15 PM    Performed by: David Chan MD  Authorized by: David Chan MD    Consent Done?:  Yes (Verbal)  Indications:  Pain  Site marked: the procedure site was marked    Timeout: prior to procedure the correct patient, procedure, and site was verified    Prep: patient was prepped and draped in usual sterile fashion      Local anesthesia used?: Yes    Anesthesia:  Local infiltration  Local anesthetic: Ropivicaine.  Anesthetic total (ml):  3      Details:  Needle Size:  22 G  Ultrasonic Guidance for needle placement?: No    Approach:  Anterolateral  Location:  Knee  Laterality:  Bilateral  Site:  Bilateral knee  Medications (Right):  40 mg triamcinolone acetonide 40 mg/mL  Medications (Left):  40 mg triamcinolone acetonide 40 mg/mL  Patient tolerance:  Patient tolerated the procedure well with no immediate complications

## 2024-12-04 NOTE — PROGRESS NOTES
Past Medical History:   Diagnosis Date    Depression     GERD (gastroesophageal reflux disease)     Hyperlipidemia     Hypertension     Hypothyroid        Past Surgical History:   Procedure Laterality Date    APPENDECTOMY      COLONOSCOPY  2017    Briana, repeat in 10    TONSILLECTOMY      TUBAL LIGATION         Current Outpatient Medications   Medication Sig    cetirizine (ZYRTEC) 10 MG tablet Take 10 mg by mouth once daily.    divalproex (DEPAKOTE) 125 MG EC tablet Take 250 mg by mouth once daily.    divalproex ER (DEPAKOTE ER) 500 MG Tb24 24 hr tablet Take 500 mg by mouth once daily. At night    famotidine (PEPCID) 40 MG tablet Take 40 mg by mouth.    ibuprofen (ADVIL,MOTRIN) 800 MG tablet Take 1 tablet (800 mg total) by mouth every 8 (eight) hours as needed for Pain.    levothyroxine (SYNTHROID) 100 MCG tablet     metoprolol succinate (TOPROL-XL) 100 MG 24 hr tablet Take 1 tablet (100 mg total) by mouth once daily.    multivit-min-ferrous fumarate 9 mg iron/15 mL Liqd as directed    ondansetron (ZOFRAN-ODT) 4 MG TbDL Take 1 tablet (4 mg total) by mouth every 8 (eight) hours as needed (nausea/vomiting).    pantoprazole (PROTONIX) 40 MG tablet Take 1 tablet (40 mg total) by mouth once daily.    QUEtiapine (SEROQUEL) 50 MG tablet Take  mg by mouth every evening.    rizatriptan (MAXALT) 10 MG tablet Take by mouth.    valACYclovir (VALTREX) 1000 MG tablet      No current facility-administered medications for this visit.       Review of patient's allergies indicates:   Allergen Reactions    Lidocaine      Hand edema,  redness    Penicillins Rash       Family History   Problem Relation Name Age of Onset    Cancer Father      Cancer Sister      Glaucoma Neg Hx      Macular degeneration Neg Hx      Retinal detachment Neg Hx      Breast cancer Neg Hx      Ovarian cancer Neg Hx      Eczema Neg Hx      Lupus Neg Hx      Melanoma Neg Hx      Psoriasis Neg Hx         Social History     Socioeconomic History    Marital  status:    Tobacco Use    Smoking status: Never    Smokeless tobacco: Never   Substance and Sexual Activity    Alcohol use: Not Currently     Comment: rarely    Drug use: No    Sexual activity: Yes     Social Drivers of Health     Financial Resource Strain: Patient Declined (12/11/2023)    Overall Financial Resource Strain (CARDIA)     Difficulty of Paying Living Expenses: Patient declined   Food Insecurity: Patient Declined (12/11/2023)    Hunger Vital Sign     Worried About Running Out of Food in the Last Year: Patient declined     Ran Out of Food in the Last Year: Patient declined   Transportation Needs: Patient Declined (12/11/2023)    PRAPARE - Transportation     Lack of Transportation (Medical): Patient declined     Lack of Transportation (Non-Medical): Patient declined   Physical Activity: Unknown (12/11/2023)    Exercise Vital Sign     Days of Exercise per Week: Patient declined     Minutes of Exercise per Session: 20 min   Stress: Patient Declined (12/11/2023)    [employer] - Occupational Stress Questionnaire     Feeling of Stress : Patient declined   Housing Stability: Unknown (12/11/2023)    Housing Stability Vital Sign     Unable to Pay for Housing in the Last Year: Patient refused     Unstable Housing in the Last Year: Patient refused       Chief Complaint:   No chief complaint on file.      History of present illness: This is a 74-year-old female who has had a history of knee arthritis since 2014.  Patient previously had good results with both the Euflexxa and cortisone.  Unfortunately, the Euflexxa is no longer working. Pain is up to an 3/10.      Review of systems:   Musculoskeletal: See history of present illness    Physical examination:    Vital Signs:    There were no vitals filed for this visit.      There is no height or weight on file to calculate BMI.    This a well-developed, well nourished patient in no acute distress.  They are alert and oriented and  cooperative to examination.  Pt. walks without an antalgic gait.      Examination of bilateral knee shows no rashes or erythema. There are no masses ecchymosis or effusion. Patient has full range of motion from 0-130°. Patient is nontender to palpation over lateral joint line and moderately tender to palpation over the medial joint line.  Knee is stable to varus and valgus stress. 5 out of 5 motor strength. Palpable distal pulses. Intact light touch sensation.  Severe Patellofemoral crepitus      X-rays: 4 views of both knees are reviewed which show arthritic changes with severe changes of the patellofemoral joint and more moderate changes of the medial compartment of both knees .  No significant change  X-rays of the right shoulder  reviewed which show some mild degenerative changes well-maintained glenohumeral joint space     Assessment:: Bilateral knee osteoarthritis    Plan:  I reviewed the x-ray with her today.  We discussed treatment options for her knee arthritis.  I agreed to inject both knees with cortisone today.  We will get [facility]co authorized as well She does not want knee replacement.

## 2024-12-16 ENCOUNTER — HOSPITAL ENCOUNTER (OUTPATIENT)
Dept: RADIOLOGY | Facility: HOSPITAL | Age: 74
Discharge: HOME | End: 2024-12-16
Attending: NURSE PRACTITIONER
Payer: MEDICARE

## 2024-12-16 DIAGNOSIS — N64.4 BREAST PAIN, RIGHT: ICD-10-CM

## 2024-12-16 PROCEDURE — 77066 DX MAMMO INCL CAD BI: CPT | Mod: TC,PO

## 2024-12-16 PROCEDURE — 77066 DX MAMMO INCL CAD BI: CPT | Mod: 26,,, | Performed by: RADIOLOGY

## 2024-12-16 PROCEDURE — 77062 BREAST TOMOSYNTHESIS BI: CPT | Mod: 26,,, | Performed by: RADIOLOGY

## 2024-12-16 PROCEDURE — 76642 ULTRASOUND BREAST LIMITED: CPT | Mod: 26,RT,, | Performed by: RADIOLOGY

## 2024-12-16 PROCEDURE — 76642 ULTRASOUND BREAST LIMITED: CPT | Mod: TC,PO,RT

## 2025-01-05 ENCOUNTER — PATIENT MESSAGE (OUTPATIENT)
Dept: FAMILY MEDICINE | Facility: CLINIC | Age: 75
End: 2025-01-05
Payer: MEDICARE

## 2025-01-05 DIAGNOSIS — K21.9 GASTROESOPHAGEAL REFLUX DISEASE: ICD-10-CM

## 2025-01-06 RX ORDER — PANTOPRAZOLE SODIUM 40 MG/1
40 TABLET, DELAYED RELEASE ORAL DAILY
Qty: 30 TABLET | Refills: 11 | Status: SHIPPED | OUTPATIENT
Start: 2025-01-06

## 2025-01-13 ENCOUNTER — OFFICE VISIT (OUTPATIENT)
Dept: ORTHOPEDICS | Facility: CLINIC | Age: 75
End: 2025-01-13
Payer: MEDICARE

## 2025-01-13 VITALS — RESPIRATION RATE: 16 BRPM

## 2025-01-13 DIAGNOSIS — M17.11 PRIMARY OSTEOARTHRITIS OF RIGHT KNEE: Primary | ICD-10-CM

## 2025-01-13 DIAGNOSIS — M17.12 PRIMARY OSTEOARTHRITIS OF LEFT KNEE: ICD-10-CM

## 2025-01-13 PROCEDURE — 20610 DRAIN/INJ JOINT/BURSA W/O US: CPT | Mod: 50,S$GLB,, | Performed by: ORTHOPAEDIC SURGERY

## 2025-01-13 PROCEDURE — 99999 PR PBB SHADOW E&M-EST. PATIENT-LVL II: CPT | Mod: PBBFAC,,, | Performed by: ORTHOPAEDIC SURGERY

## 2025-01-13 PROCEDURE — 99499 UNLISTED E&M SERVICE: CPT | Mod: 25,S$GLB,, | Performed by: ORTHOPAEDIC SURGERY

## 2025-01-13 NOTE — PROCEDURES
Large Joint Aspiration/Injection: bilateral knee    Date/Time: 1/13/2025 11:00 AM    Performed by: David Chan MD  Authorized by: David Chan MD    Consent Done?:  Yes (Verbal)  Indications:  Pain  Site marked: the procedure site was marked    Timeout: prior to procedure the correct patient, procedure, and site was verified      Details:  Needle Size:  20 G  Approach:  Anterolateral  Location:  Knee  Laterality:  Bilateral  Site:  Bilateral knee  Medications (Right):  16 mg hyaluronate 16 mg/2 mL  Medications (Left):  16 mg hyaluronate 16 mg/2 mL  Patient tolerance:  Patient tolerated the procedure well with no immediate complications

## 2025-01-13 NOTE — PROGRESS NOTES
Past Medical History:   Diagnosis Date    Depression     GERD (gastroesophageal reflux disease)     Hyperlipidemia     Hypertension     Hypothyroid        Past Surgical History:   Procedure Laterality Date    APPENDECTOMY      COLONOSCOPY  2017    Briana, repeat in 10    TONSILLECTOMY      TUBAL LIGATION         Current Outpatient Medications   Medication Sig    cetirizine (ZYRTEC) 10 MG tablet Take 10 mg by mouth once daily.    divalproex (DEPAKOTE) 125 MG EC tablet Take 250 mg by mouth once daily.    famotidine (PEPCID) 40 MG tablet Take 40 mg by mouth.    ibuprofen (ADVIL,MOTRIN) 800 MG tablet Take 1 tablet (800 mg total) by mouth every 8 (eight) hours as needed for Pain.    levothyroxine (SYNTHROID) 100 MCG tablet     metoprolol succinate (TOPROL-XL) 100 MG 24 hr tablet Take 1 tablet (100 mg total) by mouth once daily.    multivit-min-ferrous fumarate 9 mg iron/15 mL Liqd as directed    ondansetron (ZOFRAN-ODT) 4 MG TbDL Take 1 tablet (4 mg total) by mouth every 8 (eight) hours as needed (nausea/vomiting).    pantoprazole (PROTONIX) 40 MG tablet Take 1 tablet (40 mg total) by mouth once daily.    QUEtiapine (SEROQUEL) 50 MG tablet Take  mg by mouth every evening.    rizatriptan (MAXALT) 10 MG tablet Take by mouth.    valACYclovir (VALTREX) 1000 MG tablet      No current facility-administered medications for this visit.       Review of patient's allergies indicates:   Allergen Reactions    Lidocaine      Hand edema,  redness    Penicillins Rash       Family History   Problem Relation Name Age of Onset    Cancer Father      Cancer Sister      Breast cancer Daughter      Glaucoma Neg Hx      Macular degeneration Neg Hx      Retinal detachment Neg Hx      Ovarian cancer Neg Hx      Eczema Neg Hx      Lupus Neg Hx      Melanoma Neg Hx      Psoriasis Neg Hx         Social History     Socioeconomic History    Marital status:    Tobacco Use    Smoking status: Never    Smokeless tobacco: Never   Substance  and Sexual Activity    Alcohol use: Not Currently     Comment: rarely    Drug use: No    Sexual activity: Yes     Social Drivers of Health     Financial Resource Strain: Patient Declined (12/11/2023)    Overall Financial Resource Strain (CARDIA)     Difficulty of Paying Living Expenses: Patient declined   Food Insecurity: Patient Declined (12/11/2023)    Hunger Vital Sign     Worried About Running Out of Food in the Last Year: Patient declined     Ran Out of Food in the Last Year: Patient declined   Transportation Needs: Patient Declined (12/11/2023)    PRAPARE - Transportation     Lack of Transportation (Medical): Patient declined     Lack of Transportation (Non-Medical): Patient declined   Physical Activity: Unknown (12/11/2023)    Exercise Vital Sign     Days of Exercise per Week: Patient declined     Minutes of Exercise per Session: 20 min   Stress: Patient Declined (12/11/2023)    [employer] - Occupational Stress Questionnaire     Feeling of Stress : Patient declined   Housing Stability: Unknown (12/11/2023)    Housing Stability Vital Sign     Unable to Pay for Housing in the Last Year: Patient refused     Unstable Housing in the Last Year: Patient refused       Chief Complaint:   No chief complaint on file.      History of present illness: This is a 74-year-old female who has had a history of knee arthritis since 2014.  Patient previously had good results with both the Euflexxa and cortisone.  Unfortunately, the Euflexxa is no longer working. Pain is up to an 3/10.      Review of systems:   Musculoskeletal: See history of present illness    Physical examination:    Vital Signs:    There were no vitals filed for this visit.      There is no height or weight on file to calculate BMI.    This a well-developed, well nourished patient in no acute distress.  They are alert and oriented and cooperative to examination.  Pt. walks without an antalgic gait.      Examination of bilateral  knee shows no rashes or erythema. There are no masses ecchymosis or effusion. Patient has full range of motion from 0-130°. Patient is nontender to palpation over lateral joint line and moderately tender to palpation over the medial joint line.  Knee is stable to varus and valgus stress. 5 out of 5 motor strength. Palpable distal pulses. Intact light touch sensation.  Severe Patellofemoral crepitus      X-rays: 4 views of both knees are reviewed which show arthritic changes with severe changes of the patellofemoral joint and more moderate changes of the medial compartment of both knees .  No significant change  X-rays of the right shoulder  reviewed which show some mild degenerative changes well-maintained glenohumeral joint space     Assessment:: Bilateral knee osteoarthritis    Plan:  I reviewed the x-ray with her today.  We discussed treatment options for her knee arthritis.  I I injected both knees with Synvisc 1 of 3.  Follow up next week                 Render Risk Assessment In Note?: no Detail Level: Simple Comment: Father (passed away from MM)

## 2025-01-16 ENCOUNTER — PATIENT MESSAGE (OUTPATIENT)
Dept: FAMILY MEDICINE | Facility: CLINIC | Age: 75
End: 2025-01-16
Payer: MEDICARE

## 2025-01-17 DIAGNOSIS — E06.3 HYPOTHYROIDISM DUE TO HASHIMOTO THYROIDITIS: Primary | ICD-10-CM

## 2025-01-17 RX ORDER — LEVOTHYROXINE SODIUM 112 UG/1
112 TABLET ORAL
Qty: 30 TABLET | Refills: 11 | Status: SHIPPED | OUTPATIENT
Start: 2025-01-17 | End: 2026-01-17

## 2025-01-22 ENCOUNTER — TELEPHONE (OUTPATIENT)
Dept: ORTHOPEDICS | Facility: CLINIC | Age: 75
End: 2025-01-22
Payer: MEDICARE

## 2025-01-22 NOTE — TELEPHONE ENCOUNTER
1st attempt to contact pt to r/s orthopedics appt on 01/23 w/Dr. Chan.  No answer; LVM for call back to r/s appt due to inclining/severe weather causing office(s) closures.   Pt portal msg to be sent to pt due to failed call attempt.

## 2025-01-24 ENCOUNTER — PATIENT MESSAGE (OUTPATIENT)
Dept: FAMILY MEDICINE | Facility: CLINIC | Age: 75
End: 2025-01-24
Payer: MEDICARE

## 2025-01-27 ENCOUNTER — OFFICE VISIT (OUTPATIENT)
Dept: ORTHOPEDICS | Facility: CLINIC | Age: 75
End: 2025-01-27
Payer: MEDICARE

## 2025-01-27 DIAGNOSIS — M15.9 OSTEOARTHRITIS OF MULTIPLE JOINTS, UNSPECIFIED OSTEOARTHRITIS TYPE: Primary | ICD-10-CM

## 2025-01-27 DIAGNOSIS — M17.11 PRIMARY OSTEOARTHRITIS OF RIGHT KNEE: Primary | ICD-10-CM

## 2025-01-27 DIAGNOSIS — M17.12 PRIMARY OSTEOARTHRITIS OF LEFT KNEE: ICD-10-CM

## 2025-01-27 DIAGNOSIS — M25.552 LEFT HIP PAIN: ICD-10-CM

## 2025-01-27 PROCEDURE — 20610 DRAIN/INJ JOINT/BURSA W/O US: CPT | Mod: 50,S$GLB,, | Performed by: ORTHOPAEDIC SURGERY

## 2025-01-27 PROCEDURE — 99499 UNLISTED E&M SERVICE: CPT | Mod: 25,S$GLB,, | Performed by: ORTHOPAEDIC SURGERY

## 2025-01-27 PROCEDURE — 99999 PR PBB SHADOW E&M-EST. PATIENT-LVL II: CPT | Mod: PBBFAC,,, | Performed by: ORTHOPAEDIC SURGERY

## 2025-01-27 NOTE — PROCEDURES
Large Joint Aspiration/Injection: bilateral knee    Date/Time: 1/27/2025 11:00 AM    Performed by: David Chna MD  Authorized by: David Chan MD    Consent Done?:  Yes (Verbal)  Indications:  Pain  Site marked: the procedure site was marked    Timeout: prior to procedure the correct patient, procedure, and site was verified      Details:  Needle Size:  20 G  Approach:  Anterolateral  Location:  Knee  Laterality:  Bilateral  Site:  Bilateral knee  Medications (Right):  16 mg hyaluronate 16 mg/2 mL  Medications (Left):  16 mg hyaluronate 16 mg/2 mL  Patient tolerance:  Patient tolerated the procedure well with no immediate complications

## 2025-01-27 NOTE — PROGRESS NOTES
Past Medical History:   Diagnosis Date    Depression     GERD (gastroesophageal reflux disease)     Hyperlipidemia     Hypertension     Hypothyroid        Past Surgical History:   Procedure Laterality Date    APPENDECTOMY      COLONOSCOPY  2017    Briana, repeat in 10    TONSILLECTOMY      TUBAL LIGATION         Current Outpatient Medications   Medication Sig    cetirizine (ZYRTEC) 10 MG tablet Take 10 mg by mouth once daily.    divalproex (DEPAKOTE) 125 MG EC tablet Take 250 mg by mouth once daily.    famotidine (PEPCID) 40 MG tablet Take 40 mg by mouth.    ibuprofen (ADVIL,MOTRIN) 800 MG tablet Take 1 tablet (800 mg total) by mouth every 8 (eight) hours as needed for Pain.    levothyroxine (SYNTHROID) 112 MCG tablet Take 1 tablet (112 mcg total) by mouth before breakfast.    metoprolol succinate (TOPROL-XL) 100 MG 24 hr tablet Take 1 tablet (100 mg total) by mouth once daily.    multivit-min-ferrous fumarate 9 mg iron/15 mL Liqd as directed    ondansetron (ZOFRAN-ODT) 4 MG TbDL Take 1 tablet (4 mg total) by mouth every 8 (eight) hours as needed (nausea/vomiting).    pantoprazole (PROTONIX) 40 MG tablet Take 1 tablet (40 mg total) by mouth once daily.    QUEtiapine (SEROQUEL) 50 MG tablet Take  mg by mouth every evening.    rizatriptan (MAXALT) 10 MG tablet Take by mouth.    valACYclovir (VALTREX) 1000 MG tablet      No current facility-administered medications for this visit.       Review of patient's allergies indicates:   Allergen Reactions    Lidocaine      Hand edema,  redness    Penicillins Rash       Family History   Problem Relation Name Age of Onset    Cancer Father      Cancer Sister      Breast cancer Daughter      Glaucoma Neg Hx      Macular degeneration Neg Hx      Retinal detachment Neg Hx      Ovarian cancer Neg Hx      Eczema Neg Hx      Lupus Neg Hx      Melanoma Neg Hx      Psoriasis Neg Hx         Social History     Socioeconomic History    Marital status:    Tobacco Use     Smoking status: Never    Smokeless tobacco: Never   Substance and Sexual Activity    Alcohol use: Not Currently     Comment: rarely    Drug use: No    Sexual activity: Yes     Social Drivers of Health     Financial Resource Strain: Patient Declined (12/11/2023)    Overall Financial Resource Strain (CARDIA)     Difficulty of Paying Living Expenses: Patient declined   Food Insecurity: Patient Declined (12/11/2023)    Hunger Vital Sign     Worried About Running Out of Food in the Last Year: Patient declined     Ran Out of Food in the Last Year: Patient declined   Transportation Needs: Patient Declined (12/11/2023)    PRAPARE - Transportation     Lack of Transportation (Medical): Patient declined     Lack of Transportation (Non-Medical): Patient declined   Physical Activity: Unknown (12/11/2023)    Exercise Vital Sign     Days of Exercise per Week: Patient declined     Minutes of Exercise per Session: 20 min   Stress: Patient Declined (12/11/2023)    [employer] - Occupational Stress Questionnaire     Feeling of Stress : Patient declined   Housing Stability: Unknown (12/11/2023)    Housing Stability Vital Sign     Unable to Pay for Housing in the Last Year: Patient refused     Unstable Housing in the Last Year: Patient refused       Chief Complaint:   No chief complaint on file.      History of present illness: This is a 74-year-old female who has had a history of knee arthritis since 2014.  Patient previously had good results with both the Euflexxa and cortisone.  Unfortunately, the Euflexxa is no longer working. Pain is up to an 3/10.      Review of systems:   Musculoskeletal: See history of present illness    Physical examination:    Vital Signs:    There were no vitals filed for this visit.      There is no height or weight on file to calculate BMI.    This a well-developed, well nourished patient in no acute distress.  They are alert and oriented and cooperative to examination.  Pt.  walks without an antalgic gait.      Examination of bilateral knee shows no rashes or erythema. There are no masses ecchymosis or effusion. Patient has full range of motion from 0-130°. Patient is nontender to palpation over lateral joint line and moderately tender to palpation over the medial joint line.  Knee is stable to varus and valgus stress. 5 out of 5 motor strength. Palpable distal pulses. Intact light touch sensation.  Severe Patellofemoral crepitus      X-rays: 4 views of both knees are reviewed which show arthritic changes with severe changes of the patellofemoral joint and more moderate changes of the medial compartment of both knees .  No significant change  X-rays of the right shoulder  reviewed which show some mild degenerative changes well-maintained glenohumeral joint space     Assessment:: Bilateral knee osteoarthritis    Plan:  I reviewed the x-ray with her today.  We discussed treatment options for her knee arthritis.  I injected both knees with Synvisc 2 of 3.  Follow up next week

## 2025-01-30 ENCOUNTER — TELEPHONE (OUTPATIENT)
Dept: FAMILY MEDICINE | Facility: CLINIC | Age: 75
End: 2025-01-30
Payer: MEDICARE

## 2025-01-30 NOTE — TELEPHONE ENCOUNTER
----- Message from Carmen sent at 1/30/2025  3:16 PM CST -----  [facility] stating that they do not do a lift recliner.   [phone]

## 2025-02-03 ENCOUNTER — OFFICE VISIT (OUTPATIENT)
Dept: ORTHOPEDICS | Facility: CLINIC | Age: 75
End: 2025-02-03
Payer: MEDICARE

## 2025-02-03 DIAGNOSIS — M17.12 PRIMARY OSTEOARTHRITIS OF LEFT KNEE: ICD-10-CM

## 2025-02-03 DIAGNOSIS — M17.11 PRIMARY OSTEOARTHRITIS OF RIGHT KNEE: Primary | ICD-10-CM

## 2025-02-03 PROCEDURE — 99999 PR PBB SHADOW E&M-EST. PATIENT-LVL II: CPT | Mod: PBBFAC,,, | Performed by: ORTHOPAEDIC SURGERY

## 2025-02-03 PROCEDURE — 20610 DRAIN/INJ JOINT/BURSA W/O US: CPT | Mod: 50,S$GLB,, | Performed by: ORTHOPAEDIC SURGERY

## 2025-02-03 PROCEDURE — 99499 UNLISTED E&M SERVICE: CPT | Mod: 25,S$GLB,, | Performed by: ORTHOPAEDIC SURGERY

## 2025-02-03 NOTE — PROGRESS NOTES
Past Medical History:   Diagnosis Date    Depression     GERD (gastroesophageal reflux disease)     Hyperlipidemia     Hypertension     Hypothyroid        Past Surgical History:   Procedure Laterality Date    APPENDECTOMY      COLONOSCOPY  2017    Briana, repeat in 10    TONSILLECTOMY      TUBAL LIGATION         Current Outpatient Medications   Medication Sig    cetirizine (ZYRTEC) 10 MG tablet Take 10 mg by mouth once daily.    divalproex (DEPAKOTE) 125 MG EC tablet Take 250 mg by mouth once daily.    famotidine (PEPCID) 40 MG tablet Take 40 mg by mouth.    ibuprofen (ADVIL,MOTRIN) 800 MG tablet Take 1 tablet (800 mg total) by mouth every 8 (eight) hours as needed for Pain.    levothyroxine (SYNTHROID) 112 MCG tablet Take 1 tablet (112 mcg total) by mouth before breakfast.    metoprolol succinate (TOPROL-XL) 100 MG 24 hr tablet Take 1 tablet (100 mg total) by mouth once daily.    multivit-min-ferrous fumarate 9 mg iron/15 mL Liqd as directed    ondansetron (ZOFRAN-ODT) 4 MG TbDL Take 1 tablet (4 mg total) by mouth every 8 (eight) hours as needed (nausea/vomiting).    pantoprazole (PROTONIX) 40 MG tablet Take 1 tablet (40 mg total) by mouth once daily.    QUEtiapine (SEROQUEL) 50 MG tablet Take  mg by mouth every evening.    rizatriptan (MAXALT) 10 MG tablet Take by mouth.    valACYclovir (VALTREX) 1000 MG tablet      No current facility-administered medications for this visit.       Review of patient's allergies indicates:   Allergen Reactions    Lidocaine      Hand edema,  redness    Penicillins Rash       Family History   Problem Relation Name Age of Onset    Cancer Father      Cancer Sister      Breast cancer Daughter      Glaucoma Neg Hx      Macular degeneration Neg Hx      Retinal detachment Neg Hx      Ovarian cancer Neg Hx      Eczema Neg Hx      Lupus Neg Hx      Melanoma Neg Hx      Psoriasis Neg Hx         Social History     Socioeconomic History    Marital status:    Tobacco Use     Smoking status: Never    Smokeless tobacco: Never   Substance and Sexual Activity    Alcohol use: Not Currently     Comment: rarely    Drug use: No    Sexual activity: Yes     Social Drivers of Health     Financial Resource Strain: Patient Declined (12/11/2023)    Overall Financial Resource Strain (CARDIA)     Difficulty of Paying Living Expenses: Patient declined   Food Insecurity: Patient Declined (12/11/2023)    Hunger Vital Sign     Worried About Running Out of Food in the Last Year: Patient declined     Ran Out of Food in the Last Year: Patient declined   Transportation Needs: Patient Declined (12/11/2023)    PRAPARE - Transportation     Lack of Transportation (Medical): Patient declined     Lack of Transportation (Non-Medical): Patient declined   Physical Activity: Unknown (12/11/2023)    Exercise Vital Sign     Days of Exercise per Week: Patient declined     Minutes of Exercise per Session: 20 min   Stress: Patient Declined (12/11/2023)    [employer] - Occupational Stress Questionnaire     Feeling of Stress : Patient declined   Housing Stability: Unknown (12/11/2023)    Housing Stability Vital Sign     Unable to Pay for Housing in the Last Year: Patient refused     Unstable Housing in the Last Year: Patient refused       Chief Complaint:   No chief complaint on file.      History of present illness: This is a 74-year-old female who has had a history of knee arthritis since 2014.  Patient previously had good results with both the Euflexxa and cortisone.  Unfortunately, the Euflexxa is no longer working. Pain is up to an 3/10.      Review of systems:   Musculoskeletal: See history of present illness    Physical examination:    Vital Signs:    There were no vitals filed for this visit.      There is no height or weight on file to calculate BMI.    This a well-developed, well nourished patient in no acute distress.  They are alert and oriented and cooperative to examination.  Pt.  walks without an antalgic gait.      Examination of bilateral knee shows no rashes or erythema. There are no masses ecchymosis or effusion. Patient has full range of motion from 0-130°. Patient is nontender to palpation over lateral joint line and moderately tender to palpation over the medial joint line.  Knee is stable to varus and valgus stress. 5 out of 5 motor strength. Palpable distal pulses. Intact light touch sensation.  Severe Patellofemoral crepitus      X-rays: 4 views of both knees are reviewed which show arthritic changes with severe changes of the patellofemoral joint and more moderate changes of the medial compartment of both knees .  No significant change  X-rays of the right shoulder  reviewed which show some mild degenerative changes well-maintained glenohumeral joint space     Assessment:: Bilateral knee osteoarthritis    Plan:  I reviewed the x-ray with her today.  We discussed treatment options for her knee arthritis.  I injected both knees with Synvisc 3 of 3.  Follow up in 6 months

## 2025-02-03 NOTE — PROCEDURES
Large Joint Aspiration/Injection: bilateral knee    Date/Time: 2/3/2025 1:00 PM    Performed by: David Chan MD  Authorized by: David Chan MD    Consent Done?:  Yes (Verbal)  Indications:  Pain  Site marked: the procedure site was marked    Timeout: prior to procedure the correct patient, procedure, and site was verified      Details:  Needle Size:  20 G  Approach:  Anterolateral  Location:  Knee  Laterality:  Bilateral  Site:  Bilateral knee  Medications (Right):  16 mg hyaluronate 16 mg/2 mL  Medications (Left):  16 mg hyaluronate 16 mg/2 mL  Patient tolerance:  Patient tolerated the procedure well with no immediate complications

## 2025-02-11 ENCOUNTER — PATIENT MESSAGE (OUTPATIENT)
Dept: FAMILY MEDICINE | Facility: CLINIC | Age: 75
End: 2025-02-11
Payer: MEDICARE

## 2025-02-15 ENCOUNTER — OFFICE VISIT (OUTPATIENT)
Dept: URGENT CARE | Facility: CLINIC | Age: 75
End: 2025-02-15
Payer: MEDICARE

## 2025-02-15 VITALS
TEMPERATURE: 98 F | SYSTOLIC BLOOD PRESSURE: 122 MMHG | DIASTOLIC BLOOD PRESSURE: 91 MMHG | OXYGEN SATURATION: 95 % | BODY MASS INDEX: 36.22 KG/M2 | RESPIRATION RATE: 18 BRPM | HEIGHT: 68 IN | WEIGHT: 239 LBS | HEART RATE: 83 BPM

## 2025-02-15 DIAGNOSIS — R30.0 DYSURIA: Primary | ICD-10-CM

## 2025-02-15 DIAGNOSIS — R31.9 URINARY TRACT INFECTION WITH HEMATURIA, SITE UNSPECIFIED: ICD-10-CM

## 2025-02-15 DIAGNOSIS — N39.0 URINARY TRACT INFECTION WITH HEMATURIA, SITE UNSPECIFIED: ICD-10-CM

## 2025-02-15 LAB
BILIRUB UR QL STRIP: NEGATIVE
GLUCOSE UR QL STRIP: NEGATIVE
KETONES UR QL STRIP: NEGATIVE
LEUKOCYTE ESTERASE UR QL STRIP: POSITIVE
PH, POC UA: 6
POC BLOOD, URINE: POSITIVE
POC NITRATES, URINE: NEGATIVE
PROT UR QL STRIP: NEGATIVE
SP GR UR STRIP: 1.01 (ref 1–1.03)
UROBILINOGEN UR STRIP-ACNC: NORMAL (ref 0.1–1.1)

## 2025-02-15 RX ORDER — NAPROXEN SODIUM 220 MG/1
TABLET, FILM COATED ORAL
COMMUNITY

## 2025-02-15 RX ORDER — PANTOPRAZOLE SODIUM 40 MG/1
TABLET, DELAYED RELEASE ORAL
COMMUNITY

## 2025-02-15 RX ORDER — TIZANIDINE 4 MG/1
4 TABLET ORAL NIGHTLY PRN
COMMUNITY
Start: 2025-02-05

## 2025-02-15 RX ORDER — VENLAFAXINE HYDROCHLORIDE 37.5 MG/1
CAPSULE, EXTENDED RELEASE ORAL
COMMUNITY

## 2025-02-15 RX ORDER — NITROFURANTOIN 25; 75 MG/1; MG/1
100 CAPSULE ORAL 2 TIMES DAILY
Qty: 14 CAPSULE | Refills: 0 | Status: SHIPPED | OUTPATIENT
Start: 2025-02-15 | End: 2025-02-22

## 2025-02-15 RX ORDER — METOPROLOL SUCCINATE 25 MG/1
100 TABLET, EXTENDED RELEASE ORAL
COMMUNITY

## 2025-02-15 RX ORDER — CETIRIZINE HYDROCHLORIDE 10 MG/1
TABLET ORAL
COMMUNITY

## 2025-02-15 RX ORDER — PENICILLIN V POTASSIUM 500 MG/1
500 TABLET, FILM COATED ORAL 4 TIMES DAILY
COMMUNITY
Start: 2024-12-12

## 2025-02-15 RX ORDER — TRIAMTERENE/HYDROCHLOROTHIAZID 37.5-25 MG
TABLET ORAL
COMMUNITY

## 2025-02-15 RX ORDER — KETOROLAC TROMETHAMINE 10 MG/1
10 TABLET, FILM COATED ORAL EVERY 12 HOURS PRN
COMMUNITY
Start: 2024-11-26

## 2025-02-15 RX ORDER — LOVASTATIN 20 MG/1
TABLET ORAL
COMMUNITY

## 2025-02-15 RX ORDER — LEVOTHYROXINE SODIUM 88 UG/1
TABLET ORAL
COMMUNITY

## 2025-02-15 NOTE — PROGRESS NOTES
"Subjective:      Patient ID: Skyler Espinoza is a 74 y.o. female.    Vitals:  height is 5' 8" (1.727 m) and weight is 108.4 kg (239 lb). Her oral temperature is 98.1 °F (36.7 °C). Her blood pressure is 122/91 (abnormal) and her pulse is 83. Her respiration is 18 and oxygen saturation is 95%.     Chief Complaint: Dysuria    Dysuria   This is a new problem. Episode onset: x 1 week. The problem occurs every urination. The problem has been unchanged. The quality of the pain is described as burning. The pain is at a severity of 2/10. The pain is mild. There has been no fever. Associated symptoms include frequency and urgency. She has tried nothing for the symptoms. The treatment provided mild relief.       Genitourinary:  Positive for dysuria, frequency, urgency and bladder incontinence (significantly more than normal).      Objective:     Physical Exam   Eyes: Conjunctivae are normal.   Cardiovascular: Normal rate.   Pulmonary/Chest: Effort normal.   Neurological: She is alert.   Psychiatric: Her behavior is normal. Mood normal.   Nursing note and vitals reviewed.      Assessment:     1. Dysuria    2. Urinary tract infection with hematuria, site unspecified        Plan:       Dysuria  -     POCT Urinalysis, Dipstick, Automated, W/O Scope    Urinary tract infection with hematuria, site unspecified  -     Urine Culture High Risk    Other orders  -     nitrofurantoin, macrocrystal-monohydrate, (MACROBID) 100 MG capsule; Take 1 capsule (100 mg total) by mouth 2 (two) times daily. for 7 days  Dispense: 14 capsule; Refill: 0    Pt presents with complaint of dysuria, urgency, frequency and noticably more incontinent than normal. There is some leukocytosis and hematuria. No evidence of pyelonephritis. Will start Macrobid and Urine Cx sent to lab. Return precautions discussed.                 "

## 2025-02-20 ENCOUNTER — RESULTS FOLLOW-UP (OUTPATIENT)
Dept: URGENT CARE | Facility: CLINIC | Age: 75
End: 2025-02-20
Payer: MEDICARE

## 2025-02-20 LAB
BACTERIA UR CULT: ABNORMAL
BACTERIA UR CULT: ABNORMAL
OTHER ANTIBIOTIC SUSC ISLT: ABNORMAL

## 2025-03-13 ENCOUNTER — OFFICE VISIT (OUTPATIENT)
Dept: ORTHOPEDICS | Facility: CLINIC | Age: 75
End: 2025-03-13
Payer: MEDICARE

## 2025-03-13 DIAGNOSIS — M17.12 PRIMARY OSTEOARTHRITIS OF LEFT KNEE: ICD-10-CM

## 2025-03-13 DIAGNOSIS — M17.11 PRIMARY OSTEOARTHRITIS OF RIGHT KNEE: Primary | ICD-10-CM

## 2025-03-13 PROCEDURE — 99999 PR PBB SHADOW E&M-EST. PATIENT-LVL I: CPT | Mod: PBBFAC,,, | Performed by: ORTHOPAEDIC SURGERY

## 2025-03-13 RX ORDER — TRIAMCINOLONE ACETONIDE 40 MG/ML
40 INJECTION, SUSPENSION INTRA-ARTICULAR; INTRAMUSCULAR
Status: DISCONTINUED | OUTPATIENT
Start: 2025-03-13 | End: 2025-03-13 | Stop reason: HOSPADM

## 2025-03-13 RX ADMIN — TRIAMCINOLONE ACETONIDE 40 MG: 40 INJECTION, SUSPENSION INTRA-ARTICULAR; INTRAMUSCULAR at 04:03

## 2025-03-13 NOTE — PROCEDURES
Large Joint Aspiration/Injection: bilateral knee    Date/Time: 3/13/2025 4:15 PM    Performed by: aDvid Chan MD  Authorized by: David Chan MD    Consent Done?:  Yes (Verbal)  Indications:  Pain  Site marked: the procedure site was marked    Timeout: prior to procedure the correct patient, procedure, and site was verified    Prep: patient was prepped and draped in usual sterile fashion      Local anesthesia used?: Yes    Anesthesia:  Local infiltration  Local anesthetic: Ropivicaine.  Anesthetic total (ml):  3      Details:  Needle Size:  22 G  Ultrasonic Guidance for needle placement?: No    Approach:  Anterolateral  Location:  Knee  Laterality:  Bilateral  Site:  Bilateral knee  Medications (Right):  40 mg triamcinolone acetonide 40 mg/mL  Medications (Left):  40 mg triamcinolone acetonide 40 mg/mL  Patient tolerance:  Patient tolerated the procedure well with no immediate complications

## 2025-03-13 NOTE — PROGRESS NOTES
Past Medical History:   Diagnosis Date    Depression     GERD (gastroesophageal reflux disease)     Hyperlipidemia     Hypertension     Hypothyroid        Past Surgical History:   Procedure Laterality Date    APPENDECTOMY      COLONOSCOPY  2017    Briana, repeat in 10    TONSILLECTOMY      TUBAL LIGATION         Current Outpatient Medications   Medication Sig    aspirin 81 MG Chew 1 tablet Orally Once a day (Patient not taking: Reported on 3/13/2025)    cetirizine (ZYRTEC) 10 MG tablet 1 tablet Orally Once a day    famotidine (PEPCID) 40 MG tablet Take 40 mg by mouth.    ibuprofen (ADVIL,MOTRIN) 800 MG tablet Take 1 tablet (800 mg total) by mouth every 8 (eight) hours as needed for Pain.    ketorolac (TORADOL) 10 mg tablet Take 10 mg by mouth every 12 (twelve) hours as needed.    levothyroxine (SYNTHROID) 112 MCG tablet Take 1 tablet (112 mcg total) by mouth before breakfast.    levothyroxine (UNITHROID) 88 MCG tablet 1 tablet in the morning on an empty stomach Orally Once a day    lovastatin (MEVACOR) 20 MG tablet 1 tablet with the evening meal Orally Once a day    metoprolol succinate (TOPROL-XL) 25 MG 24 hr tablet 100 mg.    multivit-min-ferrous fumarate 9 mg iron/15 mL Liqd as directed    ondansetron (ZOFRAN-ODT) 4 MG TbDL Take 1 tablet (4 mg total) by mouth every 8 (eight) hours as needed (nausea/vomiting).    pantoprazole (PROTONIX) 40 MG tablet 1 tablet Orally Once a day    penicillin v potassium (VEETID) 500 MG tablet Take 500 mg by mouth 4 (four) times daily. (Patient not taking: Reported on 3/13/2025)    rizatriptan (MAXALT) 10 MG tablet Take by mouth.    tiZANidine (ZANAFLEX) 4 MG tablet Take 4 mg by mouth nightly as needed.    triamterene-hydrochlorothiazide 37.5-25 mg (MAXZIDE-25) 37.5-25 mg per tablet 1 tablet in the morning Orally Once a day    valACYclovir (VALTREX) 1000 MG tablet     venlafaxine (EFFEXOR-XR) 37.5 MG 24 hr capsule 1 capsule with food Orally Once a day (Patient not taking: Reported  on 3/13/2025)     No current facility-administered medications for this visit.       Review of patient's allergies indicates:   Allergen Reactions    Lidocaine      Hand edema,  redness    Penicillins Rash       Family History   Problem Relation Name Age of Onset    Cancer Father      Cancer Sister      Breast cancer Daughter      Glaucoma Neg Hx      Macular degeneration Neg Hx      Retinal detachment Neg Hx      Ovarian cancer Neg Hx      Eczema Neg Hx      Lupus Neg Hx      Melanoma Neg Hx      Psoriasis Neg Hx         Social History     Socioeconomic History    Marital status:    Tobacco Use    Smoking status: Never    Smokeless tobacco: Never   Substance and Sexual Activity    Alcohol use: Not Currently     Comment: rarely    Drug use: No    Sexual activity: Yes     Social Drivers of Health     Financial Resource Strain: Patient Declined (12/11/2023)    Overall Financial Resource Strain (CARDIA)     Difficulty of Paying Living Expenses: Patient declined   Food Insecurity: Patient Declined (12/11/2023)    Hunger Vital Sign     Worried About Running Out of Food in the Last Year: Patient declined     Ran Out of Food in the Last Year: Patient declined   Transportation Needs: Patient Declined (12/11/2023)    PRAPARE - Transportation     Lack of Transportation (Medical): Patient declined     Lack of Transportation (Non-Medical): Patient declined   Physical Activity: Unknown (12/11/2023)    Exercise Vital Sign     Days of Exercise per Week: Patient declined     Minutes of Exercise per Session: 20 min   Stress: Patient Declined (12/11/2023)    [employer] - Occupational Stress Questionnaire     Feeling of Stress : Patient declined   Housing Stability: Unknown (12/11/2023)    Housing Stability Vital Sign     Unable to Pay for Housing in the Last Year: Patient refused     Unstable Housing in the Last Year: Patient refused       Chief Complaint:   No chief complaint on  file.      History of present illness: This is a 74-year-old female who has had a history of knee arthritis since 2014.  Patient previously had good results with both the Euflexxa and cortisone.  Unfortunately, the Euflexxa is no longer working. Pain is up to an 3/10.      Review of systems:   Musculoskeletal: See history of present illness    Physical examination:    Vital Signs:    There were no vitals filed for this visit.      There is no height or weight on file to calculate BMI.    This a well-developed, well nourished patient in no acute distress.  They are alert and oriented and cooperative to examination.  Pt. walks without an antalgic gait.      Examination of bilateral knee shows no rashes or erythema. There are no masses ecchymosis or effusion. Patient has full range of motion from 0-130°. Patient is nontender to palpation over lateral joint line and moderately tender to palpation over the medial joint line.  Knee is stable to varus and valgus stress. 5 out of 5 motor strength. Palpable distal pulses. Intact light touch sensation.  Severe Patellofemoral crepitus      X-rays: 4 views of both knees are reviewed which show arthritic changes with severe changes of the patellofemoral joint and more moderate changes of the medial compartment of both knees .  No significant change  X-rays of the right shoulder  reviewed which show some mild degenerative changes well-maintained glenohumeral joint space     Assessment:: Bilateral knee osteoarthritis    Plan:  I reviewed the x-ray with her today.  We discussed treatment options for her knee arthritis.  I injected both knees with cortisone.

## 2025-03-24 DIAGNOSIS — Z00.00 ENCOUNTER FOR MEDICARE ANNUAL WELLNESS EXAM: ICD-10-CM

## 2025-03-26 ENCOUNTER — OFFICE VISIT (OUTPATIENT)
Dept: PULMONOLOGY | Facility: CLINIC | Age: 75
End: 2025-03-26
Payer: MEDICARE

## 2025-03-26 VITALS
SYSTOLIC BLOOD PRESSURE: 120 MMHG | OXYGEN SATURATION: 95 % | HEIGHT: 68 IN | WEIGHT: 236.13 LBS | HEART RATE: 72 BPM | DIASTOLIC BLOOD PRESSURE: 78 MMHG | BODY MASS INDEX: 35.79 KG/M2

## 2025-03-26 DIAGNOSIS — G47.30 SLEEP APNEA, UNSPECIFIED TYPE: Primary | ICD-10-CM

## 2025-03-26 DIAGNOSIS — E66.01 SEVERE OBESITY: ICD-10-CM

## 2025-03-26 PROCEDURE — 3288F FALL RISK ASSESSMENT DOCD: CPT | Mod: CPTII,S$GLB,, | Performed by: NURSE PRACTITIONER

## 2025-03-26 PROCEDURE — 3078F DIAST BP <80 MM HG: CPT | Mod: CPTII,S$GLB,, | Performed by: NURSE PRACTITIONER

## 2025-03-26 PROCEDURE — 1159F MED LIST DOCD IN RCRD: CPT | Mod: CPTII,S$GLB,, | Performed by: NURSE PRACTITIONER

## 2025-03-26 PROCEDURE — 1101F PT FALLS ASSESS-DOCD LE1/YR: CPT | Mod: CPTII,S$GLB,, | Performed by: NURSE PRACTITIONER

## 2025-03-26 PROCEDURE — 99999 PR PBB SHADOW E&M-EST. PATIENT-LVL III: CPT | Mod: PBBFAC,,, | Performed by: NURSE PRACTITIONER

## 2025-03-26 PROCEDURE — 99203 OFFICE O/P NEW LOW 30 MIN: CPT | Mod: S$GLB,,, | Performed by: NURSE PRACTITIONER

## 2025-03-26 PROCEDURE — 3074F SYST BP LT 130 MM HG: CPT | Mod: CPTII,S$GLB,, | Performed by: NURSE PRACTITIONER

## 2025-03-26 PROCEDURE — 3008F BODY MASS INDEX DOCD: CPT | Mod: CPTII,S$GLB,, | Performed by: NURSE PRACTITIONER

## 2025-03-26 RX ORDER — METOPROLOL SUCCINATE 100 MG/1
100 TABLET, EXTENDED RELEASE ORAL
COMMUNITY
Start: 2025-02-25

## 2025-03-26 NOTE — PROGRESS NOTES
3/26/2025    Skyler Espinoza  New Patient Consult    Chief Complaint   Patient presents with    Sleep Apnea    Establish Care       HPI: 3/26/2025- in office with daughter, dx moderate microvascular dementia.   Dx sleep apnea established patient Dr. Blakely. Stopped CPAP last year due to not tolerating mask.   Complaint of waking up nightly to urinate.     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, no known Asbestosis exposure, no Smoking Hx  Family Hx: Lung Cancer, COPD, Asthma  Medical Hx: previous pneumonia ; previous shoulder/chest surgery      The chief compliant  problem is new to me  PFSH:  Past Medical History:   Diagnosis Date    Depression     GERD (gastroesophageal reflux disease)     Hyperlipidemia     Hypertension     Hypothyroid          Past Surgical History:   Procedure Laterality Date    APPENDECTOMY      COLONOSCOPY  2017    Briana, repeat in 10    TONSILLECTOMY      TUBAL LIGATION       Social History[1]  Family History   Problem Relation Name Age of Onset    Cancer Father      Cancer Sister      Breast cancer Daughter      Glaucoma Neg Hx      Macular degeneration Neg Hx      Retinal detachment Neg Hx      Ovarian cancer Neg Hx      Eczema Neg Hx      Lupus Neg Hx      Melanoma Neg Hx      Psoriasis Neg Hx       Review of patient's allergies indicates:  No Known Allergies  I have reviewed past medical, family, and social history. I have reviewed previous nurse notes.        Performance Status:The patient's activity level is no limits with regular activity.          Review of Systems   Constitutional: Negative for activity change, appetite change, chills, diaphoresis, fatigue, fever and unexpected weight change.   HENT: Negative for dental problem, postnasal drip, rhinorrhea, sinus pressure, sinus pain, sneezing, sore throat, trouble swallowing and voice change.    Respiratory: Negative for apnea, cough, chest tightness,  "shortness of breath, wheezing and stridor.    Cardiovascular: Negative for chest pain, palpitations and leg swelling.   Gastrointestinal: Negative for abdominal distention, abdominal pain, constipation and nausea.   Musculoskeletal: Negative for gait problem, myalgias and neck pain.   Skin: Negative for color change and pallor.   Allergic/Immunologic: Negative for environmental allergies and food allergies.   Neurological: Negative for dizziness, speech difficulty, weakness, light-headedness, numbness and headaches.   Hematological: Negative for adenopathy. Does not bruise/bleed easily.   Psychiatric/Behavioral: Negative for dysphoric mood and sleep disturbance. The patient is not nervous/anxious.           Exam:Comprehensive exam done. /78 (BP Location: Right arm, Patient Position: Sitting)   Pulse 72   Ht 5' 8" (1.727 m)   Wt 107.1 kg (236 lb 1.8 oz)   SpO2 95% Comment: on room air at rest  BMI 35.90 kg/m²   Exam included Vitals as listed, and patient's appearance and affect and alertness and mood, oral exam for yeast and hygiene and pharynx lesions and Mallapatti (M) score, neck with inspection for jvd and masses and thyroid abnormalities and lymph nodes (supraclavicular and infraclavicular nodes and axillary also examined and noted if abn), chest exam included symmetry and effort and fremitus and percussion and auscultation, cardiac exam included rhythm and gallops and murmur and rubs and jvd and edema, abdominal exam for mass and hepatosplenomegaly and tenderness and hernias and bowel sounds, Musculoskeletal exam with muscle tone and posture and mobility/gait and  strength, and skin for rashes and cyanosis and pallor and turgor, extremity for clubbing.  Findings were normal except for pertinent findings listed below:   M2  Breath sounds clear      Radiographs (ct chest and cxr) reviewed: CXR  patient imaging studies reviewed and interpreted independently. My personal interpretation of most resent " images include:      X-Ray Chest AP Portable 11/15/2024 Lungs clear    Labs: Patients labs reviewed including CBC and CMP  reviewed       Lab Results   Component Value Date    WBC 7.32 11/15/2024    RBC 4.30 11/15/2024    HGB 13.0 11/15/2024    HCT 39.3 11/15/2024    MCV 91 11/15/2024    MCH 30.2 11/15/2024    MCHC 33.1 11/15/2024    RDW 13.0 11/15/2024     11/15/2024    MPV 10.2 11/15/2024    GRAN 3.9 11/15/2024    GRAN 52.7 11/15/2024    LYMPH 2.4 11/15/2024    LYMPH 32.1 11/15/2024    MONO 0.7 11/15/2024    MONO 9.8 11/15/2024    EOS 0.3 11/15/2024    BASO 0.05 11/15/2024    EOSINOPHIL 4.4 11/15/2024    BASOPHIL 0.7 11/15/2024      Latest Reference Range & Units 11/13/22 01:59 01/19/24 14:05 10/06/24 00:52 11/15/24 17:05   CO2 23 - 29 mmol/L 26 24 26 30 (H)   (H): Data is abnormally high    Body mass index is 35.9 kg/m². Morbid obesity complicates all aspects of disease management from diagnostic modalities to treatment. Weight loss encouraged and health benefits explained to patient. Nutritional counseling and physical activity encouraged.       Plan:  Clinical impression is apparently straight forward and impression with management as below.    Skyler was seen today for sleep apnea and establish care.    Diagnoses and all orders for this visit:    Sleep apnea, unspecified type  -     Home Sleep Study; Future    Severe obesity  Comments:  - discussed new medication for weightloss approved for sleep apnea        Follow up in about 3 months (around 6/26/2025), or if symptoms worsen or fail to improve.      Discussed with patient above for education the following:      There are no Patient Instructions on file for this visit.         [1]   Social History  Tobacco Use    Smoking status: Never    Smokeless tobacco: Never   Substance Use Topics    Alcohol use: Not Currently     Comment: rarely    Drug use: No

## 2025-03-29 ENCOUNTER — ON-DEMAND VIRTUAL (OUTPATIENT)
Dept: URGENT CARE | Facility: CLINIC | Age: 75
End: 2025-03-29
Payer: MEDICARE

## 2025-03-29 DIAGNOSIS — H00.014 HORDEOLUM OF LEFT UPPER EYELID, UNSPECIFIED HORDEOLUM TYPE: Primary | ICD-10-CM

## 2025-03-29 PROCEDURE — 98005 SYNCH AUDIO-VIDEO EST LOW 20: CPT | Mod: 95,,, | Performed by: NURSE PRACTITIONER

## 2025-03-29 RX ORDER — ERYTHROMYCIN 5 MG/G
OINTMENT OPHTHALMIC EVERY 6 HOURS
Qty: 3.5 G | Refills: 0 | Status: SHIPPED | OUTPATIENT
Start: 2025-03-29 | End: 2025-04-03

## 2025-03-29 NOTE — PROGRESS NOTES
Subjective:      Patient ID: Skyler Espinoza is a 74 y.o. female.    Vitals:  vitals were not taken for this visit.     Chief Complaint: Stye      Visit Type: TELE AUDIOVISUAL    Patient Location: Home [place]     Present with the patient at the time of consultation: TELEMED PRESENT WITH PATIENT: family member daughter with pt     Past Medical History:   Diagnosis Date    Depression     GERD (gastroesophageal reflux disease)     Hyperlipidemia     Hypertension     Hypothyroid      Past Surgical History:   Procedure Laterality Date    APPENDECTOMY      COLONOSCOPY  2017    Briana, repeat in 10    TONSILLECTOMY      TUBAL LIGATION       Review of patient's allergies indicates:  No Known Allergies  Medications Ordered Prior to Encounter[1]  Family History   Problem Relation Name Age of Onset    Cancer Father      Cancer Sister      Breast cancer Daughter      Glaucoma Neg Hx      Macular degeneration Neg Hx      Retinal detachment Neg Hx      Ovarian cancer Neg Hx      Eczema Neg Hx      Lupus Neg Hx      Melanoma Neg Hx      Psoriasis Neg Hx         Medications Ordered                [id number] [place] - [address] AT [facility] & [address] [place] [address]    Telephone: [phone]   Fax: [phone]   Hours: Open 24 hours                         E-Prescribed (1 of 1)              erythromycin (ROMYCIN) ophthalmic ointment    Sig: Place into the left eye every 6 (six) hours. for 5 days       Start: 3/29/25     Quantity: 3.5 g Refills: 0                           Ohs Peq Odvv Intake    3/29/2025  2:38 PM CDT - Filed by Patient   What is your current physical address in the event of a medical emergency? [address] [place] [address]   Are you able to take your vital signs? Yes   Systolic Blood Pressure: 106   Diastolic Blood Pressure: 66   Weight: 239   Height: 68   Pulse: 68   Temperature:    Respiration rate: 16   Pulse Oxygen: 93   Please attach any  relevant images or files    Is your employer contracted with [employer]? No         Pt presents with her daughter with c/o Left eye stye to upper lid x 3 days with discomfort.   No fever, vision changes, dizziness noted.         Constitution: Negative for fever.   Eyes:  Positive for eyelid swelling. Negative for eye itching, eye pain and vision loss.   Respiratory:  Negative for shortness of breath.    Neurological:  Negative for dizziness.        Objective:   The physical exam was conducted virtually.  Physical Exam   Constitutional: She is oriented to person, place, and time. No distress.   HENT:   Head: Normocephalic.   Ears:   Right Ear: External ear normal.   Left Ear: External ear normal.   Nose: No rhinorrhea or congestion.   Eyes: Conjunctivae are normal.          Comments: Stye noted .. See picture   Neck: Neck supple.   Pulmonary/Chest: Effort normal. No respiratory distress.   Neurological: She is alert and oriented to person, place, and time.   Skin: Skin is no rash.           Assessment:     1. Hordeolum of left upper eyelid, unspecified hordeolum type        Plan:   Use ointment as directed  Warm compresses for 5-10 minutes 3 x day    If not improved or worsening f/u as directed     Hordeolum of left upper eyelid, unspecified hordeolum type  -     erythromycin (ROMYCIN) ophthalmic ointment; Place into the left eye every 6 (six) hours. for 5 days  Dispense: 3.5 g; Refill: 0    We appreciate you trusting us with your medical care. We hope you feel better soon. We will be happy to take care of you for all of your future medical needs.     You must understand that you've received Virtual treatment only and that you may be released before all your medical problems are known or treated. You, the patient, will arrange for follow up care as instructed.     Follow up with your PCP or specialty clinic as directed in the next 1-2 weeks if not improved or as needed. You can call [phone] to  schedule an appointment with the appropriate provider.     If your condition worsens we recommend that you receive another evaluation in person, with your primary care provider, urgent care or at the emergency room immediately or contact your primary medical clinics after hours call service to discuss your concerns.                     [1]   Current Outpatient Medications on File Prior to Visit   Medication Sig Dispense Refill    aspirin 81 MG Chew 1 tablet Orally Once a day (Patient not taking: Reported on 3/13/2025)      cetirizine (ZYRTEC) 10 MG tablet 1 tablet Orally Once a day      famotidine (PEPCID) 40 MG tablet Take 40 mg by mouth.      ibuprofen (ADVIL,MOTRIN) 800 MG tablet Take 1 tablet (800 mg total) by mouth every 8 (eight) hours as needed for Pain. 30 tablet 0    ketorolac (TORADOL) 10 mg tablet Take 10 mg by mouth every 12 (twelve) hours as needed.      levothyroxine (SYNTHROID) 112 MCG tablet Take 1 tablet (112 mcg total) by mouth before breakfast. 30 tablet 11    levothyroxine (UNITHROID) 88 MCG tablet 1 tablet in the morning on an empty stomach Orally Once a day      lovastatin (MEVACOR) 20 MG tablet 1 tablet with the evening meal Orally Once a day      metoprolol succinate (TOPROL-XL) 100 MG 24 hr tablet Take 100 mg by mouth.      multivit-min-ferrous fumarate 9 mg iron/15 mL Liqd as directed      ondansetron (ZOFRAN-ODT) 4 MG TbDL Take 1 tablet (4 mg total) by mouth every 8 (eight) hours as needed (nausea/vomiting). 12 tablet 0    pantoprazole (PROTONIX) 40 MG tablet 1 tablet Orally Once a day      penicillin v potassium (VEETID) 500 MG tablet Take 500 mg by mouth 4 (four) times daily. (Patient not taking: Reported on 3/13/2025)      rizatriptan (MAXALT) 10 MG tablet Take by mouth.      tiZANidine (ZANAFLEX) 4 MG tablet Take 4 mg by mouth nightly as needed.      triamterene-hydrochlorothiazide 37.5-25 mg (MAXZIDE-25) 37.5-25 mg per tablet 1 tablet in the morning Orally Once a day      valACYclovir  (VALTREX) 1000 MG tablet       venlafaxine (EFFEXOR-XR) 37.5 MG 24 hr capsule 1 capsule with food Orally Once a day (Patient not taking: Reported on 3/13/2025)       No current facility-administered medications on file prior to visit.

## 2025-03-29 NOTE — PATIENT INSTRUCTIONS

## 2025-04-03 ENCOUNTER — PATIENT MESSAGE (OUTPATIENT)
Dept: FAMILY MEDICINE | Facility: CLINIC | Age: 75
End: 2025-04-03
Payer: MEDICARE

## 2025-04-03 ENCOUNTER — ON-DEMAND VIRTUAL (OUTPATIENT)
Dept: URGENT CARE | Facility: CLINIC | Age: 75
End: 2025-04-03
Payer: MEDICARE

## 2025-04-03 DIAGNOSIS — N39.0 URINARY TRACT INFECTION WITHOUT HEMATURIA, SITE UNSPECIFIED: ICD-10-CM

## 2025-04-03 DIAGNOSIS — R11.0 NAUSEA: Primary | ICD-10-CM

## 2025-04-03 RX ORDER — PROMETHAZINE HYDROCHLORIDE 25 MG/1
25 TABLET ORAL EVERY 6 HOURS PRN
Qty: 30 TABLET | Refills: 0 | Status: SHIPPED | OUTPATIENT
Start: 2025-04-03

## 2025-04-03 RX ORDER — NITROFURANTOIN 25; 75 MG/1; MG/1
100 CAPSULE ORAL 2 TIMES DAILY
Qty: 14 CAPSULE | Refills: 0 | Status: SHIPPED | OUTPATIENT
Start: 2025-04-03 | End: 2025-04-10

## 2025-04-03 NOTE — PROGRESS NOTES
Subjective:      Patient ID: Skyler Espinoza is a 74 y.o. female.    Vitals:  vitals were not taken for this visit.     Chief Complaint: Urinary Tract Infection      Visit Type: TELE AUDIOVISUAL - This visit was conducted virtually based on  subjective information and limited objective exam    Present with the patient at the time of consultation: TELEMED PRESENT WITH PATIENT: None  [place]  Two patient identifiers used to verify patient- saying out date of birth and full name.       Past Medical History:   Diagnosis Date    Depression     GERD (gastroesophageal reflux disease)     Hyperlipidemia     Hypertension     Hypothyroid      Past Surgical History:   Procedure Laterality Date    APPENDECTOMY      COLONOSCOPY  2017    Briana, repeat in 10    TONSILLECTOMY      TUBAL LIGATION       Review of patient's allergies indicates:  No Known Allergies  Medications Ordered Prior to Encounter[1]  Family History   Problem Relation Name Age of Onset    Cancer Father      Cancer Sister      Breast cancer Daughter      Glaucoma Neg Hx      Macular degeneration Neg Hx      Retinal detachment Neg Hx      Ovarian cancer Neg Hx      Eczema Neg Hx      Lupus Neg Hx      Melanoma Neg Hx      Psoriasis Neg Hx         Medications Ordered                [id number] - [address] & [address] [address]    Telephone: [phone]   Fax: [phone]   Hours: Open 24 hours                         E-Prescribed (2 of 2)              nitrofurantoin, macrocrystal-monohydrate, (MACROBID) 100 MG capsule    Sig: Take 1 capsule (100 mg total) by mouth 2 (two) times daily. for 7 days       Start: 4/3/25     Quantity: 14 capsule Refills: 0                         promethazine (PHENERGAN) 25 MG tablet    Sig: Take 1 tablet (25 mg total) by mouth every 6 (six) hours as needed for Nausea.       Start: 4/3/25     Quantity: 30 tablet Refills: 0                            Ohs Peq Odvv Intake    4/3/2025  3:36 PM CDT - Filed by Patient   What is your current physical address in the event of a medical emergency? [address] [place] [address]   Are you able to take your vital signs? Yes   Systolic Blood Pressure: 131   Diastolic Blood Pressure: 87   Weight: 232   Height: 68   Pulse: 76   Temperature: 97.6   Respiration rate: 18   Pulse Oxygen: 95   Please attach any relevant images or files    Is your employer contracted with [facility]? No         73 yo  female accompanied with daughter Allison, reports urinary incontinence twice today and nausea.   Hx of UTI a couple weeks ago.   Reports nausea and NBNB emesis x1 yesterday and rx phenergen.   Denies fever, chills, hematuria, weakness, fatigue, flank pain, vaginal discharge.         Constitution: Negative for activity change, appetite change, chills and fever.   Gastrointestinal:  Positive for nausea. Negative for abdominal pain, abdominal bloating, vomiting, constipation, diarrhea and bowel incontinence.   Genitourinary:  Positive for frequency, urgency and bladder incontinence. Negative for dysuria, flank pain, vaginal discharge, vaginal odor and pelvic pain.        Objective:   The physical exam was conducted virtually.    AAO x 3 ; no acute distress noted; appearance normal; mood and behavior normal; thought process normal  Head- normocephalic  Nose- appears normal, no discharge or erythema  Eyes- pupils appear normal in size, no drainage, no erythema  Ears- normal appearing; no discharge, no erythema  Mouth- appears normal  Lungs- breathing at a normal rate, no acute distress noted  Heart- no reports of tachycardia, palpitations, chest pain  Abdomen- non distended, non tender reported by patient  Skin- warm and dry, no erythema or edema noted by patient or visualized  Psych- as above; no si/hi      Assessment:     1. Nausea    2. Urinary tract infection without hematuria, site unspecified        Plan:          Thank you for choosing [facility]!    Our goal in the [facility] is to always provide outstanding medical care. You may receive a survey by mail or e-mail in the next week regarding your experience today. We would greatly appreciate you completing and returning the survey. Your feedback provides us with a way to recognize our staff who provide very good care, and it helps us learn how to improve when your experience was below our aspiration of excellence.         We appreciate you trusting us with your medical care. We hope you feel better soon. We will be happy to take care of you for all of your future medical needs.    You must understand that you've received an Urgent Care treatment only and that you may be released before all your medical problems are known or treated. You, the patient, will arrange for follow up care as instructed.    Follow up with your PCP or specialty clinic as directed in the next 1-2 weeks if not improved or as needed.  You can call [phone] to schedule an appointment with the appropriate provider.    If your condition worsens we recommend that you receive another evaluation in person, with your primary care provider, urgent care or at the emergency room immediately or contact your primary medical clinics after hours call service to discuss your concerns.         Nausea  -     promethazine (PHENERGAN) 25 MG tablet; Take 1 tablet (25 mg total) by mouth every 6 (six) hours as needed for Nausea.  Dispense: 30 tablet; Refill: 0    Urinary tract infection without hematuria, site unspecified  -     nitrofurantoin, macrocrystal-monohydrate, (MACROBID) 100 MG capsule; Take 1 capsule (100 mg total) by mouth 2 (two) times daily. for 7 days  Dispense: 14 capsule; Refill: 0                         [1]   Current Outpatient Medications on File Prior to Visit   Medication Sig Dispense Refill    aspirin 81 MG Chew 1 tablet Orally Once a day (Patient not  taking: Reported on 3/13/2025)      cetirizine (ZYRTEC) 10 MG tablet 1 tablet Orally Once a day      erythromycin (ROMYCIN) ophthalmic ointment Place into the left eye every 6 (six) hours. for 5 days 3.5 g 0    famotidine (PEPCID) 40 MG tablet Take 40 mg by mouth.      ibuprofen (ADVIL,MOTRIN) 800 MG tablet Take 1 tablet (800 mg total) by mouth every 8 (eight) hours as needed for Pain. 30 tablet 0    ketorolac (TORADOL) 10 mg tablet Take 10 mg by mouth every 12 (twelve) hours as needed.      levothyroxine (SYNTHROID) 112 MCG tablet Take 1 tablet (112 mcg total) by mouth before breakfast. 30 tablet 11    levothyroxine (UNITHROID) 88 MCG tablet 1 tablet in the morning on an empty stomach Orally Once a day      lovastatin (MEVACOR) 20 MG tablet 1 tablet with the evening meal Orally Once a day      metoprolol succinate (TOPROL-XL) 100 MG 24 hr tablet Take 100 mg by mouth.      multivit-min-ferrous fumarate 9 mg iron/15 mL Liqd as directed      pantoprazole (PROTONIX) 40 MG tablet 1 tablet Orally Once a day      penicillin v potassium (VEETID) 500 MG tablet Take 500 mg by mouth 4 (four) times daily. (Patient not taking: Reported on 3/13/2025)      rizatriptan (MAXALT) 10 MG tablet Take by mouth.      tiZANidine (ZANAFLEX) 4 MG tablet Take 4 mg by mouth nightly as needed.      triamterene-hydrochlorothiazide 37.5-25 mg (MAXZIDE-25) 37.5-25 mg per tablet 1 tablet in the morning Orally Once a day      valACYclovir (VALTREX) 1000 MG tablet       venlafaxine (EFFEXOR-XR) 37.5 MG 24 hr capsule 1 capsule with food Orally Once a day (Patient not taking: Reported on 3/13/2025)      [DISCONTINUED] ondansetron (ZOFRAN-ODT) 4 MG TbDL Take 1 tablet (4 mg total) by mouth every 8 (eight) hours as needed (nausea/vomiting). 12 tablet 0     No current facility-administered medications on file prior to visit.

## 2025-04-10 ENCOUNTER — OFFICE VISIT (OUTPATIENT)
Dept: FAMILY MEDICINE | Facility: CLINIC | Age: 75
End: 2025-04-10
Payer: MEDICARE

## 2025-04-10 ENCOUNTER — HOSPITAL ENCOUNTER (OUTPATIENT)
Dept: RADIOLOGY | Facility: HOSPITAL | Age: 75
Discharge: HOME | End: 2025-04-10
Attending: NURSE PRACTITIONER
Payer: MEDICARE

## 2025-04-10 ENCOUNTER — RESULTS FOLLOW-UP (OUTPATIENT)
Dept: FAMILY MEDICINE | Facility: CLINIC | Age: 75
End: 2025-04-10

## 2025-04-10 VITALS
HEART RATE: 102 BPM | HEIGHT: 68 IN | SYSTOLIC BLOOD PRESSURE: 102 MMHG | OXYGEN SATURATION: 98 % | TEMPERATURE: 98 F | WEIGHT: 233 LBS | DIASTOLIC BLOOD PRESSURE: 84 MMHG | BODY MASS INDEX: 35.31 KG/M2

## 2025-04-10 DIAGNOSIS — R11.2 NAUSEA AND VOMITING, UNSPECIFIED VOMITING TYPE: ICD-10-CM

## 2025-04-10 DIAGNOSIS — R10.11 RUQ ABDOMINAL PAIN: ICD-10-CM

## 2025-04-10 DIAGNOSIS — R10.13 EPIGASTRIC ABDOMINAL PAIN: ICD-10-CM

## 2025-04-10 DIAGNOSIS — N39.0 URINARY TRACT INFECTION WITHOUT HEMATURIA, SITE UNSPECIFIED: Primary | ICD-10-CM

## 2025-04-10 DIAGNOSIS — R19.7 DIARRHEA, UNSPECIFIED TYPE: ICD-10-CM

## 2025-04-10 DIAGNOSIS — E87.6 HYPOKALEMIA: Primary | ICD-10-CM

## 2025-04-10 DIAGNOSIS — J30.9 ALLERGIC RHINITIS, UNSPECIFIED SEASONALITY, UNSPECIFIED TRIGGER: ICD-10-CM

## 2025-04-10 LAB
BILIRUB UR QL STRIP: NEGATIVE
GLUCOSE UR QL STRIP: NEGATIVE
KETONES UR QL STRIP: NEGATIVE
LEUKOCYTE ESTERASE UR QL STRIP: POSITIVE
PH, POC UA: 5 (ref 5–8)
POC BLOOD, URINE: NEGATIVE
POC NITRATES, URINE: NEGATIVE
PROT UR QL STRIP: NEGATIVE
SP GR UR STRIP: 1.01 (ref 1–1.03)
UROBILINOGEN UR STRIP-ACNC: ABNORMAL (ref 0.1–1.1)

## 2025-04-10 PROCEDURE — 76705 ECHO EXAM OF ABDOMEN: CPT | Mod: 26,,, | Performed by: RADIOLOGY

## 2025-04-10 PROCEDURE — G2211 COMPLEX E/M VISIT ADD ON: HCPCS | Mod: 95,S$GLB,, | Performed by: NURSE PRACTITIONER

## 2025-04-10 PROCEDURE — 3074F SYST BP LT 130 MM HG: CPT | Mod: CPTII,S$GLB,, | Performed by: NURSE PRACTITIONER

## 2025-04-10 PROCEDURE — 76705 ECHO EXAM OF ABDOMEN: CPT | Mod: TC

## 2025-04-10 PROCEDURE — 3079F DIAST BP 80-89 MM HG: CPT | Mod: CPTII,S$GLB,, | Performed by: NURSE PRACTITIONER

## 2025-04-10 PROCEDURE — 3288F FALL RISK ASSESSMENT DOCD: CPT | Mod: CPTII,S$GLB,, | Performed by: NURSE PRACTITIONER

## 2025-04-10 PROCEDURE — 1101F PT FALLS ASSESS-DOCD LE1/YR: CPT | Mod: CPTII,S$GLB,, | Performed by: NURSE PRACTITIONER

## 2025-04-10 PROCEDURE — 1159F MED LIST DOCD IN RCRD: CPT | Mod: CPTII,S$GLB,, | Performed by: NURSE PRACTITIONER

## 2025-04-10 PROCEDURE — 99215 OFFICE O/P EST HI 40 MIN: CPT | Mod: S$GLB,,, | Performed by: NURSE PRACTITIONER

## 2025-04-10 PROCEDURE — 87086 URINE CULTURE/COLONY COUNT: CPT | Performed by: NURSE PRACTITIONER

## 2025-04-10 PROCEDURE — 81003 URINALYSIS AUTO W/O SCOPE: CPT | Mod: QW,S$GLB,, | Performed by: NURSE PRACTITIONER

## 2025-04-10 PROCEDURE — 99999 PR PBB SHADOW E&M-EST. PATIENT-LVL III: CPT | Mod: PBBFAC,,, | Performed by: NURSE PRACTITIONER

## 2025-04-10 PROCEDURE — 1160F RVW MEDS BY RX/DR IN RCRD: CPT | Mod: CPTII,S$GLB,, | Performed by: NURSE PRACTITIONER

## 2025-04-10 PROCEDURE — 3008F BODY MASS INDEX DOCD: CPT | Mod: CPTII,S$GLB,, | Performed by: NURSE PRACTITIONER

## 2025-04-10 PROCEDURE — 1126F AMNT PAIN NOTED NONE PRSNT: CPT | Mod: CPTII,S$GLB,, | Performed by: NURSE PRACTITIONER

## 2025-04-10 RX ORDER — ONDANSETRON 8 MG/1
8 TABLET, ORALLY DISINTEGRATING ORAL EVERY 8 HOURS PRN
Qty: 15 TABLET | Refills: 0 | Status: SHIPPED | OUTPATIENT
Start: 2025-04-10

## 2025-04-10 NOTE — PROGRESS NOTES
SUBJECTIVE:      Patient ID: Skyler Espinoza is a 74 y.o. female.    Chief Complaint: Nausea, Emesis (This morning onset 4 days ago. ), Urinary Tract Infection (St she did a at home test and it positive for WBC), Diarrhea (Onset today), and Chest Congestion (Onset 4 days ago. When she wake up up early in the morning daughter st that she sounds congested and is clearing her throat a lot)    History of Present Illness    CHIEF COMPLAINT:  Ms. Espinoza presents with nausea, vomiting, and headache, particularly severe at night, accompanied by congestion and a recent episode of diarrhea.    HPI:  Ms. Espinoza has been having nausea and vomiting for the past 4 mornings, starting around 2:00 AM each night. The vomiting is continuous and includes all consumed food, though she has not eaten much recently. She is nauseous during the day, but symptoms significantly worsen at night.    The vomiting episodes occur about 4 times each night, not consecutively but at different times. She also has severe congestion upon waking at 2:00 AM. This morning was the first instance of diarrhea, which occurred in bed.    Ms. Espinoza has a headache related to the ongoing symptoms. Her migraines have significantly decreased since a procedure with Dr. Abdul, one of her neurologists. The headaches are similar to prior headaches with no new features.    She has been taking various medications to manage symptoms, including Phenergan, Nauzene, Pepto, Emetrol Chews, Excedrin, Tylenol, Ibuprofen, Sudafed, and Mucinex 12. These medications are often vomited up shortly after ingestion.    For reflux, she takes Protonix (pantoprazole) 40mg in the morning and famotidine 40mg at night. She takes Zyrtec for allergies, which was recently switched to Claritin. She has sneezing episodes starting around 7:00 PM and a constant post-nasal drip.    She recently had a urinary tract infection for which she was prescribed antibiotics. Today would have been her last  dose, but it was not given due to the diarrhea. Her urine still shows some leukocytes.    She had been using THC gummies, but these were discontinued 4 days ago due to potential nausea side effects. She has not been able to maintain adequate fluid intake due to the vomiting.    She denies fever, daytime coughing, black stools, or bloody stools. She denies being around anyone who has been sick recently.    MEDICAL HISTORY:  Ms. Espinoza has a history of urinary tract infection, recently treated with antibiotics. She has a history of migraines, which decreased after a procedure with neurologist Dr. Abdul. Ms. Espinoza also has a history of allergies.    SURGICAL HISTORY:  Ms. Espinoza has undergone tubal ligation, tonsillectomy, colonoscopy, and appendectomy.    TEST RESULTS:  Ms. Espinoza performed a home urinalysis 4 days ago using AZO test strips, which was positive for leukocytes but negative for nitrites. A recent urina  lysis showed leukocytes (3+). Ms. Espinoza conducted a home EKG 4 days ago, which showed normal sinus rhythm.        Review of Systems   Constitutional:  Positive for appetite change. Negative for activity change, chills, diaphoresis, fatigue, fever and unexpected weight change.   HENT:  Positive for congestion, postnasal drip and sneezing. Negative for ear pain, sinus pressure, sore throat, trouble swallowing and voice change.    Eyes:  Negative for pain, discharge and visual disturbance.   Respiratory:  Negative for cough, chest tightness, shortness of breath and wheezing.    Cardiovascular:  Negative for chest pain and palpitations.   Gastrointestinal:  Positive for abdominal pain, diarrhea, nausea and vomiting. Negative for constipation.   Genitourinary:  Negative for difficulty urinating, dysuria, flank pain, frequency, hematuria, urgency, vaginal bleeding, vaginal discharge and vaginal pain.   Musculoskeletal:  Negative for back pain and joint swelling.   Skin:  Negative for color change and  rash.   Neurological:  Negative for dizziness, seizures, syncope, weakness, numbness and headaches.   Hematological:  Negative for adenopathy.   Psychiatric/Behavioral:  Negative for dysphoric mood and sleep disturbance. The patient is not nervous/anxious.        Family History   Problem Relation Name Age of Onset    Cancer Father      Cancer Sister      Breast cancer Daughter      Glaucoma Neg Hx      Macular degeneration Neg Hx      Retinal detachment Neg Hx      Ovarian cancer Neg Hx      Eczema Neg Hx      Lupus Neg Hx      Melanoma Neg Hx      Psoriasis Neg Hx        Social History     Socioeconomic History    Marital status:    Tobacco Use    Smoking status: Never    Smokeless tobacco: Never   Substance and Sexual Activity    Alcohol use: Not Currently     Comment: rarely    Drug use: No    Sexual activity: Yes     Social Drivers of Health     Financial Resource Strain: Patient Declined (12/11/2023)    Overall Financial Resource Strain (CARDIA)     Difficulty of Paying Living Expenses: Patient declined   Food Insecurity: Patient Declined (12/11/2023)    Hunger Vital Sign     Worried About Running Out of Food in the Last Year: Patient declined     Ran Out of Food in the Last Year: Patient declined   Transportation Needs: Patient Declined (12/11/2023)    PRAPARE - Transportation     Lack of Transportation (Medical): Patient declined     Lack of Transportation (Non-Medical): Patient declined   Physical Activity: Unknown (12/11/2023)    Exercise Vital Sign     Days of Exercise per Week: Patient declined     Minutes of Exercise per Session: 20 min   Stress: Patient Declined (12/11/2023)    [employer] - Occupational Stress Questionnaire     Feeling of Stress : Patient declined   Housing Stability: Unknown (12/11/2023)    Housing Stability Vital Sign     Unable to Pay for Housing in the Last Year: Patient refused     Unstable Housing in the Last Year: Patient refused     Current  Outpatient Medications   Medication Sig    cetirizine (ZYRTEC) 10 MG tablet 1 tablet Orally Once a day    famotidine (PEPCID) 40 MG tablet Take 40 mg by mouth.    ibuprofen (ADVIL,MOTRIN) 800 MG tablet Take 1 tablet (800 mg total) by mouth every 8 (eight) hours as needed for Pain.    metoprolol succinate (TOPROL-XL) 100 MG 24 hr tablet Take 100 mg by mouth.    multivit-min-ferrous fumarate 9 mg iron/15 mL Liqd as directed    nitrofurantoin, macrocrystal-monohydrate, (MACROBID) 100 MG capsule Take 1 capsule (100 mg total) by mouth 2 (two) times daily. for 7 days    pantoprazole (PROTONIX) 40 MG tablet 1 tablet Orally Once a day    promethazine (PHENERGAN) 25 MG tablet Take 1 tablet (25 mg total) by mouth every 6 (six) hours as needed for Nausea.    rizatriptan (MAXALT) 10 MG tablet Take by mouth.    tiZANidine (ZANAFLEX) 4 MG tablet Take 4 mg by mouth nightly as needed.    valACYclovir (VALTREX) 1000 MG tablet     levothyroxine (SYNTHROID) 112 MCG tablet Take 1 tablet (112 mcg total) by mouth before breakfast.    lovastatin (MEVACOR) 20 MG tablet 1 tablet with the evening meal Orally Once a day (Patient not taking: Reported on 4/10/2025)    ondansetron (ZOFRAN-ODT) 8 MG TbDL Take 1 tablet (8 mg total) by mouth every 8 (eight) hours as needed (nausea and vomiting).    triamterene-hydrochlorothiazide 37.5-25 mg (MAXZIDE-25) 37.5-25 mg per tablet 1 tablet in the morning Orally Once a day (Patient not taking: Reported on 4/10/2025)   Last reviewed on 4/10/2025  2:43 PM by Oscar Lee FNP-ARMANI    Review of patient's allergies indicates:  No Known Allergies   Past Medical History:   Diagnosis Date    Depression     GERD (gastroesophageal reflux disease)     Hyperlipidemia     Hypertension     Hypothyroid      Past Surgical History:   Procedure Laterality Date    APPENDECTOMY      COLONOSCOPY  2017    Briana, repeat in 10    TONSILLECTOMY      TUBAL LIGATION            OBJECTIVE:      Vitals:    04/10/25 1414  "  BP: 102/84   BP Location: Left arm   Patient Position: Sitting   Pulse: 102   Temp: 98.2 °F (36.8 °C)   TempSrc: Oral   SpO2: 98%   Weight: 105.7 kg (233 lb 0.4 oz)   Height: 5' 8" (1.727 m)     Physical Exam  Vitals and nursing note reviewed.   Constitutional:       General: She is awake. She is not in acute distress.     Appearance: She is well-developed and well-groomed. She is obese. She is ill-appearing. She is not toxic-appearing or diaphoretic.   HENT:      Head: Normocephalic and atraumatic.      Nose: Nose normal.   Eyes:      General: Lids are normal. Gaze aligned appropriately.      Conjunctiva/sclera: Conjunctivae normal.      Right eye: Right conjunctiva is not injected.      Left eye: Left conjunctiva is not injected.      Pupils: Pupils are equal, round, and reactive to light.   Cardiovascular:      Rate and Rhythm: Normal rate and regular rhythm.      Pulses: Normal pulses.      Heart sounds: Normal heart sounds, S1 normal and S2 normal. No murmur heard.     No friction rub. No gallop.   Pulmonary:      Effort: Pulmonary effort is normal. No respiratory distress.      Breath sounds: Normal breath sounds. No stridor. No decreased breath sounds, wheezing, rhonchi or rales.   Chest:      Chest wall: No tenderness.   Abdominal:      General: Bowel sounds are normal.      Palpations: Abdomen is soft.      Tenderness: There is abdominal tenderness in the right upper quadrant, epigastric area and left upper quadrant. There is guarding. There is no rebound.   Musculoskeletal:      Cervical back: Neck supple.      Right lower leg: No edema.      Left lower leg: No edema.   Lymphadenopathy:      Cervical: No cervical adenopathy.   Skin:     General: Skin is warm and dry.      Capillary Refill: Capillary refill takes less than 2 seconds.      Findings: No erythema or rash.   Neurological:      Mental Status: She is alert and oriented to person, place, and time. Mental status is at baseline.   Psychiatric:    "      Attention and Perception: Attention normal.         Mood and Affect: Mood normal.         Speech: Speech normal.         Behavior: Behavior normal. Behavior is cooperative.         Thought Content: Thought content normal.         Judgment: Judgment normal.       Office Visit on 04/10/2025   Component Date Value Ref Range Status    POC Blood, Urine 04/10/2025 Negative  Negative Final    POC Bilirubin, Urine 04/10/2025 Negative  Negative Final    POC Urobilinogen, Urine 04/10/2025 norm  0.1 - 1.1 Final    POC Ketones, Urine 04/10/2025 Negative  Negative Final    POC Protein, Urine 04/10/2025 Negative  Negative Final    POC Nitrates, Urine 04/10/2025 Negative  Negative Final    POC Glucose, Urine 04/10/2025 Negative  Negative Final    pH, UA 04/10/2025 5.0  5.0 - 8.0 Final    POC Specific Gravity, Urine 04/10/2025 1.010  1.003 - 1.029 Final    POC Leukocytes, Urine 04/10/2025 Positive (A)  Negative Final          Assessment:       1. Urinary tract infection without hematuria, site unspecified    2. RUQ abdominal pain    3. Epigastric abdominal pain    4. Nausea and vomiting, unspecified vomiting type    5. Diarrhea, unspecified type    6. Allergic rhinitis, unspecified seasonality, unspecified trigger        Plan:       Assessment & Plan    PLAN SUMMARY:  - Ordered urine culture to check for antibiotic resistance  - Ordered ultrasound of upper quadrant  - Ordered labs: CBC, kidney function, and lipase  - Changed Protonix (pantoprazole) administration: Take 60 minutes after levothyroxine, wait 30 minutes, then eat  - Switched nausea medication from Phenergan to Zofran (soluble tablet)  - Started Flonase nasal spray: 1-2 sprays in each nostril daily.  - Recommend completing antibiotic course for UTI  - Advised on proper timing of reflux medications (Protonix and Pepcid)  - Continued Claritin for allergies and Mucinex for congestion  - Recommend pushing fluids to stay hydrated    RUQ AND EPIGASTRIC ABDOMINAL PAIN:  -  Monitored patient's symptoms of nausea and vomiting, occurring around 2 AM for the past 4 mornings, accompanied by headaches and severe nighttime congestion.  - Examination revealed tenderness in the upper abdominal area.  - Considered gastritis, gastroenteritis, GERD, gallbladder issues, gallstones, and pancreatitis as potential causes for upper abdominal tenderness and symptoms.  - Ordered an ultrasound of the upper quadrant and labs (CBC, kidney function, and lipase) to evaluate the patient's condition.  - Modified timing of reflux medications (Protonix and Pepcid) for improved effectiveness.     NAUSEA, VOMITING, AND DIARRHEA:  - Monitored patient's symptoms of diarrhea, which started this morning.  - Ms. Espinoza is unable to hold down food or fluids.  - Switched nausea medication from Phenergan to Zofran (soluble tablet that dissolves without water).   - No fever reported.  - Considered viral gastroenteritis as a possible cause.  - Recommend pushing fluids to stay hydrated.  - Advised to let diarrhea run its course unless it becomes problematic, in which case Imodium can be used.  - Explained the typical course of viral gastroenteritis and when to consider using anti-diarrheal medications.  - Informed about the potential for antibiotics to cause diarrhea, but noted it is too early to suspect C. diff infection as she has only had 1 episode of diarrhea.    GASTRO-ESOPHAGEAL REFLUX DISEASE:  - Assessed that reflux medications may not be working effectively due to incorrect timing of administration.  - Changed Protonix (pantoprazole) administration: Take 60 minutes after levothyroxine, wait 30 minutes, then eat.  - Explained proper timing of reflux medications for optimal effectiveness.    ALLERGIC RHINITIS:  - Monitored patient's symptoms of sneezing starting at 7 PM and constant post-nasal drip.  - Noted allergies have recently worsened.  - Started Flonase (fluticasone) nasal spray: 1-2 sprays in each nostril  daily.  - Continue Zyrtec or Claritin.     URINARY TRACT INFECTION:  - Monitored for potential UTI continuation based on persistent leukocytes in urine.  - Noted patient had a recent urinary tract infection treated with antibiotics, with today being the last day of antibiotic course.  - Evaluated urine, which still shows leukocytes, indicating possible ongoing infection.  - Assessed if current symptoms could be related to ongoing or incompletely treated UTI.  - Advised on signs that would require hospital evaluation for UTI, including fever, confusion, pain, gross hematuria.  - Recommend completing the antibiotic course.  - Ordered urine culture to check for antibiotic resistance.    MIGRAINE:  - Monitored patient's history of migraines, which have decreased after a procedure with Dr. Abdul, a neurologist.  - Evaluated current headaches, which are not considered migraines.  - Noted patient has been using various pain medications for headache relief.  - Noted no change in headaches with no new features.     MEDICAL HISTORY:  - Noted patient's history of tubal ligation, appendectomy, and tonsillectomy.         Urinary tract infection without hematuria, site unspecified  -     POCT Urinalysis, Dipstick, Automated, W/O Scope  -     Urine Culture High Risk    RUQ abdominal pain  -     US Abdomen Limited; Future; Expected date: 04/10/2025  -     CBC Auto Differential; Future; Expected date: 04/10/2025  -     Comprehensive Metabolic Panel; Future; Expected date: 04/10/2025  -     Lipase; Future; Expected date: 04/10/2025    Epigastric abdominal pain  -     US Abdomen Limited; Future; Expected date: 04/10/2025  -     CBC Auto Differential; Future; Expected date: 04/10/2025  -     Comprehensive Metabolic Panel; Future; Expected date: 04/10/2025  -     Lipase; Future; Expected date: 04/10/2025    Nausea and vomiting, unspecified vomiting type  -     ondansetron (ZOFRAN-ODT) 8 MG TbDL; Take 1 tablet (8 mg total) by mouth every  8 (eight) hours as needed (nausea and vomiting).  Dispense: 15 tablet; Refill: 0  -     CBC Auto Differential; Future; Expected date: 04/10/2025  -     Comprehensive Metabolic Panel; Future; Expected date: 04/10/2025    Diarrhea, unspecified type  -     CBC Auto Differential; Future; Expected date: 04/10/2025  -     Comprehensive Metabolic Panel; Future; Expected date: 04/10/2025    Allergic rhinitis, unspecified seasonality, unspecified trigger    I spent a total of 42 minutes on the day of the visit.This includes face to face time and non-face to face time preparing to see the patient (eg, review of tests), obtaining and/or reviewing separately obtained history, documenting clinical information in the electronic or other health record, independently interpreting results and communicating results to the patient/family/caregiver, or care coordinator.    Follow up if symptoms worsen or fail to improve.          4/10/2025 DANY Madrigal, BRYANT    This note was generated with the assistance of ambient listening technology. Verbal consent was obtained by the patient and accompanying visitor(s) for the recording of patient appointment to facilitate this note. I attest to having reviewed and edited the generated note for accuracy, though some syntax or spelling errors may persist. Please contact the author of this note for any clarification.

## 2025-04-11 ENCOUNTER — NURSE TRIAGE (OUTPATIENT)
Dept: ADMINISTRATIVE | Facility: CLINIC | Age: 75
End: 2025-04-11
Payer: MEDICARE

## 2025-04-11 ENCOUNTER — HOSPITAL ENCOUNTER (EMERGENCY)
Facility: HOSPITAL | Age: 75
Discharge: HOME | End: 2025-04-11
Attending: EMERGENCY MEDICINE
Payer: MEDICARE

## 2025-04-11 VITALS
RESPIRATION RATE: 18 BRPM | DIASTOLIC BLOOD PRESSURE: 84 MMHG | SYSTOLIC BLOOD PRESSURE: 124 MMHG | HEART RATE: 83 BPM | TEMPERATURE: 99 F | BODY MASS INDEX: 34.86 KG/M2 | WEIGHT: 230 LBS | OXYGEN SATURATION: 97 % | HEIGHT: 68 IN

## 2025-04-11 DIAGNOSIS — R19.7 NAUSEA VOMITING AND DIARRHEA: Primary | ICD-10-CM

## 2025-04-11 DIAGNOSIS — E87.6 HYPOKALEMIA: ICD-10-CM

## 2025-04-11 DIAGNOSIS — R00.2 PALPITATIONS: ICD-10-CM

## 2025-04-11 DIAGNOSIS — R11.2 NAUSEA VOMITING AND DIARRHEA: Primary | ICD-10-CM

## 2025-04-11 LAB
ABSOLUTE EOSINOPHIL (SMH): 0.4 K/UL
ABSOLUTE MONOCYTE (SMH): 0.78 K/UL (ref 0.3–1)
ABSOLUTE NEUTROPHIL COUNT (SMH): 3.9 K/UL (ref 1.8–7.7)
ALBUMIN SERPL-MCNC: 4.2 G/DL (ref 3.5–5.2)
ALP SERPL-CCNC: 63 UNIT/L (ref 55–135)
ALT SERPL-CCNC: 16 UNIT/L (ref 10–44)
ANION GAP (SMH): 10 MMOL/L (ref 8–16)
AST SERPL-CCNC: 15 UNIT/L (ref 10–40)
BASOPHILS # BLD AUTO: 0.05 K/UL
BASOPHILS NFR BLD AUTO: 0.7 %
BILIRUB SERPL-MCNC: 0.5 MG/DL (ref 0.1–1)
BILIRUB UR QL STRIP.AUTO: NEGATIVE
BNP SERPL-MCNC: 55 PG/ML
BUN SERPL-MCNC: 7 MG/DL (ref 8–23)
CALCIUM SERPL-MCNC: 9.9 MG/DL (ref 8.7–10.5)
CHLORIDE SERPL-SCNC: 102 MMOL/L (ref 95–110)
CLARITY UR: CLEAR
CO2 SERPL-SCNC: 26 MMOL/L (ref 23–29)
COLOR UR AUTO: YELLOW
CREAT SERPL-MCNC: 1 MG/DL (ref 0.5–1.4)
ERYTHROCYTE [DISTWIDTH] IN BLOOD BY AUTOMATED COUNT: 12.4 % (ref 11.5–14.5)
GFR SERPLBLD CREATININE-BSD FMLA CKD-EPI: 60 ML/MIN/1.73/M2
GLUCOSE SERPL-MCNC: 100 MG/DL (ref 70–110)
GLUCOSE UR QL STRIP: NEGATIVE
HCT VFR BLD AUTO: 41 % (ref 37–48.5)
HGB BLD-MCNC: 13.3 GM/DL (ref 12–16)
HGB UR QL STRIP: NEGATIVE
IMM GRANULOCYTES # BLD AUTO: 0.03 K/UL (ref 0–0.04)
IMM GRANULOCYTES NFR BLD AUTO: 0.4 % (ref 0–0.5)
KETONES UR QL STRIP: NEGATIVE
LEUKOCYTE ESTERASE UR QL STRIP: NEGATIVE
LIPASE SERPL-CCNC: 36 U/L (ref 4–60)
LYMPHOCYTES # BLD AUTO: 1.93 K/UL (ref 1–4.8)
MAGNESIUM SERPL-MCNC: 2.2 MG/DL (ref 1.6–2.6)
MCH RBC QN AUTO: 29.4 PG (ref 27–31)
MCHC RBC AUTO-ENTMCNC: 32.4 G/DL (ref 32–36)
MCV RBC AUTO: 91 FL (ref 82–98)
NITRITE UR QL STRIP: NEGATIVE
NUCLEATED RBC (/100WBC) (SMH): 0 /100 WBC
PH UR STRIP: 6 [PH]
PLATELET # BLD AUTO: 313 K/UL (ref 150–450)
PMV BLD AUTO: 9.9 FL (ref 9.2–12.9)
POTASSIUM SERPL-SCNC: 3.4 MMOL/L (ref 3.5–5.1)
PROT SERPL-MCNC: 7.3 GM/DL (ref 6–8.4)
PROT UR QL STRIP: NEGATIVE
RBC # BLD AUTO: 4.52 M/UL (ref 4–5.4)
RELATIVE EOSINOPHIL (SMH): 5.6 % (ref 0–8)
RELATIVE LYMPHOCYTE (SMH): 27.1 % (ref 18–48)
RELATIVE MONOCYTE (SMH): 10.9 % (ref 4–15)
RELATIVE NEUTROPHIL (SMH): 55.3 % (ref 38–73)
SODIUM SERPL-SCNC: 138 MMOL/L (ref 136–145)
SP GR UR STRIP: 1.01
TROPONIN HIGH SENSITIVE (SMH): 3.5 PG/ML
UROBILINOGEN UR STRIP-ACNC: NEGATIVE EU/DL
WBC # BLD AUTO: 7.13 K/UL (ref 3.9–12.7)

## 2025-04-11 PROCEDURE — 63600175 PHARM REV CODE 636 W HCPCS: Performed by: EMERGENCY MEDICINE

## 2025-04-11 PROCEDURE — 80053 COMPREHEN METABOLIC PANEL: CPT | Performed by: NURSE PRACTITIONER

## 2025-04-11 PROCEDURE — 83690 ASSAY OF LIPASE: CPT | Performed by: EMERGENCY MEDICINE

## 2025-04-11 PROCEDURE — 83735 ASSAY OF MAGNESIUM: CPT | Performed by: NURSE PRACTITIONER

## 2025-04-11 PROCEDURE — 81003 URINALYSIS AUTO W/O SCOPE: CPT | Performed by: NURSE PRACTITIONER

## 2025-04-11 PROCEDURE — 96361 HYDRATE IV INFUSION ADD-ON: CPT

## 2025-04-11 PROCEDURE — 85025 COMPLETE CBC W/AUTO DIFF WBC: CPT | Performed by: NURSE PRACTITIONER

## 2025-04-11 PROCEDURE — 93010 ELECTROCARDIOGRAM REPORT: CPT | Mod: ,,, | Performed by: GENERAL PRACTICE

## 2025-04-11 PROCEDURE — 83880 ASSAY OF NATRIURETIC PEPTIDE: CPT | Performed by: NURSE PRACTITIONER

## 2025-04-11 PROCEDURE — 25000003 PHARM REV CODE 250: Performed by: EMERGENCY MEDICINE

## 2025-04-11 PROCEDURE — 96374 THER/PROPH/DIAG INJ IV PUSH: CPT

## 2025-04-11 PROCEDURE — 99285 EMERGENCY DEPT VISIT HI MDM: CPT | Mod: 25

## 2025-04-11 PROCEDURE — 84484 ASSAY OF TROPONIN QUANT: CPT | Performed by: NURSE PRACTITIONER

## 2025-04-11 PROCEDURE — 93005 ELECTROCARDIOGRAM TRACING: CPT | Performed by: GENERAL PRACTICE

## 2025-04-11 RX ORDER — FAMOTIDINE 10 MG/ML
20 INJECTION, SOLUTION INTRAVENOUS
Status: DISCONTINUED | OUTPATIENT
Start: 2025-04-11 | End: 2025-04-11

## 2025-04-11 RX ORDER — POTASSIUM CHLORIDE 750 MG/1
10 CAPSULE, EXTENDED RELEASE ORAL DAILY
Qty: 7 CAPSULE | Refills: 0 | Status: SHIPPED | OUTPATIENT
Start: 2025-04-11 | End: 2025-04-18

## 2025-04-11 RX ORDER — METOCLOPRAMIDE 5 MG/1
5 TABLET ORAL EVERY 8 HOURS PRN
Qty: 12 TABLET | Refills: 0 | Status: SHIPPED | OUTPATIENT
Start: 2025-04-11 | End: 2025-04-14

## 2025-04-11 RX ORDER — ONDANSETRON HYDROCHLORIDE 2 MG/ML
4 INJECTION, SOLUTION INTRAVENOUS
Status: COMPLETED | OUTPATIENT
Start: 2025-04-11 | End: 2025-04-11

## 2025-04-11 RX ADMIN — ONDANSETRON 4 MG: 2 INJECTION INTRAMUSCULAR; INTRAVENOUS at 07:04

## 2025-04-11 RX ADMIN — POTASSIUM BICARBONATE 40 MEQ: 782 TABLET, EFFERVESCENT ORAL at 09:04

## 2025-04-11 RX ADMIN — SODIUM CHLORIDE 1000 ML: 9 INJECTION, SOLUTION INTRAVENOUS at 07:04

## 2025-04-11 NOTE — FIRST PROVIDER EVALUATION
Emergency Department TeleTriage Encounter Note      CHIEF COMPLAINT    Chief Complaint   Patient presents with    abnormal labs     Pt has been on abx for a UTI. C/o vomiting and diarrhea x 4 days. Pt had labs done this morning and was told her potassium was low.        VITAL SIGNS   Initial Vitals   BP Pulse Resp Temp SpO2   04/11/25 1717 04/11/25 1717 04/11/25 1718 04/11/25 1717 04/11/25 1717   133/75 94 18 98.7 °F (37.1 °C) 95 %      MAP       --                   ALLERGIES    Review of patient's allergies indicates:  No Known Allergies    PROVIDER TRIAGE NOTE  Has been on antibiotics for UTI, completed yesterday. Waking up for the past few mornings with nausea. Seen by PCP yesterday and had low potassium. Continuing to vomit even with zofran and phenergan. Family reports she has complained of dizziness and feels generally weak. Also began having palpitations upon arrival to ED.     Limited physical exam via telehealth: The patient is awake, alert, answering questions appropriately and is not in respiratory distress.  As the Teletriage provider, I performed an initial assessment and ordered appropriate labs and imaging studies, if any, to facilitate the patient's care once placed in the ED. Once a room is available, care and a full evaluation will be completed by an alternate ED provider.  Any additional orders and the final disposition will be determined by that provider.  All imaging and labs will not be followed-up by the Teletriage Team, including myself.          ORDERS  Labs Reviewed - No data to display    ED Orders (720h ago, onward)      Start Ordered     Status Ordering Provider    04/11/25 1732 04/11/25 1732  Magnesium  STAT         Ordered GUERA SCHULTZ    04/11/25 1732 04/11/25 1732  EKG 12-lead  Once         Ordered GUERA SCHULTZ    04/11/25 1732 04/11/25 1732  Troponin I High Sensitivity  Add-on         Ordered GUERA SCHULTZ    04/11/25 1732 04/11/25 1732  Brain natriuretic peptide   STAT         Ordered GUERA SCHULTZ.    04/11/25 1732 04/11/25 1732  X-Ray Chest PA And Lateral  1 time imaging         Ordered GUERA SCHULTZ.    04/11/25 1731 04/11/25 1730  CBC auto differential  STAT         Ordered ZEUSDAGUERA GOMEZ.    04/11/25 1731 04/11/25 1730  Comprehensive metabolic panel  STAT         Ordered ZEUSDAGUERA GOMEZ.    04/11/25 1731 04/11/25 1730  Urinalysis, Reflex to Urine Culture  STAT         Ordered GUERA SCHULTZ.    04/11/25 1731 04/11/25 1730  CBC with Differential  PROCEDURE ONCE         Ordered GUERA SCHULTZ    04/11/25 1729 04/11/25 1728  Saline lock IV  Once         Ordered GUERA SCHULTZ              Virtual Visit Note: The provider triage portion of this emergency department evaluation and documentation was performed via [employer], a HIPAA-compliant telemedicine application, in concert with a tele-presenter in the room. A face to face patient evaluation with one of my colleagues will occur once the patient is placed in an emergency department room.      DISCLAIMER: This note was prepared with [employer] voice recognition transcription software. Garbled syntax, mangled pronouns, and other bizarre constructions may be attributed to that software system.

## 2025-04-11 NOTE — TELEPHONE ENCOUNTER
Spoke to pt daughter and asked her if she was taking her mom to the ER. She st she is going there now with her. I informed her that to reach out to the office if she has any other questions or concerns.

## 2025-04-11 NOTE — TELEPHONE ENCOUNTER
"Pt daughter calling stated her mom has been sick over a week. She saw NP on yesterday. Pt recently finished abx for UTI yesterday. Last 4 nights n/v and diarrhea has increases. This am they're was a streak of blood in the vomit. Vomited about 6 times today. Pt is taking phenergan, zofran, emetrol, nauzene. She gets dizzy when she turns. Care advice recommends pt go to ER. Pts daughter verbalized understanding.   Reason for Disposition   Feeling weak or lightheaded (e.g., woozy, feeling like they might faint)    Additional Information   Negative: Shock suspected (e.g., cold/pale/clammy skin, too weak to stand, low BP, rapid pulse)   Negative: Difficult to awaken or acting confused (e.g., disoriented, slurred speech)   Negative: Unable to walk, or can only walk with assistance (e.g., requires support)   Negative: Fainted   Negative: [1] Vomited blood AND [2] large amount (example: "a cup of blood")   Negative: Rectal bleeding (i.e., bloody stool, blood in stool)   Negative: Sounds like a life-threatening emergency to the triager    Protocols used: Vomiting Blood-A-AH    "

## 2025-04-12 LAB
BACTERIA UR CULT: NORMAL
OHS QRS DURATION: 96 MS
OHS QTC CALCULATION: 455 MS

## 2025-04-12 NOTE — DISCHARGE INSTRUCTIONS
You may try Reglan as an alternative to the promethazine for nausea.  Please return to the emergency room for any acutely worsening symptoms or concerns.

## 2025-04-12 NOTE — ED PROVIDER NOTES
Encounter Date: 4/11/2025       History     Chief Complaint   Patient presents with    abnormal labs     Pt has been on abx for a UTI. C/o vomiting and diarrhea x 4 days. Pt had labs done this morning and was told her potassium was low.      HPI  Patient with PMH hypothyroidism, hypertension, dementia, anxiety/depression, GERD, seasonal allergies presents to the ED accompanied by her daughter who assists with history for 4 days of intermittent nausea/vomiting and diarrhea, NBNB.  She has had some mild associated epigastric discomfort.  Daughter also reports increased nasal congestion and a mild dry cough.  She was recently treated for UTI and completed a course of Macrobid yesterday.  She was re-evaluated by her PCP yesterday who performed labs and right upper quadrant ultrasound which was unremarkable except for mild hypokalemia of 3.3.  Potassium prescription sent in but has not yet started.  The patient has been alternating promethazine and Zofran as well as Emetrol choose and various other OTC antacids for her symptoms.  Daughter states it typically worsens around 2:00 a.m. and wakes her from sleep.  Patient denies any chest pain, difficulty breathing, fever, known sick contacts.  She has had headaches as well but this is not uncommon for her and they feel typical of previous headaches.  A urine culture was also sent yesterday due to persistent leukocyte esterase on repeat dipstick urine.  Daughter concerned that patient has had continued symptoms and generalized weakness prompting ED visit today.    Review of patient's allergies indicates:  No Known Allergies  Past Medical History:   Diagnosis Date    Depression     GERD (gastroesophageal reflux disease)     Hyperlipidemia     Hypertension     Hypothyroid      Past Surgical History:   Procedure Laterality Date    APPENDECTOMY      COLONOSCOPY  2017    Briana, repeat in 10    TONSILLECTOMY      TUBAL LIGATION       Family History   Problem Relation Name Age of  Onset    Cancer Father      Cancer Sister      Breast cancer Daughter      Glaucoma Neg Hx      Macular degeneration Neg Hx      Retinal detachment Neg Hx      Ovarian cancer Neg Hx      Eczema Neg Hx      Lupus Neg Hx      Melanoma Neg Hx      Psoriasis Neg Hx       Social History[1]  Review of Systems   Constitutional:  Positive for appetite change and fatigue. Negative for fever.   HENT:  Positive for congestion and ear pain. Negative for sore throat.    Respiratory:  Positive for cough. Negative for shortness of breath.    Cardiovascular:  Negative for chest pain.   Gastrointestinal:  Positive for abdominal pain, diarrhea, nausea and vomiting.   Genitourinary:  Negative for dysuria.   Musculoskeletal:  Negative for back pain.   Skin:  Negative for rash.   Neurological:  Negative for weakness.   Hematological:  Does not bruise/bleed easily.       Physical Exam     Initial Vitals   BP Pulse Resp Temp SpO2   04/11/25 1717 04/11/25 1717 04/11/25 1718 04/11/25 1717 04/11/25 1717   133/75 94 18 98.7 °F (37.1 °C) 95 %      MAP       --                Physical Exam    Nursing note and vitals reviewed.  Constitutional: She appears well-developed and well-nourished. No distress.   HENT:   Head: Normocephalic and atraumatic.   Nose: Nose normal. Mouth/Throat: Oropharynx is clear and moist. No oropharyngeal exudate.   Bilateral TMs clear.  Mild serous appearing fluid of the right TM.  No erythema.  No sinus tenderness to palpation.   Eyes: EOM are normal. Pupils are equal, round, and reactive to light.   Neck: Neck supple.   Normal range of motion.  Cardiovascular:  Normal rate and regular rhythm.           Pulmonary/Chest: Breath sounds normal. No respiratory distress.   Abdominal: Abdomen is soft. There is no abdominal tenderness. There is no rebound and no guarding.   Musculoskeletal:         General: No tenderness or edema. Normal range of motion.      Cervical back: Normal range of motion and neck supple.      Neurological: She is alert and oriented to person, place, and time. She has normal strength. No cranial nerve deficit. GCS score is 15. GCS eye subscore is 4. GCS verbal subscore is 5. GCS motor subscore is 6.   Skin: Skin is warm and dry. Capillary refill takes less than 2 seconds. No rash noted.   Psychiatric: She has a normal mood and affect. Thought content normal.         ED Course   Procedures  Labs Reviewed   COMPREHENSIVE METABOLIC PANEL - Abnormal       Result Value    Sodium 138      Potassium 3.4 (*)     Chloride 102      CO2 26      Glucose 100      BUN 7 (*)     Creatinine 1.0      Calcium 9.9      Protein Total 7.3      Albumin 4.2      Bilirubin Total 0.5      ALP 63      AST 15      ALT 16      Anion Gap 10      eGFR 60 (*)    URINALYSIS, REFLEX TO URINE CULTURE - Normal    Color, UA Yellow      Appearance, UA Clear      Spec Grav UA 1.010      pH, UA 6.0      Protein, UA Negative      Glucose, UA Negative      Ketones, UA Negative      Blood, UA Negative      Bilirubin, UA Negative      Urobilinogen, UA Negative      Nitrites, UA Negative      Leukocyte Esterase, UA Negative     CBC WITH DIFFERENTIAL - Normal    WBC 7.13      RBC 4.52      Hgb 13.3      Hct 41.0      MCV 91      MCH 29.4      MCHC 32.4      RDW 12.4      Platelet Count 313      MPV 9.9      Nucleated RBC 0      Neut % 55.3      Lymph % 27.1      Mono % 10.9      Eos % 5.6      Basophil % 0.7      Imm Grans % 0.4      Neut # 3.9      Lymph # 1.93      Mono # 0.78      Eos # 0.40      Baso # 0.05      Imm Grans # 0.03     MAGNESIUM - Normal    Magnesium 2.2     TROPONIN I HIGH SENSITIVITY - Normal    Troponin High Sensitive 3.5     B-TYPE NATRIURETIC PEPTIDE - Normal    BNP 55     LIPASE - Normal    Lipase Level 36     CBC W/ AUTO DIFFERENTIAL    Narrative:     The following orders were created for panel order CBC auto differential.  Procedure                               Abnormality         Status                     ---------                                -----------         ------                     CBC with Differential[[id number]]       Normal              Final result                 Please view results for these tests on the individual orders.     EKG Readings: (Independently Interpreted)   NSR, HR 82, incomplete right bundle branch block pattern, criteria suggestive of LVH with nonspecific ST abnormalities.  No STEMI.  No significant change from prior.       Imaging Results              X-Ray Chest PA And Lateral (Final result)  Result time 04/11/25 19:49:04      Final result by Shukri Seaman MD (04/11/25 19:49:04)                   Impression:      No acute findings      Electronically signed by: Shukri Seaman  Date:    04/11/2025  Time:    19:49               Narrative:    EXAMINATION:  XR CHEST PA AND LATERAL    CLINICAL HISTORY:  Palpitations    TECHNIQUE:  PA and lateral views of the chest were performed.    COMPARISON:  11/15/2024    FINDINGS:  Lungs are clear. No focal consolidation. No pleural effusion. No pneumothorax. Normal heart size.                                       Medications   sodium chloride 0.9% bolus 1,000 mL 1,000 mL (0 mLs Intravenous Stopped 4/11/25 2145)   ondansetron injection 4 mg (4 mg Intravenous Given 4/11/25 1946)   potassium bicarbonate disintegrating tablet 40 mEq (40 mEq Oral Given 4/11/25 2124)     Medical Decision Making  Amount and/or Complexity of Data Reviewed  External Data Reviewed: notes.  Labs: ordered. Decision-making details documented in ED Course.  Radiology: ordered and independent interpretation performed. Decision-making details documented in ED Course.  ECG/medicine tests: ordered and independent interpretation performed. Decision-making details documented in ED Course.    Risk  Prescription drug management.                     Patient presents to ED as above.  Vitals stable.  Differential includes not limited to viral illness, less likely pancreatitis,  cholecystitis, hepatitis, metabolic derangement, ACS, UTI.  All labs reviewed by me and significant for normal WBC count, normal hemoglobin, K of 3.4 with otherwise stable electrolytes and renal function, normal BNP, negative troponin, normal lipase.  UA here is clear and not suggestive of UTI.  Chest x-ray independently reviewed by me and shows no acute cardiopulmonary process per radiology read.  Patient treated with 1 L of normal saline, IV Zofran and 40 mEq potassium liquid which patient tolerated and had no vomiting here.  She believes she feels well for discharge.  Unclear etiology of her symptoms, could potentially be a gastroenteritis.  Her abdominal exam is benign.  I do not feel she needs any additional advanced imaging at this time.  She also denies any current abdominal pain.  Daughter requests alternative nausea medication.  I suggested we trial Reglan in the place of promethazine.  She was advised if this did not seem to help, she could alternatively resume the promethazine.  She verbalizes understanding.  They would like to go home and continue outpatient treatment.  Advised to follow urine culture through PCP.  Strict ED return precautions discussed and provided.                 Clinical Impression:  Final diagnoses:  [R00.2] Palpitations  [R11.2, R19.7] Nausea vomiting and diarrhea (Primary)  [E87.6] Hypokalemia          ED Disposition Condition    Discharge Stable          ED Prescriptions       Medication Sig Dispense Start Date End Date Auth. Provider    metoclopramide HCl (REGLAN) 5 MG tablet Take 1 tablet (5 mg total) by mouth every 8 (eight) hours as needed (nausea/vomiting). 12 tablet 4/11/2025 -- Anushka Mills MD          Follow-up Information       Follow up With Specialties Details Why Contact Info Additional Information    Oscar Lee FNP-C Family Medicine Schedule an appointment as soon as possible for a visit   [address]  [address]  [place]  [address]  [phone]       [facility] - Emergency Dept Emergency Medicine  As needed, If symptoms worsen [address] [address]  [phone] 1st floor                 [1]   Social History  Tobacco Use    Smoking status: Never    Smokeless tobacco: Never   Substance Use Topics    Alcohol use: Not Currently     Comment: rarely    Drug use: No        Anushka Mills MD  04/12/25 0205

## 2025-04-13 ENCOUNTER — PATIENT MESSAGE (OUTPATIENT)
Dept: FAMILY MEDICINE | Facility: CLINIC | Age: 75
End: 2025-04-13
Payer: MEDICARE

## 2025-04-13 ENCOUNTER — ON-DEMAND VIRTUAL (OUTPATIENT)
Dept: URGENT CARE | Facility: CLINIC | Age: 75
End: 2025-04-13
Payer: MEDICARE

## 2025-04-13 DIAGNOSIS — R11.0 NAUSEA: Primary | ICD-10-CM

## 2025-04-13 NOTE — PROGRESS NOTES
Subjective:      Patient ID: Skyler Espinoza is a 74 y.o. female.    Vitals:  vitals were not taken for this visit.     Chief Complaint: Nausea      Visit Type: TELE AUDIOVISUAL - This visit was conducted virtually based on  subjective information and limited objective exam    Present with the patient at the time of consultation: TELEMED PRESENT WITH PATIENT: family member  LOCATION OF PATIENT [facility] [place]  Two patient identifiers used to verify patient- saying out date of birth and full name.       Past Medical History:   Diagnosis Date    Depression     GERD (gastroesophageal reflux disease)     Hyperlipidemia     Hypertension     Hypothyroid      Past Surgical History:   Procedure Laterality Date    APPENDECTOMY      COLONOSCOPY  2017    Briana, repeat in 10    TONSILLECTOMY      TUBAL LIGATION       Review of patient's allergies indicates:  No Known Allergies  Medications Ordered Prior to Encounter[1]  Family History   Problem Relation Name Age of Onset    Cancer Father      Cancer Sister      Breast cancer Daughter      Glaucoma Neg Hx      Macular degeneration Neg Hx      Retinal detachment Neg Hx      Ovarian cancer Neg Hx      Eczema Neg Hx      Lupus Neg Hx      Melanoma Neg Hx      Psoriasis Neg Hx             [facility] Peq Odvv Intake    4/13/2025  8:31 AM CDT - Filed by Patient   What is your current physical address in the event of a medical emergency? [id number], [place] [id number]   Are you able to take your vital signs? Yes   Systolic Blood Pressure: 137   Diastolic Blood Pressure: 86   Weight:    Height: 68   Pulse: 71   Temperature: 97.6   Respiration rate: 14   Pulse Oxygen: 97   Please attach any relevant images or files    Is your employer contracted with [facility]? No         75 yo female with daughter with c/o nausea and vomiting and diarrhea. Hx per daughter... she states she went to ED for persistent nausea. They gave her Reglan. She had been on abx on 4/10. US of abdomen was  normal. She is on protonix and famotadine. Daughter states zofran and reglan . She was on promethazine but it did not help. She has alzheimers and was having anxiety and looping thoughts. She did have diarrhea but resolved after abx.     She did telemedicine visit for at home positive uti test and was put on abx for uti.   She had progressive uncontrolled nausea  She had urine dip with culture but had been in abx at time.                 Constitution: Negative.   HENT: Negative.     Cardiovascular: Negative.    Respiratory: Negative.     Gastrointestinal: Negative.    Endocrine: negative.   Genitourinary: Negative.  Negative for frequency and urgency.   Musculoskeletal: Negative.    Skin: Negative.    Allergic/Immunologic: Negative.    Neurological: Negative.    Hematologic/Lymphatic: Negative.    Psychiatric/Behavioral: Negative.          Objective:   The physical exam was conducted virtually.    AAO x 3 ; no acute distress noted; appearance normal; mood and behavior normal; thought process normal  Head- normocephalic  Nose- appears normal, no discharge or erythema  Eyes- pupils appear normal in size, no drainage, no erythema  Ears- normal appearing; no discharge, no erythema  Mouth- appears normal  Oropharynx- no erythema, lesions  Lungs- breathing at a normal rate, no acute distress noted  Heart- no reports of tachycardia, palpitations, chest pain  Abdomen- non distended, non tender reported by patient  Skin- warm and dry, no erythema or edema noted by patient or visualized  Psych- as above; no si/hi    Ed notes  Expand All Collapse All  Encounter Date: 4/11/2025        History           Chief Complaint   Patient presents with    abnormal labs       Pt has been on abx for a UTI. C/o vomiting and diarrhea x 4 days. Pt had labs done this morning and was told her potassium was low.       HPI  Patient with PMH hypothyroidism, hypertension, dementia, anxiety/depression, GERD, seasonal allergies presents to the ED  accompanied by her daughter who assists with history for 4 days of intermittent nausea/vomiting and diarrhea, JOHN.  She has had some mild associated epigastric discomfort.  Daughter also reports increased nasal congestion and a mild dry cough.  She was recently treated for UTI and completed a course of Macrobid yesterday.  She was re-evaluated by her PCP yesterday who performed labs and right upper quadrant ultrasound which was unremarkable except for mild hypokalemia of 3.3.  Potassium prescription sent in but has not yet started.  The patient has been alternating promethazine and Zofran as well as Emetrol choose and various other OTC antacids for her symptoms.  Daughter states it typically worsens around 2:00 a.m. and wakes her from sleep.  Patient denies any chest pain, difficulty breathing, fever, known sick contacts.  She has had headaches as well but this is not uncommon for her and they feel typical of previous headaches.  A urine culture was also sent yesterday due to persistent leukocyte esterase on repeat dipstick urine.  Daughter concerned that patient has had continued symptoms and generalized weakness prompting ED visit today.          Results for orders placed or performed during the hospital encounter of 04/11/25   Comprehensive metabolic panel    Collection Time: 04/11/25  6:56 PM   Result Value Ref Range    Sodium 138 136 - 145 mmol/L    Potassium 3.4 (L) 3.5 - 5.1 mmol/L    Chloride 102 95 - 110 mmol/L    CO2 26 23 - 29 mmol/L    Glucose 100 70 - 110 mg/dL    BUN 7 (L) 8 - 23 mg/dL    Creatinine 1.0 0.5 - 1.4 mg/dL    Calcium 9.9 8.7 - 10.5 mg/dL    Protein Total 7.3 6.0 - 8.4 gm/dL    Albumin 4.2 3.5 - 5.2 g/dL    Bilirubin Total 0.5 0.1 - 1.0 mg/dL    ALP 63 55 - 135 unit/L    AST 15 10 - 40 unit/L    ALT 16 10 - 44 unit/L    Anion Gap 10 8 - 16 mmol/L    eGFR 60 (L) >60 mL/min/1.73/m2   CBC with Differential    Collection Time: 04/11/25  6:56 PM   Result Value Ref Range    WBC 7.13 3.90 - 12.70  K/uL    RBC 4.52 4.00 - 5.40 M/uL    Hgb 13.3 12.0 - 16.0 gm/dL    Hct 41.0 37.0 - 48.5 %    MCV 91 82 - 98 fL    MCH 29.4 27.0 - 31.0 pg    MCHC 32.4 32.0 - 36.0 g/dL    RDW 12.4 11.5 - 14.5 %    Platelet Count 313 150 - 450 K/uL    MPV 9.9 9.2 - 12.9 fL    Nucleated RBC 0 <=0 /100 WBC    Neut % 55.3 38 - 73 %    Lymph % 27.1 18 - 48 %    Mono % 10.9 4 - 15 %    Eos % 5.6 0 - 8 %    Basophil % 0.7 <=1.9 %    Imm Grans % 0.4 0.0 - 0.5 %    Neut # 3.9 1.8 - 7.7 K/uL    Lymph # 1.93 1 - 4.8 K/uL    Mono # 0.78 0.3 - 1 K/uL    Eos # 0.40 <=0.5 K/uL    Baso # 0.05 <=0.2 K/uL    Imm Grans # 0.03 0.00 - 0.04 K/uL   Magnesium    Collection Time: 04/11/25  6:56 PM   Result Value Ref Range    Magnesium 2.2 1.6 - 2.6 mg/dL   Troponin I High Sensitivity    Collection Time: 04/11/25  6:56 PM   Result Value Ref Range    Troponin High Sensitive 3.5 <=14.9 pg/mL   Brain natriuretic peptide    Collection Time: 04/11/25  6:56 PM   Result Value Ref Range    BNP 55 <=99 pg/mL   Lipase    Collection Time: 04/11/25  6:56 PM   Result Value Ref Range    Lipase Level 36 4 - 60 U/L   Urinalysis, Reflex to Urine Culture    Collection Time: 04/11/25  9:07 PM    Specimen: Urine   Result Value Ref Range    Color, UA Yellow Yellow, Straw, Flor    Appearance, UA Clear Clear    Spec Grav UA 1.010 1.005 - 1.030    pH, UA 6.0 5.0 - 8.0    Protein, UA Negative Negative    Glucose, UA Negative Negative    Ketones, UA Negative Negative    Blood, UA Negative Negative    Bilirubin, UA Negative Negative    Urobilinogen, UA Negative <2.0 EU/dL    Nitrites, UA Negative Negative    Leukocyte Esterase, UA Negative Negative   EKG 12-lead    Collection Time: 04/11/25  9:48 PM   Result Value Ref Range    QRS Duration 96 ms    [facility] QTC Calculation 455 ms       Assessment:     1. Nausea        Plan:     Advised f/u with Dr. Lee ? Repeat Urine post abx ?  Ct Abd?    Patient is getting relief. With Reglan combination with zofran    ED precautions advised.      Thank you for choosing [facility]!    Our goal in the [facility] is to always provide outstanding medical care. You may receive a survey by mail or e-mail in the next week regarding your experience today. We would greatly appreciate you completing and returning the survey. Your feedback provides us with a way to recognize our staff who provide very good care, and it helps us learn how to improve when your experience was below our aspiration of excellence.         We appreciate you trusting us with your medical care. We hope you feel better soon. We will be happy to take care of you for all of your future medical needs.    You must understand that you've received an Urgent Care treatment only and that you may be released before all your medical problems are known or treated. You, the patient, will arrange for follow up care as instructed.    Follow up with your PCP or specialty clinic as directed in the next 1-2 weeks if not improved or as needed.  You can call [phone] to schedule an appointment with the appropriate provider.    If your condition worsens we recommend that you receive another evaluation in person, with your primary care provider, urgent care or at the emergency room immediately or contact your primary medical clinics after hours call service to discuss your concerns.         Nausea                         [1]   Current Outpatient Medications on File Prior to Visit   Medication Sig Dispense Refill    cetirizine (ZYRTEC) 10 MG tablet 1 tablet Orally Once a day      famotidine (PEPCID) 40 MG tablet Take 40 mg by mouth.      ibuprofen (ADVIL,MOTRIN) 800 MG tablet Take 1 tablet (800 mg total) by mouth every 8 (eight) hours as needed for Pain. 30 tablet 0    levothyroxine (SYNTHROID) 112 MCG tablet Take 1 tablet (112 mcg total) by mouth before breakfast. 30 tablet 11    lovastatin (MEVACOR) 20 MG tablet 1 tablet with the evening meal Orally Once a day  (Patient not taking: Reported on 4/10/2025)      metoclopramide HCl (REGLAN) 5 MG tablet Take 1 tablet (5 mg total) by mouth every 8 (eight) hours as needed (nausea/vomiting). 12 tablet 0    metoprolol succinate (TOPROL-XL) 100 MG 24 hr tablet Take 100 mg by mouth.      multivit-min-ferrous fumarate 9 mg iron/15 mL Liqd as directed      ondansetron (ZOFRAN-ODT) 8 MG TbDL Take 1 tablet (8 mg total) by mouth every 8 (eight) hours as needed (nausea and vomiting). 15 tablet 0    pantoprazole (PROTONIX) 40 MG tablet 1 tablet Orally Once a day      potassium chloride (MICRO-K) 10 MEQ CpSR Take 1 capsule (10 mEq total) by mouth once daily. for 7 doses 7 capsule 0    promethazine (PHENERGAN) 25 MG tablet Take 1 tablet (25 mg total) by mouth every 6 (six) hours as needed for Nausea. 30 tablet 0    rizatriptan (MAXALT) 10 MG tablet Take by mouth.      tiZANidine (ZANAFLEX) 4 MG tablet Take 4 mg by mouth nightly as needed.      triamterene-hydrochlorothiazide 37.5-25 mg (MAXZIDE-25) 37.5-25 mg per tablet 1 tablet in the morning Orally Once a day (Patient not taking: Reported on 4/10/2025)      valACYclovir (VALTREX) 1000 MG tablet        No current facility-administered medications on file prior to visit.

## 2025-04-14 ENCOUNTER — HOSPITAL ENCOUNTER (EMERGENCY)
Facility: HOSPITAL | Age: 75
Discharge: HOME | End: 2025-04-14
Attending: EMERGENCY MEDICINE
Payer: MEDICARE

## 2025-04-14 VITALS
WEIGHT: 230 LBS | TEMPERATURE: 98 F | OXYGEN SATURATION: 95 % | RESPIRATION RATE: 19 BRPM | HEIGHT: 68 IN | BODY MASS INDEX: 34.86 KG/M2 | HEART RATE: 74 BPM | DIASTOLIC BLOOD PRESSURE: 118 MMHG | SYSTOLIC BLOOD PRESSURE: 141 MMHG

## 2025-04-14 DIAGNOSIS — R10.84 GENERALIZED ABDOMINAL PAIN: ICD-10-CM

## 2025-04-14 DIAGNOSIS — R11.2 NAUSEA AND VOMITING, UNSPECIFIED VOMITING TYPE: Primary | ICD-10-CM

## 2025-04-14 LAB
ABSOLUTE EOSINOPHIL (SMH): 0.19 K/UL
ABSOLUTE MONOCYTE (SMH): 0.49 K/UL (ref 0.3–1)
ABSOLUTE NEUTROPHIL COUNT (SMH): 4 K/UL (ref 1.8–7.7)
ALBUMIN SERPL-MCNC: 3.8 G/DL (ref 3.5–5.2)
ALP SERPL-CCNC: 56 UNIT/L (ref 55–135)
ALT SERPL-CCNC: 18 UNIT/L (ref 10–44)
ANION GAP (SMH): 6 MMOL/L (ref 8–16)
AST SERPL-CCNC: 17 UNIT/L (ref 10–40)
BASOPHILS # BLD AUTO: 0.04 K/UL
BASOPHILS NFR BLD AUTO: 0.6 %
BILIRUB SERPL-MCNC: 0.4 MG/DL (ref 0.1–1)
BILIRUB UR QL STRIP.AUTO: NEGATIVE
BUN SERPL-MCNC: 6 MG/DL (ref 8–23)
CALCIUM SERPL-MCNC: 9.2 MG/DL (ref 8.7–10.5)
CHLORIDE SERPL-SCNC: 108 MMOL/L (ref 95–110)
CLARITY UR: CLEAR
CO2 SERPL-SCNC: 25 MMOL/L (ref 23–29)
COLOR UR AUTO: COLORLESS
CREAT SERPL-MCNC: 1 MG/DL (ref 0.5–1.4)
ERYTHROCYTE [DISTWIDTH] IN BLOOD BY AUTOMATED COUNT: 12.3 % (ref 11.5–14.5)
GFR SERPLBLD CREATININE-BSD FMLA CKD-EPI: 60 ML/MIN/1.73/M2
GLUCOSE SERPL-MCNC: 104 MG/DL (ref 70–110)
GLUCOSE UR QL STRIP: NEGATIVE
HCT VFR BLD AUTO: 38.6 % (ref 37–48.5)
HGB BLD-MCNC: 12.7 GM/DL (ref 12–16)
HGB UR QL STRIP: NEGATIVE
HYALINE CASTS UR QL AUTO: 1 /LPF (ref 0–1)
IMM GRANULOCYTES # BLD AUTO: 0.02 K/UL (ref 0–0.04)
IMM GRANULOCYTES NFR BLD AUTO: 0.3 % (ref 0–0.5)
INFLUENZA A MOLECULAR (OHS): NEGATIVE
INFLUENZA B MOLECULAR (OHS): NEGATIVE
KETONES UR QL STRIP: NEGATIVE
LEUKOCYTE ESTERASE UR QL STRIP: ABNORMAL
LIPASE SERPL-CCNC: 46 U/L (ref 4–60)
LYMPHOCYTES # BLD AUTO: 1.6 K/UL (ref 1–4.8)
MCH RBC QN AUTO: 29.9 PG (ref 27–31)
MCHC RBC AUTO-ENTMCNC: 32.9 G/DL (ref 32–36)
MCV RBC AUTO: 91 FL (ref 82–98)
MICROSCOPIC COMMENT: NORMAL
NITRITE UR QL STRIP: NEGATIVE
NUCLEATED RBC (/100WBC) (SMH): 0 /100 WBC
PH UR STRIP: 6 [PH]
PLATELET # BLD AUTO: 274 K/UL (ref 150–450)
PMV BLD AUTO: 9.7 FL (ref 9.2–12.9)
POTASSIUM SERPL-SCNC: 3.7 MMOL/L (ref 3.5–5.1)
PROT SERPL-MCNC: 6.6 GM/DL (ref 6–8.4)
PROT UR QL STRIP: NEGATIVE
RBC # BLD AUTO: 4.25 M/UL (ref 4–5.4)
RBC #/AREA URNS AUTO: 1 /HPF
RELATIVE EOSINOPHIL (SMH): 3 % (ref 0–8)
RELATIVE LYMPHOCYTE (SMH): 25.4 % (ref 18–48)
RELATIVE MONOCYTE (SMH): 7.8 % (ref 4–15)
RELATIVE NEUTROPHIL (SMH): 62.9 % (ref 38–73)
SARS-COV-2 RDRP RESP QL NAA+PROBE: NEGATIVE
SODIUM SERPL-SCNC: 139 MMOL/L (ref 136–145)
SP GR UR STRIP: 1
SQUAMOUS #/AREA URNS AUTO: 1 /HPF
UROBILINOGEN UR STRIP-ACNC: NEGATIVE EU/DL
WBC # BLD AUTO: 6.31 K/UL (ref 3.9–12.7)
WBC #/AREA URNS AUTO: 2 /HPF

## 2025-04-14 PROCEDURE — 96374 THER/PROPH/DIAG INJ IV PUSH: CPT

## 2025-04-14 PROCEDURE — 25000003 PHARM REV CODE 250: Performed by: EMERGENCY MEDICINE

## 2025-04-14 PROCEDURE — 83690 ASSAY OF LIPASE: CPT | Performed by: EMERGENCY MEDICINE

## 2025-04-14 PROCEDURE — 63600175 PHARM REV CODE 636 W HCPCS: Performed by: EMERGENCY MEDICINE

## 2025-04-14 PROCEDURE — 81003 URINALYSIS AUTO W/O SCOPE: CPT | Performed by: EMERGENCY MEDICINE

## 2025-04-14 PROCEDURE — U0002 COVID-19 LAB TEST NON-CDC: HCPCS | Performed by: EMERGENCY MEDICINE

## 2025-04-14 PROCEDURE — 99285 EMERGENCY DEPT VISIT HI MDM: CPT | Mod: 25

## 2025-04-14 PROCEDURE — 96361 HYDRATE IV INFUSION ADD-ON: CPT

## 2025-04-14 PROCEDURE — 87502 INFLUENZA DNA AMP PROBE: CPT | Performed by: EMERGENCY MEDICINE

## 2025-04-14 PROCEDURE — 25500020 PHARM REV CODE 255: Performed by: EMERGENCY MEDICINE

## 2025-04-14 PROCEDURE — 82040 ASSAY OF SERUM ALBUMIN: CPT | Performed by: EMERGENCY MEDICINE

## 2025-04-14 PROCEDURE — 85025 COMPLETE CBC W/AUTO DIFF WBC: CPT | Performed by: EMERGENCY MEDICINE

## 2025-04-14 RX ORDER — METOCLOPRAMIDE 5 MG/1
5 TABLET ORAL EVERY 8 HOURS PRN
Qty: 12 TABLET | Refills: 0 | Status: SHIPPED | OUTPATIENT
Start: 2025-04-14

## 2025-04-14 RX ORDER — ONDANSETRON 8 MG/1
8 TABLET, ORALLY DISINTEGRATING ORAL EVERY 8 HOURS PRN
Qty: 15 TABLET | Refills: 0 | Status: SHIPPED | OUTPATIENT
Start: 2025-04-14

## 2025-04-14 RX ORDER — DROPERIDOL 2.5 MG/ML
2.5 INJECTION, SOLUTION INTRAMUSCULAR; INTRAVENOUS
Status: COMPLETED | OUTPATIENT
Start: 2025-04-14 | End: 2025-04-14

## 2025-04-14 RX ADMIN — IOHEXOL 100 ML: 350 INJECTION, SOLUTION INTRAVENOUS at 10:04

## 2025-04-14 RX ADMIN — DROPERIDOL 2.5 MG: 2.5 INJECTION, SOLUTION INTRAMUSCULAR; INTRAVENOUS at 08:04

## 2025-04-14 RX ADMIN — SODIUM CHLORIDE 1000 ML: 9 INJECTION, SOLUTION INTRAVENOUS at 08:04

## 2025-04-14 NOTE — ED PROVIDER NOTES
Encounter Date: 4/14/2025       History     Chief Complaint   Patient presents with    Vomiting    Nausea     Pt seen and treated for a UTI and prescribed antibiotics.  Pt c/o nausea and vomiting since starting the antibiotics     73 yo F pw nausea and vomiting. Reports mild crampy abdominal pain. Denies diarrhea. Has been dealing with similar intermittent symptoms for a month. Recently dx w UT. Patient also on THC gummy for dementia which has been helping. Given zofran in route by EMS. Denies fevers, chills, cough, CP, SOB, N/V/D, abdominal pain, dysuria, hematuria or any other complaints.        The history is provided by the patient. No  was used.     Review of patient's allergies indicates:  No Known Allergies  Past Medical History:   Diagnosis Date    Depression     GERD (gastroesophageal reflux disease)     Hyperlipidemia     Hypertension     Hypothyroid      Past Surgical History:   Procedure Laterality Date    APPENDECTOMY      COLONOSCOPY  2017    Briana, repeat in 10    TONSILLECTOMY      TUBAL LIGATION       Family History   Problem Relation Name Age of Onset    Cancer Father      Cancer Sister      Breast cancer Daughter      Glaucoma Neg Hx      Macular degeneration Neg Hx      Retinal detachment Neg Hx      Ovarian cancer Neg Hx      Eczema Neg Hx      Lupus Neg Hx      Melanoma Neg Hx      Psoriasis Neg Hx       Social History[1]  Review of Systems    Physical Exam     Initial Vitals [04/14/25 0647]   BP Pulse Resp Temp SpO2   (!) 156/109 89 16 97.9 °F (36.6 °C) 96 %      MAP       --         Physical Exam    Nursing note and vitals reviewed.  Constitutional: She appears well-developed. She is not diaphoretic. No distress.   HENT:   Head: Normocephalic and atraumatic.   Nose: Nose normal.   Eyes: EOM are normal. No scleral icterus.   Neck: Neck supple.   Normal range of motion.  Cardiovascular:  Normal rate, regular rhythm, normal heart sounds and intact distal pulses.     Exam  reveals no gallop and no friction rub.       No murmur heard.  Pulmonary/Chest: Breath sounds normal. No stridor. No respiratory distress. She has no wheezes. She has no rhonchi. She has no rales.   Abdominal: Abdomen is soft. Bowel sounds are normal. She exhibits no distension. There is no abdominal tenderness. There is no rebound and no guarding.   Musculoskeletal:         General: Normal range of motion.      Cervical back: Normal range of motion and neck supple.     Neurological: She is alert and oriented to person, place, and time. She has normal strength. No cranial nerve deficit or sensory deficit.   Skin: Skin is warm and dry. Capillary refill takes less than 2 seconds. No rash noted.   Psychiatric: She has a normal mood and affect.         ED Course   Procedures  Labs Reviewed   COMPREHENSIVE METABOLIC PANEL - Abnormal       Result Value    Sodium 139      Potassium 3.7      Chloride 108      CO2 25      Glucose 104      BUN 6 (*)     Creatinine 1.0      Calcium 9.2      Protein Total 6.6      Albumin 3.8      Bilirubin Total 0.4      ALP 56      AST 17      ALT 18      Anion Gap 6 (*)     eGFR 60 (*)    URINALYSIS, REFLEX TO URINE CULTURE - Abnormal    Color, UA Colorless (*)     Appearance, UA Clear      Spec Grav UA 1.005      pH, UA 6.0      Protein, UA Negative      Glucose, UA Negative      Ketones, UA Negative      Blood, UA Negative      Bilirubin, UA Negative      Urobilinogen, UA Negative      Nitrites, UA Negative      Leukocyte Esterase, UA Trace (*)    LIPASE - Normal    Lipase Level 46     CBC WITH DIFFERENTIAL - Normal    WBC 6.31      RBC 4.25      Hgb 12.7      Hct 38.6      MCV 91      MCH 29.9      MCHC 32.9      RDW 12.3      Platelet Count 274      MPV 9.7      Nucleated RBC 0      Neut % 62.9      Lymph % 25.4      Mono % 7.8      Eos % 3.0      Basophil % 0.6      Imm Grans % 0.3      Neut # 4.0      Lymph # 1.60      Mono # 0.49      Eos # 0.19      Baso # 0.04      Imm Grans # 0.02      INFLUENZA A & B BY MOLECULAR   CBC W/ AUTO DIFFERENTIAL    Narrative:     The following orders were created for panel order CBC W/ AUTO DIFFERENTIAL.  Procedure                               Abnormality         Status                     ---------                               -----------         ------                     CBC with Differential[[id number]]       Normal              Final result                 Please view results for these tests on the individual orders.   URINALYSIS MICROSCOPIC    RBC, UA 1      WBC, UA 2      Squamous Epithelial Cells, UA 1      Hyaline Casts, UA 1      Microscopic Comment       SARS-COV-2 RNA AMPLIFICATION, QUAL          Imaging Results              CT Abdomen Pelvis With IV Contrast NO Oral Contrast (Final result)  Result time 04/14/25 10:20:30      Final result by Eduin Bueno MD (04/14/25 10:20:30)                   Impression:      1. Chronic, stable findings as above.  No acute intra-abdominal or intrapelvic abnormalities.      Electronically signed by: Eduin Bueno  Date:    04/14/2025  Time:    10:20               Narrative:    EXAMINATION:  CT ABDOMEN PELVIS WITH IV CONTRAST    CLINICAL HISTORY:  Abdominal pain, acute, nonlocalized;.    TECHNIQUE:  Post infusion axial images were obtained from the lung bases to the pubic symphysis 100 cc nonionic contrast was utilized for the examination.    COMPARISON:  Ultrasound April 2025; CT October 2024    FINDINGS:  Mild dependent atelectasis is noted at the lung bases.    Mild hepatic steatosis is noted.  The gallbladder and biliary tree are unremarkable.  The spleen, pancreas, and adrenal glands are normal.  There is no renal mass, calculus, or hydronephrosis.  The abdominal aorta is normal in caliber.    There is no pathologic bowel wall thickening or evidence of obstruction.  Small fat containing umbilical hernia is unchanged.  There is no free air or free fluid.    Images of the pelvis demonstrate multiple  sigmoid diverticula without evidence of diverticulitis.  The urinary bladder is normal.  There is no pelvic free fluid or lymphadenopathy.    No acute osseous abnormalities are identified.                                       Medications   sodium chloride 0.9% bolus 1,000 mL 1,000 mL (0 mLs Intravenous Stopped 4/14/25 0923)   droPERidol injection 2.5 mg (2.5 mg Intravenous Given 4/14/25 0823)   iohexoL (OMNIPAQUE 350) injection 100 mL (100 mLs Intravenous Given 4/14/25 1006)     Medical Decision Making  The cause of the patient's symptoms is not clear. Possibly due to either medication reaction (THC gummies), gastroparesis, food poisoning or viral gastrointestinal infection. Benign abdominal exam with no abdominal tenderness or signs of surgical abdomen. However given patient's age and comorbidities ordered CT abd/pelvis. Patient's symptoms not typical for other emergent causes of abdominal pain such as, but not limited to, appendicitis, abdominal aortic aneurysm, surgical biliary disease, pancreatitis, SBO, mesenteric ischemia, serious intra-abdominal bacterial illness.   Presentation also not typical of gynecologic emergencies such as TOA, Ovarian Torsion, PID. Not Ectopic.  Do not suspect ACS.  Pt tolerating PO and pain controlled.    After treatment, the patient is feeling much better, tolerating PO fluids, and shows no signs of dehydration. Suspect likely transient course of illness.    Plan discharge home with prompt primary care physician follow up in the next 48 hours.      Amount and/or Complexity of Data Reviewed  Labs: ordered.  Radiology: ordered.    Risk  Prescription drug management.               ED Course as of 04/14/25 1123   Mon Apr 14, 2025   1030 Impression:     1. Chronic, stable findings as above.  No acute intra-abdominal or intrapelvic abnormalities.        Electronically signed by:Eduin Bueno  Date:                                            04/14/2025  Time:                                            10:20   [BD]      ED Course User Index  [BD] Perry Mcgovern MD                           Clinical Impression:  Final diagnoses:  [R11.2] Nausea and vomiting, unspecified vomiting type (Primary)  [R10.84] Generalized abdominal pain          ED Disposition Condition    Discharge Stable          ED Prescriptions       Medication Sig Dispense Start Date End Date Auth. Provider    metoclopramide HCl (REGLAN) 5 MG tablet Take 1 tablet (5 mg total) by mouth every 8 (eight) hours as needed (nausea/vomiting). 12 tablet 4/14/2025 -- Perry Mcgovern MD    ondansetron (ZOFRAN-ODT) 8 MG TbDL Take 1 tablet (8 mg total) by mouth every 8 (eight) hours as needed (nausea and vomiting). 15 tablet 4/14/2025 -- Perry Mcgovern MD          Follow-up Information    None            [1]   Social History  Tobacco Use    Smoking status: Never    Smokeless tobacco: Never   Substance Use Topics    Alcohol use: Not Currently     Comment: rarely    Drug use: No        Perry Mcgovern MD  04/14/25 2237

## 2025-04-15 ENCOUNTER — NURSE TRIAGE (OUTPATIENT)
Dept: ADMINISTRATIVE | Facility: CLINIC | Age: 75
End: 2025-04-15
Payer: MEDICARE

## 2025-04-15 ENCOUNTER — OFFICE VISIT (OUTPATIENT)
Dept: GASTROENTEROLOGY | Facility: CLINIC | Age: 75
End: 2025-04-15
Payer: MEDICARE

## 2025-04-15 ENCOUNTER — TELEPHONE (OUTPATIENT)
Dept: FAMILY MEDICINE | Facility: CLINIC | Age: 75
End: 2025-04-15
Payer: MEDICARE

## 2025-04-15 VITALS
WEIGHT: 229.94 LBS | SYSTOLIC BLOOD PRESSURE: 122 MMHG | BODY MASS INDEX: 34.85 KG/M2 | DIASTOLIC BLOOD PRESSURE: 75 MMHG | HEIGHT: 68 IN | HEART RATE: 87 BPM

## 2025-04-15 DIAGNOSIS — R11.0 NAUSEA: Primary | ICD-10-CM

## 2025-04-15 DIAGNOSIS — R11.0 NAUSEA: ICD-10-CM

## 2025-04-15 DIAGNOSIS — R11.10 VOMITING, UNSPECIFIED VOMITING TYPE, UNSPECIFIED WHETHER NAUSEA PRESENT: ICD-10-CM

## 2025-04-15 DIAGNOSIS — R41.0 CONFUSION: Primary | ICD-10-CM

## 2025-04-15 PROCEDURE — 1159F MED LIST DOCD IN RCRD: CPT | Mod: CPTII,S$GLB,,

## 2025-04-15 PROCEDURE — 99999 PR PBB SHADOW E&M-EST. PATIENT-LVL V: CPT | Mod: PBBFAC,,,

## 2025-04-15 PROCEDURE — 1126F AMNT PAIN NOTED NONE PRSNT: CPT | Mod: CPTII,S$GLB,,

## 2025-04-15 PROCEDURE — 1101F PT FALLS ASSESS-DOCD LE1/YR: CPT | Mod: CPTII,S$GLB,,

## 2025-04-15 PROCEDURE — 3288F FALL RISK ASSESSMENT DOCD: CPT | Mod: CPTII,S$GLB,,

## 2025-04-15 PROCEDURE — 3008F BODY MASS INDEX DOCD: CPT | Mod: CPTII,S$GLB,,

## 2025-04-15 PROCEDURE — 3078F DIAST BP <80 MM HG: CPT | Mod: CPTII,S$GLB,,

## 2025-04-15 PROCEDURE — 99204 OFFICE O/P NEW MOD 45 MIN: CPT | Mod: S$GLB,,,

## 2025-04-15 PROCEDURE — 3074F SYST BP LT 130 MM HG: CPT | Mod: CPTII,S$GLB,,

## 2025-04-15 NOTE — TELEPHONE ENCOUNTER
"Spoke to pt daughter she st that this morning pt was calling things wrong. She st that she was telling her to get the pill but was referring to a tissue. She also referred to her sister as her daughter. Daughter st that today is the first time pt is not vomited today but still have persistent nausea. She st that he has a lack of appetite and she hardly getting any fluids down. She st that the on call nurse suggested to call emergency services to get her evaluated for stroke. She st that pt was able to to verbalized everything on EMS was on scene. She st that she able to say her name and passed all their neuro test. She st that she want you to order a CT scan of the head because she wants to see "if anything is going on up there". Please advise.     "

## 2025-04-15 NOTE — PROGRESS NOTES
Subjective:       Patient ID: Skyler Espinoza is a 74 y.o. female Body mass index is 34.96 kg/m².    Chief Complaint: Nausea (Vomiting)    This patient is new to me.  Referring Provider: Aaareferral Self for nausea.     Takes protonix and pepcid daily.   Daughter speaking for patient states patient takes THC gummies to help her dementia    4/14/25 [facility] ER Visit  Vomiting   · Nausea      Pt seen and treated for a UTI and prescribed antibiotics.  Pt c/o nausea and vomiting since starting the antibiotics     73 yo F pw nausea and vomiting. Reports mild crampy abdominal pain. Denies diarrhea. Has been dealing with similar intermittent symptoms for a month. Recently dx w UT. Patient also on THC gummy for dementia which has been helping. Given zofran in route by EMS. Denies fevers, chills, cough, CP, SOB, N/V/D, abdominal pain, dysuria, hematuria or any other complaints.    CT A/P  FINDINGS:  Mild dependent atelectasis is noted at the lung bases.     Mild hepatic steatosis is noted.  The gallbladder and biliary tree are unremarkable.  The spleen, pancreas, and adrenal glands are normal.  There is no renal mass, calculus, or hydronephrosis.  The abdominal aorta is normal in caliber.     There is no pathologic bowel wall thickening or evidence of obstruction.  Small fat containing umbilical hernia is unchanged.  There is no free air or free fluid.     Images of the pelvis demonstrate multiple sigmoid diverticula without evidence of diverticulitis.  The urinary bladder is normal.  There is no pelvic free fluid or lymphadenopathy.     No acute osseous abnormalities are identified.    Impression:  1. Chronic, stable findings as above.  No acute intra-abdominal or intrapelvic abnormalities.    Medical Decision Making  The cause of the patient's symptoms is not clear. Possibly due to either medication reaction (THC gummies), gastroparesis, food poisoning or viral gastrointestinal infection. Benign abdominal exam with no abdominal  tenderness or signs of surgical abdomen. However given patient's age and comorbidities ordered CT abd/pelvis. Patient's symptoms not typical for other emergent causes of abdominal pain such as, but not limited to, appendicitis, abdominal aortic aneurysm, surgical biliary disease, pancreatitis, SBO, mesenteric ischemia, serious intra-abdominal bacterial illness.   Presentation also not typical of gynecologic emergencies such as TOA, Ovarian Torsion, PID. Not Ectopic.  Do not suspect ACS.  Pt tolerating PO and pain controlled.     After treatment, the patient is feeling much better, tolerating PO fluids, and shows no signs of dehydration. Suspect likely transient course of illness.     Plan discharge home with prompt primary care physician follow up in the next 48 hours.        Amount and/or Complexity of Data Reviewed  Labs: ordered.  Radiology: ordered.     Risk  Prescription drug management.        Review of Systems   Constitutional:  Positive for activity change, appetite change and fatigue. Negative for fever and unexpected weight change.   HENT:  Negative for sore throat and trouble swallowing.    Respiratory:  Negative for cough and shortness of breath.    Cardiovascular:  Negative for chest pain.   Gastrointestinal:  Positive for nausea and vomiting. Negative for abdominal distention, abdominal pain, anal bleeding, blood in stool, constipation, diarrhea and rectal pain.       No LMP recorded. Patient is postmenopausal.  Past Medical History:   Diagnosis Date    Depression     GERD (gastroesophageal reflux disease)     Hyperlipidemia     Hypertension     Hypothyroid      Past Surgical History:   Procedure Laterality Date    APPENDECTOMY      COLONOSCOPY  2017    Briana, repeat in 10    TONSILLECTOMY      TUBAL LIGATION       Family History   Problem Relation Name Age of Onset    Cancer Father      Cancer Sister      Breast cancer Daughter      Glaucoma Neg Hx      Macular degeneration Neg Hx      Retinal  detachment Neg Hx      Ovarian cancer Neg Hx      Eczema Neg Hx      Lupus Neg Hx      Melanoma Neg Hx      Psoriasis Neg Hx       Social History[1]  Wt Readings from Last 10 Encounters:   04/15/25 104.3 kg (229 lb 15 oz)   04/14/25 104.3 kg (230 lb)   04/11/25 104.3 kg (230 lb)   04/10/25 105.7 kg (233 lb 0.4 oz)   03/26/25 107.1 kg (236 lb 1.8 oz)   02/15/25 108.4 kg (239 lb)   11/18/24 108.5 kg (239 lb 3.2 oz)   11/15/24 95.3 kg (210 lb)   11/15/24 95.3 kg (210 lb)   10/06/24 95.3 kg (210 lb)     Lab Results   Component Value Date    WBC 6.31 04/14/2025    HGB 12.7 04/14/2025    HCT 38.6 04/14/2025    MCV 91 04/14/2025     04/14/2025     CMP  Sodium   Date Value Ref Range Status   04/14/2025 139 136 - 145 mmol/L Final     Potassium   Date Value Ref Range Status   04/14/2025 3.7 3.5 - 5.1 mmol/L Final     Chloride   Date Value Ref Range Status   11/15/2024 105 95 - 110 mmol/L Final     CO2   Date Value Ref Range Status   04/14/2025 25 23 - 29 mmol/L Final     Glucose   Date Value Ref Range Status   11/15/2024 103 70 - 110 mg/dL Final     BUN   Date Value Ref Range Status   04/14/2025 6 (L) 8 - 23 mg/dL Final     Creatinine   Date Value Ref Range Status   04/14/2025 1.0 0.5 - 1.4 mg/dL Final   05/02/2013 0.8 0.5 - 1.4 mg/dL Final     Calcium   Date Value Ref Range Status   04/14/2025 9.2 8.7 - 10.5 mg/dL Final   05/02/2013 10.0 8.7 - 10.5 mg/dL Final     Total Protein   Date Value Ref Range Status   11/15/2024 6.4 6.0 - 8.4 g/dL Final     Albumin   Date Value Ref Range Status   04/14/2025 3.8 3.5 - 5.2 g/dL Final     Bilirubin Total   Date Value Ref Range Status   04/14/2025 0.4 0.1 - 1.0 mg/dL Final     Comment:     For infants and newborns, interpretation of results should be based   on gestational age, weight and in agreement with clinical   observations.    Premature Infant recommended reference ranges:   0-24 hours:  <8.0 mg/dL   24-48 hours: <12.0 mg/dL   3-5 days:    <15.0 mg/dL   6-29 days:   <15.0  "mg/dL     ALP   Date Value Ref Range Status   04/14/2025 56 55 - 135 unit/L Final     AST   Date Value Ref Range Status   04/14/2025 17 10 - 40 unit/L Final     ALT   Date Value Ref Range Status   04/14/2025 18 10 - 44 unit/L Final     Anion Gap   Date Value Ref Range Status   11/15/2024 5 (L) 8 - 16 mmol/L Final   05/02/2013 15 5 - 15 meq/L Final     eGFR if    Date Value Ref Range Status   02/02/2021 >60 >60 mL/min/1.73 m^2 Final     eGFR if non    Date Value Ref Range Status   02/02/2021 >60 >60 mL/min/1.73 m^2 Final     Comment:     Calculation used to obtain the estimated glomerular filtration  rate (eGFR) is the CKD-EPI equation.        No results found for: "AMYLASE"  Lab Results   Component Value Date    LIPASE 46 04/14/2025     No results found for: "LIPASERES"  Lab Results   Component Value Date    TSH 2.644 04/10/2025       Reviewed prior medical records including radiology report of ER visits 4/11/25 and 4/14/25 & endoscopy history (see surgical history).    Objective:      Physical Exam  Vitals and nursing note reviewed.   Constitutional:       General: She is not in acute distress.     Appearance: She is not ill-appearing.      Comments: In her nightgown and slippers   HENT:      Head: Normocephalic and atraumatic.      Mouth/Throat:      Mouth: Mucous membranes are moist.      Pharynx: Oropharynx is clear.   Eyes:      Conjunctiva/sclera: Conjunctivae normal.   Cardiovascular:      Rate and Rhythm: Normal rate and regular rhythm.      Pulses: Normal pulses.   Pulmonary:      Effort: Pulmonary effort is normal. No respiratory distress.   Abdominal:      General: Abdomen is flat. Bowel sounds are normal. There is no distension.      Palpations: Abdomen is soft.      Tenderness: There is no abdominal tenderness.   Skin:     General: Skin is warm and dry.      Capillary Refill: Capillary refill takes 2 to 3 seconds.   Neurological:      Mental Status: She is alert and " oriented to person, place, and time.      Comments: Unsure of month - suspect close to patient's baseline         Assessment:       1. Nausea        Plan:       Nausea  Suspect patient's nausea/vomiting and past diarrhea that is reported by patient's daughter is a GI virus of some sort that patient is having trouble with recovering from.    Also suspect some possible hyperemesis due to THC use    - schedule EGD, discussed procedure with patient, including risks and benefits, patient verbalized understanding    Will do gastric emptying study to further evaluate.    Stressed the importance of hydration - sips every 15 mins of gatorade/water etc  -     NM Gastric Emptying; Future; Expected date: 04/15/2025  -     Case Request Endoscopy: EGD (ESOPHAGOGASTRODUODENOSCOPY)      Follow up if symptoms worsen or fail to improve.      If no improvement in symptoms or symptoms worsen, call/follow-up at clinic or go to ER.       DANY Umaña, BRYANT-C    Encounter includes face to face time and non-face to face time preparing to see the patient (eg, review of tests), obtaining and/or reviewing separately obtained history, documenting clinical information in the electronic or other health record, independently interpreting results (not separately reported) and communicating results to the patient/family/caregiver, or care coordination (not separately reported).     A dictation software program was used for this note. Please expect some simple typographical errors in this note.           [1]   Social History  Tobacco Use    Smoking status: Never    Smokeless tobacco: Never   Substance Use Topics    Alcohol use: Not Currently     Comment: rarely    Drug use: No

## 2025-04-15 NOTE — TELEPHONE ENCOUNTER
Notified daughter that CT was order. She is asking what test should she do in case that he suspects her mom is having a TIA? Please advise.

## 2025-04-15 NOTE — TELEPHONE ENCOUNTER
----- Message from Carmen sent at 4/15/2025  1:43 PM CDT -----  [phone] calling Returning a phone call Left voicemail @ 1:34pm

## 2025-04-15 NOTE — TELEPHONE ENCOUNTER
----- Message from Carmen sent at 4/15/2025  2:08 PM CDT -----  Returning a phone call [phone]

## 2025-04-15 NOTE — PATIENT INSTRUCTIONS
- Gastric Emptying Study  - EGD to reassess  - Continue protonix and pepcid as ordered  - Focus on hydration - sips of liquids every 15 mins

## 2025-04-15 NOTE — TELEPHONE ENCOUNTER
"Daughter calling, states pt c/o nausea/vomiting, also confusion, "words were not making sense." Daughter states this has happened more than once tonight. States pt does have hx of dementia, symptoms are worsening. Per protocol, go to ED now. Daughter verbalizes understanding and advised to call back for any further questions or concerns.   Reason for Disposition   [1] Confusion getting worse AND [2] new-onset (hours to 3 days)    Additional Information   Negative: [1] Difficult to awaken or acting confused (e.g., disoriented, slurred speech) AND [2] present now AND [3] new-onset AND [4] has diabetes (diabetes mellitus)   Negative: [1] Difficult to awaken or acting confused (e.g., disoriented, slurred speech) AND [2] present now AND [3] new-onset   Negative: [1] Weakness of the face, arm, or leg on one side of the body AND [2] new-onset   Negative: [1] Numbness of the face, arm, or leg on one side of the body AND [2] new-onset   Negative: [1] Loss of speech or garbled speech AND [2] new-onset   Negative: Shock suspected (e.g., cold/pale/clammy skin, too weak to stand, low BP, rapid pulse)   Negative: Sounds like a life-threatening emergency to the triager   Negative: Severe agitation or behavior problem (e.g., patient endangering self or others)    Protocols used: Dementia Symptoms and [id number]    "

## 2025-04-19 ENCOUNTER — ON-DEMAND VIRTUAL (OUTPATIENT)
Dept: URGENT CARE | Facility: CLINIC | Age: 75
End: 2025-04-19
Payer: MEDICARE

## 2025-04-19 DIAGNOSIS — R11.0 CHRONIC NAUSEA: ICD-10-CM

## 2025-04-19 DIAGNOSIS — R42 DIZZINESS: Primary | ICD-10-CM

## 2025-04-19 PROCEDURE — 98006 SYNCH AUDIO-VIDEO EST MOD 30: CPT | Mod: 95,,, | Performed by: NURSE PRACTITIONER

## 2025-04-19 RX ORDER — MECLIZINE HYDROCHLORIDE 25 MG/1
12.5 TABLET ORAL 3 TIMES DAILY PRN
Qty: 8 TABLET | Refills: 0 | Status: ON HOLD | OUTPATIENT
Start: 2025-04-19 | End: 2025-04-24

## 2025-04-19 NOTE — PATIENT INSTRUCTIONS

## 2025-04-19 NOTE — PROGRESS NOTES
Subjective:      Patient ID: Skyler Espinoza is a 74 y.o. female.    Vitals:  vitals were not taken for this visit.     Chief Complaint: buzzing to ear and Dizziness      Visit Type: TELE AUDIOVISUAL    Patient Location: Home [place]     Present with the patient at the time of consultation: TELEMED PRESENT WITH PATIENT: None    Past Medical History:   Diagnosis Date    Depression     GERD (gastroesophageal reflux disease)     Hyperlipidemia     Hypertension     Hypothyroid      Past Surgical History:   Procedure Laterality Date    APPENDECTOMY      COLONOSCOPY  2017    Briana, repeat in 10    TONSILLECTOMY      TUBAL LIGATION       Review of patient's allergies indicates:  No Known Allergies  Medications Ordered Prior to Encounter[1]  Family History   Problem Relation Name Age of Onset    Cancer Father      Cancer Sister      Breast cancer Daughter      Glaucoma Neg Hx      Macular degeneration Neg Hx      Retinal detachment Neg Hx      Ovarian cancer Neg Hx      Eczema Neg Hx      Lupus Neg Hx      Melanoma Neg Hx      Psoriasis Neg Hx         Medications Ordered                [id number] - [place] - [address] AT [facility] & [address] [place] [address]    Telephone: [phone]   Fax: [phone]   Hours: Open 24 hours                         E-Prescribed (1 of 1)              meclizine (ANTIVERT) 25 mg tablet    Sig: Take 0.5 tablets (12.5 mg total) by mouth 3 (three) times daily as needed for Dizziness.       Start: 4/19/25     Quantity: 8 tablet Refills: 0                           Ohs Peq Odvv Intake    4/19/2025 10:02 AM CDT - Filed by Patient   What is your current physical address in the event of a medical emergency? [id number], [place] [address]   Are you able to take your vital signs? Yes   Systolic Blood Pressure: 134   Diastolic Blood Pressure: 85   Weight:    Height: 68   Pulse: 66   Temperature: 97.4   Respiration rate: 19   Pulse Oxygen:  94   Please attach any relevant images or files    Is your employer contracted with [facility]? No         Pt with daughter with c/o Right ear buzzing noise with stabbing pain noted today (noted intermittent pain).  Has been having also dizziness and unsteady on her feet. Noted also ha and nausea for past 3 weeks.  Daughter reports she is taking the dramaine 50mg daily, not helping symptoms. Treated for UTI and symptoms improved recently.   Daughter notes she has been giving her tylenol, zofran, reglan and promethazine and nothing is helping the nausea. Also taking psudafed, ha- excedrine, tums   Denies dysuria, fever, CP, SOB, odor to urine, blood in urine.   Daughter reports she is voiding well,and drinking.     Dizziness:   Chronicity:  Recurrent   Associated symptoms: headaches, tinnitus and nausea.no fever and no chest pain.      Constitution: Negative for fever.   HENT:  Positive for tinnitus. Negative for congestion.    Cardiovascular:  Negative for chest pain.   Respiratory:  Negative for shortness of breath.    Gastrointestinal:  Positive for nausea.   Genitourinary:  Negative for dysuria, frequency, urgency, flank pain, hematuria and pelvic pain.   Neurological:  Positive for dizziness, loss of balance and headaches. Negative for passing out and speech difficulty.        Objective:   The physical exam was conducted virtually.  Physical Exam   Constitutional: She is oriented to person, place, and time. No distress.   HENT:   Head: Normocephalic.   Ears:   Right Ear: External ear normal.   Left Ear: External ear normal.   Nose: No rhinorrhea or congestion.   Eyes: Conjunctivae are normal.   Neck: Neck supple.   Pulmonary/Chest: Effort normal. No respiratory distress.   Neurological: She is alert and oriented to person, place, and time.   Skin: Skin is no rash.       Assessment:     1. Dizziness    2. Chronic nausea        Plan:   Ddx: vertigo, labyrinths, dehydration, UTI     Currently not Short  of Breath, no chest pain, no urine problems  Continue current meds  Has chronic nausea, recommend clear liquid diet for 8 hours, then bland diet to see if helps     Discussed with daughter I'm not sure what is causing the symptoms  Continue to increase fluids and rest  Rise slowly    Daughter has meclizine 25mg tabs at home,discussed can give 2-3 times /day to see if helpful    Patient's family member encouraged to monitor symptoms closely and instructed to follow-up for new or worsening symptoms    Further, in-person, evaluation may be necessary for continued treatment.     Please follow up with your primary care doctor or local urgent care or ER as discussed.    ER precautions discussed      Dizziness  -     meclizine (ANTIVERT) 25 mg tablet; Take 0.5 tablets (12.5 mg total) by mouth 3 (three) times daily as needed for Dizziness.  Dispense: 8 tablet; Refill: 0    Chronic nausea    We appreciate you trusting us with your medical care. We hope you feel better soon. We will be happy to take care of you for all of your future medical needs.     You must understand that you've received Virtual treatment only and that you may be released before all your medical problems are known or treated. You, the patient, will arrange for follow up care as instructed.     Follow up with your PCP or specialty clinic as directed in the next 1-2 days if not improved or as needed. You can call [phone] to schedule an appointment with the appropriate provider.     If your condition worsens we recommend that you receive another evaluation in person, with your primary care provider, urgent care or at the emergency room immediately or contact your primary medical clinics after hours call service to discuss your concerns.                     [1]   Current Outpatient Medications on File Prior to Visit   Medication Sig Dispense Refill    cetirizine (ZYRTEC) 10 MG tablet 1 tablet Orally Once a day      famotidine (PEPCID) 40 MG tablet  Take 40 mg by mouth.      ibuprofen (ADVIL,MOTRIN) 800 MG tablet Take 1 tablet (800 mg total) by mouth every 8 (eight) hours as needed for Pain. 30 tablet 0    levothyroxine (SYNTHROID) 112 MCG tablet Take 1 tablet (112 mcg total) by mouth before breakfast. 30 tablet 11    lovastatin (MEVACOR) 20 MG tablet 1 tablet with the evening meal Orally Once a day (Patient not taking: Reported on 4/15/2025)      metoclopramide HCl (REGLAN) 5 MG tablet Take 1 tablet (5 mg total) by mouth every 8 (eight) hours as needed (nausea/vomiting). 12 tablet 0    metoprolol succinate (TOPROL-XL) 100 MG 24 hr tablet Take 100 mg by mouth.      multivit-min-ferrous fumarate 9 mg iron/15 mL Liqd as directed      ondansetron (ZOFRAN-ODT) 8 MG TbDL Take 1 tablet (8 mg total) by mouth every 8 (eight) hours as needed (nausea and vomiting). 15 tablet 0    pantoprazole (PROTONIX) 40 MG tablet 1 tablet Orally Once a day      [] potassium chloride (MICRO-K) 10 MEQ CpSR Take 1 capsule (10 mEq total) by mouth once daily. for 7 doses 7 capsule 0    promethazine (PHENERGAN) 25 MG tablet Take 1 tablet (25 mg total) by mouth every 6 (six) hours as needed for Nausea. 30 tablet 0    rizatriptan (MAXALT) 10 MG tablet Take by mouth.      tiZANidine (ZANAFLEX) 4 MG tablet Take 4 mg by mouth nightly as needed.      triamterene-hydrochlorothiazide 37.5-25 mg (MAXZIDE-25) 37.5-25 mg per tablet 1 tablet in the morning Orally Once a day (Patient not taking: Reported on 4/15/2025)      valACYclovir (VALTREX) 1000 MG tablet        No current facility-administered medications on file prior to visit.

## 2025-04-21 ENCOUNTER — RESULTS FOLLOW-UP (OUTPATIENT)
Dept: FAMILY MEDICINE | Facility: CLINIC | Age: 75
End: 2025-04-21

## 2025-04-21 ENCOUNTER — OFFICE VISIT (OUTPATIENT)
Dept: FAMILY MEDICINE | Facility: CLINIC | Age: 75
End: 2025-04-21
Payer: MEDICARE

## 2025-04-21 ENCOUNTER — HOSPITAL ENCOUNTER (OUTPATIENT)
Dept: RADIOLOGY | Facility: HOSPITAL | Age: 75
Discharge: HOME | End: 2025-04-21
Attending: NURSE PRACTITIONER
Payer: MEDICARE

## 2025-04-21 VITALS
HEIGHT: 68 IN | HEART RATE: 70 BPM | WEIGHT: 230.38 LBS | OXYGEN SATURATION: 99 % | TEMPERATURE: 98 F | BODY MASS INDEX: 34.92 KG/M2 | RESPIRATION RATE: 12 BRPM | SYSTOLIC BLOOD PRESSURE: 124 MMHG | DIASTOLIC BLOOD PRESSURE: 88 MMHG

## 2025-04-21 DIAGNOSIS — R11.0 NAUSEA: Primary | ICD-10-CM

## 2025-04-21 DIAGNOSIS — R11.10 VOMITING, UNSPECIFIED VOMITING TYPE, UNSPECIFIED WHETHER NAUSEA PRESENT: ICD-10-CM

## 2025-04-21 DIAGNOSIS — R11.0 NAUSEA: ICD-10-CM

## 2025-04-21 DIAGNOSIS — R42 DIZZINESS: ICD-10-CM

## 2025-04-21 DIAGNOSIS — R41.0 CONFUSION: ICD-10-CM

## 2025-04-21 PROCEDURE — 1159F MED LIST DOCD IN RCRD: CPT | Mod: CPTII,S$GLB,,

## 2025-04-21 PROCEDURE — 3079F DIAST BP 80-89 MM HG: CPT | Mod: CPTII,S$GLB,,

## 2025-04-21 PROCEDURE — 1160F RVW MEDS BY RX/DR IN RCRD: CPT | Mod: CPTII,S$GLB,,

## 2025-04-21 PROCEDURE — 99999 PR PBB SHADOW E&M-EST. PATIENT-LVL V: CPT | Mod: PBBFAC,,,

## 2025-04-21 PROCEDURE — 70450 CT HEAD/BRAIN W/O DYE: CPT | Mod: 26,,, | Performed by: RADIOLOGY

## 2025-04-21 PROCEDURE — 70450 CT HEAD/BRAIN W/O DYE: CPT | Mod: TC

## 2025-04-21 PROCEDURE — 99215 OFFICE O/P EST HI 40 MIN: CPT | Mod: S$GLB,,,

## 2025-04-21 PROCEDURE — 3288F FALL RISK ASSESSMENT DOCD: CPT | Mod: CPTII,S$GLB,,

## 2025-04-21 PROCEDURE — 3008F BODY MASS INDEX DOCD: CPT | Mod: CPTII,S$GLB,,

## 2025-04-21 PROCEDURE — 3074F SYST BP LT 130 MM HG: CPT | Mod: CPTII,S$GLB,,

## 2025-04-21 PROCEDURE — 1101F PT FALLS ASSESS-DOCD LE1/YR: CPT | Mod: CPTII,S$GLB,,

## 2025-04-21 NOTE — PROGRESS NOTES
Subjective:       Patient ID:  [id number]     Chief Complaint: Abdominal Pain, Follow-up, Dizziness, Headache, Nausea, and Otalgia      History of Present Illness    Ms. Espinoza presents today for ongoing nausea and vomiting for the past month. She initially developed a left eye sty treated with antibiotic salve. Subsequently, she developed nausea, vomiting, and diarrhea. She currently experiences vomiting approximately 3 times daily, though the diarrhea has resolved. She also reports bilateral ear pain that initially started in the right ear with associated buzzing sensation. She experiences dizziness with positional changes, particularly when standing up and turning. She had two ER visits due to nausea and vomiting, receiving IV fluids and Zofran during these visits. A home urinalysis showed leukocytes but was negative for nitrites, which was confirmed during a subsequent visit showing 3+ leukocytes and negative nitrites. A GI specialist has ordered an EGD scheduled for May. Her current anti-nausea medications are not providing adequate relief. Phenergan is ineffective, with vomiting occurring about an hour after administration. Reglan appears more effective but she uses it sparingly due to concerns about its potency. She takes a proton pump inhibitor at least an hour before eating and famotidine at night, half an hour before eating, though these have not provided symptom relief. She recently discontinued THC gummy use 1-2 weeks ago and denies current THC consumption.   No other concerns.         Past Medical History:   Diagnosis Date    Depression     GERD (gastroesophageal reflux disease)     Hyperlipidemia     Hypertension     Hypothyroid       Active Problem List with Overview Notes    Diagnosis Date Noted    DDD (degenerative disc disease), lumbar 08/22/2024    Neck pain 08/22/2024    DDD (degenerative disc disease), cervical 06/12/2024    Chronic bilateral low back pain without sciatica 06/12/2024    Severe  obesity (BMI 35.0-39.9) with comorbidity 01/19/2024    Autoimmune thyroiditis 06/17/2020    Major depression, single episode 06/17/2020    History of allergy to multiple drugs 11/07/2019    Chronic rhinitis 11/07/2019    Essential tremor 07/16/2019    Primary osteoarthritis of right knee 01/25/2017    Obesity (BMI 30-39.9) 07/22/2016    Primary osteoarthritis of left knee 03/03/2016    Allergic rhinosinusitis 07/24/2013    Sleep apnea 07/24/2013    Intermittent abdominal pain 07/24/2013    HTN (hypertension), benign 04/16/2012    Depression 03/16/2012    HTN (hypertension) 03/16/2012    Hypothyroid 03/16/2012    Pap smear 03/16/2012      Review of patient's allergies indicates:  No Known Allergies    Current Medications[1]    Lab Results   Component Value Date    WBC 6.31 04/14/2025    HGB 12.7 04/14/2025    HCT 38.6 04/14/2025     04/14/2025    CHOL 273 (H) 01/14/2025    TRIG 122 01/14/2025    HDL 68 01/14/2025    ALT 18 04/14/2025    AST 17 04/14/2025     04/14/2025    K 3.7 04/14/2025     11/15/2024    CREATININE 1.0 04/14/2025    BUN 6 (L) 04/14/2025    CO2 25 04/14/2025    TSH 2.644 04/10/2025    INR 0.9 11/15/2024       Review of Systems   Constitutional:  Positive for fatigue. Negative for fever.   HENT:  Positive for ear pain, sore throat and tinnitus. Negative for congestion and sneezing.    Eyes: Negative.    Respiratory:  Negative for cough, shortness of breath and wheezing.    Cardiovascular:  Negative for chest pain, palpitations and leg swelling.   Gastrointestinal:  Positive for nausea and vomiting. Negative for abdominal pain.   Genitourinary: Negative.    Musculoskeletal: Negative.  Negative for arthralgias.   Skin: Negative.  Negative for rash.   Neurological:  Negative for dizziness, weakness, light-headedness, numbness and headaches.   Hematological: Negative.    Psychiatric/Behavioral: Negative.         Objective:      Physical Exam  Constitutional:       Appearance: Normal  appearance.   HENT:      Head: Normocephalic and atraumatic.      Right Ear: A middle ear effusion is present.      Ears:      Comments: Mild erythema noted along left ear canal with pain. Difficult to visualize TM     Nose: Nose normal.   Eyes:      Extraocular Movements: Extraocular movements intact.   Cardiovascular:      Rate and Rhythm: Normal rate and regular rhythm.   Pulmonary:      Effort: Pulmonary effort is normal.      Breath sounds: Normal breath sounds.   Abdominal:      General: Bowel sounds are normal.      Palpations: Abdomen is soft.      Tenderness: There is abdominal tenderness (mild) in the epigastric area.   Musculoskeletal:      Cervical back: Normal range of motion.      Comments: In a wheelchair   Skin:     General: Skin is warm and dry.   Neurological:      General: No focal deficit present.      Mental Status: She is alert and oriented to person, place, and time.   Psychiatric:         Mood and Affect: Mood normal.         Assessment:       1. Nausea    2. Vomiting, unspecified vomiting type, unspecified whether nausea present    3. Dizziness        Plan:       Skyler was seen today for abdominal pain, follow-up, dizziness, headache, nausea and otalgia.    Diagnoses and all orders for this visit:    Nausea  Vomiting, unspecified vomiting type, unspecified whether nausea present  Dizziness  -     Ambulatory referral/consult to ENT; Future     - Ms. Espinoza experiencing nausea and vomiting for about a month, with approximately 3 episodes of vomiting per day.  - Slight improvement noted, but symptoms persist.  - Continued Reglan, advising sparing use for uncontrolled vomiting episodes.  - Keep upcoming EGD  - Abd u/s and CT unrevealing  - labs have been benign.   - advised completion of head CT  - concern for possible menieres             FOLLOW-UP:  - Instructed patient to follow up when ENT appointment is scheduled.        Future Appointments       Date Provider Specialty Appt Notes     4/22/2025 Ashvin Love PA-C Otolaryngology Dizziness [R42]    5/19/2025 Oscar Lee FNP-[facility] Family Medicine 6 mon f/u    6/19/2025 David Chan MD Orthopedics [facility]    7/30/2025 Mary Prado, NP Pulmonology 4m f/u           I spent a total of 43 minutes on the day of the visit.This includes face to face time and non-face to face time preparing to see the patient (eg, review of tests), obtaining and/or reviewing separately obtained history, documenting clinical information in the electronic or other health record, independently interpreting results and communicating results to the patient/family/caregiver, or care coordinator.     Portions of this note were dictated using voice recognition software and may contain dictation related errors in spelling / grammar / syntax not discovered on text review.     Radha Hopkins PA-C         [1]   Current Outpatient Medications:     cetirizine (ZYRTEC) 10 MG tablet, 1 tablet Orally Once a day, Disp: , Rfl:     famotidine (PEPCID) 40 MG tablet, Take 40 mg by mouth., Disp: , Rfl:     ibuprofen (ADVIL,MOTRIN) 800 MG tablet, Take 1 tablet (800 mg total) by mouth every 8 (eight) hours as needed for Pain., Disp: 30 tablet, Rfl: 0    levothyroxine (SYNTHROID) 112 MCG tablet, Take 1 tablet (112 mcg total) by mouth before breakfast., Disp: 30 tablet, Rfl: 11    meclizine (ANTIVERT) 25 mg tablet, Take 0.5 tablets (12.5 mg total) by mouth 3 (three) times daily as needed for Dizziness., Disp: 8 tablet, Rfl: 0    metoclopramide HCl (REGLAN) 5 MG tablet, Take 1 tablet (5 mg total) by mouth every 8 (eight) hours as needed (nausea/vomiting)., Disp: 12 tablet, Rfl: 0    metoprolol succinate (TOPROL-XL) 100 MG 24 hr tablet, Take 100 mg by mouth., Disp: , Rfl:     multivit-min-ferrous fumarate 9 mg iron/15 mL Liqd, as directed, Disp: , Rfl:     ondansetron (ZOFRAN-ODT) 8 MG TbDL, Take 1 tablet (8 mg total) by mouth every 8 (eight) hours as needed  (nausea and vomiting)., Disp: 15 tablet, Rfl: 0    pantoprazole (PROTONIX) 40 MG tablet, 1 tablet Orally Once a day, Disp: , Rfl:     promethazine (PHENERGAN) 25 MG tablet, Take 1 tablet (25 mg total) by mouth every 6 (six) hours as needed for Nausea., Disp: 30 tablet, Rfl: 0    rizatriptan (MAXALT) 10 MG tablet, Take by mouth., Disp: , Rfl:     tiZANidine (ZANAFLEX) 4 MG tablet, Take 4 mg by mouth nightly as needed., Disp: , Rfl:     valACYclovir (VALTREX) 1000 MG tablet, , Disp: , Rfl:     lovastatin (MEVACOR) 20 MG tablet, 1 tablet with the evening meal Orally Once a day (Patient not taking: Reported on 4/10/2025), Disp: , Rfl:     triamterene-hydrochlorothiazide 37.5-25 mg (MAXZIDE-25) 37.5-25 mg per tablet, 1 tablet in the morning Orally Once a day (Patient not taking: Reported on 4/10/2025), Disp: , Rfl:

## 2025-04-22 ENCOUNTER — OFFICE VISIT (OUTPATIENT)
Dept: OTOLARYNGOLOGY | Facility: CLINIC | Age: 75
End: 2025-04-22
Payer: MEDICARE

## 2025-04-22 ENCOUNTER — HOSPITAL ENCOUNTER (INPATIENT)
Facility: HOSPITAL | Age: 75
LOS: 4 days | Discharge: HOME | DRG: 392 | End: 2025-04-27
Attending: EMERGENCY MEDICINE | Admitting: INTERNAL MEDICINE
Payer: MEDICARE

## 2025-04-22 VITALS
BODY MASS INDEX: 34.89 KG/M2 | HEART RATE: 118 BPM | SYSTOLIC BLOOD PRESSURE: 117 MMHG | HEIGHT: 68 IN | DIASTOLIC BLOOD PRESSURE: 88 MMHG | RESPIRATION RATE: 16 BRPM | WEIGHT: 230.19 LBS

## 2025-04-22 DIAGNOSIS — R09.82 POST-NASAL DRIP: ICD-10-CM

## 2025-04-22 DIAGNOSIS — E66.01 SEVERE OBESITY (BMI 35.0-39.9) WITH COMORBIDITY: ICD-10-CM

## 2025-04-22 DIAGNOSIS — R11.2 NAUSEA & VOMITING: ICD-10-CM

## 2025-04-22 DIAGNOSIS — E86.0 DEHYDRATION: ICD-10-CM

## 2025-04-22 DIAGNOSIS — H61.23 BILATERAL IMPACTED CERUMEN: ICD-10-CM

## 2025-04-22 DIAGNOSIS — R09.81 NASAL CONGESTION: Primary | ICD-10-CM

## 2025-04-22 DIAGNOSIS — R42 DIZZINESS: ICD-10-CM

## 2025-04-22 DIAGNOSIS — R11.2 NAUSEA AND VOMITING, UNSPECIFIED VOMITING TYPE: ICD-10-CM

## 2025-04-22 DIAGNOSIS — R11.2 INTRACTABLE NAUSEA AND VOMITING: Primary | ICD-10-CM

## 2025-04-22 DIAGNOSIS — R10.9 ABDOMINAL PAIN, UNSPECIFIED ABDOMINAL LOCATION: ICD-10-CM

## 2025-04-22 DIAGNOSIS — M51.369 DEGENERATION OF INTERVERTEBRAL DISC OF LUMBAR REGION, UNSPECIFIED WHETHER PAIN PRESENT: ICD-10-CM

## 2025-04-22 DIAGNOSIS — M17.11 PRIMARY OSTEOARTHRITIS OF RIGHT KNEE: ICD-10-CM

## 2025-04-22 LAB
ABSOLUTE EOSINOPHIL (SMH): 0.11 K/UL
ABSOLUTE MONOCYTE (SMH): 0.67 K/UL (ref 0.3–1)
ABSOLUTE NEUTROPHIL COUNT (SMH): 5.4 K/UL (ref 1.8–7.7)
ALBUMIN SERPL-MCNC: 4.3 G/DL (ref 3.5–5.2)
ALP SERPL-CCNC: 59 UNIT/L (ref 55–135)
ALT SERPL-CCNC: 15 UNIT/L (ref 10–44)
ANION GAP (SMH): 11 MMOL/L (ref 8–16)
AST SERPL-CCNC: 18 UNIT/L (ref 10–40)
BASOPHILS # BLD AUTO: 0.03 K/UL
BASOPHILS NFR BLD AUTO: 0.4 %
BILIRUB SERPL-MCNC: 0.6 MG/DL (ref 0.1–1)
BILIRUB UR QL STRIP.AUTO: NEGATIVE
BUN SERPL-MCNC: 13 MG/DL (ref 8–23)
CALCIUM SERPL-MCNC: 9.6 MG/DL (ref 8.7–10.5)
CHLORIDE SERPL-SCNC: 103 MMOL/L (ref 95–110)
CLARITY UR: CLEAR
CO2 SERPL-SCNC: 24 MMOL/L (ref 23–29)
COLOR UR AUTO: YELLOW
CREAT SERPL-MCNC: 0.8 MG/DL (ref 0.5–1.4)
ERYTHROCYTE [DISTWIDTH] IN BLOOD BY AUTOMATED COUNT: 12.6 % (ref 11.5–14.5)
GFR SERPLBLD CREATININE-BSD FMLA CKD-EPI: >60 ML/MIN/1.73/M2
GLUCOSE SERPL-MCNC: 101 MG/DL (ref 70–110)
GLUCOSE UR QL STRIP: NEGATIVE
HCT VFR BLD AUTO: 43.8 % (ref 37–48.5)
HGB BLD-MCNC: 14.5 GM/DL (ref 12–16)
HGB UR QL STRIP: NEGATIVE
IMM GRANULOCYTES # BLD AUTO: 0.03 K/UL (ref 0–0.04)
IMM GRANULOCYTES NFR BLD AUTO: 0.4 % (ref 0–0.5)
KETONES UR QL STRIP: ABNORMAL
LEUKOCYTE ESTERASE UR QL STRIP: NEGATIVE
LIPASE SERPL-CCNC: 45 U/L (ref 4–60)
LYMPHOCYTES # BLD AUTO: 1.89 K/UL (ref 1–4.8)
MAGNESIUM SERPL-MCNC: 2.1 MG/DL (ref 1.6–2.6)
MCH RBC QN AUTO: 29.2 PG (ref 27–31)
MCHC RBC AUTO-ENTMCNC: 33.1 G/DL (ref 32–36)
MCV RBC AUTO: 88 FL (ref 82–98)
MICROSCOPIC COMMENT: ABNORMAL
NITRITE UR QL STRIP: NEGATIVE
NUCLEATED RBC (/100WBC) (SMH): 0 /100 WBC
PH UR STRIP: 7 [PH]
PLATELET # BLD AUTO: 346 K/UL (ref 150–450)
PMV BLD AUTO: 10 FL (ref 9.2–12.9)
POTASSIUM SERPL-SCNC: 3.9 MMOL/L (ref 3.5–5.1)
PROT SERPL-MCNC: 7.2 GM/DL (ref 6–8.4)
PROT UR QL STRIP: ABNORMAL
RBC # BLD AUTO: 4.96 M/UL (ref 4–5.4)
RBC #/AREA URNS AUTO: 2 /HPF
RELATIVE EOSINOPHIL (SMH): 1.3 % (ref 0–8)
RELATIVE LYMPHOCYTE (SMH): 23.2 % (ref 18–48)
RELATIVE MONOCYTE (SMH): 8.2 % (ref 4–15)
RELATIVE NEUTROPHIL (SMH): 66.5 % (ref 38–73)
SODIUM SERPL-SCNC: 138 MMOL/L (ref 136–145)
SP GR UR STRIP: >=1.03
SQUAMOUS #/AREA URNS AUTO: 4 /HPF
TROPONIN HIGH SENSITIVE (SMH): 4.2 PG/ML
UROBILINOGEN UR STRIP-ACNC: NEGATIVE EU/DL
WBC # BLD AUTO: 8.16 K/UL (ref 3.9–12.7)
WBC #/AREA URNS AUTO: 11 /HPF

## 2025-04-22 PROCEDURE — 96375 TX/PRO/DX INJ NEW DRUG ADDON: CPT

## 2025-04-22 PROCEDURE — 3288F FALL RISK ASSESSMENT DOCD: CPT | Mod: CPTII,S$GLB,, | Performed by: PHYSICIAN ASSISTANT

## 2025-04-22 PROCEDURE — 81001 URINALYSIS AUTO W/SCOPE: CPT | Performed by: EMERGENCY MEDICINE

## 2025-04-22 PROCEDURE — 25500020 PHARM REV CODE 255: Performed by: EMERGENCY MEDICINE

## 2025-04-22 PROCEDURE — 96361 HYDRATE IV INFUSION ADD-ON: CPT

## 2025-04-22 PROCEDURE — 80053 COMPREHEN METABOLIC PANEL: CPT | Performed by: EMERGENCY MEDICINE

## 2025-04-22 PROCEDURE — 3074F SYST BP LT 130 MM HG: CPT | Mod: CPTII,S$GLB,, | Performed by: PHYSICIAN ASSISTANT

## 2025-04-22 PROCEDURE — 69210 REMOVE IMPACTED EAR WAX UNI: CPT | Mod: S$GLB,,, | Performed by: PHYSICIAN ASSISTANT

## 2025-04-22 PROCEDURE — 63600175 PHARM REV CODE 636 W HCPCS

## 2025-04-22 PROCEDURE — 99285 EMERGENCY DEPT VISIT HI MDM: CPT | Mod: 25

## 2025-04-22 PROCEDURE — 83690 ASSAY OF LIPASE: CPT | Performed by: EMERGENCY MEDICINE

## 2025-04-22 PROCEDURE — 1125F AMNT PAIN NOTED PAIN PRSNT: CPT | Mod: CPTII,S$GLB,, | Performed by: PHYSICIAN ASSISTANT

## 2025-04-22 PROCEDURE — 96376 TX/PRO/DX INJ SAME DRUG ADON: CPT

## 2025-04-22 PROCEDURE — 3008F BODY MASS INDEX DOCD: CPT | Mod: CPTII,S$GLB,, | Performed by: PHYSICIAN ASSISTANT

## 2025-04-22 PROCEDURE — 85025 COMPLETE CBC W/AUTO DIFF WBC: CPT | Performed by: EMERGENCY MEDICINE

## 2025-04-22 PROCEDURE — 99205 OFFICE O/P NEW HI 60 MIN: CPT | Mod: 25,S$GLB,, | Performed by: PHYSICIAN ASSISTANT

## 2025-04-22 PROCEDURE — 25000003 PHARM REV CODE 250

## 2025-04-22 PROCEDURE — 84484 ASSAY OF TROPONIN QUANT: CPT | Performed by: EMERGENCY MEDICINE

## 2025-04-22 PROCEDURE — 93005 ELECTROCARDIOGRAM TRACING: CPT | Performed by: INTERNAL MEDICINE

## 2025-04-22 PROCEDURE — 36415 COLL VENOUS BLD VENIPUNCTURE: CPT

## 2025-04-22 PROCEDURE — 87086 URINE CULTURE/COLONY COUNT: CPT | Performed by: EMERGENCY MEDICINE

## 2025-04-22 PROCEDURE — 1101F PT FALLS ASSESS-DOCD LE1/YR: CPT | Mod: CPTII,S$GLB,, | Performed by: PHYSICIAN ASSISTANT

## 2025-04-22 PROCEDURE — 96374 THER/PROPH/DIAG INJ IV PUSH: CPT

## 2025-04-22 PROCEDURE — 63600175 PHARM REV CODE 636 W HCPCS: Performed by: EMERGENCY MEDICINE

## 2025-04-22 PROCEDURE — 83735 ASSAY OF MAGNESIUM: CPT | Performed by: EMERGENCY MEDICINE

## 2025-04-22 PROCEDURE — 3079F DIAST BP 80-89 MM HG: CPT | Mod: CPTII,S$GLB,, | Performed by: PHYSICIAN ASSISTANT

## 2025-04-22 PROCEDURE — 1159F MED LIST DOCD IN RCRD: CPT | Mod: CPTII,S$GLB,, | Performed by: PHYSICIAN ASSISTANT

## 2025-04-22 PROCEDURE — 99999 PR PBB SHADOW E&M-EST. PATIENT-LVL V: CPT | Mod: PBBFAC,,, | Performed by: PHYSICIAN ASSISTANT

## 2025-04-22 PROCEDURE — 93010 ELECTROCARDIOGRAM REPORT: CPT | Mod: ,,, | Performed by: INTERNAL MEDICINE

## 2025-04-22 PROCEDURE — 1160F RVW MEDS BY RX/DR IN RCRD: CPT | Mod: CPTII,S$GLB,, | Performed by: PHYSICIAN ASSISTANT

## 2025-04-22 RX ORDER — IBUPROFEN 200 MG
16 TABLET ORAL
Status: DISCONTINUED | OUTPATIENT
Start: 2025-04-23 | End: 2025-04-27 | Stop reason: HOSPADM

## 2025-04-22 RX ORDER — PANTOPRAZOLE SODIUM 40 MG/10ML
40 INJECTION, POWDER, LYOPHILIZED, FOR SOLUTION INTRAVENOUS DAILY
Status: DISCONTINUED | OUTPATIENT
Start: 2025-04-23 | End: 2025-04-24

## 2025-04-22 RX ORDER — METOPROLOL SUCCINATE 50 MG/1
100 TABLET, EXTENDED RELEASE ORAL NIGHTLY
Status: DISCONTINUED | OUTPATIENT
Start: 2025-04-22 | End: 2025-04-27 | Stop reason: HOSPADM

## 2025-04-22 RX ORDER — HYDROCODONE BITARTRATE AND ACETAMINOPHEN 5; 325 MG/1; MG/1
1 TABLET ORAL EVERY 6 HOURS PRN
Refills: 0 | Status: DISCONTINUED | OUTPATIENT
Start: 2025-04-23 | End: 2025-04-27 | Stop reason: HOSPADM

## 2025-04-22 RX ORDER — TALC
9 POWDER (GRAM) TOPICAL NIGHTLY PRN
Status: DISCONTINUED | OUTPATIENT
Start: 2025-04-23 | End: 2025-04-27 | Stop reason: HOSPADM

## 2025-04-22 RX ORDER — FLUTICASONE PROPIONATE 50 MCG
1 SPRAY, SUSPENSION (ML) NASAL NIGHTLY
COMMUNITY

## 2025-04-22 RX ORDER — GLUCAGON 1 MG
1 KIT INJECTION
Status: DISCONTINUED | OUTPATIENT
Start: 2025-04-23 | End: 2025-04-27 | Stop reason: HOSPADM

## 2025-04-22 RX ORDER — AMOXICILLIN 250 MG
1 CAPSULE ORAL 2 TIMES DAILY PRN
Status: DISCONTINUED | OUTPATIENT
Start: 2025-04-23 | End: 2025-04-27 | Stop reason: HOSPADM

## 2025-04-22 RX ORDER — NALOXONE HCL 0.4 MG/ML
0.02 VIAL (ML) INJECTION
Status: DISCONTINUED | OUTPATIENT
Start: 2025-04-23 | End: 2025-04-27 | Stop reason: HOSPADM

## 2025-04-22 RX ORDER — LANOLIN ALCOHOL/MO/W.PET/CERES
800 CREAM (GRAM) TOPICAL
Status: DISCONTINUED | OUTPATIENT
Start: 2025-04-23 | End: 2025-04-27 | Stop reason: HOSPADM

## 2025-04-22 RX ORDER — SODIUM CHLORIDE 9 MG/ML
INJECTION, SOLUTION INTRAVENOUS CONTINUOUS
Status: DISCONTINUED | OUTPATIENT
Start: 2025-04-23 | End: 2025-04-24

## 2025-04-22 RX ORDER — METOCLOPRAMIDE HYDROCHLORIDE 5 MG/ML
10 INJECTION INTRAMUSCULAR; INTRAVENOUS EVERY 8 HOURS
Status: DISCONTINUED | OUTPATIENT
Start: 2025-04-23 | End: 2025-04-24

## 2025-04-22 RX ORDER — MORPHINE SULFATE 2 MG/ML
2 INJECTION, SOLUTION INTRAMUSCULAR; INTRAVENOUS EVERY 4 HOURS PRN
Status: DISCONTINUED | OUTPATIENT
Start: 2025-04-23 | End: 2025-04-26

## 2025-04-22 RX ORDER — ONDANSETRON HYDROCHLORIDE 2 MG/ML
4 INJECTION, SOLUTION INTRAVENOUS
Status: COMPLETED | OUTPATIENT
Start: 2025-04-22 | End: 2025-04-22

## 2025-04-22 RX ORDER — METOCLOPRAMIDE HYDROCHLORIDE 5 MG/ML
5 INJECTION INTRAMUSCULAR; INTRAVENOUS
Status: COMPLETED | OUTPATIENT
Start: 2025-04-22 | End: 2025-04-22

## 2025-04-22 RX ORDER — MECLIZINE HCL 12.5 MG 12.5 MG/1
25 TABLET ORAL 3 TIMES DAILY PRN
Status: DISCONTINUED | OUTPATIENT
Start: 2025-04-23 | End: 2025-04-27 | Stop reason: HOSPADM

## 2025-04-22 RX ORDER — SODIUM CHLORIDE 0.9 % (FLUSH) 0.9 %
10 SYRINGE (ML) INJECTION EVERY 12 HOURS PRN
Status: DISCONTINUED | OUTPATIENT
Start: 2025-04-23 | End: 2025-04-27 | Stop reason: HOSPADM

## 2025-04-22 RX ORDER — ACETAMINOPHEN 325 MG/1
650 TABLET ORAL EVERY 4 HOURS PRN
Status: DISCONTINUED | OUTPATIENT
Start: 2025-04-23 | End: 2025-04-27 | Stop reason: HOSPADM

## 2025-04-22 RX ORDER — LEVOTHYROXINE SODIUM 112 UG/1
112 TABLET ORAL
Status: DISCONTINUED | OUTPATIENT
Start: 2025-04-23 | End: 2025-04-27 | Stop reason: HOSPADM

## 2025-04-22 RX ORDER — AZELASTINE 1 MG/ML
1 SPRAY, METERED NASAL NIGHTLY
Qty: 30 ML | Refills: 2 | Status: ON HOLD | OUTPATIENT
Start: 2025-04-22 | End: 2026-04-22

## 2025-04-22 RX ORDER — ONDANSETRON HYDROCHLORIDE 2 MG/ML
4 INJECTION, SOLUTION INTRAVENOUS EVERY 6 HOURS PRN
Status: DISCONTINUED | OUTPATIENT
Start: 2025-04-23 | End: 2025-04-27 | Stop reason: HOSPADM

## 2025-04-22 RX ORDER — IBUPROFEN 200 MG
24 TABLET ORAL
Status: DISCONTINUED | OUTPATIENT
Start: 2025-04-23 | End: 2025-04-27 | Stop reason: HOSPADM

## 2025-04-22 RX ORDER — PROCHLORPERAZINE EDISYLATE 5 MG/ML
5 INJECTION INTRAMUSCULAR; INTRAVENOUS EVERY 6 HOURS PRN
Status: DISCONTINUED | OUTPATIENT
Start: 2025-04-23 | End: 2025-04-25

## 2025-04-22 RX ORDER — ALUMINUM HYDROXIDE, MAGNESIUM HYDROXIDE, AND SIMETHICONE 1200; 120; 1200 MG/30ML; MG/30ML; MG/30ML
30 SUSPENSION ORAL 4 TIMES DAILY PRN
Status: DISCONTINUED | OUTPATIENT
Start: 2025-04-23 | End: 2025-04-27 | Stop reason: HOSPADM

## 2025-04-22 RX ORDER — ESOMEPRAZOLE MAGNESIUM 40 MG/1
40 CAPSULE, DELAYED RELEASE ORAL
Status: ON HOLD | COMMUNITY
End: 2025-04-27 | Stop reason: HOSPADM

## 2025-04-22 RX ADMIN — SODIUM CHLORIDE, POTASSIUM CHLORIDE, SODIUM LACTATE AND CALCIUM CHLORIDE 1000 ML: 600; 310; 30; 20 INJECTION, SOLUTION INTRAVENOUS at 07:04

## 2025-04-22 RX ADMIN — ONDANSETRON 4 MG: 2 INJECTION INTRAMUSCULAR; INTRAVENOUS at 06:04

## 2025-04-22 RX ADMIN — IOHEXOL 100 ML: 350 INJECTION, SOLUTION INTRAVENOUS at 07:04

## 2025-04-22 RX ADMIN — ONDANSETRON 4 MG: 2 INJECTION INTRAMUSCULAR; INTRAVENOUS at 09:04

## 2025-04-22 RX ADMIN — PROMETHAZINE HYDROCHLORIDE 12.5 MG: 25 INJECTION INTRAMUSCULAR; INTRAVENOUS at 11:04

## 2025-04-22 RX ADMIN — METOCLOPRAMIDE 5 MG: 5 INJECTION, SOLUTION INTRAMUSCULAR; INTRAVENOUS at 08:04

## 2025-04-22 NOTE — PROCEDURES
Ear Cerumen Removal    Date/Time: 4/22/2025 1:00 PM    Performed by: Ashvin Love PA-C  Authorized by: Ashvin Love PA-C      Local anesthetic:  None  Location details:  Both ears  Procedure type: curette    Procedure type comment:  Curette and alligator forceps  Cerumen  Removal Results:  Cerumen completely removed  Patient tolerance:  Patient tolerated the procedure well with no immediate complications

## 2025-04-22 NOTE — PROGRESS NOTES
[facility]    Subjective:      Patient: Skyler Espinoza Patient PCP: Oscar Lee FNP-C         :  1950     Sex:  female      MRN:  [id number]          Date of Visit: 2025      Chief Complaint: N/V/dizziness with standing    Patient ID: Skyler Espinoza is a 74 y.o. female who presents to office with main complaint of uncontrolled nausea and vomiting. Pt's daughter is present with her in clinic to help with history as pt has h/o vascular dementia/alzheimer-she has mild issues with memory. She had UTI 2025-cultured-e.coli. She was treated with macrobid. She had most recent urinalysis with reflex 8 days ago that showed trace luck esterase. Pt has had uncontrolled nausea and vomiting for the past 3 weeks. She has been taking reglan, zofran and phenergan, but continues to have issues with nausea and vomiting. She states that she has had abdominal pain when she applies pressure to the center of her abdomen. RUQ abd US 04/10/2025 unremarkable. CT abd/pelvis W contrast 2025 showed multiple sigmoid diverticula without evidence of diverticulitis. She states that she has been having regular bowel movements. No diarrhea or blood in stool. She denies hematuria, foul smelling urine or burning with urination. Her daughter has noticed that her urine was darker than usual recently. Pt seen by family medicine. She has dizziness when standing up. She has taken meclizine, which has not helped with her dizziness upon standing. She denies true vertigo. She just has dizziness when going from the sitting to standing position. She has required IV fluids twice in the past few weeks. She was having episodic ear pain and was told she had fluid in her middle ear. She had CT Head WO 2025 that showed stable small vessel ischemia and involutional changes. No acute intracranial findings. She did not have fluid in the middle ear on CT scan and no significant fluids in her mastoid air cells-no signs of middle  "ear infection. She did wake up the other night and was incoherent when speaking to her daughter. No fever/chills or hemoptysis.     She has had issues with nasal congestion at night and post-nasal drip at night. She has used flonase 2 sprays to each nostril in the PM. She has been taking mucinex DM and alternating oral anti-histamines.          Past Medical History  She has a past medical history of Depression, GERD (gastroesophageal reflux disease), Hyperlipidemia, Hypertension, and Hypothyroid.    Family History  Her family history includes Breast cancer in her daughter; Cancer in her father and sister.    Past Surgical History:   Procedure Laterality Date    APPENDECTOMY      COLONOSCOPY  2017    Briana, repeat in 10    TONSILLECTOMY      TUBAL LIGATION       Social History     Tobacco Use    Smoking status: Never    Smokeless tobacco: Never   Substance and Sexual Activity    Alcohol use: Not Currently     Comment: rarely    Drug use: No    Sexual activity: Yes     Medications  She has a current medication list which includes the following prescription(s): azelastine, cetirizine, famotidine, ibuprofen, levothyroxine, lovastatin, meclizine, metoclopramide hcl, metoprolol succinate, multivit-min-ferrous fumarate, ondansetron, pantoprazole, promethazine, rizatriptan, sodium citrate dihydrate, tizanidine, triamterene-hydrochlorothiazide 37.5-25 mg, and valacyclovir.    Review of patient's allergies indicates:  No Known Allergies  All medications, allergies, and past history have been reviewed.    Objective:      Vitals:      4/21/2025    11:46 AM 4/22/2025     1:40 PM 4/22/2025     2:18 PM   Vitals - 1 value per visit   SYSTOLIC 124 141    DIASTOLIC 88 84    Pulse 70  92   Temp 98.3 °F (36.8 °C)     Resp 12 16    SPO2 99 %     Weight (lb) 230.38 230.16    Weight (kg) 104.5 104.4    Height 5' 8" (1.727 m) 5' 8" (1.727 m)    BMI (Calculated) 35 35    Pain Score  Five       Vitals:    04/22/25 1340 04/22/25 1418 " "04/22/25 1421 04/22/25 1423   BP: (!) 141/84 132/82 129/84 117/88   BP Location: Right arm Right arm Right arm Right arm   Patient Position: Sitting Lying Sitting Standing   Pulse:  92 91 (!) 118   Resp: 16      Weight: 104.4 kg (230 lb 2.6 oz)      Height: 5' 8" (1.727 m)          Body surface area is 2.24 meters squared.    Physical Exam  Constitutional:       General: She is not in acute distress.     Appearance: Normal appearance. She is not ill-appearing.   HENT:      Head: Normocephalic and atraumatic.      Right Ear: Tympanic membrane, ear canal and external ear normal. There is impacted cerumen (partial).      Left Ear: Tympanic membrane, ear canal and external ear normal. There is impacted cerumen (partial).      Ears:      Comments: Good pneumatization of middle ear AU. No THIAGO.      Nose: Septal deviation (rightward with nasal septal bone spur) present.      Mouth/Throat:      Lips: Pink. No lesions.      Mouth: Mucous membranes are moist. No oral lesions.      Tongue: No lesions.      Palate: No lesions.      Pharynx: Oropharynx is clear. Uvula midline. No pharyngeal swelling, oropharyngeal exudate, posterior oropharyngeal erythema or uvula swelling.      Tonsils: 0 on the right. 0 on the left.      Comments: S/p tonsillectomy.   Eyes:      General:         Right eye: No discharge.         Left eye: No discharge.      Extraocular Movements: Extraocular movements intact.      Conjunctiva/sclera: Conjunctivae normal.   Pulmonary:      Effort: Pulmonary effort is normal.   Neurological:      General: No focal deficit present.      Mental Status: She is alert and oriented to person, place, and time. Mental status is at baseline.   Psychiatric:         Mood and Affect: Mood normal.         Behavior: Behavior normal.         Thought Content: Thought content normal.         Judgment: Judgment normal.     Ml-Hallpike:   Negative Right  Negative Left.    Ear Cerumen Removal     Date/Time: 4/22/2025 1:00 PM   "   Performed by: Ashvin Love PA-C  Authorized by: Ashvin Love PA-C       Local anesthetic:  None  Location details:  Both ears  Procedure type: curette    Procedure type comment:  Curette and alligator forceps  Cerumen  Removal Results:  Cerumen completely removed  Patient tolerance:  Patient tolerated the procedure well with no immediate complications     Labs:  WBC   Date Value Ref Range Status   04/14/2025 6.31 3.90 - 12.70 K/uL Final     Platelet Count   Date Value Ref Range Status   04/14/2025 274 150 - 450 K/uL Final     Creatinine   Date Value Ref Range Status   04/14/2025 1.0 0.5 - 1.4 mg/dL Final     TSH   Date Value Ref Range Status   04/10/2025 2.644 0.340 - 5.600 uIU/mL Final     Glucose   Date Value Ref Range Status   11/15/2024 103 70 - 110 mg/dL Final     All lab results and imaging results have been reviewed.    Assessment:        ICD-10-CM ICD-9-CM   1. Nasal congestion  R09.81 478.19   2. Dizziness  R42 780.4   3. Post-nasal drip  R09.82 784.91   4. Dehydration  E86.0 276.51   5. Nausea and vomiting, unspecified vomiting type  R11.2 787.01            Plan:      Bilateral ear cleaning performed today in office. Use OTC nasal saline followed by flonase 1 spray to each nostril and astelin 1 spray to each nostril at night 1 hour prior to bedtime to help with nasal congestion and post-nasal drip at night. Discontinue mucinex DM and just use regular mucinex without decongestant as needed for excessive mucous production. Pt may continue alternating OTC oral anti-histamines. Pt negative for BPPV. She is not having vertigo. She has had dizziness when going from sitting to standing position that started after having chronic issues with nausea and vomiting causing dehydration x 3 weeks. She is not having true vertigo. This does not appear to be inner ear/peripheral pathology. Orthostatic vitals unremarkable today in office. I believe that pt is having dizziness going from sitting to  standing position secondary to dehydration as this can still cause issues with dizziness with normal orthostatics. Pt will go to [facility] for IV fluids and IV antiemetics due to oral antiemetics not helping with her chronic nausea and vomiting. [facility] ED called and notified that pt will be coming for fluids and further workup. May need to be re-checked for UTI/pyelonephritis.

## 2025-04-23 PROBLEM — N39.0 URINARY TRACT INFECTION: Status: ACTIVE | Noted: 2025-04-23

## 2025-04-23 PROBLEM — F03.90 DEMENTIA: Chronic | Status: ACTIVE | Noted: 2025-04-23

## 2025-04-23 PROBLEM — R11.2 INTRACTABLE NAUSEA AND VOMITING: Status: ACTIVE | Noted: 2025-04-23

## 2025-04-23 LAB
ABSOLUTE EOSINOPHIL (SMH): 0.16 K/UL
ABSOLUTE MONOCYTE (SMH): 0.92 K/UL (ref 0.3–1)
ABSOLUTE NEUTROPHIL COUNT (SMH): 4.1 K/UL (ref 1.8–7.7)
ANION GAP (SMH): 6 MMOL/L (ref 8–16)
BASOPHILS # BLD AUTO: 0.04 K/UL
BASOPHILS NFR BLD AUTO: 0.5 %
BUN SERPL-MCNC: 10 MG/DL (ref 8–23)
CALCIUM SERPL-MCNC: 8.8 MG/DL (ref 8.7–10.5)
CHLORIDE SERPL-SCNC: 106 MMOL/L (ref 95–110)
CO2 SERPL-SCNC: 26 MMOL/L (ref 23–29)
CREAT SERPL-MCNC: 0.7 MG/DL (ref 0.5–1.4)
ERYTHROCYTE [DISTWIDTH] IN BLOOD BY AUTOMATED COUNT: 12.8 % (ref 11.5–14.5)
GFR SERPLBLD CREATININE-BSD FMLA CKD-EPI: >60 ML/MIN/1.73/M2
GLUCOSE SERPL-MCNC: 95 MG/DL (ref 70–110)
HCT VFR BLD AUTO: 37.6 % (ref 37–48.5)
HGB BLD-MCNC: 12.5 GM/DL (ref 12–16)
IMM GRANULOCYTES # BLD AUTO: 0.02 K/UL (ref 0–0.04)
IMM GRANULOCYTES NFR BLD AUTO: 0.3 % (ref 0–0.5)
LYMPHOCYTES # BLD AUTO: 2.26 K/UL (ref 1–4.8)
MAGNESIUM SERPL-MCNC: 2 MG/DL (ref 1.6–2.6)
MCH RBC QN AUTO: 29.8 PG (ref 27–31)
MCHC RBC AUTO-ENTMCNC: 33.2 G/DL (ref 32–36)
MCV RBC AUTO: 90 FL (ref 82–98)
NUCLEATED RBC (/100WBC) (SMH): 0 /100 WBC
PHOSPHATE SERPL-MCNC: 3.2 MG/DL (ref 2.7–4.5)
PLATELET # BLD AUTO: 261 K/UL (ref 150–450)
PMV BLD AUTO: 10 FL (ref 9.2–12.9)
POTASSIUM SERPL-SCNC: 3.6 MMOL/L (ref 3.5–5.1)
RBC # BLD AUTO: 4.19 M/UL (ref 4–5.4)
RELATIVE EOSINOPHIL (SMH): 2.1 % (ref 0–8)
RELATIVE LYMPHOCYTE (SMH): 30.3 % (ref 18–48)
RELATIVE MONOCYTE (SMH): 12.3 % (ref 4–15)
RELATIVE NEUTROPHIL (SMH): 54.5 % (ref 38–73)
SODIUM SERPL-SCNC: 138 MMOL/L (ref 136–145)
WBC # BLD AUTO: 7.46 K/UL (ref 3.9–12.7)

## 2025-04-23 PROCEDURE — 25000003 PHARM REV CODE 250: Performed by: NURSE PRACTITIONER

## 2025-04-23 PROCEDURE — 25000003 PHARM REV CODE 250

## 2025-04-23 PROCEDURE — 83735 ASSAY OF MAGNESIUM: CPT

## 2025-04-23 PROCEDURE — 63600175 PHARM REV CODE 636 W HCPCS: Performed by: NURSE PRACTITIONER

## 2025-04-23 PROCEDURE — 63600175 PHARM REV CODE 636 W HCPCS

## 2025-04-23 PROCEDURE — 80048 BASIC METABOLIC PNL TOTAL CA: CPT

## 2025-04-23 PROCEDURE — 85025 COMPLETE CBC W/AUTO DIFF WBC: CPT

## 2025-04-23 PROCEDURE — 12000002 HC ACUTE/MED SURGE SEMI-PRIVATE ROOM

## 2025-04-23 PROCEDURE — 84100 ASSAY OF PHOSPHORUS: CPT

## 2025-04-23 RX ORDER — MEROPENEM 1 G/1
1 INJECTION, POWDER, FOR SOLUTION INTRAVENOUS
Status: DISCONTINUED | OUTPATIENT
Start: 2025-04-23 | End: 2025-04-24

## 2025-04-23 RX ORDER — CEFTRIAXONE 1 G/1
1 INJECTION, POWDER, FOR SOLUTION INTRAMUSCULAR; INTRAVENOUS
Status: DISCONTINUED | OUTPATIENT
Start: 2025-04-23 | End: 2025-04-23

## 2025-04-23 RX ORDER — SCOPOLAMINE 1 MG/3D
1 PATCH, EXTENDED RELEASE TRANSDERMAL
Status: DISCONTINUED | OUTPATIENT
Start: 2025-04-23 | End: 2025-04-27 | Stop reason: HOSPADM

## 2025-04-23 RX ORDER — PROCHLORPERAZINE EDISYLATE 5 MG/ML
5 INJECTION INTRAMUSCULAR; INTRAVENOUS EVERY 6 HOURS PRN
Status: DISCONTINUED | OUTPATIENT
Start: 2025-04-23 | End: 2025-04-23

## 2025-04-23 RX ADMIN — HYDROCODONE BITARTRATE AND ACETAMINOPHEN 1 TABLET: 5; 325 TABLET ORAL at 07:04

## 2025-04-23 RX ADMIN — HYDROCODONE BITARTRATE AND ACETAMINOPHEN 1 TABLET: 5; 325 TABLET ORAL at 06:04

## 2025-04-23 RX ADMIN — ACETAMINOPHEN 650 MG: 325 TABLET ORAL at 03:04

## 2025-04-23 RX ADMIN — LEVOTHYROXINE SODIUM 112 MCG: 112 TABLET ORAL at 06:04

## 2025-04-23 RX ADMIN — ONDANSETRON 4 MG: 2 INJECTION INTRAMUSCULAR; INTRAVENOUS at 05:04

## 2025-04-23 RX ADMIN — ACETAMINOPHEN 650 MG: 325 TABLET ORAL at 09:04

## 2025-04-23 RX ADMIN — METOCLOPRAMIDE 10 MG: 5 INJECTION, SOLUTION INTRAMUSCULAR; INTRAVENOUS at 02:04

## 2025-04-23 RX ADMIN — SODIUM CHLORIDE: 9 INJECTION, SOLUTION INTRAVENOUS at 08:04

## 2025-04-23 RX ADMIN — MEROPENEM 1 G: 1 INJECTION INTRAVENOUS at 05:04

## 2025-04-23 RX ADMIN — METOCLOPRAMIDE 10 MG: 5 INJECTION, SOLUTION INTRAMUSCULAR; INTRAVENOUS at 09:04

## 2025-04-23 RX ADMIN — ONDANSETRON 4 MG: 2 INJECTION INTRAMUSCULAR; INTRAVENOUS at 08:04

## 2025-04-23 RX ADMIN — MEROPENEM 1 G: 1 INJECTION INTRAVENOUS at 03:04

## 2025-04-23 RX ADMIN — THERA TABS 1 TABLET: TAB at 08:04

## 2025-04-23 RX ADMIN — PROCHLORPERAZINE EDISYLATE 5 MG: 5 INJECTION INTRAMUSCULAR; INTRAVENOUS at 07:04

## 2025-04-23 RX ADMIN — MORPHINE SULFATE 2 MG: 2 INJECTION, SOLUTION INTRAMUSCULAR; INTRAVENOUS at 12:04

## 2025-04-23 RX ADMIN — PROMETHAZINE HYDROCHLORIDE 12.5 MG: 25 INJECTION INTRAMUSCULAR; INTRAVENOUS at 02:04

## 2025-04-23 RX ADMIN — PANTOPRAZOLE SODIUM 40 MG: 40 INJECTION, POWDER, FOR SOLUTION INTRAVENOUS at 08:04

## 2025-04-23 RX ADMIN — PROCHLORPERAZINE EDISYLATE 5 MG: 5 INJECTION INTRAMUSCULAR; INTRAVENOUS at 09:04

## 2025-04-23 RX ADMIN — MEROPENEM 1 G: 1 INJECTION INTRAVENOUS at 10:04

## 2025-04-23 RX ADMIN — METOCLOPRAMIDE 10 MG: 5 INJECTION, SOLUTION INTRAMUSCULAR; INTRAVENOUS at 06:04

## 2025-04-23 RX ADMIN — METOPROLOL SUCCINATE 100 MG: 50 TABLET, FILM COATED, EXTENDED RELEASE ORAL at 09:04

## 2025-04-23 RX ADMIN — SODIUM CHLORIDE: 9 INJECTION, SOLUTION INTRAVENOUS at 01:04

## 2025-04-23 RX ADMIN — SCOPOLAMINE 1 PATCH: 1.5 PATCH, EXTENDED RELEASE TRANSDERMAL at 10:04

## 2025-04-23 NOTE — H&P
"  Formerly [facility], [facility] - Emergency Dept  Hospital Medicine  History & Physical    Patient Name: Skyler Espinoza  MRN: [id number]  Patient Class: OP- Observation  Admission Date: 4/22/2025  Attending Physician: Lakhwinder De La Rosa MD   Primary Care Provider: Oscar Lee FNP-C         Patient information was obtained from relative(s), past medical records, and ER records.     Subjective:     Principal Problem:Urinary tract infection    Chief Complaint:   Chief Complaint   Patient presents with    Nausea    Vomiting     Been having n/v for 2-3 weeks has seen several specialist and er visits today ent said to come get tested for urine and blood.         HPI: Skyler Espinoza is a 74 year old female with a past medical history of dementia, depression, hyperlipidemia, hypertension, hypothyroidism secondary Hashimoto thyroiditis, osteoarthritis, and surgical history of appendectomy who was brought into the ED today by her daughter with complaints of mild intermittent abdominal cramping, associated with nausea and vomiting for 3 weeks. Her daughter states today she was at the ENT clinic and began to develop intractable nausea and vomiting. Patient unable to give history due to dementia.  Daughter at bedside and reports patient had multiple ED visits on 04/11 and 4/14 for the same symptoms. In addition, she visited her PCP on 04/10 with complaints of nausea and vomiting.  Her PCP performed an abdominal ultrasound was which was normal.  Daughter reports patient was recently on antibiotics for positive urine cultures. On 02/15 urine culture positive E coli. Her daughter reports she is scheduled for a EGD with GI, Dr. Brewer on May 1st. Also, the daughter reports patient was taking THC gummies for her dementia but stopped 2 weeks ago. Daughter denies any diarrhea. Daughter is a very sweet person and patient's primary caregiver. Daughter reports she would like to "get to the bottom," of patient's symptoms prior to " discharge. ED workup reveals: CT abdomen pelvis no acute findings.  CT head completed outpatient at PCP office on 04/21 with no acute findings.  CBC and BMP unremarkable.  Urinalysis with 11 WBCs, 1+ protein, and 3+ ketones.  Urine cultures pending.  Previous culture was sensitive to meropenem, antibiotics started.  Patient received IV fluids, Reglan, Phenergan, and Zofran in ED with mild relief.  Admitted to hospital medicine for further management and treatment.            Past Medical History:   Diagnosis Date    Depression     GERD (gastroesophageal reflux disease)     Hyperlipidemia     Hypertension     Hypothyroid        Past Surgical History:   Procedure Laterality Date    APPENDECTOMY      COLONOSCOPY  2017    Briana, repeat in 10    TONSILLECTOMY      TUBAL LIGATION         Review of patient's allergies indicates:  No Known Allergies    No current facility-administered medications on file prior to encounter.     Current Outpatient Medications on File Prior to Encounter   Medication Sig    aluminum hydrox-magnesium carb (GAVISCON EXTRA STRENGTH) 254-237.5 mg/5 mL Susp Take 5 mLs by mouth 3 (three) times daily with meals.    cetirizine (ZYRTEC) 10 MG tablet Take 10 mg by mouth every evening.    esomeprazole (NEXIUM) 40 MG capsule Take 40 mg by mouth before breakfast.    famotidine (PEPCID) 40 MG tablet Take 40 mg by mouth nightly.    fluticasone propionate (FLONASE) 50 mcg/actuation nasal spray 1 spray by Each Nostril route every evening.    levothyroxine (SYNTHROID) 112 MCG tablet Take 1 tablet (112 mcg total) by mouth before breakfast.    meclizine (ANTIVERT) 25 mg tablet Take 0.5 tablets (12.5 mg total) by mouth 3 (three) times daily as needed for Dizziness. (Patient taking differently: Take 25 mg by mouth 3 (three) times daily as needed for Dizziness.)    metoclopramide HCl (REGLAN) 5 MG tablet Take 1 tablet (5 mg total) by mouth every 8 (eight) hours as needed (nausea/vomiting).    metoprolol succinate  (TOPROL-XL) 100 MG 24 hr tablet Take 100 mg by mouth nightly.    multivitamin (THERAGRAN) tablet Take 1 tablet by mouth once daily.    ondansetron (ZOFRAN-ODT) 8 MG TbDL Take 1 tablet (8 mg total) by mouth every 8 (eight) hours as needed (nausea and vomiting).    pantoprazole (PROTONIX) 40 MG tablet Take 40 mg by mouth once daily.    promethazine (PHENERGAN) 25 MG tablet Take 1 tablet (25 mg total) by mouth every 6 (six) hours as needed for Nausea.    rizatriptan (MAXALT) 10 MG tablet Take 10 mg by mouth once as needed for Migraine.    azelastine (ASTELIN) 137 mcg (0.1 %) nasal spray 1 spray (137 mcg total) by Nasal route nightly. Point up and slightly outward toward ear when spraying to avoid irritating nasal septum. With flonase.     Family History       Problem Relation (Age of Onset)    Breast cancer Daughter    Cancer Father, Sister          Tobacco Use    Smoking status: Never    Smokeless tobacco: Never   Substance and Sexual Activity    Alcohol use: Not Currently     Comment: rarely    Drug use: No    Sexual activity: Yes     Review of Systems   Unable to perform ROS: Dementia     Objective:     Vital Signs (Most Recent):  Temp: 97.7 °F (36.5 °C) (04/22/25 1522)  Pulse: 93 (04/23/25 0110)  Resp: 18 (04/23/25 0110)  BP: (!) 132/59 (04/23/25 0110)  SpO2: 96 % (04/23/25 0110) Vital Signs (24h Range):  Temp:  [97.7 °F (36.5 °C)] 97.7 °F (36.5 °C)  Pulse:  [] 93  Resp:  [16-18] 18  SpO2:  [94 %-96 %] 96 %  BP: (117-159)/() 132/59     Weight: 104 kg (229 lb 4.5 oz)  Body mass index is 34.86 kg/m².     Physical Exam  Vitals and nursing note reviewed.   Constitutional:       Appearance: She is not ill-appearing.   Eyes:      Pupils: Pupils are equal, round, and reactive to light.   Cardiovascular:      Rate and Rhythm: Normal rate and regular rhythm.   Pulmonary:      Effort: Pulmonary effort is normal. No respiratory distress.      Breath sounds: Normal breath sounds. No wheezing.   Abdominal:       "General: Bowel sounds are normal. There is no distension.      Palpations: Abdomen is soft.      Tenderness: There is no abdominal tenderness. There is no guarding.   Musculoskeletal:      Right lower leg: Edema present.      Left lower leg: Edema present.   Skin:     General: Skin is warm and dry.      Capillary Refill: Capillary refill takes less than 2 seconds.   Neurological:      Mental Status: She is alert. She is confused.      GCS: GCS eye subscore is 4. GCS verbal subscore is 4. GCS motor subscore is 6.      Comments: Mental status at baseline with dementia.                CRANIAL NERVES     CN III, IV, VI   Pupils are equal, round, and reactive to light.       Significant Labs: All pertinent labs within the past 24 hours have been reviewed.    Bilirubin:   Recent Labs   Lab 04/10/25  1530 04/11/25  1856 04/14/25  0907 04/22/25  1816   BILITOT 0.6 0.5 0.4 0.6     Blood Culture: No results for input(s): "LABBLOO" in the last 48 hours.  BMP:   Recent Labs   Lab 04/22/25  1816      K 3.9   CO2 24   BUN 13   CREATININE 0.8   CALCIUM 9.6   MG 2.1     CBC:   Recent Labs   Lab 04/22/25  1815   WBC 8.16   HGB 14.5   HCT 43.8        CMP:   Recent Labs   Lab 04/22/25  1816      K 3.9   CO2 24   BUN 13   CREATININE 0.8   CALCIUM 9.6   ALBUMIN 4.3   BILITOT 0.6   ALKPHOS 59   AST 18   ALT 15       Lipase:   Recent Labs   Lab 04/22/25  1816   LIPASE 45       Magnesium:   Recent Labs   Lab 04/22/25  1816   MG 2.1     TSH:   Recent Labs   Lab 04/10/25  1530   TSH 2.644       Urine Studies:   Recent Labs   Lab 04/22/25 2029   APPEARANCEUA Clear   SPECGRAV >=1.030*   PROTEINUA 1+*   BILIRUBINUA Negative   UROBILINOGEN Negative   LEUKOCYTESUR Negative   RBCUA 2   WBCUA 11*       Significant Imaging: I have reviewed all pertinent imaging results/findings within the past 24 hours.    CT Abdomen Pelvis With IV Contrast NO Oral Contrast  Order: [id number]   Status: Final result       Next appt: 04/29/2025 at " 08:00 PM in Sleep Medicine (HOME SLEEP STUDIES, [facility]EP [facility])    Test Result Released: Yes (not seen)    0 Result Notes  Details    Reading Physician Reading Date Result Priority   Shukri Seaman MD  [phone]  4/22/2025 STAT     Narrative & Impression  EXAMINATION:  CT ABDOMEN PELVIS WITH IV CONTRAST     CLINICAL HISTORY:  Nausea/vomiting;Epigastric pain;     TECHNIQUE:  Low dose axial images, sagittal and coronal reformations were obtained from the lung bases to the pubic symphysis following the IV administration of 100 mL of Omnipaque 350 .  Oral contrast was not given.     COMPARISON:  04/14/2025     FINDINGS:  Soft tissues: Unremarkable.     Bones: No acute osseous abnormality.     Lower chest: Normal heart size. No pericardial effusion.  Small hiatal hernia.     Lung Bases: Well aerated, without consolidation or pleural fluid.     Liver: Fatty liver.     Gallbladder: No calcified gallstones.     Bile Ducts: No evidence of dilated ducts.     Pancreas: No mass or peripancreatic fat stranding.     Spleen: Unremarkable.     Adrenals: Unremarkable.     Kidneys/ Ureters: Unremarkable.     Bladder: No evidence of wall thickening.     Reproductive organs: Unremarkable.     GI Tract/Mesentery: No evidence of bowel obstruction or inflammation.  Colonic diverticulosis, without diverticulitis.     Peritoneal Space: No ascites. No free air.     Lymphadenopathy: No significant adenopathy.     Vasculature: Mild multifocal atherosclerosis of the abdominal aorta and its branches.     Impression:     No acute findings        Electronically signed by:Shukri Seaman  Date:                                            04/22/2025  Time:                                           21:43        Exam Ended: 04/22/25 19:39 CDT Last Resulted: 04/22/25 21:43 CDT     Assessment/Plan:     Assessment & Plan  Urinary tract infection  -urinalysis with 11 WBCs, 1+ protein, and 3+ ketones  -urine cultures pending  -previous urine  culture on 02/15 positive E coli; previously treated with antibiotics  -started on IV fluids  -monitor I's and O's  -monitor white count    HTN (hypertension)  Patient's blood pressure range in the last 24 hours was: BP  Min: 117/88  Max: 159/112.The patient's inpatient anti-hypertensive regimen is listed below:  Current Antihypertensives  metoprolol succinate (TOPROL-XL) 24 hr tablet 100 mg, Nightly, Oral    Plan  - BP is controlled, no changes needed to their regimen    Intractable nausea and vomiting  -analgesics and antiemetics p.r.n.  -NPO  -continuous IV fluids  -CT abdomen completed  -monitor electrolytes; replacements as needed    Dementia  Patient with dementia with likely etiology of unknown dementia. Dementia is moderate. The patient does not have signs of behavioral disturbance. Home dementia medications are Held or Continued:  No medications noted . Continue non-pharmacologic interventions to prevent delirium (No VS between 11PM-5AM, activity during day, opening blinds, providing glasses/hearing aids, and up in chair during daytime). Will avoid narcotics and benzos unless absolutely necessary. PRN anti-psychotics are not prescribed to avoid self harm behaviors.  Order p.r.n. antipsychotics as needed  VTE Risk Mitigation (From admission, onward)           Ordered     IP VTE HIGH RISK PATIENT  Once         04/22/25 2337     Place sequential compression device  Until discontinued         04/22/25 2337                                    Chanda Lynn DNP  Department of Hospital Medicine  [facility] - Emergency Dept

## 2025-04-23 NOTE — ASSESSMENT & PLAN NOTE
Patient's blood pressure range in the last 24 hours was: BP  Min: 117/88  Max: 159/112.The patient's inpatient anti-hypertensive regimen is listed below:  Current Antihypertensives  metoprolol succinate (TOPROL-XL) 24 hr tablet 100 mg, Nightly, Oral    Plan  - BP is controlled, no changes needed to their regimen

## 2025-04-23 NOTE — PT/OT/SLP PROGRESS
Physical Therapy      Patient Name:  Skyler Espinoza   MRN:  [id number]    Patient not seen today secondary to Nausea/vomiting. Will follow-up in PM.

## 2025-04-23 NOTE — PLAN OF CARE
Inpatient Upgrade Note     Skyler Espinoza has warranted treatment spanning two or more midnights of hospital level care for the management of  Intractable nausea and vomiting, UTI . She continues to require IV antibiotics, IV fluids, daily labs, further testing/imaging, monitoring of vital signs, further evaluation by consultants, and IV antiemetics . Her condition is also complicated by the following comorbidities: Hypertension.

## 2025-04-23 NOTE — ASSESSMENT & PLAN NOTE
Patient's blood pressure range in the last 24 hours was: BP  Min: 117/88  Max: 170/112.The patient's inpatient anti-hypertensive regimen is listed below:  Current Antihypertensives  metoprolol succinate (TOPROL-XL) 24 hr tablet 100 mg, Nightly, Oral    Plan  - BP is stable, no changes needed to their regimen

## 2025-04-23 NOTE — HPI
"Skyler Espinoza is a 74 year old female with a past medical history of dementia, depression, hyperlipidemia, hypertension, hypothyroidism secondary Hashimoto thyroiditis, osteoarthritis, and surgical history of appendectomy who was brought into the ED today by her daughter with complaints of mild intermittent abdominal cramping, associated with nausea and vomiting for 3 weeks. Her daughter states today she was at the ENT clinic and began to develop intractable nausea and vomiting. Patient unable to give history due to dementia.  Daughter at bedside and reports patient had multiple ED visits on 04/11 and 4/14 for the same symptoms. In addition, she visited her PCP on 04/10 with complaints of nausea and vomiting.  Her PCP performed an abdominal ultrasound was which was normal.  Daughter reports patient was recently on antibiotics for positive urine cultures. On 02/15 urine culture positive E coli. Her daughter reports she is scheduled for a EGD with GI, Dr. Brewer on May 1st. Also, the daughter reports patient was taking THC gummies for her dementia but stopped 2 weeks ago. Daughter denies any diarrhea. Daughter is a very sweet person and patient's primary caregiver. Daughter reports she would like to "get to the bottom," of patient's symptoms prior to discharge. ED workup reveals: CT abdomen pelvis no acute findings.  CT head completed outpatient at PCP office on 04/21 with no acute findings.  CBC and BMP unremarkable.  Urinalysis with 11 WBCs, 1+ protein, and 3+ ketones.  Urine cultures pending.  Previous culture was sensitive to meropenem, antibiotics started.  Patient received IV fluids, Reglan, Phenergan, and Zofran in ED with mild relief.  Admitted to hospital medicine for further management and treatment.          "

## 2025-04-23 NOTE — PROGRESS NOTES
"[facility] Medicine  Progress Note    Patient Name: Skyler Espinoza  MRN: [id number]  Patient Class: OP- Observation   Admission Date: 4/22/2025  Length of Stay: 0 days  Attending Physician: Lakhwinder De La Rosa MD  Primary Care Provider: Oscar Lee FNP-C        Subjective     Principal Problem:Intractable nausea and vomiting        HPI:  Skyler Espinoza is a 74 year old female with a past medical history of dementia, depression, hyperlipidemia, hypertension, hypothyroidism secondary Hashimoto thyroiditis, osteoarthritis, and surgical history of appendectomy who was brought into the ED today by her daughter with complaints of mild intermittent abdominal cramping, associated with nausea and vomiting for 3 weeks. Her daughter states today she was at the ENT clinic and began to develop intractable nausea and vomiting. Patient unable to give history due to dementia.  Daughter at bedside and reports patient had multiple ED visits on 04/11 and 4/14 for the same symptoms. In addition, she visited her PCP on 04/10 with complaints of nausea and vomiting.  Her PCP performed an abdominal ultrasound was which was normal.  Daughter reports patient was recently on antibiotics for positive urine cultures. On 02/15 urine culture positive E coli. Her daughter reports she is scheduled for a EGD with GI, Dr. Brewer on May 1st. Also, the daughter reports patient was taking THC gummies for her dementia but stopped 2 weeks ago. Daughter denies any diarrhea. Daughter is a very sweet person and patient's primary caregiver. Daughter reports she would like to "get to the bottom," of patient's symptoms prior to discharge. ED workup reveals: CT abdomen pelvis no acute findings.  CT head completed outpatient at PCP office on 04/21 with no acute findings.  CBC and BMP unremarkable.  Urinalysis with 11 WBCs, 1+ protein, and 3+ ketones.  Urine cultures pending.  Previous culture was sensitive to meropenem, antibiotics " started.  Patient received IV fluids, Reglan, Phenergan, and Zofran in ED with mild relief.  Admitted to hospital medicine for further management and treatment.            Overview/Hospital Course:  No notes on file    Interval History: pt continues to have N/V despite multiple antiemetics.  Seen in office by GI recently with plan for EGD next week as well as NM gastric emptying study.  Will consult GI to see her while she is here given difficulty in achieving control of her symptoms.     Review of Systems   Unable to perform ROS: Dementia     Objective:     Vital Signs (Most Recent):  Temp: 98.3 °F (36.8 °C) (04/23/25 0834)  Pulse: (!) 117 (04/23/25 0834)  Resp: 16 (04/23/25 0834)  BP: (!) 163/88 (04/23/25 0838)  SpO2: (!) 94 % (04/23/25 0834) Vital Signs (24h Range):  Temp:  [97.7 °F (36.5 °C)-98.3 °F (36.8 °C)] 98.3 °F (36.8 °C)  Pulse:  [] 117  Resp:  [16-18] 16  SpO2:  [94 %-97 %] 94 %  BP: (117-170)/() 163/88     Weight: 104.1 kg (229 lb 8 oz)  Body mass index is 34.9 kg/m².    Intake/Output Summary (Last 24 hours) at 4/23/2025 0904  Last data filed at 4/23/2025 0846  Gross per 24 hour   Intake 1000 ml   Output --   Net 1000 ml         Physical Exam  Vitals and nursing note reviewed.   Constitutional:       Appearance: She is not ill-appearing.   Eyes:      Pupils: Pupils are equal, round, and reactive to light.   Cardiovascular:      Rate and Rhythm: Regular rhythm. Tachycardia present.   Pulmonary:      Effort: Pulmonary effort is normal. No respiratory distress.      Breath sounds: Normal breath sounds. No wheezing.   Abdominal:      General: Bowel sounds are normal. There is no distension.      Palpations: Abdomen is soft.      Tenderness: There is no abdominal tenderness.   Musculoskeletal:      Right lower leg: Edema present.      Left lower leg: Edema present.   Skin:     General: Skin is warm and dry.      Capillary Refill: Capillary refill takes less than 2 seconds.   Neurological:       Mental Status: She is alert. She is confused.      GCS: GCS eye subscore is 4. GCS verbal subscore is 4. GCS motor subscore is 6.      Comments: Mental status at baseline with dementia.     Psychiatric:         Mood and Affect: Mood normal.         Behavior: Behavior normal.               Significant Labs: All pertinent labs within the past 24 hours have been reviewed.  CBC:   Recent Labs   Lab 04/22/25 1815 04/23/25  0617   WBC 8.16 7.46   HGB 14.5 12.5   HCT 43.8 37.6    261     CMP:   Recent Labs   Lab 04/22/25 1816 04/23/25  0617    138   K 3.9 3.6   CO2 24 26   BUN 13 10   CREATININE 0.8 0.7   CALCIUM 9.6 8.8   ALBUMIN 4.3  --    BILITOT 0.6  --    ALKPHOS 59  --    AST 18  --    ALT 15  --      Magnesium:   Recent Labs   Lab 04/22/25 1816 04/23/25  0617   MG 2.1 2.0     TSH:   Recent Labs   Lab 04/10/25  1530   TSH 2.644     Urine Studies:   Recent Labs   Lab 04/22/25 2029   APPEARANCEUA Clear   SPECGRAV >=1.030*   PROTEINUA 1+*   BILIRUBINUA Negative   UROBILINOGEN Negative   LEUKOCYTESUR Negative   RBCUA 2   WBCUA 11*       Significant Imaging: I have reviewed all pertinent imaging results/findings within the past 24 hours.      Assessment & Plan  Urinary tract infection  -urinalysis with 11 WBCs, 1+ protein, and 3+ ketones  -urine cultures -no growth so far  -previous urine culture on 02/15 positive E coli; previously treated with antibiotics  -started on IV fluids  -monitor I's and O's  -monitor white count    HTN (hypertension)  Patient's blood pressure range in the last 24 hours was: BP  Min: 117/88  Max: 170/112.The patient's inpatient anti-hypertensive regimen is listed below:  Current Antihypertensives  metoprolol succinate (TOPROL-XL) 24 hr tablet 100 mg, Nightly, Oral    Plan  - BP is stable, no changes needed to their regimen    Intractable nausea and vomiting  -analgesics and antiemetics p.r.n.  -NPO  -continuous IV fluids  -CT abdomen completed  -monitor electrolytes;  replacements as needed    Dementia  Patient with dementia with likely etiology of unknown dementia. Dementia is moderate. The patient does not have signs of behavioral disturbance. Home dementia medications are Held or Continued:  No medications noted . Continue non-pharmacologic interventions to prevent delirium (No VS between 11PM-5AM, activity during day, opening blinds, providing glasses/hearing aids, and up in chair during daytime). Will avoid narcotics and benzos unless absolutely necessary. PRN anti-psychotics are not prescribed to avoid self harm behaviors.  Order p.r.n. antipsychotics as needed  VTE Risk Mitigation (From admission, onward)           Ordered     IP VTE HIGH RISK PATIENT  Once         04/22/25 2337     Place sequential compression device  Until discontinued         04/22/25 2337                    Discharge Planning   ANGELI:      Code Status: Full Code   Medical Readiness for Discharge Date:            BRYANT Hoffman  Department of Hospital Medicine   [facility]

## 2025-04-23 NOTE — PLAN OF CARE
SW explained PRATT.  Pt signed form. SW gave copy of form at bedside.  PRATT scanned into pt's .       04/23/25 1146   PRATT Message   Medicare Outpatient and Observation Notification regarding financial responsibility Given to patient/caregiver;Explained to patient/caregiver;Signed/date by patient/caregiver   Date PRATT was signed 04/23/25   Time PRATT was signed 1016

## 2025-04-23 NOTE — PLAN OF CARE
[facility]  Initial Discharge Assessment       Primary Care Provider: Oscar Lee FNP-C    Admission Diagnosis: Intractable nausea and vomiting [R11.2]    Admission Date: 4/22/2025  Expected Discharge Date: 4/25/2025     completed discharge assessment with pt's daughter at bedside. Pt was experiencing an intense headache and requested SW speak to daughter. Pt lives at home with her daughter, son, and daughter-in-law. Demographics, PCP, and insurance verified. No home health. No dialysis. Pt completes ADLs with assistance from daughter. Pt to discharge home via family transport. Pt's daughter is requesting a rollator or wheelchair.      Transition of Care Barriers: None    Payor: [employer] [facility] / Plan: [employer] / Product Type: Medicare Advantage /     Extended Emergency Contact Information  Primary Emergency Contact: Juan Sainz  Home Phone: [phone]  Mobile Phone: [phone]  Relation: Son  Preferred language: English   needed? No  Secondary Emergency Contact: Allison Trejo  Address: [address] [address]  Home Phone: [phone]  Mobile Phone: [phone]  Relation: Daughter  Preferred language: English   needed? No    Discharge Plan A: Home  Discharge Plan B: Home with family      [id number] [address] & [address] [address]  Phone: [phone] Fax: [phone]      Initial Assessment (most recent)       Adult Discharge Assessment - 04/23/25 1146          Discharge Assessment    Assessment Type Discharge Planning Assessment     Confirmed/corrected address, phone number and insurance Yes     Confirmed Demographics Correct on Facesheet     Source of Information family     When was your last doctors appointment? 04/10/25     Does patient/caregiver understand observation status Yes      Reason For Admission Intractable nausea and vomiting     People in Home child(larry), adult     Facility Arrived From: Home     Do you expect to return to your current living situation? Yes     Do you have help at home or someone to help you manage your care at home? Yes     Who are your caregiver(s) and their phone number(s)? Allison Trejo: Daughter [phone]     Prior to hospitilization cognitive status: Unable to Assess     Current cognitive status: Unable to Assess     Walking or Climbing Stairs Difficulty yes     Walking or Climbing Stairs ambulation difficulty, assistance 1 person     Dressing/Bathing Difficulty yes     Dressing/Bathing bathing difficulty, assistance 1 person;dressing difficulty, assistance 1 person     Dressing/Bathing Management daughter assists.     Home Accessibility wheelchair accessible     Home Layout Able to live on 1st floor     Equipment Currently Used at Home blood pressure machine;CPAP     Readmission within 30 days? No     Patient currently being followed by outpatient case management? No     Do you currently have service(s) that help you manage your care at home? No     Do you take prescription medications? Yes     Do you have prescription coverage? Yes     Coverage [facility]     Is the patient taking medications as prescribed? yes     Who is going to help you get home at discharge? daughter     How do you get to doctors appointments? car, drives self     Are you on dialysis? No     Do you take coumadin? No     Discharge Plan A Home     Discharge Plan B Home with family     DME Needed Upon Discharge  rollator;wheelchair     Transition of Care Barriers None                                 Detail Level: Detailed Quality 226: Preventive Care And Screening: Tobacco Use: Screening And Cessation Intervention: Patient screened for tobacco use and is an ex/non-smoker

## 2025-04-23 NOTE — CONSULTS
"GASTROENTEROLOGY INPATIENT CONSULT NOTE  Patient Name: Skyler Espinoza  Patient MRN: [id number]  Patient : 1950    Admit Date: 2025  Service date: 2025    Reason for Consult: intractable N/V     PCP: Oscar Lee FNP-C    Chief Complaint   Patient presents with    Nausea    Vomiting     Been having n/v for 2-3 weeks has seen several specialist and er visits today ent said to come get tested for urine and blood.        HPI: Patient is 74 year old female with a past medical history of dementia, depression, hyperlipidemia, hypertension, hypothyroidism secondary Hashimoto thyroiditis, osteoarthritis, and surgical history of appendectomy who was brought into the ED today by her daughter with complaints of mild intermittent abdominal cramping, associated with nausea and vomiting for 3 weeks. Her daughter states today she was at the ENT clinic and began to develop intractable nausea and vomiting. Patient unable to give history due to dementia. Daughter at bedside and reports patient had multiple ED visits on  and  for the same symptoms. In addition, she visited her PCP on 04/10 with complaints of nausea and vomiting. Her PCP performed an abdominal ultrasound was which was normal. Daughter reports patient was recently on antibiotics for positive urine cultures. On 02/15 urine culture positive E coli. Her daughter reports she is scheduled for a EGD with GI, Dr. Brewer on May 1st. Also, the daughter reports patient was taking THC gummies for her dementia but stopped 2 weeks ago. Daughter denies any diarrhea. Daughter is a very sweet person and patient's primary caregiver. Daughter reports she would like to "get to the bottom," of patient's symptoms prior to discharge. ED workup reveals: CT abdomen pelvis no acute findings. CT head completed outpatient at PCP office on  with no acute findings. CBC and BMP unremarkable. Urinalysis with 11 WBCs, 1+ protein, and 3+ ketones. Urine " cultures pending. Previous culture was sensitive to meropenem, antibiotics started. Patient received IV fluids, Reglan, Phenergan, and Zofran in ED with mild relief. Admitted to hospital medicine for further management and treatment.      Patient is interviewed with her daughter.  Patient has failed Zofran and Phenergan at home.  Reglan initially worked but stopped working through the week.  Here in the hospital she has received Zofran Phenergan and Compazine and Reglan and still is reporting severe nausea  CHART REVIEW:  Colonoscopy 6/10/2008  crc, d coli  Colonoscopy 12/2/2016  crc d coli  CTAP 4/22/25 No acute findings   CT head no acute changed    Past Medical History:  Past Medical History:   Diagnosis Date    Depression     GERD (gastroesophageal reflux disease)     Hyperlipidemia     Hypertension     Hypothyroid         Past Surgical History:  Past Surgical History:   Procedure Laterality Date    APPENDECTOMY      COLONOSCOPY  2017    Briana, repeat in 10    TONSILLECTOMY      TUBAL LIGATION          Home Medications:  Prescriptions Prior to Admission[1]    Inpatient Medications:  Review of patient's allergies indicates:  No Known Allergies    Social History:   Social History     Occupational History    Not on file   Tobacco Use    Smoking status: Never    Smokeless tobacco: Never   Substance and Sexual Activity    Alcohol use: Not Currently     Comment: rarely    Drug use: No    Sexual activity: Yes       Family History:   Family History   Problem Relation Name Age of Onset    Cancer Father      Cancer Sister      Breast cancer Daughter      Glaucoma Neg Hx      Macular degeneration Neg Hx      Retinal detachment Neg Hx      Ovarian cancer Neg Hx      Eczema Neg Hx      Lupus Neg Hx      Melanoma Neg Hx      Psoriasis Neg Hx         Review of Systems:  GENERAL: No fever, chills, weight loss  SKIN: No rashes, jaundice, pruritus  HEENT: No rhinorrhea, epistaxis, vision changes.  No trauma, tinnitus,  "lymphadenopathy or pharyngitis  CV: No chest pain, palpitations, edima or ALVES  PULM: No cough or sputum production.  No wheezing.  GI: AS IN HPI  URINARY:  No hematuria, dysuria  MS: No change in muscle or joint pain, weakness, or ROM  Neuro: No focal neurologic changes  PSYCH: No change in mood or personality.  No suicidal ideation.  ENDOCRINE: No fatigue      OBJECTIVE:    Vitals:    04/23/25 0748 04/23/25 0834 04/23/25 0838 04/23/25 1126   BP: 134/70 (!) 170/112 (!) 163/88 138/86   BP Location:  Left arm Left forearm    Patient Position:  Sitting Lying    Pulse: 88 (!) 117  89   Resp:  16  18   Temp:  98.3 °F (36.8 °C)  98.6 °F (37 °C)   TempSrc:  Oral  Oral   SpO2:  (!) 94%  99%   Weight:  104.1 kg (229 lb 8 oz)     Height:  5' 8" (1.727 m)       Physical Exam:    GEN: well-developed, well-nourished, awake and alert, non-toxic appearing adult  HEENT: PERRL, sclera anicteric, oral mucosa pink and moist without lesion  NECK: trachea midline; Good ROM  CV: regular rate and rhythm, no murmurs or gallops  RESP: clear to auscultation bilaterally, no wheezes, rhonci or rales  ABD: soft, non-tender, non-distended, normal bowel sounds  EXT: no swelling or edema, 2+ pulses distally  SKIN: no rashes or jaundice  PSYCH: normal affect    Labs:   Recent Labs   Lab 04/22/25 1815 04/23/25  0617   WBC 8.16 7.46   HGB 14.5 12.5    261   MCV 88 90     No results for input(s): "IRON", "FERRITIN" in the last 168 hours.    Invalid input(s): "IRONSAT"  Recent Labs   Lab 04/22/25  1816 04/23/25  0617    138   K 3.9 3.6   BUN 13 10   CREATININE 0.8 0.7   ALBUMIN 4.3  --    AST 18  --    ALT 15  --    ALKPHOS 59  --             Radiology Review:  Imaging Results              CT Abdomen Pelvis With IV Contrast NO Oral Contrast (Final result)  Result time 04/22/25 21:43:34      Final result by Shukri Seaman MD (04/22/25 21:43:34)                   Impression:      No acute findings      Electronically signed " by: Shukri Seaman  Date:    04/22/2025  Time:    21:43               Narrative:    EXAMINATION:  CT ABDOMEN PELVIS WITH IV CONTRAST    CLINICAL HISTORY:  Nausea/vomiting;Epigastric pain;    TECHNIQUE:  Low dose axial images, sagittal and coronal reformations were obtained from the lung bases to the pubic symphysis following the IV administration of 100 mL of Omnipaque 350 .  Oral contrast was not given.    COMPARISON:  04/14/2025    FINDINGS:  Soft tissues: Unremarkable.    Bones: No acute osseous abnormality.    Lower chest: Normal heart size. No pericardial effusion.  Small hiatal hernia.    Lung Bases: Well aerated, without consolidation or pleural fluid.    Liver: Fatty liver.    Gallbladder: No calcified gallstones.    Bile Ducts: No evidence of dilated ducts.    Pancreas: No mass or peripancreatic fat stranding.    Spleen: Unremarkable.    Adrenals: Unremarkable.    Kidneys/ Ureters: Unremarkable.    Bladder: No evidence of wall thickening.    Reproductive organs: Unremarkable.    GI Tract/Mesentery: No evidence of bowel obstruction or inflammation.  Colonic diverticulosis, without diverticulitis.    Peritoneal Space: No ascites. No free air.    Lymphadenopathy: No significant adenopathy.    Vasculature: Mild multifocal atherosclerosis of the abdominal aorta and its branches.                                          IMPRESSION / RECOMMENDATIONS:   Active Problem List with Overview Notes    Diagnosis Date Noted    Urinary tract infection 04/23/2025    Intractable nausea and vomiting 04/23/2025    Dementia 04/23/2025    DDD (degenerative disc disease), lumbar 08/22/2024    Neck pain 08/22/2024    DDD (degenerative disc disease), cervical 06/12/2024    Chronic bilateral low back pain without sciatica 06/12/2024    Severe obesity (BMI 35.0-39.9) with comorbidity 01/19/2024    Autoimmune thyroiditis 06/17/2020    Major depression, single episode 06/17/2020    History of allergy to multiple drugs 11/07/2019     Chronic rhinitis 11/07/2019    Essential tremor 07/16/2019    Primary osteoarthritis of right knee 01/25/2017    Obesity (BMI 30-39.9) 07/22/2016    Primary osteoarthritis of left knee 03/03/2016    Allergic rhinosinusitis 07/24/2013    Sleep apnea 07/24/2013    Intermittent abdominal pain 07/24/2013    HTN (hypertension), benign 04/16/2012    Depression 03/16/2012    HTN (hypertension) 03/16/2012    Pap smear 03/16/2012      73 yo female with h/o autoimmune thyroiditis, depression, dementia and  fibromyalgia with recurrent admissions for n/v. .failed Reglan, Phenergan, Zofran and Compazine  Add scopolamine patch this evening  .  HIDA scan to follow EGD in a.m. and stool for PCR to rule out enterovirus  Egd in am with interventions as warranted.  RIsks, benefits, alternatives discussed in detail regarding upcoming procedures and sedation and possible complications. Some of the more common endoscopic complications include but not limited to immediate or delayed perforation, bleeding, infections, pain, inadvertent injury to surrounding tissue / organs and possible need for surgical evaluation. All questions answered and will proceed with procedure as planned.      Thank you for this consult.    Laurita Wang  4/23/2025  11:33 AM              [1]   Medications Prior to Admission   Medication Sig Dispense Refill Last Dose/Taking    aluminum hydrox-magnesium carb (GAVISCON EXTRA STRENGTH) 254-237.5 mg/5 mL Susp Take 5 mLs by mouth 3 (three) times daily with meals.   4/21/2025 Evening    cetirizine (ZYRTEC) 10 MG tablet Take 10 mg by mouth every evening.   4/21/2025 Evening    esomeprazole (NEXIUM) 40 MG capsule Take 40 mg by mouth before breakfast.   4/22/2025 Morning    famotidine (PEPCID) 40 MG tablet Take 40 mg by mouth nightly.   4/21/2025 Evening    fluticasone propionate (FLONASE) 50 mcg/actuation nasal spray 1 spray by Each Nostril route every evening.   4/21/2025 Evening    levothyroxine (SYNTHROID) 112 MCG  tablet Take 1 tablet (112 mcg total) by mouth before breakfast. 30 tablet 11 4/21/2025 Morning    meclizine (ANTIVERT) 25 mg tablet Take 0.5 tablets (12.5 mg total) by mouth 3 (three) times daily as needed for Dizziness. (Patient taking differently: Take 25 mg by mouth 3 (three) times daily as needed for Dizziness.) 8 tablet 0 Past Week    metoclopramide HCl (REGLAN) 5 MG tablet Take 1 tablet (5 mg total) by mouth every 8 (eight) hours as needed (nausea/vomiting). 12 tablet 0 4/22/2025 Morning    metoprolol succinate (TOPROL-XL) 100 MG 24 hr tablet Take 100 mg by mouth nightly.   4/21/2025 Evening    multivitamin (THERAGRAN) tablet Take 1 tablet by mouth once daily.   4/21/2025 Morning    ondansetron (ZOFRAN-ODT) 8 MG TbDL Take 1 tablet (8 mg total) by mouth every 8 (eight) hours as needed (nausea and vomiting). 15 tablet 0 4/22/2025 Morning    pantoprazole (PROTONIX) 40 MG tablet Take 40 mg by mouth once daily.   4/22/2025 Morning    promethazine (PHENERGAN) 25 MG tablet Take 1 tablet (25 mg total) by mouth every 6 (six) hours as needed for Nausea. 30 tablet 0 4/22/2025 Morning    rizatriptan (MAXALT) 10 MG tablet Take 10 mg by mouth once as needed for Migraine.   Past Month    azelastine (ASTELIN) 137 mcg (0.1 %) nasal spray 1 spray (137 mcg total) by Nasal route nightly. Point up and slightly outward toward ear when spraying to avoid irritating nasal septum. With flonase. 30 mL 2

## 2025-04-23 NOTE — ASSESSMENT & PLAN NOTE
-urinalysis with 11 WBCs, 1+ protein, and 3+ ketones  -urine cultures pending  -previous urine culture on 02/15 positive E coli; previously treated with antibiotics  -started on IV fluids  -monitor I's and O's  -monitor white count

## 2025-04-23 NOTE — ASSESSMENT & PLAN NOTE
-analgesics and antiemetics p.r.n.  -NPO  -continuous IV fluids  -CT abdomen completed  -monitor electrolytes; replacements as needed

## 2025-04-23 NOTE — ASSESSMENT & PLAN NOTE
-urinalysis with 11 WBCs, 1+ protein, and 3+ ketones  -urine cultures -no growth so far  -previous urine culture on 02/15 positive E coli; previously treated with antibiotics  -started on IV fluids  -monitor I's and O's  -monitor white count

## 2025-04-23 NOTE — ED PROVIDER NOTES
Encounter Date: 4/22/2025       History     Chief Complaint   Patient presents with    Nausea    Vomiting     Been having n/v for 2-3 weeks has seen several specialist and er visits today ent said to come get tested for urine and blood.      HPI    Patient is a 74-year-old female history of dementia, who presents with 3 week history of worsening nausea, vomiting.  Endorses intermittent abdominal pain, none today.  Daughter reports 30 episodes of emesis today.  Was at ENT clinic, she was told to come to the ER for further evaluation.  History limited due to patient's dementia.  Review of patient's allergies indicates:  No Known Allergies  Past Medical History:   Diagnosis Date    Depression     GERD (gastroesophageal reflux disease)     Hyperlipidemia     Hypertension     Hypothyroid      Past Surgical History:   Procedure Laterality Date    APPENDECTOMY      COLONOSCOPY  2017    Briana, repeat in 10    TONSILLECTOMY      TUBAL LIGATION       Family History   Problem Relation Name Age of Onset    Cancer Father      Cancer Sister      Breast cancer Daughter      Glaucoma Neg Hx      Macular degeneration Neg Hx      Retinal detachment Neg Hx      Ovarian cancer Neg Hx      Eczema Neg Hx      Lupus Neg Hx      Melanoma Neg Hx      Psoriasis Neg Hx       Social History[1]  Review of Systems  Pertinent ROS in the HPI  Physical Exam     Initial Vitals [04/22/25 1522]   BP Pulse Resp Temp SpO2   (!) 159/112 87 17 97.7 °F (36.5 °C) (!) 94 %      MAP       --         Physical Exam    Constitutional: She appears well-developed and well-nourished.   HENT:   Head: Normocephalic and atraumatic.   Eyes: Pupils are equal, round, and reactive to light. Pupils are equal.   Neck:   Normal range of motion.  Cardiovascular:  Normal rate and regular rhythm.           No murmur heard.  Pulmonary/Chest: No respiratory distress.   Abdominal: Abdomen is soft. She exhibits no distension. There is no abdominal tenderness. There is no rebound  and no guarding.   Musculoskeletal:         General: No edema.      Cervical back: Normal range of motion.     Neurological: She is alert and oriented to person, place, and time.   Skin: Skin is warm and dry. Capillary refill takes less than 2 seconds.         ED Course   Procedures  Labs Reviewed   URINALYSIS, REFLEX TO URINE CULTURE - Abnormal       Result Value    Color, UA Yellow      Appearance, UA Clear      Spec Grav UA >=1.030 (*)     pH, UA 7.0      Protein, UA 1+ (*)     Glucose, UA Negative      Ketones, UA 3+ (*)     Blood, UA Negative      Bilirubin, UA Negative      Urobilinogen, UA Negative      Nitrites, UA Negative      Leukocyte Esterase, UA Negative     URINALYSIS MICROSCOPIC - Abnormal    RBC, UA 2      WBC, UA 11 (*)     Squamous Epithelial Cells, UA 4      Microscopic Comment       COMPREHENSIVE METABOLIC PANEL - Normal    Sodium 138      Potassium 3.9      Chloride 103      CO2 24      Glucose 101      BUN 13      Creatinine 0.8      Calcium 9.6      Protein Total 7.2      Albumin 4.3      Bilirubin Total 0.6      ALP 59      AST 18      ALT 15      Anion Gap 11      eGFR >60     LIPASE - Normal    Lipase Level 45     CBC WITH DIFFERENTIAL - Normal    WBC 8.16      RBC 4.96      Hgb 14.5      Hct 43.8      MCV 88      MCH 29.2      MCHC 33.1      RDW 12.6      Platelet Count 346      MPV 10.0      Nucleated RBC 0      Neut % 66.5      Lymph % 23.2      Mono % 8.2      Eos % 1.3      Basophil % 0.4      Imm Grans % 0.4      Neut # 5.4      Lymph # 1.89      Mono # 0.67      Eos # 0.11      Baso # 0.03      Imm Grans # 0.03     MAGNESIUM - Normal    Magnesium 2.1     TROPONIN I HIGH SENSITIVITY - Normal    Troponin High Sensitive 4.2     CULTURE, URINE   CBC W/ AUTO DIFFERENTIAL    Narrative:     The following orders were created for panel order CBC W/ AUTO DIFFERENTIAL.  Procedure                               Abnormality         Status                     ---------                                -----------         ------                     CBC with Differential[[id number]]       Normal              Final result                 Please view results for these tests on the individual orders.   HEPATITIS C ANTIBODY   HIV 1 / 2 ANTIBODY   URINALYSIS, REFLEX TO URINE CULTURE   EXTRA TUBES    Narrative:     The following orders were created for panel order EXTRA TUBES.  Procedure                               Abnormality         Status                     ---------                               -----------         ------                     Light Blue Top Hold[[id number]]                             In process                 Light Green Top Hold[[id number]]                            In process                 Lavender Top Hold[[id number]]                               In process                 Gold Top Hold[[id number]]                                   In process                   Please view results for these tests on the individual orders.   LIGHT BLUE TOP HOLD   LIGHT GREEN TOP HOLD   LAVENDER TOP HOLD   GOLD TOP HOLD          Imaging Results              CT Abdomen Pelvis With IV Contrast NO Oral Contrast (Final result)  Result time 04/22/25 21:43:34      Final result by Shukri Seamna MD (04/22/25 21:43:34)                   Impression:      No acute findings      Electronically signed by: Shukri Seaman  Date:    04/22/2025  Time:    21:43               Narrative:    EXAMINATION:  CT ABDOMEN PELVIS WITH IV CONTRAST    CLINICAL HISTORY:  Nausea/vomiting;Epigastric pain;    TECHNIQUE:  Low dose axial images, sagittal and coronal reformations were obtained from the lung bases to the pubic symphysis following the IV administration of 100 mL of Omnipaque 350 .  Oral contrast was not given.    COMPARISON:  04/14/2025    FINDINGS:  Soft tissues: Unremarkable.    Bones: No acute osseous abnormality.    Lower chest: Normal heart size. No pericardial effusion.  Small hiatal hernia.    Lung Bases: Well  aerated, without consolidation or pleural fluid.    Liver: Fatty liver.    Gallbladder: No calcified gallstones.    Bile Ducts: No evidence of dilated ducts.    Pancreas: No mass or peripancreatic fat stranding.    Spleen: Unremarkable.    Adrenals: Unremarkable.    Kidneys/ Ureters: Unremarkable.    Bladder: No evidence of wall thickening.    Reproductive organs: Unremarkable.    GI Tract/Mesentery: No evidence of bowel obstruction or inflammation.  Colonic diverticulosis, without diverticulitis.    Peritoneal Space: No ascites. No free air.    Lymphadenopathy: No significant adenopathy.    Vasculature: Mild multifocal atherosclerosis of the abdominal aorta and its branches.                                       Medications   promethazine (PHENERGAN) 12.5 mg in 0.9% NaCl 50 mL IVPB (has no administration in time range)   lactated ringers bolus 1,000 mL (0 mLs Intravenous Stopped 4/22/25 2100)   ondansetron injection 4 mg (4 mg Intravenous Given 4/22/25 1846)   iohexoL (OMNIPAQUE 350) injection 100 mL (100 mLs Intravenous Given 4/22/25 1926)   metoclopramide injection 5 mg (5 mg Intravenous Given 4/22/25 2007)   ondansetron injection 4 mg (4 mg Intravenous Given 4/22/25 2150)     Medical Decision Making  HO-3 MDM  Patient is a 74-year-old female who presents with 3 week history of nausea, vomiting and intermittent abdominal pain.  Differential includes acute pancreatitis, gastroparesis, gastroenteritis.  Patient's CMP with no major metabolic or electrolyte disturbances, CBC with no leukocytosis, stable H&H.  Patient does take THC gummy, concern for possible sickle vomiting syndrome.  CT abdomen pelvis obtained showed no acute abdominal pathology.  Patient had multiple rounds of antiemetics including Zofran, Phenergan, Reglan.  Unsuccessful at controlling her emesis.  We will admit to medicine for intractable nausea vomiting.    Fredy Cummings, PGY3  Emergency Medicine      Problems Addressed:  Abdominal pain,  unspecified abdominal location: acute illness or injury  Intractable nausea and vomiting: acute illness or injury  Nausea & vomiting: acute illness or injury    Amount and/or Complexity of Data Reviewed  Independent Historian: caregiver  External Data Reviewed: notes.  Labs: ordered. Decision-making details documented in ED Course.  Radiology: ordered and independent interpretation performed. Decision-making details documented in ED Course.  ECG/medicine tests: ordered and independent interpretation performed. Decision-making details documented in ED Course.    Risk  Prescription drug management.  Parenteral controlled substances.  Decision regarding hospitalization.               ED Course as of 04/22/25 2257 Tue Apr 22, 2025 2147 CT Abdomen Pelvis With IV Contrast NO Oral Contrast  No acute intra-abdominal pathology present [JW]   2148 Since patient has multiple episodes of emesis since spite of antiemetics will admit to medicine for intractable nausea vomiting. [JW]   2255 EKG 12-lead  Normal sinus rhythm, no ST elevations or depressions, no T-wave inversions, normal intervals.  No STEMI [JW]      ED Course User Index  [JW] Fredy Cummings MD                           Clinical Impression:  Final diagnoses:  [R11.2] Nausea & vomiting  [R11.2] Intractable nausea and vomiting (Primary)  [R10.9] Abdominal pain, unspecified abdominal location          ED Disposition Condition    Admit                   [1]   Social History  Tobacco Use    Smoking status: Never    Smokeless tobacco: Never   Substance Use Topics    Alcohol use: Not Currently     Comment: rarely    Drug use: No        Fredy Cummings MD  Resident  04/22/25 2257

## 2025-04-23 NOTE — SUBJECTIVE & OBJECTIVE
Past Medical History:   Diagnosis Date    Depression     GERD (gastroesophageal reflux disease)     Hyperlipidemia     Hypertension     Hypothyroid        Past Surgical History:   Procedure Laterality Date    APPENDECTOMY      COLONOSCOPY  2017    Briana, repeat in 10    TONSILLECTOMY      TUBAL LIGATION         Review of patient's allergies indicates:  No Known Allergies    No current facility-administered medications on file prior to encounter.     Current Outpatient Medications on File Prior to Encounter   Medication Sig    aluminum hydrox-magnesium carb (GAVISCON EXTRA STRENGTH) 254-237.5 mg/5 mL Susp Take 5 mLs by mouth 3 (three) times daily with meals.    cetirizine (ZYRTEC) 10 MG tablet Take 10 mg by mouth every evening.    esomeprazole (NEXIUM) 40 MG capsule Take 40 mg by mouth before breakfast.    famotidine (PEPCID) 40 MG tablet Take 40 mg by mouth nightly.    fluticasone propionate (FLONASE) 50 mcg/actuation nasal spray 1 spray by Each Nostril route every evening.    levothyroxine (SYNTHROID) 112 MCG tablet Take 1 tablet (112 mcg total) by mouth before breakfast.    meclizine (ANTIVERT) 25 mg tablet Take 0.5 tablets (12.5 mg total) by mouth 3 (three) times daily as needed for Dizziness. (Patient taking differently: Take 25 mg by mouth 3 (three) times daily as needed for Dizziness.)    metoclopramide HCl (REGLAN) 5 MG tablet Take 1 tablet (5 mg total) by mouth every 8 (eight) hours as needed (nausea/vomiting).    metoprolol succinate (TOPROL-XL) 100 MG 24 hr tablet Take 100 mg by mouth nightly.    multivitamin (THERAGRAN) tablet Take 1 tablet by mouth once daily.    ondansetron (ZOFRAN-ODT) 8 MG TbDL Take 1 tablet (8 mg total) by mouth every 8 (eight) hours as needed (nausea and vomiting).    pantoprazole (PROTONIX) 40 MG tablet Take 40 mg by mouth once daily.    promethazine (PHENERGAN) 25 MG tablet Take 1 tablet (25 mg total) by mouth every 6 (six) hours as needed for Nausea.    rizatriptan (MAXALT) 10  MG tablet Take 10 mg by mouth once as needed for Migraine.    azelastine (ASTELIN) 137 mcg (0.1 %) nasal spray 1 spray (137 mcg total) by Nasal route nightly. Point up and slightly outward toward ear when spraying to avoid irritating nasal septum. With flonase.     Family History       Problem Relation (Age of Onset)    Breast cancer Daughter    Cancer Father, Sister          Tobacco Use    Smoking status: Never    Smokeless tobacco: Never   Substance and Sexual Activity    Alcohol use: Not Currently     Comment: rarely    Drug use: No    Sexual activity: Yes     Review of Systems   Unable to perform ROS: Dementia     Objective:     Vital Signs (Most Recent):  Temp: 97.7 °F (36.5 °C) (04/22/25 1522)  Pulse: 93 (04/23/25 0110)  Resp: 18 (04/23/25 0110)  BP: (!) 132/59 (04/23/25 0110)  SpO2: 96 % (04/23/25 0110) Vital Signs (24h Range):  Temp:  [97.7 °F (36.5 °C)] 97.7 °F (36.5 °C)  Pulse:  [] 93  Resp:  [16-18] 18  SpO2:  [94 %-96 %] 96 %  BP: (117-159)/() 132/59     Weight: 104 kg (229 lb 4.5 oz)  Body mass index is 34.86 kg/m².     Physical Exam  Vitals and nursing note reviewed.   Constitutional:       Appearance: She is not ill-appearing.   Eyes:      Pupils: Pupils are equal, round, and reactive to light.   Cardiovascular:      Rate and Rhythm: Normal rate and regular rhythm.   Pulmonary:      Effort: Pulmonary effort is normal. No respiratory distress.      Breath sounds: Normal breath sounds. No wheezing.   Abdominal:      General: Bowel sounds are normal. There is no distension.      Palpations: Abdomen is soft.      Tenderness: There is no abdominal tenderness. There is no guarding.   Musculoskeletal:      Right lower leg: Edema present.      Left lower leg: Edema present.   Skin:     General: Skin is warm and dry.      Capillary Refill: Capillary refill takes less than 2 seconds.   Neurological:      Mental Status: She is alert. She is confused.      GCS: GCS eye subscore is 4. GCS verbal  "subscore is 4. GCS motor subscore is 6.      Comments: Mental status at baseline with dementia.                CRANIAL NERVES     CN III, IV, VI   Pupils are equal, round, and reactive to light.       Significant Labs: All pertinent labs within the past 24 hours have been reviewed.    Bilirubin:   Recent Labs   Lab 04/10/25  1530 04/11/25  1856 04/14/25  0907 04/22/25  1816   BILITOT 0.6 0.5 0.4 0.6     Blood Culture: No results for input(s): "LABBLOO" in the last 48 hours.  BMP:   Recent Labs   Lab 04/22/25  1816      K 3.9   CO2 24   BUN 13   CREATININE 0.8   CALCIUM 9.6   MG 2.1     CBC:   Recent Labs   Lab 04/22/25 1815   WBC 8.16   HGB 14.5   HCT 43.8        CMP:   Recent Labs   Lab 04/22/25 1816      K 3.9   CO2 24   BUN 13   CREATININE 0.8   CALCIUM 9.6   ALBUMIN 4.3   BILITOT 0.6   ALKPHOS 59   AST 18   ALT 15       Lipase:   Recent Labs   Lab 04/22/25  1816   LIPASE 45       Magnesium:   Recent Labs   Lab 04/22/25  1816   MG 2.1     TSH:   Recent Labs   Lab 04/10/25  1530   TSH 2.644       Urine Studies:   Recent Labs   Lab 04/22/25 2029   APPEARANCEUA Clear   SPECGRAV >=1.030*   PROTEINUA 1+*   BILIRUBINUA Negative   UROBILINOGEN Negative   LEUKOCYTESUR Negative   RBCUA 2   WBCUA 11*       Significant Imaging: I have reviewed all pertinent imaging results/findings within the past 24 hours.    CT Abdomen Pelvis With IV Contrast NO Oral Contrast  Order: [id number]   Status: Final result       Next appt: 04/29/2025 at 08:00 PM in Sleep Medicine (HOME SLEEP STUDIES, [facility])    Test Result Released: Yes (not seen)    0 Result Notes  Details    Reading Physician Reading Date Result Priority   Shukri Seaman MD  [phone]  4/22/2025 STAT     Narrative & Impression  EXAMINATION:  CT ABDOMEN PELVIS WITH IV CONTRAST     CLINICAL HISTORY:  Nausea/vomiting;Epigastric pain;     TECHNIQUE:  Low dose axial images, sagittal and coronal reformations were obtained from the lung bases " to the pubic symphysis following the IV administration of 100 mL of Omnipaque 350 .  Oral contrast was not given.     COMPARISON:  04/14/2025     FINDINGS:  Soft tissues: Unremarkable.     Bones: No acute osseous abnormality.     Lower chest: Normal heart size. No pericardial effusion.  Small hiatal hernia.     Lung Bases: Well aerated, without consolidation or pleural fluid.     Liver: Fatty liver.     Gallbladder: No calcified gallstones.     Bile Ducts: No evidence of dilated ducts.     Pancreas: No mass or peripancreatic fat stranding.     Spleen: Unremarkable.     Adrenals: Unremarkable.     Kidneys/ Ureters: Unremarkable.     Bladder: No evidence of wall thickening.     Reproductive organs: Unremarkable.     GI Tract/Mesentery: No evidence of bowel obstruction or inflammation.  Colonic diverticulosis, without diverticulitis.     Peritoneal Space: No ascites. No free air.     Lymphadenopathy: No significant adenopathy.     Vasculature: Mild multifocal atherosclerosis of the abdominal aorta and its branches.     Impression:     No acute findings        Electronically signed by:Shukri Seaman  Date:                                            04/22/2025  Time:                                           21:43        Exam Ended: 04/22/25 19:39 CDT Last Resulted: 04/22/25 21:43 CDT

## 2025-04-23 NOTE — SUBJECTIVE & OBJECTIVE
Interval History: pt continues to have N/V despite multiple antiemetics.  Seen in office by GI recently with plan for EGD next week as well as NM gastric emptying study.  Will consult GI to see her while she is here given difficulty in achieving control of her symptoms.     Review of Systems   Unable to perform ROS: Dementia     Objective:     Vital Signs (Most Recent):  Temp: 98.3 °F (36.8 °C) (04/23/25 0834)  Pulse: (!) 117 (04/23/25 0834)  Resp: 16 (04/23/25 0834)  BP: (!) 163/88 (04/23/25 0838)  SpO2: (!) 94 % (04/23/25 0834) Vital Signs (24h Range):  Temp:  [97.7 °F (36.5 °C)-98.3 °F (36.8 °C)] 98.3 °F (36.8 °C)  Pulse:  [] 117  Resp:  [16-18] 16  SpO2:  [94 %-97 %] 94 %  BP: (117-170)/() 163/88     Weight: 104.1 kg (229 lb 8 oz)  Body mass index is 34.9 kg/m².    Intake/Output Summary (Last 24 hours) at 4/23/2025 0904  Last data filed at 4/23/2025 0846  Gross per 24 hour   Intake 1000 ml   Output --   Net 1000 ml         Physical Exam  Vitals and nursing note reviewed.   Constitutional:       Appearance: She is not ill-appearing.   Eyes:      Pupils: Pupils are equal, round, and reactive to light.   Cardiovascular:      Rate and Rhythm: Regular rhythm. Tachycardia present.   Pulmonary:      Effort: Pulmonary effort is normal. No respiratory distress.      Breath sounds: Normal breath sounds. No wheezing.   Abdominal:      General: Bowel sounds are normal. There is no distension.      Palpations: Abdomen is soft.      Tenderness: There is no abdominal tenderness.   Musculoskeletal:      Right lower leg: Edema present.      Left lower leg: Edema present.   Skin:     General: Skin is warm and dry.      Capillary Refill: Capillary refill takes less than 2 seconds.   Neurological:      Mental Status: She is alert. She is confused.      GCS: GCS eye subscore is 4. GCS verbal subscore is 4. GCS motor subscore is 6.      Comments: Mental status at baseline with dementia.     Psychiatric:         Mood and  Affect: Mood normal.         Behavior: Behavior normal.               Significant Labs: All pertinent labs within the past 24 hours have been reviewed.  CBC:   Recent Labs   Lab 04/22/25  1815 04/23/25  0617   WBC 8.16 7.46   HGB 14.5 12.5   HCT 43.8 37.6    261     CMP:   Recent Labs   Lab 04/22/25  1816 04/23/25  0617    138   K 3.9 3.6   CO2 24 26   BUN 13 10   CREATININE 0.8 0.7   CALCIUM 9.6 8.8   ALBUMIN 4.3  --    BILITOT 0.6  --    ALKPHOS 59  --    AST 18  --    ALT 15  --      Magnesium:   Recent Labs   Lab 04/22/25  1816 04/23/25  0617   MG 2.1 2.0     TSH:   Recent Labs   Lab 04/10/25  1530   TSH 2.644     Urine Studies:   Recent Labs   Lab 04/22/25 2029   APPEARANCEUA Clear   SPECGRAV >=1.030*   PROTEINUA 1+*   BILIRUBINUA Negative   UROBILINOGEN Negative   LEUKOCYTESUR Negative   RBCUA 2   WBCUA 11*       Significant Imaging: I have reviewed all pertinent imaging results/findings within the past 24 hours.

## 2025-04-23 NOTE — ASSESSMENT & PLAN NOTE
Patient with dementia with likely etiology of unknown dementia. Dementia is moderate. The patient does not have signs of behavioral disturbance. Home dementia medications are Held or Continued:  No medications noted. Continue non-pharmacologic interventions to prevent delirium (No VS between 11PM-5AM, activity during day, opening blinds, providing glasses/hearing aids, and up in chair during daytime). Will avoid narcotics and benzos unless absolutely necessary. PRN anti-psychotics are not prescribed to avoid self harm behaviors.  Order p.r.n. antipsychotics as needed

## 2025-04-24 ENCOUNTER — ANESTHESIA EVENT (OUTPATIENT)
Dept: SURGERY | Facility: HOSPITAL | Age: 75
End: 2025-04-24
Payer: MEDICARE

## 2025-04-24 ENCOUNTER — ANESTHESIA (OUTPATIENT)
Dept: SURGERY | Facility: HOSPITAL | Age: 75
End: 2025-04-24
Payer: MEDICARE

## 2025-04-24 LAB
ABSOLUTE EOSINOPHIL (SMH): 0.07 K/UL
ABSOLUTE MONOCYTE (SMH): 1.14 K/UL (ref 0.3–1)
ABSOLUTE NEUTROPHIL COUNT (SMH): 7.3 K/UL (ref 1.8–7.7)
BACTERIA UR CULT: NORMAL
BASOPHILS # BLD AUTO: 0.03 K/UL
BASOPHILS NFR BLD AUTO: 0.3 %
ERYTHROCYTE [DISTWIDTH] IN BLOOD BY AUTOMATED COUNT: 12.9 % (ref 11.5–14.5)
HCT VFR BLD AUTO: 39.5 % (ref 37–48.5)
HGB BLD-MCNC: 13.1 GM/DL (ref 12–16)
IMM GRANULOCYTES # BLD AUTO: 0.04 K/UL (ref 0–0.04)
IMM GRANULOCYTES NFR BLD AUTO: 0.4 % (ref 0–0.5)
LYMPHOCYTES # BLD AUTO: 2.79 K/UL (ref 1–4.8)
MCH RBC QN AUTO: 29.5 PG (ref 27–31)
MCHC RBC AUTO-ENTMCNC: 33.2 G/DL (ref 32–36)
MCV RBC AUTO: 89 FL (ref 82–98)
NUCLEATED RBC (/100WBC) (SMH): 0 /100 WBC
PLATELET # BLD AUTO: 297 K/UL (ref 150–450)
PMV BLD AUTO: 10.3 FL (ref 9.2–12.9)
RBC # BLD AUTO: 4.44 M/UL (ref 4–5.4)
RELATIVE EOSINOPHIL (SMH): 0.6 % (ref 0–8)
RELATIVE LYMPHOCYTE (SMH): 24.6 % (ref 18–48)
RELATIVE MONOCYTE (SMH): 10 % (ref 4–15)
RELATIVE NEUTROPHIL (SMH): 64.1 % (ref 38–73)
WBC # BLD AUTO: 11.35 K/UL (ref 3.9–12.7)

## 2025-04-24 PROCEDURE — 63600175 PHARM REV CODE 636 W HCPCS

## 2025-04-24 PROCEDURE — 63600175 PHARM REV CODE 636 W HCPCS: Performed by: NURSE ANESTHETIST, CERTIFIED REGISTERED

## 2025-04-24 PROCEDURE — 27200043 HC FORCEPS, BIOPSY: Performed by: INTERNAL MEDICINE

## 2025-04-24 PROCEDURE — 97166 OT EVAL MOD COMPLEX 45 MIN: CPT

## 2025-04-24 PROCEDURE — 63600175 PHARM REV CODE 636 W HCPCS: Performed by: ANESTHESIOLOGY

## 2025-04-24 PROCEDURE — 85025 COMPLETE CBC W/AUTO DIFF WBC: CPT

## 2025-04-24 PROCEDURE — 43239 EGD BIOPSY SINGLE/MULTIPLE: CPT | Performed by: INTERNAL MEDICINE

## 2025-04-24 PROCEDURE — 0DB68ZX EXCISION OF STOMACH, VIA NATURAL OR ARTIFICIAL OPENING ENDOSCOPIC, DIAGNOSTIC: ICD-10-PCS | Performed by: INTERNAL MEDICINE

## 2025-04-24 PROCEDURE — 12000002 HC ACUTE/MED SURGE SEMI-PRIVATE ROOM

## 2025-04-24 PROCEDURE — 63600175 PHARM REV CODE 636 W HCPCS: Performed by: HOSPITALIST

## 2025-04-24 PROCEDURE — 63600175 PHARM REV CODE 636 W HCPCS: Performed by: NURSE PRACTITIONER

## 2025-04-24 PROCEDURE — 0DB98ZX EXCISION OF DUODENUM, VIA NATURAL OR ARTIFICIAL OPENING ENDOSCOPIC, DIAGNOSTIC: ICD-10-PCS | Performed by: INTERNAL MEDICINE

## 2025-04-24 PROCEDURE — 37000009 HC ANESTHESIA EA ADD 15 MINS: Performed by: INTERNAL MEDICINE

## 2025-04-24 PROCEDURE — 25000003 PHARM REV CODE 250

## 2025-04-24 PROCEDURE — 88305 TISSUE EXAM BY PATHOLOGIST: CPT | Mod: TC,59 | Performed by: PATHOLOGY

## 2025-04-24 PROCEDURE — 37000008 HC ANESTHESIA 1ST 15 MINUTES: Performed by: INTERNAL MEDICINE

## 2025-04-24 PROCEDURE — 36415 COLL VENOUS BLD VENIPUNCTURE: CPT

## 2025-04-24 RX ORDER — ONDANSETRON HYDROCHLORIDE 2 MG/ML
4 INJECTION, SOLUTION INTRAVENOUS DAILY PRN
Status: DISCONTINUED | OUTPATIENT
Start: 2025-04-24 | End: 2025-04-24 | Stop reason: HOSPADM

## 2025-04-24 RX ORDER — PROPOFOL 10 MG/ML
VIAL (ML) INTRAVENOUS
Status: DISCONTINUED | OUTPATIENT
Start: 2025-04-24 | End: 2025-04-24

## 2025-04-24 RX ORDER — PROPOFOL 10 MG/ML
VIAL (ML) INTRAVENOUS CONTINUOUS PRN
Status: DISCONTINUED | OUTPATIENT
Start: 2025-04-24 | End: 2025-04-24

## 2025-04-24 RX ORDER — KETOROLAC TROMETHAMINE 30 MG/ML
15 INJECTION, SOLUTION INTRAMUSCULAR; INTRAVENOUS ONCE
Status: DISCONTINUED | OUTPATIENT
Start: 2025-04-24 | End: 2025-04-26

## 2025-04-24 RX ORDER — GLUCAGON 1 MG
1 KIT INJECTION
Status: DISCONTINUED | OUTPATIENT
Start: 2025-04-24 | End: 2025-04-24 | Stop reason: HOSPADM

## 2025-04-24 RX ORDER — SUMATRIPTAN 6 MG/.5ML
6 INJECTION, SOLUTION SUBCUTANEOUS ONCE
Status: COMPLETED | OUTPATIENT
Start: 2025-04-24 | End: 2025-04-24

## 2025-04-24 RX ORDER — METOCLOPRAMIDE HYDROCHLORIDE 5 MG/ML
10 INJECTION INTRAMUSCULAR; INTRAVENOUS EVERY 6 HOURS
Status: DISCONTINUED | OUTPATIENT
Start: 2025-04-24 | End: 2025-04-25

## 2025-04-24 RX ORDER — PANTOPRAZOLE SODIUM 40 MG/10ML
40 INJECTION, POWDER, LYOPHILIZED, FOR SOLUTION INTRAVENOUS 2 TIMES DAILY
Status: DISCONTINUED | OUTPATIENT
Start: 2025-04-24 | End: 2025-04-27 | Stop reason: HOSPADM

## 2025-04-24 RX ORDER — SUCCINYLCHOLINE CHLORIDE 20 MG/ML
INJECTION INTRAMUSCULAR; INTRAVENOUS
Status: DISCONTINUED | OUTPATIENT
Start: 2025-04-24 | End: 2025-04-24

## 2025-04-24 RX ORDER — LIDOCAINE HYDROCHLORIDE 20 MG/ML
INJECTION, SOLUTION EPIDURAL; INFILTRATION; INTRACAUDAL; PERINEURAL
Status: DISCONTINUED | OUTPATIENT
Start: 2025-04-24 | End: 2025-04-24

## 2025-04-24 RX ORDER — ONDANSETRON HYDROCHLORIDE 2 MG/ML
INJECTION, SOLUTION INTRAVENOUS
Status: DISCONTINUED | OUTPATIENT
Start: 2025-04-24 | End: 2025-04-24

## 2025-04-24 RX ADMIN — ONDANSETRON 4 MG: 2 INJECTION INTRAMUSCULAR; INTRAVENOUS at 07:04

## 2025-04-24 RX ADMIN — HYDROCODONE BITARTRATE AND ACETAMINOPHEN 1 TABLET: 5; 325 TABLET ORAL at 02:04

## 2025-04-24 RX ADMIN — METOCLOPRAMIDE 10 MG: 5 INJECTION, SOLUTION INTRAMUSCULAR; INTRAVENOUS at 05:04

## 2025-04-24 RX ADMIN — LIDOCAINE HYDROCHLORIDE 60 MG: 20 INJECTION, SOLUTION INTRAVENOUS at 07:04

## 2025-04-24 RX ADMIN — ONDANSETRON 4 MG: 2 INJECTION INTRAMUSCULAR; INTRAVENOUS at 08:04

## 2025-04-24 RX ADMIN — ACETAMINOPHEN 650 MG: 325 TABLET ORAL at 12:04

## 2025-04-24 RX ADMIN — PROCHLORPERAZINE EDISYLATE 5 MG: 5 INJECTION INTRAMUSCULAR; INTRAVENOUS at 09:04

## 2025-04-24 RX ADMIN — METOPROLOL SUCCINATE 100 MG: 50 TABLET, FILM COATED, EXTENDED RELEASE ORAL at 08:04

## 2025-04-24 RX ADMIN — PROCHLORPERAZINE EDISYLATE 5 MG: 5 INJECTION INTRAMUSCULAR; INTRAVENOUS at 12:04

## 2025-04-24 RX ADMIN — PROPOFOL 75 MCG/KG/MIN: 10 INJECTION, EMULSION INTRAVENOUS at 07:04

## 2025-04-24 RX ADMIN — SENNOSIDES AND DOCUSATE SODIUM 1 TABLET: 50; 8.6 TABLET ORAL at 08:04

## 2025-04-24 RX ADMIN — MEROPENEM 1 G: 1 INJECTION INTRAVENOUS at 10:04

## 2025-04-24 RX ADMIN — HYDROCODONE BITARTRATE AND ACETAMINOPHEN 1 TABLET: 5; 325 TABLET ORAL at 08:04

## 2025-04-24 RX ADMIN — PANTOPRAZOLE SODIUM 40 MG: 40 INJECTION, POWDER, FOR SOLUTION INTRAVENOUS at 09:04

## 2025-04-24 RX ADMIN — PROPOFOL 200 MG: 10 INJECTION, EMULSION INTRAVENOUS at 07:04

## 2025-04-24 RX ADMIN — SODIUM CHLORIDE, SODIUM LACTATE, POTASSIUM CHLORIDE, AND CALCIUM CHLORIDE: .6; .31; .03; .02 INJECTION, SOLUTION INTRAVENOUS at 07:04

## 2025-04-24 RX ADMIN — SUMATRIPTAN 6 MG: 6 INJECTION, SOLUTION SUBCUTANEOUS at 02:04

## 2025-04-24 RX ADMIN — ACETAMINOPHEN 650 MG: 325 TABLET ORAL at 09:04

## 2025-04-24 RX ADMIN — ACETAMINOPHEN 650 MG: 325 TABLET ORAL at 05:04

## 2025-04-24 RX ADMIN — PANTOPRAZOLE SODIUM 40 MG: 40 INJECTION, POWDER, FOR SOLUTION INTRAVENOUS at 08:04

## 2025-04-24 RX ADMIN — METOCLOPRAMIDE 10 MG: 5 INJECTION, SOLUTION INTRAMUSCULAR; INTRAVENOUS at 12:04

## 2025-04-24 RX ADMIN — HYDROCODONE BITARTRATE AND ACETAMINOPHEN 1 TABLET: 5; 325 TABLET ORAL at 12:04

## 2025-04-24 RX ADMIN — ONDANSETRON 4 MG: 2 INJECTION INTRAMUSCULAR; INTRAVENOUS at 04:04

## 2025-04-24 RX ADMIN — Medication 120 MG: at 07:04

## 2025-04-24 RX ADMIN — MEROPENEM 1 G: 1 INJECTION INTRAVENOUS at 02:04

## 2025-04-24 NOTE — ANESTHESIA PREPROCEDURE EVALUATION
04/24/2025  Skyler Espinoza is a 74 y.o., female.      Pre-op Assessment    I have reviewed the Patient Summary Reports.     I have reviewed the Nursing Notes. I have reviewed the NPO Status.   I have reviewed the Medications.     Review of Systems  Anesthesia Hx:  No problems with previous Anesthesia             Denies Family Hx of Anesthesia complications.    Denies Personal Hx of Anesthesia complications.                    Social:  Non-Smoker       Hematology/Oncology:  Hematology Normal   Oncology Normal                                   EENT/Dental:  EENT/Dental Normal           Cardiovascular:     Hypertension              ECG has been reviewed.                      Hypertension         Pulmonary:        Sleep Apnea     Obstructive Sleep Apnea (GISELE).           Renal/:  Renal/ Normal                 Hepatic/GI:     GERD         Gerd          Musculoskeletal:  Arthritis        Arthritis     Spine Disorders:  Disc disease and Degenerative disease           Neurological:  Neurology Normal              Arthritis                           Endocrine:   Hypothyroidism       Hypothyroidism          Psych:  Psychiatric History                  Physical Exam  General: Well nourished, Cooperative and Confusion    Airway:  Mallampati: II   Mouth Opening: Normal  TM Distance: Normal  Tongue: Normal  Neck ROM: Normal ROM    Dental:  Intact    Chest/Lungs:  Clear to auscultation    Heart:  Rate: Normal  Rhythm: Regular Rhythm  Sounds: Normal        Anesthesia Plan  Type of Anesthesia, risks & benefits discussed:    Anesthesia Type: Gen ETT  Intra-op Monitoring Plan: Standard ASA Monitors  Induction:  rapid sequence and IV  Informed Consent: Informed consent signed with the Patient and all parties understand the risks and agree with anesthesia plan.  All questions answered.   ASA Score: 3    Ready For Surgery From  Anesthesia Perspective.     .

## 2025-04-24 NOTE — ASSESSMENT & PLAN NOTE
Patient's blood pressure range in the last 24 hours was: BP  Min: 121/72  Max: 167/79.The patient's inpatient anti-hypertensive regimen is listed below:  Current Antihypertensives  metoprolol succinate (TOPROL-XL) 24 hr tablet 100 mg, Nightly, Oral    Plan  - BP is stable, no changes needed to their regimen

## 2025-04-24 NOTE — PROGRESS NOTES
Pharmacist Renal Dose Adjustment Note    Skyler Espinoza is a 74 y.o. female being treated with the medication metoclopramide    Patient Data:    Vital Signs (Most Recent):  Temp: 97.1 °F (36.2 °C) (04/24/25 0812)  Pulse: 77 (04/24/25 0900)  Resp: 17 (04/24/25 0900)  BP: 133/69 (04/24/25 0900)  SpO2: 95 % (04/24/25 0900) Vital Signs (72h Range):  Temp:  [97.1 °F (36.2 °C)-100.5 °F (38.1 °C)]   Pulse:  []   Resp:  [12-27]   BP: (117-170)/()   SpO2:  [91 %-99 %]      Recent Labs   Lab 04/22/25  1816 04/23/25  0617   CREATININE 0.8 0.7     Serum creatinine: 0.7 mg/dL 04/23/25 0617  Estimated creatinine clearance: 90.4 mL/min    Medication:metoclopramide dose: 10mg frequency q8h will be changed to medication:metoclopramide dose:10mg frequency:q6h    Pharmacist's Name: Pilar Alaniz  Pharmacist's Extension: [phone]

## 2025-04-24 NOTE — ASSESSMENT & PLAN NOTE
-analgesics and antiemetics p.r.n.  -full liquid diet  -continuous IV fluids  -HIDA scan pending  -monitor electrolytes; replacements as needed

## 2025-04-24 NOTE — PLAN OF CARE
Goals to be met by: 5/24/2025     Patient will increase functional independence with ADLs by performing:    UE Dressing with Minimal Assistance.  LE Dressing with Minimal Assistance.  Grooming while standing at sink with Contact Guard Assistance.  Toileting from toilet with Minimal Assistance for hygiene and clothing management.   Toilet transfer to toilet with Contact Guard Assistance.

## 2025-04-24 NOTE — PT/OT/SLP PROGRESS
Physical Therapy      Patient Name:  Skyler Espinoza   MRN:  [id number]    Patient not seen today secondary to Nurse/ HEBERT hold, Nausea/vomiting. Will follow-up 4/25/25.     epigastric region/no radiation

## 2025-04-24 NOTE — PROVATION PATIENT INSTRUCTIONS
Discharge Summary/Instructions after an Endoscopic Procedure  Patient Name: Skyler Espinoza  Patient MRN: [id number]  Patient YOB: 1950  Thursday, April 24, 2025  Maxim Garzon MD  RESTRICTIONS:  During your procedure today, you received medications for sedation.  These   medications may affect your judgment, balance and coordination.  Therefore,   for 24 hours, you have the following restrictions:   - DO NOT drive a car, operate machinery, make legal/financial decisions,   sign important papers or drink alcohol.    ACTIVITY:  Today: no heavy lifting, straining or running due to procedural   sedation/anesthesia.  The following day: return to full activity including work.  DIET:  Eat and drink normally unless instructed otherwise.     TREATMENT FOR COMMON SIDE EFFECTS:  - Mild abdominal pain, nausea, belching, bloating or excessive gas:  rest,   eat lightly and use a heating pad.  - Sore Throat: treat with throat lozenges and/or gargle with warm salt   water.  - Because air was used during the procedure, expelling large amounts of air   from your rectum or belching is normal.  - If a bowel prep was taken, you may not have a bowel movement for 1-3 days.    This is normal.  SYMPTOMS TO WATCH FOR AND REPORT TO YOUR PHYSICIAN:  1. Abdominal pain or bloating, other than gas cramps.  2. Chest pain.  3. Back pain.  4. Signs of infection such as: chills or fever occurring within 24 hours   after the procedure.  5. Rectal bleeding, which would show as bright red, maroon, or black stools.   (A tablespoon of blood from the rectum is not serious, especially if   hemorrhoids are present.)  6. Vomiting.  7. Weakness or dizziness.  GO DIRECTLY TO THE NEAREST EMERGENCY ROOM IF YOU HAVE ANY OF THE FOLLOWING:      Difficulty breathing              Chills and/or fever over 101 F   Persistent vomiting and/or vomiting blood   Severe abdominal pain   Severe chest pain   Black, tarry stools   Bleeding- more than one  tablespoon   Any other symptom or condition that you feel may need urgent attention  Your doctor recommends these additional instructions:  If any biopsies were taken, your doctors clinic will contact you in 1 to 2   weeks with any results.  - Patient has a contact number available for emergencies.  The signs and   symptoms of potential delayed complications were discussed with the   patient.  Return to normal activities tomorrow.  Written discharge   instructions were provided to the patient.   - Resume previous diet.   - Continue present medications.   - Await pathology results.   - Pantoprazole 40 mg po bid (use iv inpatient if vomiting persists)  - Recommend repeat EGD in 2 months.  - Avoid NSAIDS and THC.   - Full liquids as tolerated and advance as tolerated.  - [facility] to [facility] for [facility] care.  For questions, problems or results please call your physician - Maxim Garzon MD at Work:  [phone].  [facility], EMERGENCY ROOM PHONE NUMBER: [phone]  IF A COMPLICATION OR EMERGENCY SITUATION ARISES AND YOU ARE UNABLE TO REACH   YOUR PHYSICIAN - GO DIRECTLY TO THE EMERGENCY ROOM.  MD Maxim Park MD  4/24/2025 8:00:03 AM  This report has been verified and signed electronically.  Dear patient,  As a result of recent federal legislation (The Federal Cures Act), you may   receive lab or pathology results from your procedure in your [facility]   account before your physician is able to contact you. Your physician or   their representative will relay the results to you with their   recommendations at their soonest availability.  Thank you,  [employer]OVATION

## 2025-04-24 NOTE — ASSESSMENT & PLAN NOTE
-urinalysis with 11 WBCs, 1+ protein, and 3+ ketones  -urine cultures -no growth -stop meropenem  -previous urine culture on 02/15 positive E coli; previously treated with antibiotics  -started on IV fluids  -monitor I's and O's  -monitor white count

## 2025-04-24 NOTE — HOSPITAL COURSE
Skyler Espinoza was monitored closely during her hospitalization.  She was given supportive care with IVFH and IV antiemetics.  Gastroenterology was consulted. On 4/24 she underwent an EGD with Dr. Garzon with findings of gastritis and non-bleeding gastric ulcer.  CT scan of the abdomen is nonacute.  Patient also have history of migraine and was on sumatriptan and reintroduced. HIDA scan was ordered and showed findings compatible with biliary dyskinesia.  Previous CT scan of the brain was done as an outpatient and negative. Etiology unknown at this time, possible gastroparesis vs migraine induced. Pathology from EGD report unremarkable. Symptoms dont appear to happen after eating and patient without RUQ abdominal pain so although biliary dyskinesia can have similar symptoms have low suspicion that this is the cause at the moment.  Recommend getting gastric emptying study outpatient and following up with GI.  Gastroenterology referral given.  Prescription for Reglan given since this showed some improvement in the hospital.  Instructed patient and daughter  to return to emergency room if patient develops severe pain, distress, nausea and vomiting, etc.     Physical Exam  Constitutional:       General: She is not in acute distress.  Cardiovascular:      Rate and Rhythm: Normal rate.   Pulmonary:      Effort: No respiratory distress.      Breath sounds: No wheezing.

## 2025-04-24 NOTE — PT/OT/SLP EVAL
"Occupational Therapy   Evaluation    Name: Skyler Espinoza  MRN: [id number]  Admitting Diagnosis: Intractable nausea and vomiting  Recent Surgery: Procedure(s) (LRB):  EGD (ESOPHAGOGASTRODUODENOSCOPY) (N/A) * Day of Surgery *    Recommendations:     Discharge Recommendations: Low Intensity Therapy  Discharge Equipment Recommendations:  rollator  Barriers to discharge:   (medical status)    Assessment:     Skyler Espinoza is a 74 y.o. female with a medical diagnosis of Intractable nausea and vomiting.  Performance deficits affecting function: weakness, impaired endurance, impaired self care skills, impaired functional mobility, gait instability, impaired cognition, decreased safety awareness, impaired balance.      Patient found HOB elevated with daughter (primary caregiver) present and agreeable to OT evaluation. Patient with dementia but oriented to self and place. Patient declined OOB activity secondary to nausea but performed bed level strength testing and bed mobility. Daughter stated that patient has been transferring with her help to [facility] [place] [place] in room. Patient will need a walker or rollator upon discharge home, daughter requests rollator, awaiting PT recs.     Rehab Prognosis: Good; patient would benefit from acute skilled OT services to address these deficits and reach maximum level of function.       Plan:     Patient to be seen 3 x/week to address the above listed problems via self-care/home management, therapeutic activities, therapeutic exercises, cognitive retraining  Plan of Care Expires: 05/24/25  Plan of Care Reviewed with: patient, daughter    Subjective     Chief Complaint: nausea  Patient/Family Comments/goals: to "find out what is wrong"    Occupational Profile:  Living Environment: patient lives with her daughter, son, and daughter in law in a [facility] with five ADRIEL and railings. Patient has 24/7 supervision.  Previous level of function: Min to ModA for all ADL's and functional mobility.  Equipment Used at Home: " blood pressure machine, CPAP  Assistance upon Discharge: patient will have assistance from daughter upon discharge    Pain/Comfort:  Pain Rating 1: 0/10    Patients cultural, spiritual, Yazidi conflicts given the current situation:      Objective:     Communicated with: lico prior to session.  Patient found supine with SCD, peripheral IV, telemetry, bed alarm upon OT entry to room.    General Precautions: Standard, fall  Orthopedic Precautions: N/A  Braces: N/A  Respiratory Status: Room air    Occupational Performance:    Bed Mobility:    Patient completed Rolling/Turning to Left with  moderate assistance  Patient completed Rolling/Turning to Right with moderate assistance    Functional Mobility/Transfers:  Patient declined secondary to nausea with movement    Activities of Daily Living:  Unable to assess    Cognitive/Visual Perceptual:  Cognitive/Psychosocial Skills:     -       Oriented to: Person and Place   -       Follows Commands/attention:Follows one-step commands  -       Communication: clear/fluent  -       Memory: Impaired STM, Impaired LTM, and Poor immediate recall  -       Safety awareness/insight to disability: impaired   -       Mood/Affect/Coping skills/emotional control: Pleasant    Physical Exam:  Upper Extremity Range of Motion:     -       Right Upper Extremity: WFL  -       Left Upper Extremity: WFL  Upper Extremity Strength:    -       Right Upper Extremity: WFL  -       Left Upper Extremity: WFL   Strength:    -       Right Upper Extremity: WFL  -       Left Upper Extremity: WFL  Fine Motor Coordination:    -       Intact     AMPAC 6 Click ADL:  AMPAC Total Score: 15    Treatment & Education:  Therapist provided facilitation and instruction of proper body mechanics and fall prevention strategies during tasks listed above.   Instructed patient to sit in bedside chair as tolerated daily to increase OOB/activity tolerance.   Instructed patient to use call light to have nursing staff  assist with needs/transfers.   Discussed OT POC and answered all questions within OT scope of practice.        Patient left supine with all lines intact, call button in reach, bed alarm on, and daughter Allison present    GOALS:   Multidisciplinary Problems       Occupational Therapy Goals          Problem: Occupational Therapy    Goal Priority Disciplines Outcome Interventions   Occupational Therapy Goal     OT, PT/OT     Description: Goals to be met by: 5/24/2025     Patient will increase functional independence with ADLs by performing:    UE Dressing with Minimal Assistance.  LE Dressing with Minimal Assistance.  Grooming while standing at sink with Supervision.  Toileting from toilet with Contact Guard Assistance for hygiene and clothing management.   Toilet transfer to toilet with Contact Guard Assistance.                         DME Justifications:   Skyler's mobility limitation cannot be sufficiently resolved by the use of a cane. Her functional mobility deficit can be sufficiently resolved with the use of a Rollator. Patient's mobility limitation significantly impairs their ability to participate in one of more activities of daily living.  The use of a Rollator will significantly improve the patient's ability to participate in MRADLS and the patient will use it on regular basis in the home.      History:     Past Medical History:   Diagnosis Date    Depression     GERD (gastroesophageal reflux disease)     Hyperlipidemia     Hypertension     Hypothyroid     Sleep apnea, unspecified 2015         Past Surgical History:   Procedure Laterality Date    APPENDECTOMY      COLONOSCOPY  2017    Briana, repeat in 10    TONSILLECTOMY      TUBAL LIGATION         Time Tracking:     OT Date of Treatment: 04/24/25  OT Start Time: 1451  OT Stop Time: 1503  OT Total Time (min): 12 min    Billable Minutes:Evaluation 12    4/24/2025

## 2025-04-24 NOTE — TRANSFER OF CARE
"Anesthesia Transfer of Care Note    Patient: Skyler Espinoza    Procedure(s) Performed: Procedure(s) (LRB):  EGD (ESOPHAGOGASTRODUODENOSCOPY) (N/A)    Patient location: PACU    Anesthesia Type: general    Transport from OR: Transported from OR on room air with adequate spontaneous ventilation    Post pain: adequate analgesia    Post assessment: no apparent anesthetic complications and tolerated procedure well    Post vital signs: stable    Level of consciousness: responds to stimulation    Nausea/Vomiting: no nausea/vomiting    Complications: none    Transfer of care protocol was followedComments: AAXO3 SV, exchanging well.  To PACU, VSS upon arrival. Report to RN       Last vitals: Visit Vitals  BP (!) 167/79 (BP Location: Right arm)   Pulse 82   Temp 36.8 °C (98.2 °F) (Oral)   Resp 15   Ht 5' 8" (1.727 m)   Wt 104.1 kg (229 lb 8 oz)   SpO2 97%   Breastfeeding No   BMI 34.90 kg/m²     "

## 2025-04-24 NOTE — PLAN OF CARE
Patient taken to room via stretcher at this time. Awake and alert not distressful. Assisted patient up to walk from stretcher to bed, tolerated well. Care assumed by JJ Guaman

## 2025-04-24 NOTE — SUBJECTIVE & OBJECTIVE
"Interval History: See "hospital course"      Review of Systems   Gastrointestinal:  Positive for nausea and vomiting.   Neurological:  Positive for dizziness.   Psychiatric/Behavioral:  Positive for confusion (baseline).      Objective:     Vital Signs (Most Recent):  Temp: 97.1 °F (36.2 °C) (04/24/25 0812)  Pulse: 77 (04/24/25 0900)  Resp: 17 (04/24/25 0900)  BP: 133/69 (04/24/25 0900)  SpO2: 95 % (04/24/25 0900) Vital Signs (24h Range):  Temp:  [97.1 °F (36.2 °C)-100.5 °F (38.1 °C)] 97.1 °F (36.2 °C)  Pulse:  [] 77  Resp:  [14-27] 17  SpO2:  [91 %-99 %] 95 %  BP: (121-167)/() 133/69     Weight: 104.1 kg (229 lb 8 oz)  Body mass index is 34.9 kg/m².    Intake/Output Summary (Last 24 hours) at 4/24/2025 1102  Last data filed at 4/24/2025 0859  Gross per 24 hour   Intake 200 ml   Output 305 ml   Net -105 ml         Physical Exam  Vitals and nursing note reviewed.   Constitutional:       Appearance: She is not ill-appearing.   Eyes:      Pupils: Pupils are equal, round, and reactive to light.   Cardiovascular:      Rate and Rhythm: Normal rate and regular rhythm.   Pulmonary:      Effort: Pulmonary effort is normal. No respiratory distress.      Breath sounds: Normal breath sounds. No wheezing.   Abdominal:      General: Bowel sounds are normal. There is no distension.      Palpations: Abdomen is soft.      Tenderness: There is no abdominal tenderness.   Musculoskeletal:      Right lower leg: Edema present.      Left lower leg: Edema present.   Skin:     General: Skin is warm and dry.      Capillary Refill: Capillary refill takes less than 2 seconds.   Neurological:      Mental Status: She is alert. She is confused.      GCS: GCS eye subscore is 4. GCS verbal subscore is 4. GCS motor subscore is 6.      Comments: Mental status at baseline with dementia.     Psychiatric:         Mood and Affect: Mood normal.         Behavior: Behavior normal.               Significant Labs: All pertinent labs within the " past 24 hours have been reviewed.  CBC:   Recent Labs   Lab 04/22/25  1815 04/23/25  0617 04/24/25  0448   WBC 8.16 7.46 11.35   HGB 14.5 12.5 13.1   HCT 43.8 37.6 39.5    261 297     CMP:   Recent Labs   Lab 04/22/25  1816 04/23/25  0617    138   K 3.9 3.6   CO2 24 26   BUN 13 10   CREATININE 0.8 0.7   CALCIUM 9.6 8.8   ALBUMIN 4.3  --    BILITOT 0.6  --    ALKPHOS 59  --    AST 18  --    ALT 15  --      TSH:   Recent Labs   Lab 04/10/25  1530   TSH 2.644       Significant Imaging: I have reviewed all pertinent imaging results/findings within the past 24 hours.

## 2025-04-24 NOTE — ANESTHESIA POSTPROCEDURE EVALUATION
Anesthesia Post Evaluation    Patient: Skyler Espinoza    Procedure(s) Performed: Procedure(s) (LRB):  EGD (ESOPHAGOGASTRODUODENOSCOPY) (N/A)    Final Anesthesia Type: general      Patient location during evaluation: PACU  Patient participation: Yes- Able to Participate  Level of consciousness: awake and alert, oriented and awake  Post-procedure vital signs: reviewed and stable  Pain management: adequate  Airway patency: patent    PONV status at discharge: nausea (controlled)  Anesthetic complications: no      Cardiovascular status: blood pressure returned to baseline, hemodynamically stable and stable  Respiratory status: unassisted, spontaneous ventilation and room air  Hydration status: euvolemic  Follow-up not needed.              Vitals Value Taken Time   /72 04/24/25 08:45   Temp 36.2 °C (97.1 °F) 04/24/25 08:12   Pulse 78 04/24/25 08:56   Resp 22 04/24/25 08:56   SpO2 96 % 04/24/25 08:56   Vitals shown include unfiled device data.      No case tracking events are documented in the log.      Pain/Ashwini Score: Pain Rating Prior to Med Admin: 6 (4/24/2025  2:57 AM)  Pain Rating Post Med Admin: 0 (4/24/2025  3:30 AM)  Ashwini Score: 10 (4/24/2025  8:45 AM)

## 2025-04-25 LAB
ABSOLUTE EOSINOPHIL (SMH): 0.17 K/UL
ABSOLUTE MONOCYTE (SMH): 0.72 K/UL (ref 0.3–1)
ABSOLUTE NEUTROPHIL COUNT (SMH): 2.6 K/UL (ref 1.8–7.7)
BASOPHILS # BLD AUTO: 0.02 K/UL
BASOPHILS NFR BLD AUTO: 0.4 %
ERYTHROCYTE [DISTWIDTH] IN BLOOD BY AUTOMATED COUNT: 12.8 % (ref 11.5–14.5)
HCT VFR BLD AUTO: 34.6 % (ref 37–48.5)
HGB BLD-MCNC: 11.7 GM/DL (ref 12–16)
IMM GRANULOCYTES # BLD AUTO: 0.02 K/UL (ref 0–0.04)
IMM GRANULOCYTES NFR BLD AUTO: 0.4 % (ref 0–0.5)
LYMPHOCYTES # BLD AUTO: 1.71 K/UL (ref 1–4.8)
MCH RBC QN AUTO: 29.8 PG (ref 27–31)
MCHC RBC AUTO-ENTMCNC: 33.8 G/DL (ref 32–36)
MCV RBC AUTO: 88 FL (ref 82–98)
MICROSCOPIC COMMENT: NORMAL
NUCLEATED RBC (/100WBC) (SMH): 0 /100 WBC
PLATELET # BLD AUTO: 245 K/UL (ref 150–450)
PMV BLD AUTO: 10.4 FL (ref 9.2–12.9)
RBC # BLD AUTO: 3.92 M/UL (ref 4–5.4)
RBC #/AREA URNS AUTO: <1 /HPF
RELATIVE EOSINOPHIL (SMH): 3.2 % (ref 0–8)
RELATIVE LYMPHOCYTE (SMH): 32.5 % (ref 18–48)
RELATIVE MONOCYTE (SMH): 13.7 % (ref 4–15)
RELATIVE NEUTROPHIL (SMH): 49.8 % (ref 38–73)
WBC # BLD AUTO: 5.26 K/UL (ref 3.9–12.7)
WBC #/AREA URNS AUTO: 1 /HPF

## 2025-04-25 PROCEDURE — 87086 URINE CULTURE/COLONY COUNT: CPT | Performed by: INTERNAL MEDICINE

## 2025-04-25 PROCEDURE — 25000003 PHARM REV CODE 250: Performed by: INTERNAL MEDICINE

## 2025-04-25 PROCEDURE — 63600175 PHARM REV CODE 636 W HCPCS: Performed by: NURSE PRACTITIONER

## 2025-04-25 PROCEDURE — 63600175 PHARM REV CODE 636 W HCPCS: Performed by: HOSPITALIST

## 2025-04-25 PROCEDURE — 63600175 PHARM REV CODE 636 W HCPCS

## 2025-04-25 PROCEDURE — 12000002 HC ACUTE/MED SURGE SEMI-PRIVATE ROOM

## 2025-04-25 PROCEDURE — 25000003 PHARM REV CODE 250

## 2025-04-25 PROCEDURE — 63600175 PHARM REV CODE 636 W HCPCS: Performed by: INTERNAL MEDICINE

## 2025-04-25 PROCEDURE — 36415 COLL VENOUS BLD VENIPUNCTURE: CPT

## 2025-04-25 PROCEDURE — 81001 URINALYSIS AUTO W/SCOPE: CPT | Performed by: INTERNAL MEDICINE

## 2025-04-25 PROCEDURE — 85025 COMPLETE CBC W/AUTO DIFF WBC: CPT

## 2025-04-25 PROCEDURE — 83036 HEMOGLOBIN GLYCOSYLATED A1C: CPT | Performed by: INTERNAL MEDICINE

## 2025-04-25 PROCEDURE — 25000003 PHARM REV CODE 250: Performed by: STUDENT IN AN ORGANIZED HEALTH CARE EDUCATION/TRAINING PROGRAM

## 2025-04-25 RX ORDER — METOCLOPRAMIDE HYDROCHLORIDE 5 MG/ML
5 INJECTION INTRAMUSCULAR; INTRAVENOUS EVERY 6 HOURS PRN
Status: DISCONTINUED | OUTPATIENT
Start: 2025-04-25 | End: 2025-04-27 | Stop reason: HOSPADM

## 2025-04-25 RX ORDER — METHOCARBAMOL 500 MG/1
500 TABLET, FILM COATED ORAL ONCE
Status: COMPLETED | OUTPATIENT
Start: 2025-04-25 | End: 2025-04-25

## 2025-04-25 RX ORDER — BUTALBITAL, ACETAMINOPHEN AND CAFFEINE 50; 325; 40 MG/1; MG/1; MG/1
1 TABLET ORAL EVERY 4 HOURS PRN
Status: DISCONTINUED | OUTPATIENT
Start: 2025-04-25 | End: 2025-04-25

## 2025-04-25 RX ORDER — SUMATRIPTAN SUCCINATE 50 MG/1
50 TABLET ORAL DAILY
Status: DISCONTINUED | OUTPATIENT
Start: 2025-04-25 | End: 2025-04-27 | Stop reason: HOSPADM

## 2025-04-25 RX ORDER — SUCRALFATE 1 G/10ML
1 SUSPENSION ORAL EVERY 6 HOURS
Status: COMPLETED | OUTPATIENT
Start: 2025-04-25 | End: 2025-04-26

## 2025-04-25 RX ADMIN — METOPROLOL SUCCINATE 100 MG: 50 TABLET, FILM COATED, EXTENDED RELEASE ORAL at 09:04

## 2025-04-25 RX ADMIN — METOCLOPRAMIDE 10 MG: 5 INJECTION, SOLUTION INTRAMUSCULAR; INTRAVENOUS at 06:04

## 2025-04-25 RX ADMIN — METOCLOPRAMIDE 10 MG: 5 INJECTION, SOLUTION INTRAMUSCULAR; INTRAVENOUS at 12:04

## 2025-04-25 RX ADMIN — LEVOTHYROXINE SODIUM 112 MCG: 112 TABLET ORAL at 06:04

## 2025-04-25 RX ADMIN — SODIUM CHLORIDE 250 MG: 9 INJECTION, SOLUTION INTRAVENOUS at 09:04

## 2025-04-25 RX ADMIN — PANTOPRAZOLE SODIUM 40 MG: 40 INJECTION, POWDER, FOR SOLUTION INTRAVENOUS at 09:04

## 2025-04-25 RX ADMIN — ACETAMINOPHEN 650 MG: 325 TABLET ORAL at 12:04

## 2025-04-25 RX ADMIN — THERA TABS 1 TABLET: TAB at 08:04

## 2025-04-25 RX ADMIN — SODIUM CHLORIDE 250 MG: 9 INJECTION, SOLUTION INTRAVENOUS at 02:04

## 2025-04-25 RX ADMIN — SUMATRIPTAN SUCCINATE 50 MG: 50 TABLET ORAL at 12:04

## 2025-04-25 RX ADMIN — ALUMINUM HYDROXIDE, MAGNESIUM HYDROXIDE, AND SIMETHICONE 30 ML: 200; 200; 20 SUSPENSION ORAL at 02:04

## 2025-04-25 RX ADMIN — ONDANSETRON 4 MG: 2 INJECTION INTRAMUSCULAR; INTRAVENOUS at 06:04

## 2025-04-25 RX ADMIN — HYDROCODONE BITARTRATE AND ACETAMINOPHEN 1 TABLET: 5; 325 TABLET ORAL at 02:04

## 2025-04-25 RX ADMIN — ACETAMINOPHEN 650 MG: 325 TABLET ORAL at 08:04

## 2025-04-25 RX ADMIN — BUTALBITAL, ACETAMINOPHEN, AND CAFFEINE 1 TABLET: 325; 50; 40 TABLET ORAL at 09:04

## 2025-04-25 RX ADMIN — SUCRALFATE ORAL 1 G: 1 SUSPENSION ORAL at 02:04

## 2025-04-25 RX ADMIN — PANTOPRAZOLE SODIUM 40 MG: 40 INJECTION, POWDER, FOR SOLUTION INTRAVENOUS at 10:04

## 2025-04-25 RX ADMIN — METOCLOPRAMIDE HYDROCHLORIDE 5 MG: 5 INJECTION INTRAMUSCULAR; INTRAVENOUS at 10:04

## 2025-04-25 RX ADMIN — METHOCARBAMOL TABLETS 500 MG: 500 TABLET, COATED ORAL at 09:04

## 2025-04-25 RX ADMIN — ONDANSETRON 4 MG: 2 INJECTION INTRAMUSCULAR; INTRAVENOUS at 12:04

## 2025-04-25 RX ADMIN — ACETAMINOPHEN 650 MG: 325 TABLET ORAL at 07:04

## 2025-04-25 NOTE — PLAN OF CARE
04/25/25 1049   Medicare Message   Important Message from Medicare regarding Discharge Appeal Rights Given to patient/caregiver;Explained to patient/caregiver;Signed/date by patient/caregiver   Date IMM was signed 04/25/25   Time IMM was signed 1021

## 2025-04-25 NOTE — ASSESSMENT & PLAN NOTE
Secondary to gastritis and duodenal ulcers  -analgesics and antiemetics p.r.n.  -full liquid diet as tolerated  -continuous IV fluids  -HIDA scan postponed as an outpatient as well as gastric emptying study  Add A1c  -monitor electrolytes; replacements as needed  IV pantoprazole b.i.d.  Resume patient's home migraine medication.  Possible cause could be from severe migraine attack  Repeat UA and urine culture straight cath.  If UA positive we will start antibiotics  Patient may probably need MRI of the brain if continued to have nausea vomiting and dizziness.  CT head was negative  Status post EGD and nonbleeding duodenal ulcer and diffuse gastritis  Possible trial of IV erythromycin

## 2025-04-25 NOTE — SUBJECTIVE & OBJECTIVE
Interval History:       Patient was seen and examined with the team.  Patient has some photophobia.  Having headache to.  Possible migraine attack.  EGD noted with gastritis and nonbleeding duodenal ulcer.  Abdomen is soft and nontender.  Possible UTI and will do straight cath urinalysis.  Previous urine culture with contamination with multiple organisms. Admission UA was negative Patient was not on antibiotic and will start if repeat UA is positive    Review of Systems  Objective:     Vital Signs (Most Recent):  Temp: 98.3 °F (36.8 °C) (04/25/25 1116)  Pulse: 71 (04/25/25 1116)  Resp: 15 (04/25/25 1116)  BP: (!) 149/85 (04/25/25 1116)  SpO2: 96 % (04/25/25 1116) Vital Signs (24h Range):  Temp:  [98.1 °F (36.7 °C)-98.3 °F (36.8 °C)] 98.3 °F (36.8 °C)  Pulse:  [69-88] 71  Resp:  [14-18] 15  SpO2:  [94 %-96 %] 96 %  BP: (128-157)/(72-85) 149/85     Weight: 104.1 kg (229 lb 8 oz)  Body mass index is 34.9 kg/m².    Intake/Output Summary (Last 24 hours) at 4/25/2025 1201  Last data filed at 4/25/2025 0850  Gross per 24 hour   Intake 400 ml   Output --   Net 400 ml         Physical Exam  Vitals and nursing note reviewed.   HENT:      Head: Normocephalic and atraumatic.   Eyes:      Conjunctiva/sclera: Conjunctivae normal.   Neck:      Vascular: No JVD.   Cardiovascular:      Rate and Rhythm: Normal rate.      Heart sounds: Normal heart sounds.   Pulmonary:      Effort: Pulmonary effort is normal.      Breath sounds: Normal breath sounds.   Abdominal:      Palpations: Abdomen is soft.   Skin:     General: Skin is warm.   Neurological:      General: No focal deficit present.      Mental Status: She is alert and oriented to person, place, and time. Mental status is at baseline.   Psychiatric:         Mood and Affect: Mood normal.               Significant Labs: All pertinent labs within the past 24 hours have been reviewed.    Significant Imaging: I have reviewed all pertinent imaging results/findings within the past 24  hours.

## 2025-04-25 NOTE — PLAN OF CARE
Met with Allison Trejo, daughter, at bedside to review discharge recommendation of HH and they are agreeable to plan.    Patient/family provided with a list of facilities in-network with patient's payor plan. Providers that are owned, operated, or affiliated with [facility] are included on the list.     Notified that referrals will be sent to the below listed facilities from in-network list based on proximity to home/family support:   [facility]    Patient/family instructed to identify preference.    Preferred Facility:  No preference    If an additional preferred facility not listed above is identified, additional referral to be sent. If above facilities unable to accept, will send additional referrals to in-network providers.      04/25/25 0933   Post-Acute Status   Post-Acute Authorization Home [facility]   Home Health Status Referrals Sent   Discharge Delays None known at this time   Discharge Plan   Discharge Plan A Home Health   Discharge Plan B Home Health

## 2025-04-25 NOTE — PLAN OF CARE
Patient accepted by [facility] home health via [facility].  [facility] request home health orders upon discharge.      Per Francisco BYRNE with [facility], ok to pull rollator from [facility] closet upon discharge.       04/25/25 0986   Post-Acute Status   Post-Acute Authorization HME;Home Health   HME Status (!) Pending Delivery   Home Health Status Pending medical clearance/testing   Discharge Delays Orders Needed

## 2025-04-25 NOTE — PROGRESS NOTES
"[facility] Medicine  Progress Note    Patient Name: Skyler Espinoza  MRN: [id number]  Patient Class: IP- Inpatient   Admission Date: 4/22/2025  Length of Stay: 2 days  Attending Physician: Jordi Triana MD  Primary Care Provider: Oscar Lee FNP-C        Subjective     Principal Problem:Intractable nausea and vomiting        HPI:  Skyler Espinoza is a 74 year old female with a past medical history of dementia, depression, hyperlipidemia, hypertension, hypothyroidism secondary Hashimoto thyroiditis, osteoarthritis, and surgical history of appendectomy who was brought into the ED today by her daughter with complaints of mild intermittent abdominal cramping, associated with nausea and vomiting for 3 weeks. Her daughter states today she was at the ENT clinic and began to develop intractable nausea and vomiting. Patient unable to give history due to dementia.  Daughter at bedside and reports patient had multiple ED visits on 04/11 and 4/14 for the same symptoms. In addition, she visited her PCP on 04/10 with complaints of nausea and vomiting.  Her PCP performed an abdominal ultrasound was which was normal.  Daughter reports patient was recently on antibiotics for positive urine cultures. On 02/15 urine culture positive E coli. Her daughter reports she is scheduled for a EGD with GI, Dr. Brewer on May 1st. Also, the daughter reports patient was taking THC gummies for her dementia but stopped 2 weeks ago. Daughter denies any diarrhea. Daughter is a very sweet person and patient's primary caregiver. Daughter reports she would like to "get to the bottom," of patient's symptoms prior to discharge. ED workup reveals: CT abdomen pelvis no acute findings.  CT head completed outpatient at PCP office on 04/21 with no acute findings.  CBC and BMP unremarkable.  Urinalysis with 11 WBCs, 1+ protein, and 3+ ketones.  Urine cultures pending.  Previous culture was sensitive to meropenem, antibiotics " started.  Patient received IV fluids, Reglan, Phenergan, and Zofran in ED with mild relief.  Admitted to hospital medicine for further management and treatment.            Overview/Hospital Course:  Skyler Espinoza was monitored closely during her hospitalization.  She was given supportive care with IVFH and IV antiemetics.  Gastroenterology was consulted. On 4/24 she underwent an EGD with Dr. Garzon with findings of gastritis and non-bleeding gastric ulcer.  CT scan of the abdomen is nonacute.  Patient also have history of migraine and was on sumatriptan and reintroduced. HIDA scan was ordered but Dr. Wang move to outpatient.  Previous CT scan of the brain was done as an outpatient and negative.    Interval History:       Patient was seen and examined with the team.  Patient has some photophobia.  Having headache to.  Possible migraine attack.  EGD noted with gastritis and nonbleeding duodenal ulcer.  Abdomen is soft and nontender.  Possible UTI and will do straight cath urinalysis.  Previous urine culture with contamination with multiple organisms. Admission UA was negative Patient was not on antibiotic and will start if repeat UA is positive    Review of Systems  Objective:     Vital Signs (Most Recent):  Temp: 98.3 °F (36.8 °C) (04/25/25 1116)  Pulse: 71 (04/25/25 1116)  Resp: 15 (04/25/25 1116)  BP: (!) 149/85 (04/25/25 1116)  SpO2: 96 % (04/25/25 1116) Vital Signs (24h Range):  Temp:  [98.1 °F (36.7 °C)-98.3 °F (36.8 °C)] 98.3 °F (36.8 °C)  Pulse:  [69-88] 71  Resp:  [14-18] 15  SpO2:  [94 %-96 %] 96 %  BP: (128-157)/(72-85) 149/85     Weight: 104.1 kg (229 lb 8 oz)  Body mass index is 34.9 kg/m².    Intake/Output Summary (Last 24 hours) at 4/25/2025 1201  Last data filed at 4/25/2025 0850  Gross per 24 hour   Intake 400 ml   Output --   Net 400 ml         Physical Exam  Vitals and nursing note reviewed.   HENT:      Head: Normocephalic and atraumatic.   Eyes:      Conjunctiva/sclera: Conjunctivae normal.    Neck:      Vascular: No JVD.   Cardiovascular:      Rate and Rhythm: Normal rate.      Heart sounds: Normal heart sounds.   Pulmonary:      Effort: Pulmonary effort is normal.      Breath sounds: Normal breath sounds.   Abdominal:      Palpations: Abdomen is soft.   Skin:     General: Skin is warm.   Neurological:      General: No focal deficit present.      Mental Status: She is alert and oriented to person, place, and time. Mental status is at baseline.   Psychiatric:         Mood and Affect: Mood normal.               Significant Labs: All pertinent labs within the past 24 hours have been reviewed.    Significant Imaging: I have reviewed all pertinent imaging results/findings within the past 24 hours.      Assessment & Plan  Urinary tract infection  -urinalysis with 11 WBCs, 1+ protein, and 3+ ketones  -urine cultures -no growth -stoped meropenem  -previous urine culture on 02/15 positive E coli; previously treated with antibiotics  Repeat UA and urine culture straight cath    HTN (hypertension)  Patient's blood pressure range in the last 24 hours was: BP  Min: 128/79  Max: 157/83.The patient's inpatient anti-hypertensive regimen is listed below:  Current Antihypertensives  metoprolol succinate (TOPROL-XL) 24 hr tablet 100 mg, Nightly, Oral    Plan  - BP is stable, no changes needed to their regimen    Intractable nausea and vomiting  Secondary to gastritis and duodenal ulcers  -analgesics and antiemetics p.r.n.  -full liquid diet as tolerated  -continuous IV fluids  -HIDA scan postponed as an outpatient as well as gastric emptying study  Add A1c  -monitor electrolytes; replacements as needed  IV pantoprazole b.i.d.  Resume patient's home migraine medication.  Possible cause could be from severe migraine attack  Repeat UA and urine culture straight cath.  If UA positive we will start antibiotics  Patient may probably need MRI of the brain if continued to have nausea vomiting and dizziness.  CT head was  negative  Status post EGD and nonbleeding duodenal ulcer and diffuse gastritis  Possible trial of IV erythromycin  Dementia  Patient with dementia with likely etiology of unknown dementia. Dementia is moderate. The patient does not have signs of behavioral disturbance. Home dementia medications are Held or Continued:  No medications noted . Continue non-pharmacologic interventions to prevent delirium (No VS between 11PM-5AM, activity during day, opening blinds, providing glasses/hearing aids, and up in chair during daytime). Will avoid narcotics and benzos unless absolutely necessary. PRN anti-psychotics are not prescribed to avoid self harm behaviors.  Order p.r.n. antipsychotics as needed  VTE Risk Mitigation (From admission, onward)           Ordered     IP VTE HIGH RISK PATIENT  Once         04/22/25 2337     Place sequential compression device  Until discontinued         04/22/25 2337                    Discharge Planning   ANGELI: 4/26/2025     Code Status: Full Code   Medical Readiness for Discharge Date:   Discharge Plan A: Home Health   Discharge Delays: None known at this time            Please place Justification for DME        Jordi Triana MD  Department of Hospital Medicine   [facility]

## 2025-04-25 NOTE — ASSESSMENT & PLAN NOTE
Patient's blood pressure range in the last 24 hours was: BP  Min: 128/79  Max: 157/83.The patient's inpatient anti-hypertensive regimen is listed below:  Current Antihypertensives  metoprolol succinate (TOPROL-XL) 24 hr tablet 100 mg, Nightly, Oral    Plan  - BP is stable, no changes needed to their regimen

## 2025-04-25 NOTE — ASSESSMENT & PLAN NOTE
-urinalysis with 11 WBCs, 1+ protein, and 3+ ketones  -urine cultures -no growth -stoped meropenem  -previous urine culture on 02/15 positive E coli; previously treated with antibiotics  Repeat UA and urine culture straight cath

## 2025-04-25 NOTE — CONSULTS
[facility] VASCULAR ACCESS NOTE       Bed:3016/3016-A    20G x 1.75IN [facility].    Indication: PVA  Attempts: 1    Levi Trent RN

## 2025-04-25 NOTE — NURSING
Redness and warmth observed to the LILIBETH. Elevated arm, traced perimeter and applied ice pack. MD Triana notified.

## 2025-04-25 NOTE — PLAN OF CARE
Patient still with headache, dizziness and vomiting. Workup continuing          04/25/25 1112   Discharge Reassessment   Assessment Type Discharge Planning Reassessment   Did the patient's condition or plan change since previous assessment? Yes

## 2025-04-25 NOTE — PT/OT/SLP PROGRESS
Physical Therapy      Patient Name:  Skyler Espinoza   MRN:  [id number]    Patient not seen today secondary to Pain, Nausea/vomiting (headache). Will follow-up 4/26/25.

## 2025-04-26 LAB
ANION GAP (SMH): 8 MMOL/L (ref 8–16)
BUN SERPL-MCNC: 7 MG/DL (ref 8–23)
CALCIUM SERPL-MCNC: 9 MG/DL (ref 8.7–10.5)
CHLORIDE SERPL-SCNC: 102 MMOL/L (ref 95–110)
CO2 SERPL-SCNC: 28 MMOL/L (ref 23–29)
CREAT SERPL-MCNC: 0.6 MG/DL (ref 0.5–1.4)
EAG (SMH): 100 MG/DL (ref 68–131)
GFR SERPLBLD CREATININE-BSD FMLA CKD-EPI: >60 ML/MIN/1.73/M2
GLUCOSE SERPL-MCNC: 98 MG/DL (ref 70–110)
HBA1C MFR BLD: 5.1 % (ref 4.5–6.2)
POTASSIUM SERPL-SCNC: 3.3 MMOL/L (ref 3.5–5.1)
SODIUM SERPL-SCNC: 138 MMOL/L (ref 136–145)

## 2025-04-26 PROCEDURE — 25000003 PHARM REV CODE 250: Performed by: STUDENT IN AN ORGANIZED HEALTH CARE EDUCATION/TRAINING PROGRAM

## 2025-04-26 PROCEDURE — 25000003 PHARM REV CODE 250

## 2025-04-26 PROCEDURE — 63600175 PHARM REV CODE 636 W HCPCS: Performed by: NURSE PRACTITIONER

## 2025-04-26 PROCEDURE — 63600175 PHARM REV CODE 636 W HCPCS: Performed by: INTERNAL MEDICINE

## 2025-04-26 PROCEDURE — 36415 COLL VENOUS BLD VENIPUNCTURE: CPT | Performed by: STUDENT IN AN ORGANIZED HEALTH CARE EDUCATION/TRAINING PROGRAM

## 2025-04-26 PROCEDURE — 80051 ELECTROLYTE PANEL: CPT | Performed by: STUDENT IN AN ORGANIZED HEALTH CARE EDUCATION/TRAINING PROGRAM

## 2025-04-26 PROCEDURE — 63600175 PHARM REV CODE 636 W HCPCS

## 2025-04-26 PROCEDURE — 12000002 HC ACUTE/MED SURGE SEMI-PRIVATE ROOM

## 2025-04-26 PROCEDURE — 25000003 PHARM REV CODE 250: Performed by: INTERNAL MEDICINE

## 2025-04-26 PROCEDURE — A9537 TC99M MEBROFENIN: HCPCS | Performed by: STUDENT IN AN ORGANIZED HEALTH CARE EDUCATION/TRAINING PROGRAM

## 2025-04-26 PROCEDURE — 63600175 PHARM REV CODE 636 W HCPCS: Performed by: STUDENT IN AN ORGANIZED HEALTH CARE EDUCATION/TRAINING PROGRAM

## 2025-04-26 RX ORDER — KIT FOR THE PREPARATION OF TECHNETIUM TC 99M MEBROFENIN 45 MG/10ML
9.6 INJECTION, POWDER, LYOPHILIZED, FOR SOLUTION INTRAVENOUS
Status: COMPLETED | OUTPATIENT
Start: 2025-04-26 | End: 2025-04-26

## 2025-04-26 RX ORDER — METOCLOPRAMIDE 5 MG/1
5 TABLET ORAL
Status: DISCONTINUED | OUTPATIENT
Start: 2025-04-26 | End: 2025-04-27 | Stop reason: HOSPADM

## 2025-04-26 RX ORDER — POTASSIUM CHLORIDE 7.45 MG/ML
10 INJECTION INTRAVENOUS
Status: DISCONTINUED | OUTPATIENT
Start: 2025-04-26 | End: 2025-04-26

## 2025-04-26 RX ORDER — SODIUM CHLORIDE, SODIUM LACTATE, POTASSIUM CHLORIDE, CALCIUM CHLORIDE 600; 310; 30; 20 MG/100ML; MG/100ML; MG/100ML; MG/100ML
INJECTION, SOLUTION INTRAVENOUS CONTINUOUS
Status: DISCONTINUED | OUTPATIENT
Start: 2025-04-26 | End: 2025-04-27

## 2025-04-26 RX ORDER — METOCLOPRAMIDE HYDROCHLORIDE 5 MG/ML
5 INJECTION INTRAMUSCULAR; INTRAVENOUS EVERY 6 HOURS
Status: DISCONTINUED | OUTPATIENT
Start: 2025-04-26 | End: 2025-04-26

## 2025-04-26 RX ORDER — SINCALIDE 5 UG/5ML
0.02 INJECTION, POWDER, LYOPHILIZED, FOR SOLUTION INTRAVENOUS ONCE
Status: COMPLETED | OUTPATIENT
Start: 2025-04-26 | End: 2025-04-26

## 2025-04-26 RX ORDER — METOCLOPRAMIDE 5 MG/1
5 TABLET ORAL
Status: DISCONTINUED | OUTPATIENT
Start: 2025-04-26 | End: 2025-04-26

## 2025-04-26 RX ADMIN — PANTOPRAZOLE SODIUM 40 MG: 40 INJECTION, POWDER, FOR SOLUTION INTRAVENOUS at 08:04

## 2025-04-26 RX ADMIN — METOCLOPRAMIDE HYDROCHLORIDE 5 MG: 5 INJECTION INTRAMUSCULAR; INTRAVENOUS at 02:04

## 2025-04-26 RX ADMIN — POTASSIUM BICARBONATE 35 MEQ: 391 TABLET, EFFERVESCENT ORAL at 12:04

## 2025-04-26 RX ADMIN — KIT FOR THE PREPARATION OF TECHNETIUM TC 99M MEBROFENIN 9.6 MILLICURIE: 45 INJECTION, POWDER, LYOPHILIZED, FOR SOLUTION INTRAVENOUS at 10:04

## 2025-04-26 RX ADMIN — METOPROLOL SUCCINATE 100 MG: 50 TABLET, FILM COATED, EXTENDED RELEASE ORAL at 08:04

## 2025-04-26 RX ADMIN — LEVOTHYROXINE SODIUM 112 MCG: 112 TABLET ORAL at 06:04

## 2025-04-26 RX ADMIN — METOCLOPRAMIDE 5 MG: 5 TABLET ORAL at 08:04

## 2025-04-26 RX ADMIN — HYDROCODONE BITARTRATE AND ACETAMINOPHEN 1 TABLET: 5; 325 TABLET ORAL at 01:04

## 2025-04-26 RX ADMIN — ACETAMINOPHEN 650 MG: 325 TABLET ORAL at 05:04

## 2025-04-26 RX ADMIN — SCOPOLAMINE 1 PATCH: 1.5 PATCH, EXTENDED RELEASE TRANSDERMAL at 12:04

## 2025-04-26 RX ADMIN — POTASSIUM BICARBONATE 35 MEQ: 391 TABLET, EFFERVESCENT ORAL at 05:04

## 2025-04-26 RX ADMIN — ACETAMINOPHEN 650 MG: 325 TABLET ORAL at 12:04

## 2025-04-26 RX ADMIN — SUCRALFATE ORAL 1 G: 1 SUSPENSION ORAL at 01:04

## 2025-04-26 RX ADMIN — SODIUM CHLORIDE, POTASSIUM CHLORIDE, SODIUM LACTATE AND CALCIUM CHLORIDE: 600; 310; 30; 20 INJECTION, SOLUTION INTRAVENOUS at 10:04

## 2025-04-26 RX ADMIN — HYDROCODONE BITARTRATE AND ACETAMINOPHEN 1 TABLET: 5; 325 TABLET ORAL at 03:04

## 2025-04-26 RX ADMIN — SUCRALFATE ORAL 1 G: 1 SUSPENSION ORAL at 12:04

## 2025-04-26 RX ADMIN — SUCRALFATE ORAL 1 G: 1 SUSPENSION ORAL at 06:04

## 2025-04-26 RX ADMIN — HYDROCODONE BITARTRATE AND ACETAMINOPHEN 1 TABLET: 5; 325 TABLET ORAL at 08:04

## 2025-04-26 RX ADMIN — ONDANSETRON 4 MG: 2 INJECTION INTRAMUSCULAR; INTRAVENOUS at 07:04

## 2025-04-26 RX ADMIN — METOCLOPRAMIDE HYDROCHLORIDE 5 MG: 5 INJECTION INTRAMUSCULAR; INTRAVENOUS at 08:04

## 2025-04-26 RX ADMIN — SINCALIDE 2.1 MCG: 5 INJECTION, POWDER, LYOPHILIZED, FOR SOLUTION INTRAVENOUS at 11:04

## 2025-04-26 RX ADMIN — POTASSIUM CHLORIDE 10 MEQ: 7.46 INJECTION, SOLUTION INTRAVENOUS at 10:04

## 2025-04-26 NOTE — NURSING
[facility] family at [facility].      0821 patient returned from HIDA scan unable to continue related to nausea, MD notified.  Patient's daughter requesting phenergan.

## 2025-04-26 NOTE — ASSESSMENT & PLAN NOTE
Patient's blood pressure range in the last 24 hours was: BP  Min: 99/64  Max: 164/90.The patient's inpatient anti-hypertensive regimen is listed below:  Current Antihypertensives  metoprolol succinate (TOPROL-XL) 24 hr tablet 100 mg, Nightly, Oral    Plan  - BP is stable, no changes needed to their regimen

## 2025-04-26 NOTE — ASSESSMENT & PLAN NOTE
-urinalysis with 11 WBCs, 1+ protein, and 3+ ketones  -urine cultures -no growth -stopped abx  -previous urine culture on 02/15 positive E coli; previously treated with antibiotics  Repeat UA and urine culture NGTD

## 2025-04-26 NOTE — PROGRESS NOTES
"[facility] Medicine  Progress Note    Patient Name: Skyler Espinoza  MRN: [id number]  Patient Class: IP- Inpatient   Admission Date: 4/22/2025  Length of Stay: 3 days  Attending Physician: Ed Guo MD  Primary Care Provider: Oscar Lee FNP-C        Subjective     Principal Problem:Intractable nausea and vomiting        HPI:  Skyler Espinoza is a 74 year old female with a past medical history of dementia, depression, hyperlipidemia, hypertension, hypothyroidism secondary Hashimoto thyroiditis, osteoarthritis, and surgical history of appendectomy who was brought into the ED today by her daughter with complaints of mild intermittent abdominal cramping, associated with nausea and vomiting for 3 weeks. Her daughter states today she was at the ENT clinic and began to develop intractable nausea and vomiting. Patient unable to give history due to dementia.  Daughter at bedside and reports patient had multiple ED visits on 04/11 and 4/14 for the same symptoms. In addition, she visited her PCP on 04/10 with complaints of nausea and vomiting.  Her PCP performed an abdominal ultrasound was which was normal.  Daughter reports patient was recently on antibiotics for positive urine cultures. On 02/15 urine culture positive E coli. Her daughter reports she is scheduled for a EGD with GI, Dr. Brewer on May 1st. Also, the daughter reports patient was taking THC gummies for her dementia but stopped 2 weeks ago. Daughter denies any diarrhea. Daughter is a very sweet person and patient's primary caregiver. Daughter reports she would like to "get to the bottom," of patient's symptoms prior to discharge. ED workup reveals: CT abdomen pelvis no acute findings.  CT head completed outpatient at PCP office on 04/21 with no acute findings.  CBC and BMP unremarkable.  Urinalysis with 11 WBCs, 1+ protein, and 3+ ketones.  Urine cultures pending.  Previous culture was sensitive to meropenem, antibiotics " started.  Patient received IV fluids, Reglan, Phenergan, and Zofran in ED with mild relief.  Admitted to hospital medicine for further management and treatment.            Overview/Hospital Course:  Skyler Espinoza was monitored closely during her hospitalization.  She was given supportive care with IVFH and IV antiemetics.  Gastroenterology was consulted. On 4/24 she underwent an EGD with Dr. Garzon with findings of gastritis and non-bleeding gastric ulcer.  CT scan of the abdomen is nonacute.  Patient also have history of migraine and was on sumatriptan and reintroduced. HIDA scan was ordered and results pending.  Previous CT scan of the brain was done as an outpatient and negative. Etiology unknown at this time, possible gastroparesis vs migraine induced. Pathology from EGD report unremarkable. Once stable plan on dc with GI follow up outpatient, possible gastric emptying but will defer to GI.         Interval History: seen this morning, still with some nausea. Plan for HIDA today.     Review of Systems   Respiratory:  Negative for shortness of breath.    Cardiovascular:  Negative for chest pain.   Gastrointestinal:  Positive for nausea.     Objective:     Vital Signs (Most Recent):  Temp: 98.1 °F (36.7 °C) (04/26/25 1225)  Pulse: 79 (04/26/25 1225)  Resp: 15 (04/26/25 1225)  BP: (!) 152/74 (04/26/25 1225)  SpO2: 96 % (04/26/25 1225) Vital Signs (24h Range):  Temp:  [98 °F (36.7 °C)-98.5 °F (36.9 °C)] 98.1 °F (36.7 °C)  Pulse:  [70-83] 79  Resp:  [14-18] 15  SpO2:  [92 %-98 %] 96 %  BP: ()/(64-90) 152/74     Weight: 104.1 kg (229 lb 8 oz)  Body mass index is 34.9 kg/m².    Intake/Output Summary (Last 24 hours) at 4/26/2025 1442  Last data filed at 4/26/2025 1439  Gross per 24 hour   Intake 700.29 ml   Output 300 ml   Net 400.29 ml         Physical Exam  Constitutional:       General: She is not in acute distress.  Cardiovascular:      Rate and Rhythm: Normal rate.   Pulmonary:      Effort: No respiratory  distress.      Breath sounds: No wheezing.   Abdominal:      Palpations: Abdomen is soft.   Neurological:      General: No focal deficit present.      Mental Status: She is alert. Mental status is at baseline.               Significant Labs: All pertinent labs within the past 24 hours have been reviewed.  CBC:   Recent Labs   Lab 04/25/25  0400   WBC 5.26   HGB 11.7*   HCT 34.6*        CMP:   Recent Labs   Lab 04/26/25  0825      K 3.3*   CO2 28   BUN 7*   CREATININE 0.6   CALCIUM 9.0       Significant Imaging: I have reviewed all pertinent imaging results/findings within the past 24 hours.      Assessment & Plan  Intractable nausea and vomiting  Secondary to gastritis and duodenal ulcers ?  -analgesics and antiemetics p.r.n.  Advance diet as tolerated   IVFs as needed  HIDA scan pending  A1c 5.1  -monitor electrolytes; replacements as needed  pantoprazole b.i.d.  Resume patient's home migraine medication.  Possible cause could be from severe migraine attack  Patient may probably need MRI of the brain if continued to have nausea vomiting and dizziness.  CT head was negative    Seems to be doing well with reglan which we will continue, gastroparesis?. Gastric emptying study outpatient with GI    Urinary tract infection  -urinalysis with 11 WBCs, 1+ protein, and 3+ ketones  -urine cultures -no growth -stopped abx  -previous urine culture on 02/15 positive E coli; previously treated with antibiotics  Repeat UA and urine culture NGTD    HTN (hypertension)  Patient's blood pressure range in the last 24 hours was: BP  Min: 99/64  Max: 164/90.The patient's inpatient anti-hypertensive regimen is listed below:  Current Antihypertensives  metoprolol succinate (TOPROL-XL) 24 hr tablet 100 mg, Nightly, Oral    Plan  - BP is stable, no changes needed to their regimen    Dementia  Patient with dementia with likely etiology of unknown dementia. Dementia is moderate. The patient does not have signs of behavioral  disturbance. Home dementia medications are Held or Continued:  No medications noted . Continue non-pharmacologic interventions to prevent delirium (No VS between 11PM-5AM, activity during day, opening blinds, providing glasses/hearing aids, and up in chair during daytime). Will avoid narcotics and benzos unless absolutely necessary. PRN anti-psychotics are not prescribed to avoid self harm behaviors.  Order p.r.n. antipsychotics as needed  VTE Risk Mitigation (From admission, onward)           Ordered     IP VTE HIGH RISK PATIENT  Once         04/22/25 2337     Place sequential compression device  Until discontinued         04/22/25 2337                    Discharge Planning   ANGELI: 4/27/2025     Code Status: Full Code   Medical Readiness for Discharge Date:   Discharge Plan A: Home Health   Discharge Delays: Orders Needed            Please place Justification for DME        Ed Guo MD  Department of Hospital Medicine   [facility]

## 2025-04-26 NOTE — ASSESSMENT & PLAN NOTE
Secondary to gastritis and duodenal ulcers ?  -analgesics and antiemetics p.r.n.  Advance diet as tolerated   IVFs as needed  HIDA scan pending  A1c 5.1  -monitor electrolytes; replacements as needed  pantoprazole b.i.d.  Resume patient's home migraine medication.  Possible cause could be from severe migraine attack  Patient may probably need MRI of the brain if continued to have nausea vomiting and dizziness.  CT head was negative    Seems to be doing well with reglan which we will continue, gastroparesis?. Gastric emptying study outpatient with GI

## 2025-04-26 NOTE — SUBJECTIVE & OBJECTIVE
Interval History: seen this morning, still with some nausea. Plan for HIDA today.     Review of Systems   Respiratory:  Negative for shortness of breath.    Cardiovascular:  Negative for chest pain.   Gastrointestinal:  Positive for nausea.     Objective:     Vital Signs (Most Recent):  Temp: 98.1 °F (36.7 °C) (04/26/25 1225)  Pulse: 79 (04/26/25 1225)  Resp: 15 (04/26/25 1225)  BP: (!) 152/74 (04/26/25 1225)  SpO2: 96 % (04/26/25 1225) Vital Signs (24h Range):  Temp:  [98 °F (36.7 °C)-98.5 °F (36.9 °C)] 98.1 °F (36.7 °C)  Pulse:  [70-83] 79  Resp:  [14-18] 15  SpO2:  [92 %-98 %] 96 %  BP: ()/(64-90) 152/74     Weight: 104.1 kg (229 lb 8 oz)  Body mass index is 34.9 kg/m².    Intake/Output Summary (Last 24 hours) at 4/26/2025 1442  Last data filed at 4/26/2025 1439  Gross per 24 hour   Intake 700.29 ml   Output 300 ml   Net 400.29 ml         Physical Exam  Constitutional:       General: She is not in acute distress.  Cardiovascular:      Rate and Rhythm: Normal rate.   Pulmonary:      Effort: No respiratory distress.      Breath sounds: No wheezing.   Abdominal:      Palpations: Abdomen is soft.   Neurological:      General: No focal deficit present.      Mental Status: She is alert. Mental status is at baseline.               Significant Labs: All pertinent labs within the past 24 hours have been reviewed.  CBC:   Recent Labs   Lab 04/25/25  0400   WBC 5.26   HGB 11.7*   HCT 34.6*        CMP:   Recent Labs   Lab 04/26/25  0825      K 3.3*   CO2 28   BUN 7*   CREATININE 0.6   CALCIUM 9.0       Significant Imaging: I have reviewed all pertinent imaging results/findings within the past 24 hours.

## 2025-04-26 NOTE — PT/OT/SLP PROGRESS
Physical Therapy      Patient Name:  Skyler Espinoza   MRN:  [id number]    Patient not seen today secondary to  (Gone for HIDA scan). Will follow-up ..

## 2025-04-27 VITALS
HEIGHT: 68 IN | SYSTOLIC BLOOD PRESSURE: 164 MMHG | HEART RATE: 79 BPM | DIASTOLIC BLOOD PRESSURE: 82 MMHG | TEMPERATURE: 98 F | BODY MASS INDEX: 34.78 KG/M2 | OXYGEN SATURATION: 96 % | WEIGHT: 229.5 LBS | RESPIRATION RATE: 22 BRPM

## 2025-04-27 PROBLEM — R11.2 INTRACTABLE NAUSEA AND VOMITING: Status: RESOLVED | Noted: 2025-04-23 | Resolved: 2025-04-27

## 2025-04-27 PROBLEM — N39.0 URINARY TRACT INFECTION: Status: RESOLVED | Noted: 2025-04-23 | Resolved: 2025-04-27

## 2025-04-27 LAB
ABSOLUTE EOSINOPHIL (SMH): 0.26 K/UL
ABSOLUTE MONOCYTE (SMH): 0.69 K/UL (ref 0.3–1)
ABSOLUTE NEUTROPHIL COUNT (SMH): 1.9 K/UL (ref 1.8–7.7)
ANION GAP (SMH): 5 MMOL/L (ref 8–16)
BASOPHILS # BLD AUTO: 0.02 K/UL
BASOPHILS NFR BLD AUTO: 0.4 %
BUN SERPL-MCNC: 5 MG/DL (ref 8–23)
CALCIUM SERPL-MCNC: 8.6 MG/DL (ref 8.7–10.5)
CHLORIDE SERPL-SCNC: 105 MMOL/L (ref 95–110)
CO2 SERPL-SCNC: 29 MMOL/L (ref 23–29)
CREAT SERPL-MCNC: 0.5 MG/DL (ref 0.5–1.4)
ERYTHROCYTE [DISTWIDTH] IN BLOOD BY AUTOMATED COUNT: 12.7 % (ref 11.5–14.5)
GFR SERPLBLD CREATININE-BSD FMLA CKD-EPI: >60 ML/MIN/1.73/M2
GLUCOSE SERPL-MCNC: 96 MG/DL (ref 70–110)
HCT VFR BLD AUTO: 35.7 % (ref 37–48.5)
HGB BLD-MCNC: 11.7 GM/DL (ref 12–16)
IMM GRANULOCYTES # BLD AUTO: 0.02 K/UL (ref 0–0.04)
IMM GRANULOCYTES NFR BLD AUTO: 0.4 % (ref 0–0.5)
LYMPHOCYTES # BLD AUTO: 1.92 K/UL (ref 1–4.8)
MCH RBC QN AUTO: 28.7 PG (ref 27–31)
MCHC RBC AUTO-ENTMCNC: 32.8 G/DL (ref 32–36)
MCV RBC AUTO: 88 FL (ref 82–98)
NUCLEATED RBC (/100WBC) (SMH): 0 /100 WBC
PLATELET # BLD AUTO: 249 K/UL (ref 150–450)
PMV BLD AUTO: 10.2 FL (ref 9.2–12.9)
POTASSIUM SERPL-SCNC: 3.6 MMOL/L (ref 3.5–5.1)
RBC # BLD AUTO: 4.07 M/UL (ref 4–5.4)
RELATIVE EOSINOPHIL (SMH): 5.4 % (ref 0–8)
RELATIVE LYMPHOCYTE (SMH): 39.8 % (ref 18–48)
RELATIVE MONOCYTE (SMH): 14.3 % (ref 4–15)
RELATIVE NEUTROPHIL (SMH): 39.7 % (ref 38–73)
SODIUM SERPL-SCNC: 139 MMOL/L (ref 136–145)
WBC # BLD AUTO: 4.83 K/UL (ref 3.9–12.7)

## 2025-04-27 PROCEDURE — 25000003 PHARM REV CODE 250: Performed by: INTERNAL MEDICINE

## 2025-04-27 PROCEDURE — 25000003 PHARM REV CODE 250

## 2025-04-27 PROCEDURE — 85025 COMPLETE CBC W/AUTO DIFF WBC: CPT | Performed by: STUDENT IN AN ORGANIZED HEALTH CARE EDUCATION/TRAINING PROGRAM

## 2025-04-27 PROCEDURE — 36415 COLL VENOUS BLD VENIPUNCTURE: CPT | Performed by: STUDENT IN AN ORGANIZED HEALTH CARE EDUCATION/TRAINING PROGRAM

## 2025-04-27 PROCEDURE — 63600175 PHARM REV CODE 636 W HCPCS: Performed by: NURSE PRACTITIONER

## 2025-04-27 PROCEDURE — 25000003 PHARM REV CODE 250: Performed by: STUDENT IN AN ORGANIZED HEALTH CARE EDUCATION/TRAINING PROGRAM

## 2025-04-27 PROCEDURE — 82947 ASSAY GLUCOSE BLOOD QUANT: CPT | Performed by: STUDENT IN AN ORGANIZED HEALTH CARE EDUCATION/TRAINING PROGRAM

## 2025-04-27 RX ORDER — PANTOPRAZOLE SODIUM 40 MG/1
40 TABLET, DELAYED RELEASE ORAL 2 TIMES DAILY
Qty: 180 TABLET | Refills: 0 | Status: SHIPPED | OUTPATIENT
Start: 2025-04-27 | End: 2025-07-26

## 2025-04-27 RX ORDER — RIZATRIPTAN BENZOATE 10 MG/1
10 TABLET ORAL DAILY PRN
Qty: 9 TABLET | Refills: 1 | Status: SHIPPED | OUTPATIENT
Start: 2025-04-27

## 2025-04-27 RX ORDER — METOCLOPRAMIDE 5 MG/1
5 TABLET ORAL
Qty: 120 TABLET | Refills: 2 | Status: ON HOLD | OUTPATIENT
Start: 2025-04-27 | End: 2025-05-01

## 2025-04-27 RX ORDER — SCOPOLAMINE 1 MG/3D
1 PATCH, EXTENDED RELEASE TRANSDERMAL
Qty: 10 PATCH | Refills: 0 | Status: SHIPPED | OUTPATIENT
Start: 2025-04-29 | End: 2025-05-29

## 2025-04-27 RX ADMIN — LEVOTHYROXINE SODIUM 112 MCG: 112 TABLET ORAL at 05:04

## 2025-04-27 RX ADMIN — ACETAMINOPHEN 650 MG: 325 TABLET ORAL at 06:04

## 2025-04-27 RX ADMIN — THERA TABS 1 TABLET: TAB at 08:04

## 2025-04-27 RX ADMIN — PANTOPRAZOLE SODIUM 40 MG: 40 INJECTION, POWDER, FOR SOLUTION INTRAVENOUS at 08:04

## 2025-04-27 RX ADMIN — ACETAMINOPHEN 650 MG: 325 TABLET ORAL at 02:04

## 2025-04-27 RX ADMIN — SUMATRIPTAN SUCCINATE 50 MG: 50 TABLET ORAL at 08:04

## 2025-04-27 RX ADMIN — METOCLOPRAMIDE 5 MG: 5 TABLET ORAL at 05:04

## 2025-04-27 NOTE — ASSESSMENT & PLAN NOTE
Patient's blood pressure range in the last 24 hours was: BP  Min: 143/78  Max: 164/82.The patient's inpatient anti-hypertensive regimen is listed below:  Current Antihypertensives  metoprolol succinate (TOPROL-XL) 24 hr tablet 100 mg, Nightly, Oral    Plan  - BP is stable, no changes needed to their regimen

## 2025-04-27 NOTE — PLAN OF CARE
Patient cleared for discharge from case management standpoint.    Chart and discharge orders reviewed.  Patient discharged home with no further case management needs.      04/27/25 0923   Final Note   Assessment Type Final Discharge Note   Anticipated Discharge Disposition Home   What phone number can be called within the next 1-3 days to see how you are doing after discharge? [phone]   Post-Acute Status   Discharge Delays None known at this time

## 2025-04-27 NOTE — PT/OT/SLP PROGRESS
Physical Therapy      Patient Name:  Skyler Espinoza   MRN:  [id number]    Patient not seen today secondary to Patient discharged prior to initiation of evaluation.

## 2025-04-27 NOTE — DISCHARGE SUMMARY
"[facility]  Discharge Summary      Patient Name: Skyler Espinoza  MRN: [id number]  EDWINA: [id number]  Patient Class: IP- Inpatient  Admission Date: 4/22/2025  Hospital Length of Stay: 4 days  Discharge Date and Time: 4/27/2025  9:20 AM  Attending Physician: No att. providers found   Discharging Provider: Ed Guo MD  Primary Care Provider: Oscar Lee FNP-C    Primary Care Team: Networked reference to record PCT     HPI:   Skyler Espinoza is a 74 year old female with a past medical history of dementia, depression, hyperlipidemia, hypertension, hypothyroidism secondary Hashimoto thyroiditis, osteoarthritis, and surgical history of appendectomy who was brought into the ED today by her daughter with complaints of mild intermittent abdominal cramping, associated with nausea and vomiting for 3 weeks. Her daughter states today she was at the ENT clinic and began to develop intractable nausea and vomiting. Patient unable to give history due to dementia.  Daughter at bedside and reports patient had multiple ED visits on 04/11 and 4/14 for the same symptoms. In addition, she visited her PCP on 04/10 with complaints of nausea and vomiting.  Her PCP performed an abdominal ultrasound was which was normal.  Daughter reports patient was recently on antibiotics for positive urine cultures. On 02/15 urine culture positive E coli. Her daughter reports she is scheduled for a EGD with GI, Dr. Brewer on May 1st. Also, the daughter reports patient was taking THC gummies for her dementia but stopped 2 weeks ago. Daughter denies any diarrhea. Daughter is a very sweet person and patient's primary caregiver. Daughter reports she would like to "get to the bottom," of patient's symptoms prior to discharge. ED workup reveals: CT abdomen pelvis no acute findings.  CT head completed outpatient at PCP office on 04/21 with no acute findings.  CBC and BMP unremarkable.  Urinalysis with 11 WBCs, 1+ " protein, and 3+ ketones.  Urine cultures pending.  Previous culture was sensitive to meropenem, antibiotics started.  Patient received IV fluids, Reglan, Phenergan, and Zofran in ED with mild relief.  Admitted to hospital medicine for further management and treatment.            Procedure(s) (LRB):  EGD (ESOPHAGOGASTRODUODENOSCOPY) (N/A)      Hospital Course:   Skyler Espinoza was monitored closely during her hospitalization.  She was given supportive care with IVFH and IV antiemetics.  Gastroenterology was consulted. On 4/24 she underwent an EGD with Dr. Garzon with findings of gastritis and non-bleeding gastric ulcer.  CT scan of the abdomen is nonacute.  Patient also have history of migraine and was on sumatriptan and reintroduced. HIDA scan was ordered and showed findings compatible with biliary dyskinesia.  Previous CT scan of the brain was done as an outpatient and negative. Etiology unknown at this time, possible gastroparesis vs migraine induced. Pathology from EGD report unremarkable. Symptoms dont appear to happen after eating and patient without RUQ abdominal pain so although biliary dyskinesia can have similar symptoms have low suspicion that this is the cause at the moment.  Recommend getting gastric emptying study outpatient and following up with GI.  Gastroenterology referral given.  Prescription for Reglan given since this showed some improvement in the hospital.  Instructed patient and daughter  to return to emergency room if patient develops severe pain, distress, nausea and vomiting, etc.     Physical Exam  Constitutional:       General: She is not in acute distress.  Cardiovascular:      Rate and Rhythm: Normal rate.   Pulmonary:      Effort: No respiratory distress.      Breath sounds: No wheezing.      Goals of Care Treatment Preferences:  Code Status: Full Code      SDOH Screening:  The patient was screened for utility difficulties, food insecurity, transport difficulties, housing insecurity,  and interpersonal safety and there were no concerns identified this admission.     Consults:   Consults (From admission, onward)          Status Ordering Provider     Inpatient consult to Midline team  Once        Provider:  (Not yet assigned)    Completed WILBERTO SPRING     Inpatient consult to Gastroenterology  Once        Provider:  Laurita Wang MD    Completed SHAYNE HUNTLEY            Assessment & Plan  HTN (hypertension)  Patient's blood pressure range in the last 24 hours was: BP  Min: 143/78  Max: 164/82.The patient's inpatient anti-hypertensive regimen is listed below:  Current Antihypertensives  metoprolol succinate (TOPROL-XL) 24 hr tablet 100 mg, Nightly, Oral    Plan  - BP is stable, no changes needed to their regimen    Dementia  Patient with dementia with likely etiology of unknown dementia. Dementia is moderate. The patient does not have signs of behavioral disturbance. Home dementia medications are Held or Continued:  No medications noted . Continue non-pharmacologic interventions to prevent delirium (No VS between 11PM-5AM, activity during day, opening blinds, providing glasses/hearing aids, and up in chair during daytime). Will avoid narcotics and benzos unless absolutely necessary. PRN anti-psychotics are not prescribed to avoid self harm behaviors.  Order p.r.n. antipsychotics as needed  Final Active Diagnoses:    Diagnosis Date Noted POA    Dementia [F03.90] 04/23/2025 Yes     Chronic    HTN (hypertension) [I10] 03/16/2012 Yes      Problems Resolved During this Admission:    Diagnosis Date Noted Date Resolved POA    PRINCIPAL PROBLEM:  Intractable nausea and vomiting [R11.2] 04/23/2025 04/27/2025 Yes    Urinary tract infection [N39.0] 04/23/2025 04/27/2025 Yes    Hypothyroid [E03.9] 03/16/2012 04/23/2025 Yes       Discharged Condition: stable    Disposition: Home or Self Care    Follow Up:   Follow-up Information       Oscar Lee FNP-C. Go on 4/28/2025.   "  Specialty: Family Medicine  Why: @1pm for hospital follow up.  Contact information:  [address] [facility]  [address]  [place] [address]  [phone]                           Patient Instructions:      WALKER FOR HOME USE     Order Specific Question Answer Comments   Type of Walker: Rollator with brakes and/or seat    With wheels? Yes    Height: 5' 8" (1.727 m)    Weight: 104.1 kg (229 lb 8 oz)    Length of need (1-99 months): 12    Does patient have medical equipment at home? blood pressure machine    Does patient have medical equipment at home? CPAP    Please check all that apply: Patient needs help to get in and out of chair.    Please check all that apply: Patient is unable to safely ambulate without equipment.    Please check all that apply: Walker will be used for gait training.      Ambulatory referral/consult to Gastroenterology   Standing Status: Future   Referral Priority: Routine Referral Type: Consultation   Referral Reason: Specialty Services Required   Requested Specialty: Gastroenterology   Number of Visits Requested: 1     Diet Adult Regular     Notify your health care provider if you experience any of the following:  temperature >100.4     Notify your health care provider if you experience any of the following:  persistent nausea and vomiting or diarrhea     Notify your health care provider if you experience any of the following:  severe uncontrolled pain     Notify your health care provider if you experience any of the following:  redness, tenderness, or signs of infection (pain, swelling, redness, odor or green/yellow discharge around incision site)     Activity as tolerated       Significant Diagnostic Studies: Labs: CMP   Recent Labs   Lab 04/26/25  0825 04/27/25  0432    139   K 3.3* 3.6   CO2 28 29   BUN 7* 5*   CREATININE 0.6 0.5   CALCIUM 9.0 8.6*    and CBC   Recent Labs   Lab 04/27/25  0432   WBC 4.83   HGB 11.7*   HCT 35.7*          Pending Diagnostic Studies:       Procedure " Component Value Units Date/Time    EXTRA TUBES [[id number]] Collected: 04/26/25 0825    Order Status: Sent Lab Status: In process Updated: 04/26/25 0829    Specimen: Blood, Venous     Narrative:      The following orders were created for panel order EXTRA TUBES.  Procedure                               Abnormality         Status                     ---------                               -----------         ------                     Lavender Top Hold[[id number]]                               In process                   Please view results for these tests on the individual orders.    EXTRA TUBES [[id number]] Collected: 04/25/25 0400    Order Status: Sent Lab Status: In process Updated: 04/25/25 0512    Specimen: Blood, Venous     Narrative:      The following orders were created for panel order EXTRA TUBES.  Procedure                               Abnormality         Status                     ---------                               -----------         ------                     Light Green Top Hold[[id number]]                            In process                   Please view results for these tests on the individual orders.    EXTRA TUBES [[id number]] Collected: 04/22/25 1816    Order Status: Sent Lab Status: In process Updated: 04/22/25 1827    Specimen: Blood, Venous     Narrative:      The following orders were created for panel order EXTRA TUBES.  Procedure                               Abnormality         Status                     ---------                               -----------         ------                     Light Blue Top Hold[[id number]]                             In process                 Light Green Top Hold[[id number]]                            In process                 Lavender Top Hold[[id number]]                               In process                 Gold Top Hold[[id number]]                                   In process                   Please view results for these tests on the  individual orders.    Gastrointestinal Pathogens Panel, PCR [[id number]]     Order Status: Sent Lab Status: No result     Specimen: Stool     HIV 1/2 Ag/Ab (4th Gen) [[id number]] Collected: 04/22/25 1815    Order Status: Sent Lab Status: In process Updated: 04/22/25 1822    Specimen: Blood     Hepatitis C Antibody [[id number]] Collected: 04/22/25 1815    Order Status: Sent Lab Status: In process Updated: 04/22/25 1822    Specimen: Blood            Medications:  Reconciled Home Medications:      Medication List        START taking these medications      scopolamine 1.3-1.5 mg (1 mg over 3 days)  Commonly known as: TRANSDERM-SCOP  Place 1 patch onto the skin Every 3 (three) days.  Start taking on: April 29, 2025            CHANGE how you take these medications      meclizine 25 mg tablet  Commonly known as: ANTIVERT  Take 0.5 tablets (12.5 mg total) by mouth 3 (three) times daily as needed for Dizziness.  What changed: how much to take     metoclopramide HCl 5 MG tablet  Commonly known as: REGLAN  Take 1 tablet (5 mg total) by mouth 4 (four) times daily before meals and nightly.  What changed:   when to take this  reasons to take this     pantoprazole 40 MG tablet  Commonly known as: PROTONIX  Take 1 tablet (40 mg total) by mouth 2 (two) times daily.  What changed: when to take this     rizatriptan 10 MG tablet  Commonly known as: MAXALT  Take 1 tablet (10 mg total) by mouth daily as needed for Migraine.  What changed: when to take this            CONTINUE taking these medications      azelastine 137 mcg (0.1 %) nasal spray  Commonly known as: ASTELIN  1 spray (137 mcg total) by Nasal route nightly. Point up and slightly outward toward ear when spraying to avoid irritating nasal septum. With flonase.     cetirizine 10 MG tablet  Commonly known as: ZYRTEC  Take 10 mg by mouth every evening.     fluticasone propionate 50 mcg/actuation nasal spray  Commonly known as: FLONASE  1 spray by Each Nostril route every  evening.     GAVISCON EXTRA STRENGTH 254-237.5 mg/5 mL Susp  Generic drug: aluminum hydrox-magnesium carb  Take 5 mLs by mouth 3 (three) times daily with meals.     levothyroxine 112 MCG tablet  Commonly known as: SYNTHROID  Take 1 tablet (112 mcg total) by mouth before breakfast.     metoprolol succinate 100 MG 24 hr tablet  Commonly known as: TOPROL-XL  Take 100 mg by mouth nightly.     multivitamin tablet  Commonly known as: THERAGRAN  Take 1 tablet by mouth once daily.     ondansetron 8 MG Tbdl  Commonly known as: ZOFRAN-ODT  Take 1 tablet (8 mg total) by mouth every 8 (eight) hours as needed (nausea and vomiting).     promethazine 25 MG tablet  Commonly known as: PHENERGAN  Take 1 tablet (25 mg total) by mouth every 6 (six) hours as needed for Nausea.            STOP taking these medications      esomeprazole 40 MG capsule  Commonly known as: NEXIUM     famotidine 40 MG tablet  Commonly known as: PEPCID              Indwelling Lines/Drains at time of discharge:   Lines/Drains/Airways       None                   Time spent on the discharge of patient: 35 minutes         Ed Guo MD  Department of Hospital Medicine  [facility]

## 2025-04-28 ENCOUNTER — TELEPHONE (OUTPATIENT)
Dept: GASTROENTEROLOGY | Facility: CLINIC | Age: 75
End: 2025-04-28
Payer: MEDICARE

## 2025-04-28 ENCOUNTER — PATIENT OUTREACH (OUTPATIENT)
Dept: ADMINISTRATIVE | Facility: CLINIC | Age: 75
End: 2025-04-28
Payer: MEDICARE

## 2025-04-28 ENCOUNTER — OFFICE VISIT (OUTPATIENT)
Dept: FAMILY MEDICINE | Facility: CLINIC | Age: 75
End: 2025-04-28
Payer: MEDICARE

## 2025-04-28 VITALS
DIASTOLIC BLOOD PRESSURE: 86 MMHG | OXYGEN SATURATION: 95 % | SYSTOLIC BLOOD PRESSURE: 110 MMHG | WEIGHT: 231.5 LBS | HEART RATE: 81 BPM | BODY MASS INDEX: 35.09 KG/M2 | HEIGHT: 68 IN | TEMPERATURE: 98 F

## 2025-04-28 DIAGNOSIS — K82.8 BILIARY DYSKINESIA: ICD-10-CM

## 2025-04-28 DIAGNOSIS — R29.898 WEAKNESS OF BOTH LOWER EXTREMITIES: ICD-10-CM

## 2025-04-28 DIAGNOSIS — E06.3 HYPOTHYROIDISM DUE TO HASHIMOTO THYROIDITIS: ICD-10-CM

## 2025-04-28 DIAGNOSIS — K29.00 OTHER ACUTE GASTRITIS WITHOUT HEMORRHAGE: ICD-10-CM

## 2025-04-28 DIAGNOSIS — R11.2 INTRACTABLE NAUSEA AND VOMITING: ICD-10-CM

## 2025-04-28 DIAGNOSIS — Z09 HOSPITAL DISCHARGE FOLLOW-UP: Primary | ICD-10-CM

## 2025-04-28 DIAGNOSIS — R26.89 IMBALANCE: ICD-10-CM

## 2025-04-28 DIAGNOSIS — I10 HTN (HYPERTENSION), BENIGN: ICD-10-CM

## 2025-04-28 DIAGNOSIS — K21.9 GASTROESOPHAGEAL REFLUX DISEASE, UNSPECIFIED WHETHER ESOPHAGITIS PRESENT: ICD-10-CM

## 2025-04-28 LAB — BACTERIA UR CULT: NO GROWTH

## 2025-04-28 PROCEDURE — 3074F SYST BP LT 130 MM HG: CPT | Mod: CPTII,S$GLB,, | Performed by: NURSE PRACTITIONER

## 2025-04-28 PROCEDURE — 3008F BODY MASS INDEX DOCD: CPT | Mod: CPTII,S$GLB,, | Performed by: NURSE PRACTITIONER

## 2025-04-28 PROCEDURE — 1159F MED LIST DOCD IN RCRD: CPT | Mod: CPTII,S$GLB,, | Performed by: NURSE PRACTITIONER

## 2025-04-28 PROCEDURE — 3288F FALL RISK ASSESSMENT DOCD: CPT | Mod: CPTII,S$GLB,, | Performed by: NURSE PRACTITIONER

## 2025-04-28 PROCEDURE — 99999 PR PBB SHADOW E&M-EST. PATIENT-LVL IV: CPT | Mod: PBBFAC,,, | Performed by: NURSE PRACTITIONER

## 2025-04-28 PROCEDURE — 1101F PT FALLS ASSESS-DOCD LE1/YR: CPT | Mod: CPTII,S$GLB,, | Performed by: NURSE PRACTITIONER

## 2025-04-28 PROCEDURE — G2211 COMPLEX E/M VISIT ADD ON: HCPCS | Mod: 95,S$GLB,, | Performed by: NURSE PRACTITIONER

## 2025-04-28 PROCEDURE — 1126F AMNT PAIN NOTED NONE PRSNT: CPT | Mod: CPTII,S$GLB,, | Performed by: NURSE PRACTITIONER

## 2025-04-28 PROCEDURE — 1160F RVW MEDS BY RX/DR IN RCRD: CPT | Mod: CPTII,S$GLB,, | Performed by: NURSE PRACTITIONER

## 2025-04-28 PROCEDURE — 3079F DIAST BP 80-89 MM HG: CPT | Mod: CPTII,S$GLB,, | Performed by: NURSE PRACTITIONER

## 2025-04-28 PROCEDURE — 1111F DSCHRG MED/CURRENT MED MERGE: CPT | Mod: CPTII,S$GLB,, | Performed by: NURSE PRACTITIONER

## 2025-04-28 PROCEDURE — 99215 OFFICE O/P EST HI 40 MIN: CPT | Mod: S$GLB,,, | Performed by: NURSE PRACTITIONER

## 2025-04-28 PROCEDURE — 3044F HG A1C LEVEL LT 7.0%: CPT | Mod: CPTII,S$GLB,, | Performed by: NURSE PRACTITIONER

## 2025-04-28 RX ORDER — SUCRALFATE 1 G/10ML
1 SUSPENSION ORAL 4 TIMES DAILY
Qty: 450 ML | Refills: 0 | Status: SHIPPED | OUTPATIENT
Start: 2025-04-28

## 2025-04-28 NOTE — PROGRESS NOTES
SUBJECTIVE:      Patient ID: Skyler Espinoza is a 74 y.o. female.    Chief Complaint: Transitional Care    History of Present Illness    CHIEF COMPLAINT:  Ms. Espinoza presents for a hospital follow-up visit, primarily addressing ongoing nausea and vomiting issues.    HPI:  Ms. Espinoza recently had a hospital stay due to prolonged nausea and vomiting. During hospitalization, she underwent extensive testing, including a CT, HIDA scan, and an EGD. The HIDA scan revealed biliary dyskinesia, though it may not fully explain her symptoms. The EGD showed gastritis, with normal pathology results.    Her nausea is currently controlled with scheduled Reglan every 8 hours. Her appetite has significantly decreased, and she has early satiety. She has been advised to consume small, frequent meals.    Ms. Espinoza has been having intense headaches, which began in the hospital and have persisted at home. She has been taking rizatriptan for these headaches, which may have slightly elevated her blood pressure. Previously, she underwent a nerve-burning procedure with her neurologist, which was beneficial for her migraines.    She has dizziness, for which she was prescribed meclizine 12.5 mg every 8 hours via telehealth. Due to a misunderstanding about the dosage, she has been taking 25 mg tablets instead of halving them.    Her thyroid levels were checked prior to hospital admission and were reported as normal, with a level of 2.644. She has a history of Hashimoto's thyroiditis.    She has mobility issues and fatigue, necessitating the use of wheelchairs when in public.     Her family is considering hiring a private nurse or LPN for additional care support, especially when they need to be out of town.    MEDICATIONS:  Ms. Espinoza is on Pantoprazole (Protonix) twice daily for gastritis, which was increased to this frequency during her hospital stay. She takes Meclizine 25 mg every 12 hours for dizziness and Rizatriptan as needed for  migraines. For headaches, she uses Excedrin (containing aspirin and Tylenol) as needed. Ms. Espinoza is also on Reglan (Metoclopramide) for nausea and Levothyroxine for Hashimoto's thyroiditis. CBD for anxiety management has been discontinued.    MEDICAL HISTORY:  Ms. Espinoza has a history of Hashimoto's thyroiditis, migraines, gastritis, and biliary dyskinesia.    SURGICAL HISTORY:  Ms. Espinoza underwent a nerve burning procedure performed by a neurologist to treat her migraines. The outcome of this procedure was described as beneficial.    TEST RESULTS:  Ms. Espinoza's thyroid level was recently checked and found to be 2.644. Her T4 level was also recently checked. Ms. Espinoza recently underwent an EGD (Esophagogastroduodenoscopy) which revealed diffuse gastritis throughout, with normal pathology. A HIDA scan was also recently performed, showing biliary dyskinesia.    IMAGING:  A CT was recently performed during the patient's hospital stay, with no significant findings reported.        Admission Date: 4/22/2025  Hospital Length of Stay: 4 days  Discharge Date and Time: 4/27/2025  9:20 AM  Attending Physician: No att. providers found   Discharging Provider: Ed Guo MD  Primary Care Provider: Oscar Lee FNP-C     Primary Care Team: Networked reference to record PCT      HPI:   Skyler Espinoza is a 74 year old female with a past medical history of dementia, depression, hyperlipidemia, hypertension, hypothyroidism secondary Hashimoto thyroiditis, osteoarthritis, and surgical history of appendectomy who was brought into the ED today by her daughter with complaints of mild intermittent abdominal cramping, associated with nausea and vomiting for 3 weeks. Her daughter states today she was at the ENT clinic and began to develop intractable nausea and vomiting. Patient unable to give history due to dementia.  Daughter at bedside and reports patient had multiple ED visits on 04/11 and 4/14 for the same  "symptoms. In addition, she visited her PCP on 04/10 with complaints of nausea and vomiting.  Her PCP performed an abdominal ultrasound was which was normal.  Daughter reports patient was recently on antibiotics for positive urine cultures. On 02/15 urine culture positive E coli. Her daughter reports she is scheduled for a EGD with MARGRET, Dr. Brewer on May 1st. Also, the daughter reports patient was taking THC gummies for her dementia but stopped 2 weeks ago. Daughter denies any diarrhea. Daughter is a very sweet person and patient's primary caregiver. Daughter reports she would like to "get to the bottom," of patient's symptoms prior to discharge. ED workup reveals: CT abdomen pelvis no acute findings.  CT head completed outpatient at PCP office on 04/21 with no acute findings.  CBC and BMP unremarkable.  Urinalysis with 11 WBCs, 1+ protein, and 3+ ketones.  Urine cultures pending.  Previous culture was sensitive to meropenem, antibiotics started.  Patient received IV fluids, Reglan, Phenergan, and Zofran in ED with mild relief.  Admitted to hospital medicine for further management and treatment.     Procedure(s) (LRB):  EGD (ESOPHAGOGASTRODUODENOSCOPY) (N/A)       Hospital Course:   Skyler Espinoza was monitored closely during her hospitalization.  She was given supportive care with IVFH and IV antiemetics.  Gastroenterology was consulted. On 4/24 she underwent an EGD with Dr. Garzon with findings of gastritis and non-bleeding gastric ulcer.  CT scan of the abdomen is nonacute.  Patient also have history of migraine and was on sumatriptan and reintroduced. HIDA scan was ordered and showed findings compatible with biliary dyskinesia.  Previous CT scan of the brain was done as an outpatient and negative. Etiology unknown at this time, possible gastroparesis vs migraine induced. Pathology from EGD report unremarkable. Symptoms dont appear to happen after eating and patient without RUQ abdominal pain so although biliary " dyskinesia can have similar symptoms have low suspicion that this is the cause at the moment.  Recommend getting gastric emptying study outpatient and following up with GI.  Gastroenterology referral given.  Prescription for Reglan given since this showed some improvement in the hospital.  Instructed patient and daughter  to return to emergency room if patient develops severe pain, distress, nausea and vomiting, etc.     Review of Systems   Constitutional:  Positive for fatigue. Negative for activity change, appetite change, chills, diaphoresis, fever and unexpected weight change.   HENT:  Negative for congestion, ear pain, sinus pressure, sore throat, trouble swallowing and voice change.    Eyes:  Negative for pain, discharge and visual disturbance.   Respiratory:  Negative for cough, chest tightness, shortness of breath and wheezing.    Cardiovascular:  Negative for chest pain and palpitations.   Gastrointestinal:  Positive for nausea and vomiting. Negative for abdominal pain, constipation and diarrhea.   Genitourinary:  Negative for difficulty urinating, flank pain, frequency and urgency.   Musculoskeletal:  Negative for back pain and joint swelling.   Skin:  Negative for color change and rash.   Neurological:  Positive for weakness. Negative for dizziness, seizures, syncope, numbness and headaches.   Hematological:  Negative for adenopathy.   Psychiatric/Behavioral:  Negative for dysphoric mood and sleep disturbance. The patient is not nervous/anxious.        Family History   Problem Relation Name Age of Onset    Cancer Father      Cancer Sister      Breast cancer Daughter      Glaucoma Neg Hx      Macular degeneration Neg Hx      Retinal detachment Neg Hx      Ovarian cancer Neg Hx      Eczema Neg Hx      Lupus Neg Hx      Melanoma Neg Hx      Psoriasis Neg Hx        Social History     Socioeconomic History    Marital status:    Tobacco Use    Smoking status: Never    Smokeless tobacco: Never    Substance and Sexual Activity    Alcohol use: Not Currently     Comment: rarely    Drug use: No    Sexual activity: Yes     Social Drivers of Health     Financial Resource Strain: Low Risk  (4/24/2025)    Overall Financial Resource Strain (CARDIA)     Difficulty of Paying Living Expenses: Not hard at all   Food Insecurity: No Food Insecurity (4/24/2025)    Hunger Vital Sign     Worried About Running Out of Food in the Last Year: Never true     Ran Out of Food in the Last Year: Never true   Transportation Needs: No Transportation Needs (4/24/2025)    PRAPARE - Transportation     Lack of Transportation (Medical): No     Lack of Transportation (Non-Medical): No   Physical Activity: Inactive (4/24/2025)    Exercise Vital Sign     Days of Exercise per Week: 0 days     Minutes of Exercise per Session: 0 min   Stress: No Stress Concern Present (4/24/2025)    [employer] - Occupational Stress Questionnaire     Feeling of Stress : Not at all   Housing Stability: Low Risk  (4/24/2025)    Housing Stability Vital Sign     Unable to Pay for Housing in the Last Year: No     Number of Times Moved in the Last Year: 0     Homeless in the Last Year: No     Current Outpatient Medications   Medication Sig Note    aluminum hydrox-magnesium carb (GAVISCON EXTRA STRENGTH) 254-237.5 mg/5 mL Susp Take 5 mLs by mouth 3 (three) times daily with meals. Pt takes 30min after meals takes tablet form     azelastine (ASTELIN) 137 mcg (0.1 %) nasal spray 1 spray (137 mcg total) by Nasal route nightly. Point up and slightly outward toward ear when spraying to avoid irritating nasal septum. With flonase. 4/22/2025: Not started    cetirizine (ZYRTEC) 10 MG tablet Take 10 mg by mouth every evening.     fluticasone propionate (FLONASE) 50 mcg/actuation nasal spray 1 spray by Each Nostril route every evening. Takes BID     levothyroxine (SYNTHROID) 112 MCG tablet Take 1 tablet (112 mcg total) by mouth before breakfast.      meclizine (ANTIVERT) 25 mg tablet Take 0.5 tablets (12.5 mg total) by mouth 3 (three) times daily as needed for Dizziness.     metoclopramide HCl (REGLAN) 5 MG tablet Take 1 tablet (5 mg total) by mouth 4 (four) times daily before meals and nightly.     metoprolol succinate (TOPROL-XL) 100 MG 24 hr tablet Take 100 mg by mouth nightly.     multivitamin (THERAGRAN) tablet Take 1 tablet by mouth once daily.     ondansetron (ZOFRAN-ODT) 8 MG TbDL Take 1 tablet (8 mg total) by mouth every 8 (eight) hours as needed (nausea and vomiting).     pantoprazole (PROTONIX) 40 MG tablet Take 1 tablet (40 mg total) by mouth 2 (two) times daily.     promethazine (PHENERGAN) 25 MG tablet Take 1 tablet (25 mg total) by mouth every 6 (six) hours as needed for Nausea.     rizatriptan (MAXALT) 10 MG tablet Take 1 tablet (10 mg total) by mouth daily as needed for Migraine.     [START ON 4/29/2025] scopolamine (TRANSDERM-SCOP) 1.3-1.5 mg (1 mg over 3 days) Place 1 patch onto the skin Every 3 (three) days. (Patient not taking: Reported on 4/28/2025) 4/28/2025: Hasn't picked up.     sucralfate (CARAFATE) 100 mg/mL suspension Take 10 mLs (1 g total) by mouth 4 (four) times daily.    Last reviewed on 4/28/2025 12:05 PM by Moisés Nguyễn LPN    Review of patient's allergies indicates:  No Known Allergies   Past Medical History:   Diagnosis Date    Depression     GERD (gastroesophageal reflux disease)     Hyperlipidemia     Hypertension     Hypothyroid     Sleep apnea, unspecified 2015     Past Surgical History:   Procedure Laterality Date    APPENDECTOMY      COLONOSCOPY  2017    Briana, repeat in 10    ESOPHAGOGASTRODUODENOSCOPY N/A 4/24/2025    Procedure: EGD (ESOPHAGOGASTRODUODENOSCOPY);  Surgeon: Maxim Garzon MD;  Location: [facility];  Service: Endoscopy;  Laterality: N/A;    TONSILLECTOMY      TUBAL LIGATION            OBJECTIVE:      Vitals:    04/28/25 1313 04/28/25 1400   BP: (!) 120/90 110/86   BP Location: Left arm Right arm  "  Patient Position: Sitting Sitting   Pulse: 81    Temp: 98.4 °F (36.9 °C)    TempSrc: Oral    SpO2: 95%    Weight: 105 kg (231 lb 7.7 oz)    Height: 5' 8" (1.727 m)      Physical Exam  Vitals and nursing note reviewed.   Constitutional:       General: She is awake. She is not in acute distress.     Appearance: She is well-developed and well-groomed. She is obese. She is not ill-appearing, toxic-appearing or diaphoretic.   HENT:      Head: Normocephalic and atraumatic.      Nose: Nose normal.   Eyes:      General: Lids are normal. Gaze aligned appropriately.      Conjunctiva/sclera: Conjunctivae normal.      Right eye: Right conjunctiva is not injected.      Left eye: Left conjunctiva is not injected.      Pupils: Pupils are equal, round, and reactive to light.   Cardiovascular:      Rate and Rhythm: Normal rate and regular rhythm.      Pulses: Normal pulses.      Heart sounds: Normal heart sounds, S1 normal and S2 normal. No murmur heard.     No friction rub. No gallop.   Pulmonary:      Effort: Pulmonary effort is normal. No respiratory distress.      Breath sounds: Normal breath sounds. No stridor. No decreased breath sounds, wheezing, rhonchi or rales.   Chest:      Chest wall: No tenderness.   Musculoskeletal:      Cervical back: Neck supple.      Right lower leg: No edema.      Left lower leg: No edema.   Lymphadenopathy:      Cervical: No cervical adenopathy.   Skin:     General: Skin is warm and dry.      Capillary Refill: Capillary refill takes less than 2 seconds.      Findings: No erythema or rash.   Neurological:      Mental Status: She is alert and oriented to person, place, and time. Mental status is at baseline.   Psychiatric:         Attention and Perception: Attention normal.         Mood and Affect: Mood normal.         Speech: Speech normal.         Behavior: Behavior normal. Behavior is cooperative.         Thought Content: Thought content normal.         Judgment: Judgment normal. "         Admission on 04/22/2025, Discharged on 04/27/2025   Component Date Value Ref Range Status    Sodium 04/22/2025 138  136 - 145 mmol/L Final    Potassium 04/22/2025 3.9  3.5 - 5.1 mmol/L Final    Chloride 04/22/2025 103  95 - 110 mmol/L Final    CO2 04/22/2025 24  23 - 29 mmol/L Final    Glucose 04/22/2025 101  70 - 110 mg/dL Final    BUN 04/22/2025 13  8 - 23 mg/dL Final    Creatinine 04/22/2025 0.8  0.5 - 1.4 mg/dL Final    Calcium 04/22/2025 9.6  8.7 - 10.5 mg/dL Final    Protein Total 04/22/2025 7.2  6.0 - 8.4 gm/dL Final    Albumin 04/22/2025 4.3  3.5 - 5.2 g/dL Final    Bilirubin Total 04/22/2025 0.6  0.1 - 1.0 mg/dL Final    For infants and newborns, interpretation of results should be based   on gestational age, weight and in agreement with clinical   observations.    Premature Infant recommended reference ranges:   0-24 hours:  <8.0 mg/dL   24-48 hours: <12.0 mg/dL   3-5 days:    <15.0 mg/dL   6-29 days:   <15.0 mg/dL    ALP 04/22/2025 59  55 - 135 unit/L Final    AST 04/22/2025 18  10 - 40 unit/L Final    ALT 04/22/2025 15  10 - 44 unit/L Final    Anion Gap 04/22/2025 11  8 - 16 mmol/L Final    eGFR 04/22/2025 >60  >60 mL/min/1.73/m2 Final    Lipase Level 04/22/2025 45  4 - 60 U/L Final    Color, UA 04/22/2025 Yellow  Yellow, Straw, Flor Final    Appearance, UA 04/22/2025 Clear  Clear Final    Spec Grav UA 04/22/2025 >=1.030 (A)  1.005 - 1.030 Final    pH, UA 04/22/2025 7.0  5.0 - 8.0 Final    Protein, UA 04/22/2025 1+ (A)  Negative Final    Recommend a 24 hr urine protein/creatinine ratio if globulin induced proteinuria is clinically suspected.    Glucose, UA 04/22/2025 Negative  Negative Final    Ketones, UA 04/22/2025 3+ (A)  Negative Final    Blood, UA 04/22/2025 Negative  Negative Final    Bilirubin, UA 04/22/2025 Negative  Negative Final    Urobilinogen, UA 04/22/2025 Negative  <2.0 EU/dL Final    Nitrites, UA 04/22/2025 Negative  Negative Final    Leukocyte Esterase, UA 04/22/2025 Negative   Negative Final    WBC 04/22/2025 8.16  3.90 - 12.70 K/uL Final    RBC 04/22/2025 4.96  4.00 - 5.40 M/uL Final    Hgb 04/22/2025 14.5  12.0 - 16.0 gm/dL Final    Hct 04/22/2025 43.8  37.0 - 48.5 % Final    MCV 04/22/2025 88  82 - 98 fL Final    MCH 04/22/2025 29.2  27.0 - 31.0 pg Final    MCHC 04/22/2025 33.1  32.0 - 36.0 g/dL Final    RDW 04/22/2025 12.6  11.5 - 14.5 % Final    Platelet Count 04/22/2025 346  150 - 450 K/uL Final    MPV 04/22/2025 10.0  9.2 - 12.9 fL Final    Nucleated RBC 04/22/2025 0  <=0 /100 WBC Final    Neut % 04/22/2025 66.5  38 - 73 % Final    Lymph % 04/22/2025 23.2  18 - 48 % Final    Mono % 04/22/2025 8.2  4 - 15 % Final    Eos % 04/22/2025 1.3  0 - 8 % Final    Basophil % 04/22/2025 0.4  <=1.9 % Final    Imm Grans % 04/22/2025 0.4  0.0 - 0.5 % Final    Neut # 04/22/2025 5.4  1.8 - 7.7 K/uL Final    Lymph # 04/22/2025 1.89  1 - 4.8 K/uL Final    Mono # 04/22/2025 0.67  0.3 - 1 K/uL Final    Eos # 04/22/2025 0.11  <=0.5 K/uL Final    Baso # 04/22/2025 0.03  <=0.2 K/uL Final    Imm Grans # 04/22/2025 0.03  0.00 - 0.04 K/uL Final    Mild elevation in immature granulocytes is non specific and can be seen in a variety of conditions including stress response, acute inflammation, trauma and pregnancy. Correlation with other laboratory and clinical findings is essential.    Magnesium 04/22/2025 2.1  1.6 - 2.6 mg/dL Final    QRS Duration 04/22/2025 94  ms In process    [facility] QTC Calculation 04/22/2025 422  ms In process    RBC, UA 04/22/2025 2  <=4 /HPF Final    WBC, UA 04/22/2025 11 (H)  <=5 /HPF Final    Squamous Epithelial Cells, UA 04/22/2025 4  /HPF Final    Microscopic Comment 04/22/2025    Final    Other formed elements not mentioned in the report are not present in the microscopic examination.    Urine Culture 04/22/2025 Multiple organisms isolated. None in predominance. Repeat if clinically necessary.   Final    Troponin High Sensitive 04/22/2025 4.2  <=14.9 pg/mL Final    Troponin results  differ between methods. Do not use   results between Troponin methods interchangeably.    Alkaline Phospatase levels above 400 U/L may   cause false positive results.    Access hsTnI should not be used for patients taking   Asfotase leora (Strensiq).    Sodium 04/23/2025 138  136 - 145 mmol/L Final    Potassium 04/23/2025 3.6  3.5 - 5.1 mmol/L Final    Chloride 04/23/2025 106  95 - 110 mmol/L Final    CO2 04/23/2025 26  23 - 29 mmol/L Final    Glucose 04/23/2025 95  70 - 110 mg/dL Final    BUN 04/23/2025 10  8 - 23 mg/dL Final    Creatinine 04/23/2025 0.7  0.5 - 1.4 mg/dL Final    Calcium 04/23/2025 8.8  8.7 - 10.5 mg/dL Final    Anion Gap 04/23/2025 6 (L)  8 - 16 mmol/L Final    eGFR 04/23/2025 >60  >60 mL/min/1.73/m2 Final    Magnesium 04/23/2025 2.0  1.6 - 2.6 mg/dL Final    Phosphorus Level 04/23/2025 3.2  2.7 - 4.5 mg/dL Final    WBC 04/23/2025 7.46  3.90 - 12.70 K/uL Final    RBC 04/23/2025 4.19  4.00 - 5.40 M/uL Final    Hgb 04/23/2025 12.5  12.0 - 16.0 gm/dL Final    Hct 04/23/2025 37.6  37.0 - 48.5 % Final    MCV 04/23/2025 90  82 - 98 fL Final    MCH 04/23/2025 29.8  27.0 - 31.0 pg Final    MCHC 04/23/2025 33.2  32.0 - 36.0 g/dL Final    RDW 04/23/2025 12.8  11.5 - 14.5 % Final    Platelet Count 04/23/2025 261  150 - 450 K/uL Final    MPV 04/23/2025 10.0  9.2 - 12.9 fL Final    Nucleated RBC 04/23/2025 0  <=0 /100 WBC Final    Neut % 04/23/2025 54.5  38 - 73 % Final    Lymph % 04/23/2025 30.3  18 - 48 % Final    Mono % 04/23/2025 12.3  4 - 15 % Final    Eos % 04/23/2025 2.1  0 - 8 % Final    Basophil % 04/23/2025 0.5  <=1.9 % Final    Imm Grans % 04/23/2025 0.3  0.0 - 0.5 % Final    Neut # 04/23/2025 4.1  1.8 - 7.7 K/uL Final    Lymph # 04/23/2025 2.26  1 - 4.8 K/uL Final    Mono # 04/23/2025 0.92  0.3 - 1 K/uL Final    Eos # 04/23/2025 0.16  <=0.5 K/uL Final    Baso # 04/23/2025 0.04  <=0.2 K/uL Final    Imm Grans # 04/23/2025 0.02  0.00 - 0.04 K/uL Final    Mild elevation in immature granulocytes is non  specific and can be seen in a variety of conditions including stress response, acute inflammation, trauma and pregnancy. Correlation with other laboratory and clinical findings is essential.    WBC 04/24/2025 11.35  3.90 - 12.70 K/uL Final    RBC 04/24/2025 4.44  4.00 - 5.40 M/uL Final    Hgb 04/24/2025 13.1  12.0 - 16.0 gm/dL Final    Hct 04/24/2025 39.5  37.0 - 48.5 % Final    MCV 04/24/2025 89  82 - 98 fL Final    MCH 04/24/2025 29.5  27.0 - 31.0 pg Final    MCHC 04/24/2025 33.2  32.0 - 36.0 g/dL Final    RDW 04/24/2025 12.9  11.5 - 14.5 % Final    Platelet Count 04/24/2025 297  150 - 450 K/uL Final    MPV 04/24/2025 10.3  9.2 - 12.9 fL Final    Nucleated RBC 04/24/2025 0  <=0 /100 WBC Final    Neut % 04/24/2025 64.1  38 - 73 % Final    Lymph % 04/24/2025 24.6  18 - 48 % Final    Mono % 04/24/2025 10.0  4 - 15 % Final    Eos % 04/24/2025 0.6  0 - 8 % Final    Basophil % 04/24/2025 0.3  <=1.9 % Final    Imm Grans % 04/24/2025 0.4  0.0 - 0.5 % Final    Neut # 04/24/2025 7.3  1.8 - 7.7 K/uL Final    Lymph # 04/24/2025 2.79  1 - 4.8 K/uL Final    Mono # 04/24/2025 1.14 (H)  0.3 - 1 K/uL Final    Eos # 04/24/2025 0.07  <=0.5 K/uL Final    Baso # 04/24/2025 0.03  <=0.2 K/uL Final    Imm Grans # 04/24/2025 0.04  0.00 - 0.04 K/uL Final    Mild elevation in immature granulocytes is non specific and can be seen in a variety of conditions including stress response, acute inflammation, trauma and pregnancy. Correlation with other laboratory and clinical findings is essential.    WBC 04/25/2025 5.26  3.90 - 12.70 K/uL Final    RBC 04/25/2025 3.92 (L)  4.00 - 5.40 M/uL Final    Hgb 04/25/2025 11.7 (L)  12.0 - 16.0 gm/dL Final    Hct 04/25/2025 34.6 (L)  37.0 - 48.5 % Final    MCV 04/25/2025 88  82 - 98 fL Final    MCH 04/25/2025 29.8  27.0 - 31.0 pg Final    MCHC 04/25/2025 33.8  32.0 - 36.0 g/dL Final    RDW 04/25/2025 12.8  11.5 - 14.5 % Final    Platelet Count 04/25/2025 245  150 - 450 K/uL Final    MPV 04/25/2025 10.4  9.2  - 12.9 fL Final    Nucleated RBC 04/25/2025 0  <=0 /100 WBC Final    Neut % 04/25/2025 49.8  38 - 73 % Final    Lymph % 04/25/2025 32.5  18 - 48 % Final    Mono % 04/25/2025 13.7  4 - 15 % Final    Eos % 04/25/2025 3.2  0 - 8 % Final    Basophil % 04/25/2025 0.4  <=1.9 % Final    Imm Grans % 04/25/2025 0.4  0.0 - 0.5 % Final    Neut # 04/25/2025 2.6  1.8 - 7.7 K/uL Final    Lymph # 04/25/2025 1.71  1 - 4.8 K/uL Final    Mono # 04/25/2025 0.72  0.3 - 1 K/uL Final    Eos # 04/25/2025 0.17  <=0.5 K/uL Final    Baso # 04/25/2025 0.02  <=0.2 K/uL Final    Imm Grans # 04/25/2025 0.02  0.00 - 0.04 K/uL Final    Mild elevation in immature granulocytes is non specific and can be seen in a variety of conditions including stress response, acute inflammation, trauma and pregnancy. Correlation with other laboratory and clinical findings is essential.    RBC, UA 04/25/2025 <1  <=4 /HPF Final    WBC, UA 04/25/2025 1  <=5 /HPF Final    Microscopic Comment 04/25/2025    Final    Other formed elements not mentioned in the report are not present in the microscopic examination.    Urine Culture 04/25/2025 No Growth   Final    Hemoglobin A1c 04/25/2025 5.1  4.5 - 6.2 % Final    According to ADA guidelines, hemoglobin A1C <7.0% represents  optimal control in non-pregnant diabetic patients.  Different  metrics may apply to specific populations.      Standards of Medical Care in Diabetes - 2016.    For the purpose of screening for the presence of diabetes:  <5.7%     Consistent with the absence of diabetes  5.7-6.4%  Consistent with increasing risk for diabetes             (prediabetes)  >or=6.5%  Consistent with diabetes    Currently no consensus exists for use of hemoglobin A1C  for diagnosis of diabetes for children.    Estimated Average Glucose 04/25/2025 100  68 - 131 mg/dL Final    Sodium 04/26/2025 138  136 - 145 mmol/L Final    Potassium 04/26/2025 3.3 (L)  3.5 - 5.1 mmol/L Final    Chloride 04/26/2025 102  95 - 110 mmol/L Final     CO2 04/26/2025 28  23 - 29 mmol/L Final    Glucose 04/26/2025 98  70 - 110 mg/dL Final    BUN 04/26/2025 7 (L)  8 - 23 mg/dL Final    Creatinine 04/26/2025 0.6  0.5 - 1.4 mg/dL Final    Calcium 04/26/2025 9.0  8.7 - 10.5 mg/dL Final    Anion Gap 04/26/2025 8  8 - 16 mmol/L Final    eGFR 04/26/2025 >60  >60 mL/min/1.73/m2 Final    Sodium 04/27/2025 139  136 - 145 mmol/L Final    Potassium 04/27/2025 3.6  3.5 - 5.1 mmol/L Final    Chloride 04/27/2025 105  95 - 110 mmol/L Final    CO2 04/27/2025 29  23 - 29 mmol/L Final    Glucose 04/27/2025 96  70 - 110 mg/dL Final    BUN 04/27/2025 5 (L)  8 - 23 mg/dL Final    Creatinine 04/27/2025 0.5  0.5 - 1.4 mg/dL Final    Calcium 04/27/2025 8.6 (L)  8.7 - 10.5 mg/dL Final    Anion Gap 04/27/2025 5 (L)  8 - 16 mmol/L Final    eGFR 04/27/2025 >60  >60 mL/min/1.73/m2 Final    WBC 04/27/2025 4.83  3.90 - 12.70 K/uL Final    RBC 04/27/2025 4.07  4.00 - 5.40 M/uL Final    Hgb 04/27/2025 11.7 (L)  12.0 - 16.0 gm/dL Final    Hct 04/27/2025 35.7 (L)  37.0 - 48.5 % Final    MCV 04/27/2025 88  82 - 98 fL Final    MCH 04/27/2025 28.7  27.0 - 31.0 pg Final    MCHC 04/27/2025 32.8  32.0 - 36.0 g/dL Final    RDW 04/27/2025 12.7  11.5 - 14.5 % Final    Platelet Count 04/27/2025 249  150 - 450 K/uL Final    MPV 04/27/2025 10.2  9.2 - 12.9 fL Final    Nucleated RBC 04/27/2025 0  <=0 /100 WBC Final    Neut % 04/27/2025 39.7  38 - 73 % Final    Lymph % 04/27/2025 39.8  18 - 48 % Final    Mono % 04/27/2025 14.3  4 - 15 % Final    Eos % 04/27/2025 5.4  0 - 8 % Final    Basophil % 04/27/2025 0.4  <=1.9 % Final    Imm Grans % 04/27/2025 0.4  0.0 - 0.5 % Final    Neut # 04/27/2025 1.9  1.8 - 7.7 K/uL Final    Lymph # 04/27/2025 1.92  1 - 4.8 K/uL Final    Mono # 04/27/2025 0.69  0.3 - 1 K/uL Final    Eos # 04/27/2025 0.26  <=0.5 K/uL Final    Baso # 04/27/2025 0.02  <=0.2 K/uL Final    Imm Grans # 04/27/2025 0.02  0.00 - 0.04 K/uL Final    Mild elevation in immature granulocytes is non specific and can  be seen in a variety of conditions including stress response, acute inflammation, trauma and pregnancy. Correlation with other laboratory and clinical findings is essential.        Assessment:       1. Hospital discharge follow-up    2. Intractable nausea and vomiting    3. Biliary dyskinesia    4. Gastroesophageal reflux disease, unspecified whether esophagitis present    5. HTN (hypertension), benign    6. Hypothyroidism due to Hashimoto thyroiditis    7. Other acute gastritis without hemorrhage    8. Weakness of both lower extremities    9. Imbalance        Plan:       Assessment & Plan    PLAN SUMMARY:  - Order gastric emptying study for next Friday  - Continue Protonix (pantoprazole) twice daily for gastritis  - Prescribe Reglan (metoclopramide) for nausea management  - Prescribe meclizine 25mg every 12 hours for dizziness  - Refill sucralfate (Carafate) for use if experiencing pain with eating or swallowing  - Order wheelchair and provide handicap tag for improved mobility  - Limit Excedrin use to once weekly; consider aspirin-free alternatives for headaches  - Continue rizatriptan for acute migraine treatment  - Refer to Dr. Brewer for gastroenterology follow-up  - Follow-up after gastric emptying study to discuss results and further management    BILIARY DYSKINESIA:  - Reviewed recent hospital admission findings: HIDA scan showed biliary dyskinesia.  - Acknowledged that biliary dyskinesia may not fully explain patient's symptoms.  - Follow-up with GI for gastric emptying study.    CHRONIC GASTRITIS:  - Continued Protonix (pantoprazole) twice daily for gastritis management.  - Refilled sucralfate (Carafate).  - Instructed to take Protonix at least 60 minutes before sucralfate if used.  - Advised to use sucralfate only if experiencing pain with eating or swallowing.  - Explained sucralfate mechanism of action.  - Discussed risks of NSAIDs (e.g., ibuprofen, aspirin) exacerbating gastric inflammation and advised  patient to avoid these medications.  - Noted recent EGD showed gastritis with normal pathology results.    AUTOIMMUNE THYROIDITIS (HASHIMOTO'S DISEASE):  - Confirmed thyroid function normal in recent levels, with TSH at 2.644.  - Continued levothyroxine 112 mcg.    CHRONIC MIGRAINE:  - Noted recent nerve ablation procedure by neurologist for headache management, which initially helped but patient is now experiencing intense headaches again.  - Advised to limit Excedrin use due to aspirin content and suggested   considering aspirin-free alternatives for headaches.  - Confirmed patient using rizatriptan for acute migraine treatment.  - Managed by neurology.    DIZZINESS:  - Continued Meclizine prn.     NAUSEA AND VOMITING:  - Noted nausea control improved with Reglan every 8 hours.  - Confirmed patient experienced persistent nausea and vomiting during hospital stay but is currently under control.  - Continued Reglan (metoclopramide) for nausea management.    FOLLOW-UP AND FURTHER EVALUATION:  - Scheduled a gastric emptying study for next Friday to further investigate symptoms.  - Planned follow-up after the study to discuss results and further management.  - Referred to Dr. Brewer for gastroenterology follow-up.  - Nurse to recheck BP while patient is seated.        Hospital discharge follow-up    Intractable nausea and vomiting    Biliary dyskinesia    Gastroesophageal reflux disease, unspecified whether esophagitis present    HTN (hypertension), benign    Hypothyroidism due to Hashimoto thyroiditis    Other acute gastritis without hemorrhage  -     sucralfate (CARAFATE) 100 mg/mL suspension; Take 10 mLs (1 g total) by mouth 4 (four) times daily.  Dispense: 450 mL; Refill: 0    Weakness of both lower extremities  -     WHEELCHAIR FOR HOME USE    Imbalance  -     WHEELCHAIR FOR HOME USE    I spent a total of 43 minutes on the day of the visit.This includes face to face time and non-face to face time preparing to see  the patient (eg, review of tests), obtaining and/or reviewing separately obtained history, documenting clinical information in the electronic or other health record, independently interpreting results and communicating results to the patient/family/caregiver, or care coordinator.    Follow up in about 6 months (around 10/28/2025).          4/28/2025 DANY Madrigal, BRYANT    This note was generated with the assistance of ambient listening technology. Verbal consent was obtained by the patient and accompanying visitor(s) for the recording of patient appointment to facilitate this note. I attest to having reviewed and edited the generated note for accuracy, though some syntax or spelling errors may persist. Please contact the author of this note for any clarification.

## 2025-04-28 NOTE — TELEPHONE ENCOUNTER
----- Message from Herminia sent at 4/28/2025 10:19 AM CDT -----  Contact: self  Type:  Needs Medical AdviceWho Called: daughterSymptoms (please be specific):  How long has patient had these symptoms:  Pharmacy name and phone #:  Would the patient rather a call back or a response via [facility]? callBest Call Back Number: [phone] Information: daughter would like for staff to give her a call to r/s an appt that was canceled while pt was in the hospital    Please call back to advise. Thanks!

## 2025-04-28 NOTE — TELEPHONE ENCOUNTER
Call placed to MsMeghana Allison, pts daughter, She stated her gastric emptying study appt was cancelled while she was inpatient, and she needs to rescheduled. Number given to radiology. No further issues noted.

## 2025-04-28 NOTE — PROGRESS NOTES
C3 nurse spoke with Skyler Espinoza's daughter Allison for a TCC post hospital discharge follow up call. The patient has a scheduled HOSFU appointment with Oscar Lee FNP-C on 04/28/25 @ 1300.

## 2025-04-29 ENCOUNTER — HOSPITAL ENCOUNTER (INPATIENT)
Facility: HOSPITAL | Age: 75
LOS: 1 days | Discharge: HOME HEALTH | DRG: 103 | End: 2025-05-01
Attending: EMERGENCY MEDICINE | Admitting: STUDENT IN AN ORGANIZED HEALTH CARE EDUCATION/TRAINING PROGRAM
Payer: MEDICARE

## 2025-04-29 DIAGNOSIS — R11.2 NAUSEA & VOMITING: ICD-10-CM

## 2025-04-29 DIAGNOSIS — I10 HYPERTENSION, UNSPECIFIED TYPE: ICD-10-CM

## 2025-04-29 DIAGNOSIS — F03.90 DEMENTIA, UNSPECIFIED DEMENTIA SEVERITY, UNSPECIFIED DEMENTIA TYPE, UNSPECIFIED WHETHER BEHAVIORAL, PSYCHOTIC, OR MOOD DISTURBANCE OR ANXIETY: Primary | ICD-10-CM

## 2025-04-29 DIAGNOSIS — R11.2 INTRACTABLE NAUSEA AND VOMITING: Primary | ICD-10-CM

## 2025-04-29 DIAGNOSIS — R07.9 CHEST PAIN: ICD-10-CM

## 2025-04-29 PROBLEM — G47.33 OSA (OBSTRUCTIVE SLEEP APNEA): Status: ACTIVE | Noted: 2025-04-29

## 2025-04-29 PROBLEM — K31.84 GASTROPARESIS: Status: ACTIVE | Noted: 2025-04-29

## 2025-04-29 PROBLEM — G43.909 MIGRAINE: Status: ACTIVE | Noted: 2025-04-29

## 2025-04-29 PROBLEM — E87.6 HYPOKALEMIA: Status: ACTIVE | Noted: 2025-04-29

## 2025-04-29 PROBLEM — K29.70 GASTRITIS: Status: ACTIVE | Noted: 2025-04-29

## 2025-04-29 LAB
ABSOLUTE EOSINOPHIL (SMH): 0.28 K/UL
ABSOLUTE MONOCYTE (SMH): 0.63 K/UL (ref 0.3–1)
ABSOLUTE NEUTROPHIL COUNT (SMH): 2.5 K/UL (ref 1.8–7.7)
ALBUMIN SERPL-MCNC: 3.6 G/DL (ref 3.5–5.2)
ALP SERPL-CCNC: 60 UNIT/L (ref 55–135)
ALT SERPL-CCNC: 19 UNIT/L (ref 10–44)
ANION GAP (SMH): 10 MMOL/L (ref 8–16)
AST SERPL-CCNC: 20 UNIT/L (ref 10–40)
BASOPHILS # BLD AUTO: 0.04 K/UL
BASOPHILS NFR BLD AUTO: 0.8 %
BILIRUB SERPL-MCNC: 0.5 MG/DL (ref 0.1–1)
BUN SERPL-MCNC: 7 MG/DL (ref 8–23)
CALCIUM SERPL-MCNC: 8.9 MG/DL (ref 8.7–10.5)
CHLORIDE SERPL-SCNC: 106 MMOL/L (ref 95–110)
CO2 SERPL-SCNC: 26 MMOL/L (ref 23–29)
CREAT SERPL-MCNC: 0.7 MG/DL (ref 0.5–1.4)
ERYTHROCYTE [DISTWIDTH] IN BLOOD BY AUTOMATED COUNT: 12.8 % (ref 11.5–14.5)
GFR SERPLBLD CREATININE-BSD FMLA CKD-EPI: >60 ML/MIN/1.73/M2
GLUCOSE SERPL-MCNC: 105 MG/DL (ref 70–110)
HCT VFR BLD AUTO: 39.4 % (ref 37–48.5)
HGB BLD-MCNC: 12.8 GM/DL (ref 12–16)
IMM GRANULOCYTES # BLD AUTO: 0.02 K/UL (ref 0–0.04)
IMM GRANULOCYTES NFR BLD AUTO: 0.4 % (ref 0–0.5)
LYMPHOCYTES # BLD AUTO: 1.79 K/UL (ref 1–4.8)
MCH RBC QN AUTO: 29.2 PG (ref 27–31)
MCHC RBC AUTO-ENTMCNC: 32.5 G/DL (ref 32–36)
MCV RBC AUTO: 90 FL (ref 82–98)
NUCLEATED RBC (/100WBC) (SMH): 0 /100 WBC
PLATELET # BLD AUTO: 291 K/UL (ref 150–450)
PMV BLD AUTO: 10.1 FL (ref 9.2–12.9)
POTASSIUM SERPL-SCNC: 3.2 MMOL/L (ref 3.5–5.1)
PROT SERPL-MCNC: 6 GM/DL (ref 6–8.4)
RBC # BLD AUTO: 4.38 M/UL (ref 4–5.4)
RELATIVE EOSINOPHIL (SMH): 5.3 % (ref 0–8)
RELATIVE LYMPHOCYTE (SMH): 33.8 % (ref 18–48)
RELATIVE MONOCYTE (SMH): 11.9 % (ref 4–15)
RELATIVE NEUTROPHIL (SMH): 47.8 % (ref 38–73)
SODIUM SERPL-SCNC: 142 MMOL/L (ref 136–145)
WBC # BLD AUTO: 5.3 K/UL (ref 3.9–12.7)

## 2025-04-29 PROCEDURE — 63600175 PHARM REV CODE 636 W HCPCS: Performed by: INTERNAL MEDICINE

## 2025-04-29 PROCEDURE — 25000003 PHARM REV CODE 250: Performed by: INTERNAL MEDICINE

## 2025-04-29 PROCEDURE — 96368 THER/DIAG CONCURRENT INF: CPT

## 2025-04-29 PROCEDURE — 94761 N-INVAS EAR/PLS OXIMETRY MLT: CPT

## 2025-04-29 PROCEDURE — 96372 THER/PROPH/DIAG INJ SC/IM: CPT | Performed by: INTERNAL MEDICINE

## 2025-04-29 PROCEDURE — 94799 UNLISTED PULMONARY SVC/PX: CPT

## 2025-04-29 PROCEDURE — 63600175 PHARM REV CODE 636 W HCPCS: Performed by: EMERGENCY MEDICINE

## 2025-04-29 PROCEDURE — 85025 COMPLETE CBC W/AUTO DIFF WBC: CPT | Performed by: EMERGENCY MEDICINE

## 2025-04-29 PROCEDURE — 96365 THER/PROPH/DIAG IV INF INIT: CPT

## 2025-04-29 PROCEDURE — 96375 TX/PRO/DX INJ NEW DRUG ADDON: CPT

## 2025-04-29 PROCEDURE — G0378 HOSPITAL OBSERVATION PER HR: HCPCS

## 2025-04-29 PROCEDURE — 25000003 PHARM REV CODE 250: Performed by: EMERGENCY MEDICINE

## 2025-04-29 PROCEDURE — 80053 COMPREHEN METABOLIC PANEL: CPT | Performed by: EMERGENCY MEDICINE

## 2025-04-29 PROCEDURE — 96366 THER/PROPH/DIAG IV INF ADDON: CPT

## 2025-04-29 PROCEDURE — 99900035 HC TECH TIME PER 15 MIN (STAT)

## 2025-04-29 PROCEDURE — 99900031 HC PATIENT EDUCATION (STAT)

## 2025-04-29 PROCEDURE — 99285 EMERGENCY DEPT VISIT HI MDM: CPT | Mod: 25

## 2025-04-29 RX ORDER — LEVOTHYROXINE SODIUM 112 UG/1
112 TABLET ORAL
Status: DISCONTINUED | OUTPATIENT
Start: 2025-04-30 | End: 2025-05-01 | Stop reason: HOSPADM

## 2025-04-29 RX ORDER — ACETAMINOPHEN 325 MG/1
650 TABLET ORAL EVERY 8 HOURS PRN
Status: DISCONTINUED | OUTPATIENT
Start: 2025-04-29 | End: 2025-05-01 | Stop reason: HOSPADM

## 2025-04-29 RX ORDER — NALOXONE HCL 0.4 MG/ML
0.02 VIAL (ML) INJECTION
Status: DISCONTINUED | OUTPATIENT
Start: 2025-04-29 | End: 2025-05-01 | Stop reason: HOSPADM

## 2025-04-29 RX ORDER — PSEUDOEPHEDRINE HCL 30 MG
30 TABLET ORAL EVERY 4 HOURS PRN
Status: ON HOLD | COMMUNITY
End: 2025-05-01 | Stop reason: HOSPADM

## 2025-04-29 RX ORDER — DEXTROMETHORPHAN HYDROBROMIDE, GUAIFENESIN 5; 100 MG/5ML; MG/5ML
650 LIQUID ORAL EVERY 8 HOURS
COMMUNITY

## 2025-04-29 RX ORDER — ONDANSETRON HYDROCHLORIDE 2 MG/ML
4 INJECTION, SOLUTION INTRAVENOUS EVERY 6 HOURS PRN
Status: DISCONTINUED | OUTPATIENT
Start: 2025-04-29 | End: 2025-05-01 | Stop reason: HOSPADM

## 2025-04-29 RX ORDER — TALC
6 POWDER (GRAM) TOPICAL NIGHTLY PRN
Status: DISCONTINUED | OUTPATIENT
Start: 2025-04-29 | End: 2025-05-01 | Stop reason: HOSPADM

## 2025-04-29 RX ORDER — SUMATRIPTAN SUCCINATE 50 MG/1
50 TABLET ORAL 2 TIMES DAILY PRN
Status: DISCONTINUED | OUTPATIENT
Start: 2025-04-29 | End: 2025-05-01 | Stop reason: HOSPADM

## 2025-04-29 RX ORDER — LANOLIN ALCOHOL/MO/W.PET/CERES
800 CREAM (GRAM) TOPICAL
Status: DISCONTINUED | OUTPATIENT
Start: 2025-04-29 | End: 2025-05-01 | Stop reason: HOSPADM

## 2025-04-29 RX ORDER — POTASSIUM CHLORIDE 7.45 MG/ML
10 INJECTION INTRAVENOUS ONCE
Status: COMPLETED | OUTPATIENT
Start: 2025-04-29 | End: 2025-04-29

## 2025-04-29 RX ORDER — SODIUM,POTASSIUM PHOSPHATES 280-250MG
2 POWDER IN PACKET (EA) ORAL
Status: DISCONTINUED | OUTPATIENT
Start: 2025-04-29 | End: 2025-05-01 | Stop reason: HOSPADM

## 2025-04-29 RX ORDER — MECLIZINE HCL 12.5 MG 12.5 MG/1
12.5 TABLET ORAL 3 TIMES DAILY
Status: DISCONTINUED | OUTPATIENT
Start: 2025-04-29 | End: 2025-04-30

## 2025-04-29 RX ORDER — ENOXAPARIN SODIUM 100 MG/ML
40 INJECTION SUBCUTANEOUS EVERY 24 HOURS
Status: DISCONTINUED | OUTPATIENT
Start: 2025-04-29 | End: 2025-05-01 | Stop reason: HOSPADM

## 2025-04-29 RX ORDER — METOCLOPRAMIDE 5 MG/1
5 TABLET ORAL
Status: DISCONTINUED | OUTPATIENT
Start: 2025-04-29 | End: 2025-04-30

## 2025-04-29 RX ORDER — METOPROLOL SUCCINATE 50 MG/1
100 TABLET, EXTENDED RELEASE ORAL NIGHTLY
Status: DISCONTINUED | OUTPATIENT
Start: 2025-04-29 | End: 2025-05-01 | Stop reason: HOSPADM

## 2025-04-29 RX ORDER — DIPHENHYDRAMINE HYDROCHLORIDE 50 MG/ML
12.5 INJECTION, SOLUTION INTRAMUSCULAR; INTRAVENOUS
Status: COMPLETED | OUTPATIENT
Start: 2025-04-29 | End: 2025-04-29

## 2025-04-29 RX ORDER — ALUMINUM HYDROXIDE, MAGNESIUM HYDROXIDE, AND SIMETHICONE 1200; 120; 1200 MG/30ML; MG/30ML; MG/30ML
30 SUSPENSION ORAL 4 TIMES DAILY PRN
Status: DISCONTINUED | OUTPATIENT
Start: 2025-04-29 | End: 2025-05-01 | Stop reason: HOSPADM

## 2025-04-29 RX ORDER — ACETAMINOPHEN 325 MG/1
650 TABLET ORAL EVERY 4 HOURS PRN
Status: DISCONTINUED | OUTPATIENT
Start: 2025-04-29 | End: 2025-05-01 | Stop reason: HOSPADM

## 2025-04-29 RX ORDER — HYDROCODONE BITARTRATE AND ACETAMINOPHEN 5; 325 MG/1; MG/1
1 TABLET ORAL EVERY 6 HOURS PRN
Refills: 0 | Status: DISCONTINUED | OUTPATIENT
Start: 2025-04-29 | End: 2025-05-01 | Stop reason: HOSPADM

## 2025-04-29 RX ORDER — PROCHLORPERAZINE EDISYLATE 5 MG/ML
2.5 INJECTION INTRAMUSCULAR; INTRAVENOUS EVERY 6 HOURS PRN
Status: DISCONTINUED | OUTPATIENT
Start: 2025-04-29 | End: 2025-04-30

## 2025-04-29 RX ORDER — PANTOPRAZOLE SODIUM 40 MG/1
40 TABLET, DELAYED RELEASE ORAL 2 TIMES DAILY
Status: DISCONTINUED | OUTPATIENT
Start: 2025-04-29 | End: 2025-04-30

## 2025-04-29 RX ORDER — DEXAMETHASONE SODIUM PHOSPHATE 4 MG/ML
4 INJECTION, SOLUTION INTRA-ARTICULAR; INTRALESIONAL; INTRAMUSCULAR; INTRAVENOUS; SOFT TISSUE ONCE
Status: COMPLETED | OUTPATIENT
Start: 2025-04-29 | End: 2025-04-29

## 2025-04-29 RX ADMIN — PANTOPRAZOLE SODIUM 40 MG: 40 TABLET, DELAYED RELEASE ORAL at 05:04

## 2025-04-29 RX ADMIN — SODIUM CHLORIDE 1000 ML: 9 INJECTION, SOLUTION INTRAVENOUS at 11:04

## 2025-04-29 RX ADMIN — POTASSIUM BICARBONATE 35 MEQ: 391 TABLET, EFFERVESCENT ORAL at 07:04

## 2025-04-29 RX ADMIN — METOPROLOL SUCCINATE 100 MG: 50 TABLET, EXTENDED RELEASE ORAL at 08:04

## 2025-04-29 RX ADMIN — MECLIZINE 12.5 MG: 12.5 TABLET ORAL at 03:04

## 2025-04-29 RX ADMIN — PROMETHAZINE HYDROCHLORIDE 6.25 MG: 25 INJECTION INTRAMUSCULAR; INTRAVENOUS at 12:04

## 2025-04-29 RX ADMIN — METOCLOPRAMIDE 5 MG: 5 TABLET ORAL at 05:04

## 2025-04-29 RX ADMIN — METOCLOPRAMIDE 5 MG: 5 TABLET ORAL at 08:04

## 2025-04-29 RX ADMIN — MECLIZINE 12.5 MG: 12.5 TABLET ORAL at 08:04

## 2025-04-29 RX ADMIN — DIPHENHYDRAMINE HYDROCHLORIDE 12.5 MG: 50 INJECTION, SOLUTION INTRAMUSCULAR; INTRAVENOUS at 12:04

## 2025-04-29 RX ADMIN — SUMATRIPTAN SUCCINATE 50 MG: 50 TABLET ORAL at 03:04

## 2025-04-29 RX ADMIN — DEXAMETHASONE SODIUM PHOSPHATE 4 MG: 4 INJECTION, SOLUTION INTRA-ARTICULAR; INTRALESIONAL; INTRAMUSCULAR; INTRAVENOUS; SOFT TISSUE at 12:04

## 2025-04-29 RX ADMIN — ENOXAPARIN SODIUM 40 MG: 40 INJECTION SUBCUTANEOUS at 05:04

## 2025-04-29 RX ADMIN — Medication 6 MG: at 09:04

## 2025-04-29 RX ADMIN — ONDANSETRON 4 MG: 2 INJECTION INTRAMUSCULAR; INTRAVENOUS at 09:04

## 2025-04-29 RX ADMIN — POTASSIUM CHLORIDE 10 MEQ: 7.46 INJECTION, SOLUTION INTRAVENOUS at 11:04

## 2025-04-29 RX ADMIN — ACETAMINOPHEN 650 MG: 325 TABLET ORAL at 09:04

## 2025-04-29 NOTE — CARE UPDATE
04/29/25 1523   Patient Assessment/Suction   Level of Consciousness (AVPU) alert   Respiratory Effort Unlabored   Expansion/Accessory Muscles/Retractions no use of accessory muscles   All Lung Fields Breath Sounds clear   Rhythm/Pattern, Respiratory depth regular;pattern regular;unlabored   Cough Frequency no cough   PRE-TX-O2   Device (Oxygen Therapy) room air   SpO2 96 %   Pulse Oximetry Type Intermittent   $ Pulse Oximetry - Multiple Charge Pulse Oximetry - Multiple   Pulse 74   Resp 15   Tobacco Cessation Intervention   Do you use any type of tobacco product? No   Respiratory Evaluation   $ Care Plan Tech Time 15 min   $ Respiratory Evaluation Complete   Evaluation For New Orders   Admitting Diagnosis nausea and vomit   Cardiac Diagnosis hypertension   Pulmonary Diagnosis sleep apnea   Home Oxygen   Has Home Oxygen? No   Home Aerosol, MDI, DPI, and Other Treatments/Therapies   Home Respiratory Therapy Per Patient/Review of Chart Yes  (pt was supposed to have sleep study done today. Pt has an old cpap machine @ home but doesn't wear. Family states pt will most probably pull mask off if she tried one of our cpap machines tonight. Family request to have 2L nc on pt for tonight)   Oxygen Care Plan   Oxygen Care Plan Per Protocol   SPO2 Goal (%) MD order   Rationale Shortness of Breath   Bronchodilator Care Plan   Rationale No Rationale found   Atelectasis Care Plan   Rationale No Rational Found   Airway Clearance Care Plan   Rationale No rationale found

## 2025-04-29 NOTE — ED PROVIDER NOTES
"Encounter Date: 4/29/2025       History     Chief Complaint   Patient presents with    Nausea    Emesis     History from patient and daughter at the bedside.  Pleasant 74-year-old female history of hypertension hypothyroidism presents to the ER with nausea and vomiting.  Just admitted for the same complaints for 4 days.  Workup at that time demonstrated some biliary dyskinesia.  Started on Reglan, symptoms have improved a little bit but vomited multiple times yesterday and today despite Phenergan, Zofran and Reglan.  Vomited for about an hour and a half straight this morning.  Only improved with IV Zofran in EMS according to the daughter.  Patient states she feels "like shit." Denies chest pain.  She has a headache but this is consistent with prior migraines.  She has a gastric emptying study scheduled for next Friday.  Denies abdominal pain.    The history is provided by the patient and a relative.     Review of patient's allergies indicates:  No Known Allergies  Past Medical History:   Diagnosis Date    Depression     GERD (gastroesophageal reflux disease)     Hyperlipidemia     Hypertension     Hypothyroid     Sleep apnea, unspecified 2015     Past Surgical History:   Procedure Laterality Date    APPENDECTOMY      COLONOSCOPY  2017    Briana, repeat in 10    ESOPHAGOGASTRODUODENOSCOPY N/A 4/24/2025    Procedure: EGD (ESOPHAGOGASTRODUODENOSCOPY);  Surgeon: Maxim Garzon MD;  Location: [facility];  Service: Endoscopy;  Laterality: N/A;    TONSILLECTOMY      TUBAL LIGATION       Family History   Problem Relation Name Age of Onset    Cancer Father      Cancer Sister      Breast cancer Daughter      Glaucoma Neg Hx      Macular degeneration Neg Hx      Retinal detachment Neg Hx      Ovarian cancer Neg Hx      Eczema Neg Hx      Lupus Neg Hx      Melanoma Neg Hx      Psoriasis Neg Hx       Social History[1]  Review of Systems   Constitutional:  Positive for fatigue.   Gastrointestinal:  Positive for nausea and " vomiting.       Physical Exam     Initial Vitals [04/29/25 1023]   BP Pulse Resp Temp SpO2   (!) 170/100 81 16 98.1 °F (36.7 °C) 95 %      MAP       --         Physical Exam    Nursing note and vitals reviewed.  Constitutional: She appears well-developed and well-nourished. She is not diaphoretic. No distress.   HENT:   Head: Normocephalic and atraumatic.   Eyes: EOM are normal.   Neck: Neck supple.   Normal range of motion.  Cardiovascular:  Normal rate, regular rhythm and normal heart sounds.     Exam reveals no gallop and no friction rub.       No murmur heard.  Pulmonary/Chest: Breath sounds normal. No respiratory distress. She has no wheezes. She has no rhonchi. She has no rales.   Abdominal: She exhibits no distension. There is no abdominal tenderness. There is no rebound and no guarding.   Musculoskeletal:         General: Normal range of motion.      Cervical back: Normal range of motion and neck supple.     Neurological: She is alert and oriented to person, place, and time.   Skin: Skin is warm and dry.   Psychiatric: She has a normal mood and affect. Her behavior is normal. Judgment and thought content normal.         ED Course   Procedures  Labs Reviewed   COMPREHENSIVE METABOLIC PANEL - Abnormal       Result Value    Sodium 142      Potassium 3.2 (*)     Chloride 106      CO2 26      Glucose 105      BUN 7 (*)     Creatinine 0.7      Calcium 8.9      Protein Total 6.0      Albumin 3.6      Bilirubin Total 0.5      ALP 60      AST 20      ALT 19      Anion Gap 10      eGFR >60     CBC WITH DIFFERENTIAL - Normal    WBC 5.30      RBC 4.38      Hgb 12.8      Hct 39.4      MCV 90      MCH 29.2      MCHC 32.5      RDW 12.8      Platelet Count 291      MPV 10.1      Nucleated RBC 0      Neut % 47.8      Lymph % 33.8      Mono % 11.9      Eos % 5.3      Basophil % 0.8      Imm Grans % 0.4      Neut # 2.5      Lymph # 1.79      Mono # 0.63      Eos # 0.28      Baso # 0.04      Imm Grans # 0.02     CBC W/ AUTO  DIFFERENTIAL    Narrative:     The following orders were created for panel order CBC auto differential.  Procedure                               Abnormality         Status                     ---------                               -----------         ------                     CBC with Differential[[id number]]       Normal              Final result                 Please view results for these tests on the individual orders.          Imaging Results    None          Medications   levothyroxine tablet 112 mcg (has no administration in time range)   meclizine tablet 12.5 mg (has no administration in time range)   metoprolol succinate (TOPROL-XL) 24 hr tablet 100 mg (has no administration in time range)   pantoprazole EC tablet 40 mg (has no administration in time range)   melatonin tablet 6 mg (has no administration in time range)   ondansetron injection 4 mg (has no administration in time range)   acetaminophen tablet 650 mg (has no administration in time range)   aluminum-magnesium hydroxide-simethicone 200-200-20 mg/5 mL suspension 30 mL (has no administration in time range)   acetaminophen tablet 650 mg (has no administration in time range)   HYDROcodone-acetaminophen 5-325 mg per tablet 1 tablet (has no administration in time range)   naloxone 0.4 mg/mL injection 0.02 mg (has no administration in time range)   potassium bicarbonate disintegrating tablet 50 mEq (has no administration in time range)   potassium bicarbonate disintegrating tablet 35 mEq (has no administration in time range)   potassium bicarbonate disintegrating tablet 60 mEq (has no administration in time range)   magnesium oxide tablet 800 mg (has no administration in time range)   magnesium oxide tablet 800 mg (has no administration in time range)   potassium, sodium phosphates 280-160-250 mg packet 2 packet (has no administration in time range)   potassium, sodium phosphates 280-160-250 mg packet 2 packet (has no administration in time range)    potassium, sodium phosphates 280-160-250 mg packet 2 packet (has no administration in time range)   enoxaparin injection 40 mg (has no administration in time range)   promethazine (PHENERGAN) 12.5 mg in 0.9% NaCl 50 mL IVPB (has no administration in time range)   sodium chloride 0.9% bolus 1,000 mL 1,000 mL (0 mLs Intravenous Stopped 4/29/25 1330)   potassium chloride 10 mEq in 100 mL IVPB (0 mEq Intravenous Stopped 4/29/25 1330)   promethazine (PHENERGAN) 6.25 mg in 0.9% NaCl 50 mL IVPB (0 mg Intravenous Stopped 4/29/25 1330)   diphenhydrAMINE injection 12.5 mg (12.5 mg Intravenous Given 4/29/25 1223)   dexAMETHasone injection 4 mg (4 mg Intravenous Given 4/29/25 1226)     Medical Decision Making  Amount and/or Complexity of Data Reviewed  Independent Historian: caregiver  External Data Reviewed: labs, radiology and notes.  Labs:  Decision-making details documented in ED Course.    Risk  Prescription drug management.  Decision regarding hospitalization.               ED Course as of 04/29/25 1415   Tue Apr 29, 2025   1119 Sodium: 142 [EF]   1119 Potassium(!): 3.2 [EF]   1119 Chloride: 106 [EF]   1119 CO2: 26 [EF]   1119 Glucose: 105 [EF]   1119 BUN(!): 7 [EF]   1119 Creatinine: 0.7 [EF]   1119 Calcium: 8.9 [EF]   1119 PROTEIN TOTAL: 6.0 [EF]   1119 Albumin: 3.6 [EF]   1119 BILIRUBIN TOTAL: 0.5 [EF]   1119 ALP: 60 [EF]   1119 AST: 20 [EF]   1119 ALT: 19 [EF]   1119 WBC: 5.30 [EF]   1119 Hemoglobin: 12.8 [EF]   1119 Platelet Count: 291 [EF]   1119 BP(!): 170/100 [EF]   1119 BP(!): 150/80 [EF]   1119 MAP (mmHg): 108 [EF]   1119 Temp: 98.1 °F (36.7 °C) [EF]   1119 Pulse: 72 [EF]   1119 Pulse: 81 [EF]   1119 Resp: 16 [EF]   1119 SpO2(!): 93 % [EF]   1119 SpO2: 95 % [EF]   1207 Dr pinto to admit [EF]   1215 Patient just discharged from the hospital for intractable nausea and vomiting.  Had a 4 day stay.  Went home did well for a little while but symptoms returned yesterday.  I will bring her back in as she has vomited  today for several hours.  Finally got some relief with IV Zofran. Dr pinto. [EF]      ED Course User Index  [EF] Cody Zaragoza MD                           Clinical Impression:  Final diagnoses:  [R11.2] Nausea & vomiting          ED Disposition Condition    Observation                   [1]   Social History  Tobacco Use    Smoking status: Never    Smokeless tobacco: Never   Substance Use Topics    Alcohol use: Not Currently     Comment: rarely    Drug use: No        Cody Zaragoza MD  04/29/25 1916

## 2025-04-29 NOTE — PLAN OF CARE
PRATT signed and scanned into Media Manager.      04/29/25 1512   PRATT Message   Medicare Outpatient and Observation Notification regarding financial responsibility Explained to patient/caregiver;Signed/date by patient/caregiver   Date PRATT was signed 04/29/25   Time PRATT was signed [id number]

## 2025-04-29 NOTE — PLAN OF CARE
[facility]  Initial Discharge Assessment       Primary Care Provider: Oscar Lee FNP-C    Admission Diagnosis: Nausea & vomiting [R11.2]    Admission Date: 4/29/2025  Expected Discharge Date:     SW met with patient bedside. Allison Trejo, daughter, was present in the room assisting with assessment. SW verified demographics, insurance, supports, and PCP.  Patient reported living in the home with daughter and drives self or family brings her to appointments. SW assessed patient's needs. Patient is unable to complete ADLs independently. Patient verified at home DME: blood pressure machine, CPAP. Patient verified No HH, No Dialysis, No Blood Thinners, and No Oxygen.     Patient verified pharmacy of choice: [facility] [address]  Patient confirmed Allison Trejo, daughter, will be source of transportation at the time of discharge.     Transition of Care Barriers: None    [facility] [employer] [facility] / Plan: [employer] CHOICES / Product Type: Medicare Advantage /     Extended Emergency Contact Information  Primary Emergency Contact: Juan Sainz  Home Phone: [phone]  Mobile Phone: [phone]  Relation: Son  Preferred language: English   needed? No  Secondary Emergency Contact: Allison Trejo  Address: [address] [address] of [facility]  Home Phone: [phone]  Mobile Phone: [phone]  Relation: Daughter  Preferred language: English   needed? No    Discharge Plan A: Home Health  Discharge Plan B: Home Health      [id number] [address] & [address] [address]  Phone: [phone] Fax: [phone]      Initial Assessment (most recent)       Adult Discharge Assessment - 04/29/25 1504          Discharge Assessment    Assessment Type Discharge Planning Assessment     Confirmed/corrected address, phone number and insurance Yes      Confirmed Demographics Correct on Facesheet     Source of Information patient     When was your last doctors appointment? 04/28/25     Does patient/caregiver understand observation status Yes     Communicated ANGELI with patient/caregiver Date not available/Unable to determine     Reason For Admission Nausea & vomiting     People in Home child(larry), adult     Do you expect to return to your current living situation? Yes     Do you have help at home or someone to help you manage your care at home? Yes     Who are your caregiver(s) and their phone number(s)? Allison Trejo: Daughter [phone]     Prior to hospitilization cognitive status: Unable to Assess     Current cognitive status: Alert/Oriented     Walking or Climbing Stairs Difficulty yes     Walking or Climbing Stairs ambulation difficulty, assistance 1 person     Dressing/Bathing Difficulty yes     Dressing/Bathing bathing difficulty, assistance 1 person;dressing difficulty, assistance 1 person     Home Accessibility wheelchair accessible     Home Layout Able to live on 1st floor     Equipment Currently Used at Home blood pressure machine;CPAP     Readmission within 30 days? Yes     Patient currently being followed by outpatient case management? No     Do you currently have service(s) that help you manage your care at home? No     Name and Contact number of agency --     Is the pt/caregiver preference to resume services with current agency --     Do you take prescription medications? Yes     Do you have prescription coverage? Yes     Coverage [facility]     Do you have any problems affording any of your prescribed medications? No     Is the patient taking medications as prescribed? yes     Who is going to help you get home at discharge? Allison Trejo: Daughter [phone]     How do you get to doctors appointments? car, drives self     Are you on dialysis? No     Do you take coumadin? No     Discharge Plan A Home Health     Discharge Plan B Home  Health     DME Needed Upon Discharge  none     Discharge Plan discussed with: Patient     Transition of Care Barriers None

## 2025-04-29 NOTE — H&P
[facility] - Emergency Dept  Hospital Medicine  History & Physical    Patient Name: Skyler Espinoza  MRN: [id number]  Patient Class: OP- Observation  Admission Date: 4/29/2025  Attending Physician: Jose J Zepeda MD   Primary Care Provider: Oscar Lee FNP-C         Patient information was obtained from patient, relative(s), past medical records, ER records, and ER MD  .     Subjective:     Principal Problem:Nausea & vomiting    Chief Complaint:   Chief Complaint   Patient presents with    Nausea    Emesis        HPI: 74 year old pt getting admitted with incessant N&V ,possible status migrainous  Pt has been suffering from chronic N&V episodes and had multiple ER visits before  She recently had IP hospital stay  During last hospital stay She underwent EGD which showed  findings of gastritis and non-bleeding gastric ulcer.   HIDA scan showed findings compatible with biliary dyskinesia.   Pt was prescribed reglan for possible gastroparesis and is awaiting outpatient nuclear study  She has chronic headache from migraine  She did well after discharged but started having N&V episodes 2 days ago  Pt was taking zofran and phenergan with no effect   Today symptoms went worse and she came to ER and got admitted     Past Medical History:   Diagnosis Date    Depression     GERD (gastroesophageal reflux disease)     Hyperlipidemia     Hypertension     Hypothyroid     Sleep apnea, unspecified 2015       Past Surgical History:   Procedure Laterality Date    APPENDECTOMY      COLONOSCOPY  2017    Briana, repeat in 10    ESOPHAGOGASTRODUODENOSCOPY N/A 4/24/2025    Procedure: EGD (ESOPHAGOGASTRODUODENOSCOPY);  Surgeon: Maxim Garzon MD;  Location: [facility];  Service: Endoscopy;  Laterality: N/A;    TONSILLECTOMY      TUBAL LIGATION         Review of patient's allergies indicates:  No Known Allergies    No current facility-administered medications on file prior to encounter.     Current Outpatient  Medications on File Prior to Encounter   Medication Sig    acetaminophen (TYLENOL) 650 MG TbSR Take 650 mg by mouth every 8 (eight) hours.    aspirin-acetaminophen-caffeine 250-250-65 mg (EXCEDRIN MIGRAINE) 250-250-65 mg per tablet Take 1 tablet by mouth every 6 (six) hours as needed for Pain.    azelastine (ASTELIN) 137 mcg (0.1 %) nasal spray 1 spray (137 mcg total) by Nasal route nightly. Point up and slightly outward toward ear when spraying to avoid irritating nasal septum. With flonase.    cetirizine (ZYRTEC) 10 MG tablet Take 10 mg by mouth every evening.    fluticasone propionate (FLONASE) 50 mcg/actuation nasal spray 1 spray by Each Nostril route every evening.    levothyroxine (SYNTHROID) 112 MCG tablet Take 1 tablet (112 mcg total) by mouth before breakfast.    mag carb/aluminum hydrox/algin (GAVISCON EXTRA STRENGTH ORAL) Take 1 tablet by mouth 3 (three) times daily with meals. Pt takes 30min after meals takes tablet form    meclizine (ANTIVERT) 25 mg tablet Take 0.5 tablets (12.5 mg total) by mouth 3 (three) times daily as needed for Dizziness.    metoclopramide HCl (REGLAN) 5 MG tablet Take 1 tablet (5 mg total) by mouth 4 (four) times daily before meals and nightly.    metoprolol succinate (TOPROL-XL) 100 MG 24 hr tablet Take 100 mg by mouth nightly.    multivitamin (THERAGRAN) tablet Take 1 tablet by mouth once daily.    ondansetron (ZOFRAN-ODT) 8 MG TbDL Take 1 tablet (8 mg total) by mouth every 8 (eight) hours as needed (nausea and vomiting).    pantoprazole (PROTONIX) 40 MG tablet Take 1 tablet (40 mg total) by mouth 2 (two) times daily.    pseudoephedrine (SUDAFED) 30 MG tablet Take 30 mg by mouth every 4 (four) hours as needed for Congestion.    rizatriptan (MAXALT) 10 MG tablet Take 1 tablet (10 mg total) by mouth daily as needed for Migraine.    promethazine (PHENERGAN) 25 MG tablet Take 1 tablet (25 mg total) by mouth every 6 (six) hours as needed for Nausea. (Patient not taking: Reported on  4/29/2025)    scopolamine (TRANSDERM-SCOP) 1.3-1.5 mg (1 mg over 3 days) Place 1 patch onto the skin Every 3 (three) days. (Patient not taking: Reported on 4/29/2025)    sucralfate (CARAFATE) 100 mg/mL suspension Take 10 mLs (1 g total) by mouth 4 (four) times daily.     Family History       Problem Relation (Age of Onset)    Breast cancer Daughter    Cancer Father, Sister          Tobacco Use    Smoking status: Never    Smokeless tobacco: Never   Substance and Sexual Activity    Alcohol use: Not Currently     Comment: rarely    Drug use: No    Sexual activity: Yes     Review of Systems   Constitutional:  Negative for activity change and appetite change.   HENT:  Negative for congestion and dental problem.    Eyes:  Negative for discharge and itching.   Respiratory:  Negative for shortness of breath.    Cardiovascular:  Negative for chest pain.   Gastrointestinal:  Positive for nausea and vomiting. Negative for abdominal distention and abdominal pain.   Endocrine: Negative for cold intolerance.   Genitourinary:  Negative for difficulty urinating and dysuria.   Musculoskeletal:  Negative for arthralgias and back pain.   Skin:  Negative for color change.   Neurological:  Positive for headaches. Negative for dizziness and facial asymmetry.   Hematological:  Negative for adenopathy.   Psychiatric/Behavioral:  Negative for agitation and behavioral problems.      Objective:     Vital Signs (Most Recent):  Temp: 98.1 °F (36.7 °C) (04/29/25 1539)  Pulse: 71 (04/29/25 1636)  Resp: 16 (04/29/25 1539)  BP: (!) 166/94 (04/29/25 1539)  SpO2: 95 % (04/29/25 1539) Vital Signs (24h Range):  Temp:  [98.1 °F (36.7 °C)] 98.1 °F (36.7 °C)  Pulse:  [68-81] 71  Resp:  [15-16] 16  SpO2:  [93 %-98 %] 95 %  BP: (139-170)/() 166/94     Weight: 104.3 kg (230 lb)  Body mass index is 34.97 kg/m².     Physical Exam  Vitals and nursing note reviewed.   Constitutional:       General: She is not in acute distress.  HENT:      Head:  Atraumatic.      Right Ear: External ear normal.      Left Ear: External ear normal.      Nose: Nose normal.      Mouth/Throat:      Mouth: Mucous membranes are dry.   Eyes:      Extraocular Movements: Extraocular movements intact.   Cardiovascular:      Rate and Rhythm: Normal rate.   Pulmonary:      Effort: Pulmonary effort is normal.   Abdominal:      Palpations: Abdomen is soft.   Musculoskeletal:         General: Normal range of motion.      Cervical back: Normal range of motion.   Skin:     General: Skin is warm.   Neurological:      Mental Status: She is alert and oriented to person, place, and time.   Psychiatric:         Behavior: Behavior normal.                Significant Labs: All pertinent labs within the past 24 hours have been reviewed.  CBC:   Recent Labs   Lab 04/29/25  1037   WBC 5.30   HGB 12.8   HCT 39.4        CMP:   Recent Labs   Lab 04/29/25  1037      K 3.2*      CO2 26      BUN 7*   CREATININE 0.7   CALCIUM 8.9   PROT 6.0   ALBUMIN 3.6   BILITOT 0.5   ALKPHOS 60   AST 20   ALT 19   ANIONGAP 10       Significant Imaging: I have reviewed all pertinent imaging results/findings within the past 24 hours.    Assessment/Plan:     Assessment & Plan  Nausea & vomiting  Mostly from Status Migrainous   Will give one dose of iv decadron and iv benadryl  Iv compazine PRN basis  Maintain PO Reglan  If refractory will prescribe iv ativan for N&V    Obesity (BMI 30-39.9)  Body mass index is 34.97 kg/m². Morbid obesity complicates all aspects of disease management from diagnostic modalities to treatment.     Autoimmune thyroiditis  Aware     Gastroparesis  As mentioned in HPI  Maintain PO reglan    Gastritis  PPI    Hypokalemia  Monitor and replete   .   Recent Labs     04/27/25  0432 04/29/25  1037   K 3.6 3.2*       GISELE (obstructive sleep apnea)  Supposed to have sleep study today on OP basis  Pt has nocturnal; sat drops  Maintain O2    Migraine  As mentioned above  Pt need to  follow up with her Neurologist for possible Botox rx for Migraine        VTE Risk Mitigation (From admission, onward)           Ordered     enoxaparin injection 40 mg  Daily         04/29/25 1218     IP VTE HIGH RISK PATIENT  Once         04/29/25 1218     Place sequential compression device  Until discontinued         04/29/25 1218                                    Jose J Zepeda MD  Department of Hospital Medicine  [facility] - Emergency Dept

## 2025-04-30 LAB
ABSOLUTE EOSINOPHIL (SMH): 0.03 K/UL
ABSOLUTE MONOCYTE (SMH): 0.59 K/UL (ref 0.3–1)
ABSOLUTE NEUTROPHIL COUNT (SMH): 4.4 K/UL (ref 1.8–7.7)
ALBUMIN SERPL-MCNC: 3.2 G/DL (ref 3.5–5.2)
ALP SERPL-CCNC: 53 UNIT/L (ref 55–135)
ALT SERPL-CCNC: 17 UNIT/L (ref 10–44)
ANION GAP (SMH): 6 MMOL/L (ref 8–16)
AST SERPL-CCNC: 18 UNIT/L (ref 10–40)
BASOPHILS # BLD AUTO: 0.02 K/UL
BASOPHILS NFR BLD AUTO: 0.3 %
BILIRUB SERPL-MCNC: 0.5 MG/DL (ref 0.1–1)
BUN SERPL-MCNC: 8 MG/DL (ref 8–23)
CALCIUM SERPL-MCNC: 8.5 MG/DL (ref 8.7–10.5)
CHLORIDE SERPL-SCNC: 106 MMOL/L (ref 95–110)
CO2 SERPL-SCNC: 27 MMOL/L (ref 23–29)
CREAT SERPL-MCNC: 0.7 MG/DL (ref 0.5–1.4)
ERYTHROCYTE [DISTWIDTH] IN BLOOD BY AUTOMATED COUNT: 12.9 % (ref 11.5–14.5)
GFR SERPLBLD CREATININE-BSD FMLA CKD-EPI: >60 ML/MIN/1.73/M2
GLUCOSE SERPL-MCNC: 116 MG/DL (ref 70–110)
HCT VFR BLD AUTO: 36.1 % (ref 37–48.5)
HGB BLD-MCNC: 11.7 GM/DL (ref 12–16)
IMM GRANULOCYTES # BLD AUTO: 0.02 K/UL (ref 0–0.04)
IMM GRANULOCYTES NFR BLD AUTO: 0.3 % (ref 0–0.5)
LYMPHOCYTES # BLD AUTO: 1.78 K/UL (ref 1–4.8)
MAGNESIUM SERPL-MCNC: 2 MG/DL (ref 1.6–2.6)
MCH RBC QN AUTO: 29.2 PG (ref 27–31)
MCHC RBC AUTO-ENTMCNC: 32.4 G/DL (ref 32–36)
MCV RBC AUTO: 90 FL (ref 82–98)
NUCLEATED RBC (/100WBC) (SMH): 0 /100 WBC
OHS QRS DURATION: 94 MS
OHS QTC CALCULATION: 422 MS
PLATELET # BLD AUTO: 295 K/UL (ref 150–450)
PMV BLD AUTO: 10.6 FL (ref 9.2–12.9)
POTASSIUM SERPL-SCNC: 3.7 MMOL/L (ref 3.5–5.1)
PROT SERPL-MCNC: 5.4 GM/DL (ref 6–8.4)
RBC # BLD AUTO: 4.01 M/UL (ref 4–5.4)
RELATIVE EOSINOPHIL (SMH): 0.4 % (ref 0–8)
RELATIVE LYMPHOCYTE (SMH): 25.9 % (ref 18–48)
RELATIVE MONOCYTE (SMH): 8.6 % (ref 4–15)
RELATIVE NEUTROPHIL (SMH): 64.5 % (ref 38–73)
SODIUM SERPL-SCNC: 139 MMOL/L (ref 136–145)
WBC # BLD AUTO: 6.87 K/UL (ref 3.9–12.7)

## 2025-04-30 PROCEDURE — 96376 TX/PRO/DX INJ SAME DRUG ADON: CPT

## 2025-04-30 PROCEDURE — 25000003 PHARM REV CODE 250: Performed by: INTERNAL MEDICINE

## 2025-04-30 PROCEDURE — 80053 COMPREHEN METABOLIC PANEL: CPT | Performed by: INTERNAL MEDICINE

## 2025-04-30 PROCEDURE — G0425 INPT/ED TELECONSULT30: HCPCS | Mod: GT,,, | Performed by: STUDENT IN AN ORGANIZED HEALTH CARE EDUCATION/TRAINING PROGRAM

## 2025-04-30 PROCEDURE — 83735 ASSAY OF MAGNESIUM: CPT | Performed by: INTERNAL MEDICINE

## 2025-04-30 PROCEDURE — 36415 COLL VENOUS BLD VENIPUNCTURE: CPT | Performed by: INTERNAL MEDICINE

## 2025-04-30 PROCEDURE — 63600175 PHARM REV CODE 636 W HCPCS: Performed by: INTERNAL MEDICINE

## 2025-04-30 PROCEDURE — 85025 COMPLETE CBC W/AUTO DIFF WBC: CPT | Performed by: INTERNAL MEDICINE

## 2025-04-30 PROCEDURE — G0378 HOSPITAL OBSERVATION PER HR: HCPCS

## 2025-04-30 PROCEDURE — 96372 THER/PROPH/DIAG INJ SC/IM: CPT | Performed by: INTERNAL MEDICINE

## 2025-04-30 PROCEDURE — 63600175 PHARM REV CODE 636 W HCPCS: Performed by: STUDENT IN AN ORGANIZED HEALTH CARE EDUCATION/TRAINING PROGRAM

## 2025-04-30 PROCEDURE — 96367 TX/PROPH/DG ADDL SEQ IV INF: CPT

## 2025-04-30 PROCEDURE — 96365 THER/PROPH/DIAG IV INF INIT: CPT | Mod: 59

## 2025-04-30 PROCEDURE — 96366 THER/PROPH/DIAG IV INF ADDON: CPT

## 2025-04-30 PROCEDURE — 25000003 PHARM REV CODE 250: Performed by: STUDENT IN AN ORGANIZED HEALTH CARE EDUCATION/TRAINING PROGRAM

## 2025-04-30 PROCEDURE — 96375 TX/PRO/DX INJ NEW DRUG ADDON: CPT

## 2025-04-30 RX ORDER — MECLIZINE HCL 12.5 MG 12.5 MG/1
12.5 TABLET ORAL 3 TIMES DAILY PRN
Status: DISCONTINUED | OUTPATIENT
Start: 2025-04-30 | End: 2025-05-01 | Stop reason: HOSPADM

## 2025-04-30 RX ORDER — METOCLOPRAMIDE HYDROCHLORIDE 5 MG/ML
5 INJECTION INTRAMUSCULAR; INTRAVENOUS EVERY 6 HOURS
Status: DISCONTINUED | OUTPATIENT
Start: 2025-04-30 | End: 2025-05-01

## 2025-04-30 RX ORDER — PANTOPRAZOLE SODIUM 40 MG/10ML
40 INJECTION, POWDER, LYOPHILIZED, FOR SOLUTION INTRAVENOUS DAILY
Status: DISCONTINUED | OUTPATIENT
Start: 2025-05-01 | End: 2025-05-01 | Stop reason: HOSPADM

## 2025-04-30 RX ORDER — SUCRALFATE 1 G/10ML
1 SUSPENSION ORAL 4 TIMES DAILY
Status: DISCONTINUED | OUTPATIENT
Start: 2025-04-30 | End: 2025-05-01 | Stop reason: HOSPADM

## 2025-04-30 RX ORDER — MAGNESIUM SULFATE HEPTAHYDRATE 40 MG/ML
2 INJECTION, SOLUTION INTRAVENOUS ONCE
Status: DISCONTINUED | OUTPATIENT
Start: 2025-04-30 | End: 2025-05-01

## 2025-04-30 RX ORDER — METOCLOPRAMIDE 5 MG/1
5 TABLET ORAL
Status: DISCONTINUED | OUTPATIENT
Start: 2025-04-30 | End: 2025-04-30

## 2025-04-30 RX ORDER — METOCLOPRAMIDE 5 MG/1
10 TABLET ORAL
Status: DISCONTINUED | OUTPATIENT
Start: 2025-04-30 | End: 2025-04-30

## 2025-04-30 RX ORDER — SCOPOLAMINE 1 MG/3D
1 PATCH, EXTENDED RELEASE TRANSDERMAL
Status: DISCONTINUED | OUTPATIENT
Start: 2025-04-30 | End: 2025-05-01 | Stop reason: HOSPADM

## 2025-04-30 RX ADMIN — PANTOPRAZOLE SODIUM 40 MG: 40 TABLET, DELAYED RELEASE ORAL at 05:04

## 2025-04-30 RX ADMIN — METOCLOPRAMIDE 5 MG: 5 TABLET ORAL at 05:04

## 2025-04-30 RX ADMIN — ONDANSETRON 4 MG: 2 INJECTION INTRAMUSCULAR; INTRAVENOUS at 01:04

## 2025-04-30 RX ADMIN — PROCHLORPERAZINE EDISYLATE 2.5 MG: 5 INJECTION INTRAMUSCULAR; INTRAVENOUS at 02:04

## 2025-04-30 RX ADMIN — MECLIZINE 12.5 MG: 12.5 TABLET ORAL at 09:04

## 2025-04-30 RX ADMIN — LEVOTHYROXINE SODIUM 112 MCG: 112 TABLET ORAL at 05:04

## 2025-04-30 RX ADMIN — ACETAMINOPHEN 650 MG: 325 TABLET ORAL at 05:04

## 2025-04-30 RX ADMIN — ENOXAPARIN SODIUM 40 MG: 40 INJECTION SUBCUTANEOUS at 05:04

## 2025-04-30 RX ADMIN — ONDANSETRON 4 MG: 2 INJECTION INTRAMUSCULAR; INTRAVENOUS at 05:04

## 2025-04-30 RX ADMIN — SUMATRIPTAN SUCCINATE 50 MG: 50 TABLET ORAL at 07:04

## 2025-04-30 RX ADMIN — HYDROCODONE BITARTRATE AND ACETAMINOPHEN 1 TABLET: 5; 325 TABLET ORAL at 07:04

## 2025-04-30 RX ADMIN — POTASSIUM BICARBONATE 50 MEQ: 978 TABLET, EFFERVESCENT ORAL at 09:04

## 2025-04-30 RX ADMIN — SUCRALFATE ORAL 1 G: 1 SUSPENSION ORAL at 08:04

## 2025-04-30 RX ADMIN — METOCLOPRAMIDE 5 MG: 5 INJECTION, SOLUTION INTRAMUSCULAR; INTRAVENOUS at 06:04

## 2025-04-30 RX ADMIN — PROMETHAZINE HYDROCHLORIDE 12.5 MG: 25 INJECTION INTRAMUSCULAR; INTRAVENOUS at 04:04

## 2025-04-30 RX ADMIN — SODIUM CHLORIDE 1000 MG: 9 INJECTION, SOLUTION INTRAVENOUS at 02:04

## 2025-04-30 RX ADMIN — ONDANSETRON 4 MG: 2 INJECTION INTRAMUSCULAR; INTRAVENOUS at 07:04

## 2025-04-30 RX ADMIN — SCOPOLAMINE 1 PATCH: 1.5 PATCH, EXTENDED RELEASE TRANSDERMAL at 05:04

## 2025-04-30 RX ADMIN — HYDROCODONE BITARTRATE AND ACETAMINOPHEN 1 TABLET: 5; 325 TABLET ORAL at 11:04

## 2025-04-30 RX ADMIN — SUMATRIPTAN SUCCINATE 50 MG: 50 TABLET ORAL at 04:04

## 2025-04-30 RX ADMIN — METOCLOPRAMIDE 5 MG: 5 TABLET ORAL at 10:04

## 2025-04-30 RX ADMIN — PROMETHAZINE HYDROCHLORIDE 12.5 MG: 25 INJECTION INTRAMUSCULAR; INTRAVENOUS at 09:04

## 2025-04-30 RX ADMIN — METOPROLOL SUCCINATE 100 MG: 50 TABLET, EXTENDED RELEASE ORAL at 08:04

## 2025-04-30 NOTE — HOSPITAL COURSE
Patient with history of gastritis, hypertension, hypothyroidism, migraine, biliary dyskinesia presented with nausea and vomiting and headache.  Was recently hospitalized for similar presentation, at that time nausea vomiting thought to be possibly secondary to migraines versus gastroparesis versus biliary dyskinesia.  Patient was instructed to follow up outpatient with GI for a gastric emptying study.  Neurology consulted for migraine, appreciate recs, ultimately needs follow up outpatient. MRI brain ordered, negative for acute findings. Surgery consulted for bilary dyskinesia, no acute surgical intervention needed can follow up outpatient.  Nausea and vomiting improving, patient tolerating diet.  Instructed patient and daughter to advance diet as tolerated, follow up with PCP and Gastroenterology outpatient.  Started the patient on Topamax outpatient per neurology recommendations.    Physical Exam  Constitutional:       General: She is not in acute distress.  Cardiovascular:      Rate and Rhythm: Regular rhythm.   Pulmonary:      Effort: No respiratory distress.      Breath sounds: No wheezing

## 2025-04-30 NOTE — SUBJECTIVE & OBJECTIVE
HPI:  74 y.o. female with medical history of gastritis, hypertension, hypothyroidism, chronic migraine, biliary dyskinesia with admission concerns for intractable headaches. And neurology consulted for the same.    History from patient / medical records review. Headaches x several years / daily basis / right sided - throbbing in nature / with associated photosensitivity, intermittent N,V / with varying intensity with PRN medications. Chronic headaches - however not on daily preventive medications. No focal defictis / no cranial nerve deficits. No history of strokes / seizures / complex migraines. No clear triggers or postural components.     Changes in headaches spectrum during this time / with more intractable nausea and vomiting / with confounders of chronic gastroparesis.      Images personally reviewed and interpreted:  Study: Head CT  Study Interpretation: no acute findings     Additional studies reviewed:   Study: Cardiac_Neuro: EEG  Study Interpretation: N/A    Laboratory studies reviewed:  BMP:   Lab Results   Component Value Date    GLUCOSE 115 (H) 05/02/2013     04/30/2025    K 3.7 04/30/2025     04/30/2025    CO2 27 04/30/2025    BUN 8 04/30/2025    CREATININE 0.7 04/30/2025    CREATININE 0.8 05/02/2013    CALCIUM 8.5 (L) 04/30/2025    CALCIUM 10.0 05/02/2013     CBC:   Lab Results   Component Value Date    WBC 6.87 04/30/2025    RBC 4.01 04/30/2025    HGB 11.7 (L) 04/30/2025    HGB 14.5 05/02/2013    HCT 36.1 (L) 04/30/2025     04/30/2025    MCV 90 04/30/2025    MCH 29.2 04/30/2025    MCHC 32.4 04/30/2025     Lipid Panel:   Lab Results   Component Value Date    CHOL 273 (H) 01/14/2025    LDLCALC 183 (H) 01/14/2025    HDL 68 01/14/2025    TRIG 122 01/14/2025     Coagulation:   Lab Results   Component Value Date    INR 0.9 11/15/2024     Hgb A1C:   Lab Results   Component Value Date    HGBA1C 5.1 04/25/2025     TSH:   Lab Results   Component Value Date    TSH 2.644 04/10/2025        Documentation personally reviewed:  Notes: Notes: H&P     Assessment and plan:  74 y.o. female with medical history of gastritis, hypertension, hypothyroidism, chronic migraine, biliary dyskinesia with admission concerns for intractable headaches. And neurology consulted for the same.    History from patient / medical records review. Headaches x several years / daily basis / right sided - throbbing in nature / with associated photosensitivity, intermittent N,V / with varying intensity with PRN medications. Chronic headaches - however not on daily preventive medications. No focal defictis / no cranial nerve deficits. No history of strokes / seizures / complex migraines. No clear triggers or postural components.     Changes in headaches spectrum during this time / with more intractable nausea and vomiting / with confounders of chronic gastroparesis.     Recs:  Intractable headaches - suspect migrainous pattern rather other primary headaches or secondary etiology / however needs rule out      MRI brain wo contrast for further evaluation     Headache management - abortive therapy   - IV MgSo4 2g ONCE + IV Valproic acid 1g ONCE    Any further add on's as needed   - IV MgSo4 2g q12 hrs PRN 1-2 days +/-   - IV Valproic acid 1g q24 hr PRN 1-2 days +/-   - IV Solumedrol 1 g q24 hr PRN 1-2 days  - Compazine prn for nausea / vomiting     - Pharmacological options:   OP Abortive therapy: tylenol prn / ibuporfen prn  vs PRN sumatriptan 50 mg as a single dose. If symptoms persist or return, may repeat dose (usually same as first dose) after >=2 hours.     --- Preventive therapy: Discharge on Magnesium oxide 400 mg daily  / riboflavin 400 mg daily combination at TX + Topamax 25 mg once daily; increase to 50 mg increments at intervals >=1 week.   --- OP neurology for further regulation on abortive and preventive options - Needs optimization with newer generation agents    -  on importance of maintainance of head diary  / identification of triggers, control of triggers   - Lifestyle modifications -   on healthy sleep hygiene / dietary patterns / avoidance of stressors / compliance with medications      Post charge discharge plan:  Clinic follow up: Headache    Visit Type: in-person only  Timeframe: 4 weeks

## 2025-04-30 NOTE — SUBJECTIVE & OBJECTIVE
Past Medical History:   Diagnosis Date    Depression     GERD (gastroesophageal reflux disease)     Hyperlipidemia     Hypertension     Hypothyroid     Sleep apnea, unspecified 2015       Past Surgical History:   Procedure Laterality Date    APPENDECTOMY      COLONOSCOPY  2017    Briana, repeat in 10    ESOPHAGOGASTRODUODENOSCOPY N/A 4/24/2025    Procedure: EGD (ESOPHAGOGASTRODUODENOSCOPY);  Surgeon: Maxim Garzon MD;  Location: [facility];  Service: Endoscopy;  Laterality: N/A;    TONSILLECTOMY      TUBAL LIGATION         Review of patient's allergies indicates:  No Known Allergies    No current facility-administered medications on file prior to encounter.     Current Outpatient Medications on File Prior to Encounter   Medication Sig    acetaminophen (TYLENOL) 650 MG TbSR Take 650 mg by mouth every 8 (eight) hours.    aspirin-acetaminophen-caffeine 250-250-65 mg (EXCEDRIN MIGRAINE) 250-250-65 mg per tablet Take 1 tablet by mouth every 6 (six) hours as needed for Pain.    azelastine (ASTELIN) 137 mcg (0.1 %) nasal spray 1 spray (137 mcg total) by Nasal route nightly. Point up and slightly outward toward ear when spraying to avoid irritating nasal septum. With flonase.    cetirizine (ZYRTEC) 10 MG tablet Take 10 mg by mouth every evening.    fluticasone propionate (FLONASE) 50 mcg/actuation nasal spray 1 spray by Each Nostril route every evening.    levothyroxine (SYNTHROID) 112 MCG tablet Take 1 tablet (112 mcg total) by mouth before breakfast.    mag carb/aluminum hydrox/algin (GAVISCON EXTRA STRENGTH ORAL) Take 1 tablet by mouth 3 (three) times daily with meals. Pt takes 30min after meals takes tablet form    meclizine (ANTIVERT) 25 mg tablet Take 0.5 tablets (12.5 mg total) by mouth 3 (three) times daily as needed for Dizziness.    metoclopramide HCl (REGLAN) 5 MG tablet Take 1 tablet (5 mg total) by mouth 4 (four) times daily before meals and nightly.    metoprolol succinate (TOPROL-XL) 100 MG 24 hr tablet  Take 100 mg by mouth nightly.    multivitamin (THERAGRAN) tablet Take 1 tablet by mouth once daily.    ondansetron (ZOFRAN-ODT) 8 MG TbDL Take 1 tablet (8 mg total) by mouth every 8 (eight) hours as needed (nausea and vomiting).    pantoprazole (PROTONIX) 40 MG tablet Take 1 tablet (40 mg total) by mouth 2 (two) times daily.    pseudoephedrine (SUDAFED) 30 MG tablet Take 30 mg by mouth every 4 (four) hours as needed for Congestion.    rizatriptan (MAXALT) 10 MG tablet Take 1 tablet (10 mg total) by mouth daily as needed for Migraine.    promethazine (PHENERGAN) 25 MG tablet Take 1 tablet (25 mg total) by mouth every 6 (six) hours as needed for Nausea. (Patient not taking: Reported on 4/29/2025)    scopolamine (TRANSDERM-SCOP) 1.3-1.5 mg (1 mg over 3 days) Place 1 patch onto the skin Every 3 (three) days. (Patient not taking: Reported on 4/29/2025)    sucralfate (CARAFATE) 100 mg/mL suspension Take 10 mLs (1 g total) by mouth 4 (four) times daily.     Family History       Problem Relation (Age of Onset)    Breast cancer Daughter    Cancer Father, Sister          Tobacco Use    Smoking status: Never    Smokeless tobacco: Never   Substance and Sexual Activity    Alcohol use: Not Currently     Comment: rarely    Drug use: No    Sexual activity: Yes     Review of Systems   Constitutional:  Negative for activity change and appetite change.   HENT:  Negative for congestion and dental problem.    Eyes:  Negative for discharge and itching.   Respiratory:  Negative for shortness of breath.    Cardiovascular:  Negative for chest pain.   Gastrointestinal:  Positive for nausea and vomiting. Negative for abdominal distention and abdominal pain.   Endocrine: Negative for cold intolerance.   Genitourinary:  Negative for difficulty urinating and dysuria.   Musculoskeletal:  Negative for arthralgias and back pain.   Skin:  Negative for color change.   Neurological:  Positive for headaches. Negative for dizziness and facial asymmetry.    Hematological:  Negative for adenopathy.   Psychiatric/Behavioral:  Negative for agitation and behavioral problems.      Objective:     Vital Signs (Most Recent):  Temp: 98.1 °F (36.7 °C) (04/29/25 1539)  Pulse: 71 (04/29/25 1636)  Resp: 16 (04/29/25 1539)  BP: (!) 166/94 (04/29/25 1539)  SpO2: 95 % (04/29/25 1539) Vital Signs (24h Range):  Temp:  [98.1 °F (36.7 °C)] 98.1 °F (36.7 °C)  Pulse:  [68-81] 71  Resp:  [15-16] 16  SpO2:  [93 %-98 %] 95 %  BP: (139-170)/() 166/94     Weight: 104.3 kg (230 lb)  Body mass index is 34.97 kg/m².     Physical Exam  Vitals and nursing note reviewed.   Constitutional:       General: She is not in acute distress.  HENT:      Head: Atraumatic.      Right Ear: External ear normal.      Left Ear: External ear normal.      Nose: Nose normal.      Mouth/Throat:      Mouth: Mucous membranes are dry.   Eyes:      Extraocular Movements: Extraocular movements intact.   Cardiovascular:      Rate and Rhythm: Normal rate.   Pulmonary:      Effort: Pulmonary effort is normal.   Abdominal:      Palpations: Abdomen is soft.   Musculoskeletal:         General: Normal range of motion.      Cervical back: Normal range of motion.   Skin:     General: Skin is warm.   Neurological:      Mental Status: She is alert and oriented to person, place, and time.   Psychiatric:         Behavior: Behavior normal.                Significant Labs: All pertinent labs within the past 24 hours have been reviewed.  CBC:   Recent Labs   Lab 04/29/25  1037   WBC 5.30   HGB 12.8   HCT 39.4        CMP:   Recent Labs   Lab 04/29/25  1037      K 3.2*      CO2 26      BUN 7*   CREATININE 0.7   CALCIUM 8.9   PROT 6.0   ALBUMIN 3.6   BILITOT 0.5   ALKPHOS 60   AST 20   ALT 19   ANIONGAP 10       Significant Imaging: I have reviewed all pertinent imaging results/findings within the past 24 hours.

## 2025-04-30 NOTE — PROGRESS NOTES
[facility] Medicine  Progress Note    Patient Name: Skyler Espinoza  MRN: [id number]  Patient Class: OP- Observation   Admission Date: 4/29/2025  Length of Stay: 0 days  Attending Physician: Ed Guo MD  Primary Care Provider: Oscar Lee FNP-C        Subjective     Principal Problem:Intractable nausea and vomiting        HPI:  74 year old pt getting admitted with incessant N&V ,possible status migrainous  Pt has been suffering from chronic N&V episodes and had multiple ER visits before  She recently had IP hospital stay  During last hospital stay She underwent EGD which showed  findings of gastritis and non-bleeding gastric ulcer.   HIDA scan showed findings compatible with biliary dyskinesia.   Pt was prescribed reglan for possible gastroparesis and is awaiting outpatient nuclear study  She has chronic headache from migraine  She did well after discharged but started having N&V episodes 2 days ago  Pt was taking zofran and phenergan with no effect   Today symptoms went worse and she came to ER and got admitted     Overview/Hospital Course:  Patient with history of gastritis, hypertension, hypothyroidism, migraine, biliary dyskinesia presented with nausea and vomiting and headache.  Was recently hospitalized for similar presentation, at that time nausea vomiting thought to be possibly secondary to migraines versus gastroparesis versus biliary dyskinesia.  Patient was instructed to follow up outpatient with GI for a gastric emptying study.  Neurology consulted for migraine.    Interval History:  Patient seen and examined this morning, nausea and vomiting seemed to have improved.  Still having a headache.  Neurology consulted.    Review of Systems   Constitutional:  Negative for diaphoresis.   Respiratory:  Negative for shortness of breath.    Neurological:  Positive for headaches.     Objective:     Vital Signs (Most Recent):  Temp: 97.7 °F (36.5 °C) (04/30/25 1133)  Pulse:  74 (04/30/25 1133)  Resp: 18 (04/30/25 1158)  BP: (!) 150/85 (04/30/25 1133)  SpO2: 96 % (04/30/25 1133) Vital Signs (24h Range):  Temp:  [97.7 °F (36.5 °C)-98.1 °F (36.7 °C)] 97.7 °F (36.5 °C)  Pulse:  [69-85] 74  Resp:  [15-18] 18  SpO2:  [92 %-98 %] 96 %  BP: (120-166)/(69-94) 150/85     Weight: 107.3 kg (236 lb 8.9 oz)  Body mass index is 35.97 kg/m².    Intake/Output Summary (Last 24 hours) at 4/30/2025 1205  Last data filed at 4/30/2025 0910  Gross per 24 hour   Intake 860 ml   Output 501 ml   Net 359 ml         Physical Exam  Constitutional:       General: She is not in acute distress.  Cardiovascular:      Rate and Rhythm: Regular rhythm.   Pulmonary:      Effort: No respiratory distress.      Breath sounds: No wheezing.   Abdominal:      Palpations: Abdomen is soft.   Neurological:      General: No focal deficit present.      Mental Status: She is alert.      Comments: Dementia               Significant Labs: All pertinent labs within the past 24 hours have been reviewed.  CBC:   Recent Labs   Lab 04/29/25  1037 04/30/25  0439   WBC 5.30 6.87   HGB 12.8 11.7*   HCT 39.4 36.1*    295     CMP:   Recent Labs   Lab 04/29/25  1037 04/30/25  0439    139   K 3.2* 3.7    106   CO2 26 27    116*   BUN 7* 8   CREATININE 0.7 0.7   CALCIUM 8.9 8.5*   PROT 6.0 5.4*   ALBUMIN 3.6 3.2*   BILITOT 0.5 0.5   ALKPHOS 60 53*   AST 20 18   ALT 19 17   ANIONGAP 10 6*       Significant Imaging: I have reviewed all pertinent imaging results/findings within the past 24 hours.      Assessment & Plan  Intractable nausea and vomiting  Likely from Status Migrainous   Received IV Decadron and Benadryl, continue Reglan  Does not appear in any acute distress    Obesity (BMI 30-39.9)  Body mass index is 35.97 kg/m². Morbid obesity complicates all aspects of disease management from diagnostic modalities to treatment.     Autoimmune thyroiditis  Aware     Gastroparesis  As mentioned in HPI  Maintain PO  reglan    Gastritis  PPI    Hypokalemia  Monitor and replete   .   Recent Labs     04/29/25  1037 04/30/25  0439   K 3.2* 3.7       GISELE (obstructive sleep apnea)  Supposed to have sleep study today on OP basis  Pt has nocturnal; sat drops  Maintain O2    Migraine  As mentioned above  Neurology consulted      VTE Risk Mitigation (From admission, onward)           Ordered     enoxaparin injection 40 mg  Daily         04/29/25 1218     IP VTE HIGH RISK PATIENT  Once         04/29/25 1218     Place sequential compression device  Until discontinued         04/29/25 1218                    Discharge Planning   ANGELI: 5/1/2025     Code Status: Full Code   Medical Readiness for Discharge Date:   Discharge Plan A: Home Health                        Ed Guo MD  Department of Hospital Medicine   [facility]

## 2025-04-30 NOTE — HPI
74 year old pt getting admitted with incessant N&V ,possible status migrainous  Pt has been suffering from chronic N&V episodes and had multiple ER visits before  She recently had IP hospital stay  During last hospital stay She underwent EGD which showed  findings of gastritis and non-bleeding gastric ulcer.   HIDA scan showed findings compatible with biliary dyskinesia.   Pt was prescribed reglan for possible gastroparesis and is awaiting outpatient nuclear study  She has chronic headache from migraine  She did well after discharged but started having N&V episodes 2 days ago  Pt was taking zofran and phenergan with no effect   Today symptoms went worse and she came to ER and got admitted

## 2025-04-30 NOTE — TELEMEDICINE CONSULT
"[facility]   General Neurology  Consult Note      Consult Information  Inpatient Consult to Neurology Services (General Neurology)  Consult performed by: Jane Solis MD  Consult ordered by: Ed Guo MD          Consulting Provider:    Current Providers  No providers found    Patient Location:  [place] [address] [address]  Unit    Spoke hospital nurse at bedside with patient assisting consultant.  Patient information was obtained from patient and relative(s).       Neurology Documentation  Acute Stroke Times   Stroke Team Called Date: 04/30/25  Stroke Team Called Time: 1100  Stroke Team Arrival Date: 04/30/25  Stroke Team Arrival Time: 1330    Blood pressure (!) 150/85, pulse 74, temperature 97.7 °F (36.5 °C), temperature source Oral, resp. rate 18, height 5' 8" (1.727 m), weight 107.3 kg (236 lb 8.9 oz), SpO2 96%.    Medical Decision Making  HPI:  74 y.o. female with medical history of gastritis, hypertension, hypothyroidism, chronic migraine, biliary dyskinesia with admission concerns for intractable headaches. And neurology consulted for the same.    History from patient / medical records review. Headaches x several years / daily basis / right sided - throbbing in nature / with associated photosensitivity, intermittent N,V / with varying intensity with PRN medications. Chronic headaches - however not on daily preventive medications. No focal defictis / no cranial nerve deficits. No history of strokes / seizures / complex migraines. No clear triggers or postural components.     Changes in headaches spectrum during this time / with more intractable nausea and vomiting / with confounders of chronic gastroparesis.      Images personally reviewed and interpreted:  Study: Head CT  Study Interpretation: no acute findings     Additional studies reviewed:   Study: Cardiac_Neuro: EEG  Study Interpretation: N/A    Laboratory studies reviewed:  BMP:   Lab Results   Component Value Date    GLUCOSE 115 (H) 05/02/2013 "     04/30/2025    K 3.7 04/30/2025     04/30/2025    CO2 27 04/30/2025    BUN 8 04/30/2025    CREATININE 0.7 04/30/2025    CREATININE 0.8 05/02/2013    CALCIUM 8.5 (L) 04/30/2025    CALCIUM 10.0 05/02/2013     CBC:   Lab Results   Component Value Date    WBC 6.87 04/30/2025    RBC 4.01 04/30/2025    HGB 11.7 (L) 04/30/2025    HGB 14.5 05/02/2013    HCT 36.1 (L) 04/30/2025     04/30/2025    MCV 90 04/30/2025    MCH 29.2 04/30/2025    MCHC 32.4 04/30/2025     Lipid Panel:   Lab Results   Component Value Date    CHOL 273 (H) 01/14/2025    LDLCALC 183 (H) 01/14/2025    HDL 68 01/14/2025    TRIG 122 01/14/2025     Coagulation:   Lab Results   Component Value Date    INR 0.9 11/15/2024     Hgb A1C:   Lab Results   Component Value Date    HGBA1C 5.1 04/25/2025     TSH:   Lab Results   Component Value Date    TSH 2.644 04/10/2025       Documentation personally reviewed:  Notes: Notes: H&P     Assessment and plan:  74 y.o. female with medical history of gastritis, hypertension, hypothyroidism, chronic migraine, biliary dyskinesia with admission concerns for intractable headaches. And neurology consulted for the same.    History from patient / medical records review. Headaches x several years / daily basis / right sided - throbbing in nature / with associated photosensitivity, intermittent N,V / with varying intensity with PRN medications. Chronic headaches - however not on daily preventive medications. No focal defictis / no cranial nerve deficits. No history of strokes / seizures / complex migraines. No clear triggers or postural components.     Changes in headaches spectrum during this time / with more intractable nausea and vomiting / with confounders of chronic gastroparesis.     Recs:  Intractable headaches - suspect migrainous pattern rather other primary headaches or secondary etiology / however needs rule out      MRI brain wo contrast for further evaluation     Headache management - abortive  therapy   - IV MgSo4 2g ONCE + IV Valproic acid 1g ONCE    Any further add on's as needed   - IV MgSo4 2g q12 hrs PRN 1-2 days +/-   - IV Valproic acid 1g q24 hr PRN 1-2 days +/-   - IV Solumedrol 1 g q24 hr PRN 1-2 days  - Compazine prn for nausea / vomiting     - Pharmacological options:   OP Abortive therapy: tylenol prn / ibuporfen prn  vs PRN sumatriptan 50 mg as a single dose. If symptoms persist or return, may repeat dose (usually same as first dose) after >=2 hours.     --- Preventive therapy: Discharge on Magnesium oxide 400 mg daily  / riboflavin 400 mg daily combination at Dc + Topamax 25 mg once daily; increase to 50 mg increments at intervals >=1 week.   --- OP neurology for further regulation on abortive and preventive options - Needs optimization with newer generation agents    -  on importance of maintainance of head diary / identification of triggers, control of triggers   - Lifestyle modifications -   on healthy sleep hygiene / dietary patterns / avoidance of stressors / compliance with medications      Post charge discharge plan:  Clinic follow up: Headache    Visit Type: in-person only  Timeframe: 4 weeks        Additional Physical Exam, History, & ROS  ROS  Physical Exam    Awake alert following commands   Oriented x 3   No aphasia or cortical signs   No focal motor or sensory deficits or cranial nerve deficits   No resting tremor / Dyskinesia      Past Medical History:   Diagnosis Date    Depression     GERD (gastroesophageal reflux disease)     Hyperlipidemia     Hypertension     Hypothyroid     Sleep apnea, unspecified 2015     Past Surgical History:   Procedure Laterality Date    APPENDECTOMY      COLONOSCOPY  2017    Briana, repeat in 10    ESOPHAGOGASTRODUODENOSCOPY N/A 4/24/2025    Procedure: EGD (ESOPHAGOGASTRODUODENOSCOPY);  Surgeon: Maxim Garzon MD;  [facility]: [facility];  Service: Endoscopy;  Laterality: N/A;    TONSILLECTOMY      TUBAL LIGATION       Family History    Problem Relation Name Age of Onset    Cancer Father      Cancer Sister      Breast cancer Daughter      Glaucoma Neg Hx      Macular degeneration Neg Hx      Retinal detachment Neg Hx      Ovarian cancer Neg Hx      Eczema Neg Hx      Lupus Neg Hx      Melanoma Neg Hx      Psoriasis Neg Hx         Diagnoses  Problem Noted   Migraine 4/29/2025       Jane Solis MD    Neurology consultation requested by spoke provider. Audiovisual encounter with the patient performed using a secure connection.  Results and impressions from the visit are documented on this note and were communicated to the consulting provider/team via direct communication. The note has been shared for addition to the patients electronic medical record.

## 2025-04-30 NOTE — PROGRESS NOTES
Pharmacist Renal Dose Adjustment Note    Skyler Espinoza is a 74 y.o. female being treated with the medication Reglan    Patient Data:    Vital Signs (Most Recent):  Temp: 98.1 °F (36.7 °C) (04/30/25 0910)  Pulse: 80 (04/30/25 0910)  Resp: 18 (04/30/25 0910)  BP: (!) 152/69 (04/30/25 0910)  SpO2: (!) 94 % (04/30/25 0910) Vital Signs (72h Range):  Temp:  [97.7 °F (36.5 °C)-98.4 °F (36.9 °C)]   Pulse:  [68-85]   Resp:  [15-18]   BP: (110-170)/()   SpO2:  [92 %-98 %]      Recent Labs   Lab 04/27/25  0432 04/29/25  1037 04/30/25  0439   CREATININE 0.5 0.7 0.7     Serum creatinine: 0.7 mg/dL 04/30/25 0439  Estimated creatinine clearance: 91.9 mL/min    Medication:reglan dose: 5mg frequency before meals and nightly will be changed to medication:reglan dose:10mg frequency:before meals and nightly    Pharmacist's Name: Pilar Alaniz  Pharmacist's Extension: [phone]

## 2025-04-30 NOTE — SUBJECTIVE & OBJECTIVE
Interval History:  Patient seen and examined this morning, nausea and vomiting seemed to have improved.  Still having a headache.  Neurology consulted.    Review of Systems   Constitutional:  Negative for diaphoresis.   Respiratory:  Negative for shortness of breath.    Neurological:  Positive for headaches.     Objective:     Vital Signs (Most Recent):  Temp: 97.7 °F (36.5 °C) (04/30/25 1133)  Pulse: 74 (04/30/25 1133)  Resp: 18 (04/30/25 1158)  BP: (!) 150/85 (04/30/25 1133)  SpO2: 96 % (04/30/25 1133) Vital Signs (24h Range):  Temp:  [97.7 °F (36.5 °C)-98.1 °F (36.7 °C)] 97.7 °F (36.5 °C)  Pulse:  [69-85] 74  Resp:  [15-18] 18  SpO2:  [92 %-98 %] 96 %  BP: (120-166)/(69-94) 150/85     Weight: 107.3 kg (236 lb 8.9 oz)  Body mass index is 35.97 kg/m².    Intake/Output Summary (Last 24 hours) at 4/30/2025 1205  Last data filed at 4/30/2025 0910  Gross per 24 hour   Intake 860 ml   Output 501 ml   Net 359 ml         Physical Exam  Constitutional:       General: She is not in acute distress.  Cardiovascular:      Rate and Rhythm: Regular rhythm.   Pulmonary:      Effort: No respiratory distress.      Breath sounds: No wheezing.   Abdominal:      Palpations: Abdomen is soft.   Neurological:      General: No focal deficit present.      Mental Status: She is alert.      Comments: Dementia               Significant Labs: All pertinent labs within the past 24 hours have been reviewed.  CBC:   Recent Labs   Lab 04/29/25  1037 04/30/25  0439   WBC 5.30 6.87   HGB 12.8 11.7*   HCT 39.4 36.1*    295     CMP:   Recent Labs   Lab 04/29/25  1037 04/30/25  0439    139   K 3.2* 3.7    106   CO2 26 27    116*   BUN 7* 8   CREATININE 0.7 0.7   CALCIUM 8.9 8.5*   PROT 6.0 5.4*   ALBUMIN 3.6 3.2*   BILITOT 0.5 0.5   ALKPHOS 60 53*   AST 20 18   ALT 19 17   ANIONGAP 10 6*       Significant Imaging: I have reviewed all pertinent imaging results/findings within the past 24 hours.

## 2025-04-30 NOTE — CONSULTS
[facility] VASCULAR ACCESS NOTE       Bed:3016/3016-A    20G x 1.75IN [facility].    Indication: PVA  Attempts: 1    Levi Trent RN

## 2025-05-01 VITALS
HEIGHT: 68 IN | DIASTOLIC BLOOD PRESSURE: 78 MMHG | WEIGHT: 236.56 LBS | SYSTOLIC BLOOD PRESSURE: 124 MMHG | RESPIRATION RATE: 18 BRPM | HEART RATE: 82 BPM | BODY MASS INDEX: 35.85 KG/M2 | TEMPERATURE: 98 F | OXYGEN SATURATION: 95 %

## 2025-05-01 PROBLEM — E87.6 HYPOKALEMIA: Status: RESOLVED | Noted: 2025-04-29 | Resolved: 2025-05-01

## 2025-05-01 PROBLEM — R11.2 INTRACTABLE NAUSEA AND VOMITING: Status: RESOLVED | Noted: 2025-04-29 | Resolved: 2025-05-01

## 2025-05-01 LAB
ANION GAP (SMH): 7 MMOL/L (ref 8–16)
BUN SERPL-MCNC: 6 MG/DL (ref 8–23)
CALCIUM SERPL-MCNC: 8.6 MG/DL (ref 8.7–10.5)
CHLORIDE SERPL-SCNC: 104 MMOL/L (ref 95–110)
CO2 SERPL-SCNC: 28 MMOL/L (ref 23–29)
CREAT SERPL-MCNC: 0.7 MG/DL (ref 0.5–1.4)
GFR SERPLBLD CREATININE-BSD FMLA CKD-EPI: >60 ML/MIN/1.73/M2
GLUCOSE SERPL-MCNC: 87 MG/DL (ref 70–110)
POTASSIUM SERPL-SCNC: 3.8 MMOL/L (ref 3.5–5.1)
SODIUM SERPL-SCNC: 139 MMOL/L (ref 136–145)

## 2025-05-01 PROCEDURE — 36415 COLL VENOUS BLD VENIPUNCTURE: CPT | Performed by: STUDENT IN AN ORGANIZED HEALTH CARE EDUCATION/TRAINING PROGRAM

## 2025-05-01 PROCEDURE — 80048 BASIC METABOLIC PNL TOTAL CA: CPT | Performed by: STUDENT IN AN ORGANIZED HEALTH CARE EDUCATION/TRAINING PROGRAM

## 2025-05-01 PROCEDURE — 63600175 PHARM REV CODE 636 W HCPCS: Performed by: STUDENT IN AN ORGANIZED HEALTH CARE EDUCATION/TRAINING PROGRAM

## 2025-05-01 PROCEDURE — 63600175 PHARM REV CODE 636 W HCPCS: Performed by: INTERNAL MEDICINE

## 2025-05-01 PROCEDURE — 96376 TX/PRO/DX INJ SAME DRUG ADON: CPT

## 2025-05-01 PROCEDURE — 25000003 PHARM REV CODE 250: Performed by: STUDENT IN AN ORGANIZED HEALTH CARE EDUCATION/TRAINING PROGRAM

## 2025-05-01 PROCEDURE — 25000003 PHARM REV CODE 250: Performed by: INTERNAL MEDICINE

## 2025-05-01 PROCEDURE — 96365 THER/PROPH/DIAG IV INF INIT: CPT | Mod: 59

## 2025-05-01 PROCEDURE — 86364 TISS TRNSGLTMNASE EA IG CLAS: CPT | Performed by: STUDENT IN AN ORGANIZED HEALTH CARE EDUCATION/TRAINING PROGRAM

## 2025-05-01 PROCEDURE — 99223 1ST HOSP IP/OBS HIGH 75: CPT | Mod: ,,, | Performed by: STUDENT IN AN ORGANIZED HEALTH CARE EDUCATION/TRAINING PROGRAM

## 2025-05-01 PROCEDURE — 12000002 HC ACUTE/MED SURGE SEMI-PRIVATE ROOM

## 2025-05-01 PROCEDURE — 96375 TX/PRO/DX INJ NEW DRUG ADDON: CPT

## 2025-05-01 PROCEDURE — 96366 THER/PROPH/DIAG IV INF ADDON: CPT

## 2025-05-01 RX ORDER — METOCLOPRAMIDE HYDROCHLORIDE 5 MG/ML
10 INJECTION INTRAMUSCULAR; INTRAVENOUS
Status: DISCONTINUED | OUTPATIENT
Start: 2025-05-01 | End: 2025-05-01 | Stop reason: HOSPADM

## 2025-05-01 RX ORDER — CALCIUM CARBONATE 300MG(750)
400 TABLET,CHEWABLE ORAL DAILY
Qty: 90 TABLET | Refills: 1 | Status: SHIPPED | OUTPATIENT
Start: 2025-05-01

## 2025-05-01 RX ORDER — MAGNESIUM SULFATE HEPTAHYDRATE 40 MG/ML
2 INJECTION, SOLUTION INTRAVENOUS ONCE
Status: COMPLETED | OUTPATIENT
Start: 2025-05-01 | End: 2025-05-01

## 2025-05-01 RX ORDER — METOCLOPRAMIDE 5 MG/1
10 TABLET ORAL
Qty: 240 TABLET | Refills: 2 | Status: SHIPPED | OUTPATIENT
Start: 2025-05-01 | End: 2025-07-30

## 2025-05-01 RX ORDER — METHYLPREDNISOLONE SOD SUCC 125 MG
125 VIAL (EA) INJECTION ONCE
Status: COMPLETED | OUTPATIENT
Start: 2025-05-01 | End: 2025-05-01

## 2025-05-01 RX ORDER — TOPIRAMATE 25 MG/1
25 TABLET ORAL DAILY
Qty: 30 TABLET | Refills: 11 | Status: SHIPPED | OUTPATIENT
Start: 2025-05-01 | End: 2026-05-01

## 2025-05-01 RX ORDER — PROMETHAZINE HYDROCHLORIDE 25 MG/1
25 SUPPOSITORY RECTAL EVERY 6 HOURS PRN
Qty: 12 SUPPOSITORY | Refills: 1 | Status: SHIPPED | OUTPATIENT
Start: 2025-05-01

## 2025-05-01 RX ORDER — RIBOFLAVIN (VITAMIN B2) 400 MG
400 TABLET ORAL DAILY
Qty: 90 TABLET | Refills: 1 | Status: SHIPPED | OUTPATIENT
Start: 2025-05-01

## 2025-05-01 RX ADMIN — SUMATRIPTAN SUCCINATE 50 MG: 50 TABLET ORAL at 02:05

## 2025-05-01 RX ADMIN — PROMETHAZINE HYDROCHLORIDE 12.5 MG: 25 INJECTION INTRAMUSCULAR; INTRAVENOUS at 02:05

## 2025-05-01 RX ADMIN — SUCRALFATE ORAL 1 G: 1 SUSPENSION ORAL at 08:05

## 2025-05-01 RX ADMIN — ACETAMINOPHEN 650 MG: 325 TABLET ORAL at 03:05

## 2025-05-01 RX ADMIN — SUMATRIPTAN SUCCINATE 50 MG: 50 TABLET ORAL at 01:05

## 2025-05-01 RX ADMIN — METHYLPREDNISOLONE SODIUM SUCCINATE 125 MG: 125 INJECTION, POWDER, FOR SOLUTION INTRAMUSCULAR; INTRAVENOUS at 10:05

## 2025-05-01 RX ADMIN — SUCRALFATE ORAL 1 G: 1 SUSPENSION ORAL at 12:05

## 2025-05-01 RX ADMIN — SUCRALFATE ORAL 1 G: 1 SUSPENSION ORAL at 04:05

## 2025-05-01 RX ADMIN — HYDROCODONE BITARTRATE AND ACETAMINOPHEN 1 TABLET: 5; 325 TABLET ORAL at 10:05

## 2025-05-01 RX ADMIN — ACETAMINOPHEN 650 MG: 325 TABLET ORAL at 08:05

## 2025-05-01 RX ADMIN — LEVOTHYROXINE SODIUM 112 MCG: 112 TABLET ORAL at 05:05

## 2025-05-01 RX ADMIN — PANTOPRAZOLE SODIUM 40 MG: 40 INJECTION, POWDER, FOR SOLUTION INTRAVENOUS at 08:05

## 2025-05-01 RX ADMIN — METOCLOPRAMIDE 10 MG: 5 INJECTION, SOLUTION INTRAMUSCULAR; INTRAVENOUS at 10:05

## 2025-05-01 RX ADMIN — METOCLOPRAMIDE 5 MG: 5 INJECTION, SOLUTION INTRAMUSCULAR; INTRAVENOUS at 12:05

## 2025-05-01 RX ADMIN — ENOXAPARIN SODIUM 40 MG: 40 INJECTION SUBCUTANEOUS at 04:05

## 2025-05-01 RX ADMIN — METOCLOPRAMIDE 10 MG: 5 INJECTION, SOLUTION INTRAMUSCULAR; INTRAVENOUS at 03:05

## 2025-05-01 RX ADMIN — METOCLOPRAMIDE 5 MG: 5 INJECTION, SOLUTION INTRAMUSCULAR; INTRAVENOUS at 05:05

## 2025-05-01 RX ADMIN — POTASSIUM BICARBONATE 50 MEQ: 978 TABLET, EFFERVESCENT ORAL at 08:05

## 2025-05-01 RX ADMIN — MAGNESIUM SULFATE HEPTAHYDRATE 2 G: 40 INJECTION, SOLUTION INTRAVENOUS at 10:05

## 2025-05-01 NOTE — SUBJECTIVE & OBJECTIVE
Interval History:  Patient seen and examined this morning, continues to report headache, nausea and vomiting.  MRI brain pending.  Potassium within normal limits.    Review of Systems   Constitutional:  Negative for diaphoresis.   Respiratory:  Negative for shortness of breath.    Neurological:  Positive for headaches.     Objective:     Vital Signs (Most Recent):  Temp: 98.4 °F (36.9 °C) (05/01/25 0727)  Pulse: 78 (05/01/25 0727)  Resp: 16 (05/01/25 1056)  BP: (!) 150/78 (05/01/25 0727)  SpO2: 95 % (05/01/25 0727) Vital Signs (24h Range):  Temp:  [97.7 °F (36.5 °C)-98.8 °F (37.1 °C)] 98.4 °F (36.9 °C)  Pulse:  [76-85] 78  Resp:  [14-20] 16  SpO2:  [92 %-98 %] 95 %  BP: (131-165)/(78-90) 150/78     Weight: 107.3 kg (236 lb 8.9 oz)  Body mass index is 35.97 kg/m².    Intake/Output Summary (Last 24 hours) at 5/1/2025 1220  Last data filed at 5/1/2025 1050  Gross per 24 hour   Intake 537.18 ml   Output 2200 ml   Net -1662.82 ml         Physical Exam  Constitutional:       General: She is not in acute distress.  Cardiovascular:      Rate and Rhythm: Regular rhythm.   Pulmonary:      Effort: No respiratory distress.      Breath sounds: No wheezing.   Abdominal:      Palpations: Abdomen is soft.   Neurological:      General: No focal deficit present.      Mental Status: She is alert.      Comments: Dementia               Significant Labs: All pertinent labs within the past 24 hours have been reviewed.  CBC:   Recent Labs   Lab 04/30/25  0439   WBC 6.87   HGB 11.7*   HCT 36.1*        CMP:   Recent Labs   Lab 04/30/25  0439 05/01/25  0402    139   K 3.7 3.8    104   CO2 27 28   * 87   BUN 8 6*   CREATININE 0.7 0.7   CALCIUM 8.5* 8.6*   PROT 5.4*  --    ALBUMIN 3.2*  --    BILITOT 0.5  --    ALKPHOS 53*  --    AST 18  --    ALT 17  --    ANIONGAP 6* 7*       Significant Imaging: I have reviewed all pertinent imaging results/findings within the past 24 hours.

## 2025-05-01 NOTE — PLAN OF CARE
Patient accepted by [facility] via "[employer] with start of care date 05/02/2025 pending orders.    CALLY sent in basket message to [facility] to inform of no discharge till possible tomorrow.       05/01/25 1337   Post-Acute Status   Post-Acute Authorization [facility] Status Pending medical clearance/testing   Discharge Delays Orders Needed

## 2025-05-01 NOTE — NURSING
Pt discharged via wheelchair with RN. AVS given with follow up appointments and prescriptions with discharge instructions.

## 2025-05-01 NOTE — CONSULTS
[facility] [place]  General Surgery  Consult Note    Inpatient consult to General Surgery  Consult performed by: Farooq Lepe MD  Consult ordered by: Ed Guo MD        Subjective:     Chief Complaint/Reason for Admission:  Nausea and vomiting    History of Present Illness:  This is a 74-year-old female who presented with intractable nausea and vomiting as well as headaches.  She underwent HIDA scan with ejection fraction which showed biliary dyskinesia reduced EF, presumably a 10%.  Prior ultrasound without stones    No current facility-administered medications on file prior to encounter.     Current Outpatient Medications on File Prior to Encounter   Medication Sig    acetaminophen (TYLENOL) 650 MG TbSR Take 650 mg by mouth every 8 (eight) hours.    aspirin-acetaminophen-caffeine 250-250-65 mg (EXCEDRIN MIGRAINE) 250-250-65 mg per tablet Take 1 tablet by mouth every 6 (six) hours as needed for Pain.    azelastine (ASTELIN) 137 mcg (0.1 %) nasal spray 1 spray (137 mcg total) by Nasal route nightly. Point up and slightly outward toward ear when spraying to avoid irritating nasal septum. With flonase.    cetirizine (ZYRTEC) 10 MG tablet Take 10 mg by mouth every evening.    fluticasone propionate (FLONASE) 50 mcg/actuation nasal spray 1 spray by Each Nostril route every evening.    levothyroxine (SYNTHROID) 112 MCG tablet Take 1 tablet (112 mcg total) by mouth before breakfast.    mag carb/aluminum hydrox/algin (GAVISCON EXTRA STRENGTH ORAL) Take 1 tablet by mouth 3 (three) times daily with meals. Pt takes 30min after meals takes tablet form    meclizine (ANTIVERT) 25 mg tablet Take 0.5 tablets (12.5 mg total) by mouth 3 (three) times daily as needed for Dizziness.    metoclopramide HCl (REGLAN) 5 MG tablet Take 1 tablet (5 mg total) by mouth 4 (four) times daily before meals and nightly.    metoprolol succinate (TOPROL-XL) 100 MG 24 hr tablet Take 100 mg by mouth nightly.    multivitamin (THERAGRAN)  tablet Take 1 tablet by mouth once daily.    ondansetron (ZOFRAN-ODT) 8 MG TbDL Take 1 tablet (8 mg total) by mouth every 8 (eight) hours as needed (nausea and vomiting).    pantoprazole (PROTONIX) 40 MG tablet Take 1 tablet (40 mg total) by mouth 2 (two) times daily.    pseudoephedrine (SUDAFED) 30 MG tablet Take 30 mg by mouth every 4 (four) hours as needed for Congestion.    rizatriptan (MAXALT) 10 MG tablet Take 1 tablet (10 mg total) by mouth daily as needed for Migraine.    promethazine (PHENERGAN) 25 MG tablet Take 1 tablet (25 mg total) by mouth every 6 (six) hours as needed for Nausea. (Patient not taking: Reported on 4/29/2025)    scopolamine (TRANSDERM-SCOP) 1.3-1.5 mg (1 mg over 3 days) Place 1 patch onto the skin Every 3 (three) days. (Patient not taking: Reported on 4/29/2025)    sucralfate (CARAFATE) 100 mg/mL suspension Take 10 mLs (1 g total) by mouth 4 (four) times daily.       Review of patient's allergies indicates:  No Known Allergies    Past Medical History:   Diagnosis Date    Depression     GERD (gastroesophageal reflux disease)     Hyperlipidemia     Hypertension     Hypothyroid     Sleep apnea, unspecified 2015     Past Surgical History:   Procedure Laterality Date    APPENDECTOMY      COLONOSCOPY  2017    Briana, repeat in 10    ESOPHAGOGASTRODUODENOSCOPY N/A 4/24/2025    Procedure: EGD (ESOPHAGOGASTRODUODENOSCOPY);  Surgeon: Maxim Garzon MD;  Location: [facility];  Service: Endoscopy;  Laterality: N/A;    TONSILLECTOMY      TUBAL LIGATION       Family History       Problem Relation (Age of Onset)    Breast cancer Daughter    Cancer Father, Sister          Tobacco Use    Smoking status: Never    Smokeless tobacco: Never   Substance and Sexual Activity    Alcohol use: Not Currently     Comment: rarely    Drug use: No    Sexual activity: Yes     Review of Systems   Constitutional:  Negative for fever.   HENT: Negative.     Eyes: Negative.    Respiratory: Negative.     Cardiovascular:  Negative.    Gastrointestinal:  Positive for nausea and vomiting.   Endocrine: Negative.    Genitourinary: Negative.    Musculoskeletal: Negative.    Skin: Negative.    Allergic/Immunologic: Negative.    Neurological:  Positive for headaches.   Hematological: Negative.    Psychiatric/Behavioral: Negative.       Objective:     Vital Signs (Most Recent):  Temp: 97.9 °F (36.6 °C) (05/01/25 1256)  Pulse: 84 (05/01/25 1256)  Resp: 16 (05/01/25 1256)  BP: 130/87 (05/01/25 1256)  SpO2: (!) 94 % (05/01/25 1256) Vital Signs (24h Range):  Temp:  [97.7 °F (36.5 °C)-98.8 °F (37.1 °C)] 97.9 °F (36.6 °C)  Pulse:  [75-85] 84  Resp:  [14-20] 16  SpO2:  [92 %-98 %] 94 %  BP: (130-165)/(78-90) 130/87     Weight: 107.3 kg (236 lb 8.9 oz)  Body mass index is 35.97 kg/m².      Intake/Output Summary (Last 24 hours) at 5/1/2025 1322  Last data filed at 5/1/2025 1200  Gross per 24 hour   Intake 537.18 ml   Output 2200 ml   Net -1662.82 ml       Physical Exam  Constitutional:       General: She is not in acute distress.     Appearance: Normal appearance. She is obese. She is not ill-appearing, toxic-appearing or diaphoretic.   HENT:      Head: Normocephalic.      Nose: Nose normal.   Eyes:      Conjunctiva/sclera: Conjunctivae normal.   Cardiovascular:      Rate and Rhythm: Normal rate and regular rhythm.   Pulmonary:      Effort: Pulmonary effort is normal.   Abdominal:      Palpations: Abdomen is soft.   Musculoskeletal:         General: Normal range of motion.      Cervical back: Normal range of motion.   Skin:     General: Skin is warm.   Neurological:      General: No focal deficit present.      Mental Status: She is alert.   Psychiatric:         Mood and Affect: Mood normal.         Significant Labs:  CBC:   Recent Labs   Lab 04/30/25  0439   WBC 6.87   RBC 4.01   HGB 11.7*   HCT 36.1*      MCV 90   MCH 29.2   MCHC 32.4     CMP:   Recent Labs   Lab 04/30/25  0439 05/01/25  0402   * 87   CALCIUM 8.5* 8.6*   ALBUMIN 3.2*   "--    PROT 5.4*  --     139   K 3.7 3.8   CO2 27 28    104   BUN 8 6*   CREATININE 0.7 0.7   ALKPHOS 53*  --    ALT 17  --    AST 18  --    BILITOT 0.5  --      Coagulation: No results for input(s): "PT", "INR", "APTT" in the last 48 hours.  Lactic Acid: No results for input(s): "LACTATE" in the last 48 hours.    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  Discuss that this may be multifactorial.  She does have biliary dyskinesia based off of HIDA scan.    Discuss that there were no acute intervention required for biliary dyskinesia.  There is no life-threatening risk associated with leaving the gallbladder in Situ.  I did discuss that it is unclear if cholecystectomy will alleviate her symptoms.  She may also have gastroparesis or other causes of her nausea and vomiting and headaches.    Recommend she complete a gastric emptying study which can be done as an outpatient    Can discuss cholecystectomy electively.    Will arrange for outpatient follow up      Thank you for your consult. I will sign off. Please contact us if you have any additional questions.    Farooq Lepe MD  General Surgery  [facility]  "

## 2025-05-01 NOTE — PLAN OF CARE
Pt clear for DC from case management standpoint. Discharging to home with [facility]  and daughter to transport.          05/01/25 1602   Final Note   Assessment Type Final Discharge Note   Anticipated Discharge Disposition Home-Health   Hospital Resources/Appts/Education Provided Appointments scheduled and added to AVS

## 2025-05-01 NOTE — PLAN OF CARE
Problem: Adult Inpatient Plan of Care  Goal: Plan of Care Review  5/1/2025 0455 by Brenda Freedman RN  Outcome: Progressing  5/1/2025 0454 by Brenda Freedman RN  Outcome: Progressing  Goal: Patient-Specific Goal (Individualized)  5/1/2025 0455 by Brenda Freedman RN  Outcome: Progressing  5/1/2025 0454 by Brenda Freedman RN  Outcome: Progressing  Goal: Absence of Hospital-Acquired Illness or Injury  5/1/2025 0455 by Bredna Freedman RN  Outcome: Progressing  5/1/2025 0454 by Brenda Freedman RN  Outcome: Progressing  Goal: Optimal Comfort and Wellbeing  5/1/2025 0455 by Brenda Freedman RN  Outcome: Progressing  5/1/2025 0454 by Brenda Freedman RN  Outcome: Progressing  Goal: Readiness for Transition of Care  5/1/2025 0455 by Brenda Freedman RN  Outcome: Progressing  5/1/2025 0454 by Brenda Freedman RN  Outcome: Progressing

## 2025-05-01 NOTE — DISCHARGE SUMMARY
[facility] Medicine  Discharge Summary      Patient Name: Skyler Espinoza  MRN: [id number]  EDWINA: [id number]  Patient Class: IP- Inpatient  Admission Date: 4/29/2025  Hospital Length of Stay: 1 days  Discharge Date and Time: 5/1/2025  4:52 PM  Attending Physician: Mi att. providers found   Discharging Provider: Ed Guo MD  Primary Care Provider: Oscar Lee FNP-C    Primary Care Team: Networked reference to record PCT     HPI:   74 year old pt getting admitted with incessant N&V ,possible status migrainous  Pt has been suffering from chronic N&V episodes and had multiple ER visits before  She recently had IP hospital stay  During last hospital stay She underwent EGD which showed  findings of gastritis and non-bleeding gastric ulcer.   HIDA scan showed findings compatible with biliary dyskinesia.   Pt was prescribed reglan for possible gastroparesis and is awaiting outpatient nuclear study  She has chronic headache from migraine  She did well after discharged but started having N&V episodes 2 days ago  Pt was taking zofran and phenergan with no effect   Today symptoms went worse and she came to ER and got admitted     * No surgery found *      Hospital Course:   Patient with history of gastritis, hypertension, hypothyroidism, migraine, biliary dyskinesia presented with nausea and vomiting and headache.  Was recently hospitalized for similar presentation, at that time nausea vomiting thought to be possibly secondary to migraines versus gastroparesis versus biliary dyskinesia.  Patient was instructed to follow up outpatient with GI for a gastric emptying study.  Neurology consulted for migraine, appreciate recs, ultimately needs follow up outpatient. MRI brain ordered, negative for acute findings. Surgery consulted for bilary dyskinesia, no acute surgical intervention needed can follow up outpatient.  Nausea and vomiting improving, patient tolerating diet.  Instructed patient  and daughter to advance diet as tolerated, follow up with PCP and Gastroenterology outpatient.  Started the patient on Topamax outpatient per neurology recommendations.    Physical Exam  Constitutional:       General: She is not in acute distress.  Cardiovascular:      Rate and Rhythm: Regular rhythm.   Pulmonary:      Effort: No respiratory distress.      Breath sounds: No wheezing     Goals of Care Treatment Preferences:  Code Status: Full Code      SDOH Screening:  The patient was screened for utility difficulties, food insecurity, transport difficulties, housing insecurity, and interpersonal safety and there were no concerns identified this admission.     Consults:   Consults (From admission, onward)          Status Ordering Provider     Inpatient consult to Registered Dietitian/Nutritionist  Once        Provider:  (Not yet assigned)    Acknowledged DANIELLE SARMIENTOA     Inpatient consult to General Surgery  Once        Provider:  Farooq Lepe MD    Completed DANIELLE SARMIENTOA     Inpatient consult to Midline team  Once        Provider:  (Not yet assigned)    Completed GUILLERMINA WADIA     Inpatient Consult to Neurology Services (General Neurology)  Once        Provider:  (Not yet assigned)    Completed JAZLYN SARMIENTODIA            Assessment & Plan  Obesity (BMI 30-39.9)  Body mass index is 35.97 kg/m². Morbid obesity complicates all aspects of disease management from diagnostic modalities to treatment.     Autoimmune thyroiditis  Aware     Gastroparesis  As mentioned in HPI  Maintain reglan    Gastritis  PPI    GISELE (obstructive sleep apnea)  Supposed to have sleep study today on OP basis  Pt has nocturnal; sat drops  Maintain O2    Migraine  GI consulted, recommended MRI brain  Patient received IV Mag and IV valproic acid once, daughter reports no improvement with valproic acid  Ativan 1 dose of Solu-Medrol today    Per neurology: further add on's as needed   - IV MgSo4 2g q12 hrs PRN 1-2 days +/-   - IV Valproic acid 1g q24  hr PRN 1-2 days +/-   - IV Solumedrol 1 g q24 hr PRN 1-2 days  - Compazine prn for nausea / vomiting      - Pharmacological options:   OP Abortive therapy: tylenol prn / ibuporfen prn  vs PRN sumatriptan 50 mg as a single dose. If symptoms persist or return, may repeat dose (usually same as first dose) after >=2 hours.      --- Preventive therapy: Discharge on Magnesium oxide 400 mg daily  / riboflavin 400 mg daily combination at MO + Topamax 25 mg once daily; increase to 50 mg increments at intervals >=1 week.   --- OP neurology for further regulation on abortive and preventive options - Needs optimization with newer generation agents    Final Active Diagnoses:    Diagnosis Date Noted POA    Gastroparesis [K31.84] 04/29/2025 Yes    Gastritis [K29.70] 04/29/2025 Yes    GISELE (obstructive sleep apnea) [G47.33] 04/29/2025 Yes    Migraine [G43.909] 04/29/2025 Yes    Autoimmune thyroiditis [E06.3] 06/17/2020 Yes    Obesity (BMI 30-39.9) [E66.9] 07/22/2016 Yes      Problems Resolved During this Admission:    Diagnosis Date Noted Date Resolved POA    PRINCIPAL PROBLEM:  Intractable nausea and vomiting [R11.2] 04/29/2025 05/01/2025 Yes    Hypokalemia [E87.6] 04/29/2025 05/01/2025 Yes       Discharged Condition: stable    Disposition: Home or Self Care    Follow Up:   Contact information for follow-up providers       Oscar Lee FNP-C. Go on 5/6/2025.    Specialty: Family Medicine  Why: hospital follow up @1pm  Contact information:  [address]  [address]  [place] [address]  [phone]               Farooq Lepe MD Follow up.    Specialty: General Surgery  Contact information:  [address]  [address]  [place] [address]  [phone]                       Contact information for after-discharge care       Home Medical Care       [facility] .    Service: Home Health Services  Why: Home Health  Contact information:  [address] [address]  [phone]                                  Patient Instructions:      Diet Adult Regular     Notify your health care provider if you experience any of the following:  temperature >100.4     Notify your health care provider if you experience any of the following:  persistent nausea and vomiting or diarrhea     Notify your health care provider if you experience any of the following:  severe uncontrolled pain     Notify your health care provider if you experience any of the following:  redness, tenderness, or signs of infection (pain, swelling, redness, odor or green/yellow discharge around incision site)     SUBSEQUENT HOME HEALTH ORDERS   Order Comments: Please resume all home health services previously enrolled- sn/pt/ot     Order Specific Question Answer Comments   What Home Health Agency is the patient currently using? [facility]      Activity as tolerated       Significant Diagnostic Studies: Labs: CMP   Recent Labs   Lab 04/30/25  0439 05/01/25  0402    139   K 3.7 3.8    104   CO2 27 28   * 87   BUN 8 6*   CREATININE 0.7 0.7   CALCIUM 8.5* 8.6*   PROT 5.4*  --    ALBUMIN 3.2*  --    BILITOT 0.5  --    ALKPHOS 53*  --    AST 18  --    ALT 17  --    ANIONGAP 6* 7*    and CBC   Recent Labs   Lab 04/30/25  0439   WBC 6.87   HGB 11.7*   HCT 36.1*          Pending Diagnostic Studies:       Procedure Component Value Units Date/Time    Tissue transglutaminase, IgA [[id number]] Collected: 05/01/25 0912    Order Status: Sent Lab Status: In process Updated: 05/01/25 0919    Specimen: Blood            Medications:  Reconciled Home Medications:      Medication List        START taking these medications      magnesium oxide 400 mg magnesium Tab  Take 400 mg by mouth once daily.     promethazine 25 MG suppository  Commonly known as: PHENERGAN  Place 1 suppository (25 mg total) rectally every 6 (six) hours as needed for Nausea.  Replaces: promethazine 25 MG tablet     riboflavin (vitamin B2) 400 mg  Tab  Take 400 mg by mouth once daily.     topiramate 25 MG tablet  Commonly known as: TOPAMAX  Take 1 tablet (25 mg total) by mouth once daily.            CHANGE how you take these medications      metoclopramide HCl 5 MG tablet  Commonly known as: REGLAN  Take 2 tablets (10 mg total) by mouth 4 (four) times daily before meals and nightly.  What changed: how much to take            CONTINUE taking these medications      acetaminophen 650 MG Tbsr  Commonly known as: TYLENOL  Take 650 mg by mouth every 8 (eight) hours.     azelastine 137 mcg (0.1 %) nasal spray  Commonly known as: ASTELIN  1 spray (137 mcg total) by Nasal route nightly. Point up and slightly outward toward ear when spraying to avoid irritating nasal septum. With flonase.     cetirizine 10 MG tablet  Commonly known as: ZYRTEC  Take 10 mg by mouth every evening.     fluticasone propionate 50 mcg/actuation nasal spray  Commonly known as: FLONASE  1 spray by Each Nostril route every evening.     GAVISCON EXTRA STRENGTH ORAL  Take 1 tablet by mouth 3 (three) times daily with meals. Pt takes 30min after meals takes tablet form     levothyroxine 112 MCG tablet  Commonly known as: SYNTHROID  Take 1 tablet (112 mcg total) by mouth before breakfast.     meclizine 25 mg tablet  Commonly known as: ANTIVERT  Take 0.5 tablets (12.5 mg total) by mouth 3 (three) times daily as needed for Dizziness.     metoprolol succinate 100 MG 24 hr tablet  Commonly known as: TOPROL-XL  Take 100 mg by mouth nightly.     multivitamin tablet  Commonly known as: THERAGRAN  Take 1 tablet by mouth once daily.     ondansetron 8 MG Tbdl  Commonly known as: ZOFRAN-ODT  Take 1 tablet (8 mg total) by mouth every 8 (eight) hours as needed (nausea and vomiting).     pantoprazole 40 MG tablet  Commonly known as: PROTONIX  Take 1 tablet (40 mg total) by mouth 2 (two) times daily.     rizatriptan 10 MG tablet  Commonly known as: MAXALT  Take 1 tablet (10 mg total) by mouth daily as needed for  Migraine.     scopolamine 1.3-1.5 mg (1 mg over 3 days)  Commonly known as: TRANSDERM-SCOP  Place 1 patch onto the skin Every 3 (three) days.     sucralfate 100 mg/mL suspension  Commonly known as: CARAFATE  Take 10 mLs (1 g total) by mouth 4 (four) times daily.            STOP taking these medications      aspirin-acetaminophen-caffeine 250-250-65 mg 250-250-65 mg per tablet  Commonly known as: EXCEDRIN MIGRAINE     promethazine 25 MG tablet  Commonly known as: PHENERGAN  Replaced by: promethazine 25 MG suppository     pseudoephedrine 30 MG tablet  Commonly known as: SUDAFED              Indwelling Lines/Drains at time of discharge:   Lines/Drains/Airways       None                   Time spent on the discharge of patient: 36 minutes         Ed Guo MD  Department of Hospital Medicine  [facility]

## 2025-05-01 NOTE — PROGRESS NOTES
[facility] Medicine  Progress Note    Patient Name: Skyler Espinoza  MRN: [id number]  Patient Class: OP- Observation   Admission Date: 4/29/2025  Length of Stay: 0 days  Attending Physician: Ed Guo MD  Primary Care Provider: Oscar Lee FNP-C        Subjective     Principal Problem:Intractable nausea and vomiting        HPI:  74 year old pt getting admitted with incessant N&V ,possible status migrainous  Pt has been suffering from chronic N&V episodes and had multiple ER visits before  She recently had IP hospital stay  During last hospital stay She underwent EGD which showed  findings of gastritis and non-bleeding gastric ulcer.   HIDA scan showed findings compatible with biliary dyskinesia.   Pt was prescribed reglan for possible gastroparesis and is awaiting outpatient nuclear study  She has chronic headache from migraine  She did well after discharged but started having N&V episodes 2 days ago  Pt was taking zofran and phenergan with no effect   Today symptoms went worse and she came to ER and got admitted     Overview/Hospital Course:  Patient with history of gastritis, hypertension, hypothyroidism, migraine, biliary dyskinesia presented with nausea and vomiting and headache.  Was recently hospitalized for similar presentation, at that time nausea vomiting thought to be possibly secondary to migraines versus gastroparesis versus biliary dyskinesia.  Patient was instructed to follow up outpatient with GI for a gastric emptying study.  Neurology consulted for migraine, appreciate recs, ultimately needs follow up outpatient. MRI brain ordered. Surgery consulted for bilary dyskinesia.     Interval History:  Patient seen and examined this morning, continues to report headache, nausea and vomiting.  MRI brain pending.  Potassium within normal limits.    Review of Systems   Constitutional:  Negative for diaphoresis.   Respiratory:  Negative for shortness of breath.     Neurological:  Positive for headaches.     Objective:     Vital Signs (Most Recent):  Temp: 98.4 °F (36.9 °C) (05/01/25 0727)  Pulse: 78 (05/01/25 0727)  Resp: 16 (05/01/25 1056)  BP: (!) 150/78 (05/01/25 0727)  SpO2: 95 % (05/01/25 0727) Vital Signs (24h Range):  Temp:  [97.7 °F (36.5 °C)-98.8 °F (37.1 °C)] 98.4 °F (36.9 °C)  Pulse:  [76-85] 78  Resp:  [14-20] 16  SpO2:  [92 %-98 %] 95 %  BP: (131-165)/(78-90) 150/78     Weight: 107.3 kg (236 lb 8.9 oz)  Body mass index is 35.97 kg/m².    Intake/Output Summary (Last 24 hours) at 5/1/2025 1220  Last data filed at 5/1/2025 1050  Gross per 24 hour   Intake 537.18 ml   Output 2200 ml   Net -1662.82 ml         Physical Exam  Constitutional:       General: She is not in acute distress.  Cardiovascular:      Rate and Rhythm: Regular rhythm.   Pulmonary:      Effort: No respiratory distress.      Breath sounds: No wheezing.   Abdominal:      Palpations: Abdomen is soft.   Neurological:      General: No focal deficit present.      Mental Status: She is alert.      Comments: Dementia               Significant Labs: All pertinent labs within the past 24 hours have been reviewed.  CBC:   Recent Labs   Lab 04/30/25  0439   WBC 6.87   HGB 11.7*   HCT 36.1*        CMP:   Recent Labs   Lab 04/30/25  0439 05/01/25  0402    139   K 3.7 3.8    104   CO2 27 28   * 87   BUN 8 6*   CREATININE 0.7 0.7   CALCIUM 8.5* 8.6*   PROT 5.4*  --    ALBUMIN 3.2*  --    BILITOT 0.5  --    ALKPHOS 53*  --    AST 18  --    ALT 17  --    ANIONGAP 6* 7*       Significant Imaging: I have reviewed all pertinent imaging results/findings within the past 24 hours.      Assessment & Plan  Intractable nausea and vomiting  Likely from Status Migrainous   Received IV Decadron and Benadryl, continue Reglan  Continues to have nausea and vomiting   Daughter concerned about biliary dyskinesia, we will consult General surgery for further recommendations   Also concern for celiac although  patient not having typical symptoms, duodenal biopsy from last EGD showed no acute findings   Follow up transglutaminase    Started the patient on gluten diet, encourage oral intake as much as patient can tolerate.  Ideally needs gastric emptying study done outpatient.  Patient has 1 or 2 good days then develops intractable nausea and vomiting that is difficult to control.  Continue current antiemetics    Obesity (BMI 30-39.9)  Body mass index is 35.97 kg/m². Morbid obesity complicates all aspects of disease management from diagnostic modalities to treatment.     Autoimmune thyroiditis  Aware     Gastroparesis  As mentioned in HPI  Maintain reglan    Gastritis  PPI    Hypokalemia  Monitor and replete   .   Recent Labs     04/29/25  1037 04/30/25  0439 05/01/25  0402   K 3.2* 3.7 3.8       GISELE (obstructive sleep apnea)  Supposed to have sleep study today on OP basis  Pt has nocturnal; sat drops  Maintain O2    Migraine  GI consulted, recommended MRI brain  Patient received IV Mag and IV valproic acid once, daughter reports no improvement with valproic acid  Ativan 1 dose of Solu-Medrol today    Per neurology: further add on's as needed   - IV MgSo4 2g q12 hrs PRN 1-2 days +/-   - IV Valproic acid 1g q24 hr PRN 1-2 days +/-   - IV Solumedrol 1 g q24 hr PRN 1-2 days  - Compazine prn for nausea / vomiting      - Pharmacological options:   OP Abortive therapy: tylenol prn / ibuporfen prn  vs PRN sumatriptan 50 mg as a single dose. If symptoms persist or return, may repeat dose (usually same as first dose) after >=2 hours.      --- Preventive therapy: Discharge on Magnesium oxide 400 mg daily  / riboflavin 400 mg daily combination at [place] + Topamax 25 mg once daily; increase to 50 mg increments at intervals >=1 week.   --- OP neurology for further regulation on abortive and preventive options - Needs optimization with newer generation agents    VTE Risk Mitigation (From admission, onward)           Ordered     enoxaparin  injection 40 mg  Daily         04/29/25 1218     IP VTE HIGH RISK PATIENT  Once         04/29/25 1218     Place sequential compression device  Until discontinued         04/29/25 1218                    Discharge Planning   ANGELI: 5/2/2025     Code Status: Full Code   Medical Readiness for Discharge Date:   Discharge Plan A: Home Health                        Ed Guo MD  Department of Hospital Medicine   [facility]

## 2025-05-01 NOTE — PLAN OF CARE
Patient accepted by [facility] home health via [facility] with start of care 05/03/2025 05/01/25 1600   Post-Acute Status   Post-Acute Authorization Home Health   Home Health Status Set-up Complete/Auth obtained

## 2025-05-01 NOTE — NURSING
5.1.25 0845:dr. Lepe called for general sx consult:biliary dyskinesia, abdominal pain, nausea and vomiting . Fall precautions in place. Stimulation decreased for h/a. Bed low locked. Nad was noted further.   Subjective:   Will Michaud is a 45 y.o. male who presents for Knee Pain (Stepped in a hole at work.  Left knee pain.)       HPI  Patient presents for evaluation of left knee pain and swelling that began this morning.  Patient states that he was walking at work, when he fell into a hole was 8 inches deep.  Patient states he has any present to the site any had immediate swelling and pain, difficulty with walking.  Patient denies prior injury to his left knee.  Patient states this occurred while at the workplace, brings him  paperwork to have completed at today's visit.  He has not anything yet for symptoms.    Review of Systems   Constitutional: Negative.    HENT: Negative.    Eyes: Negative.    Respiratory: Negative.    Cardiovascular: Negative.    Gastrointestinal: Negative.    Genitourinary: Negative.    Musculoskeletal: Positive for falls and joint pain.   Skin: Negative.    Neurological: Negative.    Psychiatric/Behavioral: Negative.    All other systems reviewed and are negative.      MEDS:   Current Outpatient Medications:   •  doxepin (SINEQUAN) 50 MG Cap, Take 50 mg by mouth every evening., Disp: , Rfl:   •  QUEtiapine (SEROQUEL) 50 MG tablet, TAKE 1 TABLET BY MOUTH EVERY NIGHT, Disp: , Rfl:   •  clonazePAM (KLONOPIN) 0.5 MG Tab, Take 0.5 mg by mouth 2 times a day as needed., Disp: , Rfl:   •  naproxen (NAPROSYN) 500 MG Tab, Take 1 Tablet by mouth 2 times a day with meals., Disp: 60 Tablet, Rfl: 0  •  gabapentin (NEURONTIN) 400 MG Cap, Take 400 mg by mouth 3 times a day., Disp: , Rfl:   •  ibuprofen (MOTRIN) 800 MG Tab, Take 800 mg by mouth every 8 hours as needed., Disp: , Rfl:   ALLERGIES: No Known Allergies    Patient's PMH, SocHx, SurgHx, FamHx, Drug allergies and medications were reviewed.     Objective:   /78   Pulse 82   Temp 36.8 °C (98.2 °F) (Temporal)   Resp 12   Ht 1.829 m (6')   Wt 83.9 kg (185 lb)   SpO2 98%   BMI 25.09 kg/m²     Physical Exam  Vitals and nursing  note reviewed.   Constitutional:       General: He is awake.      Appearance: Normal appearance. He is well-developed.   HENT:      Head: Normocephalic and atraumatic.      Right Ear: External ear normal.      Left Ear: External ear normal.      Nose: Nose normal.      Mouth/Throat:      Lips: Pink.      Mouth: Mucous membranes are moist.      Pharynx: Oropharynx is clear.   Eyes:      General: Lids are normal.      Extraocular Movements: Extraocular movements intact.      Conjunctiva/sclera: Conjunctivae normal.   Cardiovascular:      Rate and Rhythm: Normal rate and regular rhythm.   Pulmonary:      Effort: Pulmonary effort is normal.   Musculoskeletal:         General: Normal range of motion.      Cervical back: Normal range of motion.   Skin:     General: Skin is warm and dry.   Neurological:      Mental Status: He is alert and oriented to person, place, and time.   Psychiatric:         Mood and Affect: Mood normal.         Behavior: Behavior normal. Behavior is cooperative.         Thought Content: Thought content normal.         Judgment: Judgment normal.       Narrative & Impression     3/4/2022 2:39 PM     HISTORY/REASON FOR EXAM: Pain/Deformity Following Trauma. Twisting injury     TECHNIQUE/EXAM DESCRIPTION AND NUMBER OF VIEWS: 4 views of the LEFT knee.     COMPARISON: None     FINDINGS:  No joint effusion is seen.     No displaced fracture is visualized.     There is no subluxation.     No degenerative change.     IMPRESSION:     Negative knee series.             Exam Ended: 03/04/22  3:02 PM Last Resulted: 03/04/22  3:16 PM               Assessment/Plan:   Assessment    1. Strain of left knee, initial encounter    2. Fall from ground level  - DX-KNEE COMPLETE 4+ LEFT; Future  - naproxen (NAPROSYN) 500 MG Tab; Take 1 Tablet by mouth 2 times a day with meals.  Dispense: 60 Tablet; Refill: 0    3. Pain and swelling of left knee  - DX-KNEE COMPLETE 4+ LEFT; Future  - naproxen (NAPROSYN) 500 MG Tab; Take 1  Tablet by mouth 2 times a day with meals.  Dispense: 60 Tablet; Refill: 0    4. Antalgic gait  - DX-KNEE COMPLETE 4+ LEFT; Future  - naproxen (NAPROSYN) 500 MG Tab; Take 1 Tablet by mouth 2 times a day with meals.  Dispense: 60 Tablet; Refill: 0    Vital signs stable at today's acute urgent care visit. Reviewed test results that were completed in the clinic.  Begin medications as listed on a PRN basis.  RICE therapies discussed.  Discussed management options as indicated.  Patient placed on light duty basis for the next 8 days.  Patient make a follow-up appointment in urgent care in 8 days to continue this case/cream.  Notify people were completed.    Advised the patient to follow-up with the primary care provider for recheck, reevaluation, and/or consideration of further management. Return to urgent care with any worsening/continued symptoms.  Red flags discussed and indications to immediately call 911 or present to the ED.  All questions were encouraged and answered to the patient's satisfaction and understanding, and they agree to the plan of care.     I personally reviewed prior external notes and test results pertinent to today's visit.  I have independently reviewed and interpreted all diagnostics ordered during this urgent care acute visit. Time spent evaluating this patient was a minimum of 30 minutes and includes preparing for visit, counseling/education, exam, evaluation, obtaining history, and ordering lab/test/procedures.      Please note that this dictation was created using voice recognition software. I have made a reasonable attempt to correct obvious errors, but I expect that there are errors of grammar and possibly content that I did not discover before finalizing the note.

## 2025-05-01 NOTE — ASSESSMENT & PLAN NOTE
As mentioned above  Neurology consulted      
As mentioned above  Pt need to follow up with her Neurologist for possible Botox rx for Migraine      
As mentioned in HPI  Maintain PO reglan    
As mentioned in HPI  Maintain PO reglan    
As mentioned in HPI  Maintain reglan    
As mentioned in HPI  Maintain reglan    
Aware     
Body mass index is 34.97 kg/m². Morbid obesity complicates all aspects of disease management from diagnostic modalities to treatment.     
Body mass index is 35.97 kg/m². Morbid obesity complicates all aspects of disease management from diagnostic modalities to treatment.     
GI consulted, recommended MRI brain  Patient received IV Mag and IV valproic acid once, daughter reports no improvement with valproic acid  Ativan 1 dose of Solu-Medrol today    Per neurology: further add on's as needed   - IV MgSo4 2g q12 hrs PRN 1-2 days +/-   - IV Valproic acid 1g q24 hr PRN 1-2 days +/-   - IV Solumedrol 1 g q24 hr PRN 1-2 days  - Compazine prn for nausea / vomiting      - Pharmacological options:   OP Abortive therapy: tylenol prn / ibuporfen prn  vs PRN sumatriptan 50 mg as a single dose. If symptoms persist or return, may repeat dose (usually same as first dose) after >=2 hours.      --- Preventive therapy: Discharge on Magnesium oxide 400 mg daily  / riboflavin 400 mg daily combination at Dc + Topamax 25 mg once daily; increase to 50 mg increments at intervals >=1 week.   --- OP neurology for further regulation on abortive and preventive options - Needs optimization with newer generation agents    
GI consulted, recommended MRI brain  Patient received IV Mag and IV valproic acid once, daughter reports no improvement with valproic acid  Ativan 1 dose of Solu-Medrol today    Per neurology: further add on's as needed   - IV MgSo4 2g q12 hrs PRN 1-2 days +/-   - IV Valproic acid 1g q24 hr PRN 1-2 days +/-   - IV Solumedrol 1 g q24 hr PRN 1-2 days  - Compazine prn for nausea / vomiting      - Pharmacological options:   OP Abortive therapy: tylenol prn / ibuporfen prn  vs PRN sumatriptan 50 mg as a single dose. If symptoms persist or return, may repeat dose (usually same as first dose) after >=2 hours.      --- Preventive therapy: Discharge on Magnesium oxide 400 mg daily  / riboflavin 400 mg daily combination at Dc + Topamax 25 mg once daily; increase to 50 mg increments at intervals >=1 week.   --- OP neurology for further regulation on abortive and preventive options - Needs optimization with newer generation agents    
Likely from Status Migrainous   Received IV Decadron and Benadryl, continue Reglan  Continues to have nausea and vomiting   Daughter concerned about biliary dyskinesia, we will consult General surgery for further recommendations   Also concern for celiac although patient not having typical symptoms, duodenal biopsy from last EGD showed no acute findings   Follow up transglutaminase    Started the patient on gluten diet, encourage oral intake as much as patient can tolerate.  Ideally needs gastric emptying study done outpatient.  Patient has 1 or 2 good days then develops intractable nausea and vomiting that is difficult to control.  Continue current antiemetics    
Likely from Status Migrainous   Received IV Decadron and Benadryl, continue Reglan  Does not appear in any acute distress    
Monitor and replete   .   Recent Labs     04/27/25  0432 04/29/25  1037   K 3.6 3.2*       
Monitor and replete   .   Recent Labs     04/29/25  1037 04/30/25  0439   K 3.2* 3.7       
Monitor and replete   .   Recent Labs     04/29/25  1037 04/30/25  0439 05/01/25  0402   K 3.2* 3.7 3.8       
Mostly from Status Migrainous   Will give one dose of iv decadron and iv benadryl  Iv compazine PRN basis  Maintain PO Reglan  If refractory will prescribe iv ativan for N&V    
PPI    
Supposed to have sleep study today on OP basis  Pt has nocturnal; sat drops  Maintain O2    
Statement Selected

## 2025-05-02 ENCOUNTER — TELEPHONE (OUTPATIENT)
Dept: FAMILY MEDICINE | Facility: CLINIC | Age: 75
End: 2025-05-02
Payer: MEDICARE

## 2025-05-02 LAB — TTG IGA SER-ACNC: <2 U/ML (ref 0–3)

## 2025-05-02 NOTE — TELEPHONE ENCOUNTER
Discharge Information     Discharge Date:   5/1/25    Primary Discharge Diagnosis:  intractable nausea and vomiting       Discharge Summary:  Reviewed      Medication & Order Review     Were medication changes made or new medications added?   Yes    If so, has the patient filled the prescriptions?  Yes     Was Home Health ordered?  yes    If so, has Home Health contacted patient and/or initiated services?  yes    Name of Home Health Agency? [facility] home health     Durable Medical Equipment ordered?  Yes     If so, has the [facility] provider contacted patient and delivered equipment?  yes    Follow Up               Any problems since discharge? No    How is the patient feeling since returning home?  Nausea and headaches. But better not vomiting or severe headaches    Have you set up recommended follow up appointments?  Neurologist, pcp tcc     Schedule Hospital Follow-up appointment within 7-14 days (preferably 7).      Notes:  n/a            Charlee Stock

## 2025-05-02 NOTE — TELEPHONE ENCOUNTER
----- Message from Med Assistant Clemenst sent at 5/2/2025 10:20 AM CDT -----  Call patient - needs post-hospital phone call within 2 business days and hospital follow up visit scheduled within 7-14 days.

## 2025-05-04 ENCOUNTER — TELEPHONE (OUTPATIENT)
Dept: ADMINISTRATIVE | Facility: CLINIC | Age: 75
End: 2025-05-04
Payer: MEDICARE

## 2025-05-04 DIAGNOSIS — G43.909 MIGRAINE WITHOUT STATUS MIGRAINOSUS, NOT INTRACTABLE, UNSPECIFIED MIGRAINE TYPE: Primary | ICD-10-CM

## 2025-05-04 RX ORDER — RIMEGEPANT SULFATE 75 MG/75MG
75 TABLET, ORALLY DISINTEGRATING ORAL DAILY PRN
Qty: 8 TABLET | Refills: 1 | Status: SHIPPED | OUTPATIENT
Start: 2025-05-04

## 2025-05-04 NOTE — TELEPHONE ENCOUNTER
Day 3 Post-Discharge SMS Tracker     Discharged on Friday.  Migraines have been worse since discharge.  Pt has been nauseated, but no vomiting.  Taking all meds as prescribed.  Pain currently a 6/10.        Spoke to PCP - he was sending in a new med for migraines.  Pt's daughter has been notified and verbalizes understanding.

## 2025-05-05 ENCOUNTER — PATIENT OUTREACH (OUTPATIENT)
Dept: ADMINISTRATIVE | Facility: HOSPITAL | Age: 75
End: 2025-05-05
Payer: MEDICARE

## 2025-05-05 DIAGNOSIS — Z71.89 COMPLEX CARE COORDINATION: Primary | ICD-10-CM

## 2025-05-05 NOTE — PROGRESS NOTES
Chart review of [facility]BC Patients with Rising Risk of ED/Admission Report    Pt was seen in ED on 4-29-25 for Intractable nausea and vomiting. Has hospital f/u with PCP on 5-6-25.        Care Coordination Encounter Details:       MyChart Portal Status:         [x]  Reviewed MyChart Portal Status offered / enrolled if applicable        Additional Notes:     MyChart Outcomes: Pt is enrolled & active          Updates Requested / Reviewed:        Updated Care Coordination Note and Immunizations Reconciliation Completed or Queried: [place]         Health Maintenance Screening(s) Due:      Health Maintenance Topics Overdue:      [facility] Score: 0     Patient is not due for any topics at this time.    RSV Vaccine                  Health Maintenance Topic(s) Outreach Outcomes & Actions Taken:           Additional Notes:             Chronic Disease Management:     Diabetes Measures        Lab Results   Component Value Date    HGBA1C 5.1 04/25/2025           [x]  Reviewed chart for active Diabetes diagnosis     []  Scheduled necessary follow up appointments if needed         Additional Notes:             Hypertension Measures        BP Readings from Last 1 Encounters:   05/01/25 124/78           [x]  Reviewed chart for active Hypertension diagnosis     []  Reviewed & documented Home BP Cuff     []  Documented a Remote BP if needed & applicable     []  Scheduled necessary follow up appointments with Primary Care if needed         Additional Notes:             Provider Team Continuity:     Last PCP Visit Date: 4/28/2025          [x]  Reviewed Primary Care Provider Visits, Annual Wellness Visit, and Future          Appointments to ensure appointments have been scheduled and/or           completed        Additional Notes:             Social Determinants of Health          [x]  Reviewed, completed, and/or updated the following sections:                  Food Insecurity, Transportation Needs, Financial Resource Strain,                  Tobacco Use        Additional Notes:  Called regarding value base resources, pt having difficulty with paying for medications. OPCM RFL placed.           Care Management, Digital Medicine, and/or Education Referrals    OPCM Risk Score: 80.7         Next Steps - Referral Actions: Referral placed for OPCM, Message sent to Pharmacy Assistance for Medication Financial Needs, and Digital Medicine Outcomes and Actions Taken: Pt Declined or Not Eligible        Additional Notes:

## 2025-05-06 ENCOUNTER — TELEPHONE (OUTPATIENT)
Dept: FAMILY MEDICINE | Facility: CLINIC | Age: 75
End: 2025-05-06
Payer: MEDICARE

## 2025-05-06 ENCOUNTER — HOSPITAL ENCOUNTER (OUTPATIENT)
Dept: RADIOLOGY | Facility: HOSPITAL | Age: 75
Discharge: HOME | End: 2025-05-06
Payer: MEDICARE

## 2025-05-06 DIAGNOSIS — R11.0 NAUSEA: ICD-10-CM

## 2025-05-07 ENCOUNTER — PATIENT OUTREACH (OUTPATIENT)
Dept: ADMINISTRATIVE | Facility: OTHER | Age: 75
End: 2025-05-07
Payer: MEDICARE

## 2025-05-07 NOTE — PROGRESS NOTES
CHW - Case Closure    This Community Health Worker spoke to caregiver via telephone today.   Pt/Caregiver reported: Family will seek alternative for medication.  Pt/Caregiver denied any additional needs at this time and agrees with episode closure at this time.  Provided caregiver with Community Health Worker's contact information and encouraged him/her to contact this Community Health Worker if additional needs arise.

## 2025-05-08 ENCOUNTER — TELEPHONE (OUTPATIENT)
Dept: FAMILY MEDICINE | Facility: CLINIC | Age: 75
End: 2025-05-08
Payer: MEDICARE

## 2025-05-08 NOTE — TELEPHONE ENCOUNTER
I received this message on your patient and returned the call . They stated that the patient went in with a weight of 210 now 227. Patient does not have any edema to extremities or abdomen. Patient is having increased crying episodes and loop/repetitive questions. They also stated that the patient is also showing behavior outburst. She is have agitation and aggression with her family members. Patient is not sleeping at night. They are wanting to know if it is possible for medication to be sent in to help with the episodes that the patient is currently expressing.   Please advise                                  Primary Information    Source   Skyler Espinoza (Patient)    Subject   Skyler Espinoza (Patient)    Topic   General Inquiry - Patient Advice      Communication   Type:  Needs Medical Advice            Who Called:  Cherry/JOSE R thomas via [facility]?  call      Best Call Back Number: [phone]      Additional Information:  Kadi asking for a call back        Patient Information    Patient Name Gender  SSN   Skyler Espinoza Female 1950      Contacts    Contact Date/Time Type Contact Phone/Fax   2025 02:35 PM CDT Phone (Incoming) Skyler Espinoza (Self) [phone]     Routing History     From To [facility]   2025 02:38 PM Sia Michel STAFF Routine

## 2025-05-09 ENCOUNTER — TELEPHONE (OUTPATIENT)
Dept: FAMILY MEDICINE | Facility: CLINIC | Age: 75
End: 2025-05-09
Payer: MEDICARE

## 2025-05-09 DIAGNOSIS — R11.2 NAUSEA AND VOMITING, UNSPECIFIED VOMITING TYPE: ICD-10-CM

## 2025-05-09 DIAGNOSIS — K29.00 OTHER ACUTE GASTRITIS WITHOUT HEMORRHAGE: ICD-10-CM

## 2025-05-09 RX ORDER — TIZANIDINE 4 MG/1
4 TABLET ORAL NIGHTLY PRN
OUTPATIENT
Start: 2025-05-09

## 2025-05-09 NOTE — TELEPHONE ENCOUNTER
----- Message from BRYANT Boyce sent at 5/9/2025  8:43 AM CDT -----  Regarding: RE: BERNICE BAZAN [[id number]]  See other message  ----- Message -----  From: Shea Jimenez  Sent: 5/8/2025   4:05 PM CDT  To: JOYCE Beaulieu  Subject: BERNICE BAZAN [[id number]]                        Type : Patient Call  Who Called :BERNICE BAZAN [[id number]]  Does the patient know what this is regarding?:ondansetron (ZOFRAN-ODT) 8 MG TbDL; patient's daughter called and stated that patient is  out  of this medication and as of 2-3 days ago, is still experiencing vomiting,diarrhea and incontinence. Patient's daughter would like to receive a call back with medical advice as soon as  possible. Thanks    Would the patient rather a call back or a response via [facility]?call back  Best Call Back Number:[phone]  Additional Information:

## 2025-05-09 NOTE — TELEPHONE ENCOUNTER
----- Message from Zeferino Moreno sent at 7/16/2019  4:31 PM CDT -----  Type:  RX Refill Request    Who Called:  Patient  Refill or New Rx:  Refill  RX Name and Strength:  lovastatin (MEVACOR) 20 MG tablet       How is the patient currently taking it? (ex. 1XDay):  1XDay  Is this a 30 day or 90 day RX:  90  Preferred Pharmacy with phone number:    [facility] [address] [place] [address] - [address]  [address] [facility] [address]  Phone: [phone] Fax: [phone]      Local or Mail Order:  Local  Ordering Provider:  Osmin Prado Call Back Number:  [phone]  Additional Information:       95

## 2025-05-09 NOTE — TELEPHONE ENCOUNTER
----- Message from Juan Carlos Presley sent at 5/9/2025  9:57 AM CDT -----  Pt daughter is calling back requesting refills on tizanidine please send to janice on front # [phone]

## 2025-05-09 NOTE — TELEPHONE ENCOUNTER
Spoke to pt daughter she st that she having frequent diarrhea, nausea, vomiting, and headaches. She st that she is in contact with GI and pt have a appt scheduled for Monday. She st that the Phenergan and scopolamine patches are helping with the nausea and vomiting and since then the diarrhea has subsided. Her main concern his her behavioral issues that her mom is having. She st that the only thing that helps her moms headaches is tizanidine. She st nothing else seems to help her moms headaches but, she st since giving her tizanidine she believes the medication is making her have manic episodes, more nervous, anxious, and fidgety. She st it has also caused her to have problems with sleeping at night. I advised pt daughter to make an appt to talk to [place] since he is not in office today. I have appt scheduled for 5/12 at 11:40 AM.

## 2025-05-09 NOTE — TELEPHONE ENCOUNTER
----- Message from Juan Carlos Presley sent at 5/9/2025  8:52 AM CDT -----  Pt daughter is returning phone call # [phone]

## 2025-05-10 ENCOUNTER — ON-DEMAND VIRTUAL (OUTPATIENT)
Dept: URGENT CARE | Facility: CLINIC | Age: 75
End: 2025-05-10
Payer: MEDICARE

## 2025-05-10 DIAGNOSIS — R51.9 NONINTRACTABLE HEADACHE, UNSPECIFIED CHRONICITY PATTERN, UNSPECIFIED HEADACHE TYPE: Primary | ICD-10-CM

## 2025-05-10 PROCEDURE — 98005 SYNCH AUDIO-VIDEO EST LOW 20: CPT | Mod: 95,,, | Performed by: NURSE PRACTITIONER

## 2025-05-10 RX ORDER — TIZANIDINE 4 MG/1
4 TABLET ORAL 2 TIMES DAILY PRN
Qty: 5 TABLET | Refills: 0 | Status: SHIPPED | OUTPATIENT
Start: 2025-05-10 | End: 2025-05-12 | Stop reason: SDUPTHER

## 2025-05-10 NOTE — PROGRESS NOTES
Subjective:      Patient ID: Skyler Espinoza is a 74 y.o. female.    Vitals:  vitals were not taken for this visit.     Chief Complaint: Headache      Visit Type: TELE AUDIOVISUAL    Past Medical History:   Diagnosis Date    Depression     GERD (gastroesophageal reflux disease)     Hyperlipidemia     Hypertension     Hypothyroid     Sleep apnea, unspecified 2015     Past Surgical History:   Procedure Laterality Date    APPENDECTOMY      COLONOSCOPY  2017    Briana, repeat in 10    ESOPHAGOGASTRODUODENOSCOPY N/A 4/24/2025    Procedure: EGD (ESOPHAGOGASTRODUODENOSCOPY);  Surgeon: Maxim Garzon MD;  Location: [facility];  Service: Endoscopy;  Laterality: N/A;    TONSILLECTOMY      TUBAL LIGATION       Review of patient's allergies indicates:  No Known Allergies  Medications Ordered Prior to Encounter[1]  Family History   Problem Relation Name Age of Onset    Cancer Father      Cancer Sister      Breast cancer Daughter      Glaucoma Neg Hx      Macular degeneration Neg Hx      Retinal detachment Neg Hx      Ovarian cancer Neg Hx      Eczema Neg Hx      Lupus Neg Hx      Melanoma Neg Hx      Psoriasis Neg Hx         Medications Ordered                [id number] - [address] & [address] [id number]    Telephone: [phone]   Fax: [phone]   Hours: Open 24 hours                         E-Prescribed (1 of 1)              tiZANidine (ZANAFLEX) 4 MG tablet    Sig: Take 1 tablet (4 mg total) by mouth 2 (two) times daily as needed (headache).       Start: 5/10/25     Quantity: 5 tablet Refills: 0                           Ohs Peq Odvv Intake    5/10/2025  5:45 PM CDT - Filed by Allison Trejo (Proxy)   What is your current physical address in the event of a medical emergency? [address], [place] [address]   Are you able to take your vital signs? Yes   Systolic Blood Pressure: 105   Diastolic Blood Pressure: 69   Weight: 225   Height:  69   Pulse: 71   Temperature: 97.9   Respiration rate: 18   Pulse Oxygen: 96   Please attach any relevant images or files    Is your employer contracted with [facility]? No         Patient is accompanied by her daughter with request for refill of tizanidine.  She has hx frequent headaches and tizanidine as been the most effective in controlling them.  Daughter reports that she has been giving the patient tizanidine bid.  Review of record shows that PCP had noted that daughter expressed concern that the tizanidine was causing increased anxiety, and causing her to be more nervous and fidgety.  Daughter tells me that pt was given tizanidine last night and slept well; did not have symptoms of nervousness or anxiety, so she is not sure that those are actually adverse effects of the med.  Patient is scheduled to see her PCP on Monday.     Two patient identifiers were used-name was repeated verbally as well as date of birth.  The patient was located in their home in the [facility].          Constitution: Positive for fatigue and generalized weakness.   Gastrointestinal:  Positive for abdominal pain, nausea, vomiting and diarrhea.   Neurological:  Positive for headaches.        Objective:   The physical exam was conducted virtually.  Physical Exam   Constitutional: She is oriented to person, place, and time. No distress.   HENT:   Head: Normocephalic and atraumatic.   Neck: Neck supple.   Pulmonary/Chest: Effort normal. No respiratory distress.   Abdominal: Normal appearance.   Musculoskeletal: Normal range of motion.         General: Normal range of motion.   Neurological: no focal deficit. She is alert and oriented to person, place, and time.   Skin: Skin is not pale.   Psychiatric: Her behavior is normal. Mood, judgment and thought content normal.       Assessment:     1. Nonintractable headache, unspecified chronicity pattern, unspecified headache type        Plan:       Nonintractable headache,  unspecified chronicity pattern, unspecified headache type    Other orders  -     tiZANidine (ZANAFLEX) 4 MG tablet; Take 1 tablet (4 mg total) by mouth 2 (two) times daily as needed (headache).  Dispense: 5 tablet; Refill: 0    Patient actually known to me from recent hospitalization during which I cared for her in hospital medicine, so I am somewhat aware of her history.  I think reasonable to allow tizanidine until she can get into see her PCP and discuss other treatment options.      You must understand that you've received a virtual Care treatment only and that you may be released before all your medical problems are known or treated. You, the patient, will arrange for follow up care as instructed.  If your condition worsens we recommend that you receive another evaluation at an urgent care in person, the emergency room or contact your primary medical clinics after hours call service to discuss your concerns.              Present with the patient at the time of consultation: TELEMED PRESENT WITH PATIENT: daughter           [1]   Current Outpatient Medications on File Prior to Visit   Medication Sig Dispense Refill    acetaminophen (TYLENOL) 650 MG TbSR Take 650 mg by mouth every 8 (eight) hours.      azelastine (ASTELIN) 137 mcg (0.1 %) nasal spray 1 spray (137 mcg total) by Nasal route nightly. Point up and slightly outward toward ear when spraying to avoid irritating nasal septum. With flonase. 30 mL 2    cetirizine (ZYRTEC) 10 MG tablet Take 10 mg by mouth every evening.      fluticasone propionate (FLONASE) 50 mcg/actuation nasal spray 1 spray by Each Nostril route every evening.      levothyroxine (SYNTHROID) 112 MCG tablet Take 1 tablet (112 mcg total) by mouth before breakfast. 30 tablet 11    mag carb/aluminum hydrox/algin (GAVISCON EXTRA STRENGTH ORAL) Take 1 tablet by mouth 3 (three) times daily with meals. Pt takes 30min after meals takes tablet form      magnesium oxide 400 mg magnesium Tab Take 400  mg by mouth once daily. 90 tablet 1    meclizine (ANTIVERT) 25 mg tablet Take 0.5 tablets (12.5 mg total) by mouth 3 (three) times daily as needed for Dizziness. 8 tablet 0    metoclopramide HCl (REGLAN) 5 MG tablet Take 2 tablets (10 mg total) by mouth 4 (four) times daily before meals and nightly. 240 tablet 2    metoprolol succinate (TOPROL-XL) 100 MG 24 hr tablet Take 100 mg by mouth nightly.      multivitamin (THERAGRAN) tablet Take 1 tablet by mouth once daily.      ondansetron (ZOFRAN-ODT) 8 MG TbDL Take 1 tablet (8 mg total) by mouth every 8 (eight) hours as needed (nausea and vomiting). 15 tablet 0    pantoprazole (PROTONIX) 40 MG tablet Take 1 tablet (40 mg total) by mouth 2 (two) times daily. 180 tablet 0    promethazine (PHENERGAN) 25 MG suppository Place 1 suppository (25 mg total) rectally every 6 (six) hours as needed for Nausea. 12 suppository 1    riboflavin, vitamin B2, 400 mg Tab Take 400 mg by mouth once daily. 90 tablet 1    rimegepant (NURTEC) 75 mg odt Take 1 tablet (75 mg total) by mouth daily as needed for Migraine. Place ODT tablet on the tongue; alternatively the ODT tablet may be placed under the tongue 8 tablet 1    rizatriptan (MAXALT) 10 MG tablet Take 1 tablet (10 mg total) by mouth daily as needed for Migraine. 9 tablet 1    scopolamine (TRANSDERM-SCOP) 1.3-1.5 mg (1 mg over 3 days) Place 1 patch onto the skin Every 3 (three) days. 10 patch 0    sucralfate (CARAFATE) 100 mg/mL suspension Take 10 mLs (1 g total) by mouth 4 (four) times daily. 450 mL 0    topiramate (TOPAMAX) 25 MG tablet Take 1 tablet (25 mg total) by mouth once daily. 30 tablet 11     No current facility-administered medications on file prior to visit.

## 2025-05-12 ENCOUNTER — TELEPHONE (OUTPATIENT)
Facility: CLINIC | Age: 75
End: 2025-05-12
Payer: MEDICARE

## 2025-05-12 ENCOUNTER — OFFICE VISIT (OUTPATIENT)
Dept: FAMILY MEDICINE | Facility: CLINIC | Age: 75
End: 2025-05-12
Payer: MEDICARE

## 2025-05-12 DIAGNOSIS — K29.00 OTHER ACUTE GASTRITIS WITHOUT HEMORRHAGE: ICD-10-CM

## 2025-05-12 DIAGNOSIS — R11.2 NAUSEA AND VOMITING, UNSPECIFIED VOMITING TYPE: ICD-10-CM

## 2025-05-12 DIAGNOSIS — K21.9 GASTROESOPHAGEAL REFLUX DISEASE, UNSPECIFIED WHETHER ESOPHAGITIS PRESENT: ICD-10-CM

## 2025-05-12 DIAGNOSIS — G43.909 MIGRAINE WITHOUT STATUS MIGRAINOSUS, NOT INTRACTABLE, UNSPECIFIED MIGRAINE TYPE: Primary | ICD-10-CM

## 2025-05-12 RX ORDER — SUCRALFATE 1 G/10ML
1 SUSPENSION ORAL 4 TIMES DAILY
Qty: 450 ML | Refills: 0 | Status: SHIPPED | OUTPATIENT
Start: 2025-05-12

## 2025-05-12 RX ORDER — TIZANIDINE 4 MG/1
4 TABLET ORAL 2 TIMES DAILY PRN
Qty: 60 TABLET | Refills: 3 | Status: SHIPPED | OUTPATIENT
Start: 2025-05-12

## 2025-05-12 RX ORDER — PROMETHAZINE HYDROCHLORIDE 25 MG/1
25 SUPPOSITORY RECTAL EVERY 6 HOURS PRN
Qty: 12 SUPPOSITORY | Refills: 1 | Status: SHIPPED | OUTPATIENT
Start: 2025-05-12

## 2025-05-12 RX ORDER — ONDANSETRON 8 MG/1
8 TABLET, ORALLY DISINTEGRATING ORAL EVERY 8 HOURS PRN
Qty: 20 TABLET | Refills: 1 | Status: SHIPPED | OUTPATIENT
Start: 2025-05-12

## 2025-05-12 NOTE — PROGRESS NOTES
Subjective:        The patient location is: Home  The chief complaint leading to consultation is: Nausea, vomiting, headaches    Visit type: audio only      History of Present Illness    CHIEF COMPLAINT:  Ms. Espinoza presents with persistent nausea and vomiting, requiring ongoing management with medications and a scopolamine patch.    HPI:  Ms. Espinoza has persistent nausea and vomiting, controlled with a scopolamine patch. When the patch was removed for a scheduled gastric emptying study, she began vomiting within 24 hours, necessitating reapplication of the patch along with a Phenergan suppository. Her appetite has decreased, and she has lost weight, evidenced by fitting into previously tight clothing.    She reports a sensation of food being stuck in her esophagus, despite taking Protonix twice daily. She also has right upper quadrant pain. The nausea is incessant but currently managed with Phenergan suppositories and the scopolamine patch.    She has headaches, controlled with Tizanidine, a muscle relaxer prescribed about a month ago. The headaches occur approximately every 12 hours and subside with the medication. Previously, other headache medications, including triptans, were ineffective. Her headaches improved about a year ago when she started seeing a chiropractor for adjustments, but she eventually discontinued appointments.    She has congestion and right ear itching in conjunction with her nausea symptoms.    Sleep disturbances have been noted, with restless sleep and sleep talking when not using Phenergan suppositories.    She is under the care of multiple specialists. She has an appointment with a new gastroenterologist today, Dr. Mancilla. She is also seeing Dr. Luci Abdul (neurologist for migraines), and Dr. Medina (neurologist for dementia). She also receives home health care, including nursing, occupational therapy, and physical therapy.    A gastric emptying study was attempted but postponed due  to medication contraindications. It has been rescheduled for 10 days after stopping the scopolamine patch, but there are concerns about her ability to complete the study due to the rapid onset of vomiting when off the patch.    She denies agitation or anger during periods of increased activity related to her dementia.    MEDICATIONS:  Ms. Espinoza is on a Scopolamine patch applied to the skin for nausea and vomiting. She is also using Phenergan (promethazine) suppository as needed and Reglan (metoclopramide) for nausea. For acid reflux/GERD, she takes Protonix (pantoprazole) twice daily. Zofran (ondansetron) is taken under the tongue for nausea. She is on Tizanidine 4 mg, 1 tablet by mouth twice daily as needed for headache, which was started about a month ago and prescribed by Dr. Luci Abdul (also known as Dr. Rufino Fields). Ms. Espinoza is also on Meclizine and Carafate (sucralfate) 10 cc 4 times daily for GI issues. She temporarily discontinued the scopolamine patch for a gastric emptying study but had to reapply it within 24 hours due to vomiting.    TEST RESULTS:  A gastric emptying study is scheduled for the 21st or 22nd. The study has not been completed yet due to medication interference from the scopolam  ine patch.       Review of Systems   Constitutional:  Negative for activity change, appetite change, chills, diaphoresis, fatigue, fever and unexpected weight change.   HENT:  Positive for congestion.    Respiratory:  Negative for shortness of breath.    Cardiovascular:  Negative for chest pain and palpitations.   Gastrointestinal:  Positive for abdominal pain, constipation, diarrhea, nausea and vomiting.   Genitourinary:  Positive for frequency. Negative for dysuria.   Musculoskeletal:  Positive for arthralgias. Negative for myalgias.   Neurological:  Positive for headaches.   Psychiatric/Behavioral:  Positive for confusion (dementia). Negative for agitation and behavioral problems. The patient is  hyperactive (only for 24-48 hr).        Past Surgical History:   Procedure Laterality Date    APPENDECTOMY      COLONOSCOPY  2017    Briana, repeat in 10    ESOPHAGOGASTRODUODENOSCOPY N/A 4/24/2025    Procedure: EGD (ESOPHAGOGASTRODUODENOSCOPY);  Surgeon: Maxim Garzon MD;  Location: [facility];  Service: Endoscopy;  Laterality: N/A;    TONSILLECTOMY      TUBAL LIGATION       Past Medical History:   Diagnosis Date    Depression     GERD (gastroesophageal reflux disease)     Hyperlipidemia     Hypertension     Hypothyroid     Sleep apnea, unspecified 2015     Family History   Problem Relation Name Age of Onset    Cancer Father      Cancer Sister      Breast cancer Daughter      Glaucoma Neg Hx      Macular degeneration Neg Hx      Retinal detachment Neg Hx      Ovarian cancer Neg Hx      Eczema Neg Hx      Lupus Neg Hx      Melanoma Neg Hx      Psoriasis Neg Hx          Social History:   Marital Status:   Alcohol History:  reports that she does not currently use alcohol.  Tobacco History:  reports that she has never smoked. She has never used smokeless tobacco.  Drug History:  reports no history of drug use.    Review of patient's allergies indicates:  No Known Allergies    Current Medications[1]     Objective:        Physical Exam:   Physical Exam  Vitals and nursing note reviewed.   Constitutional:       Appearance: Normal appearance. She is obese.   HENT:      Head: Normocephalic and atraumatic.   Pulmonary:      Effort: Pulmonary effort is normal. No respiratory distress.   Neurological:      Mental Status: She is alert. Mental status is at baseline.   Psychiatric:         Mood and Affect: Mood normal.         Behavior: Behavior normal.                 Assessment:       Assessment & Plan    PLAN SUMMARY:  - Arranged home health nurse, occupational and physical therapy services  - Continue Phenergan suppositories and Scopolamine for nausea  - Maintain Protonix twice daily, Reglan, and Carafate  (sucralfate) 10 cc 4 times daily for GI symptoms  - Prescribed Tizanidine 4 mg twice daily as needed for headache  - Refilled Zofran sublingual tablets and Phenergan suppositories  - Awaiting GI specialist consultation with Dr. Ayers  - Consider gastric emptying study pending GI specialist input  - Evaluate gallbladder as possible cause of symptoms, considering surgical intervention  - Follow up with neurologist Dr. Medina for dementia treatment and behavioral management    MODERATE VASCULAR DEMENTIA WITH OTHER BEHAVIORAL DISTURBANCE:  - Monitored the patient who has been restless and talking in the middle of the night, with hyperactivity observed by everyone for the past 24-48 hours, indicating behavioral disturbances.  - Ms. Shankars dementia is treated by Dr. Medina (neurologist in the same office as Dr. Luci Abdul), whom they have contacted multiple times without response.  - Prescribed Tizanidine 4 mg twice daily as needed for headache, which may be related to musculoskeletal issues.  - Arranged home health nurse, occupational and physical therapy services to help monitor the patient's condition.    VOMITING:  - Ms. Espinoza experiences vomiting when off scopolamine patch, typically starting after bowel movements.  - Currently controlled with Phenergan suppositories, Scopolamine, and reglan.  - Refilled Zofran sublingual tablets and Phenergan suppositories today.  - Awaiting GI specialist consultation with Dr. Ayers to determine cause of persistent vomiting and evaluate need for gastric emptying study.    NAUSEA:  - Ms. Espinoza experiences nausea concurrent with vomiting episodes, managed with the same medication regimen of scopolamine patch, Phenergan suppositories, and other antiemetics.  - Symptoms are typically controlled with this combination therapy.  - Will reassess treatment plan following GI consultation with Dr. Ayers.    GASTRO-ESOPHAGEAL REFLUX DISEASE (GERD):  - Ms. Espinoza reports  sensation of food stuck in esophagus despite current treatment.  - Managing with Protonix twice daily, Reglan, and Carafate (sucralfate) 10 cc 4 times daily.  - EGD completed last month which showed acute gastritis, negative for H. Pylori   - Will reassess need for sucralfate pending input from GI specialist.    CHRONIC POST-TRAUMATIC HEADACHE:  - Headaches are currently controlled with Tizanidine.  - Response to medication suggests possible musculoskeletal origin.  - Sent refill today.    FOLLOW-UP:  - Ms. Espinoza to follow up after appointment with Dr. Ayers for insights and treatment plan adjustments.  - Advised to contact office if needed in the meantime.       Plan:   Migraine without status migrainosus, not intractable, unspecified migraine type  -     tiZANidine (ZANAFLEX) 4 MG tablet; Take 1 tablet (4 mg total) by mouth 2 (two) times daily as needed (headache).  Dispense: 60 tablet; Refill: 3    Nausea and vomiting, unspecified vomiting type    Other acute gastritis without hemorrhage    Gastroesophageal reflux disease, unspecified whether esophagitis present      Follow up in about 3 months (around 8/12/2025).        DANY Madrigal, FNP    Face to Face time with patient: 15 minutes    20 minutes of total time spent on the encounter, which includes face to face time and non-face to face time preparing to see the patient (eg, review of tests), Obtaining and/or reviewing separately obtained history, Documenting clinical information in the electronic or other health record, Independently interpreting results (not separately reported) and communicating results to the patient/family/caregiver, or Care coordination (not separately reported).     Each patient to whom he or she provides medical services by telemedicine is:  (1) informed of the relationship between the physician and patient and the respective role of any other health care provider with respect to management of the patient; and (2)  notified that he or she may decline to receive medical services by telemedicine and may withdraw from such care at any time.    This note was generated with the assistance of ambient listening technology. Verbal consent was obtained by the patient and accompanying visitor(s) for the recording of patient appointment to facilitate this note. I attest to having reviewed and edited the generated note for accuracy, though some syntax or spelling errors may persist. Please contact the author of this note for any clarification.             [1]   Current Outpatient Medications   Medication Sig Dispense Refill    acetaminophen (TYLENOL) 650 MG TbSR Take 650 mg by mouth every 8 (eight) hours.      azelastine (ASTELIN) 137 mcg (0.1 %) nasal spray 1 spray (137 mcg total) by Nasal route nightly. Point up and slightly outward toward ear when spraying to avoid irritating nasal septum. With flonase. 30 mL 2    cetirizine (ZYRTEC) 10 MG tablet Take 10 mg by mouth every evening.      fluticasone propionate (FLONASE) 50 mcg/actuation nasal spray 1 spray by Each Nostril route every evening.      levothyroxine (SYNTHROID) 112 MCG tablet Take 1 tablet (112 mcg total) by mouth before breakfast. 30 tablet 11    mag carb/aluminum hydrox/algin (GAVISCON EXTRA STRENGTH ORAL) Take 1 tablet by mouth 3 (three) times daily with meals. Pt takes 30min after meals takes tablet form      magnesium oxide 400 mg magnesium Tab Take 400 mg by mouth once daily. 90 tablet 1    meclizine (ANTIVERT) 25 mg tablet Take 0.5 tablets (12.5 mg total) by mouth 3 (three) times daily as needed for Dizziness. 8 tablet 0    metoclopramide HCl (REGLAN) 5 MG tablet Take 2 tablets (10 mg total) by mouth 4 (four) times daily before meals and nightly. 240 tablet 2    metoprolol succinate (TOPROL-XL) 100 MG 24 hr tablet Take 100 mg by mouth nightly.      multivitamin (THERAGRAN) tablet Take 1 tablet by mouth once daily.      ondansetron (ZOFRAN-ODT) 8 MG TbDL Take 1 tablet  (8 mg total) by mouth every 8 (eight) hours as needed (nausea and vomiting). 20 tablet 1    pantoprazole (PROTONIX) 40 MG tablet Take 1 tablet (40 mg total) by mouth 2 (two) times daily. 180 tablet 0    promethazine (PHENERGAN) 25 MG suppository Place 1 suppository (25 mg total) rectally every 6 (six) hours as needed for Nausea. 12 suppository 1    riboflavin, vitamin B2, 400 mg Tab Take 400 mg by mouth once daily. 90 tablet 1    rimegepant (NURTEC) 75 mg odt Take 1 tablet (75 mg total) by mouth daily as needed for Migraine. Place ODT tablet on the tongue; alternatively the ODT tablet may be placed under the tongue 8 tablet 1    rizatriptan (MAXALT) 10 MG tablet Take 1 tablet (10 mg total) by mouth daily as needed for Migraine. 9 tablet 1    scopolamine (TRANSDERM-SCOP) 1.3-1.5 mg (1 mg over 3 days) Place 1 patch onto the skin Every 3 (three) days. 10 patch 0    sucralfate (CARAFATE) 100 mg/mL suspension Take 10 mLs (1 g total) by mouth 4 (four) times daily. 450 mL 0    tiZANidine (ZANAFLEX) 4 MG tablet Take 1 tablet (4 mg total) by mouth 2 (two) times daily as needed (headache). 60 tablet 3    topiramate (TOPAMAX) 25 MG tablet Take 1 tablet (25 mg total) by mouth once daily. 30 tablet 11     No current facility-administered medications for this visit.

## 2025-05-12 NOTE — PROGRESS NOTES
SUBJECTIVE:      Patient ID: Skyler Espinoza is a 74 y.o. female.    Chief Complaint: No chief complaint on file.    History of Present Illness             Review of Systems    Family History   Problem Relation Name Age of Onset    Cancer Father      Cancer Sister      Breast cancer Daughter      Glaucoma Neg Hx      Macular degeneration Neg Hx      Retinal detachment Neg Hx      Ovarian cancer Neg Hx      Eczema Neg Hx      Lupus Neg Hx      Melanoma Neg Hx      Psoriasis Neg Hx        Social History     Socioeconomic History    Marital status:    Tobacco Use    Smoking status: Never    Smokeless tobacco: Never   Substance and Sexual Activity    Alcohol use: Not Currently     Comment: rarely    Drug use: No    Sexual activity: Yes     Social Drivers of Health     Financial Resource Strain: Medium Risk (5/5/2025)    Overall Financial Resource Strain (CARDIA)     Difficulty of Paying Living Expenses: Somewhat hard   Food Insecurity: No Food Insecurity (5/5/2025)    Hunger Vital Sign     Worried About Running Out of Food in the Last Year: Never true     Ran Out of Food in the Last Year: Never true   Transportation Needs: No Transportation Needs (5/5/2025)    PRAPARE - Transportation     Lack of Transportation (Medical): No     Lack of Transportation (Non-Medical): No   Physical Activity: Inactive (4/24/2025)    Exercise Vital Sign     Days of Exercise per Week: 0 days     Minutes of Exercise per Session: 0 min   Stress: No Stress Concern Present (5/1/2025)    [employer] [place] [employer] - Occupational Stress Questionnaire     Feeling of Stress : Not at all   Housing Stability: Low Risk  (5/1/2025)    Housing Stability Vital Sign     Unable to Pay for Housing in the Last Year: No     Number of Times Moved in the Last Year: 0     Homeless in the Last Year: No     Current Outpatient Medications   Medication Sig Note    acetaminophen (TYLENOL) 650 MG TbSR Take 650 mg by mouth every 8 (eight) hours.      azelastine (ASTELIN) 137 mcg (0.1 %) nasal spray 1 spray (137 mcg total) by Nasal route nightly. Point up and slightly outward toward ear when spraying to avoid irritating nasal septum. With flonase.     cetirizine (ZYRTEC) 10 MG tablet Take 10 mg by mouth every evening.     fluticasone propionate (FLONASE) 50 mcg/actuation nasal spray 1 spray by Each Nostril route every evening.     levothyroxine (SYNTHROID) 112 MCG tablet Take 1 tablet (112 mcg total) by mouth before breakfast.     mag carb/aluminum hydrox/algin (GAVISCON EXTRA STRENGTH ORAL) Take 1 tablet by mouth 3 (three) times daily with meals. Pt takes 30min after meals takes tablet form     magnesium oxide 400 mg magnesium Tab Take 400 mg by mouth once daily.     meclizine (ANTIVERT) 25 mg tablet Take 0.5 tablets (12.5 mg total) by mouth 3 (three) times daily as needed for Dizziness.     metoclopramide HCl (REGLAN) 5 MG tablet Take 2 tablets (10 mg total) by mouth 4 (four) times daily before meals and nightly.     metoprolol succinate (TOPROL-XL) 100 MG 24 hr tablet Take 100 mg by mouth nightly.     multivitamin (THERAGRAN) tablet Take 1 tablet by mouth once daily.     ondansetron (ZOFRAN-ODT) 8 MG TbDL Take 1 tablet (8 mg total) by mouth every 8 (eight) hours as needed (nausea and vomiting).     pantoprazole (PROTONIX) 40 MG tablet Take 1 tablet (40 mg total) by mouth 2 (two) times daily.     promethazine (PHENERGAN) 25 MG suppository Place 1 suppository (25 mg total) rectally every 6 (six) hours as needed for Nausea.     riboflavin, vitamin B2, 400 mg Tab Take 400 mg by mouth once daily.     rimegepant (NURTEC) 75 mg odt Take 1 tablet (75 mg total) by mouth daily as needed for Migraine. Place ODT tablet on the tongue; alternatively the ODT tablet may be placed under the tongue     rizatriptan (MAXALT) 10 MG tablet Take 1 tablet (10 mg total) by mouth daily as needed for Migraine.     scopolamine (TRANSDERM-SCOP) 1.3-1.5 mg (1 mg over 3 days) Place 1  patch onto the skin Every 3 (three) days. 4/28/2025: Hasn't picked up.     sucralfate (CARAFATE) 100 mg/mL suspension Take 10 mLs (1 g total) by mouth 4 (four) times daily.     tiZANidine (ZANAFLEX) 4 MG tablet Take 1 tablet (4 mg total) by mouth 2 (two) times daily as needed (headache).     topiramate (TOPAMAX) 25 MG tablet Take 1 tablet (25 mg total) by mouth once daily.    Last reviewed on 4/29/2025 12:50 PM by Neo Colvin    Review of patient's allergies indicates:  No Known Allergies   Past Medical History:   Diagnosis Date    Depression     GERD (gastroesophageal reflux disease)     Hyperlipidemia     Hypertension     Hypothyroid     Sleep apnea, unspecified 2015     Past Surgical History:   Procedure Laterality Date    APPENDECTOMY      COLONOSCOPY  2017    Briana, repeat in 10    ESOPHAGOGASTRODUODENOSCOPY N/A 4/24/2025    Procedure: EGD (ESOPHAGOGASTRODUODENOSCOPY);  Surgeon: Maxim Garzon MD;  Location: [facility];  Service: Endoscopy;  Laterality: N/A;    TONSILLECTOMY      TUBAL LIGATION            OBJECTIVE:      There were no vitals filed for this visit.  Physical Exam       Assessment:       No diagnosis found.    Plan:       Assessment & Plan             There are no diagnoses linked to this encounter.    No follow-ups on file.      5/12/2025 DANY Madrigal, BRYANT  This note was generated with the assistance of ambient listening technology. Verbal consent was obtained by the patient and accompanying visitor(s) for the recording of patient appointment to facilitate this note. I attest to having reviewed and edited the generated note for accuracy, though some syntax or spelling errors may persist. Please contact the author of this note for any clarification.

## 2025-05-12 NOTE — TELEPHONE ENCOUNTER
Spoke to  Pt's daughter. I informed her that unfortunately, there are no sooner appt available at this time with Jacquelyn but have added the appt to the wait list. However, I can schedule the Pt with another provider that has sooner availability. Pt's daughter stated Jacquelyn was highly recommended. While talking she mention that the Pt was seeing seeing a neurologist not affiliated with [facility] who was burning the Pt's nerves. However, those procedures worked for a little bit then the pain would return. While talking it was mentioned that Pt lives on the [facility]. I offered to see if a provider on the [facility] had sooner availability. Pt's daughter was open to the option.       ----- Message from Enriqueta sent at 5/8/2025 11:49 AM CDT -----  Regarding: Appt Sooner  Contact: [phone]  Pt daughter is calling to speak to someone in the office; asking for a sooner appt than what they are scheduled for. Pt is already on the wait list; no available appts in Epic that are coming up soon. Pt is asking to speak to someone in the office. Please call to advise. Thanks. Reason for sooner appt: chronic headaches / migranes Pt's DX: chronic headaches / migranes When is the first available appointment? 8/6 Communication Preference:  [phone] Additional Information: they were referred from pt case manger

## 2025-05-14 ENCOUNTER — LAB REQUISITION (OUTPATIENT)
Dept: LAB | Facility: HOSPITAL | Age: 75
End: 2025-05-14
Payer: MEDICARE

## 2025-05-14 ENCOUNTER — TELEPHONE (OUTPATIENT)
Dept: FAMILY MEDICINE | Facility: CLINIC | Age: 75
End: 2025-05-14
Payer: MEDICARE

## 2025-05-14 DIAGNOSIS — N39.0 URINARY TRACT INFECTION, SITE NOT SPECIFIED: ICD-10-CM

## 2025-05-14 DIAGNOSIS — R82.998 DARK URINE: Primary | ICD-10-CM

## 2025-05-14 LAB
BILIRUB UR QL STRIP.AUTO: NEGATIVE
CLARITY UR: CLEAR
COLOR UR AUTO: YELLOW
GLUCOSE UR QL STRIP: NEGATIVE
HGB UR QL STRIP: NEGATIVE
KETONES UR QL STRIP: NEGATIVE
LEUKOCYTE ESTERASE UR QL STRIP: NEGATIVE
NITRITE UR QL STRIP: NEGATIVE
PH UR STRIP: 6 [PH]
PROT UR QL STRIP: NEGATIVE
SP GR UR STRIP: 1.01
UROBILINOGEN UR STRIP-ACNC: NEGATIVE EU/DL

## 2025-05-14 PROCEDURE — 81003 URINALYSIS AUTO W/O SCOPE: CPT | Performed by: NURSE PRACTITIONER

## 2025-05-14 NOTE — TELEPHONE ENCOUNTER
Patient's medical caregiver's message    Who Called: Blanquita from [facility]   Symptoms (please be specific): pt is experiencing a migraine, diarrhea times 2, signs and symptoms of a UTI, dark urine, and a low grade fever 99.4. needs a order for a UACNS with reflex   Would the patient rather a call back or a response via [facility]? call   Best Call Back Number: blanquita - , caregiver Maria M Harding    Additional Information: please advise and thank you

## 2025-05-15 ENCOUNTER — DOCUMENT SCAN (OUTPATIENT)
Dept: HOME HEALTH SERVICES | Facility: HOSPITAL | Age: 75
End: 2025-05-15
Payer: MEDICARE

## 2025-05-15 ENCOUNTER — EXTERNAL HOME HEALTH (OUTPATIENT)
Dept: HOME HEALTH SERVICES | Facility: HOSPITAL | Age: 75
End: 2025-05-15
Payer: MEDICARE

## 2025-05-16 ENCOUNTER — DOCUMENT SCAN (OUTPATIENT)
Dept: HOME HEALTH SERVICES | Facility: HOSPITAL | Age: 75
End: 2025-05-16
Payer: MEDICARE

## 2025-05-20 ENCOUNTER — TELEPHONE (OUTPATIENT)
Dept: PRIMARY CARE CLINIC | Facility: CLINIC | Age: 75
End: 2025-05-20
Payer: MEDICARE

## 2025-05-21 ENCOUNTER — TELEPHONE (OUTPATIENT)
Dept: FAMILY MEDICINE | Facility: CLINIC | Age: 75
End: 2025-05-21
Payer: MEDICARE

## 2025-05-21 NOTE — TELEPHONE ENCOUNTER
----- Message from Yesy sent at 5/21/2025 12:00 PM CDT -----  Type:  Needs Medical Advice Who Called: Sofy (Home Health Nurse ) Symptoms (please be specific): Constipation  How long has patient had these symptoms:  3 days  Pharmacy name and phone #:  [id number] - [place] [id number] [facility] & [address] [id number]: [phone] Fax: [phone] Would the patient rather a call back or a response via [facility]? Call Back  Best Call Back Number: [phone] Additional Information: Calling for  To send medication to help with constipation      Please call Back to advise. Thanks!

## 2025-05-21 NOTE — TELEPHONE ENCOUNTER
Spoke to pt daughter she st that she has been giving pt prunes and Otc stool softener dulcolax with still constipation. She st she has not tried Miralax on pt.

## 2025-05-21 NOTE — TELEPHONE ENCOUNTER
Spoke to pt daughter and she st that her mom was c/o of fast heart rate. She st that she took her BP and HR and those were normal. She st that she did an at home EKG on the pt and it resulted to supraventricular ectopy. She is asking on advise and should she be concerned. Please advise. She st that she will try the Miralax and see if it helps.

## 2025-05-22 NOTE — TELEPHONE ENCOUNTER
Spoke with pt daughter she st that it only happened once and she think giving pt sudafed and she thinks that it related to her tachycardia. She st that it had not happened since then and she st that if it does happen persistently she will bring pt to ED.

## 2025-05-23 ENCOUNTER — TELEPHONE (OUTPATIENT)
Dept: FAMILY MEDICINE | Facility: CLINIC | Age: 75
End: 2025-05-23
Payer: MEDICARE

## 2025-05-23 NOTE — TELEPHONE ENCOUNTER
Patient was encouraged to continue Miralax since she had a little success. But to monitor for signs severe distress.

## 2025-05-26 ENCOUNTER — OFFICE VISIT (OUTPATIENT)
Dept: NEUROLOGY | Facility: CLINIC | Age: 75
End: 2025-05-26
Payer: MEDICARE

## 2025-05-26 VITALS — RESPIRATION RATE: 16 BRPM | BODY MASS INDEX: 34.7 KG/M2 | HEIGHT: 68 IN | WEIGHT: 228.94 LBS

## 2025-05-26 DIAGNOSIS — F01.B18 MODERATE VASCULAR DEMENTIA WITH OTHER BEHAVIORAL DISTURBANCE: ICD-10-CM

## 2025-05-26 DIAGNOSIS — G43.709 CHRONIC MIGRAINE WITHOUT AURA WITHOUT STATUS MIGRAINOSUS, NOT INTRACTABLE: Primary | ICD-10-CM

## 2025-05-26 DIAGNOSIS — M79.12 MYALGIA OF AUXILIARY MUSCLES, HEAD AND NECK: ICD-10-CM

## 2025-05-26 PROCEDURE — 99999 PR PBB SHADOW E&M-EST. PATIENT-LVL IV: CPT | Mod: PBBFAC,,, | Performed by: INTERNAL MEDICINE

## 2025-05-26 NOTE — PROCEDURES
Trigger Point Injection    Performed by: Oscar Soni MD  Authorized by: Oscar Soni MD      Consent Done?:  Yes (Verbal)    Pre-Procedure:   Indications:  Diagnostic evaluation and pain  Site marked: the procedure site was marked     Timeout: prior to procedure the correct patient, procedure, and site was verified    Prep: patient was prepped and draped in usual sterile fashion     Local anesthesia used?: Yes    Anesthesia:  Local infiltration  Local anesthetic: ropi 0.5%  Anesthetic total (ml):  15      Description of Procedure:    The patient was identified and consented verbally to procedure(s) listed below. The patient acknowledged the risks of the procedure, which include (but not necessarily limited to) pain, bleeding, bruising, infection at the injection site, nerve injury / damage, lung injury (for trigger point injections and nerve blocks in the thoracic region), bowel injury (for trigger point injections and nerve blocks of the abdomen), local anesthetic toxicity, spinal cord and/or major vessel injury (for cervical procedures). The patient wished to proceed with the procedure.     They were then placed sitting with head down in the clinic room, the site was identified / marked, and the area was sterilized using alcohol.     Then, the following was performed:    Trigger point injection of the bilateral lateral occipitalis    Trigger point injection of the bilateral medial occipitalis    Needle used: 30G, 1 in (2.5 cm) needle  Type and total local anesthetic used: 15 mL of ropivacaine 0.5% and 0 mL of lidocaine 1%   Type and total steroid used:  None    The site(s) was/were cleaned and the patient was instructed to avoid submersion in water x 48 hours. They were told to let the clinic know if there were concerns for infection (increased redness, heat, pain, swelling) in the area receiving the procedure.     Ultrasound was used for this procedure and image has been uploaded to the  chart: No    Pain Prior to Procedure: 5/10  Pain After the Procedure: 0/10    Oscar Soni MD  Headache and Pain Management  [facility]

## 2025-05-26 NOTE — PROGRESS NOTES
"Neurology Headache [facility] - [facility]  New Patient Visit     Subjective      REFERRAL SOURCE  Self, Aaareferral          CHIEF COMPLAINT/REASON FOR REFERRAL  Headache     HISTORY OF PRESENT ILLNESS  Skyler Espinoza is a 74 y.o. woman with depression, hypertension, autoimmune thyroid disease, obstructive sleep apnea off CPAP, moderate dementia (vascular and Alz), who is presenting to the [facility] for an office visit with a chief complaint of headache.     The patient is accompanied by her daughter who speaks for the patient. When asked what medical problems she has, the patient states "I like taking small children and throwing them out of the window." Daughter states that that she was in the ED for nausea/vomiting and she left with tizanidine, phenergan, and scopolamine patches. She has been on scopolamine patches continually for 2 weeks. She is mid-work-up for nausea and it has been suggested that maybe some of this nausea is from migraine.     The patient states that she has had headache sine teenage years. She has been following with Dr Abdul and Dr Medina (both private) for headache and behavioral dementia. She had blood work for dementia that returned positive for Alz. The patient had a cervical radiofrequency ablation a few months ago that was effective for headache. They are waiting to hear back about repeating the procedure.     She does not have myasthenia gravis    NPV headache characteristics:  Headache Frequency:        Total: 30        Disablin     Duration of attacks: days  Timing to max pain: minutes   Time of day when headache is worse?: yes - night time  Headache location:    bilateral frontal and occipital   right retro-orbital  Quality: throbbing / pulsating, sharp/shooting/stabbing, and shock-like  Intensity: moderate to severe  Associated symptoms: photophobia, phonophobia, aggravation with routine physical activity, nausea, and delayed vomiting  Unilateral " cranial autonomic features or restlessness: none - facial flushing on both sides   Premonitory symptoms: none  Aura symptoms:   Type: none   Duration: migraine aura duration: none  Headache Red Flags:        New (or very out-of-proportion to previous) daily, continuous, and progressive headache in a patient over 50 (especially if subacute): yes - 2 months headache much worse than her normal        Fevers/Drenching Night Sweats/Unintended Weight Loss/Hx of Cancer: flushes and shivering        Focal weakness: no        Severe 10/10 headache in <10 seconds (Thunderclap onset): yes - 2 days per week over the last 2 months she'll get shocking / stabbing        Start a headache with coughing/sneezing/bending over: no        Headache absolutely worse with certain positions (lying down vs standing up): yes - worse lying down        Start a headache with exercise or exertion: no        Double vision: no        Transient Visual Obscurations: no        Loss of color vision: no        Pulsatile or whooshing tinnitus: ringing on right side then on both sides  Triggers: no  Neck pain: yes - bilateral Radiation up  Jaw pain/cramping with chewing, e.g., starts/stops when chewing due to pain/fatigue, lockjaw/decreased opening or cramping (including tongue) when eating: no  Symptoms of dysautonomia: postural lightheadedness / dizziness, heart racing or skipped beats, feels full quickly (early satiety), difficulty with urination, dry mouth (causing cavities or loss of teeth), and dry eyes (requiring frequent use of eye drops)     Prior non-pharm treatments (CBT-Pain, PT, chiropractor, acupuncture, massage): no  History of asthma, constipation, kidney stones: yes - problematic constipation  Psychiatric comorbidities: yes - depression  History of Head Trauma: yes - falls with head injury. Recent slide out of bed and now there is a rail. Stool in bathroom with head injury within the las 9 mo  Hypertension, Hyperlipidemia: yes -  "both  Prior stroke: no  Coronary artery disease: no  Vascular malformation or aneurysm: no  Peripheral Vascular disease / Raynaud's: no    Caffeine use: yes - drinking coffee and avoiding pseudoephedrine    Sleep comorbidities: Snoring absent, witnessed apneas present, sleep study yes - GISELE but off CPAP     Family history of headaches:  yes - her mother and daughter     Last dilated eye exam: over 1 year ago   Any abnormalities? no     Menstrual status:  Did attacks start around menarche? yes  Does the patient currently get their period? no  Worsening of migraine during menses? no  Does the patient use contraception? Type? no  Is the patient currently pregnant or planning pregnancy in the near future? no  Is the patient menopausal? yes  Using HRT? no     Social History:  The patient lives in [place], [place].   Employment: no  Tobacco use: no  Alcohol use: no  Substances: no / THC gummies but stopped when she had hyperemesis  Mood: "It's ok - I have wonderful children and wonderful grandchildren" Daughter says whenever we talk about anything stressful she will get angry and she talks about shooting them (dogs)    The patient is unable to do the following activities easily because of their pain:  eating, dressing, using the toilet, bathing or showering, cooking or preparing meals, cleaning, and shopping      ----------------     Current Acute Headache Medications:  -- Tizanidine 4 mg - very helpful  -- Reglan 5 mg  -- Rizatriptan 10 mg  -- Phenergan 25 mg suppository     Number of Days with acute medication use per week: 7      Current Prophylactic Headache Medications:  -- Magnesium 400 mg  -- Metoprolol 100 mg     Previous headache treatment that was ineffective or not tolerated:  Acute: Nurtec 75 mg ($$$)  Preventive: Topiramate 25 mg   Devices:     Procedures:  1/2025 - cervical radiofrequency ablation    ----------------     Past Medical History:   Diagnosis Date    Depression     GERD (gastroesophageal reflux " "disease)     Hyperlipidemia     Hypertension     Hypothyroid     Sleep apnea, unspecified 2015        Medications Ordered Prior to Encounter[1]     REVIEW OF SYSTEMS  As above     Objective      PHYSICAL EXAMINATION    Resp. rate 16, height 5' 8" (1.727 m), weight 103.8 kg (228 lb 15.2 oz).     General Exam: The patient is in no acute distress.  Psychiatric: The patient is alert, interactive, and has appropriate mood and affect.    Head and Neck:  Supratrochlear tenderness: Yes bilateral  Supraorbital tenderness: Yes bilateral  Auriculotemporal tenderness: Yes bilateral  Lesser occipital tenderness: Yes bilateral  Greater occipital tenderness: Yes bilateral  Cervical paraspinal muscle tenderness: Yes bilateral  Cervical ROM (normal flexion 50'; extension 80'; lateral flexion 45'; rotation 85'): Abnormal - due to pain   Cervical facet loading: Equivocal bilateral  Spurling's sign: Negative bilateral     Neurologic Examination:  Mental Status: Mental status is normal to conversation. The patient is able to recall the details of their medical history without difficulty. Speech is clear. Language is grossly intact.    Cranial Nerves: Facial symmetry and strength is intact. Tongue protrudes in the midline.   Sensory Examination:  Equal and intact to touch at the arms and legs.  Coordination: No dysmetria on finger-nose-finger testing  Gait: Normal gait.     Motor/Reflexes:      Right Left   C5 Shoulder Abduction  5  5   C5/6 Elbow Flexion    5  5   C6 Wrist Extension  5  5   C7 Elbow Extension   5  5   C8/T1 (ulnar) First dorsal interosseous  5  5   C8/T1 (median) Abductor pollicis brevis 5 5        Right Left   L2/3 Iliopsoas  Hip flexion  5  5   L3/4 Quadriceps Knee Extension  5  5   L4/5 Tib Anterior Ankle Dorsiflexion   5  5   L5 Extensor Hallicus Longus Great toe extension  5  5   S1/S2 Gastroc/Soleus Plantar Flexion  5  5      Right Left   Biceps DTR 2+ 2+   Triceps DTR 2+ 2+   Patellar DTR 2+ 2+   Achilles DTR 2+ " 2+   Zamorano Absent  Absent   Clonus Absent Absent   Babinski Down-going Down-going       Visual Exam:    Pupils: Tested - Normal  Visual fields: Not Tested  Funduscopic exam: Not Tested  Color Vision: Not Tested  Extraocular movements: supranuclear elevation bilateral seems somewhat reduced  Nystagmus: Not Tested  Saccades: Not Tested  Pursuits: Tested - Normal    ----------------     DIAGNOSTIC DATA     Laboratory Data:     Lab Results   Component Value Date    WBC 6.87 04/30/2025    HGB 11.7 (L) 04/30/2025    HCT 36.1 (L) 04/30/2025    MCV 90 04/30/2025     04/30/2025           CMP  Sodium   Date Value Ref Range Status   05/01/2025 139 136 - 145 mmol/L Final     Potassium   Date Value Ref Range Status   05/01/2025 3.8 3.5 - 5.1 mmol/L Final     Chloride   Date Value Ref Range Status   05/01/2025 104 95 - 110 mmol/L Final     CO2   Date Value Ref Range Status   05/01/2025 28 23 - 29 mmol/L Final     Glucose   Date Value Ref Range Status   05/01/2025 87 70 - 110 mg/dL Final     BUN   Date Value Ref Range Status   05/01/2025 6 (L) 8 - 23 mg/dL Final     Creatinine   Date Value Ref Range Status   05/01/2025 0.7 0.5 - 1.4 mg/dL Final   05/02/2013 0.8 0.5 - 1.4 mg/dL Final     Calcium   Date Value Ref Range Status   05/01/2025 8.6 (L) 8.7 - 10.5 mg/dL Final   05/02/2013 10.0 8.7 - 10.5 mg/dL Final     Protein Total   Date Value Ref Range Status   04/30/2025 5.4 (L) 6.0 - 8.4 gm/dL Final     Albumin   Date Value Ref Range Status   04/30/2025 3.2 (L) 3.5 - 5.2 g/dL Final     Bilirubin Total   Date Value Ref Range Status   04/30/2025 0.5 0.1 - 1.0 mg/dL Final     Comment:     For infants and newborns, interpretation of results should be based   on gestational age, weight and in agreement with clinical   observations.    Premature Infant recommended reference ranges:   0-24 hours:  <8.0 mg/dL   24-48 hours: <12.0 mg/dL   3-5 days:    <15.0 mg/dL   6-29 days:   <15.0 mg/dL     ALP   Date Value Ref Range Status  "  04/30/2025 53 (L) 55 - 135 unit/L Final     AST   Date Value Ref Range Status   04/30/2025 18 10 - 40 unit/L Final     ALT   Date Value Ref Range Status   04/30/2025 17 10 - 44 unit/L Final     Anion Gap   Date Value Ref Range Status   05/01/2025 7 (L) 8 - 16 mmol/L Final   05/02/2013 15 5 - 15 meq/L Final     eGFR   Date Value Ref Range Status   05/01/2025 >60 >60 mL/min/1.73/m2 Final   11/15/2024 >60.0 >60 mL/min/1.73 m^2 Final        Imaging Data:    Xray:      CT:       MRI:  5/1/2025  FINDINGS:  Gray matter distinction is preserved throughout the brain parenchyma.  The ventricles are midline without mass effect. There involutional changes of the brain parenchyma with ex vacuo dilatation of the ventricles.  Periventricular deep white matter FLAIR T2 hyperintensities are consistent with changes of chronic microvascular ischemia.  No intra-axial hemorrhage or restricted diffusion.  No intra-axial fluid collection or mass.  Bone marrow signal of the calvarium is within normal limits.  Major vascular flow voids are within normal limits.  The orbits globes and optic nerves are grossly unremarkable.  Paranasal sinuses are unremarkable.     Impression:     Chronic and involutional changes of the brain with no acute intracranial abnormality.      ----------------     Assessment & Plan      Skyler Espinoza is a 74 y.o. woman with depression, hypertension, autoimmune thyroid disease, obstructive sleep apnea off CPAP, moderate dementia (vascular and Alz), who is presenting to the [facility] for an office visit with a chief complaint of headache.     The patient is accompanied by her daughter who speaks for the patient. When asked what medical problems she has, the patient states "I like taking small children and throwing them out of the window." Daughter states that that she was in the ED for nausea/vomiting and she left with tizanidine, phenergan, and scopolamine patches. She has been on " scopolamine patches continually for 2 weeks. She is mid-work-up for nausea and it has been suggested that maybe some of this nausea is from migraine.     The patient states that she has had headache sine teenage years. She has been following with Dr Abdul and Dr Medina (both private) for headache and behavioral dementia. She had blood work for dementia that returned positive for Alz. The patient had a cervical radiofrequency ablation a few months ago that was effective for headache. They are waiting to hear back about repeating the procedure.     Exam notable for bilateral occipital notch tenderness and somewhat reduced up-gaze. She had some inappropriate statements today to questioning but social skills overall preserved. At this point, it is likely that the patient is experiencing ICHD 1.3 chronic migraine based on the presence of headache on >=15 days per month with >/= 8 days meeting criteria for migraine, which last >/= 4 - 72 hours without treatment and are characterized by occasional unilateral, pulsating/throbbing pain that is moderate-to-severe in intensity and aggravated by routine physical activity. They also experience photophobia and phonophobia and nausea and/or vomiting. This has been occurring for at least 3 months without improvement on their current regimen. She has already tried topiramate, nurtec, and metoprolol. Will ask her to start  q 3 months.     Botox (onabotulinumtoxinA) will be injected into the following muscles (PREEMPT):    - 155 unit protocol -   Procerus 5 Units  Corrugators 5U Right 5U Left  Frontalis 10U Right 10U Left  Temporalis 20U Right and 20U Left  Occipitalis 15U Right and 15U Left  Cervical Paraspinals 10U Right and 10U Left  Trapezius 15U Right and 15U Left    Total units: 155U injected; 45U waste    It is also likely that the patient is experiencing ICHD13.4 occipital neuralgia based on the presence of unilateral or bilateral pain in the distribution(s) of the  greater, lesser and/or third occipital nerves that recurs in paroxysmal attacks lasting from a few seconds to minutes, is severe in intensity and shooting, stabbing or sharp in quality. It is also associated with dysaesthesia and/or allodynia apparent during innocuous stimulation of the scalp and/or hair and either or both of the followin) tenderness over the affected nerve branches or 2) trigger points at the emergence of the greater occipital nerve or in the distribution of C2. Today, we performed a local anesthetic injection and their pain was significantly improved.     Chronic migraine  Migraine without aura  Occipital neuralgia / myalgia cervical    -- Diagnostic studies and referrals recommended: None  -- Preventive:  units q 3 month; don't start topiramate. Side effects discussed.  -- Supplements: Try Magnesium (oxide, glycinate, citrate, or combination) 400 mg by mouth twice per day (Side effect: stomach upset). Find at local [employer] or [employer] food store. Try Riboflavin (VitB2) 200 mg by mouth twice per day (Side effect: bright yellow urine). Find at [employer] food store ([employer], etc.). Alternatively, there is a combination pill that you can try that has 600 mg of magnesium citrate (can cause loose stools), 400 mg Riboflavin, 300 mg CoQ10, 3 mg Melatonin, and 4000 IU VitD3 called MigraineMD that you can try at night.  -- Abortive: Rizatriptan 10 mg. You can take your triptan medication 2 times within a 24 hour period after waiting 2 hours between doses. Do not mix more than one triptan or ergotamine medication within the same 24 hours. Take early and combine with other medications for added benefit. Side effects include: nausea, fatigue, chest/jaw tightness (not dangerous).   -- Nausea/Vomiting: discuss scopolamine patches  -- Medication overuse discussed. Limit the use of as needed medications to less than 2 to 3 days per week or 10 days per month to reduce the risk of medication overuse  "complications.  -- Future options: increase BTX; ajovy, emgality, memantine     -- Recommend lifestyle modifications including the SEEDS for success in headache management, including Sleep hygiene, Exercising regularly, Eating healthy and regular meals, Drinking water and maintaining a headache Diary, and Stress reduction.  -- Therapeutic education and advocacy:        -Access the Migraine Toolkit, [facility] Migraine Patient Toolkit        -Visit the American Migraine Foundation (americanmigrainefoundation.org) website and the Move Against Migraine Facebook group for additional patient education    Pain Psychology Resources:  -- "Harnessing the Power of your Thoughts for Pain Control" - [employer] Back Pain Education Day 2016.   -- "Pain Catastrophizing" - Johnny Marvin education Youtube channel  -- Free 8-week Mindfulness Course - [employer] Mindfulness-Based Stress Reduction  -- [id number] Free Mine for stress reduction developed by the [employer]       -- Follow-up recommended: 6 weeks from 1st btx     The total time spent for evaluation and management on including reviewing separately obtained history, performing a medically appropriate exam and evaluation, documenting clinical information in the health record, independently interpreting results and communicating them to the patient/family/caregiver, and ordering medications/tests/procedures was 54 minutes.    Level 4 by Medical Decision-Making.    ---    Oscar Soni MD  Headache and Pain Management  [facility] - [place], [place]         [1]   Current Outpatient Medications on File Prior to Visit   Medication Sig Dispense Refill    acetaminophen (TYLENOL) 650 MG TbSR Take 650 mg by mouth every 8 (eight) hours.      azelastine (ASTELIN) 137 mcg (0.1 %) nasal spray 1 spray (137 mcg total) by Nasal route nightly. Point up and slightly outward toward ear when spraying to avoid irritating nasal septum. With " flonase. 30 mL 2    cetirizine (ZYRTEC) 10 MG tablet Take 10 mg by mouth every evening.      fluticasone propionate (FLONASE) 50 mcg/actuation nasal spray 1 spray by Each Nostril route every evening.      levothyroxine (SYNTHROID) 112 MCG tablet Take 1 tablet (112 mcg total) by mouth before breakfast. 30 tablet 11    mag carb/aluminum hydrox/algin (GAVISCON EXTRA STRENGTH ORAL) Take 1 tablet by mouth 3 (three) times daily with meals. Pt takes 30min after meals takes tablet form      magnesium oxide 400 mg magnesium Tab Take 400 mg by mouth once daily. 90 tablet 1    metoclopramide HCl (REGLAN) 5 MG tablet Take 2 tablets (10 mg total) by mouth 4 (four) times daily before meals and nightly. 240 tablet 2    metoprolol succinate (TOPROL-XL) 100 MG 24 hr tablet Take 100 mg by mouth nightly.      multivitamin (THERAGRAN) tablet Take 1 tablet by mouth once daily.      ondansetron (ZOFRAN-ODT) 8 MG TbDL Take 1 tablet (8 mg total) by mouth every 8 (eight) hours as needed (nausea and vomiting). 20 tablet 1    pantoprazole (PROTONIX) 40 MG tablet Take 1 tablet (40 mg total) by mouth 2 (two) times daily. 180 tablet 0    promethazine (PHENERGAN) 25 MG suppository Place 1 suppository (25 mg total) rectally every 6 (six) hours as needed for Nausea. 12 suppository 1    riboflavin, vitamin B2, 400 mg Tab Take 400 mg by mouth once daily. 90 tablet 1    rimegepant (NURTEC) 75 mg odt Take 1 tablet (75 mg total) by mouth daily as needed for Migraine. Place ODT tablet on the tongue; alternatively the ODT tablet may be placed under the tongue 8 tablet 1    rizatriptan (MAXALT) 10 MG tablet Take 1 tablet (10 mg total) by mouth daily as needed for Migraine. 9 tablet 1    scopolamine (TRANSDERM-SCOP) 1.3-1.5 mg (1 mg over 3 days) Place 1 patch onto the skin Every 3 (three) days. 10 patch 0    sucralfate (CARAFATE) 100 mg/mL suspension Take 10 mLs (1 g total) by mouth 4 (four) times daily. 450 mL 0    tiZANidine (ZANAFLEX) 4 MG tablet Take 1  tablet (4 mg total) by mouth 2 (two) times daily as needed (headache). 60 tablet 3    topiramate (TOPAMAX) 25 MG tablet Take 1 tablet (25 mg total) by mouth once daily. 30 tablet 11    meclizine (ANTIVERT) 25 mg tablet Take 0.5 tablets (12.5 mg total) by mouth 3 (three) times daily as needed for Dizziness. 8 tablet 0     No current facility-administered medications on file prior to visit.

## 2025-05-26 NOTE — PATIENT INSTRUCTIONS
-- Let your behavioral neurologist know about the scopolamine plan  -- BTX for chronic migraine  -- Continue rizatriptan but no more than 10 days per month  -- Watch out for low blood pressure with tizanidine   -- Follow-up 6 weeks after BTX

## 2025-05-27 ENCOUNTER — OFFICE VISIT (OUTPATIENT)
Dept: SURGERY | Facility: CLINIC | Age: 75
End: 2025-05-27
Payer: MEDICARE

## 2025-05-27 VITALS
WEIGHT: 228.81 LBS | TEMPERATURE: 98 F | HEIGHT: 68 IN | BODY MASS INDEX: 34.68 KG/M2 | HEART RATE: 88 BPM | DIASTOLIC BLOOD PRESSURE: 88 MMHG | SYSTOLIC BLOOD PRESSURE: 131 MMHG

## 2025-05-27 DIAGNOSIS — K82.8 BILIARY DYSKINESIA: Primary | ICD-10-CM

## 2025-05-27 DIAGNOSIS — R11.2 NAUSEA AND VOMITING, UNSPECIFIED VOMITING TYPE: Primary | ICD-10-CM

## 2025-05-27 DIAGNOSIS — R30.9 PAIN PASSING URINE: ICD-10-CM

## 2025-05-27 PROCEDURE — 99999 PR PBB SHADOW E&M-EST. PATIENT-LVL V: CPT | Mod: PBBFAC,,, | Performed by: SURGERY

## 2025-05-27 RX ORDER — SCOPOLAMINE 1 MG/3D
1 PATCH, EXTENDED RELEASE TRANSDERMAL
Qty: 10 PATCH | Refills: 0 | Status: SHIPPED | OUTPATIENT
Start: 2025-05-27 | End: 2025-06-26

## 2025-05-27 NOTE — H&P (VIEW-ONLY)
Subjective:       Patient ID: Skyler Espinoza is a 74 y.o. female.    Chief Complaint: Gall Bladder Problem      HPI:  74 year old female complaining of persistent nausea and daily vomiting x 2 months. Symptoms associated with occasional diarrhea. Intermittent RUQ and epigastric abdominal pain at first. She has had work up involving CT, US, HIDA and  EGD, US. EGD did show gastritis and small duodenal ulcers. She was already on PPI and it was doubled to BID with relief of the epigastric pain but not the nausea and vomiting. Her CT is read as unremarkable. Her US showed no stones or wall thickening, CBD normal in caliber. Her HIDA did show a low EF at 10 percent. Past surgical history of appendectomy and BTL.     Past Medical History:   Diagnosis Date    Depression     GERD (gastroesophageal reflux disease)     Hyperlipidemia     Hypertension     Hypothyroid     Sleep apnea, unspecified 2015     Past Surgical History:   Procedure Laterality Date    APPENDECTOMY      COLONOSCOPY  2017    Briana, repeat in 10    ESOPHAGOGASTRODUODENOSCOPY N/A 4/24/2025    Procedure: EGD (ESOPHAGOGASTRODUODENOSCOPY);  Surgeon: Maxim Garzon MD;  Location: [facility];  Service: Endoscopy;  Laterality: N/A;    TONSILLECTOMY      TUBAL LIGATION       Review of patient's allergies indicates:  No Known Allergies  Medication List with Changes/Refills   Current Medications    ACETAMINOPHEN (TYLENOL) 650 MG TBSR    Take 650 mg by mouth every 8 (eight) hours.    AZELASTINE (ASTELIN) 137 MCG (0.1 %) NASAL SPRAY    1 spray (137 mcg total) by Nasal route nightly. Point up and slightly outward toward ear when spraying to avoid irritating nasal septum. With flonase.    CETIRIZINE (ZYRTEC) 10 MG TABLET    Take 10 mg by mouth every evening.    FLUTICASONE PROPIONATE (FLONASE) 50 MCG/ACTUATION NASAL SPRAY    1 spray by Each Nostril route every evening.    LEVOTHYROXINE (SYNTHROID) 112 MCG TABLET    Take 1 tablet (112 mcg total) by mouth  before breakfast.    MAG CARB/ALUMINUM HYDROX/ALGIN (GAVISCON EXTRA STRENGTH ORAL)    Take 1 tablet by mouth 3 (three) times daily with meals. Pt takes 30min after meals takes tablet form    MAGNESIUM OXIDE 400 MG MAGNESIUM TAB    Take 400 mg by mouth once daily.    MECLIZINE (ANTIVERT) 25 MG TABLET    Take 0.5 tablets (12.5 mg total) by mouth 3 (three) times daily as needed for Dizziness.    METOCLOPRAMIDE HCL (REGLAN) 5 MG TABLET    Take 2 tablets (10 mg total) by mouth 4 (four) times daily before meals and nightly.    METOPROLOL SUCCINATE (TOPROL-XL) 100 MG 24 HR TABLET    Take 100 mg by mouth nightly.    MULTIVITAMIN (THERAGRAN) TABLET    Take 1 tablet by mouth once daily.    ONDANSETRON (ZOFRAN-ODT) 8 MG TBDL    Take 1 tablet (8 mg total) by mouth every 8 (eight) hours as needed (nausea and vomiting).    PANTOPRAZOLE (PROTONIX) 40 MG TABLET    Take 1 tablet (40 mg total) by mouth 2 (two) times daily.    PROMETHAZINE (PHENERGAN) 25 MG SUPPOSITORY    Place 1 suppository (25 mg total) rectally every 6 (six) hours as needed for Nausea.    RIBOFLAVIN, VITAMIN B2, 400 MG TAB    Take 400 mg by mouth once daily.    RIZATRIPTAN (MAXALT) 10 MG TABLET    Take 1 tablet (10 mg total) by mouth daily as needed for Migraine.    SCOPOLAMINE (TRANSDERM-SCOP) 1.3-1.5 MG (1 MG OVER 3 DAYS)    Place 1 patch onto the skin Every 3 (three) days.    SUCRALFATE (CARAFATE) 100 MG/ML SUSPENSION    Take 10 mLs (1 g total) by mouth 4 (four) times daily.    TIZANIDINE (ZANAFLEX) 4 MG TABLET    Take 1 tablet (4 mg total) by mouth 2 (two) times daily as needed (headache).     Family History   Problem Relation Name Age of Onset    Cancer Father      Cancer Sister      Breast cancer Daughter      Glaucoma Neg Hx      Macular degeneration Neg Hx      Retinal detachment Neg Hx      Ovarian cancer Neg Hx      Eczema Neg Hx      Lupus Neg Hx      Melanoma Neg Hx      Psoriasis Neg Hx       Social History     Socioeconomic History     Marital status:    Tobacco Use    Smoking status: Never    Smokeless tobacco: Never   Substance and Sexual Activity    Alcohol use: Not Currently     Comment: rarely    Drug use: No    Sexual activity: Yes     Social Drivers of Health     Financial Resource Strain: Medium Risk (5/5/2025)    Overall Financial Resource Strain (CARDIA)     Difficulty of Paying Living Expenses: Somewhat hard   Food Insecurity: No Food Insecurity (5/5/2025)    Hunger Vital Sign     Worried About Running Out of Food in the Last Year: Never true     Ran Out of Food in the Last Year: Never true   Transportation Needs: No Transportation Needs (5/5/2025)    PRAPARE - Transportation     Lack of Transportation (Medical): No     Lack of Transportation (Non-Medical): No   Physical Activity: Inactive (4/24/2025)    Exercise Vital Sign     Days of Exercise per Week: 0 days     Minutes of Exercise per Session: 0 min   Stress: No Stress Concern Present (5/1/2025)    [employer] [place] [employer] - Occupational Stress Questionnaire     Feeling of Stress : Not at all   Housing Stability: Low Risk  (5/1/2025)    Housing Stability Vital Sign     Unable to Pay for Housing in the Last Year: No     Number of Times Moved in the Last Year: 0     Homeless in the Last Year: No         Review of Systems   Constitutional:  Negative for appetite change, chills, fever and unexpected weight change.   HENT:  Negative for hearing loss, rhinorrhea, sore throat and voice change.    Eyes:  Negative for photophobia and visual disturbance.   Respiratory:  Negative for cough, choking and shortness of breath.    Cardiovascular:  Negative for chest pain, palpitations and leg swelling.   Gastrointestinal:  Positive for nausea and vomiting. Negative for abdominal pain, blood in stool, constipation and diarrhea.   Endocrine: Negative for cold intolerance, heat intolerance, polydipsia and polyuria.   Musculoskeletal:  Negative for arthralgias,  back pain, joint swelling and neck stiffness.   Skin:  Negative for color change, pallor and rash.   Neurological:  Negative for dizziness, seizures, syncope and headaches.   Hematological:  Negative for adenopathy. Does not bruise/bleed easily.   Psychiatric/Behavioral:  Negative for agitation, behavioral problems and confusion.      Objective:      Physical Exam  Constitutional:       General: She is not in acute distress.     Appearance: She is obese. She is not toxic-appearing.   Pulmonary:      Effort: No tachypnea, bradypnea or respiratory distress.   Abdominal:      General: There is no distension.      Palpations: Abdomen is soft.      Tenderness: There is no abdominal tenderness.   Neurological:      Mental Status: She is alert and oriented to person, place, and time.   Psychiatric:         Behavior: Behavior is cooperative.       Assessment/Plan:   Skyler was seen today for gall bladder problem.    Diagnoses and all orders for this visit:    Biliary dyskinesia  -     Vital signs; Standing  -     Insert peripheral IV; Standing  -     Diet NPO; Standing  -     Place sequential compression device; Standing  -     Pulse Oximetry Q4H; Standing  -     Case Request Operating Room: CHOLECYSTECTOMY, LAPAROSCOPIC  -     Place in Outpatient; Standing  -     EKG 12-lead; Future  -     X-Ray Chest PA And Lateral; Future  -     CBC auto differential; Future  -     Comprehensive metabolic panel; Future  -     Full code; Standing  -     Insert peripheral IV    Other orders  -     IP VTE LOW RISK PATIENT; Standing  -     ceFOXItin (MEFOXIN) 2 g in D5W 100 mL IVPB (MB+)      Patient with constant nausea. It may be multifactorial but has low EF on HIDA. Agree cholecystectomy may offer symptomatic benefit. We discussed there is no guarantee it would help but do recommend cholecystectomy. Will plan on cholecystectomy June 4.       I discussed the proposed procedures with the patient including risks, benefits, indications,  alternatives and special concerns.  The patient appears to understand and agrees to go ahead with surgery.  I have made no promises, warranties or verbal agreements beyond what was discussed above.    No follow-ups on file.

## 2025-05-27 NOTE — TELEPHONE ENCOUNTER
APPT. 10/28/25, LOV. 5/12/25    Additional Information: Req orders for ua to be done with straight cath. Pt was not able to give urine without cath. Pls call back and adv. Thank you.

## 2025-05-27 NOTE — PROGRESS NOTES
Subjective:       Patient ID: Skyler Espinoza is a 74 y.o. female.    Chief Complaint: Gall Bladder Problem      HPI:  74 year old female complaining of persistent nausea and daily vomiting x 2 months. Symptoms associated with occasional diarrhea. Intermittent RUQ and epigastric abdominal pain at first. She has had work up involving CT, US, HIDA and  EGD, US. EGD did show gastritis and small duodenal ulcers. She was already on PPI and it was doubled to BID with relief of the epigastric pain but not the nausea and vomiting. Her CT is read as unremarkable. Her US showed no stones or wall thickening, CBD normal in caliber. Her HIDA did show a low EF at 10 percent. Past surgical history of appendectomy and BTL.     Past Medical History:   Diagnosis Date    Depression     GERD (gastroesophageal reflux disease)     Hyperlipidemia     Hypertension     Hypothyroid     Sleep apnea, unspecified 2015     Past Surgical History:   Procedure Laterality Date    APPENDECTOMY      COLONOSCOPY  2017    Briana, repeat in 10    ESOPHAGOGASTRODUODENOSCOPY N/A 4/24/2025    Procedure: EGD (ESOPHAGOGASTRODUODENOSCOPY);  Surgeon: Maxim Garzon MD;  Location: [facility];  Service: Endoscopy;  Laterality: N/A;    TONSILLECTOMY      TUBAL LIGATION       Review of patient's allergies indicates:  No Known Allergies  Medication List with Changes/Refills   Current Medications    ACETAMINOPHEN (TYLENOL) 650 MG TBSR    Take 650 mg by mouth every 8 (eight) hours.    AZELASTINE (ASTELIN) 137 MCG (0.1 %) NASAL SPRAY    1 spray (137 mcg total) by Nasal route nightly. Point up and slightly outward toward ear when spraying to avoid irritating nasal septum. With flonase.    CETIRIZINE (ZYRTEC) 10 MG TABLET    Take 10 mg by mouth every evening.    FLUTICASONE PROPIONATE (FLONASE) 50 MCG/ACTUATION NASAL SPRAY    1 spray by Each Nostril route every evening.    LEVOTHYROXINE (SYNTHROID) 112 MCG TABLET    Take 1 tablet (112 mcg total) by mouth  before breakfast.    MAG CARB/ALUMINUM HYDROX/ALGIN (GAVISCON EXTRA STRENGTH ORAL)    Take 1 tablet by mouth 3 (three) times daily with meals. Pt takes 30min after meals takes tablet form    MAGNESIUM OXIDE 400 MG MAGNESIUM TAB    Take 400 mg by mouth once daily.    MECLIZINE (ANTIVERT) 25 MG TABLET    Take 0.5 tablets (12.5 mg total) by mouth 3 (three) times daily as needed for Dizziness.    METOCLOPRAMIDE HCL (REGLAN) 5 MG TABLET    Take 2 tablets (10 mg total) by mouth 4 (four) times daily before meals and nightly.    METOPROLOL SUCCINATE (TOPROL-XL) 100 MG 24 HR TABLET    Take 100 mg by mouth nightly.    MULTIVITAMIN (THERAGRAN) TABLET    Take 1 tablet by mouth once daily.    ONDANSETRON (ZOFRAN-ODT) 8 MG TBDL    Take 1 tablet (8 mg total) by mouth every 8 (eight) hours as needed (nausea and vomiting).    PANTOPRAZOLE (PROTONIX) 40 MG TABLET    Take 1 tablet (40 mg total) by mouth 2 (two) times daily.    PROMETHAZINE (PHENERGAN) 25 MG SUPPOSITORY    Place 1 suppository (25 mg total) rectally every 6 (six) hours as needed for Nausea.    RIBOFLAVIN, VITAMIN B2, 400 MG TAB    Take 400 mg by mouth once daily.    RIZATRIPTAN (MAXALT) 10 MG TABLET    Take 1 tablet (10 mg total) by mouth daily as needed for Migraine.    SCOPOLAMINE (TRANSDERM-SCOP) 1.3-1.5 MG (1 MG OVER 3 DAYS)    Place 1 patch onto the skin Every 3 (three) days.    SUCRALFATE (CARAFATE) 100 MG/ML SUSPENSION    Take 10 mLs (1 g total) by mouth 4 (four) times daily.    TIZANIDINE (ZANAFLEX) 4 MG TABLET    Take 1 tablet (4 mg total) by mouth 2 (two) times daily as needed (headache).     Family History   Problem Relation Name Age of Onset    Cancer Father      Cancer Sister      Breast cancer Daughter      Glaucoma Neg Hx      Macular degeneration Neg Hx      Retinal detachment Neg Hx      Ovarian cancer Neg Hx      Eczema Neg Hx      Lupus Neg Hx      Melanoma Neg Hx      Psoriasis Neg Hx       Social History     Socioeconomic History     Marital status:    Tobacco Use    Smoking status: Never    Smokeless tobacco: Never   Substance and Sexual Activity    Alcohol use: Not Currently     Comment: rarely    Drug use: No    Sexual activity: Yes     Social Drivers of Health     Financial Resource Strain: Medium Risk (5/5/2025)    Overall Financial Resource Strain (CARDIA)     Difficulty of Paying Living Expenses: Somewhat hard   Food Insecurity: No Food Insecurity (5/5/2025)    Hunger Vital Sign     Worried About Running Out of Food in the Last Year: Never true     Ran Out of Food in the Last Year: Never true   Transportation Needs: No Transportation Needs (5/5/2025)    PRAPARE - Transportation     Lack of Transportation (Medical): No     Lack of Transportation (Non-Medical): No   Physical Activity: Inactive (4/24/2025)    Exercise Vital Sign     Days of Exercise per Week: 0 days     Minutes of Exercise per Session: 0 min   Stress: No Stress Concern Present (5/1/2025)    [employer] [place] - Occupational Stress Questionnaire     Feeling of Stress : Not at all   Housing Stability: Low Risk  (5/1/2025)    Housing Stability Vital Sign     Unable to Pay for Housing in the Last Year: No     Number of Times Moved in the Last Year: 0     Homeless in the Last Year: No         Review of Systems   Constitutional:  Negative for appetite change, chills, fever and unexpected weight change.   HENT:  Negative for hearing loss, rhinorrhea, sore throat and voice change.    Eyes:  Negative for photophobia and visual disturbance.   Respiratory:  Negative for cough, choking and shortness of breath.    Cardiovascular:  Negative for chest pain, palpitations and leg swelling.   Gastrointestinal:  Positive for nausea and vomiting. Negative for abdominal pain, blood in stool, constipation and diarrhea.   Endocrine: Negative for cold intolerance, heat intolerance, polydipsia and polyuria.   Musculoskeletal:  Negative for arthralgias,  back pain, joint swelling and neck stiffness.   Skin:  Negative for color change, pallor and rash.   Neurological:  Negative for dizziness, seizures, syncope and headaches.   Hematological:  Negative for adenopathy. Does not bruise/bleed easily.   Psychiatric/Behavioral:  Negative for agitation, behavioral problems and confusion.      Objective:      Physical Exam  Constitutional:       General: She is not in acute distress.     Appearance: She is obese. She is not toxic-appearing.   Pulmonary:      Effort: No tachypnea, bradypnea or respiratory distress.   Abdominal:      General: There is no distension.      Palpations: Abdomen is soft.      Tenderness: There is no abdominal tenderness.   Neurological:      Mental Status: She is alert and oriented to person, place, and time.   Psychiatric:         Behavior: Behavior is cooperative.       Assessment/Plan:   Skyler was seen today for gall bladder problem.    Diagnoses and all orders for this visit:    Biliary dyskinesia  -     Vital signs; Standing  -     Insert peripheral IV; Standing  -     Diet NPO; Standing  -     Place sequential compression device; Standing  -     Pulse Oximetry Q4H; Standing  -     Case Request Operating Room: CHOLECYSTECTOMY, LAPAROSCOPIC  -     Place in Outpatient; Standing  -     EKG 12-lead; Future  -     X-Ray Chest PA And Lateral; Future  -     CBC auto differential; Future  -     Comprehensive metabolic panel; Future  -     Full code; Standing  -     Insert peripheral IV    Other orders  -     IP VTE LOW RISK PATIENT; Standing  -     ceFOXItin (MEFOXIN) 2 g in D5W 100 mL IVPB (MB+)      Patient with constant nausea. It may be multifactorial but has low EF on HIDA. Agree cholecystectomy may offer symptomatic benefit. We discussed there is no guarantee it would help but do recommend cholecystectomy. Will plan on cholecystectomy June 4.       I discussed the proposed procedures with the patient including risks, benefits, indications,  alternatives and special concerns.  The patient appears to understand and agrees to go ahead with surgery.  I have made no promises, warranties or verbal agreements beyond what was discussed above.    No follow-ups on file.

## 2025-05-28 DIAGNOSIS — R11.2 NAUSEA AND VOMITING, UNSPECIFIED VOMITING TYPE: ICD-10-CM

## 2025-05-28 RX ORDER — SCOPOLAMINE 1 MG/3D
1 PATCH, EXTENDED RELEASE TRANSDERMAL
Qty: 10 PATCH | Refills: 0 | Status: CANCELLED | OUTPATIENT
Start: 2025-05-28 | End: 2025-06-27

## 2025-05-29 ENCOUNTER — LAB REQUISITION (OUTPATIENT)
Dept: LAB | Facility: HOSPITAL | Age: 75
End: 2025-05-29
Payer: MEDICARE

## 2025-05-29 DIAGNOSIS — N39.0 URINARY TRACT INFECTION, SITE NOT SPECIFIED: ICD-10-CM

## 2025-05-29 DIAGNOSIS — R30.0 DYSURIA: Primary | ICD-10-CM

## 2025-05-29 LAB
BACTERIA #/AREA URNS AUTO: ABNORMAL /HPF
BILIRUB UR QL STRIP.AUTO: NEGATIVE
CLARITY UR: ABNORMAL
COLOR UR AUTO: YELLOW
GLUCOSE UR QL STRIP: NEGATIVE
HGB UR QL STRIP: NEGATIVE
KETONES UR QL STRIP: NEGATIVE
LEUKOCYTE ESTERASE UR QL STRIP: ABNORMAL
MICROSCOPIC COMMENT: ABNORMAL
NITRITE UR QL STRIP: NEGATIVE
PH UR STRIP: 7 [PH]
PROT UR QL STRIP: ABNORMAL
SP GR UR STRIP: 1.01
UROBILINOGEN UR STRIP-ACNC: NEGATIVE EU/DL
WBC #/AREA URNS AUTO: >100 /HPF

## 2025-05-29 PROCEDURE — 81003 URINALYSIS AUTO W/O SCOPE: CPT | Performed by: NURSE PRACTITIONER

## 2025-05-29 PROCEDURE — 87186 SC STD MICRODIL/AGAR DIL: CPT | Performed by: NURSE PRACTITIONER

## 2025-05-30 ENCOUNTER — HOSPITAL ENCOUNTER (OUTPATIENT)
Dept: RADIOLOGY | Facility: HOSPITAL | Age: 75
Discharge: HOME | End: 2025-05-30
Attending: SURGERY
Payer: MEDICARE

## 2025-05-30 ENCOUNTER — RESULTS FOLLOW-UP (OUTPATIENT)
Dept: FAMILY MEDICINE | Facility: CLINIC | Age: 75
End: 2025-05-30

## 2025-05-30 DIAGNOSIS — N39.0 UTI DUE TO EXTENDED-SPECTRUM BETA LACTAMASE (ESBL) PRODUCING ESCHERICHIA COLI: ICD-10-CM

## 2025-05-30 DIAGNOSIS — Z16.12 UTI DUE TO EXTENDED-SPECTRUM BETA LACTAMASE (ESBL) PRODUCING ESCHERICHIA COLI: ICD-10-CM

## 2025-05-30 DIAGNOSIS — B96.29 UTI DUE TO EXTENDED-SPECTRUM BETA LACTAMASE (ESBL) PRODUCING ESCHERICHIA COLI: ICD-10-CM

## 2025-05-30 DIAGNOSIS — K82.8 BILIARY DYSKINESIA: ICD-10-CM

## 2025-05-30 DIAGNOSIS — N30.01 ACUTE CYSTITIS WITH HEMATURIA: Primary | ICD-10-CM

## 2025-05-30 PROCEDURE — 71046 X-RAY EXAM CHEST 2 VIEWS: CPT | Mod: 26,,, | Performed by: RADIOLOGY

## 2025-05-30 PROCEDURE — 71046 X-RAY EXAM CHEST 2 VIEWS: CPT | Mod: TC

## 2025-05-30 RX ORDER — CIPROFLOXACIN 250 MG/1
250 TABLET, FILM COATED ORAL 2 TIMES DAILY
Qty: 6 TABLET | Refills: 0 | Status: SHIPPED | OUTPATIENT
Start: 2025-05-30 | End: 2025-06-02

## 2025-05-31 DIAGNOSIS — I10 HTN (HYPERTENSION), BENIGN: Primary | ICD-10-CM

## 2025-06-02 ENCOUNTER — TELEPHONE (OUTPATIENT)
Dept: FAMILY MEDICINE | Facility: CLINIC | Age: 75
End: 2025-06-02
Payer: MEDICARE

## 2025-06-02 LAB — BACTERIA UR CULT: ABNORMAL

## 2025-06-02 RX ORDER — METOPROLOL SUCCINATE 100 MG/1
TABLET, EXTENDED RELEASE ORAL
Qty: 90 TABLET | Refills: 1 | Status: SHIPPED | OUTPATIENT
Start: 2025-06-02

## 2025-06-02 RX ORDER — NITROFURANTOIN 25; 75 MG/1; MG/1
100 CAPSULE ORAL 2 TIMES DAILY
Qty: 14 CAPSULE | Refills: 0 | Status: SHIPPED | OUTPATIENT
Start: 2025-06-02 | End: 2025-06-09

## 2025-06-03 ENCOUNTER — PATIENT MESSAGE (OUTPATIENT)
Dept: FAMILY MEDICINE | Facility: CLINIC | Age: 75
End: 2025-06-03
Payer: MEDICARE

## 2025-06-06 ENCOUNTER — PATIENT MESSAGE (OUTPATIENT)
Dept: NEUROLOGY | Facility: CLINIC | Age: 75
End: 2025-06-06
Payer: MEDICARE

## 2025-06-06 ENCOUNTER — PATIENT MESSAGE (OUTPATIENT)
Dept: FAMILY MEDICINE | Facility: CLINIC | Age: 75
End: 2025-06-06
Payer: MEDICARE

## 2025-06-10 ENCOUNTER — TELEPHONE (OUTPATIENT)
Dept: FAMILY MEDICINE | Facility: CLINIC | Age: 75
End: 2025-06-10
Payer: MEDICARE

## 2025-06-10 ENCOUNTER — TELEPHONE (OUTPATIENT)
Dept: SURGERY | Facility: CLINIC | Age: 75
End: 2025-06-10
Payer: MEDICARE

## 2025-06-10 DIAGNOSIS — N30.01 ACUTE CYSTITIS WITH HEMATURIA: Primary | ICD-10-CM

## 2025-06-10 NOTE — TELEPHONE ENCOUNTER
Message forwarded to Virgen De Paz and she talked to daughter, now waiting on a date from Dr Buenrostro

## 2025-06-10 NOTE — TELEPHONE ENCOUNTER
Copied from CRM #[id number]. Topic: General Inquiry - Patient Advice  >> [place] 10, 2025 10:52 AM Kelly wrote:  Type: Needs Medical Advice  Who Called:  Pt Daughter    Best Call Back Number:     Additional Information: Calling to get pt procedure r/s please call to advise

## 2025-06-10 NOTE — TELEPHONE ENCOUNTER
----- Message from Anushka sent at 6/10/2025 12:38 PM CDT -----  [facility] home health is calling and the daughter called requesting a ua. She completed her antibiotics and wanted to make sure she does not have a uti so she can get her gal bladder removed. They have a nurse going out tomorrow and can do it if we send an order Fax [phone] attn: [email or web address]

## 2025-06-11 ENCOUNTER — PATIENT MESSAGE (OUTPATIENT)
Dept: FAMILY MEDICINE | Facility: CLINIC | Age: 75
End: 2025-06-11
Payer: MEDICARE

## 2025-06-11 DIAGNOSIS — M17.11 PRIMARY OSTEOARTHRITIS OF RIGHT KNEE: Primary | ICD-10-CM

## 2025-06-11 DIAGNOSIS — M17.12 PRIMARY OSTEOARTHRITIS OF LEFT KNEE: ICD-10-CM

## 2025-06-12 ENCOUNTER — LAB REQUISITION (OUTPATIENT)
Dept: LAB | Facility: HOSPITAL | Age: 75
End: 2025-06-12
Payer: MEDICARE

## 2025-06-12 DIAGNOSIS — N30.01 ACUTE CYSTITIS WITH HEMATURIA: ICD-10-CM

## 2025-06-12 LAB
BILIRUB UR QL STRIP.AUTO: NEGATIVE
CLARITY UR: CLEAR
COLOR UR AUTO: YELLOW
GLUCOSE UR QL STRIP: NEGATIVE
HGB UR QL STRIP: NEGATIVE
KETONES UR QL STRIP: NEGATIVE
LEUKOCYTE ESTERASE UR QL STRIP: NEGATIVE
NITRITE UR QL STRIP: NEGATIVE
PH UR STRIP: 6 [PH]
PROT UR QL STRIP: NEGATIVE
SP GR UR STRIP: 1.02
UROBILINOGEN UR STRIP-ACNC: NEGATIVE EU/DL

## 2025-06-12 PROCEDURE — 81003 URINALYSIS AUTO W/O SCOPE: CPT | Performed by: NURSE PRACTITIONER

## 2025-06-13 ENCOUNTER — PATIENT MESSAGE (OUTPATIENT)
Dept: SURGERY | Facility: CLINIC | Age: 75
End: 2025-06-13
Payer: MEDICARE

## 2025-06-13 DIAGNOSIS — R11.2 NAUSEA AND VOMITING, UNSPECIFIED VOMITING TYPE: ICD-10-CM

## 2025-06-14 RX ORDER — SCOPOLAMINE 1 MG/3D
1 PATCH, EXTENDED RELEASE TRANSDERMAL
Qty: 3 PATCH | Refills: 0 | Status: SHIPPED | OUTPATIENT
Start: 2025-06-14

## 2025-06-16 ENCOUNTER — TELEPHONE (OUTPATIENT)
Dept: FAMILY MEDICINE | Facility: CLINIC | Age: 75
End: 2025-06-16
Payer: MEDICARE

## 2025-06-16 DIAGNOSIS — R11.2 NAUSEA AND VOMITING, UNSPECIFIED VOMITING TYPE: ICD-10-CM

## 2025-06-16 RX ORDER — ONDANSETRON 8 MG/1
TABLET, ORALLY DISINTEGRATING ORAL
Qty: 20 TABLET | Refills: 1 | OUTPATIENT
Start: 2025-06-16

## 2025-06-16 RX ORDER — ONDANSETRON 8 MG/1
8 TABLET, ORALLY DISINTEGRATING ORAL EVERY 8 HOURS PRN
Qty: 20 TABLET | Refills: 1 | Status: SHIPPED | OUTPATIENT
Start: 2025-06-16

## 2025-06-16 NOTE — TELEPHONE ENCOUNTER
Copied from CRM #[id number]. Topic: General Inquiry - Return Call  >> Jun 13, 2025  2:37 PM Armida wrote:  Type:  Patient Returning Call    Who Called:  pt daughter  Who Left Message for Patient:  nurse  Does the patient know what this is regarding?:  no  Best Call Back Number:  [phone]    Additional Information:  please advise

## 2025-06-17 ENCOUNTER — ANESTHESIA EVENT (OUTPATIENT)
Dept: SURGERY | Facility: HOSPITAL | Age: 75
End: 2025-06-17
Payer: MEDICARE

## 2025-06-17 NOTE — ANESTHESIA PREPROCEDURE EVALUATION
06/17/2025  Skyler Espinoza is a 74 y.o., female.      Results for orders placed or performed during the hospital encounter of 04/22/25   EKG 12-lead    Collection Time: 04/22/25 10:53 PM   Result Value Ref Range    QRS Duration 94 ms    OHS QTC Calculation 422 ms    Narrative    Test Reason : R11.2,    Vent. Rate :  92 BPM     Atrial Rate :  92 BPM     P-R Int : 182 ms          QRS Dur :  94 ms      QT Int : 342 ms       P-R-T Axes :  39 -40  38 degrees    QTcB Int : 422 ms    Normal sinus rhythm  Left axis deviation  Incomplete right bundle branch block  Minimal voltage criteria for LVH, may be normal variant ( R in aVL )  Nonspecific ST abnormality  Abnormal ECG  When compared with ECG of 11-Apr-2025 21:48,  Nonspecific T wave abnormality now evident in Anterior leads  Confirmed by Salvador Palacio ([id number]) on 4/30/2025 7:14:19 PM    Referred By: [email or web address] SELF           Confirmed By: Salvador Palacio             Lab Results   Component Value Date    WBC 6.97 05/30/2025    HGB 12.8 05/30/2025    HCT 39.3 05/30/2025    MCV 90 05/30/2025     05/30/2025     BMP  Lab Results   Component Value Date     05/30/2025    K 4.5 05/30/2025     05/30/2025    CO2 27 05/30/2025    BUN 10 05/30/2025    CREATININE 0.9 05/30/2025    CALCIUM 9.6 05/30/2025    ANIONGAP 9 05/30/2025     (H) 05/30/2025    GLU 87 05/01/2025     (H) 04/30/2025       Results for orders placed during the hospital encounter of 01/23/24    Echo    Interpretation Summary    Left Ventricle: The left ventricle is normal in size. Mildly increased wall thickness. There is concentric hypertrophy. Normal wall motion. Septal motion is consistent with bundle branch block. There is normal systolic function with a visually estimated ejection fraction of 60 - 65%. There is normal diastolic function.    Right Ventricle: Normal right  ventricular cavity size. Wall thickness is normal. Right ventricle wall motion  is normal. Systolic function is normal.    Left Atrium: Left atrium is mildly dilated.    IVC/SVC: Normal venous pressure at 3 mmHg.         Pre-op Assessment    I have reviewed the Patient Summary Reports.     I have reviewed the Nursing Notes. I have reviewed the NPO Status.   I have reviewed the Medications.     Review of Systems  Anesthesia Hx:  No problems with previous Anesthesia             Denies Family Hx of Anesthesia complications.    Denies Personal Hx of Anesthesia complications.                    Social:  Non-Smoker, No Alcohol Use       Hematology/Oncology:  Hematology Normal   Oncology Normal                                   EENT/Dental:  chronic allergic rhinitis           Cardiovascular:     Hypertension, well controlled Valvular problems/Murmurs, MVP          hyperlipidemia   ECG has been reviewed.  Patient on beta blockers                    Hypertension         Pulmonary:        Sleep Apnea     Obstructive Sleep Apnea (GISELE).      Education provided regarding risk of obstructive sleep apnea            Renal/:  Renal/ Normal                 Hepatic/GI:   PUD,  GERD, well controlled   Biliary dyskinesia     Esophageal / Stomach Disorders Gastric Conditions:, Gastroparesis          Musculoskeletal:  Arthritis   Chronic bilateral low back pain without sciatica  DDD (degenerative disc disease), cervical  DDD (degenerative disc disease), lumbar           Spine Disorders: cervical and lumbar Disc disease and Degenerative disease           Neurological:      Headaches     Moderate vascular dementia with other behavioral disturbance     Dementia moderate                        Endocrine:   Hypothyroidism  History of Hashimoto's disease      Obesity / BMI > 30  Psych:  Psychiatric History  depression                Physical Exam  General: Well nourished, Cooperative and Alert    Airway:  Mallampati: III   Mouth Opening:  Normal  TM Distance: > 6 cm  Tongue: Normal  Neck ROM: Normal ROM    Dental:  Intact, Veneers    Chest/Lungs:  Clear to auscultation, Normal Respiratory Rate    Heart:  Rate: Normal  Rhythm: Regular Rhythm  Sounds: Normal        Anesthesia Plan  Type of Anesthesia, risks & benefits discussed:    Anesthesia Type: Gen ETT  Intra-op Monitoring Plan: Standard ASA Monitors  Post Op Pain Control Plan: multimodal analgesia and IV/PO Opioids PRN  Induction:  rapid sequence and IV  Airway Plan: Video, Post-Induction  Informed Consent: Informed consent signed with the Patient and all parties understand the risks and agree with anesthesia plan.  All questions answered.   ASA Score: 3  Anesthesia Plan Notes:         GETA   Hold Versed  Minimal Fentanyl   RSI (secondary to gastroparesis)  Benadryl (administered in SDS),  Decadron 8mg, iv Zofran 4mg iv, Pepcid 20mg iv Reglan 5 mg iv (administered in SDS ), Scopolamine Patch (patient wearing home patch now)  Ofirmev 1000mg iv  Sugammadex   GISELE Precautions: Extubate patient awake with HOB elevated and oral airway in place      Ready For Surgery From Anesthesia Perspective.     .

## 2025-06-18 ENCOUNTER — ANESTHESIA (OUTPATIENT)
Dept: SURGERY | Facility: HOSPITAL | Age: 75
End: 2025-06-18
Payer: MEDICARE

## 2025-06-18 ENCOUNTER — HOSPITAL ENCOUNTER (OUTPATIENT)
Facility: HOSPITAL | Age: 75
Discharge: HOME | End: 2025-06-18
Attending: SURGERY | Admitting: SURGERY
Payer: MEDICARE

## 2025-06-18 VITALS
OXYGEN SATURATION: 96 % | HEART RATE: 105 BPM | DIASTOLIC BLOOD PRESSURE: 85 MMHG | RESPIRATION RATE: 16 BRPM | TEMPERATURE: 98 F | SYSTOLIC BLOOD PRESSURE: 146 MMHG

## 2025-06-18 DIAGNOSIS — K82.8 BILIARY DYSKINESIA: Primary | ICD-10-CM

## 2025-06-18 PROCEDURE — 36000708 HC OR TIME LEV III 1ST 15 MIN: Performed by: SURGERY

## 2025-06-18 PROCEDURE — 63600175 PHARM REV CODE 636 W HCPCS: Performed by: ANESTHESIOLOGY

## 2025-06-18 PROCEDURE — 25000003 PHARM REV CODE 250

## 2025-06-18 PROCEDURE — 63600175 PHARM REV CODE 636 W HCPCS

## 2025-06-18 PROCEDURE — 71000039 HC RECOVERY, EACH ADD'L HOUR: Performed by: SURGERY

## 2025-06-18 PROCEDURE — 37000008 HC ANESTHESIA 1ST 15 MINUTES: Performed by: SURGERY

## 2025-06-18 PROCEDURE — 63600175 PHARM REV CODE 636 W HCPCS: Mod: JZ,TB | Performed by: SURGERY

## 2025-06-18 PROCEDURE — 71000016 HC POSTOP RECOV ADDL HR: Performed by: SURGERY

## 2025-06-18 PROCEDURE — 47562 LAPAROSCOPIC CHOLECYSTECTOMY: CPT | Mod: ,,, | Performed by: SURGERY

## 2025-06-18 PROCEDURE — 36000709 HC OR TIME LEV III EA ADD 15 MIN: Performed by: SURGERY

## 2025-06-18 PROCEDURE — 37000009 HC ANESTHESIA EA ADD 15 MINS: Performed by: SURGERY

## 2025-06-18 PROCEDURE — 71000033 HC RECOVERY, INTIAL HOUR: Performed by: SURGERY

## 2025-06-18 PROCEDURE — 63600175 PHARM REV CODE 636 W HCPCS: Performed by: SURGERY

## 2025-06-18 PROCEDURE — 88304 TISSUE EXAM BY PATHOLOGIST: CPT | Mod: TC | Performed by: PATHOLOGY

## 2025-06-18 PROCEDURE — 71000015 HC POSTOP RECOV 1ST HR: Performed by: SURGERY

## 2025-06-18 PROCEDURE — 25000003 PHARM REV CODE 250: Performed by: ANESTHESIOLOGY

## 2025-06-18 PROCEDURE — 27201423 OPTIME MED/SURG SUP & DEVICES STERILE SUPPLY: Performed by: SURGERY

## 2025-06-18 PROCEDURE — 25000003 PHARM REV CODE 250: Performed by: SURGERY

## 2025-06-18 RX ORDER — GLUCAGON 1 MG
1 KIT INJECTION
Status: DISCONTINUED | OUTPATIENT
Start: 2025-06-18 | End: 2025-06-18 | Stop reason: HOSPADM

## 2025-06-18 RX ORDER — LIDOCAINE HYDROCHLORIDE 20 MG/ML
INJECTION, SOLUTION EPIDURAL; INFILTRATION; INTRACAUDAL; PERINEURAL
Status: DISCONTINUED | OUTPATIENT
Start: 2025-06-18 | End: 2025-06-18

## 2025-06-18 RX ORDER — ONDANSETRON HYDROCHLORIDE 2 MG/ML
4 INJECTION, SOLUTION INTRAVENOUS DAILY PRN
Status: DISCONTINUED | OUTPATIENT
Start: 2025-06-18 | End: 2025-06-18 | Stop reason: HOSPADM

## 2025-06-18 RX ORDER — HYDROCODONE BITARTRATE AND ACETAMINOPHEN 5; 325 MG/1; MG/1
1 TABLET ORAL EVERY 6 HOURS PRN
Qty: 20 TABLET | Refills: 0 | Status: SHIPPED | OUTPATIENT
Start: 2025-06-18

## 2025-06-18 RX ORDER — BUPIVACAINE HYDROCHLORIDE AND EPINEPHRINE 2.5; 5 MG/ML; UG/ML
INJECTION, SOLUTION EPIDURAL; INFILTRATION; INTRACAUDAL; PERINEURAL
Status: DISCONTINUED | OUTPATIENT
Start: 2025-06-18 | End: 2025-06-18 | Stop reason: HOSPADM

## 2025-06-18 RX ORDER — METOCLOPRAMIDE HYDROCHLORIDE 5 MG/ML
5 INJECTION INTRAMUSCULAR; INTRAVENOUS ONCE
Status: COMPLETED | OUTPATIENT
Start: 2025-06-18 | End: 2025-06-18

## 2025-06-18 RX ORDER — MIDAZOLAM HYDROCHLORIDE 1 MG/ML
INJECTION INTRAMUSCULAR; INTRAVENOUS
Status: DISCONTINUED | OUTPATIENT
Start: 2025-06-18 | End: 2025-06-18

## 2025-06-18 RX ORDER — DIPHENHYDRAMINE HYDROCHLORIDE 50 MG/ML
12.5 INJECTION, SOLUTION INTRAMUSCULAR; INTRAVENOUS ONCE
Status: COMPLETED | OUTPATIENT
Start: 2025-06-18 | End: 2025-06-18

## 2025-06-18 RX ORDER — PROPOFOL 10 MG/ML
VIAL (ML) INTRAVENOUS
Status: DISCONTINUED | OUTPATIENT
Start: 2025-06-18 | End: 2025-06-18

## 2025-06-18 RX ORDER — DIPHENHYDRAMINE HYDROCHLORIDE 50 MG/ML
12.5 INJECTION, SOLUTION INTRAMUSCULAR; INTRAVENOUS
Status: DISCONTINUED | OUTPATIENT
Start: 2025-06-18 | End: 2025-06-18 | Stop reason: HOSPADM

## 2025-06-18 RX ORDER — CEFOXITIN 2 G/1
2 INJECTION, POWDER, FOR SOLUTION INTRAVENOUS ONCE
Status: COMPLETED | OUTPATIENT
Start: 2025-06-18 | End: 2025-06-18

## 2025-06-18 RX ORDER — SUCCINYLCHOLINE CHLORIDE 20 MG/ML
INJECTION INTRAMUSCULAR; INTRAVENOUS
Status: DISCONTINUED | OUTPATIENT
Start: 2025-06-18 | End: 2025-06-18

## 2025-06-18 RX ORDER — DEXAMETHASONE SODIUM PHOSPHATE 4 MG/ML
INJECTION, SOLUTION INTRA-ARTICULAR; INTRALESIONAL; INTRAMUSCULAR; INTRAVENOUS; SOFT TISSUE
Status: DISCONTINUED | OUTPATIENT
Start: 2025-06-18 | End: 2025-06-18

## 2025-06-18 RX ORDER — OXYCODONE HYDROCHLORIDE 5 MG/1
5 TABLET ORAL EVERY 4 HOURS PRN
Refills: 0 | Status: DISCONTINUED | OUTPATIENT
Start: 2025-06-18 | End: 2025-06-18 | Stop reason: HOSPADM

## 2025-06-18 RX ORDER — PROMETHAZINE HYDROCHLORIDE 25 MG/ML
6.25 INJECTION, SOLUTION INTRAMUSCULAR; INTRAVENOUS ONCE
Status: DISCONTINUED | OUTPATIENT
Start: 2025-06-18 | End: 2025-06-18

## 2025-06-18 RX ORDER — BUPIVACAINE 13.3 MG/ML
INJECTION, SUSPENSION, LIPOSOMAL INFILTRATION
Status: DISCONTINUED | OUTPATIENT
Start: 2025-06-18 | End: 2025-06-18 | Stop reason: HOSPADM

## 2025-06-18 RX ORDER — MEPERIDINE HYDROCHLORIDE 50 MG/ML
12.5 INJECTION INTRAMUSCULAR; INTRAVENOUS; SUBCUTANEOUS ONCE
Refills: 0 | Status: COMPLETED | OUTPATIENT
Start: 2025-06-18 | End: 2025-06-18

## 2025-06-18 RX ORDER — SODIUM CHLORIDE, SODIUM LACTATE, POTASSIUM CHLORIDE, CALCIUM CHLORIDE 600; 310; 30; 20 MG/100ML; MG/100ML; MG/100ML; MG/100ML
INJECTION, SOLUTION INTRAVENOUS CONTINUOUS PRN
Status: DISCONTINUED | OUTPATIENT
Start: 2025-06-18 | End: 2025-06-18

## 2025-06-18 RX ORDER — FENTANYL CITRATE 50 UG/ML
INJECTION, SOLUTION INTRAMUSCULAR; INTRAVENOUS
Status: DISCONTINUED | OUTPATIENT
Start: 2025-06-18 | End: 2025-06-18

## 2025-06-18 RX ORDER — FAMOTIDINE 10 MG/ML
INJECTION, SOLUTION INTRAVENOUS
Status: DISCONTINUED | OUTPATIENT
Start: 2025-06-18 | End: 2025-06-18

## 2025-06-18 RX ORDER — OXYCODONE HYDROCHLORIDE 5 MG/1
5 TABLET ORAL
Refills: 0 | Status: DISCONTINUED | OUTPATIENT
Start: 2025-06-18 | End: 2025-06-18 | Stop reason: HOSPADM

## 2025-06-18 RX ORDER — ONDANSETRON 4 MG/1
4 TABLET, ORALLY DISINTEGRATING ORAL ONCE
Status: DISCONTINUED | OUTPATIENT
Start: 2025-06-18 | End: 2025-06-18 | Stop reason: HOSPADM

## 2025-06-18 RX ORDER — ACETAMINOPHEN 10 MG/ML
INJECTION, SOLUTION INTRAVENOUS
Status: DISCONTINUED | OUTPATIENT
Start: 2025-06-18 | End: 2025-06-18

## 2025-06-18 RX ORDER — ONDANSETRON HYDROCHLORIDE 2 MG/ML
INJECTION, SOLUTION INTRAMUSCULAR; INTRAVENOUS
Status: DISCONTINUED | OUTPATIENT
Start: 2025-06-18 | End: 2025-06-18

## 2025-06-18 RX ORDER — ROCURONIUM BROMIDE 10 MG/ML
INJECTION, SOLUTION INTRAVENOUS
Status: DISCONTINUED | OUTPATIENT
Start: 2025-06-18 | End: 2025-06-18

## 2025-06-18 RX ORDER — HYDROMORPHONE HYDROCHLORIDE 1 MG/ML
0.2 INJECTION, SOLUTION INTRAMUSCULAR; INTRAVENOUS; SUBCUTANEOUS EVERY 5 MIN PRN
Refills: 0 | Status: DISCONTINUED | OUTPATIENT
Start: 2025-06-18 | End: 2025-06-18 | Stop reason: HOSPADM

## 2025-06-18 RX ADMIN — ROCURONIUM BROMIDE 30 MG: 10 INJECTION, SOLUTION INTRAVENOUS at 07:06

## 2025-06-18 RX ADMIN — OXYCODONE HYDROCHLORIDE 5 MG: 5 TABLET ORAL at 09:06

## 2025-06-18 RX ADMIN — CEFOXITIN 2 G: 2 INJECTION, POWDER, FOR SOLUTION INTRAVENOUS at 07:06

## 2025-06-18 RX ADMIN — MEPERIDINE HYDROCHLORIDE 12.5 MG: 50 INJECTION, SOLUTION INTRAMUSCULAR; INTRAVENOUS; SUBCUTANEOUS at 11:06

## 2025-06-18 RX ADMIN — ONDANSETRON 4 MG: 2 INJECTION INTRAMUSCULAR; INTRAVENOUS at 10:06

## 2025-06-18 RX ADMIN — LIDOCAINE HYDROCHLORIDE 80 MG: 20 INJECTION, SOLUTION INTRAVENOUS at 07:06

## 2025-06-18 RX ADMIN — DIPHENHYDRAMINE HYDROCHLORIDE 12.5 MG: 50 INJECTION INTRAMUSCULAR; INTRAVENOUS at 06:06

## 2025-06-18 RX ADMIN — HYDROMORPHONE HYDROCHLORIDE 0.2 MG: 1 INJECTION, SOLUTION INTRAMUSCULAR; INTRAVENOUS; SUBCUTANEOUS at 09:06

## 2025-06-18 RX ADMIN — FAMOTIDINE 20 MG: 10 INJECTION, SOLUTION INTRAVENOUS at 07:06

## 2025-06-18 RX ADMIN — ROCURONIUM BROMIDE 10 MG: 10 INJECTION, SOLUTION INTRAVENOUS at 08:06

## 2025-06-18 RX ADMIN — FENTANYL CITRATE 50 MCG: 50 INJECTION, SOLUTION INTRAMUSCULAR; INTRAVENOUS at 07:06

## 2025-06-18 RX ADMIN — ONDANSETRON 4 MG: 2 INJECTION INTRAMUSCULAR; INTRAVENOUS at 07:06

## 2025-06-18 RX ADMIN — MEPERIDINE HYDROCHLORIDE 12.5 MG: 50 INJECTION INTRAMUSCULAR; INTRAVENOUS; SUBCUTANEOUS at 09:06

## 2025-06-18 RX ADMIN — METOCLOPRAMIDE 5 MG: 5 INJECTION, SOLUTION INTRAMUSCULAR; INTRAVENOUS at 06:06

## 2025-06-18 RX ADMIN — PROPOFOL 120 MG: 10 INJECTION, EMULSION INTRAVENOUS at 07:06

## 2025-06-18 RX ADMIN — SUGAMMADEX 200 MG: 100 INJECTION, SOLUTION INTRAVENOUS at 08:06

## 2025-06-18 RX ADMIN — FENTANYL CITRATE 25 MCG: 50 INJECTION, SOLUTION INTRAMUSCULAR; INTRAVENOUS at 08:06

## 2025-06-18 RX ADMIN — ROCURONIUM BROMIDE 10 MG: 10 INJECTION, SOLUTION INTRAVENOUS at 07:06

## 2025-06-18 RX ADMIN — MIDAZOLAM HYDROCHLORIDE 2 MG: 1 INJECTION, SOLUTION INTRAMUSCULAR; INTRAVENOUS at 07:06

## 2025-06-18 RX ADMIN — SODIUM CHLORIDE, SODIUM LACTATE, POTASSIUM CHLORIDE, AND CALCIUM CHLORIDE: .6; .31; .03; .02 INJECTION, SOLUTION INTRAVENOUS at 07:06

## 2025-06-18 RX ADMIN — DEXAMETHASONE SODIUM PHOSPHATE 8 MG: 4 INJECTION, SOLUTION INTRAMUSCULAR; INTRAVENOUS at 07:06

## 2025-06-18 RX ADMIN — ACETAMINOPHEN 1000 MG: 10 INJECTION, SOLUTION INTRAVENOUS at 07:06

## 2025-06-18 RX ADMIN — PROMETHAZINE HYDROCHLORIDE 6.25 MG: 25 INJECTION INTRAMUSCULAR; INTRAVENOUS at 12:06

## 2025-06-18 RX ADMIN — Medication 120 MG: at 07:06

## 2025-06-18 NOTE — PLAN OF CARE
Pt w/ shaking noted, warm blankets placed on pt, coffee given.  1040 Pt continues w/ shaking, now also c/o nausea  1055 Med given as ordered for nausea  1115 Med given as ordered for shaking  1140 Shaking no longer noted. States nausea relieved at present.  1210 Assisted to bathroom, pt vomited small amt of bile colored emesis.  1229 Meds as ordered for nausea up to infuse over 20 minutes.  1310 States nausea relieved at present.

## 2025-06-18 NOTE — DISCHARGE SUMMARY
Discharge Summary  General Surgery      Admit Date: 6/18/2025    Discharge Date :6/18/2025    Attending Physician: Felix Buenrostro III     Discharge Physician: Felix Buenrostro III    Discharged Condition: good    Discharge Diagnosis: Biliary dyskinesia [K82.8]    Treatments/Procedures: Procedure(s) (LRB):  CHOLECYSTECTOMY, LAPAROSCOPIC (N/A)    Hospital Course: Uneventful; Discharged home from Recovery    Significant Diagnostic Studies: none    Disposition: Home or Self Care    Diet: Regular    Follow up: Office 10-14 days    Activity: No heavy lifting till seen in office.    Patient Instructions:   Current Discharge Medication List        START taking these medications    Details   HYDROcodone-acetaminophen (NORCO) 5-325 mg per tablet Take 1 tablet by mouth every 6 (six) hours as needed for Pain.  Qty: 20 tablet, Refills: 0    Comments: Quantity prescribed more than 7 day supply? No  Associated Diagnoses: Biliary dyskinesia           CONTINUE these medications which have NOT CHANGED    Details   cetirizine (ZYRTEC) 10 MG tablet Take 10 mg by mouth every evening.      levothyroxine (SYNTHROID) 112 MCG tablet Take 1 tablet (112 mcg total) by mouth before breakfast.  Qty: 30 tablet, Refills: 11    Associated Diagnoses: Hypothyroidism due to Hashimoto thyroiditis      mag carb/aluminum hydrox/algin (GAVISCON EXTRA STRENGTH ORAL) Take 1 tablet by mouth 3 (three) times daily with meals. Pt takes 30min after meals takes tablet form      magnesium oxide 400 mg magnesium Tab Take 400 mg by mouth once daily.  Qty: 90 tablet, Refills: 1      metoclopramide HCl (REGLAN) 5 MG tablet Take 2 tablets (10 mg total) by mouth 4 (four) times daily before meals and nightly.  Qty: 240 tablet, Refills: 2      metoprolol succinate (TOPROL-XL) 100 MG 24 hr tablet TAKE 1 TABLET(100 MG) BY MOUTH DAILY  Qty: 90 tablet, Refills: 1    Comments: .  Associated Diagnoses: HTN (hypertension), benign      multivitamin (THERAGRAN) tablet Take 1  tablet by mouth once daily.      ondansetron (ZOFRAN-ODT) 8 MG TbDL Take 1 tablet (8 mg total) by mouth every 8 (eight) hours as needed (nausea and vomiting).  Qty: 20 tablet, Refills: 1    Associated Diagnoses: Nausea and vomiting, unspecified vomiting type      pantoprazole (PROTONIX) 40 MG tablet Take 1 tablet (40 mg total) by mouth 2 (two) times daily.  Qty: 180 tablet, Refills: 0      promethazine (PHENERGAN) 25 MG suppository Place 1 suppository (25 mg total) rectally every 6 (six) hours as needed for Nausea.  Qty: 12 suppository, Refills: 1      rizatriptan (MAXALT) 10 MG tablet Take 1 tablet (10 mg total) by mouth daily as needed for Migraine.  Qty: 9 tablet, Refills: 1      scopolamine (TRANSDERM-SCOP) 1.3-1.5 mg (1 mg over 3 days) Place 1 patch onto the skin Every 3 (three) days.  Qty: 3 patch, Refills: 0    Associated Diagnoses: Nausea and vomiting, unspecified vomiting type      sucralfate (CARAFATE) 100 mg/mL suspension Take 10 mLs (1 g total) by mouth 4 (four) times daily.  Qty: 450 mL, Refills: 0    Associated Diagnoses: Other acute gastritis without hemorrhage      tiZANidine (ZANAFLEX) 4 MG tablet Take 1 tablet (4 mg total) by mouth 2 (two) times daily as needed (headache).  Qty: 60 tablet, Refills: 3    Associated Diagnoses: Migraine without status migrainosus, not intractable, unspecified migraine type      acetaminophen (TYLENOL) 650 MG TbSR Take 650 mg by mouth every 8 (eight) hours.      azelastine (ASTELIN) 137 mcg (0.1 %) nasal spray 1 spray (137 mcg total) by Nasal route nightly. Point up and slightly outward toward ear when spraying to avoid irritating nasal septum. With flonase.  Qty: 30 mL, Refills: 2    Associated Diagnoses: Nasal congestion      fluticasone propionate (FLONASE) 50 mcg/actuation nasal spray 1 spray by Each Nostril route every evening.      meclizine (ANTIVERT) 25 mg tablet Take 0.5 tablets (12.5 mg total) by mouth 3 (three) times daily as needed for Dizziness.  Qty: 8  tablet, Refills: 0    Associated Diagnoses: Dizziness      riboflavin, vitamin B2, 400 mg Tab Take 400 mg by mouth once daily.  Qty: 90 tablet, Refills: 1             Discharge Procedure Orders   Diet Adult Regular     Lifting restrictions   Order Comments: No lifting over twenty pounds for six weeks

## 2025-06-18 NOTE — BRIEF OP NOTE
[facility]  Brief Operative Note    SUMMARY     Surgery Date: 6/18/2025     Surgeons and Role:     * Felix Buenrostro III, MD - Primary    Assist Laurie Macias     Pre-op Diagnosis:  Biliary dyskinesia [K82.8]    Post-op Diagnosis:  Post-Op Diagnosis Codes:     * Biliary dyskinesia [K82.8]    Procedure(s) (LRB):  CHOLECYSTECTOMY, LAPAROSCOPIC (N/A)    Anesthesia: General    Implants:  * No implants in log *    Operative Findings: The patient was brought to the operating room and transferred to the operating room table in the supine position.  Patient was given general anesthesia and intubated.  The abdomen was prepped and draped.  A transverse infraumbilical incision was made.  Dissection was carried down through the skin and subcutaneous tissue.  The abdomen was insufflated with the Veress needle. The abdomen was entered with the 5 mm visiport. Under direct visualization, three ports were placed.  One 12 mm was placed in the subxiphoid position, one 5 mm in the right anterior axillary line and the other 5 mm in the mid clavicular line.  The gallbladder body was retracted superiorly and the infundibulum was retracted laterally.  The peritoneum overlying the cystic duct and artery was carefully taken down.  The cystic duct and cystic artery were individually isolated and dissected free 360 degrees.  They were individually clipped proximally and distally.  They were transected using endo natalya.  Electrocautery was used to take the gallbladder off the liver bed.  The Endo-Catch bag was used to remove the gallbladder from the abdomen.  We irrigated out the right upper quadrant with suction .  We checked again and there was no evidence of any bile leak or bleeding from our clips or from the liver bed.  The patient tolerated the procedure well.  We removed all of our ports. We repaired the skin with 4-0 Monocryl.  After that was done, the patient was extubated and taken to the recovery room  in stable condition.  All lap and instrument counts were correct.     Estimated Blood Loss: 10 mL    Estimated Blood Loss has been documented.         Specimens:   Specimen (24h ago, onward)      None          * No specimens in log *    [id number]

## 2025-06-18 NOTE — TRANSFER OF CARE
Anesthesia Transfer of Care Note    Patient: Skyler Espinoza    Procedure(s) Performed: Procedure(s) (LRB):  CHOLECYSTECTOMY, LAPAROSCOPIC (N/A)    Patient location: PACU    Anesthesia Type: general    Transport from OR: Transported from OR on room air with adequate spontaneous ventilation    Post pain: adequate analgesia    Post assessment: no apparent anesthetic complications    Post vital signs: stable    Level of consciousness: sedated    Nausea/Vomiting: no nausea/vomiting    Complications: none    Transfer of care protocol was followed      Last vitals: Visit Vitals  BP (!) 150/76 (BP Location: Left arm, Patient Position: Lying)   Pulse 85   Temp 37 °C (98.6 °F) (Oral)   Resp 16   SpO2 (!) 94%   Breastfeeding No

## 2025-06-18 NOTE — ANESTHESIA PROCEDURE NOTES
Intubation    Date/Time: 6/18/2025 7:42 AM    Performed by: Gladis Rodas CRNA  Authorized by: Mehul Rangel MD    Intubation:     Induction:  Rapid sequence induction    Intubated:  Postinduction    Mask Ventilation:  Not attempted    Attempts:  1    Attempted By:  CRNA    Method of Intubation:  Video laryngoscopy    Blade:  Boyer 3    Laryngeal View Grade: Grade I - full view of cords      Difficult Airway Encountered?: No      Complications:  None    Airway Device:  Oral endotracheal tube    Airway Device Size:  7.5    Style/Cuff Inflation:  Cuffed    Tube secured:  24    Secured at:  The lips    Placement Verified By:  Capnometry and Revisualization with laryngoscopy    Complicating Factors:  None    Findings Post-Intubation:  BS equal bilateral and atraumatic/condition of teeth unchanged

## 2025-06-18 NOTE — DISCHARGE INSTRUCTIONS
For the next 24 hours:  Don't drink any alcohol  Don't drive  Don't sign any legal documents  Don't take a shower     Showers only, no tub baths. Don't immerse yourself in any pooling water for the next 2 weeks.

## 2025-06-18 NOTE — ANESTHESIA POSTPROCEDURE EVALUATION
Anesthesia Post Evaluation    Patient: Skyler Espinoza    Procedure(s) Performed: Procedure(s) (LRB):  CHOLECYSTECTOMY, LAPAROSCOPIC (N/A)    Final Anesthesia Type: general      Patient location during evaluation: PACU  Patient participation: Yes- Able to Participate  Level of consciousness: awake and alert  Post-procedure vital signs: reviewed and stable  Pain management: adequate  Airway patency: patent  GISELE mitigation strategies: Extubation while patient is awake, Multimodal analgesia and Extubation and recovery carried out in lateral, semiupright, or other nonsupine position  PONV status at discharge: No PONV  Anesthetic complications: no      Cardiovascular status: stable  Respiratory status: unassisted and spontaneous ventilation  Hydration status: euvolemic  Follow-up not needed.              Vitals Value Taken Time   /75 06/18/25 10:00   Temp 37 °C (98.6 °F) 06/18/25 09:00   Pulse 100 06/18/25 10:01   Resp 23 06/18/25 10:01   SpO2 100 % 06/18/25 10:01   Vitals shown include unfiled device data.      No case tracking events are documented in the log.      Pain/Ashwini Score: Pain Rating Prior to Med Admin: -- (given for post anesthesia tremors) (6/18/2025  9:47 AM)  Ashwini Score: 10 (6/18/2025  9:45 AM)

## 2025-06-18 NOTE — OP NOTE
Surgery Date: 6/18/2025     Surgeons and Role:     * Felix Buenrostro III, MD - Primary    Assist Laurie Macias     Pre-op Diagnosis:  Biliary dyskinesia [K82.8]    Post-op Diagnosis:  Post-Op Diagnosis Codes:     * Biliary dyskinesia [K82.8]    Procedure(s) (LRB):  CHOLECYSTECTOMY, LAPAROSCOPIC (N/A)    Anesthesia: General    Implants:  * No implants in log *    Operative Findings: The patient was brought to the operating room and transferred to the operating room table in the supine position.  Patient was given general anesthesia and intubated.  The abdomen was prepped and draped.  A transverse infraumbilical incision was made.  Dissection was carried down through the skin and subcutaneous tissue.  The abdomen was insufflated with the Veress needle. The abdomen was entered with the 5 mm visiport. Under direct visualization, three ports were placed.  One 12 mm was placed in the subxiphoid position, one 5 mm in the right anterior axillary line and the other 5 mm in the mid clavicular line.  The gallbladder body was retracted superiorly and the infundibulum was retracted laterally.  The peritoneum overlying the cystic duct and artery was carefully taken down.  The cystic duct and cystic artery were individually isolated and dissected free 360 degrees.  They were individually clipped proximally and distally.  They were transected using endo natalya.  Electrocautery was used to take the gallbladder off the liver bed.  The Endo-Catch bag was used to remove the gallbladder from the abdomen.  We irrigated out the right upper quadrant with suction .  We checked again and there was no evidence of any bile leak or bleeding from our clips or from the liver bed.  The patient tolerated the procedure well.  We removed all of our ports. We repaired the skin with 4-0 Monocryl.  After that was done, the patient was extubated and taken to the recovery room in stable condition.  All lap and instrument counts were  correct.     Estimated Blood Loss: 10 mL    Estimated Blood Loss has been documented.         Specimens: Gallbladder   Specimen (24h ago, onward)      None          * No specimens in log *    [id number]

## 2025-06-19 ENCOUNTER — NURSE TRIAGE (OUTPATIENT)
Dept: ADMINISTRATIVE | Facility: CLINIC | Age: 75
End: 2025-06-19
Payer: MEDICARE

## 2025-06-20 ENCOUNTER — TELEPHONE (OUTPATIENT)
Dept: SURGERY | Facility: CLINIC | Age: 75
End: 2025-06-20
Payer: MEDICARE

## 2025-06-20 NOTE — TELEPHONE ENCOUNTER
Gave phenergan suppository at 3:15 pm  Gave Zofran at noon  Gave hydrocodone 6 pm  Gave ibuprofen 800 mg at 7:45 pm    Patient c/o severe uncontrolled post op pain and nausea. She is one day post op rufus miranda. Per protocol will contact the on call provider. Per Dr. Buenrostro, if she has unbearable pain or fever/chills, she should go to the ER. She can take an additional dose of hydrocodone for a total of 10 mg. Patient's daughter verbalizes understanding. Advised to call back with any further questions or if symptoms worsen.        Reason for Disposition   [1] SEVERE post-op pain (e.g., excruciating, pain scale 8-10) AND [2] not controlled with pain medications    Additional Information   Negative: Sounds like a life-threatening emergency to the triager   Negative: [1] Widespread rash AND [2] bright red, sunburn-like   Negative: [1] SEVERE headache (e.g., excruciating) AND [2] after spinal (epidural) anesthesia   Negative: [1] Vomiting AND [2] persists > 4 hours   Negative: [1] Vomiting AND [2] abdomen looks much more swollen than usual   Negative: [1] Drinking very little AND [2] dehydration suspected (e.g., no urine > 12 hours, very dry mouth, very lightheaded)   Negative: Patient sounds very sick or weak to the triager   Negative: Sounds like a serious complication to the triager   Negative: Fever > 100.4 F (38.0 C)    Protocols used: Post-Op Symptoms and [id number]

## 2025-06-20 NOTE — TELEPHONE ENCOUNTER
Copied from CRM #[id number]. Topic: Appointments - Appointment Access  >> Jun 20, 2025 11:37 AM Kiki wrote:  Type:  Sooner Appointment Request  Name of Caller: Pt Daughter Allison  When is the first available appointment? 7/31  Symptoms: Pt is seeking a P/O appt   Would the patient rather a call back or a response via [facility]? Call   Best Call Back Number: Allison [phone]  Please call to advise... Thank you...

## 2025-06-22 ENCOUNTER — HOSPITAL ENCOUNTER (INPATIENT)
Facility: HOSPITAL | Age: 75
LOS: 1 days | Discharge: HOME | DRG: 439 | End: 2025-06-25
Attending: EMERGENCY MEDICINE | Admitting: INTERNAL MEDICINE
Payer: MEDICARE

## 2025-06-22 ENCOUNTER — NURSE TRIAGE (OUTPATIENT)
Dept: ADMINISTRATIVE | Facility: CLINIC | Age: 75
End: 2025-06-22
Payer: MEDICARE

## 2025-06-22 DIAGNOSIS — R07.9 CHEST PAIN: ICD-10-CM

## 2025-06-22 DIAGNOSIS — K85.90 ACUTE PANCREATITIS, UNSPECIFIED COMPLICATION STATUS, UNSPECIFIED PANCREATITIS TYPE: Primary | ICD-10-CM

## 2025-06-22 PROCEDURE — 99285 EMERGENCY DEPT VISIT HI MDM: CPT

## 2025-06-23 PROBLEM — K85.90 ACUTE PANCREATITIS: Status: ACTIVE | Noted: 2025-06-23

## 2025-06-23 PROBLEM — Z90.49 STATUS POST CHOLECYSTECTOMY: Status: ACTIVE | Noted: 2025-06-23

## 2025-06-23 LAB
ABSOLUTE EOSINOPHIL (SMH): 0.52 K/UL
ABSOLUTE MONOCYTE (SMH): 1.17 K/UL (ref 0.3–1)
ABSOLUTE NEUTROPHIL COUNT (SMH): 4.1 K/UL (ref 1.8–7.7)
ALBUMIN SERPL-MCNC: 3.4 G/DL (ref 3.5–5.2)
ALP SERPL-CCNC: 100 UNIT/L (ref 55–135)
ALT SERPL-CCNC: 23 UNIT/L (ref 10–44)
ANION GAP (SMH): 18 MMOL/L (ref 8–16)
AST SERPL-CCNC: 31 UNIT/L (ref 10–40)
BASOPHILS # BLD AUTO: 0.02 K/UL
BASOPHILS NFR BLD AUTO: 0.3 %
BILIRUB SERPL-MCNC: 1.5 MG/DL (ref 0.1–1)
BILIRUB UR QL STRIP.AUTO: NEGATIVE
BUN SERPL-MCNC: 6 MG/DL (ref 8–23)
CALCIUM SERPL-MCNC: 9 MG/DL (ref 8.7–10.5)
CHLORIDE SERPL-SCNC: 99 MMOL/L (ref 95–110)
CHOLEST SERPL-MCNC: 210 MG/DL (ref 120–199)
CHOLEST/HDLC SERPL: 3.8 {RATIO} (ref 2–5)
CLARITY UR: CLEAR
CO2 SERPL-SCNC: 23 MMOL/L (ref 23–29)
COLOR UR AUTO: YELLOW
CREAT SERPL-MCNC: 0.7 MG/DL (ref 0.5–1.4)
ERYTHROCYTE [DISTWIDTH] IN BLOOD BY AUTOMATED COUNT: 14.2 % (ref 11.5–14.5)
GFR SERPLBLD CREATININE-BSD FMLA CKD-EPI: >60 ML/MIN/1.73/M2
GLUCOSE SERPL-MCNC: 119 MG/DL (ref 70–110)
GLUCOSE UR QL STRIP: NEGATIVE
HCT VFR BLD AUTO: 37.1 % (ref 37–48.5)
HDLC SERPL-MCNC: 56 MG/DL (ref 40–75)
HDLC SERPL: 26.7 % (ref 20–50)
HGB BLD-MCNC: 12.3 GM/DL (ref 12–16)
HGB UR QL STRIP: NEGATIVE
IMM GRANULOCYTES # BLD AUTO: 0.04 K/UL (ref 0–0.04)
IMM GRANULOCYTES NFR BLD AUTO: 0.5 % (ref 0–0.5)
KETONES UR QL STRIP: ABNORMAL
LACTATE SERPL-SCNC: 0.7 MMOL/L (ref 0.5–1.9)
LDH SERPL L TO P-CCNC: 1.28 MMOL/L (ref 0.5–2.2)
LDLC SERPL CALC-MCNC: 129.8 MG/DL (ref 63–159)
LEUKOCYTE ESTERASE UR QL STRIP: NEGATIVE
LIPASE SERPL-CCNC: 91 U/L (ref 4–60)
LYMPHOCYTES # BLD AUTO: 1.56 K/UL (ref 1–4.8)
MCH RBC QN AUTO: 29.8 PG (ref 27–31)
MCHC RBC AUTO-ENTMCNC: 33.2 G/DL (ref 32–36)
MCV RBC AUTO: 90 FL (ref 82–98)
NITRITE UR QL STRIP: NEGATIVE
NONHDLC SERPL-MCNC: 154 MG/DL
NUCLEATED RBC (/100WBC) (SMH): 0 /100 WBC
PH UR STRIP: 7 [PH]
PLATELET # BLD AUTO: 320 K/UL (ref 150–450)
PMV BLD AUTO: 10.2 FL (ref 9.2–12.9)
POTASSIUM SERPL-SCNC: 3.1 MMOL/L (ref 3.5–5.1)
PROT SERPL-MCNC: 6.4 GM/DL (ref 6–8.4)
PROT UR QL STRIP: NEGATIVE
RBC # BLD AUTO: 4.13 M/UL (ref 4–5.4)
RELATIVE EOSINOPHIL (SMH): 7.1 % (ref 0–8)
RELATIVE LYMPHOCYTE (SMH): 21.2 % (ref 18–48)
RELATIVE MONOCYTE (SMH): 15.9 % (ref 4–15)
RELATIVE NEUTROPHIL (SMH): 55 % (ref 38–73)
SAMPLE: NORMAL
SODIUM SERPL-SCNC: 140 MMOL/L (ref 136–145)
SP GR UR STRIP: 1.01
TRIGL SERPL-MCNC: 121 MG/DL (ref 30–150)
UROBILINOGEN UR STRIP-ACNC: NEGATIVE EU/DL
WBC # BLD AUTO: 7.37 K/UL (ref 3.9–12.7)

## 2025-06-23 PROCEDURE — 96372 THER/PROPH/DIAG INJ SC/IM: CPT | Performed by: STUDENT IN AN ORGANIZED HEALTH CARE EDUCATION/TRAINING PROGRAM

## 2025-06-23 PROCEDURE — 25000003 PHARM REV CODE 250: Performed by: STUDENT IN AN ORGANIZED HEALTH CARE EDUCATION/TRAINING PROGRAM

## 2025-06-23 PROCEDURE — 63600175 PHARM REV CODE 636 W HCPCS: Performed by: STUDENT IN AN ORGANIZED HEALTH CARE EDUCATION/TRAINING PROGRAM

## 2025-06-23 PROCEDURE — G0378 HOSPITAL OBSERVATION PER HR: HCPCS

## 2025-06-23 PROCEDURE — 99900031 HC PATIENT EDUCATION (STAT)

## 2025-06-23 PROCEDURE — 36415 COLL VENOUS BLD VENIPUNCTURE: CPT | Performed by: STUDENT IN AN ORGANIZED HEALTH CARE EDUCATION/TRAINING PROGRAM

## 2025-06-23 PROCEDURE — 99900035 HC TECH TIME PER 15 MIN (STAT)

## 2025-06-23 PROCEDURE — 99406 BEHAV CHNG SMOKING 3-10 MIN: CPT

## 2025-06-23 PROCEDURE — 83690 ASSAY OF LIPASE: CPT | Performed by: EMERGENCY MEDICINE

## 2025-06-23 PROCEDURE — 63600175 PHARM REV CODE 636 W HCPCS: Performed by: EMERGENCY MEDICINE

## 2025-06-23 PROCEDURE — 80061 LIPID PANEL: CPT | Performed by: STUDENT IN AN ORGANIZED HEALTH CARE EDUCATION/TRAINING PROGRAM

## 2025-06-23 PROCEDURE — 81003 URINALYSIS AUTO W/O SCOPE: CPT | Performed by: EMERGENCY MEDICINE

## 2025-06-23 PROCEDURE — 96367 TX/PROPH/DG ADDL SEQ IV INF: CPT

## 2025-06-23 PROCEDURE — 82040 ASSAY OF SERUM ALBUMIN: CPT | Performed by: EMERGENCY MEDICINE

## 2025-06-23 PROCEDURE — 85025 COMPLETE CBC W/AUTO DIFF WBC: CPT | Performed by: EMERGENCY MEDICINE

## 2025-06-23 PROCEDURE — 87040 BLOOD CULTURE FOR BACTERIA: CPT | Performed by: EMERGENCY MEDICINE

## 2025-06-23 PROCEDURE — 36415 COLL VENOUS BLD VENIPUNCTURE: CPT | Performed by: EMERGENCY MEDICINE

## 2025-06-23 PROCEDURE — 94761 N-INVAS EAR/PLS OXIMETRY MLT: CPT

## 2025-06-23 PROCEDURE — 27000221 HC OXYGEN, UP TO 24 HOURS

## 2025-06-23 PROCEDURE — 96361 HYDRATE IV INFUSION ADD-ON: CPT

## 2025-06-23 PROCEDURE — 83605 ASSAY OF LACTIC ACID: CPT | Performed by: STUDENT IN AN ORGANIZED HEALTH CARE EDUCATION/TRAINING PROGRAM

## 2025-06-23 PROCEDURE — 25000003 PHARM REV CODE 250: Performed by: EMERGENCY MEDICINE

## 2025-06-23 PROCEDURE — 96365 THER/PROPH/DIAG IV INF INIT: CPT

## 2025-06-23 PROCEDURE — 96375 TX/PRO/DX INJ NEW DRUG ADDON: CPT

## 2025-06-23 PROCEDURE — 25500020 PHARM REV CODE 255: Performed by: EMERGENCY MEDICINE

## 2025-06-23 PROCEDURE — 94799 UNLISTED PULMONARY SVC/PX: CPT

## 2025-06-23 RX ORDER — LANOLIN ALCOHOL/MO/W.PET/CERES
800 CREAM (GRAM) TOPICAL
Status: DISCONTINUED | OUTPATIENT
Start: 2025-06-23 | End: 2025-06-25 | Stop reason: HOSPADM

## 2025-06-23 RX ORDER — AMOXICILLIN 250 MG
1 CAPSULE ORAL 2 TIMES DAILY PRN
Status: DISCONTINUED | OUTPATIENT
Start: 2025-06-23 | End: 2025-06-25 | Stop reason: HOSPADM

## 2025-06-23 RX ORDER — TIZANIDINE 2 MG/1
2 TABLET ORAL 2 TIMES DAILY PRN
Status: DISCONTINUED | OUTPATIENT
Start: 2025-06-23 | End: 2025-06-24

## 2025-06-23 RX ORDER — LANOLIN ALCOHOL/MO/W.PET/CERES
100 CREAM (GRAM) TOPICAL DAILY
COMMUNITY
Start: 2025-05-01

## 2025-06-23 RX ORDER — ENOXAPARIN SODIUM 100 MG/ML
40 INJECTION SUBCUTANEOUS EVERY 24 HOURS
Status: DISCONTINUED | OUTPATIENT
Start: 2025-06-23 | End: 2025-06-25 | Stop reason: HOSPADM

## 2025-06-23 RX ORDER — TALC
9 POWDER (GRAM) TOPICAL NIGHTLY PRN
Status: DISCONTINUED | OUTPATIENT
Start: 2025-06-23 | End: 2025-06-25 | Stop reason: HOSPADM

## 2025-06-23 RX ORDER — MORPHINE SULFATE 2 MG/ML
2 INJECTION, SOLUTION INTRAMUSCULAR; INTRAVENOUS EVERY 6 HOURS PRN
Status: DISCONTINUED | OUTPATIENT
Start: 2025-06-23 | End: 2025-06-25 | Stop reason: HOSPADM

## 2025-06-23 RX ORDER — ACETAMINOPHEN 325 MG/1
650 TABLET ORAL EVERY 4 HOURS PRN
Status: DISCONTINUED | OUTPATIENT
Start: 2025-06-23 | End: 2025-06-25 | Stop reason: HOSPADM

## 2025-06-23 RX ORDER — METOPROLOL SUCCINATE 50 MG/1
100 TABLET, EXTENDED RELEASE ORAL DAILY
Status: DISCONTINUED | OUTPATIENT
Start: 2025-06-23 | End: 2025-06-25 | Stop reason: HOSPADM

## 2025-06-23 RX ORDER — SODIUM,POTASSIUM PHOSPHATES 280-250MG
2 POWDER IN PACKET (EA) ORAL
Status: DISCONTINUED | OUTPATIENT
Start: 2025-06-23 | End: 2025-06-25 | Stop reason: HOSPADM

## 2025-06-23 RX ORDER — ONDANSETRON HYDROCHLORIDE 2 MG/ML
4 INJECTION, SOLUTION INTRAVENOUS
Status: COMPLETED | OUTPATIENT
Start: 2025-06-23 | End: 2025-06-23

## 2025-06-23 RX ORDER — IBUPROFEN 200 MG
16 TABLET ORAL
Status: DISCONTINUED | OUTPATIENT
Start: 2025-06-23 | End: 2025-06-25 | Stop reason: HOSPADM

## 2025-06-23 RX ORDER — SCOPOLAMINE 1 MG/3D
1 PATCH, EXTENDED RELEASE TRANSDERMAL
Status: DISCONTINUED | OUTPATIENT
Start: 2025-06-23 | End: 2025-06-25 | Stop reason: HOSPADM

## 2025-06-23 RX ORDER — ACETAMINOPHEN 325 MG/1
650 TABLET ORAL EVERY 4 HOURS PRN
Status: DISCONTINUED | OUTPATIENT
Start: 2025-06-23 | End: 2025-06-23

## 2025-06-23 RX ORDER — ONDANSETRON HYDROCHLORIDE 2 MG/ML
4 INJECTION, SOLUTION INTRAVENOUS EVERY 8 HOURS PRN
Status: DISCONTINUED | OUTPATIENT
Start: 2025-06-23 | End: 2025-06-25 | Stop reason: HOSPADM

## 2025-06-23 RX ORDER — HYDROCODONE BITARTRATE AND ACETAMINOPHEN 5; 325 MG/1; MG/1
1 TABLET ORAL EVERY 6 HOURS PRN
Refills: 0 | Status: DISCONTINUED | OUTPATIENT
Start: 2025-06-23 | End: 2025-06-25 | Stop reason: HOSPADM

## 2025-06-23 RX ORDER — MORPHINE SULFATE 2 MG/ML
2 INJECTION, SOLUTION INTRAMUSCULAR; INTRAVENOUS
Refills: 0 | Status: COMPLETED | OUTPATIENT
Start: 2025-06-23 | End: 2025-06-23

## 2025-06-23 RX ORDER — LANOLIN ALCOHOL/MO/W.PET/CERES
1 CREAM (GRAM) TOPICAL DAILY
COMMUNITY
Start: 2025-05-01

## 2025-06-23 RX ORDER — SODIUM CHLORIDE, SODIUM LACTATE, POTASSIUM CHLORIDE, CALCIUM CHLORIDE 600; 310; 30; 20 MG/100ML; MG/100ML; MG/100ML; MG/100ML
INJECTION, SOLUTION INTRAVENOUS CONTINUOUS
Status: DISCONTINUED | OUTPATIENT
Start: 2025-06-23 | End: 2025-06-24

## 2025-06-23 RX ORDER — IBUPROFEN 200 MG
24 TABLET ORAL
Status: DISCONTINUED | OUTPATIENT
Start: 2025-06-23 | End: 2025-06-25 | Stop reason: HOSPADM

## 2025-06-23 RX ORDER — POTASSIUM CHLORIDE 7.45 MG/ML
10 INJECTION INTRAVENOUS ONCE
Status: COMPLETED | OUTPATIENT
Start: 2025-06-23 | End: 2025-06-23

## 2025-06-23 RX ORDER — PROCHLORPERAZINE EDISYLATE 5 MG/ML
5 INJECTION INTRAMUSCULAR; INTRAVENOUS EVERY 6 HOURS PRN
Status: DISCONTINUED | OUTPATIENT
Start: 2025-06-23 | End: 2025-06-25 | Stop reason: HOSPADM

## 2025-06-23 RX ORDER — METOPROLOL TARTRATE 50 MG/1
50 TABLET ORAL
Status: COMPLETED | OUTPATIENT
Start: 2025-06-23 | End: 2025-06-23

## 2025-06-23 RX ORDER — PANTOPRAZOLE SODIUM 40 MG/1
40 TABLET, DELAYED RELEASE ORAL 2 TIMES DAILY
Status: DISCONTINUED | OUTPATIENT
Start: 2025-06-23 | End: 2025-06-25 | Stop reason: HOSPADM

## 2025-06-23 RX ORDER — SODIUM CHLORIDE 9 MG/ML
1000 INJECTION, SOLUTION INTRAVENOUS
Status: COMPLETED | OUTPATIENT
Start: 2025-06-23 | End: 2025-06-23

## 2025-06-23 RX ORDER — SODIUM CHLORIDE 0.9 % (FLUSH) 0.9 %
10 SYRINGE (ML) INJECTION
Status: DISCONTINUED | OUTPATIENT
Start: 2025-06-23 | End: 2025-06-25 | Stop reason: HOSPADM

## 2025-06-23 RX ORDER — NALOXONE HCL 0.4 MG/ML
0.02 VIAL (ML) INJECTION
Status: DISCONTINUED | OUTPATIENT
Start: 2025-06-23 | End: 2025-06-25 | Stop reason: HOSPADM

## 2025-06-23 RX ORDER — GLUCAGON 1 MG
1 KIT INJECTION
Status: DISCONTINUED | OUTPATIENT
Start: 2025-06-23 | End: 2025-06-25 | Stop reason: HOSPADM

## 2025-06-23 RX ORDER — LEVOTHYROXINE SODIUM 112 UG/1
112 TABLET ORAL
Status: DISCONTINUED | OUTPATIENT
Start: 2025-06-23 | End: 2025-06-25 | Stop reason: HOSPADM

## 2025-06-23 RX ORDER — IBUPROFEN 400 MG/1
400 TABLET, FILM COATED ORAL EVERY 6 HOURS PRN
Status: DISCONTINUED | OUTPATIENT
Start: 2025-06-23 | End: 2025-06-25 | Stop reason: HOSPADM

## 2025-06-23 RX ADMIN — PROCHLORPERAZINE EDISYLATE 5 MG: 5 INJECTION INTRAMUSCULAR; INTRAVENOUS at 07:06

## 2025-06-23 RX ADMIN — PANTOPRAZOLE SODIUM 40 MG: 40 TABLET, DELAYED RELEASE ORAL at 08:06

## 2025-06-23 RX ADMIN — METOPROLOL TARTRATE 50 MG: 50 TABLET, FILM COATED ORAL at 03:06

## 2025-06-23 RX ADMIN — TIZANIDINE 2 MG: 2 TABLET ORAL at 11:06

## 2025-06-23 RX ADMIN — SODIUM CHLORIDE, POTASSIUM CHLORIDE, SODIUM LACTATE AND CALCIUM CHLORIDE: 600; 310; 30; 20 INJECTION, SOLUTION INTRAVENOUS at 01:06

## 2025-06-23 RX ADMIN — POTASSIUM CHLORIDE 10 MEQ: 7.46 INJECTION, SOLUTION INTRAVENOUS at 02:06

## 2025-06-23 RX ADMIN — SODIUM CHLORIDE 1000 ML: 0.9 INJECTION, SOLUTION INTRAVENOUS at 04:06

## 2025-06-23 RX ADMIN — ONDANSETRON 4 MG: 2 INJECTION INTRAMUSCULAR; INTRAVENOUS at 02:06

## 2025-06-23 RX ADMIN — MORPHINE SULFATE 2 MG: 2 INJECTION, SOLUTION INTRAMUSCULAR; INTRAVENOUS at 08:06

## 2025-06-23 RX ADMIN — PROMETHAZINE HYDROCHLORIDE 12.5 MG: 25 INJECTION INTRAMUSCULAR; INTRAVENOUS at 03:06

## 2025-06-23 RX ADMIN — SODIUM CHLORIDE, POTASSIUM CHLORIDE, SODIUM LACTATE AND CALCIUM CHLORIDE: 600; 310; 30; 20 INJECTION, SOLUTION INTRAVENOUS at 05:06

## 2025-06-23 RX ADMIN — SODIUM CHLORIDE, POTASSIUM CHLORIDE, SODIUM LACTATE AND CALCIUM CHLORIDE: 600; 310; 30; 20 INJECTION, SOLUTION INTRAVENOUS at 06:06

## 2025-06-23 RX ADMIN — TIZANIDINE 2 MG: 2 TABLET ORAL at 10:06

## 2025-06-23 RX ADMIN — LEVOTHYROXINE SODIUM 112 MCG: 112 TABLET ORAL at 06:06

## 2025-06-23 RX ADMIN — MORPHINE SULFATE 2 MG: 2 INJECTION, SOLUTION INTRAMUSCULAR; INTRAVENOUS at 02:06

## 2025-06-23 RX ADMIN — IOHEXOL 100 ML: 350 INJECTION, SOLUTION INTRAVENOUS at 01:06

## 2025-06-23 RX ADMIN — IBUPROFEN 400 MG: 400 TABLET ORAL at 11:06

## 2025-06-23 RX ADMIN — ENOXAPARIN SODIUM 40 MG: 40 INJECTION SUBCUTANEOUS at 04:06

## 2025-06-23 RX ADMIN — SCOPOLAMINE 1 PATCH: 1.5 PATCH, EXTENDED RELEASE TRANSDERMAL at 01:06

## 2025-06-23 RX ADMIN — SODIUM CHLORIDE 12.5 MG: 9 INJECTION, SOLUTION INTRAVENOUS at 08:06

## 2025-06-23 NOTE — SUBJECTIVE & OBJECTIVE
"Past Medical History:   Diagnosis Date    Dementia without behavioral disturbance     Depression     GERD (gastroesophageal reflux disease)     Hashimoto's disease     Hyperlipidemia     Hypertension     Hypothyroid     Sleep apnea, unspecified 2015       Past Surgical History:   Procedure Laterality Date    APPENDECTOMY      COLONOSCOPY  2017    Briana, repeat in 10    ESOPHAGOGASTRODUODENOSCOPY N/A 04/24/2025    Procedure: EGD (ESOPHAGOGASTRODUODENOSCOPY);  Surgeon: Maxim Garzon MD;  Location: [facility] ENDO;  Service: Endoscopy;  Laterality: N/A;    LAPAROSCOPIC CHOLECYSTECTOMY N/A 6/18/2025    Procedure: CHOLECYSTECTOMY, LAPAROSCOPIC;  Surgeon: Felix Buenrostro III, MD;  Location: [facility] OR;  Service: General;  Laterality: N/A;    NECK SURGERY      "burning of the nerves to help with her migraines"    TONSILLECTOMY      TUBAL LIGATION         Review of patient's allergies indicates:  No Known Allergies    Current Facility-Administered Medications on File Prior to Encounter   Medication    onabotulinumtoxina injection 200 Units     Current Outpatient Medications on File Prior to Encounter   Medication Sig    acetaminophen (TYLENOL) 650 MG TbSR Take 650 mg by mouth every 8 (eight) hours.    azelastine (ASTELIN) 137 mcg (0.1 %) nasal spray 1 spray (137 mcg total) by Nasal route nightly. Point up and slightly outward toward ear when spraying to avoid irritating nasal septum. With flonase.    cetirizine (ZYRTEC) 10 MG tablet Take 10 mg by mouth every evening.    fluticasone propionate (FLONASE) 50 mcg/actuation nasal spray 1 spray by Each Nostril route every evening.    HYDROcodone-acetaminophen (NORCO) 5-325 mg per tablet Take 1 tablet by mouth every 6 (six) hours as needed for Pain.    levothyroxine (SYNTHROID) 112 MCG tablet Take 1 tablet (112 mcg total) by mouth before breakfast.    mag carb/aluminum hydrox/algin (GAVISCON EXTRA STRENGTH ORAL) Take 1 tablet by mouth 3 (three) times daily with meals. Pt takes " 30min after meals takes tablet form    magnesium oxide 400 mg magnesium Tab Take 400 mg by mouth once daily.    meclizine (ANTIVERT) 25 mg tablet Take 0.5 tablets (12.5 mg total) by mouth 3 (three) times daily as needed for Dizziness.    metoclopramide HCl (REGLAN) 5 MG tablet Take 2 tablets (10 mg total) by mouth 4 (four) times daily before meals and nightly.    metoprolol succinate (TOPROL-XL) 100 MG 24 hr tablet TAKE 1 TABLET(100 MG) BY MOUTH DAILY    multivitamin (THERAGRAN) tablet Take 1 tablet by mouth once daily.    ondansetron (ZOFRAN-ODT) 8 MG TbDL Take 1 tablet (8 mg total) by mouth every 8 (eight) hours as needed (nausea and vomiting).    pantoprazole (PROTONIX) 40 MG tablet Take 1 tablet (40 mg total) by mouth 2 (two) times daily.    promethazine (PHENERGAN) 25 MG suppository Place 1 suppository (25 mg total) rectally every 6 (six) hours as needed for Nausea.    riboflavin, vitamin B2, 400 mg Tab Take 400 mg by mouth once daily.    rizatriptan (MAXALT) 10 MG tablet Take 1 tablet (10 mg total) by mouth daily as needed for Migraine.    scopolamine (TRANSDERM-SCOP) 1.3-1.5 mg (1 mg over 3 days) Place 1 patch onto the skin Every 3 (three) days.    sucralfate (CARAFATE) 100 mg/mL suspension Take 10 mLs (1 g total) by mouth 4 (four) times daily.    tiZANidine (ZANAFLEX) 4 MG tablet Take 1 tablet (4 mg total) by mouth 2 (two) times daily as needed (headache).     Family History       Problem Relation (Age of Onset)    Breast cancer Daughter    Cancer Father, Sister          Tobacco Use    Smoking status: Never    Smokeless tobacco: Never   Substance and Sexual Activity    Alcohol use: Not Currently    Drug use: No    Sexual activity: Yes     Review of Systems   Constitutional:  Positive for appetite change and fatigue. Negative for chills and fever.   HENT:  Negative for sinus pain and sore throat.    Eyes:  Negative for visual disturbance.   Respiratory:  Negative for cough, shortness of breath and wheezing.     Cardiovascular:  Negative for chest pain, palpitations and leg swelling.   Gastrointestinal:  Positive for nausea and vomiting. Negative for abdominal pain, constipation and diarrhea.   Endocrine: Negative for polyuria.   Genitourinary:  Negative for dysuria and hematuria.   Musculoskeletal:  Negative for back pain and myalgias.   Skin:  Positive for color change. Negative for pallor.   Neurological:  Negative for seizures, syncope and headaches.   Psychiatric/Behavioral:  Negative for confusion.      Objective:     Vital Signs (Most Recent):  Temp: 97.7 °F (36.5 °C) (06/23/25 0358)  Pulse: 95 (06/23/25 0430)  Resp: 20 (06/23/25 0242)  BP: (!) 176/79 (06/23/25 0430)  SpO2: (!) 94 % (06/23/25 0430) Vital Signs (24h Range):  Temp:  [97.7 °F (36.5 °C)-98 °F (36.7 °C)] 97.7 °F (36.5 °C)  Pulse:  [] 95  Resp:  [18-20] 20  SpO2:  [92 %-97 %] 94 %  BP: (169-193)/(79-89) 176/79        Body mass index is 34.21 kg/m².     Physical Exam  Constitutional:       General: She is not in acute distress.     Appearance: Normal appearance. She is not toxic-appearing.   HENT:      Head: Normocephalic and atraumatic.      Nose: No congestion or rhinorrhea.      Mouth/Throat:      Mouth: Mucous membranes are moist.      Pharynx: No oropharyngeal exudate or posterior oropharyngeal erythema.   Eyes:      General: No scleral icterus.     Extraocular Movements: Extraocular movements intact.      Pupils: Pupils are equal, round, and reactive to light.   Cardiovascular:      Rate and Rhythm: Normal rate and regular rhythm.      Pulses: Normal pulses.      Heart sounds: Normal heart sounds.   Pulmonary:      Effort: No respiratory distress.      Breath sounds: No wheezing, rhonchi or rales.   Abdominal:      General: There is no distension.      Palpations: Abdomen is soft.      Tenderness: There is abdominal tenderness. There is no guarding.      Comments: Mild mid epigastric tenderness to palpation, no RUQ tenderness/sharpe sign is  "negative.  Few laparoscopic incisions with mild drainage    Musculoskeletal:      Right lower leg: No edema.      Left lower leg: No edema.   Skin:     General: Skin is warm and dry.      Coloration: Skin is not jaundiced.      Findings: No bruising.   Neurological:      General: No focal deficit present.      Mental Status: She is alert.              CRANIAL NERVES     CN III, IV, VI   Pupils are equal, round, and reactive to light.       Significant Labs: All pertinent labs within the past 24 hours have been reviewed.  A1C:   Recent Labs   Lab 04/25/25  0400   HGBA1C 5.1     ABGs: No results for input(s): "PH", "PCO2", "HCO3", "POCSATURATED", "BE", "TOTALHB", "COHB", "METHB", "O2HB", "POCFIO2", "PO2" in the last 48 hours.  Bilirubin:   Recent Labs   Lab 05/30/25  0937 06/23/25  0012   BILITOT 0.5 1.5*     Blood Culture: No results for input(s): "LABBLOO" in the last 48 hours.  BMP:   Recent Labs   Lab 06/23/25  0012   *      K 3.1*   CL 99   CO2 23   BUN 6*   CREATININE 0.7   CALCIUM 9.0     CBC:   Recent Labs   Lab 06/23/25  0012   WBC 7.37   HGB 12.3   HCT 37.1        CMP:   Recent Labs   Lab 06/23/25  0012      K 3.1*   CL 99   CO2 23   *   BUN 6*   CREATININE 0.7   CALCIUM 9.0   PROT 6.4   ALBUMIN 3.4*   BILITOT 1.5*   ALKPHOS 100   AST 31   ALT 23   ANIONGAP 18*     Cardiac Markers: No results for input(s): "CKMB", "MYOGLOBIN", "BNP", "TROPISTAT" in the last 48 hours.  Coagulation: No results for input(s): "PT", "INR", "APTT" in the last 48 hours.  Lactic Acid: No results for input(s): "LACTATE" in the last 48 hours.  Lipase:   Recent Labs   Lab 06/23/25  0012   LIPASE 91*     Lipid Panel: No results for input(s): "CHOL", "HDL", "LDLCALC", "TRIG", "CHOLHDL" in the last 48 hours.  Magnesium: No results for input(s): "MG" in the last 48 hours.  Troponin: No results for input(s): "TROPONINI", "TROPONINIHS" in the last 48 hours.  TSH:   Recent Labs   Lab 04/10/25  1530   TSH 2.644 "     Urine Studies:   Recent Labs   Lab 06/23/25  0249   APPEARANCEUA Clear   SPECGRAV 1.015   PROTEINUA Negative   BILIRUBINUA Negative   UROBILINOGEN Negative   LEUKOCYTESUR Negative       Significant Imaging: I have reviewed all pertinent imaging results/findings within the past 24 hours.

## 2025-06-23 NOTE — ED PROVIDER NOTES
"Encounter Date: 6/22/2025       History     Chief Complaint   Patient presents with    Vomiting     X2 weeks    Jaundice     Gallbladder removed Wednesday. Family noticed jaundice. Surgeon told her to get checked if jaundice appeared     74-year-old female chief complaint is "not feeling well" family member called EMS.  On Wednesday the patient had her gallbladder removed.  That was 4 days ago.  Now she complains of pruritus.  She is now jaundiced.  Temp 98.6° per EMS.  Heart rate 112.  Blood pressure 169 over 73.  93% on room air.  Respirations 26.  The patient has vascular dementia.  The history was given by the daughter.  She had surgery 4 days ago for gallbladder removal and since then she has been not doing well having intermittent nausea vomiting and at 1 point was seen by 911 and had a L of fluid given to her.  Today her heart rate in the ER was 110        Review of patient's allergies indicates:  No Known Allergies  Past Medical History:   Diagnosis Date    Dementia without behavioral disturbance     Depression     GERD (gastroesophageal reflux disease)     Hashimoto's disease     Hyperlipidemia     Hypertension     Hypothyroid     Sleep apnea, unspecified 2015     Past Surgical History:   Procedure Laterality Date    APPENDECTOMY      COLONOSCOPY  2017    Briana, repeat in 10    ESOPHAGOGASTRODUODENOSCOPY N/A 04/24/2025    Procedure: EGD (ESOPHAGOGASTRODUODENOSCOPY);  Surgeon: Maxim Garzon MD;  Location: [facility] ENDO;  Service: Endoscopy;  Laterality: N/A;    LAPAROSCOPIC CHOLECYSTECTOMY N/A 6/18/2025    Procedure: CHOLECYSTECTOMY, LAPAROSCOPIC;  Surgeon: Felix Buenrostro III, MD;  Location: [facility] OR;  Service: General;  Laterality: N/A;    NECK SURGERY      "burning of the nerves to help with her migraines"    TONSILLECTOMY      TUBAL LIGATION       Family History   Problem Relation Name Age of Onset    Cancer Father      Cancer Sister      Breast cancer Daughter      Glaucoma Neg Hx      Macular " degeneration Neg Hx      Retinal detachment Neg Hx      Ovarian cancer Neg Hx      Eczema Neg Hx      Lupus Neg Hx      Melanoma Neg Hx      Psoriasis Neg Hx       Social History[1]  Review of Systems   Constitutional:  Negative for chills and fever.   HENT:  Negative for ear pain, rhinorrhea and sore throat.    Eyes:  Negative for pain and visual disturbance.   Respiratory:  Negative for cough and shortness of breath.    Cardiovascular:  Negative for chest pain and palpitations.   Gastrointestinal:  Positive for nausea and vomiting. Negative for abdominal pain, constipation and diarrhea.   Genitourinary:  Negative for dysuria, frequency, hematuria and urgency.   Musculoskeletal:  Negative for back pain, joint swelling and myalgias.   Skin:  Negative for rash.   Neurological:  Negative for dizziness, seizures, weakness and headaches.   Psychiatric/Behavioral:  Negative for dysphoric mood. The patient is not nervous/anxious.        Physical Exam     Initial Vitals [06/23/25 0000]   BP Pulse Resp Temp SpO2   (!) 169/86 110 18 98 °F (36.7 °C) (!) 92 %      MAP       --         Physical Exam    Nursing note and vitals reviewed.  Constitutional: She appears well-developed and well-nourished.   HENT:   Head: Normocephalic and atraumatic.   Eyes: Conjunctivae, EOM and lids are normal. Pupils are equal, round, and reactive to light.   Neck: Trachea normal. Neck supple. No thyroid mass and no thyromegaly present.   Normal range of motion.  Cardiovascular:  Normal rate, regular rhythm and normal heart sounds.           Pulmonary/Chest: Effort normal and breath sounds normal.   Abdominal: Abdomen is soft.   Musculoskeletal:         General: Normal range of motion.      Cervical back: Normal range of motion and neck supple.     Neurological: She is alert and oriented to person, place, and time. She has normal strength and normal reflexes. No cranial nerve deficit or sensory deficit.   Skin: Skin is warm and dry.   Psychiatric:  She has a normal mood and affect. Her speech is normal and behavior is normal. Judgment and thought content normal.         ED Course   Procedures  Labs Reviewed   COMPREHENSIVE METABOLIC PANEL - Abnormal       Result Value    Sodium 140      Potassium 3.1 (*)     Chloride 99      CO2 23      Glucose 119 (*)     BUN 6 (*)     Creatinine 0.7      Calcium 9.0      Protein Total 6.4      Albumin 3.4 (*)     Bilirubin Total 1.5 (*)           AST 31      ALT 23      Anion Gap 18 (*)     eGFR >60     LIPASE - Abnormal    Lipase Level 91 (*)    URINALYSIS, REFLEX TO URINE CULTURE - Abnormal    Color, UA Yellow      Appearance, UA Clear      Spec Grav UA 1.015      pH, UA 7.0      Protein, UA Negative      Glucose, UA Negative      Ketones, UA Trace (*)     Blood, UA Negative      Bilirubin, UA Negative      Urobilinogen, UA Negative      Nitrites, UA Negative      Leukocyte Esterase, UA Negative     CBC WITH DIFFERENTIAL - Abnormal    WBC 7.37      RBC 4.13      Hgb 12.3      Hct 37.1      MCV 90      MCH 29.8      MCHC 33.2      RDW 14.2      Platelet Count 320      MPV 10.2      Nucleated RBC 0      Neut % 55.0      Lymph % 21.2      Mono % 15.9 (*)     Eos % 7.1      Basophil % 0.3      Imm Grans % 0.5      Neut # 4.1      Lymph # 1.56      Mono # 1.17 (*)     Eos # 0.52 (*)     Baso # 0.02      Imm Grans # 0.04     CULTURE, BLOOD   CULTURE, BLOOD   CBC W/ AUTO DIFFERENTIAL    Narrative:     The following orders were created for panel order CBC auto differential.  Procedure                               Abnormality         Status                     ---------                               -----------         ------                     CBC with Differential[[id number]]       Abnormal            Final result                 Please view results for these tests on the individual orders.   EXTRA TUBES    Narrative:     The following orders were created for panel order EXTRA TUBES.  Procedure                                Abnormality         Status                     ---------                               -----------         ------                     Light Blue Top Hold[[id number]]                             In process                 Lavender Top Hold[[id number]]                               In process                 Gold Top Hold[[id number]]                                   In process                 Gold Top Hold[[id number]]                                   In process                   Please view results for these tests on the individual orders.   LIGHT BLUE TOP HOLD   LAVENDER TOP HOLD   GOLD TOP HOLD   GOLD TOP HOLD   ISTAT LACTATE    POC Lactate 1.28      Sample VENOUS     POCT LACTATE     EKG Readings: (Independently Interpreted)   EKG reveals sinus tachycardia 121 heart rate TN interval normal       Imaging Results               CT Abdomen Pelvis With IV Contrast NO Oral Contrast (Final result)  Result time 06/23/25 03:37:50      Final result by Henry Valenzuela MD (06/23/25 03:37:50)                   Impression:      Interval operative changes of cholecystectomy with new peripancreatic fat stranding and fat stranding in the periportal region and adjacent to the duodenum, likely related to acute pancreatitis.  Suggest correlation with symptoms and serum lactate.    Trace right pleural effusion.    Mixed liquid and solid stool in the colon.  Colonic diverticulosis.    Other incidental findings discussed in the body of the report.    This report was flagged in Epic as abnormal.      Electronically signed by: Henry Valenzuela MD  Date:    06/23/2025  Time:    03:37               Narrative:    EXAMINATION:  CT ABDOMEN PELVIS WITH IV CONTRAST    CLINICAL HISTORY:  vomiting;    TECHNIQUE:  Low dose axial images, sagittal and coronal reformations were obtained from the lung bases to the pubic symphysis following the IV administration of 100 mL of Omnipaque 350 .  Oral contrast was not  given.    COMPARISON:  Ultrasound, 06/23/2025.  CT, 04/22/2025.    FINDINGS:  Lower Chest:    Lung bases demonstrate linear subsegmental atelectasis.  Elevated right hemidiaphragm.  Small right pleural effusion.  Heart size is normal.  No pericardial effusion.    Abdomen:    Liver is similar in size and contour.  Elevated right hemidiaphragm.  Possible hepatic steatosis.  Gallbladder is absent.  Inflammatory changes in the gallbladder fossa.  Minimal distension of the common bile duct, potentially related to cholecystectomy status.  No overt biliary duct dilatation.    Spleen is not enlarged.  Adrenal glands are unremarkable.    Inflammatory changes adjacent to the pancreatic head.  No pancreatic necrosis, peripancreatic fluid collection, or vascular complication.    The kidneys are symmetric.  No hydronephrosis. No asymmetric perinephric fat stranding.    Stomach is not overly distended.  There are inflammatory changes adjacent to the duodenum.  No small bowel obstruction.  Colonic diverticulosis.  No evidence of acute diverticulitis.  Mixed liquid and solid stool in the colon.    No pneumoperitoneum or organized fluid collection.    No bulky retroperitoneal lymphadenopathy.    Abdominal aorta is normal in caliber with minimal calcific atherosclerosis.    Portal, splenic, and superior mesenteric veins are patent. No portal venous gas.    Pelvis:    Urinary bladder, pelvic organs, and rectum are unremarkable.  Minimal pelvic free fluid.    Bones and soft tissues:    No aggressive osseous lesions.  Degenerative changes in the spine and hips.  Small fat containing umbilical hernia.  Extraperitoneal soft tissues are negative for acute finding.                                       US Abdomen Limited (Final result)  Result time 06/23/25 01:28:13      Final result by Shukri Seaman MD (06/23/25 01:28:13)                   Impression:      As above      Electronically signed by: Shukri  Urszula  Date:    06/23/2025  Time:    01:28               Narrative:    EXAMINATION:  US ABDOMEN LIMITED    CLINICAL HISTORY:  abdominal pain;    TECHNIQUE:  Limited ultrasound of the right upper quadrant of the abdomen.    COMPARISON:  None.    FINDINGS:  Trace right pleural effusion.  Unremarkable liver measuring 12.6 cm.  Unremarkable right kidney measuring 10 cm.  Surgically absent gallbladder.  Common bile duct measures 7 mm.                                       Medications   iohexoL (OMNIPAQUE 350) injection 100 mL (100 mLs Intravenous Given 6/23/25 0146)   morphine injection 2 mg (2 mg Intravenous Given 6/23/25 0242)   ondansetron injection 4 mg (4 mg Intravenous Given 6/23/25 0242)   potassium chloride 10 mEq in 100 mL IVPB (0 mEq Intravenous Stopped 6/23/25 0358)   promethazine (PHENERGAN) 12.5 mg in 0.9% NaCl 50 mL IVPB (0 mg Intravenous Stopped 6/23/25 0350)   metoprolol tartrate (LOPRESSOR) tablet 50 mg (50 mg Oral Given 6/23/25 0342)   0.9% NaCl infusion (1,000 mLs Intravenous New Bag 6/23/25 0400)     Medical Decision Making  Amount and/or Complexity of Data Reviewed  Labs: ordered.  Radiology: ordered.    Risk  Prescription drug management.              Attending Attestation:         Attending Critical Care:   Critical Care Times:   Direct Patient Care (initial evaluation, reassessments, and time considering the case)................................................................10 minutes.   Additional History from reviewing old medical records or taking additional history from the family, EMS, PCP, etc.......................5 minutes.   Ordering, Reviewing, and Interpreting Diagnostic Studies...............................................................................................................5 minutes.    Documentation..................................................................................................................................................................................5 minutes.   Consultation with other Physicians. .................................................................................................................................................5 minutes.   ==============================================================  Total Critical Care Time - exclusive of procedural time: 30 minutes.  ==============================================================  Critical care was necessary to treat or prevent imminent or life-threatening deterioration of the following conditions: metabolic crisis.   The following critical care procedures were done by me (see procedure notes): pulse oximetry.   Critical care was time spent personally by me on the following activities: evaluation of patient's response to treatment, discussion with consultants, ordering lab, x-rays, and/or EKG, obtaining history from patient or relative, examination of patient and review of old charts.   Critical Care Condition: critical                                  Clinical Impression:  Final diagnoses:  [K85.90] Acute pancreatitis, unspecified complication status, unspecified pancreatitis type (Primary)          ED Disposition Condition    Admit                       [1]   Social History  Tobacco Use    Smoking status: Never    Smokeless tobacco: Never   Substance Use Topics    Alcohol use: Not Currently    Drug use: No        Melissa Singletary MD  06/23/25 0359

## 2025-06-23 NOTE — ASSESSMENT & PLAN NOTE
Patient with dementia with likely etiology of vascular dementia. Dementia is moderate. The patient does not have signs of behavioral disturbance. Home dementia medications are Held or Continued: continued.. Continue non-pharmacologic interventions to prevent delirium (No VS between 11PM-5AM, activity during day, opening blinds, providing glasses/hearing aids, and up in chair during daytime). Will avoid narcotics and benzos unless absolutely necessary. PRN anti-psychotics are not prescribed to avoid self harm behaviors.    Fall Precautions  Aspiration precautions

## 2025-06-23 NOTE — ASSESSMENT & PLAN NOTE
Patient presents with persistent nausea/vomiting after cholecystectomy.  Now has elevated bilirubin to 1.5 and lipase (all previous values were normal)    Will order MRCP to evaluate for post-cholecystectomy obstruction/stricture of biliary duct or stone   Nausea medications PRN  Surgery consult   Keep NPO   Pain control PRN   Trend LFTs, bilirubin

## 2025-06-23 NOTE — HPI
74 year old female with comorbid conditions of vascular dementia, depression, GERD, hypertension, hypothyroidism, Hashiomoto's disease presents due to persistent nausea and vomiting after recent cholecystectomy. Patient had laparoscopic cholecystectomy on Wednesday (6/18) with Dr. Buenrostro for biliary dyskinesia.  Patient reports generalized fatigue, pruritus.  Vomiting has been nonbloody, nonbilious.  Per daughter patient's skin appeared to be yellow.  Family called EMS at one point due to blood pressure in 80s for which 1 liter of fluids were administered with improvement.  Denies fevers, chills, significant abdominal pain, diarrhea, chest pain, SOB.   In ED labs revealed bilirubin of 1.5, lipase of 91, normal WBC count and negative UA.  CT abdomen revealed operative changes of cholecystectomy, inflammatory changes in gallbladder fossa, new peripancreatic fat stranding and periportal region adjacent to duodenum fat stranding.  No overt biliary duct dilation.  Was given morphine, zofran, potassium, promethazine and IV fluids in ED.

## 2025-06-23 NOTE — PLAN OF CARE
Patient has dementia and her daughter Allison Sainz has POA, she was present in the room with Skyler. I had to call Damiánause she was not present during my rounds.  I got a telephone consent from Allison. Witnessed by Laurie Sue RN       06/23/25 1046   PRATT Message   Medicare Outpatient and Observation Notification regarding financial responsibility Explained to patient/caregiver;Signed/date by patient/caregiver   Date PRATT was signed 06/23/25   Time PRATT was signed 0846

## 2025-06-23 NOTE — ASSESSMENT & PLAN NOTE
Patient's blood pressure range in the last 24 hours was: BP  Min: 169/86  Max: 193/79.The patient's inpatient anti-hypertensive regimen is listed below:  Current Antihypertensives  metoprolol succinate (TOPROL-XL) 24 hr tablet 100 mg, Daily, Oral    Plan  - BP is controlled, no changes needed to their regimen

## 2025-06-23 NOTE — TELEPHONE ENCOUNTER
"Pt daughter called and reports pt looks jaundice. She reports her skin on the face looks slightly yellow and the white of her eyes towards the bottom.  Reports giving acetaminophen, ibuprofen, and hydrocodone to patient for discomfort. Denies abdominal pain at present. Denies fever. According to care advice, see PCP within 24 hours. Pt's daughter verbalized understanding.   Reason for Disposition   Jaundice suspected (e.g., yellow color of the skin and sclera [white of the eye])    Additional Information   Negative: Unconscious or difficult to awaken   Negative: Acting confused (e.g., disoriented, slurred speech)   Negative: Seizure has occurred   Negative: Passed out (e.g., fainted, lost consciousness, blacked out and was not responding)   Negative: Very weak (e.g., can't stand)   Negative: Acetaminophen overdose or poisoning suspected   Negative: Sounds like a life-threatening emergency to the triager   Negative: [1] Abdominal pain AND [2] severe   Negative: [1] Constant abdominal pain AND [2] present > 2 hours   Negative: [1] Vomiting AND [2] contains red blood or black ("coffee ground") material   Negative: [1] Vomiting AND [2] signs of dehydration (e.g., very dry mouth, lightheaded)   Negative: Fever   Negative: Shaking chills   Negative: [1] Drinking very little AND [2] dehydration suspected (e.g., no urine > 12 hours, very dry mouth, very lightheaded)   Negative: Patient sounds very sick or weak to the triager   Negative: Abdominal pain    Protocols used: [id number]    "

## 2025-06-23 NOTE — PLAN OF CARE
[facility], [facility] - Emergency Dept  Initial Discharge Assessment       Primary Care Provider: Oscar Lee FNP-C    Admission Diagnosis: Acute pancreatitis, unspecified complication status, unspecified pancreatitis type [K85.90]    Admission Date: 6/22/2025  Expected Discharge Date: 6/25/2025    CM met with patient bedside. Patient's daughter Allison was present in the room assisting with assessment. CM verified demographics, insurance, supports, and PCP.  Patient's daughter Allison reported that she lives with her and has dementia. Patient's daughter states she previously had CPAP, but cannot use it because the mask gives her horrible anxiety. CM assessed patient's needs. Patient is able to complete ADLs with assistance from equipment and one person. Patient verified at home DME: rollator, bedside commode.  Patient verified  HH with [facility], No Dialysis, No Blood Thinners, and No Oxygen.     Patient verified pharmacy of choice: [facility]   Patient confirmed Allison Sainz [phone]  will be source of transportation at the time of discharge.     Transition of Care Barriers: None    Payor: [email or web address] MGD [facility] / Plan: [email or web address] CHOICES / Product Type: Medicare Advantage /     Extended Emergency Contact Information  Primary Emergency Contact: Allison Sainz  Address: [address] [place] [address] [place]  Mobile Phone: [phone]  Relation: Healthcare Power of   Secondary Emergency Contact: Juan Sainz  [place] Phone: [phone]  Mobile Phone: [phone]  Relation: Son  Preferred language: English   needed? No    Discharge Plan A: Home Health  Discharge Plan B: Home      [id number] - [place], [place] - [address]  AT [address] & [address]  [address] [address]  Phone: [phone] Fax: [phone]      Initial Assessment (most recent)       Adult Discharge Assessment - 06/23/25 3878           Discharge Assessment    Assessment Type Discharge Planning Assessment     Confirmed/corrected address, phone number and insurance Yes     Confirmed Demographics Correct on Facesheet     Source of Information patient;family     If unable to respond/provide information was family/caregiver contacted? Yes     Contact Name/Number Allison Sainz [phone]     Communicated ANGELI with patient/caregiver Yes     People in Home child(larry), adult     Name(s) of People in Home Allison Sainz [phone]     Facility Arrived From: home     Do you expect to return to your current living situation? Yes     Do you have help at home or someone to help you manage your care at home? Yes     Who are your caregiver(s) and their phone number(s)? Allison Sainz [phone]     Prior to hospitilization cognitive status: Alert/Oriented;Not Oriented to Time   pleasantly confused    Current cognitive status: Alert/Oriented;Not Oriented to Time   pleasantly confused    Walking or Climbing Stairs Difficulty yes     Walking or Climbing Stairs ambulation difficulty, requires equipment     Dressing/Bathing Difficulty yes     Dressing/Bathing bathing difficulty, requires equipment     Equipment Currently Used at Home bedside commode;rollator     Readmission within 30 days? No     Patient currently being followed by outpatient case management? No     Do you currently have service(s) that help you manage your care at home? Yes     Name and Contact number of agency [facility]     Do you take prescription medications? Yes     Do you have prescription coverage? Yes     Do you have any problems affording any of your prescribed medications? No     Is the patient taking medications as prescribed? yes     Who is going to help you get home at discharge? Allison Sainz [phone]     How do you get to doctors appointments? family or friend will provide     Are you on dialysis? No     Do you take coumadin? No     Discharge Plan A Home Health      Discharge Plan B Home     DME Needed Upon Discharge  none     Discharge Plan discussed with: Patient     Transition of Care Barriers None

## 2025-06-23 NOTE — CARE UPDATE
Patient with a history dementia, depression, hypertension, hypothyroidism, persistent nausea and vomiting presented with complaints of nausea and vomiting, poor appetite.  Had recent cholecystectomy on 06/18 for biliary dyskinesia.  Patient has ongoing nausea and vomiting with unclear etiology.  Daughter thought patient had some jaundice so patient was brought to emergency room.  Bili 1.5.  General surgery consulted.  CT abdomen and pelvis showed findings concerning of acute pancreatitis.  MRCP unremarkable.  Continue IV fluids.

## 2025-06-23 NOTE — PLAN OF CARE
Message sent to verify if patient is current with [facility]       06/23/25 8507   Post-Acute Status   Post-Acute Authorization Home Health

## 2025-06-23 NOTE — ASSESSMENT & PLAN NOTE
Patient has elevated lipase, bilirubin and persistent vomiting after cholecystectomy     IV hydration - LR @ 75  Obtain MRCP   Pain control PRN   Anti-emetics PRN

## 2025-06-23 NOTE — H&P
[facility] - Emergency Dept  Hospital Medicine  History & Physical    Patient Name: Skyler Espinoza  MRN: [id number]  Patient Class: OP- Observation  Admission Date: 6/22/2025  Attending Physician: Taran Bearden MD   Primary Care Provider: Oscar Lee FNP-C         Patient information was obtained from patient, relative(s), and ER records.     Subjective:     Principal Problem:Acute pancreatitis    Chief Complaint:   Chief Complaint   Patient presents with    Vomiting     X2 weeks    Jaundice     Gallbladder removed Wednesday. Family noticed jaundice. Surgeon told her to get checked if jaundice appeared        HPI: 74 year old female with comorbid conditions of vascular dementia, depression, GERD, hypertension, hypothyroidism, Hashiomoto's disease presents due to persistent nausea and vomiting after recent cholecystectomy. Patient had laparoscopic cholecystectomy on Wednesday (6/18) with Dr. Buenrostro for biliary dyskinesia.  Patient reports generalized fatigue, pruritus.  Vomiting has been nonbloody, nonbilious.  Per daughter patient's skin appeared to be yellow.  Family called EMS at one point due to blood pressure in 80s for which 1 liter of fluids were administered with improvement.  Denies fevers, chills, significant abdominal pain, diarrhea, chest pain, SOB.   In ED labs revealed bilirubin of 1.5, lipase of 91, normal WBC count and negative UA.  CT abdomen revealed operative changes of cholecystectomy, inflammatory changes in gallbladder fossa, new peripancreatic fat stranding and periportal region adjacent to duodenum fat stranding.  No overt biliary duct dilation.  Was given morphine, zofran, potassium, promethazine and IV fluids in ED.      Past Medical History:   Diagnosis Date    Dementia without behavioral disturbance     Depression     GERD (gastroesophageal reflux disease)     Hashimoto's disease     Hyperlipidemia     Hypertension     Hypothyroid     Sleep apnea, unspecified  "2015       Past Surgical History:   Procedure Laterality Date    APPENDECTOMY      COLONOSCOPY  2017    Briana, repeat in 10    ESOPHAGOGASTRODUODENOSCOPY N/A 04/24/2025    Procedure: EGD (ESOPHAGOGASTRODUODENOSCOPY);  Surgeon: Maxim Garzon MD;  Location: [facility] ENDO;  Service: Endoscopy;  Laterality: N/A;    LAPAROSCOPIC CHOLECYSTECTOMY N/A 6/18/2025    Procedure: CHOLECYSTECTOMY, LAPAROSCOPIC;  Surgeon: Felix Buenrostro III, MD;  Location: [facility] OR;  Service: General;  Laterality: N/A;    NECK SURGERY      "burning of the nerves to help with her migraines"    TONSILLECTOMY      TUBAL LIGATION         Review of patient's allergies indicates:  No Known Allergies    Current Facility-Administered Medications on File Prior to Encounter   Medication    onabotulinumtoxina injection 200 Units     Current Outpatient Medications on File Prior to Encounter   Medication Sig    acetaminophen (TYLENOL) 650 MG TbSR Take 650 mg by mouth every 8 (eight) hours.    azelastine (ASTELIN) 137 mcg (0.1 %) nasal spray 1 spray (137 mcg total) by Nasal route nightly. Point up and slightly outward toward ear when spraying to avoid irritating nasal septum. With flonase.    cetirizine (ZYRTEC) 10 MG tablet Take 10 mg by mouth every evening.    fluticasone propionate (FLONASE) 50 mcg/actuation nasal spray 1 spray by Each Nostril route every evening.    HYDROcodone-acetaminophen (NORCO) 5-325 mg per tablet Take 1 tablet by mouth every 6 (six) hours as needed for Pain.    levothyroxine (SYNTHROID) 112 MCG tablet Take 1 tablet (112 mcg total) by mouth before breakfast.    mag carb/aluminum hydrox/algin (GAVISCON EXTRA STRENGTH ORAL) Take 1 tablet by mouth 3 (three) times daily with meals. Pt takes 30min after meals takes tablet form    magnesium oxide 400 mg magnesium Tab Take 400 mg by mouth once daily.    meclizine (ANTIVERT) 25 mg tablet Take 0.5 tablets (12.5 mg total) by mouth 3 (three) times daily as needed for Dizziness.    " metoclopramide HCl (REGLAN) 5 MG tablet Take 2 tablets (10 mg total) by mouth 4 (four) times daily before meals and nightly.    metoprolol succinate (TOPROL-XL) 100 MG 24 hr tablet TAKE 1 TABLET(100 MG) BY MOUTH DAILY    multivitamin (THERAGRAN) tablet Take 1 tablet by mouth once daily.    ondansetron (ZOFRAN-ODT) 8 MG TbDL Take 1 tablet (8 mg total) by mouth every 8 (eight) hours as needed (nausea and vomiting).    pantoprazole (PROTONIX) 40 MG tablet Take 1 tablet (40 mg total) by mouth 2 (two) times daily.    promethazine (PHENERGAN) 25 MG suppository Place 1 suppository (25 mg total) rectally every 6 (six) hours as needed for Nausea.    riboflavin, vitamin B2, 400 mg Tab Take 400 mg by mouth once daily.    rizatriptan (MAXALT) 10 MG tablet Take 1 tablet (10 mg total) by mouth daily as needed for Migraine.    scopolamine (TRANSDERM-SCOP) 1.3-1.5 mg (1 mg over 3 days) Place 1 patch onto the skin Every 3 (three) days.    sucralfate (CARAFATE) 100 mg/mL suspension Take 10 mLs (1 g total) by mouth 4 (four) times daily.    tiZANidine (ZANAFLEX) 4 MG tablet Take 1 tablet (4 mg total) by mouth 2 (two) times daily as needed (headache).     Family History       Problem Relation (Age of Onset)    Breast cancer Daughter    Cancer Father, Sister          Tobacco Use    Smoking status: Never    Smokeless tobacco: Never   Substance and Sexual Activity    Alcohol use: Not Currently    Drug use: No    Sexual activity: Yes     Review of Systems   Constitutional:  Positive for appetite change and fatigue. Negative for chills and fever.   HENT:  Negative for sinus pain and sore throat.    Eyes:  Negative for visual disturbance.   Respiratory:  Negative for cough, shortness of breath and wheezing.    Cardiovascular:  Negative for chest pain, palpitations and leg swelling.   Gastrointestinal:  Positive for nausea and vomiting. Negative for abdominal pain, constipation and diarrhea.   Endocrine: Negative for polyuria.   Genitourinary:   Negative for dysuria and hematuria.   Musculoskeletal:  Negative for back pain and myalgias.   Skin:  Positive for color change. Negative for pallor.   Neurological:  Negative for seizures, syncope and headaches.   Psychiatric/Behavioral:  Negative for confusion.      Objective:     Vital Signs (Most Recent):  Temp: 97.7 °F (36.5 °C) (06/23/25 0358)  Pulse: 95 (06/23/25 0430)  Resp: 20 (06/23/25 0242)  BP: (!) 176/79 (06/23/25 0430)  SpO2: (!) 94 % (06/23/25 0430) Vital Signs (24h Range):  Temp:  [97.7 °F (36.5 °C)-98 °F (36.7 °C)] 97.7 °F (36.5 °C)  Pulse:  [] 95  Resp:  [18-20] 20  SpO2:  [92 %-97 %] 94 %  BP: (169-193)/(79-89) 176/79        Body mass index is 34.21 kg/m².     Physical Exam  Constitutional:       General: She is not in acute distress.     Appearance: Normal appearance. She is not toxic-appearing.   HENT:      Head: Normocephalic and atraumatic.      Nose: No congestion or rhinorrhea.      Mouth/Throat:      Mouth: Mucous membranes are moist.      Pharynx: No oropharyngeal exudate or posterior oropharyngeal erythema.   Eyes:      General: No scleral icterus.     Extraocular Movements: Extraocular movements intact.      Pupils: Pupils are equal, round, and reactive to light.   Cardiovascular:      Rate and Rhythm: Normal rate and regular rhythm.      Pulses: Normal pulses.      Heart sounds: Normal heart sounds.   Pulmonary:      Effort: No respiratory distress.      Breath sounds: No wheezing, rhonchi or rales.   Abdominal:      General: There is no distension.      Palpations: Abdomen is soft.      Tenderness: There is abdominal tenderness. There is no guarding.      Comments: Mild mid epigastric tenderness to palpation, no RUQ tenderness/sharpe sign is negative.  Few laparoscopic incisions with mild drainage    Musculoskeletal:      Right lower leg: No edema.      Left lower leg: No edema.   Skin:     General: Skin is warm and dry.      Coloration: Skin is not jaundiced.      Findings: No  "bruising.   Neurological:      General: No focal deficit present.      Mental Status: She is alert.              CRANIAL NERVES     CN III, IV, VI   Pupils are equal, round, and reactive to light.       Significant Labs: All pertinent labs within the past 24 hours have been reviewed.  A1C:   Recent Labs   Lab 04/25/25  0400   HGBA1C 5.1     ABGs: No results for input(s): "PH", "PCO2", "HCO3", "POCSATURATED", "BE", "TOTALHB", "COHB", "METHB", "O2HB", "POCFIO2", "PO2" in the last 48 hours.  Bilirubin:   Recent Labs   Lab 05/30/25  0937 06/23/25  0012   BILITOT 0.5 1.5*     Blood Culture: No results for input(s): "LABBLOO" in the last 48 hours.  BMP:   Recent Labs   Lab 06/23/25  0012   *      K 3.1*   CL 99   CO2 23   BUN 6*   CREATININE 0.7   CALCIUM 9.0     CBC:   Recent Labs   Lab 06/23/25  0012   WBC 7.37   HGB 12.3   HCT 37.1        CMP:   Recent Labs   Lab 06/23/25  0012      K 3.1*   CL 99   CO2 23   *   BUN 6*   CREATININE 0.7   CALCIUM 9.0   PROT 6.4   ALBUMIN 3.4*   BILITOT 1.5*   ALKPHOS 100   AST 31   ALT 23   ANIONGAP 18*     Cardiac Markers: No results for input(s): "CKMB", "MYOGLOBIN", "BNP", "TROPISTAT" in the last 48 hours.  Coagulation: No results for input(s): "PT", "INR", "APTT" in the last 48 hours.  Lactic Acid: No results for input(s): "LACTATE" in the last 48 hours.  Lipase:   Recent Labs   Lab 06/23/25  0012   LIPASE 91*     Lipid Panel: No results for input(s): "CHOL", "HDL", "LDLCALC", "TRIG", "CHOLHDL" in the last 48 hours.  Magnesium: No results for input(s): "MG" in the last 48 hours.  Troponin: No results for input(s): "TROPONINI", "TROPONINIHS" in the last 48 hours.  TSH:   Recent Labs   Lab 04/10/25  1530   TSH 2.644     Urine Studies:   Recent Labs   Lab 06/23/25  0249   APPEARANCEUA Clear   SPECGRAV 1.015   PROTEINUA Negative   BILIRUBINUA Negative   UROBILINOGEN Negative   LEUKOCYTESUR Negative       Significant Imaging: I have reviewed all pertinent " imaging results/findings within the past 24 hours.  Assessment/Plan:     Assessment & Plan  Acute pancreatitis  Patient has elevated lipase, bilirubin and persistent vomiting after cholecystectomy     IV hydration - LR @ 75  Obtain MRCP   Pain control PRN   Anti-emetics PRN    Status post cholecystectomy  Patient presents with persistent nausea/vomiting after cholecystectomy.  Now has elevated bilirubin to 1.5 and lipase (all previous values were normal)    Will order MRCP to evaluate for post-cholecystectomy obstruction/stricture of biliary duct or stone   Nausea medications PRN  Surgery consult   Keep NPO   Pain control PRN   Trend LFTs, bilirubin     HTN (hypertension)  Patient's blood pressure range in the last 24 hours was: BP  Min: 169/86  Max: 193/79.The patient's inpatient anti-hypertensive regimen is listed below:  Current Antihypertensives  metoprolol succinate (TOPROL-XL) 24 hr tablet 100 mg, Daily, Oral    Plan  - BP is controlled, no changes needed to their regimen  Sleep apnea  Patient has sleep apnea but can not tolerate CPAP mask.  Per daughter desaturates at night    Continue supplemental oxygen     Autoimmune thyroiditis  Continue synthroid     Migraine  PRN tizanidine     Moderate vascular dementia with other behavioral disturbance  Patient with dementia with likely etiology of vascular dementia. Dementia is moderate. The patient does not have signs of behavioral disturbance. Home dementia medications are Held or Continued: continued.. Continue non-pharmacologic interventions to prevent delirium (No VS between 11PM-5AM, activity during day, opening blinds, providing glasses/hearing aids, and up in chair during daytime). Will avoid narcotics and benzos unless absolutely necessary. PRN anti-psychotics are not prescribed to avoid self harm behaviors.    Fall Precautions  Aspiration precautions   VTE Risk Mitigation (From admission, onward)           Ordered     enoxaparin injection 40 mg  Daily          06/23/25 0511     IP VTE HIGH RISK PATIENT  Once         06/23/25 0511     Place sequential compression device  Until discontinued         06/23/25 0511                                   On 06/23/2025, patient should be placed in hospital observation services under my care.             Taran Bearden MD  Department of Hospital Medicine  [facility] - Emergency Dept

## 2025-06-23 NOTE — PHARMACY MED REC
"            Admission Medication History     The home medication history was taken by Chely Simmons.    You may go to "Admission" then "Reconcile Home Medications" tabs to review and/or act upon these items.     The home medication list has been updated by the Pharmacy department.   Please read ALL comments highlighted in yellow.   Please address this information as you see fit.    Feel free to contact us if you have any questions or require assistance.        Medications listed below were obtained from: Patient/family and Analytic software- [employer]  Medications Ordered Prior to Encounter[1]      Chely Simmons  [id number]      .               [1]   Current Facility-Administered Medications on File Prior to Encounter   Medication Dose Route Frequency Provider Last Rate Last Admin    onabotulinumtoxina injection 200 Units  200 Units Intramuscular Q90 Days          Current Outpatient Medications on File Prior to Encounter   Medication Sig Dispense Refill    acetaminophen (TYLENOL) 650 MG TbSR Take 650 mg by mouth every 8 (eight) hours.      azelastine (ASTELIN) 137 mcg (0.1 %) nasal spray 1 spray (137 mcg total) by Nasal route nightly. Point up and slightly outward toward ear when spraying to avoid irritating nasal septum. With flonase. 30 mL 2    cetirizine (ZYRTEC) 10 MG tablet Take 10 mg by mouth every evening.      fluticasone propionate (FLONASE) 50 mcg/actuation nasal spray 1 spray by Each Nostril route every evening.      HYDROcodone-acetaminophen (NORCO) 5-325 mg per tablet Take 1 tablet by mouth every 6 (six) hours as needed for Pain. 20 tablet 0    levothyroxine (SYNTHROID) 112 MCG tablet Take 1 tablet (112 mcg total) by mouth before breakfast. 30 tablet 11    mag carb/aluminum hydrox/algin (GAVISCON EXTRA STRENGTH ORAL) Take 1 tablet by mouth 3 (three) times daily with meals. Pt takes 30min after meals takes tablet form      magnesium oxide (MAG-OX) 400 mg (241.3 mg magnesium) tablet Take 1 tablet by mouth once " daily.      metoclopramide HCl (REGLAN) 5 MG tablet Take 2 tablets (10 mg total) by mouth 4 (four) times daily before meals and nightly. 240 tablet 2    metoprolol succinate (TOPROL-XL) 100 MG 24 hr tablet TAKE 1 TABLET(100 MG) BY MOUTH DAILY (Patient taking differently: Take 100 mg by mouth once daily.) 90 tablet 1    multivitamin (THERAGRAN) tablet Take 1 tablet by mouth once daily.      ondansetron (ZOFRAN-ODT) 8 MG TbDL Take 1 tablet (8 mg total) by mouth every 8 (eight) hours as needed (nausea and vomiting). 20 tablet 1    pantoprazole (PROTONIX) 40 MG tablet Take 1 tablet (40 mg total) by mouth 2 (two) times daily. 180 tablet 0    promethazine (PHENERGAN) 25 MG suppository Place 1 suppository (25 mg total) rectally every 6 (six) hours as needed for Nausea. 12 suppository 1    riboflavin, vitamin B2, 400 mg Tab Take 400 mg by mouth once daily. 90 tablet 1    rizatriptan (MAXALT) 10 MG tablet Take 1 tablet (10 mg total) by mouth daily as needed for Migraine. 9 tablet 1    scopolamine (TRANSDERM-SCOP) 1.3-1.5 mg (1 mg over 3 days) Place 1 patch onto the skin Every 3 (three) days. 3 patch 0    sucralfate (CARAFATE) 100 mg/mL suspension Take 10 mLs (1 g total) by mouth 4 (four) times daily. 450 mL 0    tiZANidine (ZANAFLEX) 4 MG tablet Take 1 tablet (4 mg total) by mouth 2 (two) times daily as needed (headache). 60 tablet 3    thiamine 100 MG tablet Take 100 mg by mouth once daily.      [DISCONTINUED] magnesium oxide 400 mg magnesium Tab Take 400 mg by mouth once daily. 90 tablet 1    [DISCONTINUED] meclizine (ANTIVERT) 25 mg tablet Take 0.5 tablets (12.5 mg total) by mouth 3 (three) times daily as needed for Dizziness. 8 tablet 0

## 2025-06-24 LAB
ABSOLUTE EOSINOPHIL (SMH): 0.35 K/UL
ABSOLUTE MONOCYTE (SMH): 1.09 K/UL (ref 0.3–1)
ABSOLUTE NEUTROPHIL COUNT (SMH): 3.1 K/UL (ref 1.8–7.7)
ALBUMIN SERPL-MCNC: 2.8 G/DL (ref 3.5–5.2)
ALP SERPL-CCNC: 79 UNIT/L (ref 55–135)
ALT SERPL-CCNC: 16 UNIT/L (ref 10–44)
ANION GAP (SMH): 5 MMOL/L (ref 8–16)
AST SERPL-CCNC: 16 UNIT/L (ref 10–40)
BASOPHILS # BLD AUTO: 0.03 K/UL
BASOPHILS NFR BLD AUTO: 0.5 %
BILIRUB SERPL-MCNC: 1 MG/DL (ref 0.1–1)
BUN SERPL-MCNC: 3 MG/DL (ref 8–23)
CALCIUM SERPL-MCNC: 8.6 MG/DL (ref 8.7–10.5)
CHLORIDE SERPL-SCNC: 106 MMOL/L (ref 95–110)
CO2 SERPL-SCNC: 28 MMOL/L (ref 23–29)
CREAT SERPL-MCNC: 0.6 MG/DL (ref 0.5–1.4)
ERYTHROCYTE [DISTWIDTH] IN BLOOD BY AUTOMATED COUNT: 14.4 % (ref 11.5–14.5)
GFR SERPLBLD CREATININE-BSD FMLA CKD-EPI: >60 ML/MIN/1.73/M2
GLUCOSE SERPL-MCNC: 96 MG/DL (ref 70–110)
HCT VFR BLD AUTO: 33.3 % (ref 37–48.5)
HGB BLD-MCNC: 10.7 GM/DL (ref 12–16)
IMM GRANULOCYTES # BLD AUTO: 0.03 K/UL (ref 0–0.04)
IMM GRANULOCYTES NFR BLD AUTO: 0.5 % (ref 0–0.5)
LYMPHOCYTES # BLD AUTO: 1.7 K/UL (ref 1–4.8)
MAGNESIUM SERPL-MCNC: 1.9 MG/DL (ref 1.6–2.6)
MCH RBC QN AUTO: 29.1 PG (ref 27–31)
MCHC RBC AUTO-ENTMCNC: 32.1 G/DL (ref 32–36)
MCV RBC AUTO: 91 FL (ref 82–98)
NUCLEATED RBC (/100WBC) (SMH): 0 /100 WBC
PHOSPHATE SERPL-MCNC: 3.6 MG/DL (ref 2.7–4.5)
PLATELET # BLD AUTO: 286 K/UL (ref 150–450)
PMV BLD AUTO: 9.9 FL (ref 9.2–12.9)
POTASSIUM SERPL-SCNC: 3.5 MMOL/L (ref 3.5–5.1)
PROT SERPL-MCNC: 5.4 GM/DL (ref 6–8.4)
RBC # BLD AUTO: 3.68 M/UL (ref 4–5.4)
RELATIVE EOSINOPHIL (SMH): 5.6 % (ref 0–8)
RELATIVE LYMPHOCYTE (SMH): 27.1 % (ref 18–48)
RELATIVE MONOCYTE (SMH): 17.4 % (ref 4–15)
RELATIVE NEUTROPHIL (SMH): 48.9 % (ref 38–73)
SODIUM SERPL-SCNC: 139 MMOL/L (ref 136–145)
WBC # BLD AUTO: 6.27 K/UL (ref 3.9–12.7)

## 2025-06-24 PROCEDURE — 96372 THER/PROPH/DIAG INJ SC/IM: CPT | Performed by: STUDENT IN AN ORGANIZED HEALTH CARE EDUCATION/TRAINING PROGRAM

## 2025-06-24 PROCEDURE — 97535 SELF CARE MNGMENT TRAINING: CPT

## 2025-06-24 PROCEDURE — 94761 N-INVAS EAR/PLS OXIMETRY MLT: CPT

## 2025-06-24 PROCEDURE — 99900031 HC PATIENT EDUCATION (STAT)

## 2025-06-24 PROCEDURE — 25000003 PHARM REV CODE 250: Performed by: INTERNAL MEDICINE

## 2025-06-24 PROCEDURE — 25000003 PHARM REV CODE 250: Performed by: STUDENT IN AN ORGANIZED HEALTH CARE EDUCATION/TRAINING PROGRAM

## 2025-06-24 PROCEDURE — 27000221 HC OXYGEN, UP TO 24 HOURS

## 2025-06-24 PROCEDURE — 80053 COMPREHEN METABOLIC PANEL: CPT | Performed by: SURGERY

## 2025-06-24 PROCEDURE — 85025 COMPLETE CBC W/AUTO DIFF WBC: CPT | Performed by: STUDENT IN AN ORGANIZED HEALTH CARE EDUCATION/TRAINING PROGRAM

## 2025-06-24 PROCEDURE — 99900035 HC TECH TIME PER 15 MIN (STAT)

## 2025-06-24 PROCEDURE — 63600175 PHARM REV CODE 636 W HCPCS: Performed by: STUDENT IN AN ORGANIZED HEALTH CARE EDUCATION/TRAINING PROGRAM

## 2025-06-24 PROCEDURE — 36415 COLL VENOUS BLD VENIPUNCTURE: CPT | Performed by: STUDENT IN AN ORGANIZED HEALTH CARE EDUCATION/TRAINING PROGRAM

## 2025-06-24 PROCEDURE — 97161 PT EVAL LOW COMPLEX 20 MIN: CPT

## 2025-06-24 PROCEDURE — 96375 TX/PRO/DX INJ NEW DRUG ADDON: CPT

## 2025-06-24 PROCEDURE — 96361 HYDRATE IV INFUSION ADD-ON: CPT

## 2025-06-24 PROCEDURE — 97165 OT EVAL LOW COMPLEX 30 MIN: CPT

## 2025-06-24 PROCEDURE — 84100 ASSAY OF PHOSPHORUS: CPT | Performed by: STUDENT IN AN ORGANIZED HEALTH CARE EDUCATION/TRAINING PROGRAM

## 2025-06-24 PROCEDURE — G0378 HOSPITAL OBSERVATION PER HR: HCPCS

## 2025-06-24 PROCEDURE — 83735 ASSAY OF MAGNESIUM: CPT | Performed by: STUDENT IN AN ORGANIZED HEALTH CARE EDUCATION/TRAINING PROGRAM

## 2025-06-24 PROCEDURE — 97530 THERAPEUTIC ACTIVITIES: CPT

## 2025-06-24 RX ORDER — TIZANIDINE 4 MG/1
TABLET ORAL 2 TIMES DAILY PRN
Status: DISCONTINUED | OUTPATIENT
Start: 2025-06-24 | End: 2025-06-25 | Stop reason: HOSPADM

## 2025-06-24 RX ADMIN — LEVOTHYROXINE SODIUM 112 MCG: 112 TABLET ORAL at 05:06

## 2025-06-24 RX ADMIN — ONDANSETRON 4 MG: 2 INJECTION INTRAMUSCULAR; INTRAVENOUS at 04:06

## 2025-06-24 RX ADMIN — SENNOSIDES, DOCUSATE SODIUM 1 TABLET: 50; 8.6 TABLET, FILM COATED ORAL at 09:06

## 2025-06-24 RX ADMIN — ACETAMINOPHEN 650 MG: 325 TABLET ORAL at 02:06

## 2025-06-24 RX ADMIN — HYDROCODONE BITARTRATE AND ACETAMINOPHEN 1 TABLET: 5; 325 TABLET ORAL at 09:06

## 2025-06-24 RX ADMIN — PANTOPRAZOLE SODIUM 40 MG: 40 TABLET, DELAYED RELEASE ORAL at 09:06

## 2025-06-24 RX ADMIN — TIZANIDINE 1 MG: 4 TABLET ORAL at 06:06

## 2025-06-24 RX ADMIN — POTASSIUM BICARBONATE 50 MEQ: 977.5 TABLET, EFFERVESCENT ORAL at 09:06

## 2025-06-24 RX ADMIN — ENOXAPARIN SODIUM 40 MG: 40 INJECTION SUBCUTANEOUS at 04:06

## 2025-06-24 RX ADMIN — METOPROLOL SUCCINATE 50 MG: 50 TABLET, EXTENDED RELEASE ORAL at 09:06

## 2025-06-24 RX ADMIN — PANTOPRAZOLE SODIUM 40 MG: 40 TABLET, DELAYED RELEASE ORAL at 08:06

## 2025-06-24 NOTE — ASSESSMENT & PLAN NOTE
Patient has sleep apnea but can not tolerate CPAP mask.  Per daughter desaturates at night    Continue supplemental oxygen

## 2025-06-24 NOTE — CONSULTS
Addended by: TOMMIE BENTLEY on: 9/21/2024 12:29 PM     Modules accepted: Orders     [facility]  General Surgery  Consult Note    Inpatient consult to General Surgery  Consult performed by: Felix Buenrostro III, MD  Consult ordered by: Taran Bearden MD        Subjective:     Chief Complaint/Reason for Admission: post operative pancreatitis     History of Present Illness: 74 year old female s/p laparoscopic cholecystectomy 6/18 for biliary dyskinesia. She has had several month history of daily nausea. Her HIDA showed a below normal EF at 10 percent. Procedure was straight forward. No immediate complication appreciated. Since surgery she has continued to have significant nausea - no improvement. Her daughter thought she may be looking jaundice yesterday and she was brought to the ED. She was found to have little elevated tbili to 1.5. Lipase 91. CT showed inflammation around the pancrease consistent with acute pancreatitis. MRCP negative for fluid collection, negative for biliary dilatation. No obvious fluid collection. She was admitted. She is afebrile and hemodynamically stable.     No current facility-administered medications on file prior to encounter.     Current Outpatient Medications on File Prior to Encounter   Medication Sig    acetaminophen (TYLENOL) 650 MG TbSR Take 650 mg by mouth every 8 (eight) hours.    azelastine (ASTELIN) 137 mcg (0.1 %) nasal spray 1 spray (137 mcg total) by Nasal route nightly. Point up and slightly outward toward ear when spraying to avoid irritating nasal septum. With flonase.    cetirizine (ZYRTEC) 10 MG tablet Take 10 mg by mouth every evening.    fluticasone propionate (FLONASE) 50 mcg/actuation nasal spray 1 spray by Each Nostril route every evening.    HYDROcodone-acetaminophen (NORCO) 5-325 mg per tablet Take 1 tablet by mouth every 6 (six) hours as needed for Pain.    levothyroxine (SYNTHROID) 112 MCG tablet Take 1 tablet (112 mcg total) by mouth before breakfast.    mag carb/aluminum hydrox/algin (GAVISCON EXTRA STRENGTH ORAL) Take 1  tablet by mouth 3 (three) times daily with meals. Pt takes 30min after meals takes tablet form    magnesium oxide (MAG-OX) 400 mg (241.3 mg magnesium) tablet Take 1 tablet by mouth once daily.    metoclopramide HCl (REGLAN) 5 MG tablet Take 2 tablets (10 mg total) by mouth 4 (four) times daily before meals and nightly.    metoprolol succinate (TOPROL-XL) 100 MG 24 hr tablet TAKE 1 TABLET(100 MG) BY MOUTH DAILY (Patient taking differently: Take 100 mg by mouth once daily.)    multivitamin (THERAGRAN) tablet Take 1 tablet by mouth once daily.    ondansetron (ZOFRAN-ODT) 8 MG TbDL Take 1 tablet (8 mg total) by mouth every 8 (eight) hours as needed (nausea and vomiting).    pantoprazole (PROTONIX) 40 MG tablet Take 1 tablet (40 mg total) by mouth 2 (two) times daily.    promethazine (PHENERGAN) 25 MG suppository Place 1 suppository (25 mg total) rectally every 6 (six) hours as needed for Nausea.    riboflavin, vitamin B2, 400 mg Tab Take 400 mg by mouth once daily.    rizatriptan (MAXALT) 10 MG tablet Take 1 tablet (10 mg total) by mouth daily as needed for Migraine.    scopolamine (TRANSDERM-SCOP) 1.3-1.5 mg (1 mg over 3 days) Place 1 patch onto the skin Every 3 (three) days.    sucralfate (CARAFATE) 100 mg/mL suspension Take 10 mLs (1 g total) by mouth 4 (four) times daily.    tiZANidine (ZANAFLEX) 4 MG tablet Take 1 tablet (4 mg total) by mouth 2 (two) times daily as needed (headache).    thiamine 100 MG tablet Take 100 mg by mouth once daily.    [DISCONTINUED] magnesium oxide 400 mg magnesium Tab Take 400 mg by mouth once daily.    [DISCONTINUED] meclizine (ANTIVERT) 25 mg tablet Take 0.5 tablets (12.5 mg total) by mouth 3 (three) times daily as needed for Dizziness.       Review of patient's allergies indicates:  No Known Allergies    Past Medical History:   Diagnosis Date    Dementia without behavioral disturbance     Depression     GERD (gastroesophageal reflux disease)     Hashimoto's disease     Hyperlipidemia   "   Hypertension     Hypothyroid     Sleep apnea, unspecified 2015     Past Surgical History:   Procedure Laterality Date    APPENDECTOMY      COLONOSCOPY  2017    Briana, repeat in 10    ESOPHAGOGASTRODUODENOSCOPY N/A 04/24/2025    Procedure: EGD (ESOPHAGOGASTRODUODENOSCOPY);  Surgeon: Maxim Garzon MD;  Location: [facility] ENDO;  Service: Endoscopy;  Laterality: N/A;    LAPAROSCOPIC CHOLECYSTECTOMY N/A 6/18/2025    Procedure: CHOLECYSTECTOMY, LAPAROSCOPIC;  Surgeon: Felix Buenrostro III, MD;  Location: [facility] OR;  Service: General;  Laterality: N/A;    NECK SURGERY      "burning of the nerves to help with her migraines"    TONSILLECTOMY      TUBAL LIGATION       Family History       Problem Relation (Age of Onset)    Breast cancer Daughter    Cancer Father, Sister          Tobacco Use    Smoking status: Never    Smokeless tobacco: Never   Substance and Sexual Activity    Alcohol use: Not Currently    Drug use: No    Sexual activity: Yes     Review of Systems  Objective:     Vital Signs (Most Recent):  Temp: 97.9 °F (36.6 °C) (06/23/25 1925)  Pulse: 105 (06/23/25 1925)  Resp: 18 (06/23/25 1925)  BP: 127/76 (06/23/25 1925)  SpO2: 95 % (06/23/25 1925) Vital Signs (24h Range):  Temp:  [97.7 °F (36.5 °C)-98.3 °F (36.8 °C)] 97.9 °F (36.6 °C)  Pulse:  [] 105  Resp:  [18-26] 18  SpO2:  [92 %-97 %] 95 %  BP: (105-193)/(69-89) 127/76     Weight: 102.8 kg (226 lb 10.1 oz)  Body mass index is 34.46 kg/m².      Intake/Output Summary (Last 24 hours) at 6/23/2025 2130  Last data filed at 6/23/2025 1758  Gross per 24 hour   Intake 2887.13 ml   Output --   Net 2887.13 ml       Physical Exam  Constitutional:       General: She is not in acute distress.     Appearance: She is obese. She is not toxic-appearing.   Pulmonary:      Effort: No tachypnea or bradypnea.   Abdominal:      General: There is no distension.      Palpations: Abdomen is soft.      Tenderness: There is abdominal tenderness in the epigastric area.      " Comments: Diffuse tenderness. Will guard to deep palpation in the epigastrium.   Incisions OK.   No significant distention    Skin:     Coloration: Skin is not jaundiced.      Comments: No obvious jaundice    Neurological:      Mental Status: She is alert and oriented to person, place, and time.   Psychiatric:         Behavior: Behavior is cooperative.         Significant Labs:  CBC:   Recent Labs   Lab 06/23/25  0012   WBC 7.37   RBC 4.13   HGB 12.3   HCT 37.1      MCV 90   MCH 29.8   MCHC 33.2     CMP:   Recent Labs   Lab 06/23/25  0012   *   CALCIUM 9.0   ALBUMIN 3.4*   PROT 6.4      K 3.1*   CO2 23   CL 99   BUN 6*   CREATININE 0.7   ALKPHOS 100   ALT 23   AST 31   BILITOT 1.5*       Significant Diagnostics:  I have reviewed all pertinent imaging results/findings within the past 24 hours.    Assessment/Plan:     Active Diagnoses:    Diagnosis Date Noted POA    PRINCIPAL PROBLEM:  Acute pancreatitis [K85.90] 06/23/2025 Yes    Status post cholecystectomy [Z90.49] 06/23/2025 Not Applicable    Moderate vascular dementia with other behavioral disturbance [F01.B18] 05/26/2025 Yes    Migraine [G43.909] 04/29/2025 Yes    Autoimmune thyroiditis [E06.3] 06/17/2020 Yes    Sleep apnea [G47.30] 07/24/2013 Yes    HTN (hypertension) [I10] 03/16/2012 Yes      Problems Resolved During this Admission:   Evidence of pancreatitis post op. No obvious evidence of CBD filling defects, no obvious evidence of bile leak. Will trend LFT's. OK for light diet from surgery standpoint.     -nausea has continued post op. Very possible the biliary dyskinesia was not contributing to the chronic nausea - True cause something else like gastroparesis? or the pancreatitis could be prolonging it? Will discuss with Gi about any further workup.     Thank you for your consult.     Felix Buenrostro III, MD  General Surgery  [facility]

## 2025-06-24 NOTE — ASSESSMENT & PLAN NOTE
Patient has elevated lipase, bilirubin and persistent vomiting after cholecystectomy   MRCP negative   Improved with symptomatic Rx  Advance diet as tolerated

## 2025-06-24 NOTE — PLAN OF CARE
Goals to be met by: 25     Patient will increase functional independence with mobility by performin. Supine to sit with Stand by Assistance  2. Sit to stand transfer with CGA  3. Bed to chair transfer with CGA HHA  4. Gait  x 150 feet with CGA HHA.

## 2025-06-24 NOTE — PLAN OF CARE
Patient accepted by [facility] home health via [facility] with start of care 06/26/2025.       06/24/25 1323   Post-Acute Status   Post-Acute Authorization Home Health   Home Health Status Set-up Complete/Auth obtained

## 2025-06-24 NOTE — CARE UPDATE
06/24/25 0653   PRE-TX-O2   Device (Oxygen Therapy) room air   $ Is the patient on Low Flow Oxygen? Yes   SpO2 97 %   Pulse Oximetry Type Intermittent   $ Pulse Oximetry - Multiple Charge Pulse Oximetry - Multiple   Pulse 90   Resp 18   Education   $ Education 15 min;Oxygen

## 2025-06-24 NOTE — ASSESSMENT & PLAN NOTE
Patient presents with persistent nausea/vomiting after cholecystectomy.  Now has elevated bilirubin to 1.5 and lipase (all previous values were normal), above is down trended. Cont routine post op care and outpatient follow up.

## 2025-06-24 NOTE — HOSPITAL COURSE
Patient is a 74 year old female with a history dementia, depression, hypertension, hypothyroidism, persistent nausea and vomiting presented with complaints of nausea and vomiting, poor appetite.  Had recent cholecystectomy on 06/18 for biliary dyskinesia.  Patient has ongoing nausea and vomiting with unclear etiology.  Daughter thought patient had some jaundice so patient was brought to emergency room.  Bili 1.5.  General surgery consulted.  CT abdomen and pelvis showed findings concerning of acute pancreatitis.  MRCP unremarkable.  Patient received IVF. She was treated conservatively for possible pancreatitis, post op and symptomatically improved. Tolerating liquid diet. Advanced. Seen and examined today and doing well. Cleared for discharge.

## 2025-06-24 NOTE — PT/OT/SLP EVAL
Occupational Therapy   Evaluation    Name: Skyler Espinoza  MRN: [id number]  Admitting Diagnosis: Acute pancreatitis  Recent Surgery: * No surgery found *      Recommendations:     Discharge Recommendations: Low Intensity Therapy with 24/7 supervision  Discharge Equipment Recommendations:  none  Barriers to discharge:  None    Assessment:     Skyler Espinoza is a 74 y.o. female with a medical diagnosis of Acute pancreatitis.  She presents agreeable for OT eval this AM with daughter Alliosn at bedside. Performance deficits affecting function: weakness, impaired endurance, impaired self care skills, impaired cognition, decreased safety awareness, impaired cardiopulmonary response to activity.      Rehab Prognosis: Good; patient would benefit from acute skilled OT services to address these deficits and reach maximum level of function.       Plan:     Patient to be seen 3 x/week to address the above listed problems via self-care/home management, therapeutic activities, therapeutic exercises  Plan of Care Expires: 07/24/25  Plan of Care Reviewed with: patient, daughter    Subjective     Chief Complaint: SOB low O2 w/out O2  Patient/Family Comments/goals: get HH    Occupational Profile:  Living Environment: pt lives with daughter, Allison, son and DIL in a [facility].   Previous level of function: pt requires A for all ADLs dt impaired cognition  Roles and Routines: mother   Equipment Used at Home: rollator, bedside commode, CPAP  Assistance upon Discharge: family    Pain/Comfort:   None reported    Patients cultural, spiritual, Voodoo conflicts given the current situation: no    Objective:     Communicated with: nurse prior to session.  Patient found supine with telemetry, peripheral IV, oxygen upon OT entry to room.    General Precautions: Standard, fall  Orthopedic Precautions: N/A  Braces: N/A  Respiratory Status: Nasal cannula, flow 2 L/min. Pt SOB after OOB activity, but recovered fast after 1 min in supine 97%    Occupational  Performance:    Bed Mobility:    Patient completed Rolling/Turning to Right with contact guard assistance  Patient completed Scooting/Bridging with contact guard assistance  Patient completed Supine to Sit with contact guard assistance  Patient completed Sit to Supine with contact guard assistance    Functional Mobility/Transfers:  Patient completed Sit <> Stand Transfer with contact guard assistance and raised bed  with  rolling walker   Functional Mobility: pt ambulated to sink and upon entering bathroom left RW aside requiring verbal cues to use RW at all time    Activities of Daily Living:  Grooming: contact guard assistance standing at sink  Lower Body Dressing: minimum assistance sitting EOB  Toileting: minimum assistance to check roxy hygiene    Cognitive/Visual Perceptual:  Cognitive/Psychosocial Skills:     -       Oriented to: Person and Place   -       Follows Commands/attention:Follows two-step commands  -       Communication: clear/fluent  -       Safety awareness/insight to disability: impaired   -       Mood/Affect/Coping skills/emotional control: Appropriate to situation, Cooperative, and Pleasant    Physical Exam:  Balance:    -       good unsupported sitting balance and standing balance  Upper Extremity Range of Motion:     -       Right Upper Extremity: WFL  -       Left Upper Extremity: WFL  Upper Extremity Strength:    -       Right Upper Extremity: WFL  -       Left Upper Extremity: WFL   Strength:    -       Right Upper Extremity: WFL  -       Left Upper Extremity: WFL  Gross motor coordination:   WFL    AMPAC 6 Click ADL:  AMPAC Total Score: 18    Treatment & Education:  Pt educated on role of OT/POC, importance of OOB/EOB activity, use of call bell, and safety during ADLs, transfers, and functional mobility.     Patient left supine with all lines intact, call button in reach, bed alarm on, and daughter present    GOALS:   Multidisciplinary Problems       Occupational Therapy Goals        "   Problem: Occupational Therapy    Goal Priority Disciplines Outcome Interventions   Occupational Therapy Goal     OT, PT/OT     Description: Goals to be met by: 7/24/2025     Patient will increase functional independence with ADLs by performing:    UE Dressing with Supervision.  LE Dressing with Supervision.  Grooming while standing at sink with Supervision.  Toileting from toilet with Supervision for hygiene and clothing management.                          DME Justifications:  No DME recommended requiring DME justifications    History:     Past Medical History:   Diagnosis Date    Dementia without behavioral disturbance     Depression     GERD (gastroesophageal reflux disease)     Hashimoto's disease     Hyperlipidemia     Hypertension     Hypothyroid     Sleep apnea, unspecified 2015         Past Surgical History:   Procedure Laterality Date    APPENDECTOMY      COLONOSCOPY  2017    Briana, repeat in 10    ESOPHAGOGASTRODUODENOSCOPY N/A 04/24/2025    Procedure: EGD (ESOPHAGOGASTRODUODENOSCOPY);  Surgeon: Maxim Garzon MD;  Location: [facility];  Service: Endoscopy;  Laterality: N/A;    LAPAROSCOPIC CHOLECYSTECTOMY N/A 6/18/2025    Procedure: CHOLECYSTECTOMY, LAPAROSCOPIC;  Surgeon: Felix Buenrostro III, MD;  Location: [facility] OR;  Service: General;  Laterality: N/A;    NECK SURGERY      "burning of the nerves to help with her migraines"    TONSILLECTOMY      TUBAL LIGATION         Time Tracking:     OT Date of Treatment: 06/24/25  OT Start Time: 0958  OT Stop Time: 1022  OT Total Time (min): 24 min    Billable Minutes:Evaluation 10  Self Care/Home Management 14    6/24/2025  "

## 2025-06-24 NOTE — PLAN OF CARE
CALLY sent patient information to [facility] home health via [employer].       06/24/25 1013   Post-Acute Status   Post-Acute Authorization Home [facility]   Home Health Status Referrals Sent

## 2025-06-24 NOTE — PT/OT/SLP EVAL
Physical Therapy Evaluation    Patient Name:  Skyler Espinoza   MRN:  [id number]    Recommendations:     Discharge Recommendations: Low Intensity Therapy   Discharge Equipment Recommendations:  (transfer tub bench (advised pt's daughter this equipment is typically not covered by insurance and advised ordering from [employer]).)   Barriers to discharge: medical needs    Assessment:     Skyler Espinoza is a 74 y.o. female admitted with a medical diagnosis of Acute pancreatitis.  She presents with the following impairments/functional limitations: impaired endurance, impaired self care skills, impaired functional mobility, gait instability, impaired balance, decreased safety awareness, impaired cognition, edema.  Pt found HOB elevated with daughter present and pt agreeable to working with PT. Pt A & O to self and place and has the following co-morbidities: Vascular Dementia, HTN, s/p Cholecystectomy.  Pt tolerated session well and required only minimum to CGA for safe mobilization during session today which is pt's baseline for mobility. Pt would benefit from acute PT during hospitalization to increase strength, endurance and safety with mobility and would benefit from returning home with daughter to care for her and Low Intensity Therapy upon discharge.      Rehab Prognosis: Good and Fair; patient would benefit from acute skilled PT services to address these deficits and reach maximum level of function.    Recent Surgery: * No surgery found *      Plan:     During this hospitalization, patient to be seen 5 x/week to address the identified rehab impairments via gait training, therapeutic activities, therapeutic exercises and progress toward the following goals:    Plan of Care Expires:  07/22/25    Subjective     Chief Complaint: pt denied pain  Patient/Family Comments/goals: Return to prior level of functional mobility.    Pain/Comfort:  Pain Rating 1:  (pt denied pain at surgical site)  Location 1: abdomen    Patients  cultural, spiritual, Bahai conflicts given the current situation:      Living Environment:  Pt lives with her daughter in a [facility] with 5 ADRIEL/single rail.  Prior to admission, patients level of function was 1 person assist/HHA or use of rollator for mobility and ADLs.  Equipment used at home: rollator, bedside commode, CPAP.  DME owned (not currently used): none.  Upon discharge, patient will have assistance from her daughter.    Objective:     Communicated with JJ Stovall prior to session.  Patient found HOB elevated with bed alarm, peripheral IV, telemetry  upon PT entry to room.    General Precautions: Standard, fall  Orthopedic Precautions:N/A   Braces: N/A  Respiratory Status: Room air    Exams:  Cognitive Exam:  Patient is oriented to Person and Place  RLE ROM: WFL  RLE Strength: NT, but adequate for functional gait and transfers with single person assist  LLE ROM: WFL  LLE Strength: NT, but adequate for functional gait and transfers with single person assist    Functional Mobility:  Bed Mobility:     Scooting: contact guard assistance, minimum assistance, and in sitting  Supine to Sit: minimum assistance  Sit to Supine: contact guard assistance and minimum assistance  Transfers:     Sit to Stand:  contact guard assistance with hand-held assist  Gait: x 75' with HHA/CGA for safety      AM-PAC 6 CLICK MOBILITY  Total Score:19       Treatment & Education:  Pt was educated on the following: call light use, importance of OOB activity and functional mobility to negate the negative effects of prolonged bed rest during this hospitalization, safe transfers/ambulation and discharge planning recommendations including discussion of TTB when daughter requested.      Patient left HOB elevated with all lines intact, call button in reach, bed alarm on, RN notified, and pt's daughter present.    GOALS:   Multidisciplinary Problems       Physical Therapy Goals          Problem: Physical Therapy    Goal Priority Disciplines  "Outcome Interventions   Physical Therapy Goal     PT, PT/OT     Description: Goals to be met by: 25     Patient will increase functional independence with mobility by performin. Supine to sit with Stand by Assistance  2. Sit to stand transfer with CGA  3. Bed to chair transfer with CGA HHA  4. Gait  x 150 feet with CGA HHA.                             DME Justifications:  No DME recommended requiring DME justifications    History:     Past Medical History:   Diagnosis Date    Dementia without behavioral disturbance     Depression     GERD (gastroesophageal reflux disease)     Hashimoto's disease     Hyperlipidemia     Hypertension     Hypothyroid     Sleep apnea, unspecified        Past Surgical History:   Procedure Laterality Date    APPENDECTOMY      COLONOSCOPY  2017    Briana, repeat in 10    ESOPHAGOGASTRODUODENOSCOPY N/A 2025    Procedure: EGD (ESOPHAGOGASTRODUODENOSCOPY);  Surgeon: Maxim Garzon MD;  Location: [facility];  Service: Endoscopy;  Laterality: N/A;    LAPAROSCOPIC CHOLECYSTECTOMY N/A 2025    Procedure: CHOLECYSTECTOMY, LAPAROSCOPIC;  Surgeon: Felix Buenrostro III, MD;  Location: [facility] OR;  Service: General;  Laterality: N/A;    NECK SURGERY      "burning of the nerves to help with her migraines"    TONSILLECTOMY      TUBAL LIGATION         Time Tracking:     PT Received On: 25  PT Start Time: 1045     PT Stop Time: 1103  PT Total Time (min): 18 min     Billable Minutes: Evaluation 8 and Therapeutic Activity 10      2025  "

## 2025-06-24 NOTE — CARE UPDATE
06/23/25 2200   Patient Assessment/Suction   Level of Consciousness (AVPU) alert   Respiratory Effort Normal;Unlabored   Expansion/Accessory Muscles/Retractions no use of accessory muscles   All Lung Fields Breath Sounds equal bilaterally;clear   Rhythm/Pattern, Respiratory unlabored   Cough Frequency no cough   PRE-TX-O2   Device (Oxygen Therapy) nasal cannula   $ Is the patient on Low Flow Oxygen? Yes   Flow (L/min) (Oxygen Therapy) 2   SpO2 97 %   Pulse Oximetry Type Intermittent   $ Pulse Oximetry - Multiple Charge Pulse Oximetry - Multiple   Education   $ Education Oxygen;15 min   Tobacco Cessation Intervention   Do you use any type of tobacco product? No   $ Smoking Cessation Charges Smoking Cessation - Intermediate (Non-CTTS)   Respiratory Evaluation   $ Care Plan Tech Time 15 min   $ Respiratory Evaluation Complete   Evaluation For New Orders   Admitting Diagnosis Pancreatitis   Cardiac Diagnosis na   Pulmonary Diagnosis na   Current Surgeries na   Home Oxygen   Has Home Oxygen? No   Home Aerosol, MDI, DPI, and Other Treatments/Therapies   Home Respiratory Therapy Per Patient/Review of Chart Yes   Other Home Respiratory Therapies CPAP  (Pt currently not using)   Oxygen Care Plan   Oxygen Care Plan Per Protocol   SPO2 Goal (%) 92% non-cardiac   Rationale SpO2 is <92% (Non-cardiac Pt.)   Bronchodilator Care Plan   Rationale No Rationale found   Atelectasis Care Plan   Rationale No Rational Found   Airway Clearance Care Plan   Rationale No rationale found

## 2025-06-24 NOTE — DISCHARGE SUMMARY
[facility]  Discharge Summary      Patient Name: Skyler Espinoza  MRN: [id number]  [facility]: [id number]  Patient Class: OP- Observation  Admission Date: 6/22/2025  Hospital Length of Stay: 0 days  Discharge Date and Time: 06/24/2025   Attending Physician: Tina Melchor MD   Discharging Provider: Tina Melchor MD  Primary Care Provider: Oscar Lee FNP-C    Primary Care Team: Networked reference to record PCT     HPI:   74 year old female with comorbid conditions of vascular dementia, depression, GERD, hypertension, hypothyroidism, Hashiomoto's disease presents due to persistent nausea and vomiting after recent cholecystectomy. Patient had laparoscopic cholecystectomy on Wednesday (6/18) with Dr. Buenrostro for biliary dyskinesia.  Patient reports generalized fatigue, pruritus.  Vomiting has been nonbloody, nonbilious.  Per daughter patient's skin appeared to be yellow.  Family called EMS at one point due to blood pressure in 80s for which 1 liter of fluids were administered with improvement.  Denies fevers, chills, significant abdominal pain, diarrhea, chest pain, SOB.   In ED labs revealed bilirubin of 1.5, lipase of 91, normal WBC count and negative UA.  CT abdomen revealed operative changes of cholecystectomy, inflammatory changes in gallbladder fossa, new peripancreatic fat stranding and periportal region adjacent to duodenum fat stranding.  No overt biliary duct dilation.  Was given morphine, zofran, potassium, promethazine and IV fluids in ED.      * No surgery found *      Hospital Course:   Patient is a 74 year old female with a history dementia, depression, hypertension, hypothyroidism, persistent nausea and vomiting presented with complaints of nausea and vomiting, poor appetite.  Had recent cholecystectomy on 06/18 for biliary dyskinesia.  Patient has ongoing nausea and vomiting with unclear etiology.  Daughter thought patient had some jaundice so  patient was brought to emergency room.  Bili 1.5.  General surgery consulted.  CT abdomen and pelvis showed findings concerning of acute pancreatitis.  MRCP unremarkable.  Patient received IVF. She was treated conservatively for possible pancreatitis, post op and symptomatically improved. Tolerating liquid diet. Advanced. Seen and examined today and doing well. Cleared for discharge.       Goals of Care Treatment Preferences:  Code Status: Full Code         Consults:   Consults (From admission, onward)          Status Ordering Provider     Inpatient consult to General Surgery  Once        Provider:  Fredy Rhodes MD    Completed ZEN NEW            Assessment & Plan  Acute pancreatitis  Patient has elevated lipase, bilirubin and persistent vomiting after cholecystectomy   MRCP negative   Improved with symptomatic Rx  Advance diet as tolerated     Status post cholecystectomy  Patient presents with persistent nausea/vomiting after cholecystectomy.  Now has elevated bilirubin to 1.5 and lipase (all previous values were normal), above is down trended. Cont routine post op care and outpatient follow up.     HTN (hypertension)  Patient's blood pressure range in the last 24 hours was: BP  Min: 105/70  Max: 163/74.The patient's inpatient anti-hypertensive regimen is listed below:  Current Antihypertensives  metoprolol succinate (TOPROL-XL) 24 hr tablet 100 mg, Daily, Oral    Plan  - BP is controlled, no changes needed to their regimen  Sleep apnea  Patient has sleep apnea but can not tolerate CPAP mask.  Per daughter desaturates at night    Continue supplemental oxygen     Autoimmune thyroiditis  Continue synthroid     Migraine  PRN tizanidine     Moderate vascular dementia with other behavioral disturbance  Resume home medications and nonpharmacologic precautions   Final Active Diagnoses:    Diagnosis Date Noted POA    PRINCIPAL PROBLEM:  Acute pancreatitis [K85.90] 06/23/2025 Yes    Status post cholecystectomy  [Z90.49] 06/23/2025 Not Applicable    Moderate vascular dementia with other behavioral disturbance [F01.B18] 05/26/2025 Yes    Migraine [G43.909] 04/29/2025 Yes    Autoimmune thyroiditis [E06.3] 06/17/2020 Yes    Sleep apnea [G47.30] 07/24/2013 Yes    HTN (hypertension) [I10] 03/16/2012 Yes      Problems Resolved During this Admission:       Discharged Condition: good    Disposition:     Follow Up:   Follow-up Information       Oscar Lee FNP-C Follow up.    Specialty: Family Medicine  Contact information:  [address]  [address]  [place] [address]  [phone]               Felix Buenrostro III, MD Follow up on 7/3/2025.    Specialties: General Surgery, Surgery  Why: 10:00 am    Follow up from Lisa Vaughn  Contact information:  [address]  [address]  [place] [address]  [phone]                           Patient Instructions:   No discharge procedures on file.    Significant Diagnostic Studies: Labs: CMP   Recent Labs   Lab 06/23/25  0012 06/24/25  0536    139   K 3.1* 3.5   CL 99 106   CO2 23 28   * 96   BUN 6* 3*   CREATININE 0.7 0.6   CALCIUM 9.0 8.6*   PROT 6.4 5.4*   ALBUMIN 3.4* 2.8*   BILITOT 1.5* 1.0   ALKPHOS 100 79   AST 31 16   ALT 23 16   ANIONGAP 18* 5*    and CBC   Recent Labs   Lab 06/23/25  0012 06/24/25  0536   WBC 7.37 6.27   HGB 12.3 10.7*   HCT 37.1 33.3*    286       Pending Diagnostic Studies:       Procedure Component Value Units Date/Time    EXTRA TUBES [[id number]] Collected: 06/23/25 0012    Order Status: Sent Lab Status: In process Updated: 06/23/25 0019    Specimen: Blood, Venous     Narrative:      The following orders were created for panel order EXTRA TUBES.  Procedure                               Abnormality         Status                     ---------                               -----------         ------                     Light Blue Top Hold[[id number]]                             In process                 Lavender Top  Hold[[id number]]                               In process                 Gold Top Hold[[id number]]                                   In process                 Gold Top Hold[[id number]]                                   In process                   Please view results for these tests on the individual orders.           Medications:  Reconciled Home Medications:      Medication List        CHANGE how you take these medications      metoprolol succinate 100 MG 24 hr tablet  Commonly known as: TOPROL-XL  TAKE 1 TABLET(100 MG) BY MOUTH DAILY  What changed: See the new instructions.            CONTINUE taking these medications      acetaminophen 650 MG Tbsr  Commonly known as: TYLENOL  Take 650 mg by mouth every 8 (eight) hours.     azelastine 137 mcg (0.1 %) nasal spray  Commonly known as: ASTELIN  1 spray (137 mcg total) by Nasal route nightly. Point up and slightly outward toward ear when spraying to avoid irritating nasal septum. With flonase.     cetirizine 10 MG tablet  Commonly known as: ZYRTEC  Take 10 mg by mouth every evening.     fluticasone propionate 50 mcg/actuation nasal spray  Commonly known as: FLONASE  1 spray by Each Nostril route every evening.     GAVISCON EXTRA STRENGTH ORAL  Take 1 tablet by mouth 3 (three) times daily with meals. Pt takes 30min after meals takes tablet form     HYDROcodone-acetaminophen 5-325 mg per tablet  Commonly known as: NORCO  Take 1 tablet by mouth every 6 (six) hours as needed for Pain.     levothyroxine 112 MCG tablet  Commonly known as: SYNTHROID  Take 1 tablet (112 mcg total) by mouth before breakfast.     magnesium oxide 400 mg (241.3 mg magnesium) tablet  Commonly known as: MAG-OX  Take 1 tablet by mouth once daily.     metoclopramide HCl 5 MG tablet  Commonly known as: REGLAN  Take 2 tablets (10 mg total) by mouth 4 (four) times daily before meals and nightly.     multivitamin tablet  Commonly known as: THERAGRAN  Take 1 tablet by mouth once daily.     ondansetron 8  MG Tbdl  Commonly known as: ZOFRAN-ODT  Take 1 tablet (8 mg total) by mouth every 8 (eight) hours as needed (nausea and vomiting).     pantoprazole 40 MG tablet  Commonly known as: PROTONIX  Take 1 tablet (40 mg total) by mouth 2 (two) times daily.     promethazine 25 MG suppository  Commonly known as: PHENERGAN  Place 1 suppository (25 mg total) rectally every 6 (six) hours as needed for Nausea.     riboflavin (vitamin B2) 400 mg Tab  Take 400 mg by mouth once daily.     rizatriptan 10 MG tablet  Commonly known as: MAXALT  Take 1 tablet (10 mg total) by mouth daily as needed for Migraine.     scopolamine 1.3-1.5 mg (1 mg over 3 days)  Commonly known as: TRANSDERM-SCOP  Place 1 patch onto the skin Every 3 (three) days.     sucralfate 100 mg/mL suspension  Commonly known as: CARAFATE  Take 10 mLs (1 g total) by mouth 4 (four) times daily.     thiamine 100 MG tablet  Take 100 mg by mouth once daily.     tiZANidine 4 MG tablet  Commonly known as: ZANAFLEX  Take 1 tablet (4 mg total) by mouth 2 (two) times daily as needed (headache).              Indwelling Lines/Drains at time of discharge:   Lines/Drains/Airways       None                       Time spent on the discharge of patient: 40 minutes         Tina Melchor MD  Department of Hospital Medicine  [facility]

## 2025-06-24 NOTE — ASSESSMENT & PLAN NOTE
Patient's blood pressure range in the last 24 hours was: BP  Min: 105/70  Max: 163/74.The patient's inpatient anti-hypertensive regimen is listed below:  Current Antihypertensives  metoprolol succinate (TOPROL-XL) 24 hr tablet 100 mg, Daily, Oral    Plan  - BP is controlled, no changes needed to their regimen

## 2025-06-25 VITALS
OXYGEN SATURATION: 98 % | HEIGHT: 68 IN | RESPIRATION RATE: 17 BRPM | WEIGHT: 226.63 LBS | TEMPERATURE: 98 F | DIASTOLIC BLOOD PRESSURE: 88 MMHG | BODY MASS INDEX: 34.35 KG/M2 | HEART RATE: 95 BPM | SYSTOLIC BLOOD PRESSURE: 156 MMHG

## 2025-06-25 LAB
ABSOLUTE EOSINOPHIL (SMH): 0.37 K/UL
ABSOLUTE MONOCYTE (SMH): 0.98 K/UL (ref 0.3–1)
ABSOLUTE NEUTROPHIL COUNT (SMH): 2.5 K/UL (ref 1.8–7.7)
BASOPHILS # BLD AUTO: 0.03 K/UL
BASOPHILS NFR BLD AUTO: 0.5 %
ERYTHROCYTE [DISTWIDTH] IN BLOOD BY AUTOMATED COUNT: 14 % (ref 11.5–14.5)
HCT VFR BLD AUTO: 31.3 % (ref 37–48.5)
HGB BLD-MCNC: 10.3 GM/DL (ref 12–16)
IMM GRANULOCYTES # BLD AUTO: 0.02 K/UL (ref 0–0.04)
IMM GRANULOCYTES NFR BLD AUTO: 0.4 % (ref 0–0.5)
LYMPHOCYTES # BLD AUTO: 1.66 K/UL (ref 1–4.8)
MAGNESIUM SERPL-MCNC: 1.9 MG/DL (ref 1.6–2.6)
MCH RBC QN AUTO: 29.2 PG (ref 27–31)
MCHC RBC AUTO-ENTMCNC: 32.9 G/DL (ref 32–36)
MCV RBC AUTO: 89 FL (ref 82–98)
NUCLEATED RBC (/100WBC) (SMH): 0 /100 WBC
PHOSPHATE SERPL-MCNC: 4.3 MG/DL (ref 2.7–4.5)
PLATELET # BLD AUTO: 281 K/UL (ref 150–450)
PMV BLD AUTO: 9.9 FL (ref 9.2–12.9)
RBC # BLD AUTO: 3.53 M/UL (ref 4–5.4)
RELATIVE EOSINOPHIL (SMH): 6.7 % (ref 0–8)
RELATIVE LYMPHOCYTE (SMH): 30 % (ref 18–48)
RELATIVE MONOCYTE (SMH): 17.7 % (ref 4–15)
RELATIVE NEUTROPHIL (SMH): 44.7 % (ref 38–73)
WBC # BLD AUTO: 5.53 K/UL (ref 3.9–12.7)

## 2025-06-25 PROCEDURE — 83735 ASSAY OF MAGNESIUM: CPT | Performed by: STUDENT IN AN ORGANIZED HEALTH CARE EDUCATION/TRAINING PROGRAM

## 2025-06-25 PROCEDURE — 63600175 PHARM REV CODE 636 W HCPCS: Performed by: STUDENT IN AN ORGANIZED HEALTH CARE EDUCATION/TRAINING PROGRAM

## 2025-06-25 PROCEDURE — 27000221 HC OXYGEN, UP TO 24 HOURS

## 2025-06-25 PROCEDURE — 36415 COLL VENOUS BLD VENIPUNCTURE: CPT | Performed by: STUDENT IN AN ORGANIZED HEALTH CARE EDUCATION/TRAINING PROGRAM

## 2025-06-25 PROCEDURE — 96376 TX/PRO/DX INJ SAME DRUG ADON: CPT

## 2025-06-25 PROCEDURE — 11000001 HC ACUTE MED/SURG PRIVATE ROOM

## 2025-06-25 PROCEDURE — 25000003 PHARM REV CODE 250: Performed by: STUDENT IN AN ORGANIZED HEALTH CARE EDUCATION/TRAINING PROGRAM

## 2025-06-25 PROCEDURE — 27000207 HC ISOLATION

## 2025-06-25 PROCEDURE — 99900035 HC TECH TIME PER 15 MIN (STAT)

## 2025-06-25 PROCEDURE — 94761 N-INVAS EAR/PLS OXIMETRY MLT: CPT

## 2025-06-25 PROCEDURE — 85025 COMPLETE CBC W/AUTO DIFF WBC: CPT | Performed by: STUDENT IN AN ORGANIZED HEALTH CARE EDUCATION/TRAINING PROGRAM

## 2025-06-25 PROCEDURE — 84100 ASSAY OF PHOSPHORUS: CPT | Performed by: STUDENT IN AN ORGANIZED HEALTH CARE EDUCATION/TRAINING PROGRAM

## 2025-06-25 PROCEDURE — 99900031 HC PATIENT EDUCATION (STAT)

## 2025-06-25 PROCEDURE — 25000003 PHARM REV CODE 250: Performed by: INTERNAL MEDICINE

## 2025-06-25 RX ORDER — ONDANSETRON 4 MG/1
4 TABLET, ORALLY DISINTEGRATING ORAL ONCE
Status: COMPLETED | OUTPATIENT
Start: 2025-06-25 | End: 2025-06-25

## 2025-06-25 RX ADMIN — PANTOPRAZOLE SODIUM 40 MG: 40 TABLET, DELAYED RELEASE ORAL at 08:06

## 2025-06-25 RX ADMIN — LEVOTHYROXINE SODIUM 112 MCG: 112 TABLET ORAL at 04:06

## 2025-06-25 RX ADMIN — METOPROLOL SUCCINATE 100 MG: 50 TABLET, EXTENDED RELEASE ORAL at 08:06

## 2025-06-25 RX ADMIN — ONDANSETRON 4 MG: 2 INJECTION INTRAMUSCULAR; INTRAVENOUS at 01:06

## 2025-06-25 RX ADMIN — ONDANSETRON 4 MG: 4 TABLET, ORALLY DISINTEGRATING ORAL at 09:06

## 2025-06-25 RX ADMIN — ACETAMINOPHEN 650 MG: 325 TABLET ORAL at 04:06

## 2025-06-25 RX ADMIN — MORPHINE SULFATE 2 MG: 2 INJECTION, SOLUTION INTRAMUSCULAR; INTRAVENOUS at 02:06

## 2025-06-25 NOTE — CARE UPDATE
06/25/25 0817   Patient Assessment/Suction   Level of Consciousness (AVPU) alert   Respiratory Effort Normal;Unlabored   Expansion/Accessory Muscles/Retractions no use of accessory muscles;no retractions   All Lung Fields Breath Sounds clear   Rhythm/Pattern, Respiratory unlabored   Cough Frequency no cough   Skin Integrity   $ Wound Care Tech Time 15 min   Area Observed Left;Right   Skin Appearance without discoloration   PRE-TX-O2   Device (Oxygen Therapy) nasal cannula with humidification   $ Is the patient on Low Flow Oxygen? Yes   Flow (L/min) (Oxygen Therapy) 2   SpO2 98 %   Pulse Oximetry Type Intermittent   $ Pulse Oximetry - Multiple Charge Pulse Oximetry - Multiple   Pulse 95   Resp 17   Education   $ Education 15 min;Oxygen

## 2025-06-25 NOTE — PLAN OF CARE
DC orders/medications reviewed.  PCP appointment scheduled and added to AVS.  Daughter to provide transportation home.  No further CM needs identified.  Clear to dc from CM standpoint.       06/25/25 0910   Final Note   Assessment Type Final Discharge Note   Anticipated Discharge Disposition Home-Health   What phone number can be called within the next 1-3 days to see how you are doing after discharge? [phone]   Hospital Resources/Appts/Education Provided Appointments scheduled and added to AVS   Post-Acute Status   Post-Acute Authorization Home Health   Home Health Status Set-up Complete/Auth obtained   Discharge Delays None known at this time

## 2025-06-25 NOTE — PT/OT/SLP PROGRESS
Physical Therapy      Patient Name:  Skyler Espinoza   MRN:  [id number]    Patient not seen today secondary to Other (Comment) (pt discharged this am prior to session.).

## 2025-06-25 NOTE — RESPIRATORY THERAPY
06/24/25 6473   Patient Assessment/Suction   Level of Consciousness (AVPU) alert   Respiratory Effort Normal;Unlabored   PRE-TX-O2   Device (Oxygen Therapy) room air   SpO2 95 %   Pulse Oximetry Type Intermittent   $ Pulse Oximetry - Multiple Charge Pulse Oximetry - Multiple   Pulse 95   Resp 16   Education   $ Education Oxygen;15 min   Respiratory Evaluation   $ Care Plan Tech Time 15 min

## 2025-06-25 NOTE — PLAN OF CARE
Patient was discharged yesterday but she stated didn't feel like she is tolerating food. Per flowsheets, she ate 50% of lunch and dinner. She is feeling better this morning and agreeable for discharge.

## 2025-06-25 NOTE — PLAN OF CARE
Inpatient Upgrade Note    Skyler Espinoza has warranted treatment spanning two or more midnights of hospital level care for the management of Pancreatitis. She continues to require daily labs, monitoring of vital signs, advancing diet, and IV antiemtics. Her condition is also complicated by the following comorbidities: Hypertension

## 2025-06-25 NOTE — PT/OT/SLP PROGRESS
Occupational Therapy      Patient Name:  Skyler Espinoza   MRN:  [id number]    Patient not seen today secondary to Other (Comment) (dc prior session).     6/25/2025

## 2025-06-26 ENCOUNTER — HOSPITAL ENCOUNTER (EMERGENCY)
Facility: HOSPITAL | Age: 75
Discharge: HOME | End: 2025-06-26
Attending: EMERGENCY MEDICINE
Payer: MEDICARE

## 2025-06-26 ENCOUNTER — PATIENT OUTREACH (OUTPATIENT)
Dept: ADMINISTRATIVE | Facility: CLINIC | Age: 75
End: 2025-06-26
Payer: MEDICARE

## 2025-06-26 ENCOUNTER — TELEPHONE (OUTPATIENT)
Dept: FAMILY MEDICINE | Facility: CLINIC | Age: 75
End: 2025-06-26
Payer: MEDICARE

## 2025-06-26 VITALS
WEIGHT: 210 LBS | TEMPERATURE: 98 F | RESPIRATION RATE: 20 BRPM | OXYGEN SATURATION: 93 % | BODY MASS INDEX: 31.93 KG/M2 | HEART RATE: 86 BPM | SYSTOLIC BLOOD PRESSURE: 162 MMHG | DIASTOLIC BLOOD PRESSURE: 70 MMHG

## 2025-06-26 DIAGNOSIS — R11.2 NAUSEA AND VOMITING, UNSPECIFIED VOMITING TYPE: Primary | ICD-10-CM

## 2025-06-26 DIAGNOSIS — N39.0 URINARY TRACT INFECTION WITHOUT HEMATURIA, SITE UNSPECIFIED: ICD-10-CM

## 2025-06-26 DIAGNOSIS — R11.2 NAUSEA AND VOMITING, UNSPECIFIED VOMITING TYPE: ICD-10-CM

## 2025-06-26 LAB
ABSOLUTE EOSINOPHIL (OHS): 0.32 K/UL
ABSOLUTE MONOCYTE (OHS): 0.78 K/UL (ref 0.3–1)
ABSOLUTE NEUTROPHIL COUNT (OHS): 2.71 K/UL (ref 1.8–7.7)
ALBUMIN SERPL BCP-MCNC: 3 G/DL (ref 3.5–5.2)
ALP SERPL-CCNC: 104 UNIT/L (ref 40–150)
ALT SERPL W/O P-5'-P-CCNC: 17 UNIT/L (ref 10–44)
ANION GAP (OHS): 9 MMOL/L (ref 8–16)
AST SERPL-CCNC: 16 UNIT/L (ref 11–45)
BASOPHILS # BLD AUTO: 0.04 K/UL
BASOPHILS NFR BLD AUTO: 0.7 %
BILIRUB SERPL-MCNC: 0.7 MG/DL (ref 0.1–1)
BILIRUB UR QL STRIP.AUTO: NEGATIVE
BUN SERPL-MCNC: 4 MG/DL (ref 6–30)
BUN SERPL-MCNC: 5 MG/DL (ref 8–23)
CALCIUM SERPL-MCNC: 9.4 MG/DL (ref 8.7–10.5)
CHLORIDE SERPL-SCNC: 101 MMOL/L (ref 95–110)
CHLORIDE SERPL-SCNC: 102 MMOL/L (ref 95–110)
CLARITY UR: CLEAR
CO2 SERPL-SCNC: 26 MMOL/L (ref 23–29)
COLOR UR AUTO: YELLOW
CREAT SERPL-MCNC: 0.7 MG/DL (ref 0.5–1.4)
CREAT SERPL-MCNC: 0.7 MG/DL (ref 0.5–1.4)
ERYTHROCYTE [DISTWIDTH] IN BLOOD BY AUTOMATED COUNT: 13.6 % (ref 11.5–14.5)
GFR SERPLBLD CREATININE-BSD FMLA CKD-EPI: >60 ML/MIN/1.73/M2
GLUCOSE SERPL-MCNC: 93 MG/DL (ref 70–110)
GLUCOSE SERPL-MCNC: 98 MG/DL (ref 70–110)
GLUCOSE UR QL STRIP: NEGATIVE
HCT VFR BLD AUTO: 37.3 % (ref 37–48.5)
HCT VFR BLD CALC: 36 %PCV (ref 36–54)
HCV AB SERPL QL IA: NORMAL
HGB BLD-MCNC: 12.5 GM/DL (ref 12–16)
HGB UR QL STRIP: NEGATIVE
HIV 1+2 AB+HIV1 P24 AG SERPL QL IA: NORMAL
HYALINE CASTS UR QL AUTO: 4 /LPF (ref 0–1)
IMM GRANULOCYTES # BLD AUTO: 0.05 K/UL (ref 0–0.04)
IMM GRANULOCYTES NFR BLD AUTO: 0.9 % (ref 0–0.5)
KETONES UR QL STRIP: NEGATIVE
LEUKOCYTE ESTERASE UR QL STRIP: ABNORMAL
LIPASE SERPL-CCNC: 102 U/L (ref 4–60)
LYMPHOCYTES # BLD AUTO: 1.89 K/UL (ref 1–4.8)
MAGNESIUM SERPL-MCNC: 2 MG/DL (ref 1.6–2.6)
MCH RBC QN AUTO: 29.8 PG (ref 27–31)
MCHC RBC AUTO-ENTMCNC: 33.5 G/DL (ref 32–36)
MCV RBC AUTO: 89 FL (ref 82–98)
MICROSCOPIC COMMENT: ABNORMAL
NITRITE UR QL STRIP: NEGATIVE
NON-SQ EPI CELLS #/AREA URNS AUTO: <1 /HPF
NUCLEATED RBC (/100WBC) (OHS): 0 /100 WBC
OHS QRS DURATION: 92 MS
OHS QTC CALCULATION: 440 MS
PH UR STRIP: 7 [PH]
PLATELET # BLD AUTO: 370 K/UL (ref 150–450)
PMV BLD AUTO: 9.8 FL (ref 9.2–12.9)
POC IONIZED CALCIUM: 1.19 MMOL/L (ref 1.06–1.42)
POC TCO2 (MEASURED): 26 MMOL/L (ref 23–29)
POTASSIUM BLD-SCNC: 3.7 MMOL/L (ref 3.5–5.1)
POTASSIUM SERPL-SCNC: 3.8 MMOL/L (ref 3.5–5.1)
PROT SERPL-MCNC: 6.6 GM/DL (ref 6–8.4)
PROT UR QL STRIP: NEGATIVE
RBC # BLD AUTO: 4.19 M/UL (ref 4–5.4)
RBC #/AREA URNS AUTO: 1 /HPF (ref 0–4)
RELATIVE EOSINOPHIL (OHS): 5.5 %
RELATIVE LYMPHOCYTE (OHS): 32.6 % (ref 18–48)
RELATIVE MONOCYTE (OHS): 13.5 % (ref 4–15)
RELATIVE NEUTROPHIL (OHS): 46.8 % (ref 38–73)
SAMPLE: ABNORMAL
SODIUM BLD-SCNC: 137 MMOL/L (ref 136–145)
SODIUM SERPL-SCNC: 137 MMOL/L (ref 136–145)
SP GR UR STRIP: 1.01
SQUAMOUS #/AREA URNS AUTO: 1 /HPF
TROPONIN I SERPL HS-MCNC: 3 NG/L
UROBILINOGEN UR STRIP-ACNC: NEGATIVE EU/DL
WBC # BLD AUTO: 5.79 K/UL (ref 3.9–12.7)
WBC #/AREA URNS AUTO: 15 /HPF (ref 0–5)

## 2025-06-26 PROCEDURE — 81001 URINALYSIS AUTO W/SCOPE: CPT | Performed by: STUDENT IN AN ORGANIZED HEALTH CARE EDUCATION/TRAINING PROGRAM

## 2025-06-26 PROCEDURE — 99284 EMERGENCY DEPT VISIT MOD MDM: CPT | Mod: 25

## 2025-06-26 PROCEDURE — 85025 COMPLETE CBC W/AUTO DIFF WBC: CPT | Performed by: STUDENT IN AN ORGANIZED HEALTH CARE EDUCATION/TRAINING PROGRAM

## 2025-06-26 PROCEDURE — 80047 BASIC METABLC PNL IONIZED CA: CPT

## 2025-06-26 PROCEDURE — 80053 COMPREHEN METABOLIC PANEL: CPT | Performed by: STUDENT IN AN ORGANIZED HEALTH CARE EDUCATION/TRAINING PROGRAM

## 2025-06-26 PROCEDURE — 86803 HEPATITIS C AB TEST: CPT | Performed by: PHYSICIAN ASSISTANT

## 2025-06-26 PROCEDURE — 82330 ASSAY OF CALCIUM: CPT | Mod: 59

## 2025-06-26 PROCEDURE — 87086 URINE CULTURE/COLONY COUNT: CPT | Performed by: STUDENT IN AN ORGANIZED HEALTH CARE EDUCATION/TRAINING PROGRAM

## 2025-06-26 PROCEDURE — 87389 HIV-1 AG W/HIV-1&-2 AB AG IA: CPT | Performed by: PHYSICIAN ASSISTANT

## 2025-06-26 PROCEDURE — 83735 ASSAY OF MAGNESIUM: CPT | Performed by: STUDENT IN AN ORGANIZED HEALTH CARE EDUCATION/TRAINING PROGRAM

## 2025-06-26 PROCEDURE — 63600175 PHARM REV CODE 636 W HCPCS: Performed by: EMERGENCY MEDICINE

## 2025-06-26 PROCEDURE — 83690 ASSAY OF LIPASE: CPT | Performed by: STUDENT IN AN ORGANIZED HEALTH CARE EDUCATION/TRAINING PROGRAM

## 2025-06-26 PROCEDURE — 96374 THER/PROPH/DIAG INJ IV PUSH: CPT

## 2025-06-26 PROCEDURE — 84484 ASSAY OF TROPONIN QUANT: CPT | Performed by: EMERGENCY MEDICINE

## 2025-06-26 RX ORDER — CEFTRIAXONE 1 G/1
1 INJECTION, POWDER, FOR SOLUTION INTRAMUSCULAR; INTRAVENOUS
Status: COMPLETED | OUTPATIENT
Start: 2025-06-26 | End: 2025-06-26

## 2025-06-26 RX ORDER — CEPHALEXIN 500 MG/1
500 CAPSULE ORAL EVERY 12 HOURS
Qty: 10 CAPSULE | Refills: 0 | Status: SHIPPED | OUTPATIENT
Start: 2025-06-26 | End: 2025-07-01

## 2025-06-26 RX ADMIN — CEFTRIAXONE 1 G: 1 INJECTION, POWDER, FOR SOLUTION INTRAMUSCULAR; INTRAVENOUS at 08:06

## 2025-06-26 NOTE — ED TRIAGE NOTES
Pt arrives with daughter after being discharged from [facility] after having her gallbladder taken out. Pt started throwing up and not feeling well again. Pt has hx of dementia, HTN, GERD.

## 2025-06-26 NOTE — TELEPHONE ENCOUNTER
----- Message from Nurse Dao sent at 6/26/2025 10:21 AM CDT -----  Call patient - needs post-hospital phone call within 2 business days and hospital follow up visit scheduled within 7-14 days.

## 2025-06-26 NOTE — TELEPHONE ENCOUNTER
Discharge Information     Discharge Date:   6/25/2025    Primary Discharge Diagnosis:  Acute pancreatitis, unspecified complication status, unspecified pancreatitis type       Discharge Summary:  Reviewed      Medication & Order Review     Were medication changes made or new medications added?   No    If so, has the patient filled the prescriptions?  No     Was Home Health ordered? No    If so, has Home Health contacted patient and/or initiated services?  N/A    Name of Home Health Agency? Same HH pt is getting treatment from currently     Durable Medical Equipment ordered?  No     If so, has the DME provider contacted patient and delivered equipment?  N/A    Follow Up               Any problems since discharge? Yes    How is the patient feeling since returning home?  At d/c she was still actively vomiting, she has desaturation when she is sleeping.     Have you set up recommended follow up appointments?  (PT, OT, HFU with Oscar Lee.         Notes:  N/A            Charlee Stock

## 2025-06-26 NOTE — ED PROVIDER NOTES
Encounter Date: 06/26/2025       Chief Complaint   Nausea (Recent admission and discharge from [place] for acute pancreatitis ) and Vomiting (Hx pancreatitis and vascular dementia )      History Of Present Illness     Skyler Espinoza is a 74 y.o. female   W/ a PMHx significant for Alzheimer's, vascular dementia, GERD, HTN, hypothyroidism, biliary dyskinesia s/p laparoscopic cholecystectomy (6/18), and recent hospitalization for acute pancreatitis (6/22 - 06/24); during this hospitalization she underwent MRCP which was negative and symptoms including N/V, abdominal pain had largely improved prior to discharge.    Patient presents to the ED today accompanied by her daughter for evaluation of additional episodes of bilious vomiting.  She was treated w/ Zofran, Phenergan  this morning w/ improvement in her symptoms.  She has still had difficulty tolerating p.o. intake w/o nausea. Patient/daughter deny fevers, lightheadedness/dizziness, syncope,  HA, CP, abdominal pain, dysuria/hematuria, lower extremity edema/erythema, changes in bowel/bladder habits. Importantly, patient has been noted to have been having issues w/ nausea and vomiting for the past 3 months prior to cholecystectomy.  She is followed by gastroenterology and has appointment scheduled this coming week.    Please see MDM for additional details.    Past History   As per HPI and below:  Past Medical History[1]  Past Surgical History[2]  Medications Ordered Prior to Encounter[3]    Social History[4]  family history includes Breast cancer in her daughter; Cancer in her father and sister.   Review of patient's allergies indicates:  No Known Allergies    Physical Exam     Vitals:    06/26/25 1830 06/26/25 1900 06/26/25 2000 06/26/25 2100   BP: (!) 158/87   133/89   BP Location:       Patient Position:       Pulse: 90 82 83 85   Resp: (!) 23      Temp:       TempSrc:       SpO2: 97% 96% 95% (!) 93%   Weight:         Physical Exam  Vitals and nursing note reviewed.    Constitutional:       General: She is not in acute distress.     Appearance: Normal appearance. She is not ill-appearing.   HENT:      Head: Normocephalic and atraumatic.      Nose: Nose normal.      Mouth/Throat:      Mouth: Mucous membranes are moist.      Pharynx: Oropharynx is clear.   Eyes:      General: No scleral icterus.     Extraocular Movements: Extraocular movements intact.      Conjunctiva/sclera: Conjunctivae normal.      Pupils: Pupils are equal, round, and reactive to light.   Cardiovascular:      Rate and Rhythm: Normal rate and regular rhythm.      Pulses: Normal pulses.   Pulmonary:      Effort: Pulmonary effort is normal. No respiratory distress.      Breath sounds: Normal breath sounds. No wheezing.   Abdominal:      General: Abdomen is flat. There is no distension.      Palpations: Abdomen is soft.      Tenderness: There is no abdominal tenderness.      Comments: Laparoscopic incisions  appear to be well healing w/o evidence of infection, discharge, or other concerning features   Musculoskeletal:         General: No tenderness. Normal range of motion.      Cervical back: Normal range of motion.      Right lower leg: No edema.      Left lower leg: No edema.   Skin:     General: Skin is warm and dry.      Coloration: Skin is not jaundiced.      Findings: No bruising, erythema or lesion.   Neurological:      General: No focal deficit present.      Mental Status: She is alert and oriented to person, place, and time. Mental status is at baseline.   Psychiatric:         Mood and Affect: Mood normal.         Behavior: Behavior normal.            Medications Given     Medications   cefTRIAXone injection 1 g (1 g Intravenous Given 6/26/25 2056)       Labs & Imaging     Labs Reviewed   COMPREHENSIVE METABOLIC PANEL - Abnormal       Result Value    Sodium 137      Potassium 3.8      Chloride 102      CO2 26      Glucose 93      BUN 5 (*)     Creatinine 0.7      Calcium 9.4      Protein Total 6.6       Albumin 3.0 (*)     Bilirubin Total 0.7            AST 16      ALT 17      Anion Gap 9      eGFR >60     LIPASE - Abnormal    Lipase Level 102 (*)    URINALYSIS, REFLEX TO URINE CULTURE - Abnormal    Color, UA Yellow      Appearance, UA Clear      pH, UA 7.0      Spec Grav UA 1.010      Protein, UA Negative      Glucose, UA Negative      Ketones, UA Negative      Bilirubin, UA Negative      Blood, UA Negative      Nitrites, UA Negative      Urobilinogen, UA Negative      Leukocyte Esterase, UA 1+ (*)    CBC WITH DIFFERENTIAL - Abnormal    WBC 5.79      RBC 4.19      HGB 12.5      HCT 37.3      MCV 89      MCH 29.8      MCHC 33.5      RDW 13.6      Platelet Count 370      MPV 9.8      Nucleated RBC 0      Neut % 46.8      Lymph % 32.6      Mono % 13.5      Eos % 5.5      Basophil % 0.7      Imm Grans % 0.9 (*)     Neut # 2.71      Lymph # 1.89      Mono # 0.78      Eos # 0.32      Baso # 0.04      Imm Grans # 0.05 (*)    URINALYSIS MICROSCOPIC - Abnormal    RBC, UA 1      WBC, UA 15 (*)     Squamous Epithelial Cells, UA 1      Non-Squamous Epithelial Cells <1 (*)     Hyaline Casts, UA 4 (*)     Microscopic Comment       ISTAT PROCEDURE - Abnormal    POC Glucose 98      POC BUN 4 (*)     POC Creatinine 0.7      POC Sodium 137      POC Potassium 3.7      POC Chloride 101      POC TCO2 (MEASURED) 26      POC Ionized Calcium 1.19      POC Hematocrit 36      Sample THALIA     HEPATITIS C ANTIBODY - Normal    Hep C Ab Interp Non-Reactive     HIV 1 / 2 ANTIBODY - Normal    HIV 1/2 Ag/Ab Non-Reactive     MAGNESIUM - Normal    Magnesium  2.0     TROPONIN I HIGH SENSITIVITY - Normal    Troponin High Sensitive 3     CULTURE, URINE   CBC W/ AUTO DIFFERENTIAL    Narrative:     The following orders were created for panel order CBC W/ AUTO DIFFERENTIAL.                  Procedure                               Abnormality         Status                                     ---------                               -----------          ------                                     CBC with Differential[[id number]]       Abnormal            Final result                                                 Please view results for these tests on the individual orders.   HEP C VIRUS HOLD SPECIMEN   GREY TOP URINE HOLD   ISTAT CHEM8       Imaging Results    None         MDM     Skyler Espinoza is a 74 y.o. female who presents to the emergency department for evaluation of nausea and vomiting as detailed in HPI. On exam,   Patient in NAD and VSS.  No abdominal tenderness to palpation.  Laparoscopic incisions  appear to be well healing w/o evidence of infection, discharge, or other concerning features.  Strength and sensation intact throughout.  No CVA tenderness to percussion.  Otherwise, physical exam largely unremarkable. Given concern for UTI, patient given ceftriaxone 1g.  Patient tolerated p.o. intake w/o nausea or vomiting.  On re-evaluation, patient has no significant change in physical exam, VS, or overall clinical status.     Labs (independently interpreted by myself):   CBC w/o leukocytosis, reducing concern for acute infectious process.  No anemia present.  CMP w/ normal LFTs, normal bilirubin, no evidence of ANETA.  No gross electrolyte/metabolic derangement present.  Lipase elevated to 102, which is slightly higher than value on discharge, but I suspect that this is reactive in his not indicate acute pancreatitis especially in the context of no abdominal pain or other features concerning for acute pancreatitis. UA concerning for UTI.  Troponin WNL, reducing concern for ACS w/ atypical presentation.    Imaging (independently interpreted by myself): None indicated given multiple recent imaging studies.     Ddx including, but not limited to: SBO v. gastroparesis v. Gastric outlet obstruction v. pancreatitis v. gastroenteritis v. mesenteric ischemia v. volvulous v. UTI     Most likely Dx:  Definitive diagnosis difficult to make given information and  "testing available; I have lower suspicion for pathology warranting emergent intervention given the chronicity of patient's symptoms, physical exam, labs.    Disposition: Patient discharged home. Discussed strict return precautions and home care plan with patient and/or caregiver who expressed understanding and agreement.  Urgent GI referral ordered.  Discharged w/ prescription for antibiotics for UTI.    Please see HPI, physical exam, ED course for additional details.    Procedures   Final diagnoses:  [R11.2] Nausea and vomiting, unspecified vomiting type (Primary)  [N39.0] Urinary tract infection without hematuria, site unspecified      ED Disposition Condition    Discharge Stable            Mason Couch MD           [1]   Past Medical History:  Diagnosis Date    Dementia without behavioral disturbance     Depression     GERD (gastroesophageal reflux disease)     Hashimoto's disease     Hyperlipidemia     Hypertension     Hypothyroid     Sleep apnea, unspecified 2015   [2]   Past Surgical History:  Procedure Laterality Date    APPENDECTOMY      COLONOSCOPY  2017    Briana, repeat in 10    ESOPHAGOGASTRODUODENOSCOPY N/A 04/24/2025    Procedure: EGD (ESOPHAGOGASTRODUODENOSCOPY);  Surgeon: Maxim Garzon MD;  Location: [facility];  Service: Endoscopy;  Laterality: N/A;    LAPAROSCOPIC CHOLECYSTECTOMY N/A 6/18/2025    Procedure: CHOLECYSTECTOMY, LAPAROSCOPIC;  Surgeon: Felix Buenrostro III, MD;  Location: [facility] OR;  Service: General;  Laterality: N/A;    NECK SURGERY      "burning of the nerves to help with her migraines"    TONSILLECTOMY      TUBAL LIGATION     [3]   Current Facility-Administered Medications on File Prior to Encounter   Medication Dose Route Frequency Provider Last Rate Last Admin    onabotulinumtoxina injection 200 Units  200 Units Intramuscular Q90 Days          Current Outpatient Medications on File Prior to Encounter   Medication Sig Dispense Refill    acetaminophen (TYLENOL) 650 MG TbSR " Take 650 mg by mouth every 8 (eight) hours.      azelastine (ASTELIN) 137 mcg (0.1 %) nasal spray 1 spray (137 mcg total) by Nasal route nightly. Point up and slightly outward toward ear when spraying to avoid irritating nasal septum. With flonase. 30 mL 2    cetirizine (ZYRTEC) 10 MG tablet Take 10 mg by mouth every evening.      fluticasone propionate (FLONASE) 50 mcg/actuation nasal spray 1 spray by Each Nostril route every evening.      HYDROcodone-acetaminophen (NORCO) 5-325 mg per tablet Take 1 tablet by mouth every 6 (six) hours as needed for Pain. 20 tablet 0    levothyroxine (SYNTHROID) 112 MCG tablet Take 1 tablet (112 mcg total) by mouth before breakfast. 30 tablet 11    mag carb/aluminum hydrox/algin (GAVISCON EXTRA STRENGTH ORAL) Take 1 tablet by mouth 3 (three) times daily with meals. Pt takes 30min after meals takes tablet form      magnesium oxide (MAG-OX) 400 mg (241.3 mg magnesium) tablet Take 1 tablet by mouth once daily.      metoclopramide HCl (REGLAN) 5 MG tablet Take 2 tablets (10 mg total) by mouth 4 (four) times daily before meals and nightly. 240 tablet 2    metoprolol succinate (TOPROL-XL) 100 MG 24 hr tablet TAKE 1 TABLET(100 MG) BY MOUTH DAILY (Patient taking differently: Take 100 mg by mouth once daily.) 90 tablet 1    multivitamin (THERAGRAN) tablet Take 1 tablet by mouth once daily.      ondansetron (ZOFRAN-ODT) 8 MG TbDL Take 1 tablet (8 mg total) by mouth every 8 (eight) hours as needed (nausea and vomiting). 20 tablet 1    pantoprazole (PROTONIX) 40 MG tablet Take 1 tablet (40 mg total) by mouth 2 (two) times daily. 180 tablet 0    promethazine (PHENERGAN) 25 MG suppository Place 1 suppository (25 mg total) rectally every 6 (six) hours as needed for Nausea. 12 suppository 1    riboflavin, vitamin B2, 400 mg Tab Take 400 mg by mouth once daily. 90 tablet 1    rizatriptan (MAXALT) 10 MG tablet Take 1 tablet (10 mg total) by mouth daily as needed for Migraine. 9 tablet 1     scopolamine (TRANSDERM-SCOP) 1.3-1.5 mg (1 mg over 3 days) Place 1 patch onto the skin Every 3 (three) days. 3 patch 0    sucralfate (CARAFATE) 100 mg/mL suspension Take 10 mLs (1 g total) by mouth 4 (four) times daily. 450 mL 0    thiamine 100 MG tablet Take 100 mg by mouth once daily.      tiZANidine (ZANAFLEX) 4 MG tablet Take 1 tablet (4 mg total) by mouth 2 (two) times daily as needed (headache). 60 tablet 3   [4]   Social History  Tobacco Use    Smoking status: Never    Smokeless tobacco: Never   Substance Use Topics    Alcohol use: Not Currently    Drug use: No        Mason Couch MD  Resident  06/26/25 9400

## 2025-06-26 NOTE — FIRST PROVIDER EVALUATION
Medical screening exam completed.  I have conducted a focused provider triage encounter, findings are as follows:    Brief history of present illness:  Hx from pt's daughter. Was discharged from [place] yesterday. Diagnosed with pancreatitis. Since discharge she's been vomiting green bile. Vomiting x3 months and had recent cholecystectomy. Home health RN recommend a higher level of care and sent patient to [facility] ED. Pt has dementia. Has stabbing pain in stomach. Fever 101 two nights ago.  Desats when she sleeps. Took zofran and a [place] phenergan this AM. Only vomited a little since the suppository.     Vitals:    06/26/25 1411   BP: (!) 140/89   BP Location: Right arm   Patient Position: Sitting   Pulse: 96   Resp: 16   Temp: 98.6 °F (37 °C)   TempSrc: Oral   SpO2: 95%   Weight: 95.3 kg (210 lb)       Pertinent physical exam:  No acute distress    Brief workup plan:  abdominal pain order set    Preliminary workup initiated; this workup will be continued and followed by the physician or advanced practice provider that is assigned to the patient when roomed.      Thuan Suazo MD

## 2025-06-27 ENCOUNTER — TELEPHONE (OUTPATIENT)
Dept: FAMILY MEDICINE | Facility: CLINIC | Age: 75
End: 2025-06-27
Payer: MEDICARE

## 2025-06-27 LAB
HOLD SPECIMEN: NORMAL
OHS QRS DURATION: 88 MS
OHS QTC CALCULATION: 452 MS

## 2025-06-27 RX ORDER — SCOPOLAMINE 1 MG/3D
PATCH, EXTENDED RELEASE TRANSDERMAL
Qty: 10 PATCH | Refills: 0 | Status: SHIPPED | OUTPATIENT
Start: 2025-06-27

## 2025-06-27 NOTE — PROGRESS NOTES
C3 nurse attempted to contact Skyler Espinoza for a TCC post hospital discharge follow up call. No answer. Left voicemail with callback information. The patient has a scheduled HOSFU appointment with Oscar Lee FNP-C on 07/03/2025 @ 1300.

## 2025-06-27 NOTE — TELEPHONE ENCOUNTER
----- Message from Reba sent at 6/27/2025  2:55 PM CDT -----  Regarding: Requesting Refill  .Type: RX Refill Request    Who Called: daughter       Refill or New Rx: Refill     RX Name and Strength:    ondansetron (ZOFRAN-ODT) 8 MG TbDL    And   scopolamine            Preferred Pharmacy with phone number:  .  [id number] [address] [address]  Phone: [phone] Fax: [phone]        Local or Mail Order: local     Ordering Provider: Jesus       Best Call Back Number: .  [phone]

## 2025-06-27 NOTE — PROGRESS NOTES
C3 nurse spoke with Skyler Espinoza (Caregiver, Allison) for a TCC post hospital discharge follow up call. The patient has a scheduled HOSFU appointment with Oscar Lee FNP-C on 07/03/2025 @ 1300.

## 2025-06-27 NOTE — TELEPHONE ENCOUNTER
----- Message from Carmen sent at 6/27/2025 11:35 AM CDT -----  Contact: sky  Daughter Sky calling in regarding to see if a patient could get a referral to the nutritionist.   [phone]

## 2025-06-27 NOTE — DISCHARGE INSTRUCTIONS
You were seen in the emergency department for nausea and vomiting.  We gave you some medication and you felt better.  We think you're safe to go home.  Please follow-up with your primary care provider in the next day or two.  Please return for any new or worsening fever, seizures, inability to eat or drink anything, black or bloody stool, or become concerned about her child in any other way.

## 2025-06-28 LAB
BACTERIA BLD CULT: NORMAL
BACTERIA BLD CULT: NORMAL
BACTERIA UR CULT: NORMAL

## 2025-06-29 LAB — HOLD SPECIMEN: NORMAL

## 2025-06-30 ENCOUNTER — TELEPHONE (OUTPATIENT)
Dept: FAMILY MEDICINE | Facility: CLINIC | Age: 75
End: 2025-06-30
Payer: MEDICARE

## 2025-06-30 ENCOUNTER — OFFICE VISIT (OUTPATIENT)
Dept: GASTROENTEROLOGY | Facility: CLINIC | Age: 75
End: 2025-06-30
Payer: MEDICARE

## 2025-06-30 ENCOUNTER — TELEPHONE (OUTPATIENT)
Dept: ENDOSCOPY | Facility: HOSPITAL | Age: 75
End: 2025-06-30
Payer: MEDICARE

## 2025-06-30 VITALS — WEIGHT: 223 LBS | HEIGHT: 68 IN | BODY MASS INDEX: 33.8 KG/M2

## 2025-06-30 DIAGNOSIS — R11.2 NAUSEA AND VOMITING, UNSPECIFIED VOMITING TYPE: ICD-10-CM

## 2025-06-30 DIAGNOSIS — E63.9 INADEQUATE CALORIC INTAKE: Primary | ICD-10-CM

## 2025-06-30 DIAGNOSIS — K21.9 GASTROESOPHAGEAL REFLUX DISEASE, UNSPECIFIED WHETHER ESOPHAGITIS PRESENT: ICD-10-CM

## 2025-06-30 DIAGNOSIS — I10 HTN (HYPERTENSION), BENIGN: ICD-10-CM

## 2025-06-30 DIAGNOSIS — K26.9 DUODENAL ULCER: ICD-10-CM

## 2025-06-30 DIAGNOSIS — R11.0 CHRONIC NAUSEA: ICD-10-CM

## 2025-06-30 DIAGNOSIS — E44.0 MODERATE PROTEIN-CALORIE MALNUTRITION: ICD-10-CM

## 2025-06-30 DIAGNOSIS — R11.0 NAUSEA: Primary | ICD-10-CM

## 2025-06-30 DIAGNOSIS — L30.9 DERMATITIS: Primary | ICD-10-CM

## 2025-06-30 DIAGNOSIS — R13.10 DYSPHAGIA, UNSPECIFIED TYPE: ICD-10-CM

## 2025-06-30 DIAGNOSIS — R63.4 WEIGHT LOSS: Primary | ICD-10-CM

## 2025-06-30 DIAGNOSIS — K29.70 GASTRITIS WITHOUT BLEEDING, UNSPECIFIED CHRONICITY, UNSPECIFIED GASTRITIS TYPE: ICD-10-CM

## 2025-06-30 PROCEDURE — 1159F MED LIST DOCD IN RCRD: CPT | Mod: CPTII,95,,

## 2025-06-30 PROCEDURE — 1157F ADVNC CARE PLAN IN RCRD: CPT | Mod: CPTII,95,,

## 2025-06-30 PROCEDURE — 3044F HG A1C LEVEL LT 7.0%: CPT | Mod: CPTII,95,,

## 2025-06-30 PROCEDURE — 1126F AMNT PAIN NOTED NONE PRSNT: CPT | Mod: CPTII,95,,

## 2025-06-30 PROCEDURE — 98007 SYNCH AUDIO-VIDEO EST HI 40: CPT | Mod: 95,,,

## 2025-06-30 PROCEDURE — 1111F DSCHRG MED/CURRENT MED MERGE: CPT | Mod: CPTII,95,,

## 2025-06-30 PROCEDURE — 3008F BODY MASS INDEX DOCD: CPT | Mod: CPTII,95,,

## 2025-06-30 RX ORDER — RABEPRAZOLE SODIUM 20 MG/1
20 TABLET, DELAYED RELEASE ORAL 2 TIMES DAILY
Qty: 60 TABLET | Refills: 11 | Status: SHIPPED | OUTPATIENT
Start: 2025-06-30 | End: 2026-06-30

## 2025-06-30 RX ORDER — TRIAMCINOLONE ACETONIDE 1 MG/G
CREAM TOPICAL 2 TIMES DAILY
Qty: 80 G | Refills: 1 | Status: SHIPPED | OUTPATIENT
Start: 2025-06-30

## 2025-06-30 RX ORDER — ONDANSETRON 8 MG/1
8 TABLET, ORALLY DISINTEGRATING ORAL EVERY 8 HOURS PRN
Qty: 20 TABLET | Refills: 1 | Status: SHIPPED | OUTPATIENT
Start: 2025-06-30

## 2025-06-30 NOTE — TELEPHONE ENCOUNTER
----- Message from Anushka sent at 6/30/2025 11:01 AM CDT -----  Pt daughter is calling about a rash on her back. Would like something called in   [facility]   [phone] sky

## 2025-06-30 NOTE — TELEPHONE ENCOUNTER
Spoke to pt daughter she st that she is concerned about her bilateral feet swelling that onset recently. She st that a dr previously st that he seen CHF on her chart she is asking if that is a true dx or not. Please advise.

## 2025-06-30 NOTE — TELEPHONE ENCOUNTER
Spoke to daughter she st that it happened 3-4 days ago and she st that it is not blisters it is raised bumps on her shoulder and back of her arms. She st that it is red and she has pruritus is those areas. She denies and exposure to environment or any new detergents. She st the only new medication was the Keflex that she started.

## 2025-06-30 NOTE — TELEPHONE ENCOUNTER
"EGD  Received: Today  Afsaneh Colón, NP  P [facility]  Caller: Unspecified (Today,  1:23 PM)  Procedure: EGD    Diagnosis: GERD, Dysphagia, Weight loss, and Nausea/Vomiting    Procedure Timing: Within 12 weeks    *If within 4 weeks selected, please sona as high priority*    *If greater than 12 weeks, please select "5-12 weeks" and delay sending until 3 months prior to requested date*    Location: Hospital Based ([address], [facility], [facility])    Additional Scheduling Information: vascular dementia    Prep Specifications:N/A    Is the patient taking a GLP-1 Agonist:no    Have you attached a patient to this message: yes              Patient is scheduled for a Upper Endoscopy (EGD) on 07/08/25 with Dr. GRETCHEN Cheatham  Referral for procedure from [facility]    "

## 2025-06-30 NOTE — TELEPHONE ENCOUNTER
----- Message from Anushka sent at 6/30/2025  7:02 AM CDT -----  - 06/27-1:55 pm- pt daughter is calling back     [phone]

## 2025-06-30 NOTE — PROGRESS NOTES
The patient location is: [place]  The chief complaint leading to consultation is: intractable nausea and vomiting, weight loss, early satiety    Visit type: audiovisual    Face to Face time with patient: 26 minutes  60 minutes of total time spent on the encounter, which includes face to face time and non-face to face time preparing to see the patient (eg, review of tests), Obtaining and/or reviewing separately obtained history, Documenting clinical information in the electronic or other health record, Independently interpreting results (not separately reported) and communicating results to the patient/family/caregiver, or Care coordination (not separately reported).       Each patient to whom he or she provides medical services by telemedicine is:  (1) informed of the relationship between the physician and patient and the respective role of any other health care provider with respect to management of the patient; and (2) notified that he or she may decline to receive medical services by telemedicine and may withdraw from such care at any time.    Gastroenterology Clinic Consultation Note    Patient ID: Skyler Espinoza is a 74 y.o. female.    Chief Complaint: Nausea and Vomiting    History of Present Illness    CHIEF COMPLAINT:  Patient presents today for nausea and vomiting since March.    NAUSEA AND VOMITING:  She reports persistent daily vomiting with varying characteristics. Most recent episode in parking lot consisted of thick, bright green bile from mouth and nose. Previous episode contained brown-colored vomit with recently consumed liquids (Coke, Sprite, and water). Upper endoscopy revealed gastritis and a small duodenal ulcer. Lipase was elevated at 91, likely reactive to recent cholecystectomy.    SURGICAL AND MEDICAL HISTORY:  She underwent cholecystectomy approximately 1.5 weeks ago. She is currently being treated for a urinary tract infection with antibiotics.    MEDICATIONS:  She continues pantoprazole  40mg twice daily and Reglan 4 times daily with good tolerance. She uses a scopolamine patch which causes some grogginess, Zofran every 8 hours, and Phenergan suppository as rescue medication. Caregiver reports Reglan appears effective. Phenergan suppository was administered this morning to prevent hospitalization.    GI SYMPTOMS:  She has experienced constipation for three months requiring digital disimpaction. She had diarrhea two days ago while on antibiotics, but most recently passed a small, formed stool this morning.    NUTRITION:  She reports significant nutritional challenges over the past three months with extremely limited oral intake, primarily consuming water and Sprite. She has been unable to tolerate protein shakes, Boost, Ovaltine, liquid IV, and other nutritional supplements due to taste intolerance or digestive issues.    SUBSTANCE USE HISTORY:  She previously used THC gummies for approximately 1.5 months with good effect for appetite and emotional distress related to parental loss. She discontinued use three months ago after emergency room visit due to concerns about potential hyperemesis syndrome. Since discontinuation, she reports increased vomiting that has not improved. She and caregiver are considering restarting THC gummies at a reduced dosage due to previous perceived benefits.      ROS:  General: -fever, -chills, -fatigue, -weight gain, -weight loss, +weakness  Eyes: -vision changes, -redness, -discharge  ENT: -ear pain, -nasal congestion, -sore throat  Cardiovascular: -chest pain, -palpitations, -lower extremity edema  Respiratory: -cough, -shortness of breath  Gastrointestinal: -abdominal pain, +nausea, +vomiting, +diarrhea, +constipation, -blood in stool, +feeding difficulties, +loss of appetite  Genitourinary: -dysuria, -hematuria, -frequency  Musculoskeletal: -joint pain, -muscle pain  Skin: -rash, -lesion  Neurological: -headache, -dizziness, -numbness, -tingling  Psychiatric:  "+anxiety, -depression, -sleep difficulty         Physical Exam    General: No acute distress. Well-developed. Well-nourished.        Medical History:  has a past medical history of Dementia without behavioral disturbance, Depression, GERD (gastroesophageal reflux disease), Hashimoto's disease, Hyperlipidemia, Hypertension, Hypothyroid, and Sleep apnea, unspecified (2015).    Surgical History:  has a past surgical history that includes Appendectomy; Tubal ligation; Tonsillectomy; Colonoscopy (2017); Esophagogastroduodenoscopy (N/A, 04/24/2025); Neck surgery; and Laparoscopic cholecystectomy (N/A, 6/18/2025).    Family History: family history includes Breast cancer in her daughter; Cancer in her father and sister..       Review of patient's allergies indicates:  No Known Allergies    Medications Ordered Prior to Encounter[1]    Labs:  Lab Results   Component Value Date    WBC 5.79 06/26/2025    HGB 12.5 06/26/2025    HCT 36 06/26/2025     06/26/2025    CRP 0.2 02/02/2021    CHOL 210 (H) 06/23/2025    TRIG 121 06/23/2025    HDL 56 06/23/2025    ALKPHOS 104 06/26/2025    LIPASE 102 (H) 06/26/2025    ALT 17 06/26/2025    AST 16 06/26/2025     06/26/2025    K 3.8 06/26/2025     06/26/2025    CREATININE 0.7 06/26/2025    BUN 5 (L) 06/26/2025    CO2 26 06/26/2025    TSH 2.644 04/10/2025    INR 0.9 11/15/2024    HGBA1C 5.1 04/25/2025       Vital Signs:  Ht 5' 8" (1.727 m)   Wt 101.2 kg (223 lb)   BMI 33.91 kg/m²   Body mass index is 33.91 kg/m².    Imaging reviewed: CT A/P 6/23/2025  Impression:     Interval operative changes of cholecystectomy with new peripancreatic fat stranding and fat stranding in the periportal region and adjacent to the duodenum, likely related to acute pancreatitis.  Suggest correlation with symptoms and serum lactate.     Trace right pleural effusion.     Mixed liquid and solid stool in the colon.  Colonic diverticulosis.     Other incidental findings discussed in the body of " the report.     This report was flagged in Epic as abnormal.     Electronically signed by:Henry Valenzuela MD  Date:                                            06/23/2025  Time:                                           03:37    Endoscopy reviewed: EGD 4/24/2025  Impression:            - Normal esophagus.                          - Acute gastritis. Biopsied.                          - Non-bleeding duodenal ulcer with no stigmata of                          bleeding. Biopsied.   Recommendation:        - Patient has a contact number available for                          emergencies. The signs and symptoms of potential                          delayed complications were discussed with the                          patient. Return to normal activities tomorrow.                          Written discharge instructions were provided to                          the patient.                          - Resume previous diet.                          - Continue present medications.                          - Await pathology results.                          - Pantoprazole 40 mg po bid (use iv inpatient if                          vomiting persists)                          - Recommend repeat EGD in 2 months.                          - Avoid NSAIDS and THC.                          - Full liquids as tolerated and advance as                          tolerated.                          - [facility].   MD Maxim Park MD   4/24/2025 8:00:03 AM     Pathology 4/24/2025 04/25/2025 JAR/jacky     1. STOMACH, ANTRUM AND BODY, BIOPSY:     - GASTRIC ANTRAL AND OXYNTIC MUCOSA WITH NO SIGNIFICANT HISTOPATHOLOGIC    FINDINGS     - NEGATIVE FOR HELICOBACTER PYLORI TYPE ORGANISMS     2. DUODENUM, BIOPSY:     - DUODENAL MUCOSA WITH NO SIGNIFICANT HISTOPATHOLOGIC     Assessment:  1. Nausea      Orders Placed This Encounter    FL Upper GI With Small Bowel (xpd)    RABEprazole (ACIPHEX) 20 mg  tablet       Assessment & Plan    NAUSEA AND VOMITING:   Ongoing nausea and vomiting since March, recent gallbladder removal, and previous upper endoscopy findings of gastritis and duodenal ulcer.   Ruled out hyperemesis from THC gummies as cause, given symptoms worsened after cessation.   Considered possibility of obstruction, but noted previous imaging showed no significant findings.   Patient to eat small, frequent meals instead of 3 large meals daily, with dinner around 6:00 PM.   Patient to remain upright for some time after eating, even if in a reclining chair.   Recommend increasing fluid intake as tolerated, considering options like Liquid IV for electrolyte balance.   Switched from pantoprazole to Rabeprazole (Aciphex) 30 minutes before breakfast and dinner for potentially stronger acid suppression.   Continued Zofran every 8 hours.   Continued Reglan 4 times daily.   Continued scopolamine patch.    GASTRITIS AND DUODENAL ULCER:   Decreased sucralfate to twice daily if patient chooses to continue it.    PROTEIN-CALORIE MALNUTRITION:   Considered need for enteral nutrition if oral intake does not improve.   Patient to try to maintain oral nutrition as much as possible.    CANNABIS USE:   Family to consider restarting THC gummies at reduced dose (1/4 of previous dose) twice daily to stimulate appetite, given previous positive response, or consider transdermal THC patch as an alternative.    CONSTIPATION:   Started Miralax as needed for constipation.    DIAGNOSTIC PROCEDURES:   Ordered upper GI series W Contrast.   Ordered repeat upper endoscopy in the coming weeks.    FOLLOW-UP:   Request inpatient GI consult if admitted to ER.   Follow up in ER at [facility] if unable to maintain oral intake or if symptoms worsen.         No follow-ups on file.        JOYCE BENTLEY  Gastroenterology Department  [facility] Office [phone]      This note was generated with the assistance  of [email or web address] technology. Verbal consent was obtained by the patient and accompanying visitor(s) for the recording of patient appointment to facilitate this note. I attest to having reviewed and edited the generated note for accuracy, though some syntax or spelling errors may persist. Please contact the author of this note for any clarification.                                             [1]   Current Outpatient Medications on File Prior to Visit   Medication Sig Dispense Refill    acetaminophen (TYLENOL) 650 MG TbSR Take 650 mg by mouth every 8 (eight) hours.      azelastine (ASTELIN) 137 mcg (0.1 %) nasal spray 1 spray (137 mcg total) by Nasal route nightly. Point up and slightly outward toward ear when spraying to avoid irritating nasal septum. With flonase. 30 mL 2    cephALEXin (KEFLEX) 500 MG capsule Take 1 capsule (500 mg total) by mouth every 12 (twelve) hours. for 5 days 10 capsule 0    cetirizine (ZYRTEC) 10 MG tablet Take 10 mg by mouth every evening.      fluticasone propionate (FLONASE) 50 mcg/actuation nasal spray 1 spray by Each Nostril route every evening.      HYDROcodone-acetaminophen (NORCO) 5-325 mg per tablet Take 1 tablet by mouth every 6 (six) hours as needed for Pain. 20 tablet 0    levothyroxine (SYNTHROID) 112 MCG tablet Take 1 tablet (112 mcg total) by mouth before breakfast. 30 tablet 11    mag carb/aluminum hydrox/algin (GAVISCON EXTRA STRENGTH ORAL) Take 1 tablet by mouth 3 (three) times daily with meals. Pt takes 30min after meals takes tablet form      magnesium oxide (MAG-OX) 400 mg (241.3 mg magnesium) tablet Take 1 tablet by mouth once daily.      metoclopramide HCl (REGLAN) 5 MG tablet Take 2 tablets (10 mg total) by mouth 4 (four) times daily before meals and nightly. 240 tablet 2    metoprolol succinate (TOPROL-XL) 100 MG 24 hr tablet TAKE 1 TABLET(100 MG) BY MOUTH DAILY 90 tablet 1    multivitamin (THERAGRAN) tablet Take 1 tablet by mouth once daily.      ondansetron  (ZOFRAN-ODT) 8 MG TbDL Take 1 tablet (8 mg total) by mouth every 8 (eight) hours as needed (nausea and vomiting). 20 tablet 1    promethazine (PHENERGAN) 25 MG suppository Place 1 suppository (25 mg total) rectally every 6 (six) hours as needed for Nausea. 12 suppository 1    riboflavin, vitamin B2, 400 mg Tab Take 400 mg by mouth once daily. 90 tablet 1    rizatriptan (MAXALT) 10 MG tablet Take 1 tablet (10 mg total) by mouth daily as needed for Migraine. 9 tablet 1    scopolamine (TRANSDERM-SCOP) 1.3-1.5 mg (1 mg over 3 days) APPLY 1 PATCH TOPICALLY TO THE SKIN EVERY 3 DAYS 10 patch 0    sucralfate (CARAFATE) 100 mg/mL suspension Take 10 mLs (1 g total) by mouth 4 (four) times daily. 450 mL 0    thiamine 100 MG tablet Take 100 mg by mouth once daily.      tiZANidine (ZANAFLEX) 4 MG tablet Take 1 tablet (4 mg total) by mouth 2 (two) times daily as needed (headache). 60 tablet 3    [DISCONTINUED] pantoprazole (PROTONIX) 40 MG tablet Take 1 tablet (40 mg total) by mouth 2 (two) times daily. 180 tablet 0    [DISCONTINUED] ondansetron (ZOFRAN-ODT) 8 MG TbDL Take 1 tablet (8 mg total) by mouth every 8 (eight) hours as needed (nausea and vomiting). 20 tablet 1     Current Facility-Administered Medications on File Prior to Visit   Medication Dose Route Frequency Provider Last Rate Last Admin    onabotulinumtoxina injection 200 Units  200 Units Intramuscular Q90 Days

## 2025-07-03 ENCOUNTER — OFFICE VISIT (OUTPATIENT)
Dept: SURGERY | Facility: CLINIC | Age: 75
End: 2025-07-03
Payer: MEDICARE

## 2025-07-03 ENCOUNTER — OFFICE VISIT (OUTPATIENT)
Dept: FAMILY MEDICINE | Facility: CLINIC | Age: 75
End: 2025-07-03
Payer: MEDICARE

## 2025-07-03 VITALS
DIASTOLIC BLOOD PRESSURE: 64 MMHG | BODY MASS INDEX: 34.02 KG/M2 | HEART RATE: 86 BPM | WEIGHT: 223.75 LBS | TEMPERATURE: 98 F | SYSTOLIC BLOOD PRESSURE: 124 MMHG | OXYGEN SATURATION: 97 %

## 2025-07-03 VITALS — DIASTOLIC BLOOD PRESSURE: 88 MMHG | TEMPERATURE: 98 F | HEART RATE: 90 BPM | SYSTOLIC BLOOD PRESSURE: 134 MMHG

## 2025-07-03 DIAGNOSIS — N39.0 URINARY TRACT INFECTION WITHOUT HEMATURIA, SITE UNSPECIFIED: ICD-10-CM

## 2025-07-03 DIAGNOSIS — Z90.49 S/P LAPAROSCOPIC CHOLECYSTECTOMY: ICD-10-CM

## 2025-07-03 DIAGNOSIS — K26.9 DUODENAL ULCER: ICD-10-CM

## 2025-07-03 DIAGNOSIS — K29.70 GASTRITIS WITHOUT BLEEDING, UNSPECIFIED CHRONICITY, UNSPECIFIED GASTRITIS TYPE: ICD-10-CM

## 2025-07-03 DIAGNOSIS — R11.2 NAUSEA AND VOMITING, UNSPECIFIED VOMITING TYPE: Primary | ICD-10-CM

## 2025-07-03 DIAGNOSIS — K82.8 BILIARY DYSKINESIA: ICD-10-CM

## 2025-07-03 DIAGNOSIS — K81.1 CHRONIC CHOLECYSTITIS: Primary | ICD-10-CM

## 2025-07-03 DIAGNOSIS — Z87.19 HX OF PANCREATITIS: ICD-10-CM

## 2025-07-03 DIAGNOSIS — F03.90 DEMENTIA, UNSPECIFIED DEMENTIA SEVERITY, UNSPECIFIED DEMENTIA TYPE, UNSPECIFIED WHETHER BEHAVIORAL, PSYCHOTIC, OR MOOD DISTURBANCE OR ANXIETY: Chronic | ICD-10-CM

## 2025-07-03 DIAGNOSIS — N39.0 RECURRENT UTI: ICD-10-CM

## 2025-07-03 DIAGNOSIS — G47.33 OSA (OBSTRUCTIVE SLEEP APNEA): ICD-10-CM

## 2025-07-03 DIAGNOSIS — E44.0 MODERATE PROTEIN-CALORIE MALNUTRITION: ICD-10-CM

## 2025-07-03 PROCEDURE — 3044F HG A1C LEVEL LT 7.0%: CPT | Mod: CPTII,S$GLB,, | Performed by: NURSE PRACTITIONER

## 2025-07-03 PROCEDURE — 1160F RVW MEDS BY RX/DR IN RCRD: CPT | Mod: CPTII,S$GLB,, | Performed by: SURGERY

## 2025-07-03 PROCEDURE — G2211 COMPLEX E/M VISIT ADD ON: HCPCS | Mod: 95,S$GLB,, | Performed by: NURSE PRACTITIONER

## 2025-07-03 PROCEDURE — 3079F DIAST BP 80-89 MM HG: CPT | Mod: CPTII,S$GLB,, | Performed by: SURGERY

## 2025-07-03 PROCEDURE — 99999 PR PBB SHADOW E&M-EST. PATIENT-LVL V: CPT | Mod: PBBFAC,,, | Performed by: NURSE PRACTITIONER

## 2025-07-03 PROCEDURE — 3074F SYST BP LT 130 MM HG: CPT | Mod: CPTII,S$GLB,, | Performed by: NURSE PRACTITIONER

## 2025-07-03 PROCEDURE — 99999 PR PBB SHADOW E&M-EST. PATIENT-LVL IV: CPT | Mod: PBBFAC,,, | Performed by: SURGERY

## 2025-07-03 PROCEDURE — 1157F ADVNC CARE PLAN IN RCRD: CPT | Mod: CPTII,S$GLB,, | Performed by: SURGERY

## 2025-07-03 PROCEDURE — 1159F MED LIST DOCD IN RCRD: CPT | Mod: CPTII,S$GLB,, | Performed by: NURSE PRACTITIONER

## 2025-07-03 PROCEDURE — 1126F AMNT PAIN NOTED NONE PRSNT: CPT | Mod: CPTII,S$GLB,, | Performed by: NURSE PRACTITIONER

## 2025-07-03 PROCEDURE — 99024 POSTOP FOLLOW-UP VISIT: CPT | Mod: S$GLB,,, | Performed by: SURGERY

## 2025-07-03 PROCEDURE — 3044F HG A1C LEVEL LT 7.0%: CPT | Mod: CPTII,S$GLB,, | Performed by: SURGERY

## 2025-07-03 PROCEDURE — 3288F FALL RISK ASSESSMENT DOCD: CPT | Mod: CPTII,S$GLB,, | Performed by: NURSE PRACTITIONER

## 2025-07-03 PROCEDURE — 3078F DIAST BP <80 MM HG: CPT | Mod: CPTII,S$GLB,, | Performed by: NURSE PRACTITIONER

## 2025-07-03 PROCEDURE — 99215 OFFICE O/P EST HI 40 MIN: CPT | Mod: S$GLB,,, | Performed by: NURSE PRACTITIONER

## 2025-07-03 PROCEDURE — 1126F AMNT PAIN NOTED NONE PRSNT: CPT | Mod: CPTII,S$GLB,, | Performed by: SURGERY

## 2025-07-03 PROCEDURE — 3075F SYST BP GE 130 - 139MM HG: CPT | Mod: CPTII,S$GLB,, | Performed by: SURGERY

## 2025-07-03 PROCEDURE — 1159F MED LIST DOCD IN RCRD: CPT | Mod: CPTII,S$GLB,, | Performed by: SURGERY

## 2025-07-03 PROCEDURE — 1160F RVW MEDS BY RX/DR IN RCRD: CPT | Mod: CPTII,S$GLB,, | Performed by: NURSE PRACTITIONER

## 2025-07-03 PROCEDURE — 1157F ADVNC CARE PLAN IN RCRD: CPT | Mod: CPTII,S$GLB,, | Performed by: NURSE PRACTITIONER

## 2025-07-03 PROCEDURE — 3008F BODY MASS INDEX DOCD: CPT | Mod: CPTII,S$GLB,, | Performed by: NURSE PRACTITIONER

## 2025-07-03 PROCEDURE — 1111F DSCHRG MED/CURRENT MED MERGE: CPT | Mod: CPTII,S$GLB,, | Performed by: NURSE PRACTITIONER

## 2025-07-03 PROCEDURE — 1101F PT FALLS ASSESS-DOCD LE1/YR: CPT | Mod: CPTII,S$GLB,, | Performed by: NURSE PRACTITIONER

## 2025-07-03 RX ORDER — SCOPOLAMINE 1 MG/3D
1 PATCH, EXTENDED RELEASE TRANSDERMAL
Qty: 10 PATCH | Refills: 3 | Status: SHIPPED | OUTPATIENT
Start: 2025-07-03

## 2025-07-03 NOTE — PROGRESS NOTES
SUBJECTIVE:      Patient ID: Skyler Espinoza is a 74 y.o. female.    Chief Complaint: HFU    History of Present Illness    CHIEF COMPLAINT:  Ms. Espinoza presents for follow-up of recent hospitalizations for pancreatitis and ongoing management of chronic nausea and vomiting.    HPI:  Ms. Espinoza, who has a history of dementia, was hospitalized on June 22nd for acute pancreatitis, initially suspected due to jaundice. A CT confirmed the diagnosis, though her lipase was only mildly elevated. An MRCP was unremarkable. She had undergone a cholecystectomy on June 18th. She was treated conservatively for pancreatitis with IVF and liquid diet and symptoms improved.    Upon discharge, she had severe nausea and vomiting in the parking lot, expelling thick, green bile-like fluid from her nose and mouth. This continued on the way home, prompting evaluation at the ED at [facility]. There, she was diagnosed with a UTI based on leukocytes in her urinalysis, but no clear cause for her nausea and vomiting was identified.    She has had chronic nausea and vomiting, which has been a significant concern for the past several months. She as evaluated by GI via telehealth on 6/30. Her medication regimen was recently adjusted, with Rabeprazole 20mg twice daily replacing Protonix. She plans to have an EGD on 7/8 and a [place] upper GI with small bowl study was ordered.    In the past 5 days, she has not had any episodes of vomiting, which is noted as a significant improvement. She is currently on multiple medications to manage her symptoms, including scopolamine patches (changed every 3 days), Reglan (taken 15 minutes before meals and bedtime), and Zofran. She has been following a diet of small, frequent meals as recommended. Today, she has consumed 2 half sandwiches and some chips. She is also taking MiraLax in her coffee to manage constipation.    She has a history of sleep apnea and was previously using a CPAP machine. Due to her  dementia, she became fearful of the device and could not tolerate it. Her SpO2 drops to around 88% during sleep and is typically 90-92% when sitting during the day.    She has had multiple UTIs in recent months. She has had 2 confirmed UTIs based on urine cultures within a 3-month period, both caused by E. coli. She has symptoms such as burning and pain during urination. The most recent instance was accompanied by an unusual fall from bed, followed by complaints of burning during urination.    She uses THC gummies to manage anxiety related to her dementia, particularly to alleviate sundown syndrome. This treatment has been effective in reducing anxiety and repetitive questioning without causing excessive sedation. She has been off the THC gummies when the nausea and vomiting started, but plans to restart based on GI recommendations at a reduced dose to improve appetite.    She denies difficulty swallowing.    MEDICATIONS:  Ms. Espinoza is on Rabeprazole 20 mg twice daily and Scopolamine patches every 3 days. She is also taking Zofran and Reglan 15 minutes before every meal and before bedtime. MiraLax is mixed in her coffee. She recently discontinued Carafate due to concerns about medication interactions and absorption.    MEDICAL HISTORY:  Ms. Espinoza has a history of acute pancreatitis, diagnosed on June 22, as well as gastritis, sleep apnea, and dementia. She has experienced multiple UTIs, including two confirmed E. coli infections within a 3-month period.    SURGICAL HISTORY:  Ms. Espinoza underwent a cholecystectomy on June 18.    TEST R  ESULTS:  Ms. Espinoza's recent CMP showed slightly low albumin, but kidney function, liver function, and electrolytes were good. Her recent glucose level was 93 mg/dL. A lipase test on June 22 was mildly elevated. Urinalysis on June 28 showed leukocytes. A recent urine culture was negative for infection. Ms. Espinoza underwent an Esophagogastroduodenoscopy (EGD) earlier this  year.    IMAGING:  A CT on June 22 confirmed pancreatitis. On the same day, a Magnetic Resonance Cholangiopancreatography (MRCP) was performed, which was unremarkable and showed no duct blockage.    SOCIAL HISTORY:  THC gummies for dementia symptoms, particularly anxiety at sundown          Review of Systems   Constitutional:  Positive for appetite change. Negative for activity change, chills, diaphoresis, fatigue, fever and unexpected weight change.   HENT:  Negative for congestion, ear pain, sinus pressure, sore throat, trouble swallowing and voice change.    Eyes:  Negative for pain, discharge and visual disturbance.   Respiratory:  Negative for cough, chest tightness, shortness of breath and wheezing.    Cardiovascular:  Negative for chest pain and palpitations.   Gastrointestinal:  Positive for constipation, nausea and vomiting. Negative for abdominal pain and diarrhea.   Genitourinary:  Negative for difficulty urinating, flank pain, frequency and urgency.        Recurrent UTI   Musculoskeletal:  Negative for back pain and joint swelling.   Skin:  Negative for color change and rash.   Neurological:  Negative for dizziness, seizures, syncope, weakness, numbness and headaches.   Hematological:  Negative for adenopathy.   Psychiatric/Behavioral:  Positive for confusion (due to dementia). Negative for dysphoric mood and sleep disturbance. The patient is not nervous/anxious.        Family History   Problem Relation Name Age of Onset    Cancer Father      Cancer Sister      Breast cancer Daughter      Glaucoma Neg Hx      Macular degeneration Neg Hx      Retinal detachment Neg Hx      Ovarian cancer Neg Hx      Eczema Neg Hx      Lupus Neg Hx      Melanoma Neg Hx      Psoriasis Neg Hx        Social History     Socioeconomic History    Marital status:    Tobacco Use    Smoking status: Never    Smokeless tobacco: Never   Substance and Sexual Activity    Alcohol use: Not Currently    Drug use: No    Sexual  activity: Yes     Social Drivers of Health     Financial Resource Strain: Patient Declined (6/23/2025)    Overall Financial Resource Strain (CARDIA)     Difficulty of Paying Living Expenses: Patient declined   Recent Concern: Financial Resource Strain - Medium Risk (5/5/2025)    Overall Financial Resource Strain (CARDIA)     Difficulty of Paying Living Expenses: Somewhat hard   Food Insecurity: Patient Declined (6/23/2025)    Hunger Vital Sign     Worried About Running Out of Food in the Last Year: Patient declined     Ran Out of Food in the Last Year: Patient declined   Transportation Needs: Patient Declined (6/23/2025)    PRAPARE - Transportation     Lack of Transportation (Medical): Patient declined     Lack of Transportation (Non-Medical): Patient declined   Physical Activity: Inactive (4/24/2025)    Exercise Vital Sign     Days of Exercise per Week: 0 days     Minutes of Exercise per Session: 0 min   Stress: Patient Declined (6/23/2025)    [employer] [place] [employer] - Occupational Stress Questionnaire     Feeling of Stress : Patient declined   Housing Stability: Patient Declined (6/23/2025)    Housing Stability Vital Sign     Unable to Pay for Housing in the Last Year: Patient declined     Number of Times Moved in the Last Year: 0     Homeless in the Last Year: Patient declined     Current Outpatient Medications   Medication Sig Note    acetaminophen (TYLENOL) 650 MG TbSR Take 650 mg by mouth every 8 (eight) hours.     azelastine (ASTELIN) 137 mcg (0.1 %) nasal spray 1 spray (137 mcg total) by Nasal route nightly. Point up and slightly outward toward ear when spraying to avoid irritating nasal septum. With flonase.     cetirizine (ZYRTEC) 10 MG tablet Take 10 mg by mouth every evening.     fluticasone propionate (FLONASE) 50 mcg/actuation nasal spray 1 spray by Each Nostril route every evening.     HYDROcodone-acetaminophen (NORCO) 5-325 mg per tablet Take 1 tablet by mouth every 6 (six) hours as  needed for Pain.     levothyroxine (SYNTHROID) 112 MCG tablet Take 1 tablet (112 mcg total) by mouth before breakfast.     mag carb/aluminum hydrox/algin (GAVISCON EXTRA STRENGTH ORAL) Take 1 tablet by mouth 3 (three) times daily with meals. Pt takes 30min after meals takes tablet form     magnesium oxide (MAG-OX) 400 mg (241.3 mg magnesium) tablet Take 1 tablet by mouth once daily.     metoclopramide HCl (REGLAN) 5 MG tablet Take 2 tablets (10 mg total) by mouth 4 (four) times daily before meals and nightly.     metoprolol succinate (TOPROL-XL) 100 MG 24 hr tablet TAKE 1 TABLET(100 MG) BY MOUTH DAILY     multivitamin (THERAGRAN) tablet Take 1 tablet by mouth once daily.     ondansetron (ZOFRAN-ODT) 8 MG TbDL Take 1 tablet (8 mg total) by mouth every 8 (eight) hours as needed (nausea and vomiting).     promethazine (PHENERGAN) 25 MG suppository Place 1 suppository (25 mg total) rectally every 6 (six) hours as needed for Nausea.     RABEprazole (ACIPHEX) 20 mg tablet Take 1 tablet (20 mg total) by mouth 2 (two) times daily.     riboflavin, vitamin B2, 400 mg Tab Take 400 mg by mouth once daily. 6/23/2025: Pt. Takes BID    rizatriptan (MAXALT) 10 MG tablet Take 1 tablet (10 mg total) by mouth daily as needed for Migraine.     sucralfate (CARAFATE) 100 mg/mL suspension Take 10 mLs (1 g total) by mouth 4 (four) times daily.     thiamine 100 MG tablet Take 100 mg by mouth once daily.     tiZANidine (ZANAFLEX) 4 MG tablet Take 1 tablet (4 mg total) by mouth 2 (two) times daily as needed (headache).     triamcinolone acetonide 0.1% (KENALOG) 0.1 % cream Apply topically 2 (two) times daily.     scopolamine (TRANSDERM-SCOP) 1.3-1.5 mg (1 mg over 3 days) Place 1 patch onto the skin Every 3 (three) days.    Last reviewed on 7/3/2025  1:13 PM by Mary Jo Horner MA    Review of patient's allergies indicates:  No Known Allergies   Past Medical History:   Diagnosis Date    Dementia without behavioral disturbance     Depression   "   GERD (gastroesophageal reflux disease)     Hashimoto's disease     Hyperlipidemia     Hypertension     Hypothyroid     Sleep apnea, unspecified 2015     Past Surgical History:   Procedure Laterality Date    APPENDECTOMY      COLONOSCOPY  2017    Briana, repeat in 10    ESOPHAGOGASTRODUODENOSCOPY N/A 04/24/2025    Procedure: EGD (ESOPHAGOGASTRODUODENOSCOPY);  Surgeon: Maxim Garzon MD;  Location: [facility] ENDO;  Service: Endoscopy;  Laterality: N/A;    LAPAROSCOPIC CHOLECYSTECTOMY N/A 6/18/2025    Procedure: CHOLECYSTECTOMY, LAPAROSCOPIC;  Surgeon: Felix Buenrostro III, MD;  Location: [facility] OR;  Service: General;  Laterality: N/A;    NECK SURGERY      "burning of the nerves to help with her migraines"    TONSILLECTOMY      TUBAL LIGATION            OBJECTIVE:      Vitals:    07/03/25 1314   BP: 124/64   Pulse: 86   Temp: 97.9 °F (36.6 °C)   TempSrc: Oral   SpO2: 97%   Weight: 101.5 kg (223 lb 12.3 oz)     Physical Exam  Vitals and nursing note reviewed.   Constitutional:       General: She is awake. She is not in acute distress.     Appearance: She is well-developed and well-groomed. She is obese. She is not ill-appearing, toxic-appearing or diaphoretic.   HENT:      Head: Normocephalic and atraumatic.      Nose: Nose normal.   Eyes:      General: Lids are normal. Gaze aligned appropriately.      Conjunctiva/sclera: Conjunctivae normal.      Right eye: Right conjunctiva is not injected.      Left eye: Left conjunctiva is not injected.      Pupils: Pupils are equal, round, and reactive to light.   Cardiovascular:      Rate and Rhythm: Normal rate and regular rhythm.      Pulses: Normal pulses.      Heart sounds: Normal heart sounds, S1 normal and S2 normal. No murmur heard.     No friction rub. No gallop.   Pulmonary:      Effort: Pulmonary effort is normal. No respiratory distress.      Breath sounds: Normal breath sounds. No stridor. No decreased breath sounds, wheezing, rhonchi or rales.   Chest:      Chest " wall: No tenderness.   Musculoskeletal:      Cervical back: Neck supple.      Right lower leg: No edema.      Left lower leg: No edema.   Lymphadenopathy:      Cervical: No cervical adenopathy.   Skin:     General: Skin is warm and dry.      Capillary Refill: Capillary refill takes less than 2 seconds.      Findings: No erythema or rash.   Neurological:      Mental Status: She is alert and oriented to person, place, and time. Mental status is at baseline.   Psychiatric:         Attention and Perception: Attention normal.         Mood and Affect: Mood normal.         Speech: Speech normal.         Behavior: Behavior normal. Behavior is cooperative.         Thought Content: Thought content normal.         Cognition and Memory: Cognition is impaired. Memory is impaired.       Orders Only on 06/26/2025   Component Date Value Ref Range Status    QRS Duration 06/26/2025 88  ms Final    [facility] QTC Calculation 06/26/2025 452  ms Final   Admission on 06/26/2025, Discharged on 06/26/2025   Component Date Value Ref Range Status    Hep C Ab Interp 06/26/2025 Non-Reactive  Non-Reactive Final    Extra Tube 06/26/2025 Hold for add-ons.   Final    Auto resulted.       HIV 1/2 Ag/Ab 06/26/2025 Non-Reactive  Non-Reactive Final    Sodium 06/26/2025 137  136 - 145 mmol/L Final    Potassium 06/26/2025 3.8  3.5 - 5.1 mmol/L Final    Chloride 06/26/2025 102  95 - 110 mmol/L Final    CO2 06/26/2025 26  23 - 29 mmol/L Final    Glucose 06/26/2025 93  70 - 110 mg/dL Final    BUN 06/26/2025 5 (L)  8 - 23 mg/dL Final    Creatinine 06/26/2025 0.7  0.5 - 1.4 mg/dL Final    Calcium 06/26/2025 9.4  8.7 - 10.5 mg/dL Final    Protein Total 06/26/2025 6.6  6.0 - 8.4 gm/dL Final    Albumin 06/26/2025 3.0 (L)  3.5 - 5.2 g/dL Final    Bilirubin Total 06/26/2025 0.7  0.1 - 1.0 mg/dL Final    For infants and newborns, interpretation of results should be based   on gestational age, weight and in agreement with clinical   observations.    Premature Infant  recommended reference ranges:   0-24 hours:  <8.0 mg/dL   24-48 hours: <12.0 mg/dL   3-5 days:    <15.0 mg/dL   6-29 days:   <15.0 mg/dL    ALP 06/26/2025 104  40 - 150 unit/L Final    AST 06/26/2025 16  11 - 45 unit/L Final    ALT 06/26/2025 17  10 - 44 unit/L Final    Anion Gap 06/26/2025 9  8 - 16 mmol/L Final    eGFR 06/26/2025 >60  >60 mL/min/1.73/m2 Final    Estimated GFR calculated using the CKD-EPI creatinine (2021) equation.    Lipase Level 06/26/2025 102 (H)  4 - 60 U/L Final    Color, UA 06/26/2025 Yellow  Straw, Flor, Yellow, Light-Orange Final    Appearance, UA 06/26/2025 Clear  Clear Final    pH, UA 06/26/2025 7.0  5.0 - 8.0 Final    Spec Grav UA 06/26/2025 1.010  1.005 - 1.030 Final    Protein, UA 06/26/2025 Negative  Negative Final    Recommend a 24 hour urine protein or a urine protein/creatinine ratio if globulin induced proteinuria is clinically suspected.    Glucose, UA 06/26/2025 Negative  Negative Final    Ketones, UA 06/26/2025 Negative  Negative Final    Bilirubin, UA 06/26/2025 Negative  Negative Final    Blood, UA 06/26/2025 Negative  Negative Final    Nitrites, UA 06/26/2025 Negative  Negative Final    Urobilinogen, UA 06/26/2025 Negative  <2.0 EU/dL Final    Leukocyte Esterase, UA 06/26/2025 1+ (A)  Negative Final    Magnesium  06/26/2025 2.0  1.6 - 2.6 mg/dL Final    WBC 06/26/2025 5.79  3.90 - 12.70 K/uL Final    RBC 06/26/2025 4.19  4.00 - 5.40 M/uL Final    HGB 06/26/2025 12.5  12.0 - 16.0 gm/dL Final    HCT 06/26/2025 37.3  37.0 - 48.5 % Final    MCV 06/26/2025 89  82 - 98 fL Final    MCH 06/26/2025 29.8  27.0 - 31.0 pg Final    MCHC 06/26/2025 33.5  32.0 - 36.0 g/dL Final    RDW 06/26/2025 13.6  11.5 - 14.5 % Final    Platelet Count 06/26/2025 370  150 - 450 K/uL Final    MPV 06/26/2025 9.8  9.2 - 12.9 fL Final    Nucleated RBC 06/26/2025 0  <=0 /100 WBC Final    Neut % 06/26/2025 46.8  38 - 73 % Final    Lymph % 06/26/2025 32.6  18 - 48 % Final    Mono % 06/26/2025 13.5  4 - 15 %  Final    Eos % 06/26/2025 5.5  <=8 % Final    Basophil % 06/26/2025 0.7  <=1.9 % Final    Imm Grans % 06/26/2025 0.9 (H)  0.0 - 0.5 % Final    Neut # 06/26/2025 2.71  1.8 - 7.7 K/uL Final    Lymph # 06/26/2025 1.89  1 - 4.8 K/uL Final    Mono # 06/26/2025 0.78  0.3 - 1 K/uL Final    Eos # 06/26/2025 0.32  <=0.5 K/uL Final    Baso # 06/26/2025 0.04  <=0.2 K/uL Final    Imm Grans # 06/26/2025 0.05 (H)  0.00 - 0.04 K/uL Final    Mild elevation in immature granulocytes is non specific and can be seen in a variety of conditions including stress response, acute inflammation, trauma and pregnancy. Correlation with other laboratory and clinical findings is essential.    POC Glucose 06/26/2025 98  70 - 110 mg/dL Final    POC BUN 06/26/2025 4 (L)  6 - 30 mg/dL Final    POC Creatinine 06/26/2025 0.7  0.5 - 1.4 mg/dL Final    POC Sodium 06/26/2025 137  136 - 145 mmol/L Final    POC Potassium 06/26/2025 3.7  3.5 - 5.1 mmol/L Final    POC Chloride 06/26/2025 101  95 - 110 mmol/L Final    POC TCO2 (MEASURED) 06/26/2025 26  23 - 29 mmol/L Final    POC Ionized Calcium 06/26/2025 1.19  1.06 - 1.42 mmol/L Final    POC Hematocrit 06/26/2025 36  36 - 54 %PCV Final    Sample 06/26/2025 THALIA   Final    Extra Tube 06/26/2025 Hold for add-ons.   Final    Auto resulted.       RBC, UA 06/26/2025 1  0 - 4 /HPF Final    WBC, UA 06/26/2025 15 (H)  0 - 5 /HPF Final    Squamous Epithelial Cells, UA 06/26/2025 1  /HPF Final    Non-Squamous Epithelial Cells 06/26/2025 <1 (H)  <=0 /HPF Final    Hyaline Casts, UA 06/26/2025 4 (H)  0 - 1 /LPF Final    Microscopic Comment 06/26/2025    Final    Other formed elements not mentioned in the report are not present in the microscopic examination.    Troponin High Sensitive 06/26/2025 3  <=14 ng/L Final    Urine Culture 06/26/2025 Multiple organisms isolated. None in predominance. Repeat if clinically necessary.   Final   Admission on 06/22/2025, Discharged on 06/25/2025   Component Date Value Ref Range Status     Sodium 06/23/2025 140  136 - 145 mmol/L Final    Potassium 06/23/2025 3.1 (L)  3.5 - 5.1 mmol/L Final    Chloride 06/23/2025 99  95 - 110 mmol/L Final    CO2 06/23/2025 23  23 - 29 mmol/L Final    Glucose 06/23/2025 119 (H)  70 - 110 mg/dL Final    BUN 06/23/2025 6 (L)  8 - 23 mg/dL Final    Creatinine 06/23/2025 0.7  0.5 - 1.4 mg/dL Final    Calcium 06/23/2025 9.0  8.7 - 10.5 mg/dL Final    Protein Total 06/23/2025 6.4  6.0 - 8.4 gm/dL Final    Albumin 06/23/2025 3.4 (L)  3.5 - 5.2 g/dL Final    Bilirubin Total 06/23/2025 1.5 (H)  0.1 - 1.0 mg/dL Final    For infants and newborns, interpretation of results should be based   on gestational age, weight and in agreement with clinical   observations.    Premature Infant recommended reference ranges:   0-24 hours:  <8.0 mg/dL   24-48 hours: <12.0 mg/dL   3-5 days:    <15.0 mg/dL   6-29 days:   <15.0 mg/dL    ALP 06/23/2025 100  55 - 135 unit/L Final    AST 06/23/2025 31  10 - 40 unit/L Final    ALT 06/23/2025 23  10 - 44 unit/L Final    Anion Gap 06/23/2025 18 (H)  8 - 16 mmol/L Final    eGFR 06/23/2025 >60  >60 mL/min/1.73/m2 Final    CULTURE, BLOOD ([facility]) 06/23/2025 No Growth After 5 Days   Final    CULTURE, BLOOD ([facility]) 06/23/2025 No Growth After 5 Days   Final    Lipase Level 06/23/2025 91 (H)  4 - 60 U/L Final    Color, UA 06/23/2025 Yellow  Yellow, Straw, Flor Final    Appearance, UA 06/23/2025 Clear  Clear Final    Spec Grav UA 06/23/2025 1.015  1.005 - 1.030 Final    pH, UA 06/23/2025 7.0  5.0 - 8.0 Final    Protein, UA 06/23/2025 Negative  Negative Final    Recommend a 24 hr urine protein/creatinine ratio if globulin induced proteinuria is clinically suspected.    Glucose, UA 06/23/2025 Negative  Negative Final    Ketones, UA 06/23/2025 Trace (A)  Negative Final    Blood, UA 06/23/2025 Negative  Negative Final    Bilirubin, UA 06/23/2025 Negative  Negative Final    Urobilinogen, UA 06/23/2025 Negative  <2.0 EU/dL Final    Nitrites, UA 06/23/2025 Negative   Negative Final    Leukocyte Esterase, UA 06/23/2025 Negative  Negative Final    WBC 06/23/2025 7.37  3.90 - 12.70 K/uL Final    RBC 06/23/2025 4.13  4.00 - 5.40 M/uL Final    Hgb 06/23/2025 12.3  12.0 - 16.0 gm/dL Final    Hct 06/23/2025 37.1  37.0 - 48.5 % Final    MCV 06/23/2025 90  82 - 98 fL Final    MCH 06/23/2025 29.8  27.0 - 31.0 pg Final    MCHC 06/23/2025 33.2  32.0 - 36.0 g/dL Final    RDW 06/23/2025 14.2  11.5 - 14.5 % Final    Platelet Count 06/23/2025 320  150 - 450 K/uL Final    MPV 06/23/2025 10.2  9.2 - 12.9 fL Final    Nucleated RBC 06/23/2025 0  <=0 /100 WBC Final    Neut % 06/23/2025 55.0  38 - 73 % Final    Lymph % 06/23/2025 21.2  18 - 48 % Final    Mono % 06/23/2025 15.9 (H)  4 - 15 % Final    Eos % 06/23/2025 7.1  0 - 8 % Final    Basophil % 06/23/2025 0.3  <=1.9 % Final    Imm Grans % 06/23/2025 0.5  0.0 - 0.5 % Final    Neut # 06/23/2025 4.1  1.8 - 7.7 K/uL Final    Lymph # 06/23/2025 1.56  1 - 4.8 K/uL Final    Mono # 06/23/2025 1.17 (H)  0.3 - 1 K/uL Final    Eos # 06/23/2025 0.52 (H)  <=0.5 K/uL Final    Baso # 06/23/2025 0.02  <=0.2 K/uL Final    Imm Grans # 06/23/2025 0.04  0.00 - 0.04 K/uL Final    Mild elevation in immature granulocytes is non specific and can be seen in a variety of conditions including stress response, acute inflammation, trauma and pregnancy. Correlation with other laboratory and clinical findings is essential.    POC Lactate 06/23/2025 1.28  0.5 - 2.2 mmol/L Final    Sample 06/23/2025 VENOUS   Final    Cholesterol Total 06/23/2025 210 (H)  120 - 199 mg/dL Final    The National Cholesterol Education Program (NCEP) has set the  following guidelines (reference ranges) for Cholesterol:  Optimal.....................<200 mg/dL  Borderline High.............200-239 mg/dL  High........................> or = 240 mg/dL    Triglyceride 06/23/2025 121  30 - 150 mg/dL Final    The National Cholesterol Education Program (NCEP) has set the  following guidelines (reference values)  for triglycerides:  Normal......................<150 mg/dL  Borderline High.............150-199 mg/dL  High........................200-499 mg/dL      HDL Cholesterol 06/23/2025 56  40 - 75 mg/dL Final    The National Cholesterol Education Program (NCEP) has set the  following guidelines (reference values) for HDL Cholesterol:  Low...............<40 mg/dL  Optimal...........>60 mg/dL    LDL Cholesterol 06/23/2025 129.8  63.0 - 159.0 mg/dL Final    The National Cholesterol Education Program (NCEP) has set the  following guidelines (reference values) for LDL Cholesterol:  Optimal.......................<130 mg/dL  Borderline High...............130-159 mg/dL  High..........................160-189 mg/dL  Very High.....................>190 mg/dL      HDL/Cholesterol Ratio 06/23/2025 26.7  20.0 - 50.0 % Final    Cholesterol/HDL Ratio 06/23/2025 3.8  2.0 - 5.0 Final    Non HDL Cholesterol 06/23/2025 154  mg/dL Final    Risk category and Non-HDL cholesterol goals:  Coronary heart disease (CHD)or equivalent (10-year risk of CHD >20%):  Non-HDL cholesterol goal     <130 mg/dL  Two or more CHD risk factors and 10-year risk of CHD <= 20%:  Non-HDL cholesterol goal     <160 mg/dL  0 to 1 CHD risk factor:  Non-HDL cholesterol goal     <190 mg/dL    Lactic Acid Level 06/23/2025 0.7  0.5 - 1.9 mmol/L Final    Magnesium 06/24/2025 1.9  1.6 - 2.6 mg/dL Final    Phosphorus Level 06/24/2025 3.6  2.7 - 4.5 mg/dL Final    Sodium 06/24/2025 139  136 - 145 mmol/L Final    Potassium 06/24/2025 3.5  3.5 - 5.1 mmol/L Final    Chloride 06/24/2025 106  95 - 110 mmol/L Final    CO2 06/24/2025 28  23 - 29 mmol/L Final    Glucose 06/24/2025 96  70 - 110 mg/dL Final    BUN 06/24/2025 3 (L)  8 - 23 mg/dL Final    Creatinine 06/24/2025 0.6  0.5 - 1.4 mg/dL Final    Calcium 06/24/2025 8.6 (L)  8.7 - 10.5 mg/dL Final    Protein Total 06/24/2025 5.4 (L)  6.0 - 8.4 gm/dL Final    Albumin 06/24/2025 2.8 (L)  3.5 - 5.2 g/dL Final    Bilirubin Total  06/24/2025 1.0  0.1 - 1.0 mg/dL Final    For infants and newborns, interpretation of results should be based   on gestational age, weight and in agreement with clinical   observations.    Premature Infant recommended reference ranges:   0-24 hours:  <8.0 mg/dL   24-48 hours: <12.0 mg/dL   3-5 days:    <15.0 mg/dL   6-29 days:   <15.0 mg/dL    ALP 06/24/2025 79  55 - 135 unit/L Final    AST 06/24/2025 16  10 - 40 unit/L Final    ALT 06/24/2025 16  10 - 44 unit/L Final    Anion Gap 06/24/2025 5 (L)  8 - 16 mmol/L Final    eGFR 06/24/2025 >60  >60 mL/min/1.73/m2 Final    WBC 06/24/2025 6.27  3.90 - 12.70 K/uL Final    RBC 06/24/2025 3.68 (L)  4.00 - 5.40 M/uL Final    Hgb 06/24/2025 10.7 (L)  12.0 - 16.0 gm/dL Final    Hct 06/24/2025 33.3 (L)  37.0 - 48.5 % Final    MCV 06/24/2025 91  82 - 98 fL Final    MCH 06/24/2025 29.1  27.0 - 31.0 pg Final    MCHC 06/24/2025 32.1  32.0 - 36.0 g/dL Final    RDW 06/24/2025 14.4  11.5 - 14.5 % Final    Platelet Count 06/24/2025 286  150 - 450 K/uL Final    MPV 06/24/2025 9.9  9.2 - 12.9 fL Final    Nucleated RBC 06/24/2025 0  <=0 /100 WBC Final    Neut % 06/24/2025 48.9  38 - 73 % Final    Lymph % 06/24/2025 27.1  18 - 48 % Final    Mono % 06/24/2025 17.4 (H)  4 - 15 % Final    Eos % 06/24/2025 5.6  0 - 8 % Final    Basophil % 06/24/2025 0.5  <=1.9 % Final    Imm Grans % 06/24/2025 0.5  0.0 - 0.5 % Final    Neut # 06/24/2025 3.1  1.8 - 7.7 K/uL Final    Lymph # 06/24/2025 1.70  1 - 4.8 K/uL Final    Mono # 06/24/2025 1.09 (H)  0.3 - 1 K/uL Final    Eos # 06/24/2025 0.35  <=0.5 K/uL Final    Baso # 06/24/2025 0.03  <=0.2 K/uL Final    Imm Grans # 06/24/2025 0.03  0.00 - 0.04 K/uL Final    Mild elevation in immature granulocytes is non specific and can be seen in a variety of conditions including stress response, acute inflammation, trauma and pregnancy. Correlation with other laboratory and clinical findings is essential.    Magnesium 06/25/2025 1.9  1.6 - 2.6 mg/dL Final     Phosphorus Level 06/25/2025 4.3  2.7 - 4.5 mg/dL Final    WBC 06/25/2025 5.53  3.90 - 12.70 K/uL Final    RBC 06/25/2025 3.53 (L)  4.00 - 5.40 M/uL Final    Hgb 06/25/2025 10.3 (L)  12.0 - 16.0 gm/dL Final    Hct 06/25/2025 31.3 (L)  37.0 - 48.5 % Final    MCV 06/25/2025 89  82 - 98 fL Final    MCH 06/25/2025 29.2  27.0 - 31.0 pg Final    MCHC 06/25/2025 32.9  32.0 - 36.0 g/dL Final    RDW 06/25/2025 14.0  11.5 - 14.5 % Final    Platelet Count 06/25/2025 281  150 - 450 K/uL Final    MPV 06/25/2025 9.9  9.2 - 12.9 fL Final    Nucleated RBC 06/25/2025 0  <=0 /100 WBC Final    Neut % 06/25/2025 44.7  38 - 73 % Final    Lymph % 06/25/2025 30.0  18 - 48 % Final    Mono % 06/25/2025 17.7 (H)  4 - 15 % Final    Eos % 06/25/2025 6.7  0 - 8 % Final    Basophil % 06/25/2025 0.5  <=1.9 % Final    Imm Grans % 06/25/2025 0.4  0.0 - 0.5 % Final    Neut # 06/25/2025 2.5  1.8 - 7.7 K/uL Final    Lymph # 06/25/2025 1.66  1 - 4.8 K/uL Final    Mono # 06/25/2025 0.98  0.3 - 1 K/uL Final    Eos # 06/25/2025 0.37  <=0.5 K/uL Final    Baso # 06/25/2025 0.03  <=0.2 K/uL Final    Imm Grans # 06/25/2025 0.02  0.00 - 0.04 K/uL Final    Mild elevation in immature granulocytes is non specific and can be seen in a variety of conditions including stress response, acute inflammation, trauma and pregnancy. Correlation with other laboratory and clinical findings is essential.   Orders Only on 06/22/2025   Component Date Value Ref Range Status    QRS Duration 06/22/2025 92  ms Final    [facility] QTC Calculation 06/22/2025 440  ms Final   Lab Requisition on 06/12/2025   Component Date Value Ref Range Status    Color, UA 06/12/2025 Yellow  Yellow, Straw, Flor Final    Appearance, UA 06/12/2025 Clear  Clear Final    Spec Grav UA 06/12/2025 1.025  1.005 - 1.030 Final    pH, UA 06/12/2025 6.0  5.0 - 8.0 Final    Protein, UA 06/12/2025 Negative  Negative Final    Recommend a 24 hr urine protein/creatinine ratio if globulin induced proteinuria is clinically  suspected.    Glucose, UA 06/12/2025 Negative  Negative Final    Ketones, UA 06/12/2025 Negative  Negative Final    Blood, UA 06/12/2025 Negative  Negative Final    Bilirubin, UA 06/12/2025 Negative  Negative Final    Urobilinogen, UA 06/12/2025 Negative  <2.0 EU/dL Final    Nitrites, UA 06/12/2025 Negative  Negative Final    Leukocyte Esterase, UA 06/12/2025 Negative  Negative Final        Assessment:       1. Nausea and vomiting, unspecified vomiting type    2. S/P laparoscopic cholecystectomy    3. Hx of pancreatitis    4. Urinary tract infection without hematuria, site unspecified    5. Moderate protein-calorie malnutrition    6. Gastritis without bleeding, unspecified chronicity, unspecified gastritis type    7. Duodenal ulcer    8. Dementia, unspecified dementia severity, unspecified dementia type, unspecified whether behavioral, psychotic, or mood disturbance or anxiety    9. GISELE (obstructive sleep apnea)    10. Recurrent UTI        Plan:       Assessment & Plan    PLAN SUMMARY:  - Increase protein-rich food consumption to address low albumin levels  - Switch Protonix to Rabeprazole 20 mg twice daily  - Continue scopolamine patch every 3 days, Reglan before meals and at bedtime, and Zofran as needed  - Consider reintroduction of THC gummies at reduced dose for anxiety and appetite management  - Order repeat EGD and upper GI series  - Referral to pulmonology for hypoxemia evaluation and potential oxygen therapy  - Referral to urology for frequent UTI evaluation  - Continue small, frequent meals for symptom management  - Complete GI consult  - Follow-up to monitor effects of THC reintroduction and evaluate symptom management    ACUTE PANCREATITIS:  - Reviewed recent hospital admissions for acute pancreatitis and subsequent ER visits.  - Diagnosis was confirmed via CT findings and mildly elevated lipase levels.  - MRCP was unremarkable with no evidence of duct blockage.  - Follow-up with GI.     NAUSEA AND  VOMITING:  - Ms. Espinoza has shown improvement in symptoms with current medication regimen, reporting 5 days without vomiting.  - Previously experienced severe nausea and vomiting, including thick bile after hospital discharge.  - Recent ER visit found no definitive cause for these symptoms.  - Completed GI consult and ordered repeat EGD and upper GI series for further investigation.  - Medication plan includes switching Protonix to Rabeprazole 20 mg twice daily, continuing scopolamine patch every 3 days, Reglan 15 minutes before meals and at bedtime, and Zofran as needed.  - Advised continuing small, frequent meals for symptom management.    DEMENTIA WITH ANXIETY:  - Ms. Espinoza experiences anxiety, especially at sundown, related to dementia.  - Recommend cautious trial of reduced-dose THC gummies for management, with monitoring for any adverse effects.    OBSTRUCTIVE SLEEP APNEA:  - Ms. Espinoza has obstructive sleep apnea with desaturation during sleep.  - Evaluated management options given CPAP intolerance.  - Daughter wants to know if she will qualify for oxygen.   - Referred to pulmonology for further evaluation and management.    HYPOXEMIA:  - SpO2 drops to 88% during sleep and typically measures 90-92% during the day.  - Referred to pulmonology for evaluation of hypoxemia and potential oxygen therapy needs.    URINARY TRACT INFECTION (E. COLI):  - Ms. Espinoza has had 2 confirmed E. coli UTIs within a 3-month period.  - Was discharged on antibiotics for UTI after recent ER visit.  - Explained potential causes of recurrent UTI symptoms, including possibility of atrophic vaginitis.  - Referred to urology for further evaluation due to frequent infections.    GASTRITIS AND DUODENAL ULCER  - Ms. Espinoza has been experiencing chronic nausea and vomiting, possibly related to gastritis.  - Ordered repeat EGD and upper GI series to evaluate gastritis and other potential causes.  - Medication management includes  switching from Protonix to Rabeprazole 20 mg twice daily and continuing scopolamine patches, Zofran, and Reglan for nausea control.    PLASMA-PROTEIN METABOLISM DISORDER:  - Recent labs showed low albumin level, indicating a protein metabolism issue.  - Other CMP results were normal.  - Recommend increasing consumption of protein-rich foods to address low albumin levels.    CANNABIS USE:  - Ms. Espinoza has been off THC for 3 months and is experiencing looping thoughts and anxiety, especially at sundown.  - Notably, patient did not have nausea and vomiting while previously using THC, suggesting it may not be contributing to current symptoms.  - Considering reintroduction of THC gummies at reduced dose to manage anxiety and potentially help with appetite, with careful monitoring for any effect on nausea and vomiting symptoms.        Nausea and vomiting, unspecified vomiting type  -     scopolamine (TRANSDERM-SCOP) 1.3-1.5 mg (1 mg over 3 days); Place 1 patch onto the skin Every 3 (three) days.  Dispense: 10 patch; Refill: 3    S/P laparoscopic cholecystectomy    Hx of pancreatitis    Urinary tract infection without hematuria, site unspecified    Moderate protein-calorie malnutrition    Gastritis without bleeding, unspecified chronicity, unspecified gastritis type    Duodenal ulcer    Dementia, unspecified dementia severity, unspecified dementia type, unspecified whether behavioral, psychotic, or mood disturbance or anxiety    GISELE (obstructive sleep apnea)  -     Ambulatory referral/consult to Pulmonology; Future; Expected date: 07/10/2025    Recurrent UTI  -     Ambulatory referral/consult to Urology; Future; Expected date: 07/10/2025    I spent a total of 50 minutes on the day of the visit.This includes face to face time and non-face to face time preparing to see the patient (eg, review of tests), obtaining and/or reviewing separately obtained history, documenting clinical information in the electronic or other  health record, independently interpreting results and communicating results to the patient/family/caregiver, or care coordinator.    Follow up in about 3 months (around 10/3/2025).          7/3/2025 DANY Madrigal, BRYANT    This note was generated with the assistance of ambient listening technology. Verbal consent was obtained by the patient and accompanying visitor(s) for the recording of patient appointment to facilitate this note. I attest to having reviewed and edited the generated note for accuracy, though some syntax or spelling errors may persist. Please contact the author of this note for any clarification.

## 2025-07-05 ENCOUNTER — LAB VISIT (OUTPATIENT)
Dept: LAB | Facility: HOSPITAL | Age: 75
End: 2025-07-05
Attending: NURSE PRACTITIONER
Payer: MEDICARE

## 2025-07-05 ENCOUNTER — E-VISIT (OUTPATIENT)
Dept: FAMILY MEDICINE | Facility: CLINIC | Age: 75
End: 2025-07-05
Payer: MEDICARE

## 2025-07-05 DIAGNOSIS — R82.998 DARK URINE: ICD-10-CM

## 2025-07-05 DIAGNOSIS — N39.0 RECURRENT UTI: Primary | ICD-10-CM

## 2025-07-05 LAB
BILIRUB UR QL STRIP.AUTO: NEGATIVE
CLARITY UR: CLEAR
COLOR UR AUTO: YELLOW
GLUCOSE UR QL STRIP: NEGATIVE
HGB UR QL STRIP: NEGATIVE
KETONES UR QL STRIP: NEGATIVE
LEUKOCYTE ESTERASE UR QL STRIP: ABNORMAL
MICROSCOPIC COMMENT: ABNORMAL
NITRITE UR QL STRIP: NEGATIVE
PH UR STRIP: 6 [PH]
PROT UR QL STRIP: NEGATIVE
RBC #/AREA URNS AUTO: <1 /HPF
SP GR UR STRIP: 1.02
SQUAMOUS #/AREA URNS AUTO: <1 /HPF
UROBILINOGEN UR STRIP-ACNC: NEGATIVE EU/DL
WBC #/AREA URNS AUTO: 7 /HPF

## 2025-07-05 PROCEDURE — 81003 URINALYSIS AUTO W/O SCOPE: CPT

## 2025-07-05 RX ORDER — CIPROFLOXACIN 500 MG/1
500 TABLET, FILM COATED ORAL 2 TIMES DAILY
Qty: 14 TABLET | Refills: 0 | Status: SHIPPED | OUTPATIENT
Start: 2025-07-05 | End: 2025-07-12

## 2025-07-07 ENCOUNTER — TELEPHONE (OUTPATIENT)
Dept: ENDOSCOPY | Facility: HOSPITAL | Age: 75
End: 2025-07-07
Payer: MEDICARE

## 2025-07-07 PROCEDURE — 99499 UNLISTED E&M SERVICE: CPT | Mod: ,,, | Performed by: NURSE PRACTITIONER

## 2025-07-07 NOTE — TELEPHONE ENCOUNTER
"Patient is rescheduled for a Upper Endoscopy (EGD) on 7/17/25 with Dr. CHASE Michaud  [facility]    EGD  Received: Today  Afsaneh Colón, NP  [facility]  Caller: Unspecified (Today,  1:23 PM)  Procedure: EGD    Diagnosis: GERD, Dysphagia, Weight loss, and Nausea/Vomiting    Procedure Timing: Within 12 weeks    *If within 4 weeks selected, please sona as high priority*    *If greater than 12 weeks, please select "5-12 weeks" and delay sending until 3 months prior to requested date*    Location: Hospital Based ([address], [facility], [facility])    Additional Scheduling Information: vascular dementia    Prep Specifications:N/A    Is the patient taking a GLP-1 Agonist:no    Have you attached a patient to this message: yes     "

## 2025-07-07 NOTE — PROGRESS NOTES
Patient ID: Skyler Espinoza is a 74 y.o. female.        E-Visit Time Tracking:             Chief Complaint: General Illness (Entered automatically based on patient selection in [employer].)      The patient initiated a request through [employer] on 7/5/2025 for evaluation and management with a chief complaint of General Illness (Entered automatically based on patient selection in [employer].)     I evaluated the questionnaire submission on 07/07/2025.    [facility]    7/5/2025  5:47 AM CDT - Filed by Allison Sainz (Proxy)   What do you need help with? Urinary Symptoms   Do you agree to participate in an E-Visit? Yes   If you have any of the following symptoms, please go to the nearest emergency room or call 911: I acknowledge   What is the main issue you would like addressed today? UTI symptoms   Do you have any of the following symptoms? ( Discomfort or pain passing urine, Passing urine more frequently, Cloudy urine, Discharge in urine, Other, None) Passing urine more frequently;  Cloudy urine   When did your concern begin? 7/5/2025   What medications or treatments have you used to help your symptoms? (Antibiotics, Cranberry products, Drinking more fluids, Painkillers, Other, None) Cranberry products;  Drinking more fluids   What does your urine look like? (Clear, Cloudy, Dark yellow, Light yellow, Pink or red (bloody), Foamy, Not sure) Cloudy   Have you had any of the following symptoms during the past 24 hours?   Urgency (a sudden and uncontrollable urge to urinate) (None, Mild, Moderate, Severe) Severe   Frequency (going to the toilet very often) (None, Mild, Moderate, Severe) Severe   Burning pain when urinating (None, Mild, Moderate, Severe) None   Incomplete bladder emptying (still feel like you need to urinate again after urination) (None, Mild, Moderate, Severe) None   Pain in the lower belly when youre not urinating. (None, Mild, Moderate, Severe) None   Please rate how much  discomfort these symptoms have caused in the last 24 hours. (None, Mild, Moderate, Severe) Severe   Have you had any of the following symptoms during the past 24 hours?   Blood seen in the urine (None, Mild, Moderate, Severe) None   Pain in the lower back on one or both sides (flank pain) (None, Mild, Moderate, Severe) None   Abnormal Vaginal Discharge (abnormal amount, color and/or odor) (None, Mild, Moderate, Severe) None   Discharge from the opening you urinate from (urethra) when not urinating. (None, Mild, Moderate, Severe) None   Tell us how the symptoms have interfered with your every day activities/work in the past 24 hours. (None, Mild, Moderate, Severe) Severe   Do you have a fever? (Less than 100.4°F, 100.4°F or greater, No, Not sure) No   Are you a diabetic? No   If you are female, please tell us if you have any of the following right now (as youre completing the questionnaire): (Menstrual period (menses), PMS (Premenstrual syndrome),Signs of menopause such as hot flashes, Pregnancy, None) None   Do you have a history of kidney stones? No   Provide any information you feel is important to your history not asked above Mom has had severe urgency. She has been up to urinate at least every hour.  Shes been more confused. Shes conplaining of being extremely tired.   Please attach any relevant images or files (if you have performed a home test for UTI, please submit a photo of results)    Are you able to take your vitals? No       Lab Visit on 07/05/2025   Component Date Value Ref Range Status    Color, UA 07/05/2025 Yellow  Yellow, Straw, Flor Final    Appearance, UA 07/05/2025 Clear  Clear Final    Spec Grav UA 07/05/2025 1.020  1.005 - 1.030 Final    pH, UA 07/05/2025 6.0  5.0 - 8.0 Final    Protein, UA 07/05/2025 Negative  Negative Final    Recommend a 24 hr urine protein/creatinine ratio if globulin induced proteinuria is clinically suspected.    Glucose, UA 07/05/2025 Negative  Negative Final     Ketones, UA 07/05/2025 Negative  Negative Final    Blood, UA 07/05/2025 Negative  Negative Final    Bilirubin, UA 07/05/2025 Negative  Negative Final    Urobilinogen, UA 07/05/2025 Negative  <2.0 EU/dL Final    Nitrites, UA 07/05/2025 Negative  Negative Final    Leukocyte Esterase, UA 07/05/2025 1+ (A)  Negative Final    RBC, UA 07/05/2025 <1  <=4 /HPF Final    WBC, UA 07/05/2025 7 (H)  <=5 /HPF Final    Squamous Epithelial Cells, UA 07/05/2025 <1  /HPF Final    Microscopic Comment 07/05/2025    Final    Other formed elements not mentioned in the report are not present in the microscopic examination.     Encounter Diagnosis   Name Primary?    Recurrent UTI Yes        Orders Placed This Encounter   Procedures    Urinalysis, Reflex to Urine Culture Urine, Clean Catch     Preferred Collection Type:   Urine, Clean Catch     Specimen Source:   Urine     Send normal result to authorizing provider's In Basket if patient is active on MyChart::   Yes      Medications Ordered This Encounter   Medications    ciprofloxacin HCl (CIPRO) 500 MG tablet     Sig: Take 1 tablet (500 mg total) by mouth 2 (two) times daily. for 7 days     Dispense:  14 tablet     Refill:  0      Patient recently treated for UTI while in the hospital. She completed the full course of antibiotics. Urine culture at the time showed multiple organisms, none in predominance. UTI symptoms returned on 7/5 with increased frequency. Urinalysis showed +1 leukocytes. Unfortunately it did not reflex to a urine culture. Will treat symptoms with Cipro. She was referred to Urology last week due to recurrent UTIs. Discussed possibility of atrophic vaginitis causing symptoms. Will have patient evaluated by Urology.     Follow up if symptoms worsen or fail to improve.

## 2025-07-08 ENCOUNTER — TELEPHONE (OUTPATIENT)
Dept: ENDOSCOPY | Facility: HOSPITAL | Age: 75
End: 2025-07-08
Payer: MEDICARE

## 2025-07-08 ENCOUNTER — EXTERNAL HOME HEALTH (OUTPATIENT)
Dept: HOME HEALTH SERVICES | Facility: HOSPITAL | Age: 75
End: 2025-07-08
Payer: MEDICARE

## 2025-07-08 NOTE — PROGRESS NOTES
Subjective:       Patient ID: Skyler Espinoza is a 74 y.o. female.    Chief Complaint: Post-op Evaluation      HPI:  S/p cholecystectomy. Reports having painful and nauseated first week post op but now much improved. Still having some chronic nausea but seems better. Able to tolerate most meals. No fever. No jaundice. Path benign.     GALLBLADDER, CHOLECYSTECTOMY     - CHRONIC CHOLECYSTITIS     Review of Systems    Objective:      Physical Exam  Constitutional:       General: She is not in acute distress.  Pulmonary:      Effort: Pulmonary effort is normal. No respiratory distress.   Abdominal:      General: There is no distension.      Palpations: Abdomen is soft.      Tenderness: There is no abdominal tenderness. There is no guarding or rebound.   Skin:     Comments: Incisions are clean, dry and intact  There is no evidence of infection, hematoma or seroma    Neurological:      Mental Status: She is alert and oriented to person, place, and time.   Psychiatric:         Behavior: Behavior is cooperative.         Assessment/Plan:   Chronic cholecystitis    Biliary dyskinesia      S/p lap ruth. path benign. No evidence of complications. Nausea little better. Has been able to eat a little better since surgery. RTC PRN     
Statement Selected

## 2025-07-14 ENCOUNTER — PATIENT OUTREACH (OUTPATIENT)
Dept: ADMINISTRATIVE | Facility: HOSPITAL | Age: 75
End: 2025-07-14
Payer: MEDICARE

## 2025-07-14 NOTE — PROGRESS NOTES
Chart review of [facility] Patients with Rising Risk of ED/Admission Report    Pt was seen in ED on 6-26-25 for nausea / vomting. Pt had ED f/u appt with PCP on 7-3-25.

## 2025-07-16 ENCOUNTER — TELEPHONE (OUTPATIENT)
Dept: ENDOSCOPY | Facility: HOSPITAL | Age: 75
End: 2025-07-16
Payer: MEDICARE

## 2025-07-16 ENCOUNTER — PATIENT MESSAGE (OUTPATIENT)
Dept: GASTROENTEROLOGY | Facility: CLINIC | Age: 75
End: 2025-07-16
Payer: MEDICARE

## 2025-07-16 NOTE — TELEPHONE ENCOUNTER
Patient is rescheduled for a Upper Endoscopy (EGD) on 9/8/25 with CHASE Michaud. Referral for procedure from [facility].

## 2025-07-21 DIAGNOSIS — R11.2 NAUSEA AND VOMITING, UNSPECIFIED VOMITING TYPE: ICD-10-CM

## 2025-07-22 RX ORDER — ONDANSETRON 8 MG/1
TABLET, ORALLY DISINTEGRATING ORAL
Qty: 20 TABLET | Refills: 1 | OUTPATIENT
Start: 2025-07-22

## 2025-07-23 ENCOUNTER — HOSPITAL ENCOUNTER (OUTPATIENT)
Dept: RADIOLOGY | Facility: HOSPITAL | Age: 75
Discharge: HOME | End: 2025-07-23
Payer: MEDICARE

## 2025-07-23 ENCOUNTER — PATIENT MESSAGE (OUTPATIENT)
Dept: FAMILY MEDICINE | Facility: CLINIC | Age: 75
End: 2025-07-23
Payer: MEDICARE

## 2025-07-23 DIAGNOSIS — E44.0 MODERATE PROTEIN-CALORIE MALNUTRITION: ICD-10-CM

## 2025-07-23 DIAGNOSIS — Z87.19 HX OF PANCREATITIS: ICD-10-CM

## 2025-07-23 DIAGNOSIS — R11.0 NAUSEA: ICD-10-CM

## 2025-07-23 DIAGNOSIS — R11.2 NAUSEA AND VOMITING, UNSPECIFIED VOMITING TYPE: ICD-10-CM

## 2025-07-23 DIAGNOSIS — K21.9 GASTROESOPHAGEAL REFLUX DISEASE, UNSPECIFIED WHETHER ESOPHAGITIS PRESENT: Primary | ICD-10-CM

## 2025-07-23 DIAGNOSIS — E06.3 HYPOTHYROIDISM DUE TO HASHIMOTO THYROIDITIS: ICD-10-CM

## 2025-07-23 DIAGNOSIS — I10 HTN (HYPERTENSION), BENIGN: ICD-10-CM

## 2025-07-23 PROCEDURE — A9698 NON-RAD CONTRAST MATERIALNOC: HCPCS

## 2025-07-23 PROCEDURE — 74248 X-RAY SM INT F-THRU STD: CPT | Mod: 26,,, | Performed by: RADIOLOGY

## 2025-07-23 PROCEDURE — 25500020 PHARM REV CODE 255

## 2025-07-23 PROCEDURE — 74240 X-RAY XM UPR GI TRC 1CNTRST: CPT | Mod: 26,,, | Performed by: RADIOLOGY

## 2025-07-23 PROCEDURE — 74240 X-RAY XM UPR GI TRC 1CNTRST: CPT | Mod: TC,FY

## 2025-07-23 RX ADMIN — BARIUM SULFATE 200 ML: 0.6 SUSPENSION ORAL at 08:07

## 2025-07-24 ENCOUNTER — PATIENT MESSAGE (OUTPATIENT)
Dept: FAMILY MEDICINE | Facility: CLINIC | Age: 75
End: 2025-07-24
Payer: MEDICARE

## 2025-07-24 ENCOUNTER — PATIENT MESSAGE (OUTPATIENT)
Dept: GASTROENTEROLOGY | Facility: CLINIC | Age: 75
End: 2025-07-24
Payer: MEDICARE

## 2025-07-24 ENCOUNTER — LAB VISIT (OUTPATIENT)
Dept: LAB | Facility: HOSPITAL | Age: 75
End: 2025-07-24
Attending: NURSE PRACTITIONER
Payer: MEDICARE

## 2025-07-24 ENCOUNTER — TELEPHONE (OUTPATIENT)
Dept: ENDOSCOPY | Facility: HOSPITAL | Age: 75
End: 2025-07-24
Payer: MEDICARE

## 2025-07-24 VITALS — HEIGHT: 68 IN | WEIGHT: 223.75 LBS | BODY MASS INDEX: 33.91 KG/M2

## 2025-07-24 DIAGNOSIS — I10 HTN (HYPERTENSION), BENIGN: ICD-10-CM

## 2025-07-24 DIAGNOSIS — N39.0 RECURRENT UTI: ICD-10-CM

## 2025-07-24 DIAGNOSIS — N30.01 ACUTE CYSTITIS WITH HEMATURIA: Primary | ICD-10-CM

## 2025-07-24 DIAGNOSIS — K21.9 GASTROESOPHAGEAL REFLUX DISEASE, UNSPECIFIED WHETHER ESOPHAGITIS PRESENT: ICD-10-CM

## 2025-07-24 DIAGNOSIS — R30.0 DYSURIA: ICD-10-CM

## 2025-07-24 DIAGNOSIS — R11.2 NAUSEA AND VOMITING, UNSPECIFIED VOMITING TYPE: ICD-10-CM

## 2025-07-24 DIAGNOSIS — Z87.19 HX OF PANCREATITIS: ICD-10-CM

## 2025-07-24 DIAGNOSIS — E06.3 HYPOTHYROIDISM DUE TO HASHIMOTO THYROIDITIS: ICD-10-CM

## 2025-07-24 DIAGNOSIS — K21.9 GASTROESOPHAGEAL REFLUX DISEASE, UNSPECIFIED WHETHER ESOPHAGITIS PRESENT: Primary | ICD-10-CM

## 2025-07-24 DIAGNOSIS — E44.0 MODERATE PROTEIN-CALORIE MALNUTRITION: ICD-10-CM

## 2025-07-24 DIAGNOSIS — N39.0 RECURRENT UTI: Primary | ICD-10-CM

## 2025-07-24 LAB
ABSOLUTE EOSINOPHIL (SMH): 0.27 K/UL
ABSOLUTE MONOCYTE (SMH): 0.88 K/UL (ref 0.3–1)
ABSOLUTE NEUTROPHIL COUNT (SMH): 4.8 K/UL (ref 1.8–7.7)
ALBUMIN SERPL-MCNC: 4.1 G/DL (ref 3.5–5.2)
ALP SERPL-CCNC: 86 UNIT/L (ref 55–135)
ALT SERPL-CCNC: 16 UNIT/L (ref 10–44)
ANION GAP (SMH): 8 MMOL/L (ref 8–16)
AST SERPL-CCNC: 21 UNIT/L (ref 10–40)
BACTERIA #/AREA URNS AUTO: ABNORMAL /HPF
BASOPHILS # BLD AUTO: 0.06 K/UL
BASOPHILS NFR BLD AUTO: 0.7 %
BILIRUB SERPL-MCNC: 0.5 MG/DL (ref 0.1–1)
BILIRUB UR QL STRIP.AUTO: NEGATIVE
BUN SERPL-MCNC: 12 MG/DL (ref 8–23)
CALCIUM SERPL-MCNC: 9.2 MG/DL (ref 8.7–10.5)
CAOX CRY UR QL COMP ASSIST: ABNORMAL
CHLORIDE SERPL-SCNC: 106 MMOL/L (ref 95–110)
CLARITY UR: ABNORMAL
CO2 SERPL-SCNC: 28 MMOL/L (ref 23–29)
COLOR UR AUTO: YELLOW
CREAT SERPL-MCNC: 1.1 MG/DL (ref 0.5–1.4)
ERYTHROCYTE [DISTWIDTH] IN BLOOD BY AUTOMATED COUNT: 13.3 % (ref 11.5–14.5)
GFR SERPLBLD CREATININE-BSD FMLA CKD-EPI: 53 ML/MIN/1.73/M2
GLUCOSE SERPL-MCNC: 99 MG/DL (ref 70–110)
GLUCOSE UR QL STRIP: NEGATIVE
HCT VFR BLD AUTO: 39.6 % (ref 37–48.5)
HGB BLD-MCNC: 12.7 GM/DL (ref 12–16)
HGB UR QL STRIP: ABNORMAL
IMM GRANULOCYTES # BLD AUTO: 0.03 K/UL (ref 0–0.04)
IMM GRANULOCYTES NFR BLD AUTO: 0.3 % (ref 0–0.5)
KETONES UR QL STRIP: ABNORMAL
LEUKOCYTE ESTERASE UR QL STRIP: ABNORMAL
LIPASE SERPL-CCNC: 110 U/L (ref 4–60)
LYMPHOCYTES # BLD AUTO: 2.76 K/UL (ref 1–4.8)
MCH RBC QN AUTO: 29.5 PG (ref 27–31)
MCHC RBC AUTO-ENTMCNC: 32.1 G/DL (ref 32–36)
MCV RBC AUTO: 92 FL (ref 82–98)
MICROSCOPIC COMMENT: ABNORMAL
NITRITE UR QL STRIP: NEGATIVE
NUCLEATED RBC (/100WBC) (SMH): 0 /100 WBC
PH UR STRIP: 6 [PH]
PLATELET # BLD AUTO: 272 K/UL (ref 150–450)
PMV BLD AUTO: 10.6 FL (ref 9.2–12.9)
POTASSIUM SERPL-SCNC: 4 MMOL/L (ref 3.5–5.1)
PROT SERPL-MCNC: 6.5 GM/DL (ref 6–8.4)
PROT UR QL STRIP: ABNORMAL
RBC # BLD AUTO: 4.31 M/UL (ref 4–5.4)
RBC #/AREA URNS AUTO: 45 /HPF
RELATIVE EOSINOPHIL (SMH): 3.1 % (ref 0–8)
RELATIVE LYMPHOCYTE (SMH): 31.5 % (ref 18–48)
RELATIVE MONOCYTE (SMH): 10 % (ref 4–15)
RELATIVE NEUTROPHIL (SMH): 54.4 % (ref 38–73)
SODIUM SERPL-SCNC: 142 MMOL/L (ref 136–145)
SP GR UR STRIP: >=1.03
SQUAMOUS #/AREA URNS AUTO: 2 /HPF
TSH SERPL-ACNC: 1.79 UIU/ML (ref 0.34–5.6)
UROBILINOGEN UR STRIP-ACNC: ABNORMAL EU/DL
WBC # BLD AUTO: 8.76 K/UL (ref 3.9–12.7)
WBC #/AREA URNS AUTO: >100 /HPF
YEAST UR QL AUTO: ABNORMAL

## 2025-07-24 PROCEDURE — 83690 ASSAY OF LIPASE: CPT

## 2025-07-24 PROCEDURE — 82040 ASSAY OF SERUM ALBUMIN: CPT

## 2025-07-24 PROCEDURE — 85025 COMPLETE CBC W/AUTO DIFF WBC: CPT

## 2025-07-24 PROCEDURE — 81001 URINALYSIS AUTO W/SCOPE: CPT

## 2025-07-24 PROCEDURE — 87186 SC STD MICRODIL/AGAR DIL: CPT

## 2025-07-24 PROCEDURE — 36415 COLL VENOUS BLD VENIPUNCTURE: CPT

## 2025-07-24 PROCEDURE — 84443 ASSAY THYROID STIM HORMONE: CPT

## 2025-07-24 RX ORDER — SULFAMETHOXAZOLE AND TRIMETHOPRIM 800; 160 MG/1; MG/1
1 TABLET ORAL 2 TIMES DAILY
Qty: 14 TABLET | Refills: 0 | Status: SHIPPED | OUTPATIENT
Start: 2025-07-24 | End: 2025-07-31

## 2025-07-24 RX ORDER — ONDANSETRON 8 MG/1
8 TABLET, ORALLY DISINTEGRATING ORAL EVERY 8 HOURS PRN
Qty: 20 TABLET | Refills: 1 | Status: SHIPPED | OUTPATIENT
Start: 2025-07-24

## 2025-07-24 RX ORDER — PHENAZOPYRIDINE HYDROCHLORIDE 100 MG/1
100 TABLET, FILM COATED ORAL 3 TIMES DAILY PRN
Qty: 6 TABLET | Refills: 0 | Status: SHIPPED | OUTPATIENT
Start: 2025-07-24 | End: 2025-07-26

## 2025-07-24 RX ORDER — ONDANSETRON 8 MG/1
8 TABLET, ORALLY DISINTEGRATING ORAL EVERY 8 HOURS PRN
Qty: 20 TABLET | Refills: 1 | OUTPATIENT
Start: 2025-07-24

## 2025-07-24 NOTE — TELEPHONE ENCOUNTER
Patient Message    Sid,  Can you please call in a Zofran 8mg refill? Shes taking it 3 times a day. Today she completed the Barium swallow study. She vomited several times during the study but completed it in about 3 hours. We are home now and she just told me she thinks shes going to die. Could you order some follow-up bloodwork just to check her lipase, thyroid and overall health like a CBC and a CMP.  She looked a little jaundice to me when I was bathing her last night but I cant really tell. Im just hoping everything is resolved and yet she seemed really fragile to me today. The barium swallow study was a lot for her to endure but Im glad she was finally able to complete it.   Thanks,  Allison

## 2025-07-24 NOTE — TELEPHONE ENCOUNTER
"----- Message from Afsaneh Colón NP sent at 7/24/2025  7:53 AM CDT -----  Regarding: EGD  Procedure: EGD    Diagnosis: GERD and Nausea/Vomiting    Procedure Timing: Within 4 weeks (Urgent)    #If within 4 weeks selected, please sona as high priority#    #If greater than 12 weeks, please select "5-12 weeks" and delay sending until 3 months prior to requested date#     Location: Hospital Based ([address],  [place], [place])    Additional Scheduling Information: vascular dementia    Prep Specifications:N/A    Is the patient taking a GLP-1 Agonist:no    Have you attached a patient to this message: yes  "

## 2025-07-25 ENCOUNTER — OFFICE VISIT (OUTPATIENT)
Dept: UROLOGY | Facility: CLINIC | Age: 75
End: 2025-07-25
Payer: MEDICARE

## 2025-07-25 VITALS — WEIGHT: 223.75 LBS | BODY MASS INDEX: 33.91 KG/M2 | HEIGHT: 68 IN

## 2025-07-25 DIAGNOSIS — N39.0 RECURRENT UTI: Primary | ICD-10-CM

## 2025-07-25 DIAGNOSIS — N32.81 OAB (OVERACTIVE BLADDER): ICD-10-CM

## 2025-07-25 LAB — POC RESIDUAL URINE VOLUME: 18 ML (ref 0–100)

## 2025-07-25 PROCEDURE — 99999 PR PBB SHADOW E&M-EST. PATIENT-LVL V: CPT | Mod: PBBFAC,,, | Performed by: NURSE PRACTITIONER

## 2025-07-25 RX ORDER — ESTRADIOL 0.1 MG/G
1 CREAM VAGINAL
Qty: 42.5 G | Refills: 2 | Status: SHIPPED | OUTPATIENT
Start: 2025-07-28 | End: 2025-07-25 | Stop reason: SDUPTHER

## 2025-07-25 RX ORDER — ESTRADIOL 0.1 MG/G
1 CREAM VAGINAL
Qty: 42.5 G | Refills: 2 | Status: SHIPPED | OUTPATIENT
Start: 2025-07-28

## 2025-07-25 NOTE — PATIENT INSTRUCTIONS
UTI Preventative DAILY supplements:  Recommend Buying a Cranberry Supplement that has 36mg PAC (only a few have this much) of cranberry - it is a very specific dose but many companies sell it.   Preferred: Utiva Cranberry Tablets (Utiva Cranberry PACs Supplement Pills $39.99 for 30 pills)- their particular tablet is the equivalent of 9 cranberry tablets that you buy over the counter. (phone 1-506.215.8049 or online https://[email or web address]/products/utiva-uti-control)  Uriexo Cranberry Tablets   TDI Bassline $30/month: https://[email or web address]/products/cranberex-urinary-health-support  If unable to afford- can use over the counter cranberry caplets but will need to take 1500mg daily (about 3 capsules, 3x a day)   Ok to Buy Over the Counter at [employer] (ask pharmacist for help) or buy from [employer] (see above)  Probiotic with lactobacillus - take once a day. This helps populate the vagina with good bacteria instead of bad bacteria- you can buy this over the counter or buy from the company [employer]. Make sure to look for LACTOBACILLUS on the bottle.   D-mannose supplement (2000mg a day)  $20/30d supply  This helps keep the bad bacteria from sticking to your bladder wall. You can buy this over the counter or buy from [employer] by visiting [employer].       UTI prevention recommendations  Drink more water to dilute the bacteria that are replicating. This will also help you urinate more often if you tend to hold your bladder.   Timed voiding (which means- Urinate every 2 hours during the day) to rid the bladder of bacteria before they multiply and start to stick to your bladder walls and cause more symptoms and problems  Avoid pads if possible or wear thinner pads and change them more often  Avoid constipation    As long as patient has no history of breast cancer and post-menopausal:  Vaginal Hormone cream (Estrace, Premarin or Compounded) you place vaginally 2x a week using your  finger(if no history of heart attack, blood clots, cervical, breast, uterine or ovarian cancer). This will help the good bacteria replenish in the vagina. Similar to a probiotic. Lack of hormones in the vagina in post-menopausal women is a common cause of UTIs in women.   Use nightly for 2 weeks and then every other night after indefinitely around the urethra and into the vagina using finger for application. See d/c instructions for details if too expensive. The patient denies any history of breast cancer, uterine cancer, cervical cancer, abnormal pap smears.   Hormone cream- why you need it, how to use it  We all have bacteria that cover our bodies, however we want good bacteria, not bad bacteria. When you lack hormones,  your vagina starts to let the bad bacteria take over because there is an imbalance. The hormone cream allows for good bacteria to take over again and this can help decrease the risks of UTIs. It can also help with vaginal dryness.   Please let your provider know if you have a history of breast cancer, uterine cancer, cervical cancer or a history of blood clots or heart attack.  If it costs more than $70   we can write you for a compounded less expensive form of estrace cream from [employer]. Please let us know. They will mail it to you for around $8 or you can pick it up in [place]. They will call you for payment first and you can ask the address. If you haven't heard from them in 2 days, please call them to confirm they received the prescription.   Can also use goodrx  Do not use the applicator, just your finger. This will make it last longer. Apply small smear to 2nd knuckle. Apply around urethra and into vagina to 2nd knuckle. Prescription should last around 6 months. Apply inside vagina with finger daily x 2 weeks and then 2 to 3x per week after this indefinitely.  You will only see improvement if you use on a regular basis in about a month. (less dryness, maybe some less overactive  "bladder, less UTIs possibly if having UTIs).   It is not a medicine to use as needed. Typically a lifelong medication.       Other helpful information:    If you have a UTI try to self treat first for 1-2 days with conservative treatment:  Increase fluid intake - drink 4 to 5 bottles of water a day and empty bladder often  AZO for burning or urinary frequency (over the counter)  Utiva cranberry tablets when having uti symptoms: Take 2x a day for 2 days then daily for a week (buy from [employer]). Visit https://www.[employer] or call 1-275.524.6244 to order. They costs about $30/month and offer a subscribe and save option. They also have a probiotic and D mannose supplementation, if the patient is able to afford, I suggest taking. Utiva cranberry tablets only available from [employer] are equivalent to the suggested dose of 9 tablets if you buy over the counter.   D mannose 2000mx a day for 2 days then daily for a week (can buy from [employer] or over the counter)    If your symptoms persists despite increased water intake and azo then:  see pcp, gynecologist, or urgent care and make sure to give a clean catch urine or catheterized urine. This means wipe, urinate in the toilet, then provide a MID STREAM sample (your hand should get urine on it if you are doing it right). This avoids urine picking up the normal bacteria that line the vagina on the way out to the cup.   ask for a URINE CULTURE. You need to make sure you know what antibiotics will kill your particular bacteria  if you are told you have "multiple organisms" or "normal vaginal geoffrey" it means the urine sample picked up the normal bacteria in the vagina and contaminated your urine specimen. If you are still having symptoms of a UTI then you will need to provide ANOTHER clean catch sample or CATHETERIZED urine to bypass the vagina and get urine directly from the bladder:     If you are given antibiotics from primary care, ER, urgent care or " "gynecologist:  only take 3 to 5 days of the antibiotics (unless you have diabetes or a urologist tells you take take more), even if they give you a 10d supply and keep or discard the rest  only take the full 10 days if you are having fever, chills or pain in your kidneys     Things to remember:   Try to avoid antibiotics or at least try to avoid taking them for more than 3 to 5 days for simple uti.    Bacteria are smart and they will become resistant to antibiotics. We have only a limited amount of antibiotics that work.   ALWAYS request a urine culture so you can know which antibiotics work.   If you ever see bloody red urine call us ASAP, even with uti's and even if you are on a blood thinner, this can sometimes be a sign of bladder cancer or kidney stones.     If you do have uti symptoms but do not have a culture proven uti (with E.coli, for example)  You may need a catheterized urine if it says "multiple organisms" which means normal vaginal geoffrey  You may have a kidney stone, interstitial cystitis, overactive bladder, bladder cancer, so please call to make a follow-up     Once we rule out any anatomic obstruction and have given UTI recs, we will see for follow-up and then will have you return to your PCP/primary care physician  for symptomatic UTI treatment wit the information and recommendations we have given them.          Conservative measures to control urgency and frequency including   1. Avoiding/minimizing bladder irritants (see below), especially in afternoon and evening hours    Discussed bladder irritants include coffee (even decaf), tea, alcohol, soda, spicy foods, acidic juices (orange, tomato), vinegar, and artificial sweeteners/sugary beverages.    2. timed voiding - empty on a schedule (approx ~2-3 hours) in spite of need to urinate, to get ahead of urge    3. dont postponing voiding - dont hold it on purpose     4. bowel regimen as distended bowel has extrinsic compressive effect on bladder.   - " any or all of the following in any combination, titrate to soft daily bowel movement without pushing or straining  - colace/stool softener capsule - once to twice daily  - miralax - 1 capful daily to start, can increase to 2x daily (or decrease to 1/2 cap daily)  - increase dietary fibery  - fiber supplements, such as metamucil  - prunes, prune juice    5. INCREASE water intake    6. Stop fluids 2 hours before bed, and urinate just before bed

## 2025-07-25 NOTE — PROGRESS NOTES
[facility]  Group: Saul/Juany/Ramone/Anand  NPs: Angella Garcia/Wendy Richards    Note today written by:Wendy Richards  Date of Service: 07/25/2025      CHIEF COMPLAINT: Recurrent UTI.    PRESENTING ILLNESS:    Skyler Espinoza is a 74 y.o. female who presents for recurrent UTI. This is her initial clinic visit     7/25/25  Pt presents for recurrent UTI  Accompanied by her daughter to appt  Currently taking bactrim from PCP, started yesterday  Culture in process  UTI symptoms: dysuria   Had pinkish colored spot on pad but unsure if  or vaginal   No blood in urine stream or when she collected urine in specimen cup yesterday  Denies flank pain with UTI  Since starting bactrim and pyridium yesterday, dysuria has improved  PVR 18 ml    Baseline voiding: voids every 2 hours, + mixed urinary incontinence  Changes pad with each void for good hygiene.   Mild urinary incontinence most of the time. Rarely has saturated pad.    Hx of dementia    + constipation, controled with miralax and prunes    Water intake: 3 16oz glasses per day  Cup of sprite and coke    Denies hx of hysterectomy or bladder lifts  Denies hx of breast or GYN cancer, MI or blood clot    6/23/25 CT abd pelvis with contrast  The kidneys are symmetric. No hydronephrosis. No asymmetric perinephric fat stranding.   Urinary bladder unremarkable     History of kidney stones: denies  Personal or family hx of  malignancy: denies  History of  trauma: denies  Smoking history: never smoked    Urine cultures:   Lab Results   Component Value Date    LABURIN (A) 07/24/2025     >100,000 cfu/ml Gram-negative alfredito - nonfermenter species    LABURIN  06/26/2025     Multiple organisms isolated. None in predominance. Repeat if clinically necessary.    LABURIN >100,000 cfu/ml Escherichia coli ESBL (A) 05/30/2025    LABURIN >100,000 cfu/ml Escherichia coli ESBL (A) 05/29/2025    LABURIN No Growth 04/25/2025     "LABURIN  04/22/2025     Multiple organisms isolated. None in predominance. Repeat if clinically necessary.    LABURIN  04/10/2025     Multiple organisms isolated. None in predominance. Repeat if clinically necessary.    LABURIN Final report (A) 02/15/2025    LABURIN  07/05/2012     MULTIPLE ORGANISMS ISOLATED. NONE IN PREDOMINANCE. REPEAT IF CLINICALLY NECESSARY.         REVIEW OF SYSTEMS: as stated above in hpi    PATIENT HISTORY:  Past Medical History:   Diagnosis Date    Dementia without behavioral disturbance     Depression     GERD (gastroesophageal reflux disease)     Hashimoto's disease     Hyperlipidemia     Hypertension     Hypothyroid     Sleep apnea, unspecified 2015       Past Surgical History:   Procedure Laterality Date    APPENDECTOMY      COLONOSCOPY  2017    Briana, repeat in 10    ESOPHAGOGASTRODUODENOSCOPY N/A 04/24/2025    Procedure: EGD (ESOPHAGOGASTRODUODENOSCOPY);  Surgeon: Maxim Garzon MD;  Location: [facility] ENDO;  Service: Endoscopy;  Laterality: N/A;    LAPAROSCOPIC CHOLECYSTECTOMY N/A 6/18/2025    Procedure: CHOLECYSTECTOMY, LAPAROSCOPIC;  Surgeon: Felix Buenrostro III, MD;  Location: [facility] OR;  Service: General;  Laterality: N/A;    NECK SURGERY      "burning of the nerves to help with her migraines"    TONSILLECTOMY      TUBAL LIGATION         Allergies:  Patient has no known allergies.      PHYSICAL EXAMINATION:  Constitutional: She appears well-developed and well-nourished.  She is in no apparent distress.  Abdominal:  She exhibits no distension.  There is no CVA tenderness.   Neurological: She is alert.   Psych: Cooperative with normal affect.      Physical Exam      LABS:      Lab Results   Component Value Date    CREATININE 1.1 07/24/2025     Lab Results   Component Value Date    HGBA1C 5.1 04/25/2025         IMPRESSION:    Encounter Diagnoses   Name Primary?    Recurrent UTI Yes    OAB (overactive bladder)        PLAN:  -Urine culture in process, will call once culture " completed Monday  -CT 6/2025 completed, no hydro or stones reported on CT  -UTI Preventative DAILY supplements:  Recommend Buying a Cranberry Supplement that has 36mg PAC (only a few have this much) of cranberry - it is a very specific dose but many companies sell it.   Preferred: Utiva Cranberry Tablets (Utiva Cranberry PACs Supplement Pills $39.99 for 30 pills)- their particular tablet is the equivalent of 9 cranberry tablets that you buy over the counter. (phone 1-983.368.7274 or online https://www.[employer]/products/utiva-uti-control)  Uriexo Cranberry Tablets   Zumper $30/month: [email or web address]/cranberex-urinary-health-support  If unable to afford- can use over the counter cranberry caplets but will need to take 1500mg daily (about 3 capsules, 3x a day)   Ok to Buy Over the Counter at [facility], [facility] (ask pharmacist for help) or buy from [employer] (see above)  Probiotic with lactobacillus - take once a day. This helps populate the vagina with good bacteria instead of bad bacteria- you can buy this over the counter or buy from the company [employer]. Make sure to look for LACTOBACILLUS on the bottle.   D-mannose supplement (2000mg a day)  $20/30d supply  This helps keep the bad bacteria from sticking to your bladder wall. You can buy this over the counter or buy from [employer] by visiting [employer].       UTI prevention recommendations  Drink more water to dilute the bacteria that are replicating. This will also help you urinate more often if you tend to hold your bladder.   Timed voiding (which means- Urinate every 2 hours during the day) to rid the bladder of bacteria before they multiply and start to stick to your bladder walls and cause more symptoms and problems  Avoid pads if possible or wear thinner pads and change them more often  Avoid constipation    As long as patient has no history of breast cancer and post-menopausal:  Vaginal Hormone cream  (Estrace, Premarin or Compounded) you place vaginally 2x a week using your finger(if no history of heart attack, blood clots, cervical, breast, uterine or ovarian cancer). This will help the good bacteria replenish in the vagina. Similar to a probiotic. Lack of hormones in the vagina in post-menopausal women is a common cause of UTIs in women.   Use nightly for 2 weeks and then every other night after indefinitely around the urethra and into the vagina using finger for application. See d/c instructions for details if too expensive. The patient denies any history of breast cancer, uterine cancer, cervical cancer, abnormal pap smears.   Hormone cream- why you need it, how to use it  We all have bacteria that cover our bodies, however we want good bacteria, not bad bacteria. When you lack hormones,  your vagina starts to let the bad bacteria take over because there is an imbalance. The hormone cream allows for good bacteria to take over again and this can help decrease the risks of UTIs. It can also help with vaginal dryness.   Please let your provider know if you have a history of breast cancer, uterine cancer, cervical cancer or a history of blood clots or heart attack.  If it costs more than $70   we can write you for a compounded less expensive form of estrace cream from [employer]. Please let us know. They will mail it to you for around $8 or you can pick it up in [place]. They will call you for payment first and you can ask the address. If you haven't heard from them in 2 days, please call them to confirm they received the prescription.   Can also use goodrx  Do not use the applicator, just your finger. This will make it last longer. Apply small smear to 2nd knuckle. Apply around urethra and into vagina to 2nd knuckle. Prescription should last around 6 months. Apply inside vagina with finger daily x 2 weeks and then 2 to 3x per week after this indefinitely.  You will only see improvement if you use on a  "regular basis in about a month. (less dryness, maybe some less overactive bladder, less UTIs possibly if having UTIs).   It is not a medicine to use as needed. Typically a lifelong medication.     Discussed side effects and indications for estrace. Prescription sent to the pharmacy. Pt verbalized understanding.    Other helpful information:    If you have a UTI try to self treat first for 1-2 days with conservative treatment:  Increase fluid intake - drink 4 to 5 bottles of water a day and empty bladder often  AZO for burning or urinary frequency (over the counter)  Utiva cranberry tablets when having uti symptoms: Take 2x a day for 2 days then daily for a week (buy from [employer]). Visit https://www.[employer] or call 1-790.477.1763 to order. They costs about $30/month and offer a subscribe and save option. They also have a probiotic and D mannose supplementation, if the patient is able to afford, I suggest taking. Utiva cranberry tablets only available from [employer] are equivalent to the suggested dose of 9 tablets if you buy over the counter.   D mannose 2000mx a day for 2 days then daily for a week (can buy from [employer] or over the counter)    If your symptoms persists despite increased water intake and azo then:  see pcp, gynecologist, or urgent care and make sure to give a clean catch urine or catheterized urine. This means wipe, urinate in the toilet, then provide a MID STREAM sample (your hand should get urine on it if you are doing it right). This avoids urine picking up the normal bacteria that line the vagina on the way out to the cup.   ask for a URINE CULTURE. You need to make sure you know what antibiotics will kill your particular bacteria  if you are told you have "multiple organisms" or "normal vaginal geoffrey" it means the urine sample picked up the normal bacteria in the vagina and contaminated your urine specimen. If you are still having symptoms of a UTI then you will need to provide " "ANOTHER clean catch sample or CATHETERIZED urine to bypass the vagina and get urine directly from the bladder:     If you are given antibiotics from primary care, ER, urgent care or gynecologist:  only take 3 to 5 days of the antibiotics (unless you have diabetes or a urologist tells you take take more), even if they give you a 10d supply and keep or discard the rest  only take the full 10 days if you are having fever, chills or pain in your kidneys     Things to remember:   Try to avoid antibiotics or at least try to avoid taking them for more than 3 to 5 days for simple uti.    Bacteria are smart and they will become resistant to antibiotics. We have only a limited amount of antibiotics that work.   ALWAYS request a urine culture so you can know which antibiotics work.   If you ever see bloody red urine call us ASAP, even with uti's and even if you are on a blood thinner, this can sometimes be a sign of bladder cancer or kidney stones.     If you do have uti symptoms but do not have a culture proven uti (with E.coli, for example)  You may need a catheterized urine if it says "multiple organisms" which means normal vaginal geoffrey  You may have a kidney stone, interstitial cystitis, overactive bladder, bladder cancer, so please call to make a follow-up     Once we rule out any anatomic obstruction and have given UTI recs, we will see for follow-up and then will have you return to your PCP/primary care physician  for symptomatic UTI treatment wit the information and recommendations we have given them.      If UTIs persist with taking supplements and using estrace or if gross hematuria occurs with UTI, will need cysto with MD.     -Discussed OAB, urinary incontinence  Pt is interested in starting OAB medication  Pt has dementia and chronic constipation, will avoid anticholinergics  Pt take metoprolol  Prefer gemtesa  Discussed side effects and indications for gemtesa. Prescription sent to the pharmacy. Pt verbalized " understanding.  Goal is to avoid pad usage or reduce to thinnest pad possible    -Conservative measures to control urgency and frequency including   1. Avoiding/minimizing bladder irritants (see below), especially in afternoon and evening hours  Discussed bladder irritants include coffee (even decaf), tea, alcohol, soda, spicy foods, acidic juices (orange, tomato), vinegar, and artificial sweeteners/sugary beverages.  2. timed voiding - empty on a schedule (approx ~2-3 hours) in spite of need to urinate, to get ahead of urge  3. dont postponing voiding - dont hold it on purpose   4. bowel regimen as distended bowel has extrinsic compressive effect on bladder.   - any or all of the following in any combination, titrate to soft daily bowel movement without pushing or straining  - colace/stool softener capsule - once to twice daily  - miralax - 1 capful daily to start, can increase to 2x daily (or decrease to 1/2 cap daily)  - increase dietary fibery  - fiber supplements, such as metamucil  - prunes, prune juice  5. INCREASE water intake  6. Stop fluids 2 hours before bed, and urinate just before bed      -RTC 3 months    I encouraged her or any of her family members to call or email me with questions and/or concerns.      60 minutes of total time spent on the encounter, which includes face to face time and non-face to face time preparing to see the patient (eg, review of tests), Obtaining and/or reviewing separately obtained history, Documenting clinical information in the electronic or other health record, Independently interpreting results (not separately reported) and communicating results to the patient/family/caregiver, or Care coordination (not separately reported).

## 2025-07-26 LAB — BACTERIA UR CULT: ABNORMAL

## 2025-07-30 ENCOUNTER — OFFICE VISIT (OUTPATIENT)
Dept: PULMONOLOGY | Facility: CLINIC | Age: 75
End: 2025-07-30
Payer: MEDICARE

## 2025-07-30 ENCOUNTER — DOCUMENT SCAN (OUTPATIENT)
Dept: HOME HEALTH SERVICES | Facility: HOSPITAL | Age: 75
End: 2025-07-30
Payer: MEDICARE

## 2025-07-30 VITALS
OXYGEN SATURATION: 95 % | HEIGHT: 68 IN | SYSTOLIC BLOOD PRESSURE: 121 MMHG | WEIGHT: 221.81 LBS | DIASTOLIC BLOOD PRESSURE: 81 MMHG | HEART RATE: 84 BPM | BODY MASS INDEX: 33.62 KG/M2

## 2025-07-30 DIAGNOSIS — G47.33 OSA (OBSTRUCTIVE SLEEP APNEA): ICD-10-CM

## 2025-07-30 DIAGNOSIS — R06.02 SHORTNESS OF BREATH: Primary | ICD-10-CM

## 2025-07-30 PROCEDURE — 3074F SYST BP LT 130 MM HG: CPT | Mod: CPTII,S$GLB,, | Performed by: NURSE PRACTITIONER

## 2025-07-30 PROCEDURE — 1101F PT FALLS ASSESS-DOCD LE1/YR: CPT | Mod: CPTII,S$GLB,, | Performed by: NURSE PRACTITIONER

## 2025-07-30 PROCEDURE — 1159F MED LIST DOCD IN RCRD: CPT | Mod: CPTII,S$GLB,, | Performed by: NURSE PRACTITIONER

## 2025-07-30 PROCEDURE — 3079F DIAST BP 80-89 MM HG: CPT | Mod: CPTII,S$GLB,, | Performed by: NURSE PRACTITIONER

## 2025-07-30 PROCEDURE — 3288F FALL RISK ASSESSMENT DOCD: CPT | Mod: CPTII,S$GLB,, | Performed by: NURSE PRACTITIONER

## 2025-07-30 PROCEDURE — 3044F HG A1C LEVEL LT 7.0%: CPT | Mod: CPTII,S$GLB,, | Performed by: NURSE PRACTITIONER

## 2025-07-30 PROCEDURE — 99213 OFFICE O/P EST LOW 20 MIN: CPT | Mod: S$GLB,,, | Performed by: NURSE PRACTITIONER

## 2025-07-30 PROCEDURE — 3008F BODY MASS INDEX DOCD: CPT | Mod: CPTII,S$GLB,, | Performed by: NURSE PRACTITIONER

## 2025-07-30 PROCEDURE — 1157F ADVNC CARE PLAN IN RCRD: CPT | Mod: CPTII,S$GLB,, | Performed by: NURSE PRACTITIONER

## 2025-07-30 PROCEDURE — 99999 PR PBB SHADOW E&M-EST. PATIENT-LVL V: CPT | Mod: PBBFAC,,, | Performed by: NURSE PRACTITIONER

## 2025-07-30 NOTE — PROGRESS NOTES
"7/30/2025    Skyler Espinoza  Office Note    Chief Complaint   Patient presents with    4m f/u       HPI:   7/30/2025- in office with Son and daughter-in-law, using cane for ambulation. Attempted to complete home sleep study but was not able to finish.   Daughter noticed her oxygenation level dropped while sleeping to 87%.    States labored breathing with facial flushing while walking to the bathroom.       3/26/2025- in office with daughter, dx moderate microvascular dementia.   Dx sleep apnea established patient Dr. Blakely. Stopped CPAP last year due to not tolerating mask.   Complaint of waking up nightly to urinate.     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, no known Asbestosis exposure, no Smoking Hx  Family Hx: Lung Cancer, COPD, Asthma  Medical Hx: previous pneumonia ; previous shoulder/chest surgery      The chief compliant  problem varies with instablilty at time    PFSH:  Past Medical History:   Diagnosis Date    Dementia without behavioral disturbance     Depression     GERD (gastroesophageal reflux disease)     Hashimoto's disease     Hyperlipidemia     Hypertension     Hypothyroid     Sleep apnea, unspecified 2015         Past Surgical History:   Procedure Laterality Date    APPENDECTOMY      COLONOSCOPY  2017    Briana, repeat in 10    ESOPHAGOGASTRODUODENOSCOPY N/A 04/24/2025    Procedure: EGD (ESOPHAGOGASTRODUODENOSCOPY);  Surgeon: Maxim Garzon MD;  Location: [facility] ENDO;  Service: Endoscopy;  Laterality: N/A;    LAPAROSCOPIC CHOLECYSTECTOMY N/A 6/18/2025    Procedure: CHOLECYSTECTOMY, LAPAROSCOPIC;  Surgeon: Felix Buenrostro III, MD;  Location: [facility] OR;  Service: General;  Laterality: N/A;    NECK SURGERY      "burning of the nerves to help with her migraines"    TONSILLECTOMY      TUBAL LIGATION       Social History[1]  Family History   Problem Relation Name Age of Onset    Cancer Father      Cancer Sister      " "Breast cancer Daughter      Glaucoma Neg Hx      Macular degeneration Neg Hx      Retinal detachment Neg Hx      Ovarian cancer Neg Hx      Eczema Neg Hx      Lupus Neg Hx      Melanoma Neg Hx      Psoriasis Neg Hx       Review of patient's allergies indicates:  No Known Allergies  I have reviewed past medical, family, and social history. I have reviewed previous nurse notes.        Performance Status:The patient's activity level is no limits with regular activity.        Review of Systems:  a review of eleven systems covering constitutional, Eye, HEENT, Psych, Respiratory, Cardiac, GI, , Musculoskeletal, Endocrine, Dermatologic was negative except for pertinent findings as listed ABOVE and below: pertinent positive as above, rest is good  Fatigue  Shortness of breath with exertion         Exam:Comprehensive exam done. /81 (BP Location: Right arm, Patient Position: Sitting)   Pulse 84   Ht 5' 8" (1.727 m)   Wt 100.6 kg (221 lb 12.5 oz)   SpO2 95% Comment: on room air at rest  BMI 33.72 kg/m²   Exam included Vitals as listed, and patient's appearance and affect and alertness and mood, oral exam for yeast and hygiene and pharynx lesions and Mallapatti (M) score, neck with inspection for jvd and masses and thyroid abnormalities and lymph nodes (supraclavicular and infraclavicular nodes and axillary also examined and noted if abn), chest exam included symmetry and effort and fremitus and percussion and auscultation, cardiac exam included rhythm and gallops and murmur and rubs and jvd and edema, abdominal exam for mass and hepatosplenomegaly and tenderness and hernias and bowel sounds, Musculoskeletal exam with muscle tone and posture and mobility/gait and  strength, and skin for rashes and cyanosis and pallor and turgor, extremity for clubbing.  Findings were normal except for pertinent findings listed below:   M2  Breath sounds clear      Radiographs (ct chest and cxr) reviewed: CXR  patient imaging " studies reviewed and interpreted independently. My personal interpretation of most resent images include:      CT Abdomen 06/23/2025 Trace right pleural effusion, subsegmental atelectasis. Elevated right hemidiaphragm    X-Ray Chest AP Portable 11/15/2024 Lungs clear    Labs: Patients labs reviewed including CBC and CMP  reviewed       Lab Results   Component Value Date    WBC 8.76 07/24/2025    RBC 4.31 07/24/2025    HGB 12.7 07/24/2025    HCT 39.6 07/24/2025    MCV 92 07/24/2025    MCH 29.5 07/24/2025    MCHC 32.1 07/24/2025    RDW 13.3 07/24/2025     07/24/2025    MPV 10.6 07/24/2025    GRAN 3.9 11/15/2024    GRAN 52.7 11/15/2024    LYMPH 32.6 06/26/2025    LYMPH 1.89 06/26/2025    MONO 13.5 06/26/2025    MONO 0.78 06/26/2025    EOS 5.5 06/26/2025    EOS 0.32 06/26/2025    BASO 0.05 11/15/2024    EOSINOPHIL 4.4 11/15/2024    BASOPHIL 0.7 06/26/2025    BASOPHIL 0.04 06/26/2025      Latest Reference Range & Units 11/13/22 01:59 01/19/24 14:05 10/06/24 00:52 11/15/24 17:05   CO2 23 - 29 mmol/L 26 24 26 30 (H)   (H): Data is abnormally high    Body mass index is 33.72 kg/m². Morbid obesity complicates all aspects of disease management from diagnostic modalities to treatment. Weight loss encouraged and health benefits explained to patient. Nutritional counseling and physical activity encouraged.       Plan:  Clinical impression is apparently straight forward and impression with management as below.    Skyler was seen today for 4m f/u.    Diagnoses and all orders for this visit:    Shortness of breath  -     Complete PFT without bronchodilator - Clinic; Future  -     Six Minute Walk Test to qualify for Home Oxygen; Future    GISELE (obstructive sleep apnea)  Comments:  - cancel home sleep study for now  - overnight pulse oximeter to evaluate for supplemental oxygen  Orders:  -     Ambulatory referral/consult to Pulmonology        Follow up in about 3 months (around 10/30/2025), or if symptoms worsen or fail to  improve.      Discussed with patient above for education the following:      There are no Patient Instructions on file for this visit.           [1]   Social History  Tobacco Use    Smoking status: Never    Smokeless tobacco: Never   Substance Use Topics    Alcohol use: Not Currently    Drug use: No

## 2025-07-31 ENCOUNTER — OFFICE VISIT (OUTPATIENT)
Dept: ORTHOPEDICS | Facility: CLINIC | Age: 75
End: 2025-07-31
Payer: MEDICARE

## 2025-07-31 VITALS — WEIGHT: 221.81 LBS | BODY MASS INDEX: 33.62 KG/M2 | HEIGHT: 68 IN

## 2025-07-31 DIAGNOSIS — M17.12 PRIMARY OSTEOARTHRITIS OF LEFT KNEE: ICD-10-CM

## 2025-07-31 DIAGNOSIS — M17.11 PRIMARY OSTEOARTHRITIS OF RIGHT KNEE: Primary | ICD-10-CM

## 2025-07-31 PROCEDURE — 99999 PR PBB SHADOW E&M-EST. PATIENT-LVL III: CPT | Mod: PBBFAC,,, | Performed by: ORTHOPAEDIC SURGERY

## 2025-07-31 RX ORDER — TRIAMCINOLONE ACETONIDE 40 MG/ML
40 INJECTION, SUSPENSION INTRA-ARTICULAR; INTRAMUSCULAR
Status: DISCONTINUED | OUTPATIENT
Start: 2025-07-31 | End: 2025-07-31 | Stop reason: HOSPADM

## 2025-07-31 RX ADMIN — TRIAMCINOLONE ACETONIDE 40 MG: 40 INJECTION, SUSPENSION INTRA-ARTICULAR; INTRAMUSCULAR at 02:07

## 2025-07-31 NOTE — PROGRESS NOTES
"Past Medical History:   Diagnosis Date    Dementia without behavioral disturbance     Depression     GERD (gastroesophageal reflux disease)     Hashimoto's disease     Hyperlipidemia     Hypertension     Hypothyroid     Sleep apnea, unspecified 2015       Past Surgical History:   Procedure Laterality Date    APPENDECTOMY      COLONOSCOPY  2017    Briana, repeat in 10    ESOPHAGOGASTRODUODENOSCOPY N/A 04/24/2025    Procedure: EGD (ESOPHAGOGASTRODUODENOSCOPY);  Surgeon: Maxmi Garzon MD;  Location: [facility] ENDO;  Service: Endoscopy;  Laterality: N/A;    LAPAROSCOPIC CHOLECYSTECTOMY N/A 6/18/2025    Procedure: CHOLECYSTECTOMY, LAPAROSCOPIC;  Surgeon: Felix Buenrostro III, MD;  Location: [facility] OR;  Service: General;  Laterality: N/A;    NECK SURGERY      "burning of the nerves to help with her migraines"    TONSILLECTOMY      TUBAL LIGATION         Current Outpatient Medications   Medication Sig    acetaminophen (TYLENOL) 650 MG TbSR Take 650 mg by mouth every 8 (eight) hours.    azelastine (ASTELIN) 137 mcg (0.1 %) nasal spray 1 spray (137 mcg total) by Nasal route nightly. Point up and slightly outward toward ear when spraying to avoid irritating nasal septum. With flonase.    cetirizine (ZYRTEC) 10 MG tablet Take 10 mg by mouth every evening.    estradioL (ESTRACE) 0.01 % (0.1 mg/gram) vaginal cream Place 1 gram vaginally twice a week.    fluticasone propionate (FLONASE) 50 mcg/actuation nasal spray 1 spray by Each Nostril route every evening.    HYDROcodone-acetaminophen (NORCO) 5-325 mg per tablet Take 1 tablet by mouth every 6 (six) hours as needed for Pain.    levothyroxine (SYNTHROID) 112 MCG tablet Take 1 tablet (112 mcg total) by mouth before breakfast.    mag carb/aluminum hydrox/algin (GAVISCON EXTRA STRENGTH ORAL) Take 1 tablet by mouth 3 (three) times daily with meals. Pt takes 30min after meals takes tablet form    magnesium oxide (MAG-OX) 400 mg (241.3 mg magnesium) tablet Take 1 tablet by mouth once " daily.    metoprolol succinate (TOPROL-XL) 100 MG 24 hr tablet TAKE 1 TABLET(100 MG) BY MOUTH DAILY    multivitamin (THERAGRAN) tablet Take 1 tablet by mouth once daily.    ondansetron (ZOFRAN-ODT) 8 MG TbDL Take 1 tablet (8 mg total) by mouth every 8 (eight) hours as needed (nausea and vomiting).    promethazine (PHENERGAN) 25 MG suppository Place 1 suppository (25 mg total) rectally every 6 (six) hours as needed for Nausea.    RABEprazole (ACIPHEX) 20 mg tablet Take 1 tablet (20 mg total) by mouth 2 (two) times daily.    riboflavin, vitamin B2, 400 mg Tab Take 400 mg by mouth once daily.    rizatriptan (MAXALT) 10 MG tablet Take 1 tablet (10 mg total) by mouth daily as needed for Migraine.    scopolamine (TRANSDERM-SCOP) 1.3-1.5 mg (1 mg over 3 days) Place 1 patch onto the skin Every 3 (three) days.    sucralfate (CARAFATE) 100 mg/mL suspension Take 10 mLs (1 g total) by mouth 4 (four) times daily.    sulfamethoxazole-trimethoprim 800-160mg (BACTRIM DS) 800-160 mg Tab Take 1 tablet by mouth 2 (two) times daily. for 7 days    thiamine 100 MG tablet Take 100 mg by mouth once daily.    tiZANidine (ZANAFLEX) 4 MG tablet Take 1 tablet (4 mg total) by mouth 2 (two) times daily as needed (headache).    triamcinolone acetonide 0.1% (KENALOG) 0.1 % cream Apply topically 2 (two) times daily.    vibegron 75 mg Tab Take 1 tablet (75 mg total) by mouth once daily.     Current Facility-Administered Medications   Medication    onabotulinumtoxina injection 200 Units       Review of patient's allergies indicates:   Allergen Reactions    Lidocaine      Hand edema,  redness    Penicillins Rash       Family History   Problem Relation Name Age of Onset    Cancer Father      Cancer Sister      Breast cancer Daughter      Glaucoma Neg Hx      Macular degeneration Neg Hx      Retinal detachment Neg Hx      Ovarian cancer Neg Hx      Eczema Neg Hx      Lupus Neg Hx      Melanoma Neg Hx      Psoriasis Neg Hx         Social History      Socioeconomic History    Marital status:    Tobacco Use    Smoking status: Never    Smokeless tobacco: Never   Substance and Sexual Activity    Alcohol use: Not Currently    Drug use: No    Sexual activity: Yes     Social Drivers of Health     Financial Resource Strain: Patient Declined (6/23/2025)    Overall Financial Resource Strain (CARDIA)     Difficulty of Paying Living Expenses: Patient declined   Recent Concern: Financial Resource Strain - Medium Risk (5/5/2025)    Overall Financial Resource Strain (CARDIA)     Difficulty of Paying Living Expenses: Somewhat hard   Food Insecurity: Patient Declined (6/23/2025)    Hunger Vital Sign     Worried About Running Out of Food in the Last Year: Patient declined     Ran Out of Food in the Last Year: Patient declined   Transportation Needs: Patient Declined (6/23/2025)    PRAPARE - Transportation     Lack of Transportation (Medical): Patient declined     Lack of Transportation (Non-Medical): Patient declined   Physical Activity: Inactive (4/24/2025)    Exercise Vital Sign     Days of Exercise per Week: 0 days     Minutes of Exercise per Session: 0 min   Stress: Patient Declined (6/23/2025)    [employer] - Occupational Stress Questionnaire     Feeling of Stress : Patient declined   Housing Stability: Patient Declined (6/23/2025)    Housing Stability Vital Sign     Unable to Pay for Housing in the Last Year: Patient declined     Number of Times Moved in the Last Year: 0     Homeless in the Last Year: Patient declined       Chief Complaint:   No chief complaint on file.      History of present illness: This is a 74-year-old female who has had a history of knee arthritis since 2014.  Patient previously had good results with both the Euflexxa and cortisone.  Unfortunately, the Euflexxa is no longer working. Pain is up to an 3/10.      Review of systems:   Musculoskeletal: See history of present illness    Physical  examination:    Vital Signs:    There were no vitals filed for this visit.      There is no height or weight on file to calculate BMI.    This a well-developed, well nourished patient in no acute distress.  They are alert and oriented and cooperative to examination.  Pt. walks without an antalgic gait.      Examination of bilateral knee shows no rashes or erythema. There are no masses ecchymosis or effusion. Patient has full range of motion from 0-130°. Patient is nontender to palpation over lateral joint line and moderately tender to palpation over the medial joint line.  Knee is stable to varus and valgus stress. 5 out of 5 motor strength. Palpable distal pulses. Intact light touch sensation.  Severe Patellofemoral crepitus      X-rays: 4 views of both knees are reviewed which show arthritic changes with severe changes of the patellofemoral joint and more moderate changes of the medial compartment of both knees .  No significant change  X-rays of the right shoulder  reviewed which show some mild degenerative changes well-maintained glenohumeral joint space     Assessment:: Bilateral knee osteoarthritis    Plan:  I reviewed the x-ray with her today.  We discussed treatment options for her knee arthritis.  I injected both knees with cortisone.

## 2025-07-31 NOTE — PROCEDURES
Large Joint Aspiration/Injection: bilateral knee    Date/Time: 7/31/2025 2:00 PM    Performed by: David Chan MD  Authorized by: David Chan MD    Site marked: the procedure site was marked    Timeout: prior to procedure the correct patient, procedure, and site was verified    Prep: patient was prepped and draped in usual sterile fashion      Local anesthesia used?: Yes    Anesthesia:  Local infiltration  Local anesthetic: Ropivicaine.  Anesthetic total (ml):  3      Details:  Needle Size:  22 G  Ultrasonic Guidance for needle placement?: No    Approach:  Anterolateral  Location:  Knee  Laterality:  Bilateral  Site:  Bilateral knee  Medications (Right):  40 mg triamcinolone acetonide 40 mg/mL  Medications (Left):  40 mg triamcinolone acetonide 40 mg/mL  Patient tolerance:  Patient tolerated the procedure well with no immediate complications

## 2025-08-05 ENCOUNTER — LAB REQUISITION (OUTPATIENT)
Dept: LAB | Facility: HOSPITAL | Age: 75
End: 2025-08-05
Payer: MEDICARE

## 2025-08-05 DIAGNOSIS — N39.0 URINARY TRACT INFECTION, SITE NOT SPECIFIED: ICD-10-CM

## 2025-08-05 PROCEDURE — 81003 URINALYSIS AUTO W/O SCOPE: CPT | Performed by: NURSE PRACTITIONER

## 2025-08-06 ENCOUNTER — TELEPHONE (OUTPATIENT)
Dept: NEUROLOGY | Facility: CLINIC | Age: 75
End: 2025-08-06
Payer: MEDICARE

## 2025-08-06 ENCOUNTER — OFFICE VISIT (OUTPATIENT)
Facility: CLINIC | Age: 75
End: 2025-08-06
Payer: MEDICARE

## 2025-08-06 VITALS
SYSTOLIC BLOOD PRESSURE: 129 MMHG | BODY MASS INDEX: 33.89 KG/M2 | DIASTOLIC BLOOD PRESSURE: 84 MMHG | WEIGHT: 222.88 LBS | HEART RATE: 85 BPM

## 2025-08-06 DIAGNOSIS — G43.709 CHRONIC MIGRAINE WITHOUT AURA WITHOUT STATUS MIGRAINOSUS, NOT INTRACTABLE: Primary | ICD-10-CM

## 2025-08-06 PROCEDURE — 1100F PTFALLS ASSESS-DOCD GE2>/YR: CPT | Mod: CPTII,S$GLB,, | Performed by: NURSE PRACTITIONER

## 2025-08-06 PROCEDURE — 1159F MED LIST DOCD IN RCRD: CPT | Mod: CPTII,S$GLB,, | Performed by: NURSE PRACTITIONER

## 2025-08-06 PROCEDURE — 1160F RVW MEDS BY RX/DR IN RCRD: CPT | Mod: CPTII,S$GLB,, | Performed by: NURSE PRACTITIONER

## 2025-08-06 PROCEDURE — 99999 PR PBB SHADOW E&M-EST. PATIENT-LVL IV: CPT | Mod: PBBFAC,,, | Performed by: NURSE PRACTITIONER

## 2025-08-06 PROCEDURE — 3288F FALL RISK ASSESSMENT DOCD: CPT | Mod: CPTII,S$GLB,, | Performed by: NURSE PRACTITIONER

## 2025-08-06 PROCEDURE — 3079F DIAST BP 80-89 MM HG: CPT | Mod: CPTII,S$GLB,, | Performed by: NURSE PRACTITIONER

## 2025-08-06 PROCEDURE — 1157F ADVNC CARE PLAN IN RCRD: CPT | Mod: CPTII,S$GLB,, | Performed by: NURSE PRACTITIONER

## 2025-08-06 PROCEDURE — 3044F HG A1C LEVEL LT 7.0%: CPT | Mod: CPTII,S$GLB,, | Performed by: NURSE PRACTITIONER

## 2025-08-06 PROCEDURE — 3074F SYST BP LT 130 MM HG: CPT | Mod: CPTII,S$GLB,, | Performed by: NURSE PRACTITIONER

## 2025-08-06 PROCEDURE — 99215 OFFICE O/P EST HI 40 MIN: CPT | Mod: S$GLB,,, | Performed by: NURSE PRACTITIONER

## 2025-08-06 PROCEDURE — 1126F AMNT PAIN NOTED NONE PRSNT: CPT | Mod: CPTII,S$GLB,, | Performed by: NURSE PRACTITIONER

## 2025-08-06 PROCEDURE — 3008F BODY MASS INDEX DOCD: CPT | Mod: CPTII,S$GLB,, | Performed by: NURSE PRACTITIONER

## 2025-08-06 RX ORDER — RIZATRIPTAN BENZOATE 10 MG/1
10 TABLET ORAL
Qty: 16 TABLET | Refills: 2 | Status: SHIPPED | OUTPATIENT
Start: 2025-08-06 | End: 2025-08-06

## 2025-08-06 RX ORDER — RIZATRIPTAN BENZOATE 10 MG/1
10 TABLET ORAL
Qty: 16 TABLET | Refills: 2 | Status: SHIPPED | OUTPATIENT
Start: 2025-08-06

## 2025-08-06 RX ORDER — RIZATRIPTAN BENZOATE 10 MG/1
10 TABLET, ORALLY DISINTEGRATING ORAL
Qty: 12 TABLET | Refills: 5 | Status: SHIPPED | OUTPATIENT
Start: 2025-08-06

## 2025-08-06 NOTE — Clinical Note
Good morning,  I just saw this very pleasant patient of yours who you saw back in May.  They were somewhat hesitant to start Botox.  The daughter has a few friends who are my patients and requested my opinion on starting Botox.  I assured them Botox was a great option for her and frankly would be my recommendation as well.  They would like to proceed with Botox with you in [place] as they are located in [place].   Could a member of your staff please reach out to get her scheduled to initiate Botox?   Thanks, Jacquelyn Sanchez

## 2025-08-06 NOTE — PROGRESS NOTES
NEW PATIENT CONSULT  SUBJECTIVE:  Patient ID: Skyler Espinoza   MRN: [id number]  Referred By: Aaareferral Self  Chief Complaint: Consult    History of Present Illness:   74 y.o. female with dementia, tremor, migraine, HTN, sleep apnea, who presents to clinic with her daughter for evaluation of headaches.  Patient established with Neurology clinic, previous patient of Dr. Sid Soni in [place], seen 5/26/2025. She is a new patient to me.      History of Present Illness    Patient presents today as a new patient for management and evaluation of headaches.    She reports daily headaches occurring almost every day with occasional single-day breaks, experiencing more than 25 headache days per month meeting criteria for chronic migraine. Pain is characterized as occurring across the forehead and neck. This morning, she experienced severe pain behind the eye upon waking which subsequently resolved. She has a long-standing history of migraines dating back to her teenage years with no recent changes in frequency or severity.    She was previously seen by Dr. Sid Soni in [place] in May 2025 where trigger point injection was performed. The injection provided initial relief, but pain quickly returned by the time she reached the parking lot and required Dexamethasone administration upon returning home. Botox appointments for chronic migraine prevention were scheduled but subsequently canceled in June and July due to not feeling well enough and hesitation about the treatment. She expresses uncertainty about Botox effectiveness after the previous nerve-block injection's limited pain relief. Medicare has approved Botox for her treatment.    She currently manages headaches with Tizanidine 1 mg as needed for acute headaches, taken as a quarter of a 4 mg tablet due to previous blood pressure concerns. She also takes Tylenol 1000 mg with caffeine for headache management and Rizatriptan (Maxalt) for breakthrough headaches,  "limited to 4 tablets for 15 days. She notes Tizanidine helps reduce headache intensity though frequency remains unchanged. She reports tendency to conserve Rizatriptan due to limited quantity, often holding off on using medication.    She reports a history of multiple medication trials for chronic headaches. Previously, in the hospital, she received a combination of opioids and Benadryl which was ineffective. Nurtec ODT provided relief for 4-5 months, during which time she paid out of pocket monthly. She expresses frustration with Medicare's coverage of migraine medications, specifically Nurtec ODT, which was approved at a prohibitively high cost. She previously paid out of pocket for Nurtec ODT which was effective for a period of time but is no longer sustainable due to high costs.       NOTES FROM DR. AGUILAR:  HISTORY OF PRESENT ILLNESS  Skyler Espinoza is a 74 y.o. woman with depression, hypertension, autoimmune thyroid disease, obstructive sleep apnea off CPAP, moderate dementia (vascular and Alz), who is presenting to the [facility] for an office visit with a chief complaint of headache.     The patient is accompanied by her daughter who speaks for the patient. When asked what medical problems she has, the patient states "I like taking small children and throwing them out of the window." Daughter states that that she was in the ED for nausea/vomiting and she left with tizanidine, phenergan, and scopolamine patches. She has been on scopolamine patches continually for 2 weeks. She is mid-work-up for nausea and it has been suggested that maybe some of this nausea is from migraine.      The patient states that she has had headache sine teenage years. She has been following with Dr Abdul and Dr Medina (both private) for headache and behavioral dementia. She had blood work for dementia that returned positive for Alz. The patient had a cervical radiofrequency ablation a few months ago that was " effective for headache. They are waiting to hear back about repeating the procedure.      She does not have myasthenia gravis     NPV headache characteristics:  Headache Frequency:        Total: 30        Disablin     Duration of attacks: days  Timing to max pain: minutes   Time of day when headache is worse?: yes - night time  Headache location:               bilateral frontal and occipital              right retro-orbital  Quality: throbbing / pulsating, sharp/shooting/stabbing, and shock-like  Intensity: moderate to severe  Associated symptoms: photophobia, phonophobia, aggravation with routine physical activity, nausea, and delayed vomiting  Unilateral cranial autonomic features or restlessness: none - facial flushing on both sides   Premonitory symptoms: none  Aura symptoms:              Type: none              Duration: migraine aura duration: none  Headache Red Flags:        New (or very out-of-proportion to previous) daily, continuous, and progressive headache in a patient over 50 (especially if subacute): yes - 2 months headache much worse than her normal        Fevers/Drenching Night Sweats/Unintended Weight Loss/Hx of Cancer: flushes and shivering        Focal weakness: no        Severe 10/10 headache in <10 seconds (Thunderclap onset): yes - 2 days per week over the last 2 months she'll get shocking / stabbing        Start a headache with coughing/sneezing/bending over: no        Headache absolutely worse with certain positions (lying down vs standing up): yes - worse lying down        Start a headache with exercise or exertion: no        Double vision: no        Transient Visual Obscurations: no        Loss of color vision: no        Pulsatile or whooshing tinnitus: ringing on right side then on both sides  Triggers: no  Neck pain: yes - bilateral Radiation up  Jaw pain/cramping with chewing, e.g., starts/stops when chewing due to pain/fatigue, lockjaw/decreased opening or cramping (including  "tongue) when eating: no  Symptoms of dysautonomia: postural lightheadedness / dizziness, heart racing or skipped beats, feels full quickly (early satiety), difficulty with urination, dry mouth (causing cavities or loss of teeth), and dry eyes (requiring frequent use of eye drops)     Prior non-pharm treatments (CBT-Pain, PT, chiropractor, acupuncture, massage): no  History of asthma, constipation, kidney stones: yes - problematic constipation  Psychiatric comorbidities: yes - depression  History of Head Trauma: yes - falls with head injury. Recent slide out of bed and now there is a rail. Stool in bathroom with head injury within the las 9 mo  Hypertension, Hyperlipidemia: yes - both  Prior stroke: no  Coronary artery disease: no  Vascular malformation or aneurysm: no  Peripheral Vascular disease / Raynaud's: no     Caffeine use: yes - drinking coffee and avoiding pseudoephedrine     Sleep comorbidities: Snoring absent, witnessed apneas present, sleep study yes - GISELE but off CPAP     Family history of headaches:  yes - her mother and daughter     Last dilated eye exam: over 1 year ago   Any abnormalities? no     Menstrual status:  Did attacks start around menarche? yes  Does the patient currently get their period? no  Worsening of migraine during menses? no  Does the patient use contraception? Type? no  Is the patient currently pregnant or planning pregnancy in the near future? no  Is the patient menopausal? yes  Using HRT? no     Social History:  The patient lives in [place], [place].   Employment: no  Tobacco use: no  Alcohol use: no  Substances: no / THC gummies but stopped when she had hyperemesis  Mood: "It's ok - I have wonderful children and wonderful grandchildren" Daughter says whenever we talk about anything stressful she will get angry and she talks about shooting them (dogs)     The patient is unable to do the following activities easily because of their pain:  eating, dressing, using the toilet, bathing or " showering, cooking or preparing meals, cleaning, and shopping      ----------------     Current Acute Headache Medications:  -- Tizanidine 4 mg - very helpful  -- Reglan 5 mg  -- Rizatriptan 10 mg  -- Phenergan 25 mg suppository     Number of Days with acute medication use per week: 7      Current Prophylactic Headache Medications:  -- Magnesium 400 mg  -- Metoprolol 100 mg     Previous headache treatment that was ineffective or not tolerated:  Acute: Nurtec 75 mg ($$$)  Preventive: Topiramate 25 mg   Devices:      Procedures:  1/2025 - cervical radiofrequency ablation    Current Medications:  Current Medications[1]    Review of Systems - A review of 10+ systems was conducted with pertinent positive and negative findings documented in HPI with all other systems reviewed and negative.    PFSH: Past medical, family, and social history reviewed as documented in chart with pertinent positive medical, family, and social history detailed in HPI.    OBJECTIVE:  Vitals:  /84 (Patient Position: Sitting)   Pulse 85   Wt 101.1 kg (222 lb 14.2 oz)   BMI 33.89 kg/m²      Physical Exam:  Constitutional: she appears well-developed and well-nourished. she is well groomed. NAD     Review of Data:   Notes from pulmonology, urology, Dr. Soni reviewed   Labs:  Lab Requisition on 08/05/2025   Component Date Value Ref Range Status    Color, UA 08/05/2025 Yellow  Yellow, Straw, Flor Final    Appearance, UA 08/05/2025 Clear  Clear Final    Spec Grav UA 08/05/2025 1.025  1.005 - 1.030 Final    pH, UA 08/05/2025 6.0  5.0 - 8.0 Final    Protein, UA 08/05/2025 Negative  Negative Final    Glucose, UA 08/05/2025 Negative  Negative Final    Ketones, UA 08/05/2025 Negative  Negative Final    Blood, UA 08/05/2025 Negative  Negative Final    Bilirubin, UA 08/05/2025 Negative  Negative Final    Urobilinogen, UA 08/05/2025 Negative  <2.0 EU/dL Final    Nitrites, UA 08/05/2025 Negative  Negative Final    Leukocyte Esterase, UA 08/05/2025  Negative  Negative Final   Office Visit on 07/25/2025   Component Date Value Ref Range Status    POC Residual Urine Volume 07/25/2025 18  0 - 100 mL Final   Lab Visit on 07/24/2025   Component Date Value Ref Range Status    Lipase Level 07/24/2025 110 (H)  4 - 60 U/L Final    TSH 07/24/2025 1.786  0.340 - 5.600 uIU/mL Final    Sodium 07/24/2025 142  136 - 145 mmol/L Final    Potassium 07/24/2025 4.0  3.5 - 5.1 mmol/L Final    Chloride 07/24/2025 106  95 - 110 mmol/L Final    CO2 07/24/2025 28  23 - 29 mmol/L Final    Glucose 07/24/2025 99  70 - 110 mg/dL Final    BUN 07/24/2025 12  8 - 23 mg/dL Final    Creatinine 07/24/2025 1.1  0.5 - 1.4 mg/dL Final    Calcium 07/24/2025 9.2  8.7 - 10.5 mg/dL Final    Protein Total 07/24/2025 6.5  6.0 - 8.4 gm/dL Final    Albumin 07/24/2025 4.1  3.5 - 5.2 g/dL Final    Bilirubin Total 07/24/2025 0.5  0.1 - 1.0 mg/dL Final    ALP 07/24/2025 86  55 - 135 unit/L Final    AST 07/24/2025 21  10 - 40 unit/L Final    ALT 07/24/2025 16  10 - 44 unit/L Final    Anion Gap 07/24/2025 8  8 - 16 mmol/L Final    eGFR 07/24/2025 53 (L)  >60 mL/min/1.73/m2 Final    Color, UA 07/24/2025 Yellow  Yellow, Straw, Flor Final    Appearance, UA 07/24/2025 Hazy (A)  Clear Final    Spec Grav UA 07/24/2025 >=1.030 (A)  1.005 - 1.030 Final    pH, UA 07/24/2025 6.0  5.0 - 8.0 Final    Protein, UA 07/24/2025 2+ (A)  Negative Final    Glucose, UA 07/24/2025 Negative  Negative Final    Ketones, UA 07/24/2025 Trace (A)  Negative Final    Blood, UA 07/24/2025 2+ (A)  Negative Final    Bilirubin, UA 07/24/2025 Negative  Negative Final    Urobilinogen, UA 07/24/2025 2.0-3.0 (A)  <2.0 EU/dL Final    Nitrites, UA 07/24/2025 Negative  Negative Final    Leukocyte Esterase, UA 07/24/2025 3+ (A)  Negative Final    WBC 07/24/2025 8.76  3.90 - 12.70 K/uL Final    RBC 07/24/2025 4.31  4.00 - 5.40 M/uL Final    Hgb 07/24/2025 12.7  12.0 - 16.0 gm/dL Final    Hct 07/24/2025 39.6  37.0 - 48.5 % Final    MCV 07/24/2025 92  82 -  98 fL Final    MCH 07/24/2025 29.5  27.0 - 31.0 pg Final    MCHC 07/24/2025 32.1  32.0 - 36.0 g/dL Final    RDW 07/24/2025 13.3  11.5 - 14.5 % Final    Platelet Count 07/24/2025 272  150 - 450 K/uL Final    MPV 07/24/2025 10.6  9.2 - 12.9 fL Final    Nucleated RBC 07/24/2025 0  <=0 /100 WBC Final    Neut % 07/24/2025 54.4  38 - 73 % Final    Lymph % 07/24/2025 31.5  18 - 48 % Final    Mono % 07/24/2025 10.0  4 - 15 % Final    Eos % 07/24/2025 3.1  0 - 8 % Final    Basophil % 07/24/2025 0.7  <=1.9 % Final    Imm Grans % 07/24/2025 0.3  0.0 - 0.5 % Final    Neut # 07/24/2025 4.8  1.8 - 7.7 K/uL Final    Lymph # 07/24/2025 2.76  1 - 4.8 K/uL Final    Mono # 07/24/2025 0.88  0.3 - 1 K/uL Final    Eos # 07/24/2025 0.27  <=0.5 K/uL Final    Baso # 07/24/2025 0.06  <=0.2 K/uL Final    Imm Grans # 07/24/2025 0.03  0.00 - 0.04 K/uL Final    RBC, UA 07/24/2025 45 (H)  <=4 /HPF Final    WBC, UA 07/24/2025 >100 (H)  <=5 /HPF Final    Bacteria, UA 07/24/2025 Rare  None, Rare, Occasional /HPF Final    Yeast, UA 07/24/2025 Rare (A)  None Final    Squamous Epithelial Cells, UA 07/24/2025 2  /HPF Final    Calcium Oxalate Crystals, UA 07/24/2025 Few  None, Rare, Occasional, Few, Moderate Final    Microscopic Comment 07/24/2025    Final    Urine Culture 07/24/2025 >100,000 cfu/ml Escherichia coli ESBL (A)   Final   Lab Visit on 07/05/2025   Component Date Value Ref Range Status    Color, UA 07/05/2025 Yellow  Yellow, Straw, Flor Final    Appearance, UA 07/05/2025 Clear  Clear Final    Spec Grav UA 07/05/2025 1.020  1.005 - 1.030 Final    pH, UA 07/05/2025 6.0  5.0 - 8.0 Final    Protein, UA 07/05/2025 Negative  Negative Final    Glucose, UA 07/05/2025 Negative  Negative Final    Ketones, UA 07/05/2025 Negative  Negative Final    Blood, UA 07/05/2025 Negative  Negative Final    Bilirubin, UA 07/05/2025 Negative  Negative Final    Urobilinogen, UA 07/05/2025 Negative  <2.0 EU/dL Final    Nitrites, UA 07/05/2025 Negative  Negative Final     Leukocyte Esterase, UA 07/05/2025 1+ (A)  Negative Final    RBC, UA 07/05/2025 <1  <=4 /HPF Final    WBC, UA 07/05/2025 7 (H)  <=5 /HPF Final    Squamous Epithelial Cells, UA 07/05/2025 <1  /HPF Final    Microscopic Comment 07/05/2025    Final   Orders Only on 06/26/2025   Component Date Value Ref Range Status    QRS Duration 06/26/2025 88  ms Final    [facility] QTC Calculation 06/26/2025 452  ms Final   Admission on 06/26/2025, Discharged on 06/26/2025   Component Date Value Ref Range Status    Hep C Ab Interp 06/26/2025 Non-Reactive  Non-Reactive Final    Extra Tube 06/26/2025 Hold for add-ons.   Final    HIV 1/2 Ag/Ab 06/26/2025 Non-Reactive  Non-Reactive Final    Sodium 06/26/2025 137  136 - 145 mmol/L Final    Potassium 06/26/2025 3.8  3.5 - 5.1 mmol/L Final    Chloride 06/26/2025 102  95 - 110 mmol/L Final    CO2 06/26/2025 26  23 - 29 mmol/L Final    Glucose 06/26/2025 93  70 - 110 mg/dL Final    BUN 06/26/2025 5 (L)  8 - 23 mg/dL Final    Creatinine 06/26/2025 0.7  0.5 - 1.4 mg/dL Final    Calcium 06/26/2025 9.4  8.7 - 10.5 mg/dL Final    Protein Total 06/26/2025 6.6  6.0 - 8.4 gm/dL Final    Albumin 06/26/2025 3.0 (L)  3.5 - 5.2 g/dL Final    Bilirubin Total 06/26/2025 0.7  0.1 - 1.0 mg/dL Final    ALP 06/26/2025 104  40 - 150 unit/L Final    AST 06/26/2025 16  11 - 45 unit/L Final    ALT 06/26/2025 17  10 - 44 unit/L Final    Anion Gap 06/26/2025 9  8 - 16 mmol/L Final    eGFR 06/26/2025 >60  >60 mL/min/1.73/m2 Final    Lipase Level 06/26/2025 102 (H)  4 - 60 U/L Final    Color, UA 06/26/2025 Yellow  Straw, Flor, Yellow, Light-Orange Final    Appearance, UA 06/26/2025 Clear  Clear Final    pH, UA 06/26/2025 7.0  5.0 - 8.0 Final    Spec Grav UA 06/26/2025 1.010  1.005 - 1.030 Final    Protein, UA 06/26/2025 Negative  Negative Final    Glucose, UA 06/26/2025 Negative  Negative Final    Ketones, UA 06/26/2025 Negative  Negative Final    Bilirubin, UA 06/26/2025 Negative  Negative Final    Blood, UA 06/26/2025  Negative  Negative Final    Nitrites, UA 06/26/2025 Negative  Negative Final    Urobilinogen, UA 06/26/2025 Negative  <2.0 EU/dL Final    Leukocyte Esterase, UA 06/26/2025 1+ (A)  Negative Final    Magnesium  06/26/2025 2.0  1.6 - 2.6 mg/dL Final    WBC 06/26/2025 5.79  3.90 - 12.70 K/uL Final    RBC 06/26/2025 4.19  4.00 - 5.40 M/uL Final    HGB 06/26/2025 12.5  12.0 - 16.0 gm/dL Final    HCT 06/26/2025 37.3  37.0 - 48.5 % Final    MCV 06/26/2025 89  82 - 98 fL Final    MCH 06/26/2025 29.8  27.0 - 31.0 pg Final    MCHC 06/26/2025 33.5  32.0 - 36.0 g/dL Final    RDW 06/26/2025 13.6  11.5 - 14.5 % Final    Platelet Count 06/26/2025 370  150 - 450 K/uL Final    MPV 06/26/2025 9.8  9.2 - 12.9 fL Final    Nucleated RBC 06/26/2025 0  <=0 /100 WBC Final    Neut % 06/26/2025 46.8  38 - 73 % Final    Lymph % 06/26/2025 32.6  18 - 48 % Final    Mono % 06/26/2025 13.5  4 - 15 % Final    Eos % 06/26/2025 5.5  <=8 % Final    Basophil % 06/26/2025 0.7  <=1.9 % Final    Imm Grans % 06/26/2025 0.9 (H)  0.0 - 0.5 % Final    Neut # 06/26/2025 2.71  1.8 - 7.7 K/uL Final    Lymph # 06/26/2025 1.89  1 - 4.8 K/uL Final    Mono # 06/26/2025 0.78  0.3 - 1 K/uL Final    Eos # 06/26/2025 0.32  <=0.5 K/uL Final    Baso # 06/26/2025 0.04  <=0.2 K/uL Final    Imm Grans # 06/26/2025 0.05 (H)  0.00 - 0.04 K/uL Final    POC Glucose 06/26/2025 98  70 - 110 mg/dL Final    POC BUN 06/26/2025 4 (L)  6 - 30 mg/dL Final    POC Creatinine 06/26/2025 0.7  0.5 - 1.4 mg/dL Final    POC Sodium 06/26/2025 137  136 - 145 mmol/L Final    POC Potassium 06/26/2025 3.7  3.5 - 5.1 mmol/L Final    POC Chloride 06/26/2025 101  95 - 110 mmol/L Final    POC TCO2 (MEASURED) 06/26/2025 26  23 - 29 mmol/L Final    POC Ionized Calcium 06/26/2025 1.19  1.06 - 1.42 mmol/L Final    POC Hematocrit 06/26/2025 36  36 - 54 %PCV Final    Sample 06/26/2025 THALIA   Final    Extra Tube 06/26/2025 Hold for add-ons.   Final    RBC, UA 06/26/2025 1  0 - 4 /HPF Final    WBC, UA 06/26/2025  15 (H)  0 - 5 /HPF Final    Squamous Epithelial Cells, UA 06/26/2025 1  /HPF Final    Non-Squamous Epithelial Cells 06/26/2025 <1 (H)  <=0 /HPF Final    Hyaline Casts, UA 06/26/2025 4 (H)  0 - 1 /LPF Final    Microscopic Comment 06/26/2025    Final    Troponin High Sensitive 06/26/2025 3  <=14 ng/L Final    Urine Culture 06/26/2025 Multiple organisms isolated. None in predominance. Repeat if clinically necessary.   Final   Admission on 06/22/2025, Discharged on 06/25/2025   Component Date Value Ref Range Status    Sodium 06/23/2025 140  136 - 145 mmol/L Final    Potassium 06/23/2025 3.1 (L)  3.5 - 5.1 mmol/L Final    Chloride 06/23/2025 99  95 - 110 mmol/L Final    CO2 06/23/2025 23  23 - 29 mmol/L Final    Glucose 06/23/2025 119 (H)  70 - 110 mg/dL Final    BUN 06/23/2025 6 (L)  8 - 23 mg/dL Final    Creatinine 06/23/2025 0.7  0.5 - 1.4 mg/dL Final    Calcium 06/23/2025 9.0  8.7 - 10.5 mg/dL Final    Protein Total 06/23/2025 6.4  6.0 - 8.4 gm/dL Final    Albumin 06/23/2025 3.4 (L)  3.5 - 5.2 g/dL Final    Bilirubin Total 06/23/2025 1.5 (H)  0.1 - 1.0 mg/dL Final    ALP 06/23/2025 100  55 - 135 unit/L Final    AST 06/23/2025 31  10 - 40 unit/L Final    ALT 06/23/2025 23  10 - 44 unit/L Final    Anion Gap 06/23/2025 18 (H)  8 - 16 mmol/L Final    eGFR 06/23/2025 >60  >60 mL/min/1.73/m2 Final    CULTURE, BLOOD ([facility]) 06/23/2025 No Growth After 5 Days   Final    CULTURE, BLOOD (H) 06/23/2025 No Growth After 5 Days   Final    Lipase Level 06/23/2025 91 (H)  4 - 60 U/L Final    Color, UA 06/23/2025 Yellow  Yellow, Straw, Flor Final    Appearance, UA 06/23/2025 Clear  Clear Final    Spec Grav UA 06/23/2025 1.015  1.005 - 1.030 Final    pH, UA 06/23/2025 7.0  5.0 - 8.0 Final    Protein, UA 06/23/2025 Negative  Negative Final    Glucose, UA 06/23/2025 Negative  Negative Final    Ketones, UA 06/23/2025 Trace (A)  Negative Final    Blood, UA 06/23/2025 Negative  Negative Final    Bilirubin, UA 06/23/2025 Negative  Negative  Final    Urobilinogen, UA 06/23/2025 Negative  <2.0 EU/dL Final    Nitrites, UA 06/23/2025 Negative  Negative Final    Leukocyte Esterase, UA 06/23/2025 Negative  Negative Final    WBC 06/23/2025 7.37  3.90 - 12.70 K/uL Final    RBC 06/23/2025 4.13  4.00 - 5.40 M/uL Final    Hgb 06/23/2025 12.3  12.0 - 16.0 gm/dL Final    Hct 06/23/2025 37.1  37.0 - 48.5 % Final    MCV 06/23/2025 90  82 - 98 fL Final    MCH 06/23/2025 29.8  27.0 - 31.0 pg Final    MCHC 06/23/2025 33.2  32.0 - 36.0 g/dL Final    RDW 06/23/2025 14.2  11.5 - 14.5 % Final    Platelet Count 06/23/2025 320  150 - 450 K/uL Final    MPV 06/23/2025 10.2  9.2 - 12.9 fL Final    Nucleated RBC 06/23/2025 0  <=0 /100 WBC Final    Neut % 06/23/2025 55.0  38 - 73 % Final    Lymph % 06/23/2025 21.2  18 - 48 % Final    Mono % 06/23/2025 15.9 (H)  4 - 15 % Final    Eos % 06/23/2025 7.1  0 - 8 % Final    Basophil % 06/23/2025 0.3  <=1.9 % Final    Imm Grans % 06/23/2025 0.5  0.0 - 0.5 % Final    Neut # 06/23/2025 4.1  1.8 - 7.7 K/uL Final    Lymph # 06/23/2025 1.56  1 - 4.8 K/uL Final    Mono # 06/23/2025 1.17 (H)  0.3 - 1 K/uL Final    Eos # 06/23/2025 0.52 (H)  <=0.5 K/uL Final    Baso # 06/23/2025 0.02  <=0.2 K/uL Final    Imm Grans # 06/23/2025 0.04  0.00 - 0.04 K/uL Final    POC Lactate 06/23/2025 1.28  0.5 - 2.2 mmol/L Final    Sample 06/23/2025 VENOUS   Final    Cholesterol Total 06/23/2025 210 (H)  120 - 199 mg/dL Final    Triglyceride 06/23/2025 121  30 - 150 mg/dL Final    HDL Cholesterol 06/23/2025 56  40 - 75 mg/dL Final    LDL Cholesterol 06/23/2025 129.8  63.0 - 159.0 mg/dL Final    HDL/Cholesterol Ratio 06/23/2025 26.7  20.0 - 50.0 % Final    Cholesterol/HDL Ratio 06/23/2025 3.8  2.0 - 5.0 Final    Non HDL Cholesterol 06/23/2025 154  mg/dL Final    Lactic Acid Level 06/23/2025 0.7  0.5 - 1.9 mmol/L Final    Magnesium 06/24/2025 1.9  1.6 - 2.6 mg/dL Final    Phosphorus Level 06/24/2025 3.6  2.7 - 4.5 mg/dL Final    Sodium 06/24/2025 139  136 - 145 mmol/L  Final    Potassium 06/24/2025 3.5  3.5 - 5.1 mmol/L Final    Chloride 06/24/2025 106  95 - 110 mmol/L Final    CO2 06/24/2025 28  23 - 29 mmol/L Final    Glucose 06/24/2025 96  70 - 110 mg/dL Final    BUN 06/24/2025 3 (L)  8 - 23 mg/dL Final    Creatinine 06/24/2025 0.6  0.5 - 1.4 mg/dL Final    Calcium 06/24/2025 8.6 (L)  8.7 - 10.5 mg/dL Final    Protein Total 06/24/2025 5.4 (L)  6.0 - 8.4 gm/dL Final    Albumin 06/24/2025 2.8 (L)  3.5 - 5.2 g/dL Final    Bilirubin Total 06/24/2025 1.0  0.1 - 1.0 mg/dL Final    ALP 06/24/2025 79  55 - 135 unit/L Final    AST 06/24/2025 16  10 - 40 unit/L Final    ALT 06/24/2025 16  10 - 44 unit/L Final    Anion Gap 06/24/2025 5 (L)  8 - 16 mmol/L Final    eGFR 06/24/2025 >60  >60 mL/min/1.73/m2 Final    WBC 06/24/2025 6.27  3.90 - 12.70 K/uL Final    RBC 06/24/2025 3.68 (L)  4.00 - 5.40 M/uL Final    Hgb 06/24/2025 10.7 (L)  12.0 - 16.0 gm/dL Final    Hct 06/24/2025 33.3 (L)  37.0 - 48.5 % Final    MCV 06/24/2025 91  82 - 98 fL Final    MCH 06/24/2025 29.1  27.0 - 31.0 pg Final    MCHC 06/24/2025 32.1  32.0 - 36.0 g/dL Final    RDW 06/24/2025 14.4  11.5 - 14.5 % Final    Platelet Count 06/24/2025 286  150 - 450 K/uL Final    MPV 06/24/2025 9.9  9.2 - 12.9 fL Final    Nucleated RBC 06/24/2025 0  <=0 /100 WBC Final    Neut % 06/24/2025 48.9  38 - 73 % Final    Lymph % 06/24/2025 27.1  18 - 48 % Final    Mono % 06/24/2025 17.4 (H)  4 - 15 % Final    Eos % 06/24/2025 5.6  0 - 8 % Final    Basophil % 06/24/2025 0.5  <=1.9 % Final    Imm Grans % 06/24/2025 0.5  0.0 - 0.5 % Final    Neut # 06/24/2025 3.1  1.8 - 7.7 K/uL Final    Lymph # 06/24/2025 1.70  1 - 4.8 K/uL Final    Mono # 06/24/2025 1.09 (H)  0.3 - 1 K/uL Final    Eos # 06/24/2025 0.35  <=0.5 K/uL Final    Baso # 06/24/2025 0.03  <=0.2 K/uL Final    Imm Grans # 06/24/2025 0.03  0.00 - 0.04 K/uL Final    Magnesium 06/25/2025 1.9  1.6 - 2.6 mg/dL Final    Phosphorus Level 06/25/2025 4.3  2.7 - 4.5 mg/dL Final    WBC 06/25/2025  5.53  3.90 - 12.70 K/uL Final    RBC 06/25/2025 3.53 (L)  4.00 - 5.40 M/uL Final    Hgb 06/25/2025 10.3 (L)  12.0 - 16.0 gm/dL Final    Hct 06/25/2025 31.3 (L)  37.0 - 48.5 % Final    MCV 06/25/2025 89  82 - 98 fL Final    MCH 06/25/2025 29.2  27.0 - 31.0 pg Final    MCHC 06/25/2025 32.9  32.0 - 36.0 g/dL Final    RDW 06/25/2025 14.0  11.5 - 14.5 % Final    Platelet Count 06/25/2025 281  150 - 450 K/uL Final    MPV 06/25/2025 9.9  9.2 - 12.9 fL Final    Nucleated RBC 06/25/2025 0  <=0 /100 WBC Final    Neut % 06/25/2025 44.7  38 - 73 % Final    Lymph % 06/25/2025 30.0  18 - 48 % Final    Mono % 06/25/2025 17.7 (H)  4 - 15 % Final    Eos % 06/25/2025 6.7  0 - 8 % Final    Basophil % 06/25/2025 0.5  <=1.9 % Final    Imm Grans % 06/25/2025 0.4  0.0 - 0.5 % Final    Neut # 06/25/2025 2.5  1.8 - 7.7 K/uL Final    Lymph # 06/25/2025 1.66  1 - 4.8 K/uL Final    Mono # 06/25/2025 0.98  0.3 - 1 K/uL Final    Eos # 06/25/2025 0.37  <=0.5 K/uL Final    Baso # 06/25/2025 0.03  <=0.2 K/uL Final    Imm Grans # 06/25/2025 0.02  0.00 - 0.04 K/uL Final   Orders Only on 06/22/2025   Component Date Value Ref Range Status    QRS Duration 06/22/2025 92  ms Final    [facility] QTC Calculation 06/22/2025 440  ms Final   Lab Requisition on 06/12/2025   Component Date Value Ref Range Status    Color, UA 06/12/2025 Yellow  Yellow, Straw, Flor Final    Appearance, UA 06/12/2025 Clear  Clear Final    Spec Grav UA 06/12/2025 1.025  1.005 - 1.030 Final    pH, UA 06/12/2025 6.0  5.0 - 8.0 Final    Protein, UA 06/12/2025 Negative  Negative Final    Glucose, UA 06/12/2025 Negative  Negative Final    Ketones, UA 06/12/2025 Negative  Negative Final    Blood, UA 06/12/2025 Negative  Negative Final    Bilirubin, UA 06/12/2025 Negative  Negative Final    Urobilinogen, UA 06/12/2025 Negative  <2.0 EU/dL Final    Nitrites, UA 06/12/2025 Negative  Negative Final    Leukocyte Esterase, UA 06/12/2025 Negative  Negative Final   Lab Visit on 05/30/2025   Component  Date Value Ref Range Status    Sodium 05/30/2025 140  136 - 145 mmol/L Final    Potassium 05/30/2025 4.5  3.5 - 5.1 mmol/L Final    Chloride 05/30/2025 104  95 - 110 mmol/L Final    CO2 05/30/2025 27  23 - 29 mmol/L Final    Glucose 05/30/2025 116 (H)  70 - 110 mg/dL Final    BUN 05/30/2025 10  8 - 23 mg/dL Final    Creatinine 05/30/2025 0.9  0.5 - 1.4 mg/dL Final    Calcium 05/30/2025 9.6  8.7 - 10.5 mg/dL Final    Protein Total 05/30/2025 6.7  6.0 - 8.4 gm/dL Final    Albumin 05/30/2025 3.8  3.5 - 5.2 g/dL Final    Bilirubin Total 05/30/2025 0.5  0.1 - 1.0 mg/dL Final    ALP 05/30/2025 61  55 - 135 unit/L Final    AST 05/30/2025 19  10 - 40 unit/L Final    ALT 05/30/2025 16  10 - 44 unit/L Final    Anion Gap 05/30/2025 9  8 - 16 mmol/L Final    eGFR 05/30/2025 >60  >60 mL/min/1.73/m2 Final    Color, UA 05/30/2025 Brown (A)  Yellow, Straw, Flor Final    Appearance, UA 05/30/2025 Cloudy (A)  Clear Final    Spec Grav UA 05/30/2025 1.020  1.005 - 1.030 Final    pH, UA 05/30/2025 6.0  5.0 - 8.0 Final    Protein, UA 05/30/2025 1+ (A)  Negative Final    Glucose, UA 05/30/2025 Negative  Negative Final    Ketones, UA 05/30/2025 Negative  Negative Final    Blood, UA 05/30/2025 Trace (A)  Negative Final    Bilirubin, UA 05/30/2025 Negative  Negative Final    Urobilinogen, UA 05/30/2025 Negative  <2.0 EU/dL Final    Nitrites, UA 05/30/2025 Positive (A)  Negative Final    Leukocyte Esterase, UA 05/30/2025 3+ (A)  Negative Final    WBC 05/30/2025 6.97  3.90 - 12.70 K/uL Final    RBC 05/30/2025 4.38  4.00 - 5.40 M/uL Final    Hgb 05/30/2025 12.8  12.0 - 16.0 gm/dL Final    Hct 05/30/2025 39.3  37.0 - 48.5 % Final    MCV 05/30/2025 90  82 - 98 fL Final    MCH 05/30/2025 29.2  27.0 - 31.0 pg Final    MCHC 05/30/2025 32.6  32.0 - 36.0 g/dL Final    RDW 05/30/2025 13.4  11.5 - 14.5 % Final    Platelet Count 05/30/2025 312  150 - 450 K/uL Final    MPV 05/30/2025 10.2  9.2 - 12.9 fL Final    Nucleated RBC 05/30/2025 0  <=0 /100 WBC  Final    Neut % 05/30/2025 58.1  38 - 73 % Final    Lymph % 05/30/2025 27.5  18 - 48 % Final    Mono % 05/30/2025 9.3  4 - 15 % Final    Eos % 05/30/2025 4.3  0 - 8 % Final    Basophil % 05/30/2025 0.4  <=1.9 % Final    Imm Grans % 05/30/2025 0.4  0.0 - 0.5 % Final    Neut # 05/30/2025 4.0  1.8 - 7.7 K/uL Final    Lymph # 05/30/2025 1.92  1 - 4.8 K/uL Final    Mono # 05/30/2025 0.65  0.3 - 1 K/uL Final    Eos # 05/30/2025 0.30  <=0.5 K/uL Final    Baso # 05/30/2025 0.03  <=0.2 K/uL Final    Imm Grans # 05/30/2025 0.03  0.00 - 0.04 K/uL Final    RBC, UA 05/30/2025 4  <=4 /HPF Final    WBC, UA 05/30/2025 >100 (H)  <=5 /HPF Final    WBC Clumps, UA 05/30/2025 Many (A)  None Seen Final    Bacteria, UA 05/30/2025 Few (A)  None, Rare, Occasional /HPF Final    Squamous Epithelial Cells, UA 05/30/2025 2  /HPF Final    Hyaline Casts, UA 05/30/2025 22 (H)  0 - 1 /LPF Final    Microscopic Comment 05/30/2025    Final    Urine Culture 05/30/2025 >100,000 cfu/ml Escherichia coli ESBL (A)   Final   There may be more visits with results that are not included.     Imaging:  Results for orders placed or performed during the hospital encounter of 04/29/25   MRI Brain Without Contrast    Narrative    EXAMINATION:  MRI BRAIN WITHOUT CONTRAST    CLINICAL HISTORY:  Headache, new or worsening (Age >= 50y);    TECHNIQUE:  Multiplanar multisequence MR imaging of the brain was performed without IV contrast.    COMPARISON:  CT brain 04/21/2025.  MR brain 06/16/2022.    FINDINGS:  Gray matter distinction is preserved throughout the brain parenchyma.  The ventricles are midline without mass effect. There involutional changes of the brain parenchyma with ex vacuo dilatation of the ventricles.  Periventricular deep white matter FLAIR T2 hyperintensities are consistent with changes of chronic microvascular ischemia.  No intra-axial hemorrhage or restricted diffusion.  No intra-axial fluid collection or mass.  Bone marrow signal of the calvarium is  within normal limits.  Major vascular flow voids are within normal limits.  The orbits globes and optic nerves are grossly unremarkable.  Paranasal sinuses are unremarkable.      Impression    Chronic and involutional changes of the brain with no acute intracranial abnormality.      Electronically signed by: David Tello  Date:    05/01/2025  Time:    12:21   Results for orders placed or performed during the hospital encounter of 04/21/25   CT Head Without Contrast    Narrative    EXAMINATION:  CT HEAD WITHOUT CONTRAST    CLINICAL HISTORY:  Mental status change, unknown cause; Disorientation, unspecified    TECHNIQUE:  Low dose axial CT images obtained throughout the head without intravenous contrast. Sagittal and coronal reconstructions were performed.    COMPARISON:  Head CT 11/15/2024.    FINDINGS:  Brain: There is no evidence of a mass, edema, midline shift, or intracranial hemorrhage. No extra-axial fluid collection.  Grossly stable sequela of chronic small vessel ischemic and involutional changes.  No CT evidence of an acute major vascular territorial infarct.    Ventricles: The ventricles, sulci, and cisterns are within normal limits.    Skull: The osseous structures are unremarkable in appearance.    Extracranial soft tissues: Limited imaging is within normal limits.    Other: The visualized portions of the sinuses, orbits, and mastoid air cells are unremarkable.      Impression    1. No acute intracranial CT findings or adverse changes from 11/15/2024.      Electronically signed by: Ariel Moura  Date:    04/21/2025  Time:    13:20     Note: I have independently reviewed any/all imaging/labs/tests and agree with the report (s) as documented.  Any discrepancies will be as noted/demarcated by free text.  JOYCE PALMA 8/6/2025    ASSESSMENT/PLAN:  1. Chronic migraine without aura without status migrainosus, not intractable        IMPRESSION:  - Meets criteria for chronic migraine with >15 headache days/month and  >8 migraine days/month.  - Recommend proceeding with Botox treatment as initially suggested by Dr. Soni.  - Botox best treatment option for chronic migraine, noting safety profile and FDA approval since 2010.  - Discussed alternative CGRP-targeting medications (Aimovig, Emgality, Ajovy) as potential options if Botox is ineffective, but noted potential insurance coverage issues.  - Explained Botox is a long-term solution for chronic migraine, potentially requiring up to 3 rounds (every 12 weeks) to see full benefit.    CHRONIC MIGRAINE WITHOUT AURA WITHOUT STATUS MIGRAINOSUS, NOT INTRACTABLE:  - Addressed concerns about Botox's safety, clarifying it does not meaningfully affect the brain.  - Discussed widespread use of Botox for various conditions beyond migraines.  - Consider contacting Dr. Soni's staff to schedule Botox treatment.  - Continue Maxalt (rizatriptan) as rescue medication given its effectiveness; new prescription sent to [facility] with request for quantity increase to 16 or 18 tablets.  - Contact the office through the portal for the next 3 years if needed.         Orders Placed This Encounter    rizatriptan (MAXALT) 10 MG tablet    rizatriptan (MAXALT-MLT) 10 MG disintegrating tablet     Questions and concerns were sought and answered to the patient's stated verbal satisfaction.  The patient verbalizes understanding and agreement with the above stated treatment plan.     I spent a total of 40 minutes on the day of the visit.This includes face to face time and non-face to face time preparing to see the patient (eg, review of tests), obtaining and/or reviewing separately obtained history, documenting clinical information in the electronic or other health record, independently interpreting results and communicating results to the patient/family/caregiver, or care coordinator.    Visit today included increased complexity associated with the care of the episodic problem migraine without aura  addressed and managing the longitudinal care of the patient due to the serious and/or complex managed problem(s).       CC: Oscar Lee, BRYANT-BRYANT Elmore-ARMANI  [facility]  [phone]    Dr. Fields was available during today's encounter.     This note was generated with the assistance of ambient listening technology. Verbal consent was obtained by the patient and accompanying visitor(s) for the recording of patient appointment to facilitate this note. I attest to having reviewed and edited the generated note for accuracy, though some syntax or spelling errors may persist. Please contact the author of this note for any clarification.         [1]   Current Outpatient Medications:     acetaminophen (TYLENOL) 650 MG TbSR, Take 650 mg by mouth every 8 (eight) hours., Disp: , Rfl:     azelastine (ASTELIN) 137 mcg (0.1 %) nasal spray, 1 spray (137 mcg total) by Nasal route nightly. Point up and slightly outward toward ear when spraying to avoid irritating nasal septum. With flonase., Disp: 30 mL, Rfl: 2    cetirizine (ZYRTEC) 10 MG tablet, Take 10 mg by mouth every evening., Disp: , Rfl:     estradioL (ESTRACE) 0.01 % (0.1 mg/gram) vaginal cream, Place 1 gram vaginally twice a week., Disp: 42.5 g, Rfl: 2    fluticasone propionate (FLONASE) 50 mcg/actuation nasal spray, 1 spray by Each Nostril route every evening., Disp: , Rfl:     levothyroxine (SYNTHROID) 112 MCG tablet, Take 1 tablet (112 mcg total) by mouth before breakfast., Disp: 30 tablet, Rfl: 11    mag carb/aluminum hydrox/algin (GAVISCON EXTRA STRENGTH ORAL), Take 1 tablet by mouth 3 (three) times daily with meals. Pt takes 30min after meals takes tablet form, Disp: , Rfl:     magnesium oxide (MAG-OX) 400 mg (241.3 mg magnesium) tablet, Take 1 tablet by mouth once daily., Disp: , Rfl:     metoprolol succinate (TOPROL-XL) 100 MG 24 hr tablet, TAKE 1 TABLET(100 MG) BY MOUTH DAILY, Disp: 90 tablet, Rfl: 1     multivitamin (THERAGRAN) tablet, Take 1 tablet by mouth once daily., Disp: , Rfl:     ondansetron (ZOFRAN-ODT) 8 MG TbDL, Take 1 tablet (8 mg total) by mouth every 8 (eight) hours as needed (nausea and vomiting)., Disp: 20 tablet, Rfl: 1    promethazine (PHENERGAN) 25 MG suppository, Place 1 suppository (25 mg total) rectally every 6 (six) hours as needed for Nausea., Disp: 12 suppository, Rfl: 1    RABEprazole (ACIPHEX) 20 mg tablet, Take 1 tablet (20 mg total) by mouth 2 (two) times daily., Disp: 60 tablet, Rfl: 11    riboflavin, vitamin B2, 400 mg Tab, Take 400 mg by mouth once daily., Disp: 90 tablet, Rfl: 1    scopolamine (TRANSDERM-SCOP) 1.3-1.5 mg (1 mg over 3 days), Place 1 patch onto the skin Every 3 (three) days., Disp: 10 patch, Rfl: 3    sucralfate (CARAFATE) 100 mg/mL suspension, Take 10 mLs (1 g total) by mouth 4 (four) times daily., Disp: 450 mL, Rfl: 0    thiamine 100 MG tablet, Take 100 mg by mouth once daily., Disp: , Rfl:     tiZANidine (ZANAFLEX) 4 MG tablet, Take 1 tablet (4 mg total) by mouth 2 (two) times daily as needed (headache)., Disp: 60 tablet, Rfl: 3    triamcinolone acetonide 0.1% (KENALOG) 0.1 % cream, Apply topically 2 (two) times daily., Disp: 80 g, Rfl: 1    vibegron 75 mg Tab, Take 1 tablet (75 mg total) by mouth once daily., Disp: 30 tablet, Rfl: 11    HYDROcodone-acetaminophen (NORCO) 5-325 mg per tablet, Take 1 tablet by mouth every 6 (six) hours as needed for Pain. (Patient not taking: Reported on 8/6/2025), Disp: 20 tablet, Rfl: 0    rizatriptan (MAXALT) 10 MG tablet, Take 1 tablet (10 mg total) by mouth every 2 (two) hours as needed for Migraine., Disp: 16 tablet, Rfl: 2    rizatriptan (MAXALT-MLT) 10 MG disintegrating tablet, Take 1 tablet (10 mg total) by mouth every 2 (two) hours as needed for Migraine. Max 30 mg/day., Disp: 12 tablet, Rfl: 5    Current Facility-Administered Medications:     onabotulinumtoxina injection 200 Units, 200 Units, Intramuscular, Q90 Days,

## 2025-08-06 NOTE — TELEPHONE ENCOUNTER
----- Message from BRYANT Rosario sent at 8/6/2025 10:33 AM CDT -----  Good morning,   I just saw this very pleasant patient of yours who you saw back in May.  They were somewhat hesitant to start Botox.  The daughter has a few friends who are my patients and requested my opinion on starting Botox.  I assured them Botox was a great option for her and frankly would be my recommendation as well.  They would like to proceed with Botox with you in [place] as they are located in [place].    Could a member of your staff please reach out to get her scheduled to initiate Botox?    Thanks,  Jacquelyn Sanchez

## 2025-08-06 NOTE — TELEPHONE ENCOUNTER
Spoke with patient's daughter regarding scheduling a Botox appointment and 6 week follow up after Botox. Appointments scheduled with Dr. Soni.

## 2025-08-07 ENCOUNTER — TELEPHONE (OUTPATIENT)
Dept: ENDOSCOPY | Facility: HOSPITAL | Age: 75
End: 2025-08-07
Payer: MEDICARE

## 2025-08-07 NOTE — TELEPHONE ENCOUNTER
Called daughter Allison to clarify that the EGD on 9/8 should be cancelled and that the one scheduled for 8/21 should be kept. Allison agreed and EGD on 9/8 cancelled and removed as duplicate.

## 2025-08-08 DIAGNOSIS — R11.2 NAUSEA AND VOMITING, UNSPECIFIED VOMITING TYPE: ICD-10-CM

## 2025-08-11 ENCOUNTER — HOSPITAL ENCOUNTER (OUTPATIENT)
Dept: PULMONOLOGY | Facility: HOSPITAL | Age: 75
Discharge: HOME | End: 2025-08-11
Attending: NURSE PRACTITIONER
Payer: MEDICARE

## 2025-08-11 DIAGNOSIS — R11.2 NAUSEA AND VOMITING, UNSPECIFIED VOMITING TYPE: ICD-10-CM

## 2025-08-11 DIAGNOSIS — R06.02 SHORTNESS OF BREATH: ICD-10-CM

## 2025-08-11 PROCEDURE — 94618 PULMONARY STRESS TESTING: CPT | Performed by: CLINIC/CENTER

## 2025-08-11 PROCEDURE — 94010 BREATHING CAPACITY TEST: CPT | Performed by: CLINIC/CENTER

## 2025-08-11 PROCEDURE — 94729 DIFFUSING CAPACITY: CPT | Performed by: CLINIC/CENTER

## 2025-08-11 PROCEDURE — 94727 GAS DIL/WSHOT DETER LNG VOL: CPT | Performed by: CLINIC/CENTER

## 2025-08-11 RX ORDER — ONDANSETRON 8 MG/1
8 TABLET, ORALLY DISINTEGRATING ORAL EVERY 8 HOURS PRN
Qty: 20 TABLET | Refills: 1 | Status: SHIPPED | OUTPATIENT
Start: 2025-08-11

## 2025-08-11 RX ORDER — ONDANSETRON 8 MG/1
TABLET, ORALLY DISINTEGRATING ORAL
Qty: 20 TABLET | Refills: 1 | OUTPATIENT
Start: 2025-08-11

## 2025-08-13 ENCOUNTER — PATIENT MESSAGE (OUTPATIENT)
Dept: PULMONOLOGY | Facility: CLINIC | Age: 75
End: 2025-08-13
Payer: MEDICARE

## 2025-08-15 DIAGNOSIS — G47.30 SLEEP APNEA, UNSPECIFIED TYPE: Primary | ICD-10-CM

## 2025-08-19 ENCOUNTER — PROCEDURE VISIT (OUTPATIENT)
Dept: NEUROLOGY | Facility: CLINIC | Age: 75
End: 2025-08-19
Payer: MEDICARE

## 2025-08-19 DIAGNOSIS — G43.709 CHRONIC MIGRAINE WITHOUT AURA WITHOUT STATUS MIGRAINOSUS, NOT INTRACTABLE: Primary | ICD-10-CM

## 2025-08-19 PROCEDURE — 64615 CHEMODENERV MUSC MIGRAINE: CPT | Mod: S$GLB,,, | Performed by: INTERNAL MEDICINE

## 2025-08-21 ENCOUNTER — ANESTHESIA EVENT (OUTPATIENT)
Dept: ENDOSCOPY | Facility: HOSPITAL | Age: 75
End: 2025-08-21
Payer: MEDICARE

## 2025-08-21 ENCOUNTER — ANESTHESIA (OUTPATIENT)
Dept: ENDOSCOPY | Facility: HOSPITAL | Age: 75
End: 2025-08-21
Payer: MEDICARE

## 2025-08-21 ENCOUNTER — HOSPITAL ENCOUNTER (OUTPATIENT)
Facility: HOSPITAL | Age: 75
Discharge: HOME | End: 2025-08-21
Attending: INTERNAL MEDICINE | Admitting: INTERNAL MEDICINE
Payer: MEDICARE

## 2025-08-21 VITALS
BODY MASS INDEX: 33.8 KG/M2 | HEART RATE: 80 BPM | SYSTOLIC BLOOD PRESSURE: 136 MMHG | RESPIRATION RATE: 20 BRPM | OXYGEN SATURATION: 100 % | TEMPERATURE: 98 F | DIASTOLIC BLOOD PRESSURE: 68 MMHG | WEIGHT: 223 LBS | HEIGHT: 68 IN

## 2025-08-21 DIAGNOSIS — R11.14 BILIOUS VOMITING WITH NAUSEA: Primary | ICD-10-CM

## 2025-08-21 DIAGNOSIS — J98.4 RESTRICTIVE LUNG DISEASE: Primary | ICD-10-CM

## 2025-08-21 DIAGNOSIS — J98.11 ATELECTASIS: ICD-10-CM

## 2025-08-21 DIAGNOSIS — R11.2 NAUSEA AND VOMITING: ICD-10-CM

## 2025-08-21 PROCEDURE — 43235 EGD DIAGNOSTIC BRUSH WASH: CPT | Mod: ,,, | Performed by: INTERNAL MEDICINE

## 2025-08-21 PROCEDURE — 25000003 PHARM REV CODE 250: Performed by: INTERNAL MEDICINE

## 2025-08-21 PROCEDURE — 37000009 HC ANESTHESIA EA ADD 15 MINS: Performed by: INTERNAL MEDICINE

## 2025-08-21 PROCEDURE — 43235 EGD DIAGNOSTIC BRUSH WASH: CPT | Performed by: INTERNAL MEDICINE

## 2025-08-21 PROCEDURE — 25000003 PHARM REV CODE 250

## 2025-08-21 PROCEDURE — 63600175 PHARM REV CODE 636 W HCPCS

## 2025-08-21 PROCEDURE — 37000008 HC ANESTHESIA 1ST 15 MINUTES: Performed by: INTERNAL MEDICINE

## 2025-08-21 RX ORDER — FAMOTIDINE 10 MG/ML
INJECTION, SOLUTION INTRAVENOUS
Status: DISCONTINUED | OUTPATIENT
Start: 2025-08-21 | End: 2025-08-21

## 2025-08-21 RX ORDER — LIDOCAINE HYDROCHLORIDE 20 MG/ML
INJECTION INTRAVENOUS
Status: DISCONTINUED | OUTPATIENT
Start: 2025-08-21 | End: 2025-08-21

## 2025-08-21 RX ORDER — SUCCINYLCHOLINE CHLORIDE 20 MG/ML
INJECTION INTRAMUSCULAR; INTRAVENOUS
Status: DISCONTINUED | OUTPATIENT
Start: 2025-08-21 | End: 2025-08-21

## 2025-08-21 RX ORDER — ROCURONIUM BROMIDE 10 MG/ML
INJECTION, SOLUTION INTRAVENOUS
Status: DISCONTINUED | OUTPATIENT
Start: 2025-08-21 | End: 2025-08-21

## 2025-08-21 RX ORDER — PROPOFOL 10 MG/ML
VIAL (ML) INTRAVENOUS
Status: DISCONTINUED | OUTPATIENT
Start: 2025-08-21 | End: 2025-08-21

## 2025-08-21 RX ORDER — FAMOTIDINE 10 MG/ML
INJECTION, SOLUTION INTRAVENOUS
Status: COMPLETED
Start: 2025-08-21 | End: 2025-08-21

## 2025-08-21 RX ORDER — SODIUM CHLORIDE 9 MG/ML
INJECTION, SOLUTION INTRAVENOUS CONTINUOUS
Status: DISCONTINUED | OUTPATIENT
Start: 2025-08-21 | End: 2025-08-21 | Stop reason: HOSPADM

## 2025-08-21 RX ORDER — HALOPERIDOL LACTATE 5 MG/ML
0.5 INJECTION, SOLUTION INTRAMUSCULAR EVERY 10 MIN PRN
Status: DISCONTINUED | OUTPATIENT
Start: 2025-08-21 | End: 2025-08-21 | Stop reason: HOSPADM

## 2025-08-21 RX ORDER — PROCHLORPERAZINE EDISYLATE 5 MG/ML
5 INJECTION INTRAMUSCULAR; INTRAVENOUS EVERY 30 MIN PRN
Status: DISCONTINUED | OUTPATIENT
Start: 2025-08-21 | End: 2025-08-21 | Stop reason: HOSPADM

## 2025-08-21 RX ORDER — FENTANYL CITRATE 50 UG/ML
INJECTION, SOLUTION INTRAMUSCULAR; INTRAVENOUS
Status: DISCONTINUED | OUTPATIENT
Start: 2025-08-21 | End: 2025-08-21

## 2025-08-21 RX ORDER — ONDANSETRON HYDROCHLORIDE 2 MG/ML
INJECTION, SOLUTION INTRAVENOUS
Status: DISCONTINUED | OUTPATIENT
Start: 2025-08-21 | End: 2025-08-21

## 2025-08-21 RX ORDER — DEXAMETHASONE SODIUM PHOSPHATE 4 MG/ML
INJECTION, SOLUTION INTRA-ARTICULAR; INTRALESIONAL; INTRAMUSCULAR; INTRAVENOUS; SOFT TISSUE
Status: DISCONTINUED | OUTPATIENT
Start: 2025-08-21 | End: 2025-08-21

## 2025-08-21 RX ORDER — GLUCAGON 1 MG
1 KIT INJECTION
Status: DISCONTINUED | OUTPATIENT
Start: 2025-08-21 | End: 2025-08-21 | Stop reason: HOSPADM

## 2025-08-21 RX ADMIN — FAMOTIDINE 20 MG: 10 INJECTION, SOLUTION INTRAVENOUS at 10:08

## 2025-08-21 RX ADMIN — DEXAMETHASONE SODIUM PHOSPHATE 4 MG: 4 INJECTION, SOLUTION INTRAMUSCULAR; INTRAVENOUS at 10:08

## 2025-08-21 RX ADMIN — FENTANYL CITRATE 50 MCG: 50 INJECTION, SOLUTION INTRAMUSCULAR; INTRAVENOUS at 10:08

## 2025-08-21 RX ADMIN — SODIUM CHLORIDE: 0.9 INJECTION, SOLUTION INTRAVENOUS at 10:08

## 2025-08-21 RX ADMIN — ONDANSETRON 4 MG: 2 INJECTION INTRAMUSCULAR; INTRAVENOUS at 10:08

## 2025-08-21 RX ADMIN — LIDOCAINE HYDROCHLORIDE 70 MG: 20 INJECTION INTRAVENOUS at 10:08

## 2025-08-21 RX ADMIN — PROPOFOL 160 MG: 10 INJECTION, EMULSION INTRAVENOUS at 10:08

## 2025-08-21 RX ADMIN — ROCURONIUM BROMIDE 5 MG: 10 INJECTION INTRAVENOUS at 10:08

## 2025-08-21 RX ADMIN — SUCCINYLCHOLINE CHLORIDE 140 MG: 20 INJECTION, SOLUTION INTRAMUSCULAR; INTRAVENOUS; PARENTERAL at 10:08

## 2025-08-22 ENCOUNTER — TELEPHONE (OUTPATIENT)
Dept: PULMONOLOGY | Facility: CLINIC | Age: 75
End: 2025-08-22
Payer: MEDICARE

## 2025-08-27 ENCOUNTER — PATIENT MESSAGE (OUTPATIENT)
Dept: PULMONOLOGY | Facility: CLINIC | Age: 75
End: 2025-08-27
Payer: MEDICARE

## (undated) DEVICE — SET TUBE PNEUMOCLEAR SE HI FLO

## (undated) DEVICE — TROCAR ENDO Z THREAD KII 5X100

## (undated) DEVICE — GLOVE SENSICARE PI GRN 8

## (undated) DEVICE — SYS SEE SHARP SCP ANTIFG LNG

## (undated) DEVICE — COVER CAMERA OPERATING ROOM

## (undated) DEVICE — SOL NACL IRR 1000ML BTL

## (undated) DEVICE — NDL ECLIPSE SAFETY 23G 1.5IN

## (undated) DEVICE — APPLIER CLIP ENDO LIGAMAX 5MM

## (undated) DEVICE — KIT PROCEDURE STER INLET CLOSU

## (undated) DEVICE — ELECTRODE L HOOK 13IN 33M

## (undated) DEVICE — SOL NACL IRR 3000ML

## (undated) DEVICE — ELECTRODE REM PLYHSV RETURN 9

## (undated) DEVICE — TROCAR KII FIOS ZTHREAD 12X100

## (undated) DEVICE — ADHESIVE DERMABOND ADVANCED

## (undated) DEVICE — DISSECTOR KITTNER 5MM T 45CM S

## (undated) DEVICE — GLOVE SENSICARE PI MICRO 7.5

## (undated) DEVICE — SUT MCRYL PLUS 4-0 PS2 27IN

## (undated) DEVICE — Device

## (undated) DEVICE — NDL PNEUMO INSUFFLATI 120MM

## (undated) DEVICE — SUT VICRYL+ 27 UR-6 VIOL

## (undated) DEVICE — CANNULA LAP SEAL Z THRD 5X100

## (undated) DEVICE — COVER LIGHT HANDLE 80/CA

## (undated) DEVICE — CORD MPLR STR PLUG DISP 10FT

## (undated) DEVICE — IRRIGATOR SUCTION W/TIP

## (undated) DEVICE — SCISSOR 5MMX35CM DIRECT DRIVE

## (undated) DEVICE — BAG TISS RETRV MONARCH 10MM